# Patient Record
Sex: MALE | Race: OTHER | HISPANIC OR LATINO | URBAN - METROPOLITAN AREA
[De-identification: names, ages, dates, MRNs, and addresses within clinical notes are randomized per-mention and may not be internally consistent; named-entity substitution may affect disease eponyms.]

---

## 2019-02-03 ENCOUNTER — EMERGENCY (EMERGENCY)
Facility: HOSPITAL | Age: 29
LOS: 1 days | Discharge: HOME | End: 2019-02-03
Attending: EMERGENCY MEDICINE
Payer: SELF-PAY

## 2019-02-03 VITALS
RESPIRATION RATE: 18 BRPM | HEART RATE: 78 BPM | DIASTOLIC BLOOD PRESSURE: 64 MMHG | SYSTOLIC BLOOD PRESSURE: 112 MMHG | TEMPERATURE: 98 F | OXYGEN SATURATION: 100 %

## 2019-02-03 VITALS
HEART RATE: 111 BPM | SYSTOLIC BLOOD PRESSURE: 138 MMHG | WEIGHT: 136.91 LBS | HEIGHT: 64 IN | DIASTOLIC BLOOD PRESSURE: 83 MMHG | TEMPERATURE: 98 F | RESPIRATION RATE: 20 BRPM | OXYGEN SATURATION: 99 %

## 2019-02-03 LAB
ALBUMIN SERPL ELPH-MCNC: 4 G/DL — SIGNIFICANT CHANGE UP (ref 3.3–5)
ALP SERPL-CCNC: 247 U/L — HIGH (ref 40–120)
ALT FLD-CCNC: 65 U/L — HIGH (ref 10–45)
ANION GAP SERPL CALC-SCNC: 15 MMOL/L — SIGNIFICANT CHANGE UP (ref 5–17)
AST SERPL-CCNC: 70 U/L — HIGH (ref 10–40)
BASOPHILS # BLD AUTO: 0 K/UL — SIGNIFICANT CHANGE UP (ref 0–0.2)
BASOPHILS NFR BLD AUTO: 0.3 % — SIGNIFICANT CHANGE UP (ref 0–2)
BILIRUB SERPL-MCNC: 4.2 MG/DL — HIGH (ref 0.2–1.2)
BUN SERPL-MCNC: 15 MG/DL — SIGNIFICANT CHANGE UP (ref 7–23)
CALCIUM SERPL-MCNC: 9.5 MG/DL — SIGNIFICANT CHANGE UP (ref 8.4–10.5)
CHLORIDE SERPL-SCNC: 107 MMOL/L — SIGNIFICANT CHANGE UP (ref 96–108)
CO2 SERPL-SCNC: 20 MMOL/L — LOW (ref 22–31)
CREAT SERPL-MCNC: 0.85 MG/DL — SIGNIFICANT CHANGE UP (ref 0.5–1.3)
EOSINOPHIL # BLD AUTO: 0.1 K/UL — SIGNIFICANT CHANGE UP (ref 0–0.5)
EOSINOPHIL NFR BLD AUTO: 1.2 % — SIGNIFICANT CHANGE UP (ref 0–6)
GLUCOSE SERPL-MCNC: 119 MG/DL — HIGH (ref 70–99)
HCT VFR BLD CALC: 40.5 % — SIGNIFICANT CHANGE UP (ref 39–50)
HGB BLD-MCNC: 14.2 G/DL — SIGNIFICANT CHANGE UP (ref 13–17)
LIDOCAIN IGE QN: 26 U/L — SIGNIFICANT CHANGE UP (ref 7–60)
LYMPHOCYTES # BLD AUTO: 1.7 K/UL — SIGNIFICANT CHANGE UP (ref 1–3.3)
LYMPHOCYTES # BLD AUTO: 24.1 % — SIGNIFICANT CHANGE UP (ref 13–44)
MCHC RBC-ENTMCNC: 33.7 PG — SIGNIFICANT CHANGE UP (ref 27–34)
MCHC RBC-ENTMCNC: 35.1 GM/DL — SIGNIFICANT CHANGE UP (ref 32–36)
MCV RBC AUTO: 96.1 FL — SIGNIFICANT CHANGE UP (ref 80–100)
MONOCYTES # BLD AUTO: 0.5 K/UL — SIGNIFICANT CHANGE UP (ref 0–0.9)
MONOCYTES NFR BLD AUTO: 7.6 % — SIGNIFICANT CHANGE UP (ref 2–14)
NEUTROPHILS # BLD AUTO: 4.8 K/UL — SIGNIFICANT CHANGE UP (ref 1.8–7.4)
NEUTROPHILS NFR BLD AUTO: 66.9 % — SIGNIFICANT CHANGE UP (ref 43–77)
PLATELET # BLD AUTO: 424 K/UL — HIGH (ref 150–400)
POTASSIUM SERPL-MCNC: 3.7 MMOL/L — SIGNIFICANT CHANGE UP (ref 3.5–5.3)
POTASSIUM SERPL-SCNC: 3.7 MMOL/L — SIGNIFICANT CHANGE UP (ref 3.5–5.3)
PROT SERPL-MCNC: 7.5 G/DL — SIGNIFICANT CHANGE UP (ref 6–8.3)
RBC # BLD: 4.22 M/UL — SIGNIFICANT CHANGE UP (ref 4.2–5.8)
RBC # FLD: 12.1 % — SIGNIFICANT CHANGE UP (ref 10.3–14.5)
SODIUM SERPL-SCNC: 142 MMOL/L — SIGNIFICANT CHANGE UP (ref 135–145)
WBC # BLD: 7.2 K/UL — SIGNIFICANT CHANGE UP (ref 3.8–10.5)
WBC # FLD AUTO: 7.2 K/UL — SIGNIFICANT CHANGE UP (ref 3.8–10.5)

## 2019-02-03 PROCEDURE — 99284 EMERGENCY DEPT VISIT MOD MDM: CPT | Mod: 25

## 2019-02-03 PROCEDURE — 80053 COMPREHEN METABOLIC PANEL: CPT

## 2019-02-03 PROCEDURE — 82248 BILIRUBIN DIRECT: CPT

## 2019-02-03 PROCEDURE — 76705 ECHO EXAM OF ABDOMEN: CPT | Mod: 26,RT

## 2019-02-03 PROCEDURE — 83690 ASSAY OF LIPASE: CPT

## 2019-02-03 PROCEDURE — 99053 MED SERV 10PM-8AM 24 HR FAC: CPT

## 2019-02-03 PROCEDURE — 85027 COMPLETE CBC AUTOMATED: CPT

## 2019-02-03 PROCEDURE — 76705 ECHO EXAM OF ABDOMEN: CPT

## 2019-02-03 NOTE — ED ADULT NURSE NOTE - NSIMPLEMENTINTERV_GEN_ALL_ED
Implemented All Universal Safety Interventions:  [place] to call system. Call bell, personal items and telephone within reach. Instruct patient to call for assistance. Room bathroom lighting operational. Non-slip footwear when patient is off stretcher. Physically safe environment: no spills, clutter or unnecessary equipment. Stretcher in lowest position, wheels locked, appropriate side rails in place.

## 2019-02-03 NOTE — ED PROVIDER NOTE - NS ED ROS FT
General: no fevers or chills  Head: no headaches  Eyes: no vision changes  ENT:  no nasal discharge  CV: no chest pain, no palpitations  Resp: no SOB, no cough  GI: +nausea, +abdominal pain, no vomiting or diarrhea, no blood in stool  : no dysuria  MSK: no joint pain, no muscle aches  Skin: +itching  Neuro: no focal weakness, no change in sensation

## 2019-02-03 NOTE — ED PROVIDER NOTE - NSFOLLOWUPINSTRUCTIONS_ED_ALL_ED_FT
Please follow up with a gastroenterologist in the next 24-48 hours.    Return to the ED for any worsening symptoms of fevers or chills, worsening stomach pain, persistent vomiting, chest pain, difficulty breathing, or any new or concerning symptoms.    Please read all attached.

## 2019-02-03 NOTE — ED PROVIDER NOTE - PROGRESS NOTE DETAILS
Juana Ramirez, resident MD: discussed ultrasound results, no evidence of cholecystitis or hepatitic duct obstruction. discussed will require outpt follow up for management of elevated bilirubin and liver enzymes. gave copies of results and GI f/u information. return precautions were discussed.

## 2019-02-03 NOTE — ED PROVIDER NOTE - CARE PROVIDER_API CALL
Steve Mccartney (MD)  Gastroenterology; Internal Medicine  [address] [place] [address], [facility] [address]  [place] [address]  Phone: [phone]  Fax: [phone]

## 2019-02-03 NOTE — ED PROVIDER NOTE - MEDICAL DECISION MAKING DETAILS
27 yo man PMH cerebral palsy presents with itching, jaundice, and RUQ pain with +duckworth's on exam. concern for biliary etiology. was found to have enlarged gallbladder on ultrasound, elevated bilirubin, and transaminitis in [place]. will recheck labs and repeat RUQ ultrasound. no fever or history of fever. will pain control as needed and continue to monitor.

## 2019-02-03 NOTE — ED PROVIDER NOTE - PHYSICAL EXAMINATION
General: well-appearing young man in no acute distress  Head: normocephalic, atraumatic  Eyes: PERRL, mild scleral icterus  Mouth: moist mucous membranes, no oropharyngeal erythema, tongue and uvula midline  Neck: supple neck  CV: tachycardic, normal s1 and s2  Respiratory: clear to auscultation bilaterally  Abdomen: RUQ tenderness to palpation with +Roman's sign, soft, nondistended, bowel sounds present x 4 quadrants  Back: no midline tenderness to palpation, no CVAT  Neuro: alert and oriented x3, moving all extremities spontaneously  Skin: multiple excoriation on legs, feet, and arms  Extremities: no edema, peripheral pulses 2+ bilaterally

## 2019-02-03 NOTE — ED PROVIDER NOTE - OBJECTIVE STATEMENT
29 yo man PM cerebral palsy presents with itching and RUQ pain x 3 weeks. 29 yo man [facility] cerebral palsy presents with itching and RUQ pain x 3 weeks. Pt was in [place] x 2 months and began to have itching 3 weeks ago and was given medication to treat for scabies which did not help and labwork revealed transaminitis and elevated bili. He was then evaluated with RUQ ultrasound and was found to have hydropic gallbladder but no evidence of cholecystitis. Pt continued to have itching and then had RUQ pain yesterday and was seen in a hospital in [place] but did not pursue treatment. Pt currently does not complain of RUQ at rest, he denies worsening of pain with food intake. He continues to complain of itching and nausea but no vomiting. Pt has not had any fevers or chills during the past 3 weeks.

## 2019-02-03 NOTE — ED ADULT NURSE NOTE - CHPI ED NUR SYMPTOMS NEG
no dysuria/no blood in stool/no abdominal distension/no vomiting/no burning urination/no chills/no fever/no diarrhea/no hematuria

## 2019-02-03 NOTE — ED ADULT NURSE NOTE - OBJECTIVE STATEMENT
Pt presents to the ED with complaint of pruritis and 3 weeks of RUQ abdominal pain, pt states itching over entire body, lab work done in home area of [place] showed transaminitis, pt complaining of generalized itching, jaundice noted to sclera, abdomen nondistended, tender to palpation to RUQ, positive Roman's sign on exam, reports nausea, particularly after eating, no vomiting , no diarrhea, pt reports intermittent BRBPR, reportedly from hemmorhoids, none in the last week, no fevers or chills. Breathing unlabored, symmetrical, no shortness of breath, no chest pain. No dysuria or hematuria.

## 2019-02-09 VITALS — HEIGHT: 64 IN | WEIGHT: 132.94 LBS

## 2019-02-09 LAB
BASOPHILS # BLD AUTO: 0.07 K/UL — SIGNIFICANT CHANGE UP (ref 0–0.2)
BASOPHILS NFR BLD AUTO: 0.7 % — SIGNIFICANT CHANGE UP (ref 0–2)
EOSINOPHIL # BLD AUTO: 0.09 K/UL — SIGNIFICANT CHANGE UP (ref 0–0.5)
EOSINOPHIL NFR BLD AUTO: 0.9 % — SIGNIFICANT CHANGE UP (ref 0–6)
HCT VFR BLD CALC: 40.7 % — SIGNIFICANT CHANGE UP (ref 39–50)
HGB BLD-MCNC: 13.6 G/DL — SIGNIFICANT CHANGE UP (ref 13–17)
IMM GRANULOCYTES NFR BLD AUTO: 0.7 % — SIGNIFICANT CHANGE UP (ref 0–1.5)
LYMPHOCYTES # BLD AUTO: 1.34 K/UL — SIGNIFICANT CHANGE UP (ref 1–3.3)
LYMPHOCYTES # BLD AUTO: 12.9 % — LOW (ref 13–44)
MCHC RBC-ENTMCNC: 32.4 PG — SIGNIFICANT CHANGE UP (ref 27–34)
MCHC RBC-ENTMCNC: 33.4 GM/DL — SIGNIFICANT CHANGE UP (ref 32–36)
MCV RBC AUTO: 96.9 FL — SIGNIFICANT CHANGE UP (ref 80–100)
MONOCYTES # BLD AUTO: 0.59 K/UL — SIGNIFICANT CHANGE UP (ref 0–0.9)
MONOCYTES NFR BLD AUTO: 5.7 % — SIGNIFICANT CHANGE UP (ref 2–14)
NEUTROPHILS # BLD AUTO: 8.23 K/UL — HIGH (ref 1.8–7.4)
NEUTROPHILS NFR BLD AUTO: 79.1 % — HIGH (ref 43–77)
NRBC # BLD: 0 /100 WBCS — SIGNIFICANT CHANGE UP (ref 0–0)
PLATELET # BLD AUTO: 417 K/UL — HIGH (ref 150–400)
RBC # BLD: 4.2 M/UL — SIGNIFICANT CHANGE UP (ref 4.2–5.8)
RBC # FLD: 13.1 % — SIGNIFICANT CHANGE UP (ref 10.3–14.5)
WBC # BLD: 10.39 K/UL — SIGNIFICANT CHANGE UP (ref 3.8–10.5)
WBC # FLD AUTO: 10.39 K/UL — SIGNIFICANT CHANGE UP (ref 3.8–10.5)

## 2019-02-09 RX ORDER — SODIUM CHLORIDE 9 MG/ML
1000 INJECTION INTRAMUSCULAR; INTRAVENOUS; SUBCUTANEOUS ONCE
Qty: 0 | Refills: 0 | Status: COMPLETED | OUTPATIENT
Start: 2019-02-09 | End: 2019-02-09

## 2019-02-09 RX ORDER — FAMOTIDINE 10 MG/ML
20 INJECTION INTRAVENOUS ONCE
Qty: 0 | Refills: 0 | Status: COMPLETED | OUTPATIENT
Start: 2019-02-09 | End: 2019-02-09

## 2019-02-09 RX ORDER — ONDANSETRON 8 MG/1
4 TABLET, FILM COATED ORAL ONCE
Qty: 0 | Refills: 0 | Status: COMPLETED | OUTPATIENT
Start: 2019-02-09 | End: 2019-02-09

## 2019-02-09 RX ADMIN — FAMOTIDINE 20 MILLIGRAM(S): 10 INJECTION INTRAVENOUS at 23:58

## 2019-02-09 RX ADMIN — ONDANSETRON 4 MILLIGRAM(S): 8 TABLET, FILM COATED ORAL at 23:59

## 2019-02-09 RX ADMIN — SODIUM CHLORIDE 1000 MILLILITER(S): 9 INJECTION INTRAMUSCULAR; INTRAVENOUS; SUBCUTANEOUS at 23:58

## 2019-02-09 NOTE — ED ADULT TRIAGE NOTE - CHIEF COMPLAINT QUOTE
Pt presents to the ED for evaluation of R sided abdominal pain x2 days with +nausea and vomiting. Pt was diagnosed 1 wk ago in [place] with a "liver blockage". Denies fevers/chills.

## 2019-02-10 ENCOUNTER — INPATIENT (INPATIENT)
Facility: HOSPITAL | Age: 29
LOS: 1 days | Discharge: ANOTHER HOSPITAL | End: 2019-02-12
Attending: FAMILY MEDICINE | Admitting: FAMILY MEDICINE
Payer: MEDICAID

## 2019-02-10 DIAGNOSIS — R10.9 UNSPECIFIED ABDOMINAL PAIN: ICD-10-CM

## 2019-02-10 DIAGNOSIS — G80.9 CEREBRAL PALSY, UNSPECIFIED: ICD-10-CM

## 2019-02-10 DIAGNOSIS — K83.8 OTHER SPECIFIED DISEASES OF BILIARY TRACT: ICD-10-CM

## 2019-02-10 DIAGNOSIS — E80.7 DISORDER OF BILIRUBIN METABOLISM, UNSPECIFIED: ICD-10-CM

## 2019-02-10 DIAGNOSIS — K83.1 OBSTRUCTION OF BILE DUCT: ICD-10-CM

## 2019-02-10 DIAGNOSIS — K82.1 HYDROPS OF GALLBLADDER: ICD-10-CM

## 2019-02-10 LAB
ABO RH CONFIRMATION: SIGNIFICANT CHANGE UP
ALBUMIN SERPL ELPH-MCNC: 3.4 G/DL — SIGNIFICANT CHANGE UP (ref 3.3–5)
ALP SERPL-CCNC: 246 U/L — HIGH (ref 40–120)
ALT FLD-CCNC: 84 U/L — HIGH (ref 12–78)
ANION GAP SERPL CALC-SCNC: 12 MMOL/L — SIGNIFICANT CHANGE UP (ref 5–17)
APTT BLD: 32.8 SEC — SIGNIFICANT CHANGE UP (ref 27.5–36.3)
AST SERPL-CCNC: 77 U/L — HIGH (ref 15–37)
BILIRUB SERPL-MCNC: 4.6 MG/DL — HIGH (ref 0.2–1.2)
BLD GP AB SCN SERPL QL: SIGNIFICANT CHANGE UP
BUN SERPL-MCNC: 12 MG/DL — SIGNIFICANT CHANGE UP (ref 7–23)
CALCIUM SERPL-MCNC: 9 MG/DL — SIGNIFICANT CHANGE UP (ref 8.5–10.1)
CHLORIDE SERPL-SCNC: 104 MMOL/L — SIGNIFICANT CHANGE UP (ref 96–108)
CO2 SERPL-SCNC: 27 MMOL/L — SIGNIFICANT CHANGE UP (ref 22–31)
CREAT SERPL-MCNC: 0.95 MG/DL — SIGNIFICANT CHANGE UP (ref 0.5–1.3)
GLUCOSE SERPL-MCNC: 158 MG/DL — HIGH (ref 70–99)
INR BLD: 1.01 RATIO — SIGNIFICANT CHANGE UP (ref 0.88–1.16)
LIDOCAIN IGE QN: 171 U/L — SIGNIFICANT CHANGE UP (ref 73–393)
MAGNESIUM SERPL-MCNC: 2 MG/DL — SIGNIFICANT CHANGE UP (ref 1.6–2.6)
POTASSIUM SERPL-MCNC: 3.3 MMOL/L — LOW (ref 3.5–5.3)
POTASSIUM SERPL-SCNC: 3.3 MMOL/L — LOW (ref 3.5–5.3)
PROT SERPL-MCNC: 7.7 GM/DL — SIGNIFICANT CHANGE UP (ref 6–8.3)
PROTHROM AB SERPL-ACNC: 11.2 SEC — SIGNIFICANT CHANGE UP (ref 10–12.9)
SODIUM SERPL-SCNC: 143 MMOL/L — SIGNIFICANT CHANGE UP (ref 135–145)
TYPE + AB SCN PNL BLD: SIGNIFICANT CHANGE UP

## 2019-02-10 PROCEDURE — 99285 EMERGENCY DEPT VISIT HI MDM: CPT | Mod: 25

## 2019-02-10 PROCEDURE — 76705 ECHO EXAM OF ABDOMEN: CPT | Mod: 26

## 2019-02-10 RX ORDER — KETOROLAC TROMETHAMINE 30 MG/ML
30 SYRINGE (ML) INJECTION ONCE
Qty: 0 | Refills: 0 | Status: DISCONTINUED | OUTPATIENT
Start: 2019-02-10 | End: 2019-02-10

## 2019-02-10 RX ORDER — POTASSIUM CHLORIDE 20 MEQ
40 PACKET (EA) ORAL ONCE
Qty: 0 | Refills: 0 | Status: COMPLETED | OUTPATIENT
Start: 2019-02-10 | End: 2019-02-10

## 2019-02-10 RX ORDER — ACETAMINOPHEN 500 MG
650 TABLET ORAL EVERY 6 HOURS
Qty: 0 | Refills: 0 | Status: DISCONTINUED | OUTPATIENT
Start: 2019-02-10 | End: 2019-02-12

## 2019-02-10 RX ORDER — INFLUENZA VIRUS VACCINE 15; 15; 15; 15 UG/.5ML; UG/.5ML; UG/.5ML; UG/.5ML
0.5 SUSPENSION INTRAMUSCULAR ONCE
Qty: 0 | Refills: 0 | Status: DISCONTINUED | OUTPATIENT
Start: 2019-02-10 | End: 2019-02-12

## 2019-02-10 RX ADMIN — Medication 650 MILLIGRAM(S): at 09:45

## 2019-02-10 RX ADMIN — Medication 30 MILLIGRAM(S): at 00:10

## 2019-02-10 RX ADMIN — Medication 650 MILLIGRAM(S): at 09:05

## 2019-02-10 RX ADMIN — Medication 40 MILLIEQUIVALENT(S): at 02:13

## 2019-02-10 NOTE — ED ADULT NURSE NOTE - OBJECTIVE STATEMENT
Pt brought to ED complaining of right sided abdominal pain and back pain.  Pt states that he was in [place] 2 weeks ago and was diagnosed with a "liver blockage". Pt states that he returned approx a week ago and was taken directly to [facility]. Pt was discharged from their ED to follow up with a MRCP and possible ERCP. Pt states that he had the MRCP on Thursday and has not received any results as of yet. Pt presents with visible jaundice.

## 2019-02-10 NOTE — ED PROVIDER NOTE - NOTES
Recommends inpatient GI consult from Dr. Levi group for abnormal LFTs and he will consult concurrently for possible cholecystitis.

## 2019-02-10 NOTE — ED PROVIDER NOTE - MEDICAL DECISION MAKING DETAILS
Jaundice with abdominal pain, vomiting and abnormal LFTs last week. Likely will need transfer for higher level of care and ERCP, but will repeat labs and US in ED.

## 2019-02-10 NOTE — H&P ADULT - HISTORY OF PRESENT ILLNESS
29 y/o male presents with 3 week h/o intermittent RUQ pain along with N/V--saw dr yang 4 days ago and had MRCP performed (results not known)--presents with worsening RUQ pain and N/V after eating last night.  Sono shows mild intra and extrahepatic ductal dilation.  Given 1L IVF in ED  On my exam, stable vitals, NAD, awake and alert.  No fevers.  No hematemesis.  Normal BM.  No prior surgery and on no meds     Pt is FULL RESUSCITATION

## 2019-02-10 NOTE — H&P ADULT - ASSESSMENT
IMP:    27 y/o male presents with 3 week h/o intermittent RUQ pain along with N/V admitted with possible biliary disease secondary to stone--no evidence of acute cholecystitis     Plan:    RUQ pain--Consult dr yang for eval and MRCP report    PO diet as tolerated     Ambulation for DVT prophy    Above d/w mother at bedside

## 2019-02-10 NOTE — ED PROVIDER NOTE - PROGRESS NOTE DETAILS
JG:  Spoke with Surgery Dr. Fernando who will consult on patient in AM.  Recommends admission to medicine here at [facility] since workup being done by Dr. Connelly.  Awaiting MRCP results and reason for why LFTs abnormal from GI.  GI inpatient consult planned. Zosyn to be given in ED.

## 2019-02-10 NOTE — H&P ADULT - NSHPPHYSICALEXAM_GEN_ALL_CORE
Vital Signs Last 24 Hrs  T(C): 37 (09 Feb 2019 23:18), Max: 37 (09 Feb 2019 23:18)  T(F): 98.6 (09 Feb 2019 23:18), Max: 98.6 (09 Feb 2019 23:18)  HR: 64 (10 Feb 2019 06:47) (64 - 81)  BP: 121/71 (10 Feb 2019 06:47) (121/71 - 129/72)  BP(mean): --  RR: 16 (10 Feb 2019 06:47) (16 - 18)  SpO2: 100% (10 Feb 2019 06:47) (100% - 100%)

## 2019-02-10 NOTE — PATIENT PROFILE ADULT - NSPROGENDIFFINTUB_GEN_A_NUR
Ventricular Rate : 70   Atrial Rate : 70   P-R Interval : 158   QRS Duration : 74   Q-T Interval : 382   QTC Calculation(Bezet) : 412   P Axis : 49   R Axis : 35   T Axis : 12   Diagnosis : Normal sinus rhythm~Nonspecific T wave abnormality~****Abnormal ECG****~When compared with ECG of 06-MAR-2018 11:49,~No significant change was found~Confirmed by ABDELRAHMAN ENRIQUEZ MASOOD ([id number]) on 3/7/2018 3:33:27 PM      never intubated

## 2019-02-10 NOTE — ED PROVIDER NOTE - OBJECTIVE STATEMENT
Pt. is a 27 yo M with a PMHx of cerebral palsy not on any daily medications, with recent workup for biliary obstruction BIB mom for vomiting and RUQ abdominal pain radiating to mid back.  Pt. was in [place] ED where he had elevated liver enzymes and bilirubin but normal US of the RUQ.  Pt. was seen in [place] when the symptoms started about 3 wks ago.  His symptoms in [place] began as itching and intermittent abdominal pain with meals.  Pt. saw GI Dr. Aguilar Connelly in office last week and had outpatient MRCP 3 days ago without results yet.  Mom giving unknown medication for itching from GI specialist but patient has not taken anything for pain.  Pt. has never had surgery.  Mom states stools were gray in [facility] but patient states today he had normal bowel movements.

## 2019-02-10 NOTE — ED ADULT NURSE NOTE - NSIMPLEMENTINTERV_GEN_ALL_ED
Implemented All Fall Risk Interventions:  [place] to call system. Call bell, personal items and telephone within reach. Instruct patient to call for assistance. Room bathroom lighting operational. Non-slip footwear when patient is off stretcher. Physically safe environment: no spills, clutter or unnecessary equipment. Stretcher in lowest position, wheels locked, appropriate side rails in place. Provide visual cue, wrist band, yellow gown, etc. Monitor gait and stability. Monitor for mental status changes and reorient to person, place, and time. Review medications for side effects contributing to fall risk. Reinforce activity limits and safety measures with patient and family.

## 2019-02-10 NOTE — ED PROVIDER NOTE - MUSCULOSKELETAL, MLM
Spine appears normal, range of motion is not limited, no muscle or joint tenderness; no back tenderness.

## 2019-02-10 NOTE — H&P ADULT - NSHPLABSRESULTS_GEN_ALL_CORE
13.6   10.39 )-----------( 417      ( 09 Feb 2019 23:46 )             40.7       CBC Full  -  ( 09 Feb 2019 23:46 )  WBC Count : 10.39 K/uL  Hemoglobin : 13.6 g/dL  Hematocrit : 40.7 %  Platelet Count - Automated : 417 K/uL  Mean Cell Volume : 96.9 fl  Mean Cell Hemoglobin : 32.4 pg  Mean Cell Hemoglobin Concentration : 33.4 gm/dL  Auto Neutrophil # : 8.23 K/uL  Auto Lymphocyte # : 1.34 K/uL  Auto Monocyte # : 0.59 K/uL  Auto Eosinophil # : 0.09 K/uL  Auto Basophil # : 0.07 K/uL  Auto Neutrophil % : 79.1 %  Auto Lymphocyte % : 12.9 %  Auto Monocyte % : 5.7 %  Auto Eosinophil % : 0.9 %  Auto Basophil % : 0.7 %      02-09    143  |  104  |  12  ----------------------------<  158<H>  3.3<L>   |  27  |  0.95    Ca    9.0      09 Feb 2019 23:46  Mg     2.0     02-09    TPro  7.7  /  Alb  3.4  /  TBili  4.6<H>  /  DBili  x   /  AST  77<H>  /  ALT  84<H>  /  AlkPhos  246<H>  02-09      PT/INR - ( 09 Feb 2019 23:46 )   PT: 11.2 sec;   INR: 1.01 ratio         PTT - ( 09 Feb 2019 23:46 )  PTT:32.8 sec        LIVER FUNCTIONS - ( 09 Feb 2019 23:46 )  Alb: 3.4 g/dL / Pro: 7.7 gm/dL / ALK PHOS: 246 U/L / ALT: 84 U/L / AST: 77 U/L / GGT: x

## 2019-02-11 ENCOUNTER — TRANSCRIPTION ENCOUNTER (OUTPATIENT)
Age: 29
End: 2019-02-11

## 2019-02-11 LAB
ALBUMIN SERPL ELPH-MCNC: 3 G/DL — LOW (ref 3.3–5)
ALP SERPL-CCNC: 252 U/L — HIGH (ref 40–120)
ALT FLD-CCNC: 72 U/L — SIGNIFICANT CHANGE UP (ref 12–78)
ANION GAP SERPL CALC-SCNC: 10 MMOL/L — SIGNIFICANT CHANGE UP (ref 5–17)
AST SERPL-CCNC: 71 U/L — HIGH (ref 15–37)
BILIRUB DIRECT SERPL-MCNC: 5.4 MG/DL — HIGH (ref 0–0.2)
BILIRUB INDIRECT FLD-MCNC: 0.5 MG/DL — SIGNIFICANT CHANGE UP (ref 0.2–1)
BILIRUB SERPL-MCNC: 5.9 MG/DL — HIGH (ref 0.2–1.2)
BUN SERPL-MCNC: 12 MG/DL — SIGNIFICANT CHANGE UP (ref 7–23)
CALCIUM SERPL-MCNC: 8.9 MG/DL — SIGNIFICANT CHANGE UP (ref 8.5–10.1)
CHLORIDE SERPL-SCNC: 108 MMOL/L — SIGNIFICANT CHANGE UP (ref 96–108)
CO2 SERPL-SCNC: 22 MMOL/L — SIGNIFICANT CHANGE UP (ref 22–31)
CREAT SERPL-MCNC: 0.77 MG/DL — SIGNIFICANT CHANGE UP (ref 0.5–1.3)
GLUCOSE SERPL-MCNC: 85 MG/DL — SIGNIFICANT CHANGE UP (ref 70–99)
HAV IGM SER-ACNC: SIGNIFICANT CHANGE UP
HBV CORE IGM SER-ACNC: SIGNIFICANT CHANGE UP
HBV SURFACE AG SER-ACNC: SIGNIFICANT CHANGE UP
HCT VFR BLD CALC: 41.1 % — SIGNIFICANT CHANGE UP (ref 39–50)
HCV AB S/CO SERPL IA: 0.09 S/CO — SIGNIFICANT CHANGE UP
HCV AB SERPL-IMP: SIGNIFICANT CHANGE UP
HGB BLD-MCNC: 13.9 G/DL — SIGNIFICANT CHANGE UP (ref 13–17)
HIV 1+2 AB+HIV1 P24 AG SERPL QL IA: SIGNIFICANT CHANGE UP
MCHC RBC-ENTMCNC: 32.8 PG — SIGNIFICANT CHANGE UP (ref 27–34)
MCHC RBC-ENTMCNC: 33.8 GM/DL — SIGNIFICANT CHANGE UP (ref 32–36)
MCV RBC AUTO: 96.9 FL — SIGNIFICANT CHANGE UP (ref 80–100)
NRBC # BLD: 0 /100 WBCS — SIGNIFICANT CHANGE UP (ref 0–0)
PLATELET # BLD AUTO: 394 K/UL — SIGNIFICANT CHANGE UP (ref 150–400)
POTASSIUM SERPL-MCNC: 3.9 MMOL/L — SIGNIFICANT CHANGE UP (ref 3.5–5.3)
POTASSIUM SERPL-SCNC: 3.9 MMOL/L — SIGNIFICANT CHANGE UP (ref 3.5–5.3)
PROT SERPL-MCNC: 7.1 GM/DL — SIGNIFICANT CHANGE UP (ref 6–8.3)
RBC # BLD: 4.24 M/UL — SIGNIFICANT CHANGE UP (ref 4.2–5.8)
RBC # FLD: 13.4 % — SIGNIFICANT CHANGE UP (ref 10.3–14.5)
SODIUM SERPL-SCNC: 140 MMOL/L — SIGNIFICANT CHANGE UP (ref 135–145)
WBC # BLD: 7.22 K/UL — SIGNIFICANT CHANGE UP (ref 3.8–10.5)
WBC # FLD AUTO: 7.22 K/UL — SIGNIFICANT CHANGE UP (ref 3.8–10.5)

## 2019-02-11 RX ORDER — DIPHENHYDRAMINE HCL 50 MG
25 CAPSULE ORAL EVERY 6 HOURS
Qty: 0 | Refills: 0 | Status: DISCONTINUED | OUTPATIENT
Start: 2019-02-11 | End: 2019-02-12

## 2019-02-11 RX ORDER — DEXTROSE MONOHYDRATE, SODIUM CHLORIDE, AND POTASSIUM CHLORIDE 50; .745; 4.5 G/1000ML; G/1000ML; G/1000ML
1000 INJECTION, SOLUTION INTRAVENOUS
Qty: 0 | Refills: 0 | Status: DISCONTINUED | OUTPATIENT
Start: 2019-02-11 | End: 2019-02-12

## 2019-02-11 RX ORDER — DIPHENHYDRAMINE HCL 50 MG
25 CAPSULE ORAL
Qty: 0 | Refills: 0 | COMMUNITY
Start: 2019-02-11

## 2019-02-11 RX ORDER — KETOROLAC TROMETHAMINE 30 MG/ML
15 SYRINGE (ML) INJECTION
Qty: 0 | Refills: 0 | COMMUNITY
Start: 2019-02-11

## 2019-02-11 RX ORDER — KETOROLAC TROMETHAMINE 30 MG/ML
15 SYRINGE (ML) INJECTION EVERY 6 HOURS
Qty: 0 | Refills: 0 | Status: DISCONTINUED | OUTPATIENT
Start: 2019-02-11 | End: 2019-02-12

## 2019-02-11 RX ORDER — HYDROMORPHONE HYDROCHLORIDE 2 MG/ML
0.5 INJECTION INTRAMUSCULAR; INTRAVENOUS; SUBCUTANEOUS
Qty: 0 | Refills: 0 | COMMUNITY
Start: 2019-02-11

## 2019-02-11 RX ORDER — DEXTROSE MONOHYDRATE, SODIUM CHLORIDE, AND POTASSIUM CHLORIDE 50; .745; 4.5 G/1000ML; G/1000ML; G/1000ML
1000 INJECTION, SOLUTION INTRAVENOUS
Qty: 0 | Refills: 0 | COMMUNITY
Start: 2019-02-11

## 2019-02-11 RX ORDER — DEXTROSE MONOHYDRATE, SODIUM CHLORIDE, AND POTASSIUM CHLORIDE 50; .745; 4.5 G/1000ML; G/1000ML; G/1000ML
1000 INJECTION, SOLUTION INTRAVENOUS
Qty: 0 | Refills: 0 | Status: DISCONTINUED | OUTPATIENT
Start: 2019-02-11 | End: 2019-02-11

## 2019-02-11 RX ORDER — HYDROMORPHONE HYDROCHLORIDE 2 MG/ML
0.5 INJECTION INTRAMUSCULAR; INTRAVENOUS; SUBCUTANEOUS EVERY 4 HOURS
Qty: 0 | Refills: 0 | Status: DISCONTINUED | OUTPATIENT
Start: 2019-02-11 | End: 2019-02-12

## 2019-02-11 RX ADMIN — DEXTROSE MONOHYDRATE, SODIUM CHLORIDE, AND POTASSIUM CHLORIDE 75 MILLILITER(S): 50; .745; 4.5 INJECTION, SOLUTION INTRAVENOUS at 13:48

## 2019-02-11 RX ADMIN — Medication 25 MILLIGRAM(S): at 19:35

## 2019-02-11 RX ADMIN — Medication 25 MILLIGRAM(S): at 13:48

## 2019-02-11 NOTE — DISCHARGE NOTE ADULT - MEDICATION SUMMARY - MEDICATIONS TO TAKE
I will START or STAY ON the medications listed below when I get home from the hospital:    HYDROmorphone  -- 0.5 milligram(s) subcutaneous every 4 hours, As Needed for moderate pain   -- Indication: For pain    ketorolac  -- 15 milligram(s) intravenous every 8 hours, As Needed for moderate pain   -- Indication: For pain    diphenhydrAMINE  -- 25 milligram(s) by mouth every 6 hours, As Needed for pruritis  -- Indication: For pruritis    Sodium Chloride 0.9% with KCl 20 mEq/L intravenous solution  -- 1000 milliliter(s) intravenous   -- Indication: For Hydration I will START or STAY ON the medications listed below when I get home from the hospital:    HYDROmorphone  -- 0.5 milligram(s) subcutaneous every 4 hours, As Needed for moderate pain   -- Indication: For pain    ketorolac  -- 15 milligram(s) intravenous every 8 hours, As Needed for moderate pain   -- Indication: For pain    diphenhydrAMINE  -- 25 milligram(s) by mouth every 6 hours, As Needed for pruritis  -- Indication: For pruritis

## 2019-02-11 NOTE — DISCHARGE NOTE ADULT - PLAN OF CARE
evaluate Transfer to [facility] for ERCP and possible EUS Transfer to  [facility]  for ERCP and possible EUS

## 2019-02-11 NOTE — CONSULT NOTE ADULT - ASSESSMENT
28-year-old male with recurrent episodes of right upper quadrant pain associated with nausea and vomiting. Elevated LFTs.  MRCP with concern regarding biliary pathology.    Would suggest ERCP/EUS to evaluate further at tertiary care center. Discussed with hospitalist and she will arrange.    Rule out HIV cholangiopathy, rule out secondary causes. Rule out malignancy.

## 2019-02-11 NOTE — DISCHARGE NOTE ADULT - PATIENT PORTAL LINK FT
You can access the [facility] Patient Portal, offered by [facility], by registering with the following website: [email or web address]

## 2019-02-11 NOTE — DISCHARGE NOTE ADULT - OTHER SIGNIFICANT FINDINGS
MRI OF THE ABDOMEN WITH AND WITHOUT IV-CONTRAST   Clinical indication: Abnormal LFTs. Evaluate biliary obstruction. Elevated bilirubin, jaundice and pruritus.   Technique: Multiplanar T1, T2 and diffusion weighted acquisitions of the upper abdomen are obtained to include fat-suppressed volumetric T1-weighted fat-suppressed sequences obtained pre and post IV contrast administration as well as gradient echo in- and opposed-phase T1-weighted imaging. MRCP is also performed.   Contrast: 6.2 cc of IV-Gadavist    Comparison: Outside abdominal pelvic CT report dated 2/1/2019   Findings:   LIVER: Normal size with mild steatosis (that fraction 9.7%); no cirrhotic morphology. No evidence for discrete well-defined enhancing lesion however there is heterogeneous enhancement at the confluence of the right and left main intrahepatic ducts. Punctate less than 5 mm cyst in the right lobe posteriorly.   SPLEEN: Normal small splenule caudally   BILIARY SYSTEM: Mildly hydropic gallbladder without calculi or acute bladder inflammatory changes. Moderate intrahepatic bile duct dilatation in the left and right lobes extending to the confluence of the left and right main ducts where there is heterogeneous enhancement without a well-defined lesion or definite restricted diffusion (series 17, image 41). Nondilated common bile duct. No no appreciable 'beading' of the ducts. No evidence for calculi.   PANCREAS: No mass, main duct dilatation or atrophy.   ADRENALS: Normal   KIDNEYS: Normal   BOWEL: No bowel dilatation.   LYMPH NODES: No lymphadenopathy.   VASCULATURE: Patent portal, splenic and superior mesenteric veins. Normal caliber abdominal aorta.   PERITONEUM/ABDOMINAL WALL: No ascites.  Intact upper ventral wall.   SKELETAL: Normal marrow signal.   LUNG BASES:No infiltrate or effusion. Detailed lung evaluation is limited with MRI technique.   Electronic Signature: I personally reviewed the images and agree with this report. Final Report: Dictated by  and Signed by Attending Bharathi Del Rosario MD 2/7/2019 5:23 PM Impression   IMPRESSION: MILDLY HYDROPIC GALLBLADDER AND MODERATE INTRAHEPATIC BILE DUCT DILATATION EXTENDING TO THE CONFLUENCE OF THE RIGHT AND LEFT MAIN HEPATIC DUCTS WHERE THERE IS HETEROGENEOUS SIGNAL AND ENHANCEMENT WITHOUT DISCRETE WELL-DEFINED LESION OR EVIDENCE FOR BILE DUCT CALCULUS. NONDILATED COMMON BILE DUCT AND MAIN PANCREATIC DUCT.   FURTHER EVALUATION WITH CONVENTIONAL ERCP AND BRUSHINGS IS RECOMMENDED TO EXCLUDE EVIDENCE FOR CHOLANGIOCARCINOMA KLATSKIN TUMOR. ALTERNATIVE ETIOLOGIES INCLUDE BILIARY INFLAMMATORY PSEUDOTUMOR, AUTOIMMUNE CHOLANGITIS AND FIBROSING DISEASE.

## 2019-02-11 NOTE — CONSULT NOTE ADULT - SUBJECTIVE AND OBJECTIVE BOX
Patient is a 28y old  Male who presents with a chief complaint of RUQ pain (10 Feb 2019 09:00)      HPI:  27 y/o male presents with 3 week h/o intermittent RUQ pain along with N/V--saw dr yang 4 days ago and had MRCP performed (results not known)--presents with worsening RUQ pain and N/V after eating last night.  Sono shows mild intra and extrahepatic ductal dilation.  Given 1L IVF in ED  On my exam, stable vitals, NAD, awake and alert.  No fevers.  No hematemesis.  Normal BM.  No prior surgery and on no meds     Pt is FULL RESUSCITATION (10 Feb 2019 09:00)    History per patient and mother. Patient is a poor historian. Patient was seen by Dr. SUMI Yang as an outpatient.  Patient has been having recurrent episodes of abdominal pain in the right upper quadrant area associated with nausea and vomiting. He has a hard time describing it. He has lost about over 30 pounds per mother. Negative fevers. The pain has been recurrent became more severe for him.  He is a poor historian is a hard time describing the details of his symptoms.  Outside MRI was copied into my note below.  Case was discussed with hospitalist as well as nursing staff, and mother      PAST MEDICAL & SURGICAL HISTORY:  Cerebral palsy  No significant past surgical history      MEDICATIONS  (STANDING):  influenza   Vaccine 0.5 milliLiter(s) IntraMuscular once    MEDICATIONS  (PRN):  acetaminophen   Tablet .. 650 milliGRAM(s) Oral every 6 hours PRN Temp greater or equal to 38.5C (101.3F), Moderate Pain (4 - 6)      Allergies    No Known Allergies    Intolerances        SOCIAL HISTORY:poor historian, neg drugs    FAMILY HISTORY: NC, DW pt      REVIEW OF SYSTEMS:    CONSTITUTIONAL: No weakness, fevers or chills  EYES/ENT: No visual changes;  No vertigo or throat pain   NECK: No pain or stiffness  RESPIRATORY: No cough, wheezing, hemoptysis; No shortness of breath  CARDIOVASCULAR: No chest pain or palpitations  GENITOURINARY: No dysuria, frequency or hematuria  NEUROLOGICAL: No numbness or weakness  SKIN: No itching, burning, rashes, or lesions   All other review of systems is negative unless indicated above.    Vital Signs Last 24 Hrs  T(C): 36.4 (11 Feb 2019 05:13), Max: 36.8 (10 Feb 2019 09:44)  T(F): 97.6 (11 Feb 2019 05:13), Max: 98.3 (10 Feb 2019 10:58)  HR: 69 (11 Feb 2019 05:13) (69 - 80)  BP: 119/54 (11 Feb 2019 05:13) (119/54 - 132/55)  BP(mean): --  RR: 18 (11 Feb 2019 05:13) (16 - 18)  SpO2: 99% (11 Feb 2019 05:13) (97% - 100%)    PHYSICAL EXAM:    Constitutional: NAD, well-developed  HEENT: EOMI, throat clear  Neck: No LAD, supple  Respiratory: CTA and P  Cardiovascular: S1 and S2, RRR, no M  Gastrointestinal: BS+, soft, RUQ tenderness/ND, neg HSM,  Extremities: No peripheral edema, neg clubing, cyanosis  Vascular: 2+ peripheral pulses  Neurological: A/O x 3, no focal deficits  Psychiatric: Normal mood, normal affect  Skin: No rashes    LABS:  CBC Full  -  ( 11 Feb 2019 07:20 )  WBC Count : 7.22 K/uL  Hemoglobin : 13.9 g/dL  Hematocrit : 41.1 %  Platelet Count - Automated : 394 K/uL  Mean Cell Volume : 96.9 fl  Mean Cell Hemoglobin : 32.8 pg  Mean Cell Hemoglobin Concentration : 33.8 gm/dL  Auto Neutrophil # : x  Auto Lymphocyte # : x  Auto Monocyte # : x  Auto Eosinophil # : x  Auto Basophil # : x  Auto Neutrophil % : x  Auto Lymphocyte % : x  Auto Monocyte % : x  Auto Eosinophil % : x  Auto Basophil % : x    02-11    140  |  108  |  12  ----------------------------<  85  3.9   |  22  |  0.77    Ca    8.9      11 Feb 2019 07:20  Mg     2.0     02-09    TPro  7.1  /  Alb  3.0<L>  /  TBili  5.9<H>  /  DBili  5.4<H>  /  AST  71<H>  /  ALT  72  /  AlkPhos  252<H>  02-11    PT/INR - ( 09 Feb 2019 23:46 )   PT: 11.2 sec;   INR: 1.01 ratio         PTT - ( 09 Feb 2019 23:46 )  PTT:32.8 sec        RADIOLOGY & ADDITIONAL STUDIES:  < from: US Abdomen Limited (02.10.19 @ 00:47) >  EXAM:  US ABDOMEN LIMITED                            PROCEDURE DATE:  02/10/2019          INTERPRETATION:  CLINICAL INFORMATION: Right upper quadrant pain and   vomiting    COMPARISON: 2/3/2019    TECHNIQUE: Sonography of the right upper quadrant.     FINDINGS:    Liver: Within normal limits.    Bile ducts: Mild intrahepatic ductal dilatation. CBD dilatation up to 9   mm.    Gallbladder: Mildly distended. No wall thickening or pericholecystic   fluid. No cholelithiasis.    Pancreas: Not visualized due to overlying bowel gas.    Right kidney: 9.3 cm. No hydronephrosis.        Ascites: None.    IVC and aorta: Nonvisualized due to bowel.    IMPRESSION:     Mild intrahepatic and extrahepatic ductal dilatation of unclear etiology.   Correlation with MRCP reportedly performed 3 days ago is recommended.                TIFFANI GONZALEZ   This document has been electronically signed. Feb 10 2019 12:54AM    < end of copied text >      MRI ABDOMEN MR CHOLANGIOGRAM 3D RECONSTRUCTION WITH AND WITHOUT IV CONTRAST [[id number]]     Status: Final result	      Result Information     Status: Final result (Exam End: 2/7/2019 16:30)	Provider Status: Open	  Imaging Links      PACS Images    Show images for MRI ABDOMEN MR CHOLANGIOGRAM 3D RECONSTRUCTION WITH AND WITHOUT IV CONTRAST <epic:EPIC?ACTIVITY&RIS_PACS_REDIRECTOR&PATIENTDAT;77991.99&#7;9PATIENTID;  [id number]&#7;9RIS_ORDER_ID;[id number]&%[id number]%7C%[id number]&>  	 Virtual Consult <epic:EPIC?ACTIVITY&RIS_PACS_REDIRECTOR&PATIENTDAT;93187.99&#7;9PATIENTID;  [id number]&#7;9RIS_ORDER_ID;[id number]&%[id number]%[id number]&>  	    Enhanced Imaging Report      View Image <epic:EPIC?ACTIVITY&ED_FLOATING_BROWSER&PATIENTID;  [id number]&http%[email or web address]%[email or web address].aspx%3FencryptedKey%0HUXEXEVsrkDYgkeSx9s5dDvYxe5YxIAoeEndoeMVEZYlPQ3eHKritgx%129Zso9bQqA5U1uU29IcWs4l6XShThyvSqzYbd3G%253D%[email or web address]%7Cshowtoolbar%7Cprintenabled%7Chomeenabled%7Cshowvscroll%7Cshowiemenu&LAUNCH_OPTIONS;2>	    Result History   Order [id number]       Signed By     Signed	Date/Time		Phone	Pager	  FREDDYABIDAARTIE DANIEL	Feb 7, 2019	 5:23 PM	[phone]		  Impression     IMPRESSION: MILDLY HYDROPIC GALLBLADDER AND MODERATE INTRAHEPATIC BILE DUCT DILATATION EXTENDING TO THE CONFLUENCE OF THE RIGHT AND LEFT MAIN HEPATIC DUCTS WHERE THERE IS HETEROGENEOUS SIGNAL AND ENHANCEMENT WITHOUT DISCRETE WELL-DEFINED LESION OR EVIDENCE FOR BILE DUCT CALCULUS. NONDILATED COMMON BILE DUCT AND MAIN PANCREATIC DUCT.   FURTHER EVALUATION WITH CONVENTIONAL ERCP AND BRUSHINGS IS RECOMMENDED TO EXCLUDE EVIDENCE FOR CHOLANGIOCARCINOMA KLATSKIN TUMOR. ALTERNATIVE ETIOLOGIES INCLUDE BILIARY INFLAMMATORY PSEUDOTUMOR, AUTOIMMUNE CHOLANGITIS AND FIBROSING DISEASE.    	    	   	  Findings     MRI OF THE ABDOMEN WITH AND WITHOUT IV-CONTRAST   Clinical indication: Abnormal LFTs. Evaluate biliary obstruction. Elevated bilirubin, jaundice and pruritus.   Technique: Multiplanar T1, T2 and diffusion weighted acquisitions of the upper abdomen are obtained to include fat-suppressed volumetric T1-weighted fat-suppressed sequences obtained pre and post IV contrast administration as well as gradient echo in- and opposed-phase T1-weighted imaging. MRCP is also performed.   Contrast: 6.2 cc of IV-Gadavist    Comparison: Outside abdominal pelvic CT report dated 2/1/2019   Findings:   LIVER: Normal size with mild steatosis (that fraction 9.7%); no cirrhotic morphology. No evidence for discrete well-defined enhancing lesion however there is heterogeneous enhancement at the confluence of the right and left main intrahepatic ducts. Punctate less than 5 mm cyst in the right lobe posteriorly.   SPLEEN: Normal small splenule caudally   BILIARY SYSTEM: Mildly hydropic gallbladder without calculi or acute bladder inflammatory changes. Moderate intrahepatic bile duct dilatation in the left and right lobes extending to the confluence of the left and right main ducts where there is heterogeneous enhancement without a well-defined lesion or definite restricted diffusion (series 17, image 41). Nondilated common bile duct. No no appreciable 'beading' of the ducts. No evidence for calculi.   PANCREAS: No mass, main duct dilatation or atrophy.   ADRENALS: Normal   KIDNEYS: Normal   BOWEL: No bowel dilatation.   LYMPH NODES: No lymphadenopathy.   VASCULATURE: Patent portal, splenic and superior mesenteric veins. Normal caliber abdominal aorta.   PERITONEUM/ABDOMINAL WALL: No ascites.  Intact upper ventral wall.   SKELETAL: Normal marrow signal.   LUNG BASES:No infiltrate or effusion. Detailed lung evaluation is limited with MRI technique.   Electronic Signature: I personally reviewed the images and agree with this report. Final Report: Dictated by  and Signed by Attending Artie Del Rosario MD 2/7/2019 5:23 PM

## 2019-02-11 NOTE — DISCHARGE NOTE ADULT - CARE PLAN
Principal Discharge DX:	Biliary obstruction  Goal:	evaluate  Assessment and plan of treatment:	Transfer to [facility] for ERCP and possible EUS Principal Discharge DX:	Biliary obstruction  Goal:	evaluate  Assessment and plan of treatment:	Transfer to  [facility]  for ERCP and possible EUS

## 2019-02-11 NOTE — DISCHARGE NOTE ADULT - HOSPITAL COURSE
29 y/o male with PMH od Cerebral palsy presented to ED c/o  intermittent RUQ pain along with N/V  x 3 weeks, Pt was evaluated by  Dr Connelly  5 days ago and had MRCP performed (results  see below), Pt reported  worsening  of  RUQ pain and N/V after eating. + Pruritis and Mild jaundice. No fevers or chills.  Recent travel  to [place]    In ED     Sono showed  mild intra and extrahepatic ductal dilation.  Elevated Total Bili and LFTS.  Today Pt reports some improvement of pain and jaundice. Denies Fevers or chills. Results and plan for transfer for further evaluation dussed with Pt and mother at bedside and they agreed       Vital Signs Last 24 Hrs   T(C): 36.5 (11 Feb 2019 10:39), Max: 36.6 (10 Feb 2019 16:39)  T(F): 97.7 (11 Feb 2019 10:39), Max: 97.8 (10 Feb 2019 16:39)  HR: 71 (11 Feb 2019 10:39) (69 - 80)  BP: 156/70 (11 Feb 2019 10:39) (119/54 - 156/70)  RR: 19 (11 Feb 2019 10:39) (18 - 19)  SpO2: 100% (11 Feb 2019 10:39) (97% - 100%)    PHYSICAL EXAM:    General: Well developed; well nourished; in no acute distress  Eyes: PERRLA, EOMI; mild icterus   Head: Normocephalic; atraumatic  ENMT: No nasal discharge; airway clear  Neck: Supple; non tender; no masses  Respiratory: Good air enrty.  No wheezes, rales or rhonchi  Cardiovascular: Regular rate and rhythm. S1 and S2 Normal; No murmurs  Gastrointestinal: Soft non-tender non-distended; Normal bowel sounds  Genitourinary: No costovertebral angle tenderness  Extremities: Normal range of motion, No  edema  Vascular: Peripheral pulses palpable 2+ bilaterally  Neurological: Alert and oriented x4  Skin: Warm and dry. No acute rash  Lymph Nodes: No acute cervical adenopathy  Musculoskeletal: Normal muscle  tone, without deformities  Psychiatric: Cooperative and appropriate      A/p:       1. RUQ pain due to intrahepatic biliary Obstruction with elevated LFTS  No signs of cholangitis   VS stable  Bilirubin trending up  D/w Dr Gabbaizadeh, as per MRCP needs further ERCP and EUS  Clears   IVF  Control pain       2. DVT PPxs: ambulate         Dispo: Stable for transfer to [facility] for ERCP, Transfer center contacted, D/w GI and Hospitalist.  Plan for transfer when bed is available. Accepting physician DR Ayala     Total Time 60 min 29 y/o male with PMH od Cerebral palsy presented to ED c/o  intermittent RUQ pain along with N/V  x 3 weeks, Pt was evaluated by  Dr Connelly  5 days ago and had MRCP performed (results  see below), Pt reported  worsening  of  RUQ pain and N/V after eating. + Pruritis and Mild jaundice. No fevers or chills.  Recent travel  to [place]    In ED   Sono showed  mild intra and extrahepatic ductal dilation.  Elevated Total Bili and LFTS.  Today Pt reports  improvement of pain, + itchiness. Denies Fevers or chills. Results and plan for transfer for further evaluation discussed  with Pt and mother at bedside and they agreed     Vital Signs Last 24 Hrs  T(C): 36.7 (12 Feb 2019 05:10), Max: 36.8 (11 Feb 2019 17:07)  T(F): 98 (12 Feb 2019 05:10), Max: 98.2 (11 Feb 2019 17:07)  HR: 97 (12 Feb 2019 05:10) (68 - 97)  BP: 118/77 (12 Feb 2019 05:10) (118/77 - 122/49)  RR: 16 (11 Feb 2019 17:07) (16 - 16)  SpO2: 98% (12 Feb 2019 05:10) (98% - 100%)    PHYSICAL EXAM:  General: Well developed; well nourished; in no acute distress  Eyes: PERRLA, EOMI; mild icterus   Head: Normocephalic; atraumatic  ENMT: No nasal discharge; airway clear  Neck: Supple; non tender; no masses  Respiratory: Good air enrty.  No wheezes, rales or rhonchi  Cardiovascular: Regular rate and rhythm. S1 and S2 Normal; No murmurs  Gastrointestinal: Soft non-tender non-distended; Normal bowel sounds  Genitourinary: No costovertebral angle tenderness  Extremities: Normal range of motion, No  edema  Vascular: Peripheral pulses palpable 2+ bilaterally  Neurological: Alert and oriented x4  Skin: Warm and dry. No acute rash  Lymph Nodes: No acute cervical adenopathy  Musculoskeletal: Normal muscle  tone, without deformities  Psychiatric: Cooperative and appropriate      A/p:     1. RUQ pain due to intrahepatic biliary Obstruction with elevated LFTS and jaundice   No signs of cholangitis   VS stable  Bilirubin trending up  D/w Dr Gabbaizadeh, as per MRCP needs further ERCP and EUS  Control pain   Plan for transfer today     2. DVT PPxs: ambulate         Dispo: Stable for transfer to [facility]  for ERCP/EUS, Transfer center contacted, D/w Hospitalist. Accepting MD DR Shay. DR Lidia Howard accepted case     Total Time 60 min

## 2019-02-12 ENCOUNTER — INPATIENT (INPATIENT)
Facility: HOSPITAL | Age: 29
LOS: 5 days | Discharge: HOME | DRG: 444 | End: 2019-02-18
Attending: HOSPITALIST | Admitting: HOSPITALIST
Payer: MEDICAID

## 2019-02-12 ENCOUNTER — TRANSCRIPTION ENCOUNTER (OUTPATIENT)
Age: 29
End: 2019-02-12

## 2019-02-12 VITALS
HEIGHT: 64 IN | WEIGHT: 132.28 LBS | DIASTOLIC BLOOD PRESSURE: 79 MMHG | SYSTOLIC BLOOD PRESSURE: 123 MMHG | RESPIRATION RATE: 19 BRPM | OXYGEN SATURATION: 98 % | HEART RATE: 90 BPM | TEMPERATURE: 98 F

## 2019-02-12 VITALS
SYSTOLIC BLOOD PRESSURE: 119 MMHG | RESPIRATION RATE: 16 BRPM | TEMPERATURE: 98 F | HEART RATE: 80 BPM | OXYGEN SATURATION: 99 % | DIASTOLIC BLOOD PRESSURE: 76 MMHG

## 2019-02-12 DIAGNOSIS — G80.9 CEREBRAL PALSY, UNSPECIFIED: ICD-10-CM

## 2019-02-12 DIAGNOSIS — R10.9 UNSPECIFIED ABDOMINAL PAIN: ICD-10-CM

## 2019-02-12 DIAGNOSIS — E80.6 OTHER DISORDERS OF BILIRUBIN METABOLISM: ICD-10-CM

## 2019-02-12 LAB
ALBUMIN SERPL ELPH-MCNC: 2.8 G/DL — LOW (ref 3.3–5)
ALP SERPL-CCNC: 240 U/L — HIGH (ref 40–120)
ALT FLD-CCNC: 64 U/L — SIGNIFICANT CHANGE UP (ref 12–78)
ANION GAP SERPL CALC-SCNC: 9 MMOL/L — SIGNIFICANT CHANGE UP (ref 5–17)
AST SERPL-CCNC: 62 U/L — HIGH (ref 15–37)
BILIRUB SERPL-MCNC: 8.2 MG/DL — HIGH (ref 0.2–1.2)
BUN SERPL-MCNC: 9 MG/DL — SIGNIFICANT CHANGE UP (ref 7–23)
CALCIUM SERPL-MCNC: 8.9 MG/DL — SIGNIFICANT CHANGE UP (ref 8.5–10.1)
CHLORIDE SERPL-SCNC: 108 MMOL/L — SIGNIFICANT CHANGE UP (ref 96–108)
CO2 SERPL-SCNC: 24 MMOL/L — SIGNIFICANT CHANGE UP (ref 22–31)
CREAT SERPL-MCNC: 0.77 MG/DL — SIGNIFICANT CHANGE UP (ref 0.5–1.3)
GLUCOSE SERPL-MCNC: 106 MG/DL — HIGH (ref 70–99)
HCT VFR BLD CALC: 39.4 % — SIGNIFICANT CHANGE UP (ref 39–50)
HGB BLD-MCNC: 13.3 G/DL — SIGNIFICANT CHANGE UP (ref 13–17)
MCHC RBC-ENTMCNC: 32.5 PG — SIGNIFICANT CHANGE UP (ref 27–34)
MCHC RBC-ENTMCNC: 33.8 GM/DL — SIGNIFICANT CHANGE UP (ref 32–36)
MCV RBC AUTO: 96.3 FL — SIGNIFICANT CHANGE UP (ref 80–100)
NRBC # BLD: 0 /100 WBCS — SIGNIFICANT CHANGE UP (ref 0–0)
PLATELET # BLD AUTO: 369 K/UL — SIGNIFICANT CHANGE UP (ref 150–400)
POTASSIUM SERPL-MCNC: 3.8 MMOL/L — SIGNIFICANT CHANGE UP (ref 3.5–5.3)
POTASSIUM SERPL-SCNC: 3.8 MMOL/L — SIGNIFICANT CHANGE UP (ref 3.5–5.3)
PROT SERPL-MCNC: 6.9 GM/DL — SIGNIFICANT CHANGE UP (ref 6–8.3)
RBC # BLD: 4.09 M/UL — LOW (ref 4.2–5.8)
RBC # FLD: 13.2 % — SIGNIFICANT CHANGE UP (ref 10.3–14.5)
SODIUM SERPL-SCNC: 141 MMOL/L — SIGNIFICANT CHANGE UP (ref 135–145)
WBC # BLD: 6.42 K/UL — SIGNIFICANT CHANGE UP (ref 3.8–10.5)
WBC # FLD AUTO: 6.42 K/UL — SIGNIFICANT CHANGE UP (ref 3.8–10.5)

## 2019-02-12 PROCEDURE — 99285 EMERGENCY DEPT VISIT HI MDM: CPT

## 2019-02-12 PROCEDURE — 99222 1ST HOSP IP/OBS MODERATE 55: CPT

## 2019-02-12 RX ORDER — DEXTROSE MONOHYDRATE, SODIUM CHLORIDE, AND POTASSIUM CHLORIDE 50; .745; 4.5 G/1000ML; G/1000ML; G/1000ML
1000 INJECTION, SOLUTION INTRAVENOUS
Qty: 0 | Refills: 0 | Status: DISCONTINUED | OUTPATIENT
Start: 2019-02-12 | End: 2019-02-12

## 2019-02-12 RX ORDER — SODIUM CHLORIDE 9 MG/ML
1000 INJECTION INTRAMUSCULAR; INTRAVENOUS; SUBCUTANEOUS
Qty: 0 | Refills: 0 | Status: DISCONTINUED | OUTPATIENT
Start: 2019-02-12 | End: 2019-02-13

## 2019-02-12 RX ORDER — DIPHENHYDRAMINE HCL 50 MG
12.5 CAPSULE ORAL ONCE
Qty: 0 | Refills: 0 | Status: DISCONTINUED | OUTPATIENT
Start: 2019-02-12 | End: 2019-02-12

## 2019-02-12 RX ORDER — DIPHENHYDRAMINE HCL 50 MG
12.5 CAPSULE ORAL ONCE
Qty: 0 | Refills: 0 | Status: COMPLETED | OUTPATIENT
Start: 2019-02-12 | End: 2019-02-13

## 2019-02-12 RX ADMIN — Medication 25 MILLIGRAM(S): at 04:43

## 2019-02-12 RX ADMIN — DEXTROSE MONOHYDRATE, SODIUM CHLORIDE, AND POTASSIUM CHLORIDE 75 MILLILITER(S): 50; .745; 4.5 INJECTION, SOLUTION INTRAVENOUS at 11:23

## 2019-02-12 RX ADMIN — Medication 25 MILLIGRAM(S): at 11:22

## 2019-02-12 NOTE — ED ADULT NURSE NOTE - OBJECTIVE STATEMENT
pt with abd pain nausea with vomiting went to [facility] found to need ERCP

## 2019-02-12 NOTE — H&P ADULT - PROBLEM SELECTOR PLAN 1
possible Billary obstruction related  ? GI consult  Dr. Howard likely ERCP as per transfer information, pt's labs are repeated. no abd. pain at the time of admission.

## 2019-02-12 NOTE — PROGRESS NOTE ADULT - SUBJECTIVE AND OBJECTIVE BOX
CC: RUQ pain (11 Feb 2019 11:14)    HPI:  27 y/o male presents with 3 week h/o intermittent RUQ pain along with N/V--saw dr yang 4 days ago and had MRCP performed (results not known)--presents with worsening RUQ pain and N/V after eating last night.  Sono shows mild intra and extrahepatic ductal dilation.  Given 1L IVF in ED  On my exam, stable vitals, NAD, awake and alert.  No fevers.  No hematemesis.  Normal BM.  No prior surgery and on no meds       INTERVAL HPI/ OVERNIGHT EVENTS:  Pt was seen and examined, reports still has a lot of itchiness, pain is better, needed pain meds few times. No N/V.  No fevers or chills.  Plan discussed, awaiting for bed availability and transfer     Vital Signs Last 24 Hrs  T(C): 36.7 (12 Feb 2019 05:10), Max: 36.8 (11 Feb 2019 17:07)  T(F): 98 (12 Feb 2019 05:10), Max: 98.2 (11 Feb 2019 17:07)  HR: 97 (12 Feb 2019 05:10) (68 - 97)  BP: 118/77 (12 Feb 2019 05:10) (118/77 - 122/49)  RR: 16 (11 Feb 2019 17:07) (16 - 16)  SpO2: 98% (12 Feb 2019 05:10) (98% - 100%)      REVIEW OF SYSTEMS:    All other review of systems is negative unless indicated above.    PHYSICAL EXAM:    General: Well developed; well nourished; in no acute distress  Eyes: PERRLA, EOMI; mild icterus   Head: Normocephalic; atraumatic  ENMT: No nasal discharge; airway clear  Neck: Supple; non tender; no masses  Respiratory: Good air enrty.  No wheezes, rales or rhonchi  Cardiovascular: Regular rate and rhythm. S1 and S2 Normal; No murmurs  Gastrointestinal: Soft non-tender non-distended; Normal bowel sounds  Genitourinary: No costovertebral angle tenderness  Extremities: Normal range of motion, No  edema  Vascular: Peripheral pulses palpable 2+ bilaterally  Neurological: Alert and oriented x4, non focal   Skin: Warm and dry. No acute rash  Lymph Nodes: No acute cervical adenopathy  Musculoskeletal: Normal muscle  tone, without deformities  Psychiatric: Cooperative and appropriate        LABS:              13.3   6.42  )-----------( 369      ( 12 Feb 2019 06:19 )             39.4     12 Feb 2019 06:19    141    |  108    |  9      ----------------------------<  106    3.8     |  24     |  0.77     Ca    8.9        12 Feb 2019 06:19    TPro  6.9    /  Alb  2.8    /  TBili  8.2    /  DBili  x      /  AST  62     /  ALT  64     /  AlkPhos  240    12 Feb 2019 06:19      LIVER FUNCTIONS - ( 12 Feb 2019 06:19 )  Alb: 2.8 g/dL / Pro: 6.9 gm/dL / ALK PHOS: 240 U/L / ALT: 64 U/L / AST: 62 U/L / GGT: x               MEDICATIONS  (STANDING):  influenza   Vaccine 0.5 milliLiter(s) IntraMuscular once  sodium chloride 0.9% with potassium chloride 20 mEq/L 1000 milliLiter(s) (75 mL/Hr) IV Continuous <Continuous>    MEDICATIONS  (PRN):  acetaminophen   Tablet .. 650 milliGRAM(s) Oral every 6 hours PRN Temp greater or equal to 38.5C (101.3F), Moderate Pain (4 - 6)  diphenhydrAMINE 25 milliGRAM(s) Oral every 6 hours PRN Rash and/or Itching  HYDROmorphone  Injectable 0.5 milliGRAM(s) IV Push every 4 hours PRN Severe Pain (7 - 10)  ketorolac   Injectable 15 milliGRAM(s) IV Push every 6 hours PRN Moderate Pain (4 - 6)      RADIOLOGY & ADDITIONAL TESTS:

## 2019-02-12 NOTE — ED PROVIDER NOTE - OBJECTIVE STATEMENT
A 28 year old male pt presents to the ED c/o abdominal pain. The pt states that over the past month he has had intermittent episodes of RUQ pain and began to also develop jaundice of his eyes. Pt was seen at [facility] today and had CT done, which showed bile duct obstruction. He was sent to [facility] for ERCP. He denies any current pain, nausea or vomiting. denies fever. denies HA or neck pain. no chest pain or sob. no urinary f/u/d. no back pain. no motor or sensory deficits. denies illicit drug use. no recent travel. no rash. no other acute issues symptoms or concerns

## 2019-02-12 NOTE — ED PROVIDER NOTE - CLINICAL SUMMARY MEDICAL DECISION MAKING FREE TEXT BOX
sent from [facility] for ercp 2/2 hyperbilirubinmeia and mrcp showing biliary parthology no definitive mass or stone on mrcp at [facility] . sent for ercp brushings im and gi management pt stable no current pain vital normal dr mantilla accepting via medicine team

## 2019-02-12 NOTE — PROGRESS NOTE ADULT - SUBJECTIVE AND OBJECTIVE BOX
Patient is a 28y old  Male who presents with a chief complaint of RUQ pain (12 Feb 2019 16:00)      HPI:  29 y/o male presents with 3 week h/o intermittent RUQ pain along with N/V--saw dr yang 4 days ago and had MRCP performed (results not known)--presents with worsening RUQ pain and N/V after eating last night.  Sono shows mild intra and extrahepatic ductal dilation.  Given 1L IVF in ED  On my exam, stable vitals, NAD, awake and alert.  No fevers.  No hematemesis.  Normal BM.  No prior surgery and on no meds     Pt is FULL RESUSCITATION (10 Feb 2019 09:00)    February 12–patient with continued but improved right upper quadrant pain. Some nausea but improved. Negative vomiting.  Negative chest pain or shortness of breath.  History per patient and mother  Pain increases with thinking about food.        PAST MEDICAL & SURGICAL HISTORY:  Cerebral palsy  No significant past surgical history      MEDICATIONS  (STANDING):  influenza   Vaccine 0.5 milliLiter(s) IntraMuscular once  sodium chloride 0.9% with potassium chloride 20 mEq/L 1000 milliLiter(s) (75 mL/Hr) IV Continuous <Continuous>    MEDICATIONS  (PRN):  acetaminophen   Tablet .. 650 milliGRAM(s) Oral every 6 hours PRN Temp greater or equal to 38.5C (101.3F), Moderate Pain (4 - 6)  diphenhydrAMINE 25 milliGRAM(s) Oral every 6 hours PRN Rash and/or Itching  HYDROmorphone  Injectable 0.5 milliGRAM(s) IV Push every 4 hours PRN Severe Pain (7 - 10)  ketorolac   Injectable 15 milliGRAM(s) IV Push every 6 hours PRN Moderate Pain (4 - 6)      Allergies    No Known Allergies    Intolerances        SOCIAL HISTORY:NC    FAMILY HISTORY:NC      REVIEW OF SYSTEMS:    CONSTITUTIONAL: No weakness, fevers or chills  EYES/ENT: No visual changes;  No vertigo or throat pain   NECK: No pain or stiffness  RESPIRATORY: No cough, wheezing, hemoptysis; No shortness of breath  CARDIOVASCULAR: No chest pain or palpitations  GENITOURINARY: No dysuria, frequency or hematuria  NEUROLOGICAL: No numbness or weakness  SKIN: No itching, burning, rashes, or lesions   All other review of systems is negative unless indicated above.    Vital Signs Last 24 Hrs  T(C): 36.5 (12 Feb 2019 16:59), Max: 36.7 (12 Feb 2019 05:10)  T(F): 97.7 (12 Feb 2019 16:59), Max: 98 (12 Feb 2019 05:10)  HR: 80 (12 Feb 2019 16:59) (80 - 97)  BP: 119/76 (12 Feb 2019 16:59) (118/77 - 119/76)  BP(mean): --  RR: 16 (12 Feb 2019 16:59) (16 - 16)  SpO2: 99% (12 Feb 2019 16:59) (98% - 99%)    PHYSICAL EXAM:    Constitutional: NAD, well-developed  HEENT: EOMI, throat clear  Neck: No LAD, supple  Respiratory: CTA and P  Cardiovascular: S1 and S2, RRR, no M  Gastrointestinal: BS+, soft, mild RUQ tend/ND, neg HSM,  Extremities: No peripheral edema, neg clubing, cyanosis  Vascular: 2+ peripheral pulses  Neurological: A/O x 3, no focal deficits  Psychiatric: Normal mood, normal affect  Skin: No rashes    LABS:  CBC Full  -  ( 12 Feb 2019 06:19 )  WBC Count : 6.42 K/uL  Hemoglobin : 13.3 g/dL  Hematocrit : 39.4 %  Platelet Count - Automated : 369 K/uL  Mean Cell Volume : 96.3 fl  Mean Cell Hemoglobin : 32.5 pg  Mean Cell Hemoglobin Concentration : 33.8 gm/dL  Auto Neutrophil # : x  Auto Lymphocyte # : x  Auto Monocyte # : x  Auto Eosinophil # : x  Auto Basophil # : x  Auto Neutrophil % : x  Auto Lymphocyte % : x  Auto Monocyte % : x  Auto Eosinophil % : x  Auto Basophil % : x    02-12    141  |  108  |  9   ----------------------------<  106<H>  3.8   |  24  |  0.77    Ca    8.9      12 Feb 2019 06:19    TPro  6.9  /  Alb  2.8<L>  /  TBili  8.2<H>  /  DBili  x   /  AST  62<H>  /  ALT  64  /  AlkPhos  240<H>  02-12        02-11 @ 12:27  Hep A Igm Nonreact  Hep A total ab, IgA and M --  Hep B core Ab total --  Hep B core IgM Nonreact  Hep B DNA PCR --  Hep BSAg --  Hep BSAb --  Hep BSAb --  HCV Ab --  HCV RNA Log --  HCV RNA interp --                RADIOLOGY & ADDITIONAL STUDIES:

## 2019-02-12 NOTE — H&P ADULT - NSHPPHYSICALEXAM_GEN_ALL_CORE
General:  male lying in bed not in distress.   HEENT: AT, NC. PERRL. intact EOM. no throat erythema or exudate. + scleral icterus.   Neck: supple. no JVD.   Chest: CTA bilaterally  Heart: normal S1,S2. RRR. no heart murmur. no edema.   Abdomen: soft. non-tender. non-distended. + BS.   rectal : deferred by pt.   Ext: no calf tenderness on either side. moving all ext. without sig. difficulty.   Neuro: AAO x3. no focal weakness. co-operative, motor intact.  Skin: no rash noted, skin and scleral icterus.  Psych : no agitation, co-operative, no SI/HI.

## 2019-02-12 NOTE — PROGRESS NOTE ADULT - ASSESSMENT
28 y.o male with PMH od cerebral palsy admitted for:       1. RUQ pain due to intrahepatic biliary Obstruction with elevated LFTS and Jaundice   No signs of  acute cholangitis   VS stable  Bilirubin trending up  D/w Dr Castellano, as per MRCP needs further ERCP and EUS  Full liquid diet   IVF  Control pain   D/w  GI department at [facility] and transfer center, no beds available. Will try to arrange transfer to [facility].       2. DVT PPxs: ambulate         Dispo: Stable for transfer to tertiary center for ERCP/EUS when bed available

## 2019-02-13 ENCOUNTER — RESULT REVIEW (OUTPATIENT)
Age: 29
End: 2019-02-13

## 2019-02-13 LAB
ALBUMIN SERPL ELPH-MCNC: 3.5 G/DL — SIGNIFICANT CHANGE UP (ref 3.3–5.2)
ALP SERPL-CCNC: 249 U/L — HIGH (ref 40–120)
ALT FLD-CCNC: 56 U/L — HIGH
ANION GAP SERPL CALC-SCNC: 14 MMOL/L — SIGNIFICANT CHANGE UP (ref 5–17)
APTT BLD: 34.9 SEC — SIGNIFICANT CHANGE UP (ref 27.5–36.3)
AST SERPL-CCNC: 61 U/L — HIGH
BASOPHILS # BLD AUTO: 0 K/UL — SIGNIFICANT CHANGE UP (ref 0–0.2)
BASOPHILS NFR BLD AUTO: 0.4 % — SIGNIFICANT CHANGE UP (ref 0–2)
BILIRUB SERPL-MCNC: 9.1 MG/DL — HIGH (ref 0.4–2)
BUN SERPL-MCNC: 8 MG/DL — SIGNIFICANT CHANGE UP (ref 8–20)
CALCIUM SERPL-MCNC: 9.3 MG/DL — SIGNIFICANT CHANGE UP (ref 8.6–10.2)
CHLORIDE SERPL-SCNC: 105 MMOL/L — SIGNIFICANT CHANGE UP (ref 98–107)
CO2 SERPL-SCNC: 22 MMOL/L — SIGNIFICANT CHANGE UP (ref 22–29)
CREAT SERPL-MCNC: 0.44 MG/DL — LOW (ref 0.5–1.3)
EOSINOPHIL # BLD AUTO: 0.1 K/UL — SIGNIFICANT CHANGE UP (ref 0–0.5)
EOSINOPHIL NFR BLD AUTO: 1.8 % — SIGNIFICANT CHANGE UP (ref 0–5)
GLUCOSE SERPL-MCNC: 106 MG/DL — SIGNIFICANT CHANGE UP (ref 70–115)
HCT VFR BLD CALC: 40.9 % — LOW (ref 42–52)
HGB BLD-MCNC: 14.3 G/DL — SIGNIFICANT CHANGE UP (ref 14–18)
INR BLD: 1.04 RATIO — SIGNIFICANT CHANGE UP (ref 0.88–1.16)
LIDOCAIN IGE QN: 28 U/L — SIGNIFICANT CHANGE UP (ref 22–51)
LYMPHOCYTES # BLD AUTO: 1.5 K/UL — SIGNIFICANT CHANGE UP (ref 1–4.8)
LYMPHOCYTES # BLD AUTO: 21.8 % — SIGNIFICANT CHANGE UP (ref 20–55)
MCHC RBC-ENTMCNC: 33.1 PG — HIGH (ref 27–31)
MCHC RBC-ENTMCNC: 35 G/DL — SIGNIFICANT CHANGE UP (ref 32–36)
MCV RBC AUTO: 94.7 FL — HIGH (ref 80–94)
MONOCYTES # BLD AUTO: 0.8 K/UL — SIGNIFICANT CHANGE UP (ref 0–0.8)
MONOCYTES NFR BLD AUTO: 11 % — HIGH (ref 3–10)
NEUTROPHILS # BLD AUTO: 4.5 K/UL — SIGNIFICANT CHANGE UP (ref 1.8–8)
NEUTROPHILS NFR BLD AUTO: 64.6 % — SIGNIFICANT CHANGE UP (ref 37–73)
PLATELET # BLD AUTO: 440 K/UL — HIGH (ref 150–400)
POTASSIUM SERPL-MCNC: 3.6 MMOL/L — SIGNIFICANT CHANGE UP (ref 3.5–5.3)
POTASSIUM SERPL-SCNC: 3.6 MMOL/L — SIGNIFICANT CHANGE UP (ref 3.5–5.3)
PROT SERPL-MCNC: 7.4 G/DL — SIGNIFICANT CHANGE UP (ref 6.6–8.7)
PROTHROM AB SERPL-ACNC: 12 SEC — SIGNIFICANT CHANGE UP (ref 10–12.9)
RBC # BLD: 4.32 M/UL — LOW (ref 4.6–6.2)
RBC # FLD: 13.1 % — SIGNIFICANT CHANGE UP (ref 11–15.6)
SODIUM SERPL-SCNC: 141 MMOL/L — SIGNIFICANT CHANGE UP (ref 135–145)
WBC # BLD: 7 K/UL — SIGNIFICANT CHANGE UP (ref 4.8–10.8)
WBC # FLD AUTO: 7 K/UL — SIGNIFICANT CHANGE UP (ref 4.8–10.8)

## 2019-02-13 PROCEDURE — 99232 SBSQ HOSP IP/OBS MODERATE 35: CPT

## 2019-02-13 PROCEDURE — 43259 EGD US EXAM DUODENUM/JEJUNUM: CPT

## 2019-02-13 PROCEDURE — 43274 ERCP DUCT STENT PLACEMENT: CPT | Mod: 59

## 2019-02-13 PROCEDURE — 99222 1ST HOSP IP/OBS MODERATE 55: CPT | Mod: 25

## 2019-02-13 PROCEDURE — 88104 CYTOPATH FL NONGYN SMEARS: CPT | Mod: 26

## 2019-02-13 RX ORDER — INDOMETHACIN 50 MG
100 CAPSULE ORAL ONCE
Qty: 0 | Refills: 0 | Status: COMPLETED | OUTPATIENT
Start: 2019-02-13 | End: 2019-02-13

## 2019-02-13 RX ORDER — FENTANYL CITRATE 50 UG/ML
25 INJECTION INTRAVENOUS
Qty: 0 | Refills: 0 | Status: DISCONTINUED | OUTPATIENT
Start: 2019-02-13 | End: 2019-02-13

## 2019-02-13 RX ORDER — SODIUM CHLORIDE 9 MG/ML
1000 INJECTION, SOLUTION INTRAVENOUS
Qty: 0 | Refills: 0 | Status: DISCONTINUED | OUTPATIENT
Start: 2019-02-13 | End: 2019-02-15

## 2019-02-13 RX ORDER — ONDANSETRON 8 MG/1
4 TABLET, FILM COATED ORAL ONCE
Qty: 0 | Refills: 0 | Status: DISCONTINUED | OUTPATIENT
Start: 2019-02-13 | End: 2019-02-13

## 2019-02-13 RX ORDER — SODIUM CHLORIDE 9 MG/ML
1000 INJECTION, SOLUTION INTRAVENOUS
Qty: 0 | Refills: 0 | Status: DISCONTINUED | OUTPATIENT
Start: 2019-02-13 | End: 2019-02-13

## 2019-02-13 RX ORDER — ERTAPENEM SODIUM 1 G/1
1000 INJECTION, POWDER, LYOPHILIZED, FOR SOLUTION INTRAMUSCULAR; INTRAVENOUS EVERY 24 HOURS
Qty: 0 | Refills: 0 | Status: DISCONTINUED | OUTPATIENT
Start: 2019-02-13 | End: 2019-02-18

## 2019-02-13 RX ADMIN — Medication 12.5 MILLIGRAM(S): at 02:18

## 2019-02-13 RX ADMIN — ERTAPENEM SODIUM 120 MILLIGRAM(S): 1 INJECTION, POWDER, LYOPHILIZED, FOR SOLUTION INTRAMUSCULAR; INTRAVENOUS at 13:03

## 2019-02-13 RX ADMIN — SODIUM CHLORIDE 75 MILLILITER(S): 9 INJECTION, SOLUTION INTRAVENOUS at 21:41

## 2019-02-13 RX ADMIN — SODIUM CHLORIDE 100 MILLILITER(S): 9 INJECTION INTRAMUSCULAR; INTRAVENOUS; SUBCUTANEOUS at 02:16

## 2019-02-13 RX ADMIN — Medication 100 MILLIGRAM(S): at 17:15

## 2019-02-13 NOTE — PROGRESS NOTE ADULT - ASSESSMENT
The patient is a 28 year old male with a history of cerebral palsy transferred from [facility] for an ERCP. He was in [place] 4 weeks prior, was seen for abdominal pain, noted to have elevated LFTS. They recommended further work up but he chose to return home. He underwent MRCP as outpatient and a mid CBD abnormality was noted. He had abdominal pain and vomiting. No hematemesis. He was admitted to [facility] and then underwent viral hepatitis testing, which was negative. US abdomen showed mild intra and extra hepatic ductal dilation. Plan for ERCP today     Assessment/Plan:    1. CBD dilatation with elevated LFTS: NPO for ERCOP today    2. Cerebral palsy

## 2019-02-13 NOTE — PROGRESS NOTE ADULT - SUBJECTIVE AND OBJECTIVE BOX
CC: Abdominal pain    INTERVAL HPI/OVERNIGHT EVENTS: Patient seen and examined, has gassy abdominal discomfort in the RUQ.       Vital Signs Last 24 Hrs  T(C): 36.8 (13 Feb 2019 09:41), Max: 36.8 (12 Feb 2019 19:21)  T(F): 98.2 (13 Feb 2019 09:41), Max: 98.3 (12 Feb 2019 19:21)  HR: 83 (13 Feb 2019 09:41) (69 - 90)  BP: 125/75 (13 Feb 2019 09:41) (112/67 - 125/75)  BP(mean): --  RR: 18 (13 Feb 2019 09:41) (16 - 19)  SpO2: 100% (13 Feb 2019 09:41) (97% - 100%)    PHYSICAL EXAM:    GENERAL: NAD, AOX3  HEAD:  Atraumatic, Normocephalic  EYES: EOMI, PERRLA, conjunctiva and sclera clear  CHEST/LUNG: Clear to auscultation bilaterally; No rales, rhonchi, wheezing, or rubs  HEART: Regular rate and rhythm; No murmurs, rubs, or gallops  ABDOMEN: Soft, Nontender, Nondistended; Bowel sounds present  EXTREMITIES:  2+ Peripheral Pulses, No clubbing, cyanosis, or edema        MEDICATIONS  (STANDING):  ertapenem  IVPB 1000 milliGRAM(s) IV Intermittent every 24 hours  indomethacin Suppository 100 milliGRAM(s) Rectal once  sodium chloride 0.9%. 1000 milliLiter(s) (100 mL/Hr) IV Continuous <Continuous>    MEDICATIONS  (PRN):      Allergies    No Known Allergies    Intolerances          LABS:                          14.3   7.0   )-----------( 440      ( 13 Feb 2019 02:34 )             40.9     02-13    141  |  105  |  8.0  ----------------------------<  106  3.6   |  22.0  |  0.44<L>    Ca    9.3      13 Feb 2019 02:34    TPro  7.4  /  Alb  3.5  /  TBili  9.1<H>  /  DBili  x   /  AST  61<H>  /  ALT  56<H>  /  AlkPhos  249<H>  02-13    PT/INR - ( 13 Feb 2019 02:34 )   PT: 12.0 sec;   INR: 1.04 ratio         PTT - ( 13 Feb 2019 02:34 )  PTT:34.9 sec      RADIOLOGY & ADDITIONAL TESTS:

## 2019-02-13 NOTE — PROGRESS NOTE ADULT - SUBJECTIVE AND OBJECTIVE BOX
The patient is a 28y old  Male who presents with a  complaint of elevated bilirubin.  He   transferred from [facility].    He is scheduled for a ERCP with a Common Duct  Exploration.      m(13 Feb 2019 07:21)      PAST MEDICAL HISTORY:  Cerebral palsy        PAST SURGICAL HISTORY:  No significant past surgical history      MEDICATIONS  (STANDING):  ertapenem  IVPB 1000 milliGRAM(s) IV Intermittent every 24 hours  indomethacin Suppository 100 milliGRAM(s) Rectal once  sodium chloride 0.9%. 1000 milliLiter(s) (100 mL/Hr) IV Continuous <Continuous>    MEDICATIONS  (PRN):      Allergies:     No Known Drug Allergies      SOCIAL HISTORY:    Height (cm): 162.56 (02-12-19 @ 19:21)  Weight (kg): 60 (02-12-19 @ 19:21)  BMI (kg/m2): 22.7 (02-12-19 @ 19:21)                          14.3   7.0   )-----------( 440      ( 13 Feb 2019 02:34 )             40.9       PT/INR - ( 13 Feb 2019 02:34 )   PT: 12.0 sec;   INR: 1.04 ratio         PTT - ( 13 Feb 2019 02:34 )  PTT:34.9 sec    02-13    141  |  105  |  8.0  ----------------------------<  106  3.6   |  22.0  |  0.44<L>    Ca    9.3      13 Feb 2019 02:34    TPro  7.4  /  Alb  3.5  /  TBili  9.1<H>  /  DBili  x   /  AST  61<H>  /  ALT  56<H>  /  AlkPhos  249<H>  02-13    EKG:  none    TT ECHO:  -  None     MRI  -  None    CT Scan  -	    ASA # =             Mallampati # = The patient is a 28y old  Male who presents with a  complaint of elevated bilirubin.  He   transferred from [facility].    He is scheduled for a ERCP with a Common Duct  Exploration.  He was in [place] earlier this month and felt itching over his body.  He went  to the doctor there and lab work was drawn which showed elevated liver enzymes.  When the  family traveled home on [place] he went to [facility] where he had an   ultrasound of his gallbladder.  Results read here at [place] showed no stones in the   gallbladder and the common duct on 2/3 2019 measured 3 mm and a second ultrasound on   2/10/2019 measured 9 mm.  He now has some RUQ pain.  He hasn't eaten solid food in days  and his last liquids were at 6:00 p.m. yesterday.              (13 Feb 2019 07:21)      PAST MEDICAL HISTORY:  Cerebral palsy  His mother said that she had a difficult child birth.    PAST SURGICAL HISTORY:  No past surgical history      MEDICATIONS  (STANDING):  ertapenem  IVPB 1000 milliGRAM(s) IV Intermittent every 24 hours  indomethacin Suppository 100 milliGRAM(s) Rectal once  sodium chloride 0.9%. 1000 milliLiter(s) (100 mL/Hr) IV Continuous <Continuous>    MEDICATIONS  (PRN):      Allergies:     No Known Drug Allergies      SOCIAL HISTORY:  He does not drink alcohol, smoke or use illicit drugs.    Height (cm): 162.56 (02-12-19 @ 19:21)  Weight (kg): 60 (02-12-19 @ 19:21)  BMI (kg/m2): 22.7 (02-12-19 @ 19:21)                          14.3   7.0   )-----------( 440      ( 13 Feb 2019 02:34 )             40.9       PT/INR - ( 13 Feb 2019 02:34 )   PT: 12.0 sec;   INR: 1.04 ratio         PTT - ( 13 Feb 2019 02:34 )  PTT:34.9 sec    02-13    141  |  105  |  8.0  ----------------------------<  106  3.6   |  22.0  |  0.44<L>    Ca    9.3      13 Feb 2019 02:34    TPro  7.4  /  Alb  3.5  /  TBili  9.1<H>  /  DBili  x   /  AST  61<H>  /  ALT  56<H>  /  AlkPhos  249<H>  02-13    EKG:  none    TT ECHO:  -  None     MRI  -  None    CT Scan  -	    ASA # =2    Mallampati # = 1

## 2019-02-13 NOTE — CONSULT NOTE ADULT - SUBJECTIVE AND OBJECTIVE BOX
HPI:28 year old man with history of cerebral palsy was transferred from [facility] for ERCP.  As per pt. and his mother about 4 weeks ago he started developing itching while in [place]. He was seen by a physician there who ran blood work and his liver enzymes were very elevated. Pt. was told that he would need ERCP. pt. did not have the procedure and came back to [place]. He underwent MRCP as outpatient and a mid CBD abnormality was noted. He had abdominal pain and vomiting. No hematemesis. He was admitted to [facility] and then underwent viral hepatitis testing, which was negative. US abdomen showed mild intra and extra hepatic ductal dilation. No diarrhea. no fever. no cp. no cough. mild generalized itchiness. No medications.     PAST MEDICAL & SURGICAL HISTORY:  Cerebral palsy  No significant past surgical history      ROS:  No Heartburn, regurgitation, dysphagia, odynophagia.  No dyspepsia  +abdominal pain.    +Nausea, vomiting.  No Bleeding.  No hematemesis.   No diarrhea.    No hematochezia  No weight loss, anorexia.  No edema.      MEDICATIONS  (STANDING):  sodium chloride 0.9%. 1000 milliLiter(s) (100 mL/Hr) IV Continuous <Continuous>    MEDICATIONS  (PRN):      Allergies    No Known Allergies    Intolerances        SOCIAL HISTORY:Denies x 3    ENDOSCOPIC/GI HISTORY:No EGD and colonoscopy    FAMILY HISTORY:NC      Vital Signs Last 24 Hrs  T(C): 36.7 (13 Feb 2019 04:04), Max: 36.8 (12 Feb 2019 19:21)  T(F): 98.1 (13 Feb 2019 04:04), Max: 98.3 (12 Feb 2019 19:21)  HR: 69 (13 Feb 2019 04:04) (69 - 90)  BP: 112/67 (13 Feb 2019 04:04) (112/67 - 123/79)  BP(mean): --  RR: 18 (13 Feb 2019 04:04) (18 - 19)  SpO2: 97% (13 Feb 2019 04:04) (97% - 98%)    PHYSICAL EXAM:    GENERAL: NAD, well-groomed, well-developed  HEAD:  Atraumatic, Normocephalic  EYES: EOMI, PERRLA, conjunctiva and sclera icterus  ENMT: No tonsillar erythema, exudates, or enlargement; Moist mucous membranes, Good dentition, No lesions  NECK: Supple, No JVD, Normal thyroid  CHEST/LUNG: Clear to percussion bilaterally; No rales, rhonchi, wheezing, or rubs  HEART: Regular rate and rhythm; No murmurs, rubs, or gallops  ABDOMEN: Soft, tender, Nondistended; Bowel sounds present  EXTREMITIES:  2+ Peripheral Pulses, No clubbing, cyanosis, or edema  LYMPH: No lymphadenopathy noted  SKIN: No rashes or lesions      LABS:                        14.3   7.0   )-----------( 440      ( 13 Feb 2019 02:34 )             40.9     02-13    141  |  105  |  8.0  ----------------------------<  106  3.6   |  22.0  |  0.44<L>    Ca    9.3      13 Feb 2019 02:34    TPro  7.4  /  Alb  3.5  /  TBili  9.1<H>  /  DBili  x   /  AST  61<H>  /  ALT  56<H>  /  AlkPhos  249<H>  02-13    PT/INR - ( 13 Feb 2019 02:34 )   PT: 12.0 sec;   INR: 1.04 ratio         PTT - ( 13 Feb 2019 02:34 )  PTT:34.9 sec       LIVER FUNCTIONS - ( 13 Feb 2019 02:34 )  Alb: 3.5 g/dL / Pro: 7.4 g/dL / ALK PHOS: 249 U/L / ALT: 56 U/L / AST: 61 U/L / GGT: x               RADIOLOGY & ADDITIONAL STUDIES:

## 2019-02-13 NOTE — ED ADULT NURSE REASSESSMENT NOTE - NS ED NURSE REASSESS COMMENT FT1
pt with no pain. pt with reports of itching medication given. mother at bedside. pt NPO. will continue to monitor.

## 2019-02-13 NOTE — CONSULT NOTE ADULT - ASSESSMENT
Patient with obstructive jaundice with biliary dilation, with abdominal pain.  Keep NPO  No anticoagulation  ERCP today  Will follow

## 2019-02-13 NOTE — ED ADULT NURSE REASSESSMENT NOTE - NS ED NURSE REASSESS COMMENT FT1
Pt in no acute distress, alert and oriented x 4, vss, plan of care explained and pt aware he is to be NPO and going for endoscopy this afternoon. Report was given and pt endorsed to receiving RN Nancy Nichole for follow up and continuity of care.

## 2019-02-13 NOTE — BRIEF OPERATIVE NOTE - OPERATION/FINDINGS
Bile duct thickening noted on EUS, Possible stone in the proximal bile duct  Difficult cannulation  Distal bile duct stricture s/p brushings and 7 Fr 7 cm bile duct stent placement

## 2019-02-13 NOTE — BRIEF OPERATIVE NOTE - PROCEDURE
<<-----Click on this checkbox to enter Procedure ERCP with stent insertion into bile duct  02/13/2019    Active  [id number]  ERCP, with brushing  02/13/2019    Active  [id number]  ERCP with sphincterotomy  02/13/2019    Active  [id number]  Endoscopic ultrasound of bile duct  02/13/2019    Active  [id number]

## 2019-02-13 NOTE — ED ADULT NURSE REASSESSMENT NOTE - NS ED NURSE REASSESS COMMENT FT1
Late Note  Report received from outgoing RN Noemy Lamas and assumed care of pt at that time. Pt received on stretcher awake, alert, speaking with male visitor at bedside, will monitor.

## 2019-02-14 ENCOUNTER — TRANSCRIPTION ENCOUNTER (OUTPATIENT)
Age: 29
End: 2019-02-14

## 2019-02-14 LAB
ALBUMIN SERPL ELPH-MCNC: 3.3 G/DL — SIGNIFICANT CHANGE UP (ref 3.3–5.2)
ALP SERPL-CCNC: 240 U/L — HIGH (ref 40–120)
ALT FLD-CCNC: 46 U/L — HIGH
ANION GAP SERPL CALC-SCNC: 13 MMOL/L — SIGNIFICANT CHANGE UP (ref 5–17)
AST SERPL-CCNC: 57 U/L — HIGH
BASOPHILS # BLD AUTO: 0 K/UL — SIGNIFICANT CHANGE UP (ref 0–0.2)
BASOPHILS NFR BLD AUTO: 0.2 % — SIGNIFICANT CHANGE UP (ref 0–2)
BILIRUB SERPL-MCNC: 10.3 MG/DL — HIGH (ref 0.4–2)
BUN SERPL-MCNC: 12 MG/DL — SIGNIFICANT CHANGE UP (ref 8–20)
CALCIUM SERPL-MCNC: 9.1 MG/DL — SIGNIFICANT CHANGE UP (ref 8.6–10.2)
CHLORIDE SERPL-SCNC: 101 MMOL/L — SIGNIFICANT CHANGE UP (ref 98–107)
CO2 SERPL-SCNC: 25 MMOL/L — SIGNIFICANT CHANGE UP (ref 22–29)
CREAT SERPL-MCNC: 0.5 MG/DL — SIGNIFICANT CHANGE UP (ref 0.5–1.3)
EOSINOPHIL # BLD AUTO: 0.1 K/UL — SIGNIFICANT CHANGE UP (ref 0–0.5)
EOSINOPHIL NFR BLD AUTO: 1.3 % — SIGNIFICANT CHANGE UP (ref 0–5)
GLUCOSE SERPL-MCNC: 97 MG/DL — SIGNIFICANT CHANGE UP (ref 70–115)
HCT VFR BLD CALC: 36.5 % — LOW (ref 42–52)
HGB BLD-MCNC: 12.7 G/DL — LOW (ref 14–18)
LYMPHOCYTES # BLD AUTO: 1.9 K/UL — SIGNIFICANT CHANGE UP (ref 1–4.8)
LYMPHOCYTES # BLD AUTO: 19.6 % — LOW (ref 20–55)
MCHC RBC-ENTMCNC: 32.8 PG — HIGH (ref 27–31)
MCHC RBC-ENTMCNC: 34.8 G/DL — SIGNIFICANT CHANGE UP (ref 32–36)
MCV RBC AUTO: 94.3 FL — HIGH (ref 80–94)
MONOCYTES # BLD AUTO: 0.6 K/UL — SIGNIFICANT CHANGE UP (ref 0–0.8)
MONOCYTES NFR BLD AUTO: 6 % — SIGNIFICANT CHANGE UP (ref 3–10)
NEUTROPHILS # BLD AUTO: 6.9 K/UL — SIGNIFICANT CHANGE UP (ref 1.8–8)
NEUTROPHILS NFR BLD AUTO: 72.4 % — SIGNIFICANT CHANGE UP (ref 37–73)
PLATELET # BLD AUTO: 391 K/UL — SIGNIFICANT CHANGE UP (ref 150–400)
POTASSIUM SERPL-MCNC: 3.8 MMOL/L — SIGNIFICANT CHANGE UP (ref 3.5–5.3)
POTASSIUM SERPL-SCNC: 3.8 MMOL/L — SIGNIFICANT CHANGE UP (ref 3.5–5.3)
PROT SERPL-MCNC: 6.2 G/DL — LOW (ref 6.6–8.7)
RBC # BLD: 3.87 M/UL — LOW (ref 4.6–6.2)
RBC # FLD: 13.3 % — SIGNIFICANT CHANGE UP (ref 11–15.6)
SODIUM SERPL-SCNC: 139 MMOL/L — SIGNIFICANT CHANGE UP (ref 135–145)
WBC # BLD: 9.5 K/UL — SIGNIFICANT CHANGE UP (ref 4.8–10.8)
WBC # FLD AUTO: 9.5 K/UL — SIGNIFICANT CHANGE UP (ref 4.8–10.8)

## 2019-02-14 PROCEDURE — 99233 SBSQ HOSP IP/OBS HIGH 50: CPT

## 2019-02-14 PROCEDURE — 99232 SBSQ HOSP IP/OBS MODERATE 35: CPT

## 2019-02-14 RX ORDER — ENOXAPARIN SODIUM 100 MG/ML
40 INJECTION SUBCUTANEOUS DAILY
Qty: 0 | Refills: 0 | Status: DISCONTINUED | OUTPATIENT
Start: 2019-02-14 | End: 2019-02-18

## 2019-02-14 RX ORDER — SACCHAROMYCES BOULARDII 250 MG
250 POWDER IN PACKET (EA) ORAL
Qty: 0 | Refills: 0 | Status: DISCONTINUED | OUTPATIENT
Start: 2019-02-14 | End: 2019-02-18

## 2019-02-14 RX ADMIN — Medication 250 MILLIGRAM(S): at 18:28

## 2019-02-14 RX ADMIN — ENOXAPARIN SODIUM 40 MILLIGRAM(S): 100 INJECTION SUBCUTANEOUS at 11:41

## 2019-02-14 RX ADMIN — ERTAPENEM SODIUM 120 MILLIGRAM(S): 1 INJECTION, POWDER, LYOPHILIZED, FOR SOLUTION INTRAMUSCULAR; INTRAVENOUS at 15:09

## 2019-02-14 NOTE — DISCHARGE NOTE ADULT - CARE PLAN
Principal Discharge DX:	Obstructive jaundice Principal Discharge DX:	Obstructive jaundice  Goal:	Diagnosis and treat  Assessment and plan of treatment:	Take medication as reconciled for itching.   F/u with Dr Howard GI in 1-2 weeks on cytopathology/ IgG result.  Further management per GI as an outpatient.  Will need outpatient referral to hepatologist in [place] for further therapy that will be as per Dr Howard in clinic.  Secondary Diagnosis:	Cerebral palsy, unspecified type  Assessment and plan of treatment:	Supportive care.

## 2019-02-14 NOTE — DISCHARGE NOTE ADULT - PATIENT PORTAL LINK FT
You can access the [facility], offered by [facility], by registering with the following website: [email or web address]

## 2019-02-14 NOTE — PROGRESS NOTE ADULT - ASSESSMENT
The patient is a 28 year old male with a history of cerebral palsy transferred from [facility] for an ERCP. He was in [place] 4 weeks prior, was seen for abdominal pain, noted to have elevated LFTS. They recommended further work up but he chose to return home. He underwent MRCP as outpatient and a mid CBD abnormality was noted. He had abdominal pain and vomiting. No hematemesis. He was admitted to [facility] and then underwent viral hepatitis testing, which was negative. US abdomen showed mild intra and extra hepatic ductal dilation. Status post ERCP  with distal biliary thickening; biliary stent was placed and brushings done.     Assessment/Plan:    1. Obstructive jaundice: Plan for repeat MRCP in AM  Monitor lfts  IGG subset ordered as well as ARMANDO and CA 19-9     2. Cerebral palsy    Plan discussed with GI, patient and mother at the bedside

## 2019-02-14 NOTE — PROGRESS NOTE ADULT - SUBJECTIVE AND OBJECTIVE BOX
INTERVAL HPI/OVERNIGHT EVENTS: s/p ERCP yesterday. No pain. No fever or chills.    ROS wnl     PAST MEDICAL & SURGICAL HISTORY:  Cerebral palsy  Cerebral palsy  No significant past surgical history  No significant past surgical history      Home Medications:  diphenhydrAMINE: 25 milligram(s) orally every 6 hours, As Needed for pruritis (11 Feb 2019 11:39)  HYDROmorphone: 0.5 milligram(s) subcutaneous every 4 hours, As Needed for moderate pain  (11 Feb 2019 11:39)  ketorolac: 15 milligram(s) intravenous every 8 hours, As Needed for moderate pain  (11 Feb 2019 11:39)      MEDICATIONS  (STANDING):  ertapenem  IVPB 1000 milliGRAM(s) IV Intermittent every 24 hours  lactated ringers. 1000 milliLiter(s) (75 mL/Hr) IV Continuous <Continuous>    MEDICATIONS  (PRN):      Allergies    No Known Allergies    Intolerances      PHYSICAL EXAM:   Vital Signs:  Vital Signs Last 24 Hrs  T(C): 36.8 (14 Feb 2019 07:39), Max: 36.9 (13 Feb 2019 11:23)  T(F): 98.3 (14 Feb 2019 07:39), Max: 98.5 (13 Feb 2019 11:23)  HR: 64 (14 Feb 2019 07:39) (57 - 83)  BP: 109/65 (14 Feb 2019 07:39) (109/65 - 125/75)  BP(mean): --  RR: 18 (14 Feb 2019 07:39) (14 - 20)  SpO2: 100% (13 Feb 2019 19:30) (99% - 100%)  Daily     Daily     GENERAL:  no distress  HEENT:  NC/AT,  icteric  CHEST:   no increased effort, breath sounds clear  HEART:  Regular rhythm  ABDOMEN:  Soft, non-tender, non-distended, normoactive bowel sounds,  no masses ,no hepato-splenomegaly, no signs of chronic liver disease  EXTEREMITIES:  no cyanosis      LABS:                        12.7   9.5   )-----------( 391      ( 14 Feb 2019 06:06 )             36.5       Hemoglobin: 12.7 g/dL (02-14-19 @ 06:06)  Hemoglobin: 14.3 g/dL (02-13-19 @ 02:34)  Hemoglobin: 13.3 g/dL (02-12-19 @ 06:19)      02-14    139  |  101  |  12.0  ----------------------------<  97  3.8   |  25.0  |  0.50    Ca    9.1      14 Feb 2019 06:06    TPro  6.2<L>  /  Alb  3.3  /  TBili  10.3<H>  /  DBili  x   /  AST  57<H>  /  ALT  46<H>  /  AlkPhos  240<H>  02-14      INR: 1.04 ratio (02-13-19 @ 02:34)      Aspartate Aminotransferase (AST/SGOT): 57 U/L (02-14-19 @ 06:06)  Alkaline Phosphatase, Serum: 240 U/L (02-14-19 @ 06:06)  Alanine Aminotransferase (ALT/SGPT): 46 U/L (02-14-19 @ 06:06)  Aspartate Aminotransferase (AST/SGOT): 61 U/L (02-13-19 @ 02:34)  Alkaline Phosphatase, Serum: 249 U/L (02-13-19 @ 02:34)  Alanine Aminotransferase (ALT/SGPT): 56 U/L (02-13-19 @ 02:34)  Alanine Aminotransferase (ALT/SGPT): 64 U/L (02-12-19 @ 06:19)  Alkaline Phosphatase, Serum: 240 U/L (02-12-19 @ 06:19)  Aspartate Aminotransferase (AST/SGOT): 62 U/L (02-12-19 @ 06:19)

## 2019-02-14 NOTE — DISCHARGE NOTE ADULT - PROVIDER TOKENS
PROVIDER:[TOKEN:[[id number]],FOLLOWUP:[1 week]] PROVIDER:[TOKEN:[[id number]],FOLLOWUP:[1 week]],FREE:[LAST:[PMD],PHONE:[(   )    -],FAX:[(   )    -]]

## 2019-02-14 NOTE — PROGRESS NOTE ADULT - ASSESSMENT
Patient with obstructive jaundice with biliary dilation, with abdominal pain. s/p ERCP yesterday with distal biliary thickening biliary stent placed brushings were done.   ?? PSC vs IGG4 cholangiopathy vs malignancy   Patient denies chronic diarrhea or rectal bleeding -> no IBD like symptoms    advance diet to clear liquids  MRI/MRCP tomorrow   IGG subclasses ordered     Thanks

## 2019-02-14 NOTE — DISCHARGE NOTE ADULT - HOSPITAL COURSE
The patient is a 28 year old male with a history of cerebral palsy transferred from [facility] for an ERCP. He was in [place] 4 weeks prior, was seen for abdominal pain, noted to have elevated LFTS. They recommended further work up but he chose to return home. He underwent MRCP as outpatient and a mid CBD abnormality was noted. He had abdominal pain and vomiting. No hematemesis. He was admitted to [facility] and then underwent viral hepatitis testing, which was negative. US abdomen showed mild intra and extra hepatic ductal dilation. Status post ERCP  with distal biliary thickening; biliary stent was placed and brushings done. The patient is a 28 year old male with a history of cerebral palsy, transferred from [facility] for an ERCP. He was in [place] 4 weeks prior, was seen for abdominal pain, noted to have elevated LFTS. They recommended further work up but he chose to return home. He underwent MRCP as an outpatient and a mid CBD abnormality was noted. He had abdominal pain and vomiting. No hematemesis. He was admitted to [facility] and then underwent viral hepatitis testing, which was negative. US abdomen showed mild intra and extra hepatic ductal dilation. Status post ERCP with distal biliary thickening; biliary stent was placed and brushings done. Repeat MRCP showed mild to moderate intrahepatic duct dilatation and bilirubin still elevated raising the suspicion of PSC. Pt was followed by GI, deemed cleared for discharge with outpatient f/u with Dr Howard to f/u on cytopathology/ IgG and will will need outpatient referral to hepatologist in [place] for further therapy as per Dr Howard. Pt stable for discharge today. Plan discussed with pt's mother at bedside and she will make appointment with Dr Howard.     PHYSICAL EXAM:    Vital Signs Last 24 Hrs  T(C): 36.8 (18 Feb 2019 07:30), Max: 36.8 (17 Feb 2019 15:28)  T(F): 98.2 (18 Feb 2019 07:30), Max: 98.2 (17 Feb 2019 15:28)  HR: 109 (18 Feb 2019 07:30) (90 - 109)  BP: 123/82 (18 Feb 2019 07:30) (123/82 - 133/73)  BP(mean): --  RR: 18 (17 Feb 2019 23:46) (18 - 18)  SpO2: 99% (17 Feb 2019 15:28) (99% - 99%)    GENERAL: Pt lying comfortably, NAD.  HEENT: NC/ AT, icterus sclera.  CHEST/LUNG: Clear to auscultation bilaterally; No wheezing.  HEART: S1S2+, Regular rate and rhythm; No murmurs.  ABDOMEN: Soft, Nontender, Nondistended; Bowel sounds present.  Extremities: No LE edema, pulses+  NEURO: AAOX3, no focal deficits, no motor r sensory loss.  PSYCH: normal mood.    Total time spent on discharge 35 minutes.

## 2019-02-14 NOTE — DISCHARGE NOTE ADULT - PLAN OF CARE
Diagnosis and treat Take medication as reconciled for itching.   F/u with Dr Howard GI in 1-2 weeks on cytopathology/ IgG result.  Further management per GI as an outpatient.  Will need outpatient referral to hepatologist in [place] for further therapy that will be as per Dr Howard in clinic. Supportive care.

## 2019-02-14 NOTE — DISCHARGE NOTE ADULT - MEDICATION SUMMARY - MEDICATIONS TO STOP TAKING
I will STOP taking the medications listed below when I get home from the hospital:    HYDROmorphone  -- 0.5 milligram(s) subcutaneous every 4 hours, As Needed for moderate pain    ketorolac  -- 15 milligram(s) intravenous every 8 hours, As Needed for moderate pain

## 2019-02-14 NOTE — PROGRESS NOTE ADULT - SUBJECTIVE AND OBJECTIVE BOX
CC:     INTERVAL HPI/OVERNIGHT EVENTS: Patient seen and examined, complaints of right upper quadrant discomfort; non radiating. No associated nausea or vomiting.       Vital Signs Last 24 Hrs  T(C): 36.8 (14 Feb 2019 07:39), Max: 36.9 (13 Feb 2019 11:23)  T(F): 98.3 (14 Feb 2019 07:39), Max: 98.5 (13 Feb 2019 11:23)  HR: 64 (14 Feb 2019 07:39) (57 - 76)  BP: 109/65 (14 Feb 2019 07:39) (109/65 - 122/68)  BP(mean): --  RR: 18 (14 Feb 2019 07:39) (14 - 20)  SpO2: 100% (13 Feb 2019 19:30) (99% - 100%)    PHYSICAL EXAM:    GENERAL: NAD, AOX3  HEAD:  Atraumatic, Normocephalic  EYES: EOMI, PERRLA, conjunctiva and sclera icterus   ENMT: Moist mucous membranes  CHEST/LUNG: Clear to auscultation bilaterally; No rales, rhonchi, wheezing, or rubs  HEART: Regular rate and rhythm; No murmurs, rubs, or gallops  ABDOMEN: Soft, Nontender, Nondistended; Bowel sounds present  EXTREMITIES:  2+ Peripheral Pulses, No clubbing, cyanosis, or edema        MEDICATIONS  (STANDING):  enoxaparin Injectable 40 milliGRAM(s) SubCutaneous daily  ertapenem  IVPB 1000 milliGRAM(s) IV Intermittent every 24 hours  lactated ringers. 1000 milliLiter(s) (75 mL/Hr) IV Continuous <Continuous>  saccharomyces boulardii 250 milliGRAM(s) Oral two times a day    MEDICATIONS  (PRN):      Allergies    No Known Allergies    Intolerances          LABS:                          12.7   9.5   )-----------( 391      ( 14 Feb 2019 06:06 )             36.5     02-14    139  |  101  |  12.0  ----------------------------<  97  3.8   |  25.0  |  0.50    Ca    9.1      14 Feb 2019 06:06    TPro  6.2<L>  /  Alb  3.3  /  TBili  10.3<H>  /  DBili  x   /  AST  57<H>  /  ALT  46<H>  /  AlkPhos  240<H>  02-14    PT/INR - ( 13 Feb 2019 02:34 )   PT: 12.0 sec;   INR: 1.04 ratio         PTT - ( 13 Feb 2019 02:34 )  PTT:34.9 sec      RADIOLOGY & ADDITIONAL TESTS:

## 2019-02-14 NOTE — DISCHARGE NOTE ADULT - CARE PROVIDER_API CALL
Yasmany Howard)  Gastroenterology; Internal Medicine  [address] [place] [address]  Phone: [phone]  Fax: [phone]  Follow Up Time: 1 week Yasmany Howard)  Gastroenterology; Internal Medicine  [address] [place] [address]  Phone: [phone]  Fax: [phone]  Follow Up Time: 1 week    PMD,   Phone: (   )    -  Fax: (   )    -  Follow Up Time:

## 2019-02-14 NOTE — DISCHARGE NOTE ADULT - CARE PROVIDERS DIRECT ADDRESSES
[email or web address]

## 2019-02-15 DIAGNOSIS — K83.8 OTHER SPECIFIED DISEASES OF BILIARY TRACT: ICD-10-CM

## 2019-02-15 LAB
ALBUMIN SERPL ELPH-MCNC: 3 G/DL — LOW (ref 3.3–5.2)
ALP SERPL-CCNC: 223 U/L — HIGH (ref 40–120)
ALT FLD-CCNC: 44 U/L — HIGH
ANA TITR SER: NEGATIVE — SIGNIFICANT CHANGE UP
ANION GAP SERPL CALC-SCNC: 14 MMOL/L — SIGNIFICANT CHANGE UP (ref 5–17)
AST SERPL-CCNC: 60 U/L — HIGH
BILIRUB SERPL-MCNC: 9.1 MG/DL — HIGH (ref 0.4–2)
BUN SERPL-MCNC: 8 MG/DL — SIGNIFICANT CHANGE UP (ref 8–20)
CALCIUM SERPL-MCNC: 8.9 MG/DL — SIGNIFICANT CHANGE UP (ref 8.6–10.2)
CANCER AG19-9 SERPL-ACNC: 16.1 U/ML — SIGNIFICANT CHANGE UP
CANCER ANTIGEN 19-9-ROCHE, RESULT: 12 U/ML — SIGNIFICANT CHANGE UP
CHLORIDE SERPL-SCNC: 102 MMOL/L — SIGNIFICANT CHANGE UP (ref 98–107)
CO2 SERPL-SCNC: 23 MMOL/L — SIGNIFICANT CHANGE UP (ref 22–29)
CREAT SERPL-MCNC: 0.33 MG/DL — LOW (ref 0.5–1.3)
GLUCOSE SERPL-MCNC: 97 MG/DL — SIGNIFICANT CHANGE UP (ref 70–115)
HCT VFR BLD CALC: 37.5 % — LOW (ref 42–52)
HGB BLD-MCNC: 12.9 G/DL — LOW (ref 14–18)
MCHC RBC-ENTMCNC: 31.8 PG — HIGH (ref 27–31)
MCHC RBC-ENTMCNC: 34.4 G/DL — SIGNIFICANT CHANGE UP (ref 32–36)
MCV RBC AUTO: 92.4 FL — SIGNIFICANT CHANGE UP (ref 80–94)
PLATELET # BLD AUTO: 341 K/UL — SIGNIFICANT CHANGE UP (ref 150–400)
POTASSIUM SERPL-MCNC: 3.6 MMOL/L — SIGNIFICANT CHANGE UP (ref 3.5–5.3)
POTASSIUM SERPL-SCNC: 3.6 MMOL/L — SIGNIFICANT CHANGE UP (ref 3.5–5.3)
PROT SERPL-MCNC: 6.5 G/DL — LOW (ref 6.6–8.7)
RBC # BLD: 4.06 M/UL — LOW (ref 4.6–6.2)
RBC # FLD: 13.2 % — SIGNIFICANT CHANGE UP (ref 11–15.6)
SODIUM SERPL-SCNC: 139 MMOL/L — SIGNIFICANT CHANGE UP (ref 135–145)
WBC # BLD: 6.5 K/UL — SIGNIFICANT CHANGE UP (ref 4.8–10.8)
WBC # FLD AUTO: 6.5 K/UL — SIGNIFICANT CHANGE UP (ref 4.8–10.8)

## 2019-02-15 PROCEDURE — 99232 SBSQ HOSP IP/OBS MODERATE 35: CPT

## 2019-02-15 PROCEDURE — 74183 MRI ABD W/O CNTR FLWD CNTR: CPT | Mod: 26

## 2019-02-15 RX ADMIN — Medication 250 MILLIGRAM(S): at 05:44

## 2019-02-15 RX ADMIN — ERTAPENEM SODIUM 120 MILLIGRAM(S): 1 INJECTION, POWDER, LYOPHILIZED, FOR SOLUTION INTRAMUSCULAR; INTRAVENOUS at 16:00

## 2019-02-15 RX ADMIN — Medication 250 MILLIGRAM(S): at 19:10

## 2019-02-15 NOTE — PROGRESS NOTE ADULT - PROBLEM SELECTOR PLAN 1
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 For repeat MRCP today. Awaiting brushings of CBD as well. F/U LFTs in AM. 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 For repeat MRCP today. Awaiting brushings of CBD as well. F/U LFTs in AM. Will also order AMA and anti liver kidney microsomal antibody

## 2019-02-15 NOTE — PROGRESS NOTE ADULT - SUBJECTIVE AND OBJECTIVE BOX
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 Bili today down slightly. For repeat MRCP today.    REVIEW OF SYSTEMS:    CONSTITUTIONAL: No fever, weight loss, or fatigue  EYES: No eye pain, visual disturbances, or discharge  ENMT:  No difficulty hearing, tinnitus, vertigo; No sinus or throat pain  RESPIRATORY: No cough, wheezing, chills or hemoptysis; No shortness of breath  CARDIOVASCULAR: No chest pain, palpitations, dizziness, or leg swelling  GASTROINTESTINAL: No abdominal or epigastric pain. No nausea, vomiting, or hematemesis; No diarrhea or constipation. No melena or hematochezia.    MEDICATIONS:  MEDICATIONS  (STANDING):  enoxaparin Injectable 40 milliGRAM(s) SubCutaneous daily  ertapenem  IVPB 1000 milliGRAM(s) IV Intermittent every 24 hours  lactated ringers. 1000 milliLiter(s) (75 mL/Hr) IV Continuous <Continuous>  saccharomyces boulardii 250 milliGRAM(s) Oral two times a day    MEDICATIONS  (PRN):      Allergies    No Known Allergies    Intolerances        Vital Signs Last 24 Hrs  T(C): 37.1 (15 Feb 2019 01:01), Max: 37.1 (15 Feb 2019 01:01)  T(F): 98.7 (15 Feb 2019 01:01), Max: 98.7 (15 Feb 2019 01:01)  HR: 66 (15 Feb 2019 01:01) (66 - 82)  BP: 107/52 (15 Feb 2019 01:01) (107/52 - 132/64)  BP(mean): --  RR: 18 (15 Feb 2019 01:01) (18 - 18)  SpO2: --    02-14 @ 07:01  -  02-15 @ 07:00  --------------------------------------------------------  IN: 900 mL / OUT: 0 mL / NET: 900 mL        PHYSICAL EXAM:    General: Well developed; well nourished; in no acute distress, jaundiced  HEENT: MMM, conjunctiva and sclera clear  Gastrointestinal:Abdomen: Soft non-tender non-distended; Normal bowel sounds; No hepatosplenomegaly  Extremities: no cyanosis, clubbing or edema.  Skin: Warm and dry. No obvious rash    LABS:      CBC Full  -  ( 15 Feb 2019 06:58 )  WBC Count : 6.5 K/uL  Hemoglobin : 12.9 g/dL  Hematocrit : 37.5 %  Platelet Count - Automated : 341 K/uL  Mean Cell Volume : 92.4 fl  Mean Cell Hemoglobin : 31.8 pg  Mean Cell Hemoglobin Concentration : 34.4 g/dL  Auto Neutrophil # : x  Auto Lymphocyte # : x  Auto Monocyte # : x  Auto Eosinophil # : x  Auto Basophil # : x  Auto Neutrophil % : x  Auto Lymphocyte % : x  Auto Monocyte % : x  Auto Eosinophil % : x  Auto Basophil % : x    02-15    139  |  102  |  8.0  ----------------------------<  97  3.6   |  23.0  |  0.33<L>    Ca    8.9      15 Feb 2019 06:58    TPro  6.5<L>  /  Alb  3.0<L>  /  TBili  9.1<H>  /  DBili  x   /  AST  60<H>  /  ALT  44<H>  /  AlkPhos  223<H>  02-15 Pt seen and examined f/u 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 Bili today down slightly. For repeat MRCP today. IgG subsets pending    REVIEW OF SYSTEMS:    CONSTITUTIONAL: No fever, weight loss, or fatigue  EYES: No eye pain, visual disturbances, or discharge  ENMT:  No difficulty hearing, tinnitus, vertigo; No sinus or throat pain  RESPIRATORY: No cough, wheezing, chills or hemoptysis; No shortness of breath  CARDIOVASCULAR: No chest pain, palpitations, dizziness, or leg swelling  GASTROINTESTINAL: No abdominal or epigastric pain. No nausea, vomiting, or hematemesis; No diarrhea or constipation. No melena or hematochezia.    MEDICATIONS:  MEDICATIONS  (STANDING):  enoxaparin Injectable 40 milliGRAM(s) SubCutaneous daily  ertapenem  IVPB 1000 milliGRAM(s) IV Intermittent every 24 hours  lactated ringers. 1000 milliLiter(s) (75 mL/Hr) IV Continuous <Continuous>  saccharomyces boulardii 250 milliGRAM(s) Oral two times a day    MEDICATIONS  (PRN):      Allergies    No Known Allergies    Intolerances        Vital Signs Last 24 Hrs  T(C): 37.1 (15 Feb 2019 01:01), Max: 37.1 (15 Feb 2019 01:01)  T(F): 98.7 (15 Feb 2019 01:01), Max: 98.7 (15 Feb 2019 01:01)  HR: 66 (15 Feb 2019 01:01) (66 - 82)  BP: 107/52 (15 Feb 2019 01:01) (107/52 - 132/64)  BP(mean): --  RR: 18 (15 Feb 2019 01:01) (18 - 18)  SpO2: --    02-14 @ 07:01  -  02-15 @ 07:00  --------------------------------------------------------  IN: 900 mL / OUT: 0 mL / NET: 900 mL        PHYSICAL EXAM:    General: Well developed; well nourished; in no acute distress, jaundiced  HEENT: MMM, conjunctiva and sclera clear  Gastrointestinal:Abdomen: Soft non-tender non-distended; Normal bowel sounds; No hepatosplenomegaly  Extremities: no cyanosis, clubbing or edema.  Skin: Warm and dry. No obvious rash    LABS:      CBC Full  -  ( 15 Feb 2019 06:58 )  WBC Count : 6.5 K/uL  Hemoglobin : 12.9 g/dL  Hematocrit : 37.5 %  Platelet Count - Automated : 341 K/uL  Mean Cell Volume : 92.4 fl  Mean Cell Hemoglobin : 31.8 pg  Mean Cell Hemoglobin Concentration : 34.4 g/dL  Auto Neutrophil # : x  Auto Lymphocyte # : x  Auto Monocyte # : x  Auto Eosinophil # : x  Auto Basophil # : x  Auto Neutrophil % : x  Auto Lymphocyte % : x  Auto Monocyte % : x  Auto Eosinophil % : x  Auto Basophil % : x    02-15    139  |  102  |  8.0  ----------------------------<  97  3.6   |  23.0  |  0.33<L>    Ca    8.9      15 Feb 2019 06:58    TPro  6.5<L>  /  Alb  3.0<L>  /  TBili  9.1<H>  /  DBili  x   /  AST  60<H>  /  ALT  44<H>  /  AlkPhos  223<H>  02-15

## 2019-02-15 NOTE — PROGRESS NOTE ADULT - ASSESSMENT
The patient is a 28 year old male with a history of cerebral palsy transferred from [facility] for an ERCP. He was in [place] 4 weeks prior, was seen for abdominal pain, noted to have elevated LFTS. They recommended further work up but he chose to return home. He underwent MRCP as outpatient and a mid CBD abnormality was noted. He had abdominal pain and vomiting. No hematemesis. He was admitted to [facility] and then underwent viral hepatitis testing, which was negative. US abdomen showed mild intra and extra hepatic ductal dilation. Status post ERCP  with distal biliary thickening; biliary stent was placed and brushings done. Repeat MRCP ordered    Assessment/Plan:    1. Obstructive jaundice: Plan for repeat MRCP today  Monitor lfts  ARMANDO negative  IGG subset pending as well as brushings of CBD.   AMA and Anti liver kidney microsomal antibody ordered by GI.     2. Cerebral palsy    Plan discussed with patient and mother at the bedside

## 2019-02-15 NOTE — PROGRESS NOTE ADULT - SUBJECTIVE AND OBJECTIVE BOX
CC: Follow up    INTERVAL HPI/OVERNIGHT EVENTS: Patient seen and examined, RUQ discomfort. +itching overnight.       Vital Signs Last 24 Hrs  T(C): 36.9 (15 Feb 2019 07:48), Max: 37.1 (15 Feb 2019 01:01)  T(F): 98.4 (15 Feb 2019 07:48), Max: 98.7 (15 Feb 2019 01:01)  HR: 82 (15 Feb 2019 07:48) (66 - 82)  BP: 108/64 (15 Feb 2019 07:48) (107/52 - 132/64)  BP(mean): --  RR: 18 (15 Feb 2019 07:48) (18 - 18)  SpO2: --    PHYSICAL EXAM:    GENERAL: NAD, AOX3  HEAD:  Atraumatic, Normocephalic  EYES: EOMI, PERRLA, conjunctiva and sclera icterus+  ENMT: Moist mucous membranes  CHEST/LUNG: Clear to auscultation bilaterally; No rales, rhonchi, wheezing, or rubs  HEART: Regular rate and rhythm; No murmurs, rubs, or gallops  ABDOMEN: Soft, RUQ tenderness , Nondistended; Bowel sounds present  EXTREMITIES:  2+ Peripheral Pulses, No clubbing, cyanosis, or edema        MEDICATIONS  (STANDING):  enoxaparin Injectable 40 milliGRAM(s) SubCutaneous daily  ertapenem  IVPB 1000 milliGRAM(s) IV Intermittent every 24 hours  lactated ringers. 1000 milliLiter(s) (75 mL/Hr) IV Continuous <Continuous>  saccharomyces boulardii 250 milliGRAM(s) Oral two times a day    MEDICATIONS  (PRN):      Allergies    No Known Allergies    Intolerances          LABS:                          12.9   6.5   )-----------( 341      ( 15 Feb 2019 06:58 )             37.5     02-15    139  |  102  |  8.0  ----------------------------<  97  3.6   |  23.0  |  0.33<L>    Ca    8.9      15 Feb 2019 06:58    TPro  6.5<L>  /  Alb  3.0<L>  /  TBili  9.1<H>  /  DBili  x   /  AST  60<H>  /  ALT  44<H>  /  AlkPhos  223<H>  02-15          RADIOLOGY & ADDITIONAL TESTS:

## 2019-02-16 LAB
ALBUMIN SERPL ELPH-MCNC: 3.5 G/DL — SIGNIFICANT CHANGE UP (ref 3.3–5.2)
ALP SERPL-CCNC: 245 U/L — HIGH (ref 40–120)
ALT FLD-CCNC: 45 U/L — HIGH
AST SERPL-CCNC: 57 U/L — HIGH
BILIRUB DIRECT SERPL-MCNC: 6.6 MG/DL — HIGH (ref 0–0.3)
BILIRUB INDIRECT FLD-MCNC: 2.2 MG/DL — HIGH (ref 0.2–1)
BILIRUB SERPL-MCNC: 8.8 MG/DL — HIGH (ref 0.4–2)
INR BLD: 1.04 RATIO — SIGNIFICANT CHANGE UP (ref 0.88–1.16)
PROT SERPL-MCNC: 6.6 G/DL — SIGNIFICANT CHANGE UP (ref 6.6–8.7)
PROTHROM AB SERPL-ACNC: 12 SEC — SIGNIFICANT CHANGE UP (ref 10–12.9)

## 2019-02-16 PROCEDURE — 99232 SBSQ HOSP IP/OBS MODERATE 35: CPT

## 2019-02-16 RX ORDER — DIPHENHYDRAMINE HCL 50 MG
25 CAPSULE ORAL EVERY 6 HOURS
Qty: 0 | Refills: 0 | Status: DISCONTINUED | OUTPATIENT
Start: 2019-02-16 | End: 2019-02-18

## 2019-02-16 RX ORDER — CALAMINE AND ZINC OXIDE AND PHENOL 160; 10 MG/ML; MG/ML
1 LOTION TOPICAL
Qty: 0 | Refills: 0 | Status: DISCONTINUED | OUTPATIENT
Start: 2019-02-16 | End: 2019-02-18

## 2019-02-16 RX ADMIN — Medication 250 MILLIGRAM(S): at 05:09

## 2019-02-16 RX ADMIN — Medication 25 MILLIGRAM(S): at 18:30

## 2019-02-16 RX ADMIN — ERTAPENEM SODIUM 120 MILLIGRAM(S): 1 INJECTION, POWDER, LYOPHILIZED, FOR SOLUTION INTRAMUSCULAR; INTRAVENOUS at 13:38

## 2019-02-16 RX ADMIN — Medication 250 MILLIGRAM(S): at 18:30

## 2019-02-16 NOTE — PROGRESS NOTE ADULT - PROBLEM SELECTOR PLAN 1
due to CBD stricture of unknown etiology, possible sclerosing cholangitis or IgG4 disease. Awaiting brushings of CBD S/P ERCP with plastic stent placement. F/U LFTs. due to CBD stricture of unknown etiology and possibly an intrahepatic stricture, possible sclerosing cholangitis or IgG4 disease. Awaiting brushings of CBD S/P ERCP with plastic stent placement. F/U LFTs.

## 2019-02-16 NOTE — PROGRESS NOTE ADULT - ASSESSMENT
The patient is a 28 year old male with a history of cerebral palsy transferred from [facility] for an ERCP. He was in [place] 4 weeks prior, was seen for abdominal pain, noted to have elevated LFTS. They recommended further work up but he chose to return home. He underwent MRCP as outpatient and a mid CBD abnormality was noted. He had abdominal pain and vomiting. No hematemesis. He was admitted to [facility] and then underwent viral hepatitis testing, which was negative. US abdomen showed mild intra and extra hepatic ductal dilation. Status post ERCP  with distal biliary thickening; biliary stent was placed and brushings done. Repeat MRCP ordered    Assessment/Plan:    Obstructive jaundice - continues to have hyperbilirubinemia and transaminitis though improving  - Monitor lfts  - ARMANDO negative  - IGG subset pending as well as brushings of CBD.   - AMA and Anti liver kidney microsomal antibody ordered by GI.   - GI follow up; discussed with Dr Jones, to review follow up MRCP with patient mother    Cerebral palsy  - Supportive care    Prophylactic measure  - VTE ppx continue LMWH        Plan discussed with patient and mother at the bedside, PABLO, RN

## 2019-02-16 NOTE — PROGRESS NOTE ADULT - SUBJECTIVE AND OBJECTIVE BOX
HOSPITALIST PROGRESS NOTE    SAUMYA ZARATE  [id number]  28yMale    Patient is a 28y old  Male who presents with a chief complaint of elevated bilirubin / transferred from [facility] (16 Feb 2019 11:04)      SUBJECTIVE:   Chart reviewed since last visit.  Patient seen and examined at bedside for obstructive jaundice.  Feels itchy - denies any nausea, vomiting, abdominal pain, chills.    OBJECTIVE:  Vital Signs Last 24 Hrs  T(C): 36.6 (16 Feb 2019 07:55), Max: 36.8 (15 Feb 2019 16:52)  T(F): 97.9 (16 Feb 2019 07:55), Max: 98.3 (15 Feb 2019 16:52)  HR: 75 (16 Feb 2019 07:55) (71 - 107)  BP: 121/72 (16 Feb 2019 07:55) (106/69 - 125/56)   RR: 18 (16 Feb 2019 07:55) (18 - 18)  SpO2: 99% (16 Feb 2019 07:55) (95% - 99%)    PHYSICAL EXAMINATION  General: Lying in bed, NAD  HEENT:  Mildly icteric  NECK:  Supple  CVS: regular rate and rhythm S1S2  RESP:  CTAB  GI:  Soft nondistended nontender BS+  : No suprapubic tenderness  MS: No edema, FROM  CNS: No focal deficit noted  INTEG:  Warm dry skin  PSYCH:  Fair mood    MONITOR:  CAPILLARY BLOOD GLUCOSE            I&O's Summary                          12.9   6.5   )-----------( 341      ( 15 Feb 2019 06:58 )             37.5     PT/INR - ( 16 Feb 2019 06:56 )   PT: 12.0 sec;   INR: 1.04 ratio           02-15    139  |  102  |  8.0  ----------------------------<  97  3.6   |  23.0  |  0.33<L>    Ca    8.9      15 Feb 2019 06:58    TPro  6.6  /  Alb  3.5  /  TBili  8.8<H>  /  DBili  6.6<H>  /  AST  57<H>  /  ALT  45<H>  /  AlkPhos  245<H>  02-16            Culture:    TTE:    RADIOLOGY  < from: MR MRCP w/wo IV Cont (02.15.19 @ 11:07) >     EXAM:  MR MRCP WAW IC                          *** ADDENDUM 02/15/2019  ***    The following was omitted from the body of the report under BILE DUCTS:   There is abrupt cut off of the intrahepatic biliary radicles within the   central aspect of the biliary tree with no visible mass, likely secondary   to a stricture.      *** END OF ADDENDUM 02/15/2019  ***      PROCEDURE DATE:  02/15/2019          INTERPRETATION:  CLINICAL INFORMATION: Follow-up MRCP. Status post ERCP   with stent placement.    COMPARISON: Abdominal ultrasound 2/10/2019    PROCEDURE:   MRI of the abdomen was performed with and without intravenous contrast.    MRCP was performed.  6 cc Gadavist was administered        FINDINGS:    LOWER CHEST: Within normal limits.    LIVER: Within normal limits. Increased enhancement involving the right   hepatic lobe, likely perfusional and and may be related to the biliary   obstruction. No fatty infiltration. No mass.  BILE DUCTS: Mild to moderate intrahepatic biliary ductal dilatation which   is greater in the right hepatic lobe. Pneumobilia is noted consistent   with the recent instrumentation. Common bile duct measures 3 mm.  GALLBLADDER: Low T2 signal is seen within the nondependent portion of the   gallbladder fundus consistent with air are normal caliber wall.  SPLEEN: Within normal limits.  PANCREAS: Within normal limits.  ADRENALS: Within normal limits.  KIDNEYS/URETERS: Within normal limits.    VISUALIZED PORTIONS:    BOWEL: Within normal limits.   PERITONEUM: No ascites.  VESSELS: Within normal limits.  RETROPERITONEUM: No lymphadenopathy.    ABDOMINAL WALL: Within normal limits.  BONES: Within normal limits.    IMPRESSION:     Mild to moderate intrahepatic biliary ductal dilatation, greater in the   right hepatic lobe which is secondary to a stricture within the central   aspect of the biliary tree in the minh hepatis.    No mass seen. The differential includes primary sclerosing cholangeitis   or IgG4 disease.    Common bile duct normal in caliber.          ***Please see the addendum at the top of this report. It may contain   additional important information or changes.****          NATTY KERNS   This document has been electronically signed. Feb 15 2019  2:57PM  Addend:  NATTY KERNS   This addendum was electronically signed on: Feb 15 2019  3:13PM.            < end of copied text >        MEDICATIONS  (STANDING):  enoxaparin Injectable 40 milliGRAM(s) SubCutaneous daily  ertapenem  IVPB 1000 milliGRAM(s) IV Intermittent every 24 hours  saccharomyces boulardii 250 milliGRAM(s) Oral two times a day      MEDICATIONS  (PRN):

## 2019-02-16 NOTE — PROGRESS NOTE ADULT - SUBJECTIVE AND OBJECTIVE BOX
Pt seen and examined f/u CBD stricture    This morning he and family note that he has no abdominal pain, nausea or vomiting. No dairrhea or constipation. Repeat MRI with mld to moderate intrahepatic dilation, right greater than left with possible mid duct stricture. ARMANDO is negative. IgG4 is pending. Cytology is pending. Bili coming down slowly    REVIEW OF SYSTEMS:    CONSTITUTIONAL: No fever, weight loss, or fatigue  EYES: No eye pain, visual disturbances, or discharge  ENMT:  No difficulty hearing, tinnitus, vertigo; No sinus or throat pain  RESPIRATORY: No cough, wheezing, chills or hemoptysis; No shortness of breath  CARDIOVASCULAR: No chest pain, palpitations, dizziness, or leg swelling  GASTROINTESTINAL: No abdominal or epigastric pain. No nausea, vomiting, or hematemesis; No diarrhea or constipation. No melena or hematochezia.    MEDICATIONS:  MEDICATIONS  (STANDING):  enoxaparin Injectable 40 milliGRAM(s) SubCutaneous daily  ertapenem  IVPB 1000 milliGRAM(s) IV Intermittent every 24 hours  saccharomyces boulardii 250 milliGRAM(s) Oral two times a day    MEDICATIONS  (PRN):      Allergies    No Known Allergies    Intolerances        Vital Signs Last 24 Hrs  T(C): 36.6 (16 Feb 2019 07:55), Max: 36.8 (15 Feb 2019 16:52)  T(F): 97.9 (16 Feb 2019 07:55), Max: 98.3 (15 Feb 2019 16:52)  HR: 75 (16 Feb 2019 07:55) (71 - 107)  BP: 121/72 (16 Feb 2019 07:55) (106/69 - 125/56)  BP(mean): --  RR: 18 (16 Feb 2019 07:55) (18 - 18)  SpO2: 99% (16 Feb 2019 07:55) (95% - 99%)      PHYSICAL EXAM:    General: Well developed; well nourished; in no acute distress, jaundiced  HEENT: MMM, conjunctiva and sclera clear  Gastrointestinal:Abdomen: Soft non-tender non-distended; Normal bowel sounds; No hepatosplenomegaly  Extremities: no cyanosis, clubbing or edema.  Skin: Warm and dry. No obvious rash    LABS:      CBC Full  -  ( 15 Feb 2019 06:58 )  WBC Count : 6.5 K/uL  Hemoglobin : 12.9 g/dL  Hematocrit : 37.5 %  Platelet Count - Automated : 341 K/uL  Mean Cell Volume : 92.4 fl  Mean Cell Hemoglobin : 31.8 pg  Mean Cell Hemoglobin Concentration : 34.4 g/dL  Auto Neutrophil # : x  Auto Lymphocyte # : x  Auto Monocyte # : x  Auto Eosinophil # : x  Auto Basophil # : x  Auto Neutrophil % : x  Auto Lymphocyte % : x  Auto Monocyte % : x  Auto Eosinophil % : x  Auto Basophil % : x    02-15    139  |  102  |  8.0  ----------------------------<  97  3.6   |  23.0  |  0.33<L>    Ca    8.9      15 Feb 2019 06:58    TPro  6.6  /  Alb  3.5  /  TBili  8.8<H>  /  DBili  6.6<H>  /  AST  57<H>  /  ALT  45<H>  /  AlkPhos  245<H>  02-16    PT/INR - ( 16 Feb 2019 06:56 )   PT: 12.0 sec;   INR: 1.04 ratio                           RADIOLOGY & ADDITIONAL STUDIES (The following images were personally reviewed):  < from: MR MRCP w/wo IV Cont (02.15.19 @ 11:07) >    The following was omitted from the body of the report under BILE DUCTS:   There is abrupt cut off of the intrahepatic biliary radicles within the   central aspect of the biliary tree with no visible mass, likely secondary   to a stricture.      *** END OF ADDENDUM 02/15/2019  ***      PROCEDURE DATE:  02/15/2019          INTERPRETATION:  CLINICAL INFORMATION: Follow-up MRCP. Status post ERCP   with stent placement.    COMPARISON: Abdominal ultrasound 2/10/2019    PROCEDURE:   MRI of the abdomen was performed with and without intravenous contrast.    MRCP was performed.  6 cc Gadavist was administered        FINDINGS:    LOWER CHEST: Within normal limits.    LIVER: Within normal limits. Increased enhancement involving the right   hepatic lobe, likely perfusional and and may be related to the biliary   obstruction. No fatty infiltration. No mass.  BILE DUCTS: Mild to moderate intrahepatic biliary ductal dilatation which   is greater in the right hepatic lobe. Pneumobilia is noted consistent   with the recent instrumentation. Common bile duct measures 3 mm.  GALLBLADDER: Low T2 signal is seen within the nondependent portion of the   gallbladder fundus consistent with air are normal caliber wall.  SPLEEN: Within normal limits.  PANCREAS: Within normal limits.  ADRENALS: Within normal limits.  KIDNEYS/URETERS: Within normal limits.    VISUALIZED PORTIONS:    BOWEL: Within normal limits.   PERITONEUM: No ascites.  VESSELS: Within normal limits.  RETROPERITONEUM: No lymphadenopathy.    ABDOMINAL WALL: Within normal limits.  BONES: Within normal limits.    IMPRESSION:     Mild to moderate intrahepatic biliary ductal dilatation, greater in the   right hepatic lobe which is secondary to a stricture within the central   aspect of the biliary tree in the minh hepatis.      < end of copied text > Pt seen and examined f/u CBD stricture    This morning he and family note that he has no abdominal pain, nausea or vomiting. No dairrhea or constipation. Repeat MRI with mld to moderate intrahepatic dilation, right greater than left with possible mid duct stricture. There may be an intrahepatic stricture as well.  ARMANDO is negative. IgG4 is pending. Cytology is pending. Bili coming down slowly    REVIEW OF SYSTEMS:    CONSTITUTIONAL: No fever, weight loss, or fatigue  EYES: No eye pain, visual disturbances, or discharge  ENMT:  No difficulty hearing, tinnitus, vertigo; No sinus or throat pain  RESPIRATORY: No cough, wheezing, chills or hemoptysis; No shortness of breath  CARDIOVASCULAR: No chest pain, palpitations, dizziness, or leg swelling  GASTROINTESTINAL: No abdominal or epigastric pain. No nausea, vomiting, or hematemesis; No diarrhea or constipation. No melena or hematochezia.    MEDICATIONS:  MEDICATIONS  (STANDING):  enoxaparin Injectable 40 milliGRAM(s) SubCutaneous daily  ertapenem  IVPB 1000 milliGRAM(s) IV Intermittent every 24 hours  saccharomyces boulardii 250 milliGRAM(s) Oral two times a day    MEDICATIONS  (PRN):      Allergies    No Known Allergies    Intolerances        Vital Signs Last 24 Hrs  T(C): 36.6 (16 Feb 2019 07:55), Max: 36.8 (15 Feb 2019 16:52)  T(F): 97.9 (16 Feb 2019 07:55), Max: 98.3 (15 Feb 2019 16:52)  HR: 75 (16 Feb 2019 07:55) (71 - 107)  BP: 121/72 (16 Feb 2019 07:55) (106/69 - 125/56)  BP(mean): --  RR: 18 (16 Feb 2019 07:55) (18 - 18)  SpO2: 99% (16 Feb 2019 07:55) (95% - 99%)      PHYSICAL EXAM:    General: Well developed; well nourished; in no acute distress, jaundiced  HEENT: MMM, conjunctiva and sclera clear  Gastrointestinal:Abdomen: Soft non-tender non-distended; Normal bowel sounds; No hepatosplenomegaly  Extremities: no cyanosis, clubbing or edema.  Skin: Warm and dry. No obvious rash    LABS:      CBC Full  -  ( 15 Feb 2019 06:58 )  WBC Count : 6.5 K/uL  Hemoglobin : 12.9 g/dL  Hematocrit : 37.5 %  Platelet Count - Automated : 341 K/uL  Mean Cell Volume : 92.4 fl  Mean Cell Hemoglobin : 31.8 pg  Mean Cell Hemoglobin Concentration : 34.4 g/dL  Auto Neutrophil # : x  Auto Lymphocyte # : x  Auto Monocyte # : x  Auto Eosinophil # : x  Auto Basophil # : x  Auto Neutrophil % : x  Auto Lymphocyte % : x  Auto Monocyte % : x  Auto Eosinophil % : x  Auto Basophil % : x    02-15    139  |  102  |  8.0  ----------------------------<  97  3.6   |  23.0  |  0.33<L>    Ca    8.9      15 Feb 2019 06:58    TPro  6.6  /  Alb  3.5  /  TBili  8.8<H>  /  DBili  6.6<H>  /  AST  57<H>  /  ALT  45<H>  /  AlkPhos  245<H>  02-16    PT/INR - ( 16 Feb 2019 06:56 )   PT: 12.0 sec;   INR: 1.04 ratio                           RADIOLOGY & ADDITIONAL STUDIES (The following images were personally reviewed):  < from: MR MRCP w/wo IV Cont (02.15.19 @ 11:07) >    The following was omitted from the body of the report under BILE DUCTS:   There is abrupt cut off of the intrahepatic biliary radicles within the   central aspect of the biliary tree with no visible mass, likely secondary   to a stricture.      *** END OF ADDENDUM 02/15/2019  ***      PROCEDURE DATE:  02/15/2019          INTERPRETATION:  CLINICAL INFORMATION: Follow-up MRCP. Status post ERCP   with stent placement.    COMPARISON: Abdominal ultrasound 2/10/2019    PROCEDURE:   MRI of the abdomen was performed with and without intravenous contrast.    MRCP was performed.  6 cc Gadavist was administered        FINDINGS:    LOWER CHEST: Within normal limits.    LIVER: Within normal limits. Increased enhancement involving the right   hepatic lobe, likely perfusional and and may be related to the biliary   obstruction. No fatty infiltration. No mass.  BILE DUCTS: Mild to moderate intrahepatic biliary ductal dilatation which   is greater in the right hepatic lobe. Pneumobilia is noted consistent   with the recent instrumentation. Common bile duct measures 3 mm.  GALLBLADDER: Low T2 signal is seen within the nondependent portion of the   gallbladder fundus consistent with air are normal caliber wall.  SPLEEN: Within normal limits.  PANCREAS: Within normal limits.  ADRENALS: Within normal limits.  KIDNEYS/URETERS: Within normal limits.    VISUALIZED PORTIONS:    BOWEL: Within normal limits.   PERITONEUM: No ascites.  VESSELS: Within normal limits.  RETROPERITONEUM: No lymphadenopathy.    ABDOMINAL WALL: Within normal limits.  BONES: Within normal limits.    IMPRESSION:     Mild to moderate intrahepatic biliary ductal dilatation, greater in the   right hepatic lobe which is secondary to a stricture within the central   aspect of the biliary tree in the minh hepatis.      < end of copied text >

## 2019-02-17 LAB
ALBUMIN SERPL ELPH-MCNC: 3.4 G/DL — SIGNIFICANT CHANGE UP (ref 3.3–5.2)
ALP SERPL-CCNC: 254 U/L — HIGH (ref 40–120)
ALT FLD-CCNC: 47 U/L — HIGH
ANION GAP SERPL CALC-SCNC: 13 MMOL/L — SIGNIFICANT CHANGE UP (ref 5–17)
AST SERPL-CCNC: 62 U/L — HIGH
BILIRUB DIRECT SERPL-MCNC: 6.8 MG/DL — HIGH (ref 0–0.3)
BILIRUB INDIRECT FLD-MCNC: 2.6 MG/DL — HIGH (ref 0.2–1)
BILIRUB SERPL-MCNC: 9.4 MG/DL — HIGH (ref 0.4–2)
BUN SERPL-MCNC: 12 MG/DL — SIGNIFICANT CHANGE UP (ref 8–20)
CALCIUM SERPL-MCNC: 9.1 MG/DL — SIGNIFICANT CHANGE UP (ref 8.6–10.2)
CHLORIDE SERPL-SCNC: 100 MMOL/L — SIGNIFICANT CHANGE UP (ref 98–107)
CO2 SERPL-SCNC: 27 MMOL/L — SIGNIFICANT CHANGE UP (ref 22–29)
CREAT SERPL-MCNC: 0.45 MG/DL — LOW (ref 0.5–1.3)
GLUCOSE SERPL-MCNC: 95 MG/DL — SIGNIFICANT CHANGE UP (ref 70–115)
HCT VFR BLD CALC: 38.9 % — LOW (ref 42–52)
HGB BLD-MCNC: 13.1 G/DL — LOW (ref 14–18)
MCHC RBC-ENTMCNC: 31.6 PG — HIGH (ref 27–31)
MCHC RBC-ENTMCNC: 33.7 G/DL — SIGNIFICANT CHANGE UP (ref 32–36)
MCV RBC AUTO: 93.7 FL — SIGNIFICANT CHANGE UP (ref 80–94)
PLATELET # BLD AUTO: 393 K/UL — SIGNIFICANT CHANGE UP (ref 150–400)
POTASSIUM SERPL-MCNC: 3.2 MMOL/L — LOW (ref 3.5–5.3)
POTASSIUM SERPL-SCNC: 3.2 MMOL/L — LOW (ref 3.5–5.3)
PROT SERPL-MCNC: 6.5 G/DL — SIGNIFICANT CHANGE UP (ref 6–8.3)
PROT SERPL-MCNC: 6.5 G/DL — SIGNIFICANT CHANGE UP (ref 6–8.3)
PROT SERPL-MCNC: 7.2 G/DL — SIGNIFICANT CHANGE UP (ref 6.6–8.7)
RBC # BLD: 4.15 M/UL — LOW (ref 4.6–6.2)
RBC # FLD: 13.7 % — SIGNIFICANT CHANGE UP (ref 11–15.6)
SODIUM SERPL-SCNC: 140 MMOL/L — SIGNIFICANT CHANGE UP (ref 135–145)
WBC # BLD: 7.8 K/UL — SIGNIFICANT CHANGE UP (ref 4.8–10.8)
WBC # FLD AUTO: 7.8 K/UL — SIGNIFICANT CHANGE UP (ref 4.8–10.8)

## 2019-02-17 PROCEDURE — 99232 SBSQ HOSP IP/OBS MODERATE 35: CPT

## 2019-02-17 RX ORDER — POTASSIUM CHLORIDE 20 MEQ
40 PACKET (EA) ORAL ONCE
Qty: 0 | Refills: 0 | Status: COMPLETED | OUTPATIENT
Start: 2019-02-17 | End: 2019-02-17

## 2019-02-17 RX ADMIN — Medication 250 MILLIGRAM(S): at 05:20

## 2019-02-17 RX ADMIN — Medication 40 MILLIEQUIVALENT(S): at 10:46

## 2019-02-17 RX ADMIN — Medication 250 MILLIGRAM(S): at 17:07

## 2019-02-17 RX ADMIN — ERTAPENEM SODIUM 120 MILLIGRAM(S): 1 INJECTION, POWDER, LYOPHILIZED, FOR SOLUTION INTRAMUSCULAR; INTRAVENOUS at 12:57

## 2019-02-17 RX ADMIN — Medication 40 MILLIEQUIVALENT(S): at 21:40

## 2019-02-17 NOTE — CHART NOTE - NSCHARTNOTEFT_GEN_A_CORE
Upon Nutritional Assessment by the Registered Dietitian your patient was determined to meet criteria / has evidence of the following diagnosis/diagnoses:          [ ]  Mild Protein Calorie Malnutrition        [ ]  Moderate Protein Calorie Malnutrition        [x ] Severe Protein Calorie Malnutrition        [ ] Unspecified Protein Calorie Malnutrition        [ ] Underweight / BMI <19        [ ] Morbid Obesity / BMI > 40      Findings as based on:  •  Comprehensive nutrition assessment and consultation  18.75% weight loss x 6 weeks        Treatment:    The following diet has been recommended:    pennie ensure TID  PROVIDER Section:     By signing this assessment you are acknowledging and agree with the diagnosis/diagnoses assigned by the Registered Dietitian    Comments:

## 2019-02-17 NOTE — PROGRESS NOTE ADULT - SUBJECTIVE AND OBJECTIVE BOX
Pt seen and examined f/u for extrahepatic and possible some intrhepatic cholestasis    This morning he feels well with no complaints. deneis any abdominal pain, nausea or vomiting and tolerating diet. The bili is up somewhat today. ARMANDO negative. AMA pending and IgG4 pending.    REVIEW OF SYSTEMS:    CONSTITUTIONAL: No fever, weight loss, or fatigue  EYES: No eye pain, visual disturbances, or discharge  ENMT:  No difficulty hearing, tinnitus, vertigo; No sinus or throat pain  RESPIRATORY: No cough, wheezing, chills or hemoptysis; No shortness of breath  CARDIOVASCULAR: No chest pain, palpitations, dizziness, or leg swelling  GASTROINTESTINAL: No abdominal or epigastric pain. No nausea, vomiting, or hematemesis; No diarrhea or constipation. No melena or hematochezia.    MEDICATIONS:  MEDICATIONS  (STANDING):  enoxaparin Injectable 40 milliGRAM(s) SubCutaneous daily  ertapenem  IVPB 1000 milliGRAM(s) IV Intermittent every 24 hours  saccharomyces boulardii 250 milliGRAM(s) Oral two times a day    MEDICATIONS  (PRN):  calamine Lotion 1 Application(s) Topical four times a day PRN Itching  diphenhydrAMINE 25 milliGRAM(s) Oral every 6 hours PRN Rash and/or Itching      Allergies    No Known Allergies    Intolerances        Vital Signs Last 24 Hrs  T(C): 37.1 (17 Feb 2019 08:50), Max: 37.1 (17 Feb 2019 08:50)  T(F): 98.7 (17 Feb 2019 08:50), Max: 98.7 (17 Feb 2019 08:50)  HR: 72 (17 Feb 2019 08:50) (68 - 90)  BP: 110/60 (17 Feb 2019 08:50) (109/55 - 132/78)  BP(mean): --  RR: 18 (17 Feb 2019 08:50) (18 - 18)  SpO2: 99% (17 Feb 2019 08:50) (99% - 99%)      PHYSICAL EXAM:    General: Well developed; well nourished; in no acute distress, jaundiced  HEENT: MMM, conjunctiva and sclera clear  Gastrointestinal:Abdomen: Soft non-tender non-distended; Normal bowel sounds; No hepatosplenomegaly  Extremities: no cyanosis, clubbing or edema.  Skin: Warm and dry. No obvious rash    LABS:      CBC Full  -  ( 17 Feb 2019 07:01 )  WBC Count : 7.8 K/uL  Hemoglobin : 13.1 g/dL  Hematocrit : 38.9 %  Platelet Count - Automated : 393 K/uL  Mean Cell Volume : 93.7 fl  Mean Cell Hemoglobin : 31.6 pg  Mean Cell Hemoglobin Concentration : 33.7 g/dL  Auto Neutrophil # : x  Auto Lymphocyte # : x  Auto Monocyte # : x  Auto Eosinophil # : x  Auto Basophil # : x  Auto Neutrophil % : x  Auto Lymphocyte % : x  Auto Monocyte % : x  Auto Eosinophil % : x  Auto Basophil % : x    02-17    140  |  100  |  12.0  ----------------------------<  95  3.2<L>   |  27.0  |  0.45<L>    Ca    9.1      17 Feb 2019 07:01    TPro  7.2  /  Alb  3.4  /  TBili  9.4<H>  /  DBili  6.8<H>  /  AST  62<H>  /  ALT  47<H>  /  AlkPhos  254<H>  02-17    PT/INR - ( 16 Feb 2019 06:56 )   PT: 12.0 sec;   INR: 1.04 ratio                     < from: MR MRCP w/wo IV Cont (02.15.19 @ 11:07) >   EXAM:  MR MRCP WAW IC                          *** ADDENDUM 02/15/2019  ***    The following was omitted from the body of the report under BILE DUCTS:   There is abrupt cut off of the intrahepatic biliary radicles within the   central aspect of the biliary tree with no visible mass, likely secondary   to a stricture.      *** END OF ADDENDUM 02/15/2019  ***      PROCEDURE DATE:  02/15/2019          INTERPRETATION:  CLINICAL INFORMATION: Follow-up MRCP. Status post ERCP   with stent placement.    COMPARISON: Abdominal ultrasound 2/10/2019    PROCEDURE:   MRI of the abdomen was performed with and without intravenous contrast.    MRCP was performed.  6 cc Gadavist was administered        FINDINGS:    LOWER CHEST: Within normal limits.    LIVER: Within normal limits. Increased enhancement involving the right   hepatic lobe, likely perfusional and and may be related to the biliary   obstruction. No fatty infiltration. No mass.  BILE DUCTS: Mild to moderate intrahepatic biliary ductal dilatation which   is greater in the right hepatic lobe. Pneumobilia is noted consistent   with the recent instrumentation. Common bile duct measures 3 mm.  GALLBLADDER: Low T2 signal is seen within the nondependent portion of the   gallbladder fundus consistent with air are normal caliber wall.  SPLEEN: Within normal limits.  PANCREAS: Within normal limits.  ADRENALS: Within normal limits.  KIDNEYS/URETERS: Within normal limits.    VISUALIZED PORTIONS:    BOWEL: Within normal limits.   PERITONEUM: No ascites.  VESSELS: Within normal limits.  RETROPERITONEUM: No lymphadenopathy.    ABDOMINAL WALL: Within normal limits.  BONES: Within normal limits.    IMPRESSION:     Mild to moderate intrahepatic biliary ductal dilatation, greater in the   right hepatic lobe which is secondary to a stricture within the central   aspect of the biliary tree in the minh hepatis.    No mass seen. The differential includes primary sclerosing cholangeitis   or IgG4 disease.    Common bile duct normal in caliber.          ***Please see the addendum at the top of this report. It may contain   additional important information or changes.****          NATTY KERNS   This document has been electronically signed. Feb 15 2019  2:57PM  Addend:  NATTY KERNS   This addendum was electronically signed on: Feb 15 2019  3:13PM.            < end of copied text >

## 2019-02-17 NOTE — PROGRESS NOTE ADULT - ASSESSMENT
The patient is a 28 year old male with a history of cerebral palsy, transferred from [facility] for an ERCP. He was in [place] 4 weeks prior, was seen for abdominal pain, noted to have elevated LFTS. They recommended further work up but he chose to return home. He underwent MRCP as an outpatient and a mid CBD abnormality was noted. He had abdominal pain and vomiting. No hematemesis. He was admitted to [facility] and then underwent viral hepatitis testing, which was negative. US abdomen showed mild intra and extra hepatic ductal dilation. Status post ERCP with distal biliary thickening; biliary stent was placed and brushings done. Repeat MRCP showed mild ot moderate intrahepatic duct dilatation.    >Obstructive jaundice:  - continues to have hyperbilirubinemia and transaminitis.  - No pain/ nausea/ vomiting or itching.   - Monitor lfts  - ARMANDO negative  - Pending IGG subset/ brushings of CBD/ AMA and antimicrosomal Ab. Will follow.   - GI on board. C/w Abx, monitor LFTs.     >H/o Cerebral palsy- Supportive care  >Prophylactic measure- VTE ppx continue LMWH

## 2019-02-17 NOTE — PROGRESS NOTE ADULT - PROBLEM SELECTOR PLAN 1
s/p stent placement into proximal CBD but bili no longer falling. MRCP showed possible stricture of intrahepatic bile duct feeding the right hepatic lobe in both the region of the minh hepatis and more proximally in the intrahepatic duct of the right lobe. 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 Brushings still pending.

## 2019-02-17 NOTE — PROGRESS NOTE ADULT - SUBJECTIVE AND OBJECTIVE BOX
SAUMYA ZARATE Male 28y [id number]    Patient is a 28y old  Male who presents with a chief complaint of elevated bilirubin / transferred from [facility] (16 Feb 2019 11:32)      On Service note:  Pt seen/ examined and charts reviewed, parents at bedside, no over night event reported by night staff. Pt reports doing well and has no complaints. Per mother jaundice improving. Labs noted.     Review of system:  No fever, chills, nausea, vomiting, headache, dizziness, chest pain, SOB or palpitation.      PHYSICAL EXAM:    Vital Signs Last 24 Hrs  T(C): 37.1 (17 Feb 2019 08:50), Max: 37.1 (17 Feb 2019 08:50)  T(F): 98.7 (17 Feb 2019 08:50), Max: 98.7 (17 Feb 2019 08:50)  HR: 72 (17 Feb 2019 08:50) (68 - 90)  BP: 110/60 (17 Feb 2019 08:50) (109/55 - 132/78)  BP(mean): --  RR: 18 (17 Feb 2019 08:50) (18 - 18)  SpO2: 99% (17 Feb 2019 08:50) (99% - 99%)    GENERAL: Pt lying comfortably, NAD.  HEENT: NC/ AT, icterus sclera.  CHEST/LUNG: Clear to auscultation bilaterally; No wheezing.  HEART: S1S2+, Regular rate and rhythm; No murmurs.  ABDOMEN: Soft, Nontender, Nondistended; Bowel sounds present.  Extremities: No LE edema, pulses+  NEURO: AAOX3, no focal deficits, no motor r sensory loss.  PSYCH: normal mood.      MEDICATIONS  (STANDING):  enoxaparin Injectable 40 milliGRAM(s) SubCutaneous daily  ertapenem  IVPB 1000 milliGRAM(s) IV Intermittent every 24 hours  potassium chloride    Tablet ER 40 milliEquivalent(s) Oral once  saccharomyces boulardii 250 milliGRAM(s) Oral two times a day    MEDICATIONS  (PRN):  calamine Lotion 1 Application(s) Topical four times a day PRN Itching  diphenhydrAMINE 25 milliGRAM(s) Oral every 6 hours PRN Rash and/or Itching        Labs:  LABS:                        13.1   7.8   )-----------( 393      ( 17 Feb 2019 07:01 )             38.9     02-17    140  |  100  |  12.0  ----------------------------<  95  3.2<L>   |  27.0  |  0.45<L>    Ca    9.1      17 Feb 2019 07:01    TPro  7.2  /  Alb  3.4  /  TBili  9.4<H>  /  DBili  6.8<H>  /  AST  62<H>  /  ALT  47<H>  /  AlkPhos  254<H>  02-17    PT/INR - ( 16 Feb 2019 06:56 )   PT: 12.0 sec;   INR: 1.04 ratio             LIVER FUNCTIONS - ( 17 Feb 2019 07:01 )  Alb: 3.4 g/dL / Pro: 7.2 g/dL / ALK PHOS: 254 U/L / ALT: 47 U/L / AST: 62 U/L / GGT: x

## 2019-02-17 NOTE — DIETITIAN INITIAL EVALUATION ADULT. - OTHER INFO
Pt reports lack of appetite secondary to pain. He knew he lost weight, but was unaware of quantity until we obtained current weight. physical signs of malnutrition [ ]absent [ x]present. [x ]Mild [ ]Moderate [ ]Severe Muscle wasting present at [x] temples [ ]clavicle [ ]interosseos [ ]calf. [x ]Mild [ ]Moderate [ ]Severe subcutaneous fat loss presents at [ ]ribs [x]orbital region [ ]triceps [x ]buccal area.

## 2019-02-18 VITALS — SYSTOLIC BLOOD PRESSURE: 123 MMHG | TEMPERATURE: 98 F | DIASTOLIC BLOOD PRESSURE: 82 MMHG | HEART RATE: 109 BPM

## 2019-02-18 LAB
% ALBUMIN: 53.6 % — SIGNIFICANT CHANGE UP
% ALPHA 1: 4.8 % — SIGNIFICANT CHANGE UP
% ALPHA 2: 9.8 % — SIGNIFICANT CHANGE UP
% BETA: 12.9 % — SIGNIFICANT CHANGE UP
% GAMMA: 18.9 % — SIGNIFICANT CHANGE UP
ALBUMIN SERPL ELPH-MCNC: 3.5 G/DL — LOW (ref 3.6–5.5)
ALBUMIN SERPL ELPH-MCNC: 4 G/DL — SIGNIFICANT CHANGE UP (ref 3.3–5.2)
ALBUMIN/GLOB SERPL ELPH: 1.2 RATIO — SIGNIFICANT CHANGE UP
ALP SERPL-CCNC: 288 U/L — HIGH (ref 40–120)
ALPHA1 GLOB SERPL ELPH-MCNC: 0.3 G/DL — SIGNIFICANT CHANGE UP (ref 0.1–0.4)
ALPHA2 GLOB SERPL ELPH-MCNC: 0.6 G/DL — SIGNIFICANT CHANGE UP (ref 0.5–1)
ALT FLD-CCNC: 57 U/L — HIGH
ANION GAP SERPL CALC-SCNC: 14 MMOL/L — SIGNIFICANT CHANGE UP (ref 5–17)
AST SERPL-CCNC: 75 U/L — HIGH
B-GLOBULIN SERPL ELPH-MCNC: 0.8 G/DL — SIGNIFICANT CHANGE UP (ref 0.5–1)
BILIRUB DIRECT SERPL-MCNC: 7.1 MG/DL — HIGH (ref 0–0.3)
BILIRUB DIRECT SERPL-MCNC: 7.1 MG/DL — HIGH (ref 0–0.3)
BILIRUB INDIRECT FLD-MCNC: 3.4 MG/DL — HIGH (ref 0.2–1)
BILIRUB INDIRECT FLD-MCNC: 3.4 MG/DL — HIGH (ref 0.2–1)
BILIRUB SERPL-MCNC: 10.5 MG/DL — HIGH (ref 0.4–2)
BILIRUB SERPL-MCNC: 10.5 MG/DL — HIGH (ref 0.4–2)
BUN SERPL-MCNC: 16 MG/DL — SIGNIFICANT CHANGE UP (ref 8–20)
CALCIUM SERPL-MCNC: 9.8 MG/DL — SIGNIFICANT CHANGE UP (ref 8.6–10.2)
CHLORIDE SERPL-SCNC: 100 MMOL/L — SIGNIFICANT CHANGE UP (ref 98–107)
CO2 SERPL-SCNC: 23 MMOL/L — SIGNIFICANT CHANGE UP (ref 22–29)
CREAT SERPL-MCNC: 0.53 MG/DL — SIGNIFICANT CHANGE UP (ref 0.5–1.3)
GAMMA GLOBULIN: 1.2 G/DL — SIGNIFICANT CHANGE UP (ref 0.6–1.6)
GLUCOSE SERPL-MCNC: 116 MG/DL — HIGH (ref 70–115)
INTERPRETATION SERPL IFE-IMP: SIGNIFICANT CHANGE UP
LKM AB SER-ACNC: <20.1 UNITS — SIGNIFICANT CHANGE UP (ref 0–20)
POTASSIUM SERPL-MCNC: 4 MMOL/L — SIGNIFICANT CHANGE UP (ref 3.5–5.3)
POTASSIUM SERPL-SCNC: 4 MMOL/L — SIGNIFICANT CHANGE UP (ref 3.5–5.3)
PROT PATTERN SERPL ELPH-IMP: SIGNIFICANT CHANGE UP
PROT SERPL-MCNC: 8.2 G/DL — SIGNIFICANT CHANGE UP (ref 6.6–8.7)
SODIUM SERPL-SCNC: 137 MMOL/L — SIGNIFICANT CHANGE UP (ref 135–145)

## 2019-02-18 PROCEDURE — C1769: CPT

## 2019-02-18 PROCEDURE — 80076 HEPATIC FUNCTION PANEL: CPT

## 2019-02-18 PROCEDURE — T1013: CPT

## 2019-02-18 PROCEDURE — 80053 COMPREHEN METABOLIC PANEL: CPT

## 2019-02-18 PROCEDURE — 83690 ASSAY OF LIPASE: CPT

## 2019-02-18 PROCEDURE — 99232 SBSQ HOSP IP/OBS MODERATE 35: CPT

## 2019-02-18 PROCEDURE — 85610 PROTHROMBIN TIME: CPT

## 2019-02-18 PROCEDURE — 74330 X-RAY BILE/PANC ENDOSCOPY: CPT

## 2019-02-18 PROCEDURE — 86038 ANTINUCLEAR ANTIBODIES: CPT

## 2019-02-18 PROCEDURE — 86334 IMMUNOFIX E-PHORESIS SERUM: CPT

## 2019-02-18 PROCEDURE — 85027 COMPLETE CBC AUTOMATED: CPT

## 2019-02-18 PROCEDURE — C1889: CPT

## 2019-02-18 PROCEDURE — 99239 HOSP IP/OBS DSCHRG MGMT >30: CPT

## 2019-02-18 PROCEDURE — 88104 CYTOPATH FL NONGYN SMEARS: CPT

## 2019-02-18 PROCEDURE — 74183 MRI ABD W/O CNTR FLWD CNTR: CPT

## 2019-02-18 PROCEDURE — 85730 THROMBOPLASTIN TIME PARTIAL: CPT

## 2019-02-18 PROCEDURE — 84165 PROTEIN E-PHORESIS SERUM: CPT

## 2019-02-18 PROCEDURE — 84155 ASSAY OF PROTEIN SERUM: CPT

## 2019-02-18 PROCEDURE — C2625: CPT

## 2019-02-18 PROCEDURE — 99285 EMERGENCY DEPT VISIT HI MDM: CPT

## 2019-02-18 PROCEDURE — 86376 MICROSOMAL ANTIBODY EACH: CPT

## 2019-02-18 PROCEDURE — 82248 BILIRUBIN DIRECT: CPT

## 2019-02-18 PROCEDURE — 36415 COLL VENOUS BLD VENIPUNCTURE: CPT

## 2019-02-18 PROCEDURE — 86381 MITOCHONDRIAL ANTIBODY EACH: CPT

## 2019-02-18 PROCEDURE — 86301 IMMUNOASSAY TUMOR CA 19-9: CPT

## 2019-02-18 PROCEDURE — 82787 IGG 1 2 3 OR 4 EACH: CPT

## 2019-02-18 RX ORDER — CHOLESTYRAMINE 4 G/9G
4 POWDER, FOR SUSPENSION ORAL AT BEDTIME
Qty: 0 | Refills: 0 | Status: DISCONTINUED | OUTPATIENT
Start: 2019-02-18 | End: 2019-02-18

## 2019-02-18 RX ORDER — CHOLESTYRAMINE 4 G/9G
4 POWDER, FOR SUSPENSION ORAL
Qty: 120 | Refills: 0 | OUTPATIENT
Start: 2019-02-18

## 2019-02-18 RX ORDER — HYDROXYZINE HCL 10 MG
25 TABLET ORAL EVERY 6 HOURS
Qty: 0 | Refills: 0 | Status: DISCONTINUED | OUTPATIENT
Start: 2019-02-18 | End: 2019-02-18

## 2019-02-18 RX ORDER — CALAMINE AND ZINC OXIDE AND PHENOL 160; 10 MG/ML; MG/ML
1 LOTION TOPICAL
Qty: 1 | Refills: 0 | OUTPATIENT
Start: 2019-02-18

## 2019-02-18 RX ADMIN — ERTAPENEM SODIUM 120 MILLIGRAM(S): 1 INJECTION, POWDER, LYOPHILIZED, FOR SOLUTION INTRAMUSCULAR; INTRAVENOUS at 12:52

## 2019-02-18 RX ADMIN — Medication 250 MILLIGRAM(S): at 05:29

## 2019-02-18 NOTE — PROGRESS NOTE ADULT - SUBJECTIVE AND OBJECTIVE BOX
Pt seen and examined f/u for cholestasis and biliary strictures    This morning he has no complaints. No abdominal pain, nausea or vomiting. Bili today without change at 10.5. Still with pruritis. IgG4 and pathology still pending.    REVIEW OF SYSTEMS:    CONSTITUTIONAL: No fever, weight loss, or fatigue  EYES: No eye pain, visual disturbances, or discharge  ENMT:  No difficulty hearing, tinnitus, vertigo; No sinus or throat pain  RESPIRATORY: No cough, wheezing, chills or hemoptysis; No shortness of breath  CARDIOVASCULAR: No chest pain, palpitations, dizziness, or leg swelling  GASTROINTESTINAL: No abdominal or epigastric pain. No nausea, vomiting, or hematemesis; No diarrhea or constipation. No melena or hematochezia.    MEDICATIONS:  MEDICATIONS  (STANDING):  cholestyramine Powder (Sugar-Free) 4 Gram(s) Oral at bedtime  enoxaparin Injectable 40 milliGRAM(s) SubCutaneous daily  ertapenem  IVPB 1000 milliGRAM(s) IV Intermittent every 24 hours  saccharomyces boulardii 250 milliGRAM(s) Oral two times a day    MEDICATIONS  (PRN):  calamine Lotion 1 Application(s) Topical four times a day PRN Itching  diphenhydrAMINE 25 milliGRAM(s) Oral every 6 hours PRN Rash and/or Itching      Allergies    No Known Allergies    Intolerances        Vital Signs Last 24 Hrs  T(C): 36.8 (18 Feb 2019 07:30), Max: 36.8 (17 Feb 2019 15:28)  T(F): 98.2 (18 Feb 2019 07:30), Max: 98.2 (17 Feb 2019 15:28)  HR: 109 (18 Feb 2019 07:30) (90 - 109)  BP: 123/82 (18 Feb 2019 07:30) (123/82 - 133/73)  BP(mean): --  RR: 18 (17 Feb 2019 23:46) (18 - 18)  SpO2: 99% (17 Feb 2019 15:28) (99% - 99%)      PHYSICAL EXAM:    General: Well developed; well nourished; in no acute distress, jaundiced  HEENT: MMM, conjunctiva and scleral icterus  Gastrointestinal:Abdomen: Soft non-tender non-distended; Normal bowel sounds; No hepatosplenomegaly  Extremities: no cyanosis, clubbing or edema.  Skin: Warm and dry. No obvious rash    LABS:      CBC Full  -  ( 17 Feb 2019 07:01 )  WBC Count : 7.8 K/uL  Hemoglobin : 13.1 g/dL  Hematocrit : 38.9 %  Platelet Count - Automated : 393 K/uL  Mean Cell Volume : 93.7 fl  Mean Cell Hemoglobin : 31.6 pg  Mean Cell Hemoglobin Concentration : 33.7 g/dL  Auto Neutrophil # : x  Auto Lymphocyte # : x  Auto Monocyte # : x  Auto Eosinophil # : x  Auto Basophil # : x  Auto Neutrophil % : x  Auto Lymphocyte % : x  Auto Monocyte % : x  Auto Eosinophil % : x  Auto Basophil % : x    02-18    137  |  100  |  16.0  ----------------------------<  116<H>  4.0   |  23.0  |  0.53    Ca    9.8      18 Feb 2019 08:05    TPro  8.2  /  Alb  4.0  /  TBili  10.5<H>  /  DBili  7.1<H>  /  AST  75<H>  /  ALT  57<H>  /  AlkPhos  288<H>  02-18                      RADIOLOGY & ADDITIONAL STUDIES (The following images were personally reviewed):

## 2019-02-18 NOTE — PROGRESS NOTE ADULT - PROBLEM SELECTOR PLAN 1
possible PSC. Will add questran powder at bedtime for itching from cholestasis. Also continue benadryl or can try hydroxyzine 25mg every 6 hours for the itching. Nothing further to do from our standoint. Await path and IgG4 but will need outpatient referral to hepatologist in [facility] for further therapy. F/U with Dr Howard as outpatient.

## 2019-02-18 NOTE — PROGRESS NOTE ADULT - REASON FOR ADMISSION
elevated bilirubin / transferred from [facility]
elevated bilirubin / transferred from [facility]
elevated bilirubin / transferred from [facility]
elevated bilirubin / transferred from [facility]
elevated bilirubin / transferred from [facility]
elevated bilirubin / transferred from [facility]
elevated bilirubin / transferred from [facility]
elevated bilirubin / transferred from [facility]
elevated bilirubin / transferred from [facility]
elevated bilirubin / transferred from [facility]
elevated bilirubin / transferred from [facility]

## 2019-02-19 PROBLEM — G80.9 CEREBRAL PALSY, UNSPECIFIED: Chronic | Status: ACTIVE | Noted: 2019-02-12

## 2019-02-19 LAB — MITOCHONDRIA AB SER-ACNC: SIGNIFICANT CHANGE UP

## 2019-02-20 LAB — NON-GYNECOLOGICAL CYTOLOGY STUDY: SIGNIFICANT CHANGE UP

## 2019-02-21 LAB
IGG SERPL-MCNC: 1217 MG/DL — SIGNIFICANT CHANGE UP (ref 700–1600)
IGG1 SER-MCNC: 815 MG/DL — HIGH (ref 248–810)
IGG2 SER-MCNC: 372 MG/DL — SIGNIFICANT CHANGE UP (ref 130–555)
IGG3 SER-MCNC: 106 MG/DL — HIGH (ref 15–102)
IGG4 SER-MCNC: 14 MG/DL — SIGNIFICANT CHANGE UP (ref 2–96)

## 2019-03-07 ENCOUNTER — APPOINTMENT (OUTPATIENT)
Dept: GASTROENTEROLOGY | Facility: CLINIC | Age: 29
End: 2019-03-07
Payer: MEDICAID

## 2019-03-07 VITALS
WEIGHT: 132 LBS | BODY MASS INDEX: 22.53 KG/M2 | HEIGHT: 64 IN | DIASTOLIC BLOOD PRESSURE: 75 MMHG | HEART RATE: 82 BPM | SYSTOLIC BLOOD PRESSURE: 132 MMHG

## 2019-03-07 DIAGNOSIS — Z78.9 OTHER SPECIFIED HEALTH STATUS: ICD-10-CM

## 2019-03-07 PROCEDURE — 99215 OFFICE O/P EST HI 40 MIN: CPT

## 2019-03-10 NOTE — HISTORY OF PRESENT ILLNESS
[de-identified] : 28-year-old man with a history of cerebral palsy presented for a followup. He had started itching while he was in [place]. He had some nausea and vomiting and abdominal pain. He was noted to have liver enzyme elevation. Then subsequently he presented to Health system and underwent evaluation. His bilirubin started to rise further and then transferred to [facility]. He underwent ERCP today the distal bile duct stricture and questionable right intrahepatic stricture. Small stent was placed in the distal bile duct. However his bilirubin did not improve initially. He underwent MRI with severe right intrahepatic stricture. He was discharged home. He was itching for many days but now has improved. His skin color and lactulose have improved. He has no abdominal pain.\par \par Ca 19-9 level, IgG 4 level and bile duct brushings have been unremarkable.

## 2019-03-10 NOTE — ASSESSMENT
[FreeTextEntry1] : I recommended repeating the blood tests and then will schedule him for ERCP. Based on the ERCP findings, we will decide further whether he needs repeat imaging or liver biopsy with possible hepatology referral.

## 2019-03-27 LAB
ALBUMIN SERPL ELPH-MCNC: 4 G/DL
ALP BLD-CCNC: 299 U/L
ALT SERPL-CCNC: 70 U/L
ANION GAP SERPL CALC-SCNC: 11 MMOL/L
AST SERPL-CCNC: 76 U/L
BASOPHILS # BLD AUTO: 0.06 K/UL
BASOPHILS NFR BLD AUTO: 1 %
BILIRUB SERPL-MCNC: 4 MG/DL
BUN SERPL-MCNC: 13 MG/DL
CALCIUM SERPL-MCNC: 10.1 MG/DL
CHLORIDE SERPL-SCNC: 100 MMOL/L
CO2 SERPL-SCNC: 26 MMOL/L
CREAT SERPL-MCNC: 0.75 MG/DL
EOSINOPHIL # BLD AUTO: 0.18 K/UL
EOSINOPHIL NFR BLD AUTO: 2.9 %
GLUCOSE SERPL-MCNC: 100 MG/DL
HCT VFR BLD CALC: 44.5 %
HGB BLD-MCNC: 14.4 G/DL
IMM GRANULOCYTES NFR BLD AUTO: 0.5 %
INR PPP: 0.97 RATIO
LYMPHOCYTES # BLD AUTO: 1.35 K/UL
LYMPHOCYTES NFR BLD AUTO: 21.5 %
MAN DIFF?: NORMAL
MCHC RBC-ENTMCNC: 32.4 GM/DL
MCHC RBC-ENTMCNC: 32.6 PG
MCV RBC AUTO: 100.7 FL
MONOCYTES # BLD AUTO: 0.64 K/UL
MONOCYTES NFR BLD AUTO: 10.2 %
NEUTROPHILS # BLD AUTO: 4.03 K/UL
NEUTROPHILS NFR BLD AUTO: 63.9 %
PLATELET # BLD AUTO: 478 K/UL
POTASSIUM SERPL-SCNC: 4.4 MMOL/L
PROT SERPL-MCNC: 7.5 G/DL
PT BLD: 11 SEC
RBC # BLD: 4.42 M/UL
RBC # FLD: 13.2 %
SODIUM SERPL-SCNC: 137 MMOL/L
WBC # FLD AUTO: 6.29 K/UL

## 2019-04-02 ENCOUNTER — FORM ENCOUNTER (OUTPATIENT)
Age: 29
End: 2019-04-02

## 2019-04-03 ENCOUNTER — OUTPATIENT (OUTPATIENT)
Dept: OUTPATIENT SERVICES | Facility: HOSPITAL | Age: 29
LOS: 1 days | End: 2019-04-03
Payer: MEDICAID

## 2019-04-03 ENCOUNTER — RESULT REVIEW (OUTPATIENT)
Age: 29
End: 2019-04-03

## 2019-04-03 ENCOUNTER — APPOINTMENT (OUTPATIENT)
Dept: GASTROENTEROLOGY | Facility: HOSPITAL | Age: 29
End: 2019-04-03

## 2019-04-03 DIAGNOSIS — K83.1 OBSTRUCTION OF BILE DUCT: ICD-10-CM

## 2019-04-03 PROCEDURE — C1889: CPT

## 2019-04-03 PROCEDURE — 88305 TISSUE EXAM BY PATHOLOGIST: CPT

## 2019-04-03 PROCEDURE — 88300 SURGICAL PATH GROSS: CPT

## 2019-04-03 PROCEDURE — C1769: CPT

## 2019-04-03 PROCEDURE — 88300 SURGICAL PATH GROSS: CPT | Mod: 26,59

## 2019-04-03 PROCEDURE — C2625: CPT

## 2019-04-03 PROCEDURE — C1726: CPT

## 2019-04-03 PROCEDURE — 88305 TISSUE EXAM BY PATHOLOGIST: CPT | Mod: 26

## 2019-04-03 PROCEDURE — 88112 CYTOPATH CELL ENHANCE TECH: CPT | Mod: 26

## 2019-04-03 PROCEDURE — 43274 ERCP DUCT STENT PLACEMENT: CPT

## 2019-04-03 PROCEDURE — 88112 CYTOPATH CELL ENHANCE TECH: CPT

## 2019-04-03 PROCEDURE — 74330 X-RAY BILE/PANC ENDOSCOPY: CPT

## 2019-04-03 PROCEDURE — 43276 ERCP STENT EXCHANGE W/DILATE: CPT

## 2019-04-09 LAB — NON-GYNECOLOGICAL CYTOLOGY STUDY: SIGNIFICANT CHANGE UP

## 2019-04-10 LAB — SURGICAL PATHOLOGY STUDY: SIGNIFICANT CHANGE UP

## 2019-04-11 LAB
ALBUMIN SERPL ELPH-MCNC: 4 G/DL
ALP BLD-CCNC: 265 U/L
ALT SERPL-CCNC: 58 U/L
ANION GAP SERPL CALC-SCNC: 14 MMOL/L
AST SERPL-CCNC: 77 U/L
BASOPHILS # BLD AUTO: 0.07 K/UL
BASOPHILS NFR BLD AUTO: 0.9 %
BILIRUB SERPL-MCNC: 3.7 MG/DL
BUN SERPL-MCNC: 13 MG/DL
CALCIUM SERPL-MCNC: 10.1 MG/DL
CHLORIDE SERPL-SCNC: 102 MMOL/L
CO2 SERPL-SCNC: 24 MMOL/L
CREAT SERPL-MCNC: 0.79 MG/DL
EOSINOPHIL # BLD AUTO: 0.23 K/UL
EOSINOPHIL NFR BLD AUTO: 2.9 %
GLUCOSE SERPL-MCNC: 98 MG/DL
HCT VFR BLD CALC: 45.5 %
HGB BLD-MCNC: 14.6 G/DL
IMM GRANULOCYTES NFR BLD AUTO: 0.6 %
LYMPHOCYTES # BLD AUTO: 1.88 K/UL
LYMPHOCYTES NFR BLD AUTO: 23.5 %
MAN DIFF?: NORMAL
MCHC RBC-ENTMCNC: 32.1 GM/DL
MCHC RBC-ENTMCNC: 32.4 PG
MCV RBC AUTO: 100.9 FL
MONOCYTES # BLD AUTO: 0.61 K/UL
MONOCYTES NFR BLD AUTO: 7.6 %
NEUTROPHILS # BLD AUTO: 5.16 K/UL
NEUTROPHILS NFR BLD AUTO: 64.5 %
PLATELET # BLD AUTO: 553 K/UL
POTASSIUM SERPL-SCNC: 4.7 MMOL/L
PROT SERPL-MCNC: 7.5 G/DL
RBC # BLD: 4.51 M/UL
RBC # FLD: 13 %
SODIUM SERPL-SCNC: 140 MMOL/L
WBC # FLD AUTO: 8 K/UL

## 2019-04-12 ENCOUNTER — TRANSCRIPTION ENCOUNTER (OUTPATIENT)
Age: 29
End: 2019-04-12

## 2019-04-12 ENCOUNTER — EMERGENCY (EMERGENCY)
Facility: HOSPITAL | Age: 29
LOS: 1 days | Discharge: HOME | End: 2019-04-12
Attending: EMERGENCY MEDICINE
Payer: MEDICAID

## 2019-04-12 VITALS
HEART RATE: 88 BPM | DIASTOLIC BLOOD PRESSURE: 68 MMHG | OXYGEN SATURATION: 99 % | TEMPERATURE: 99 F | RESPIRATION RATE: 18 BRPM | SYSTOLIC BLOOD PRESSURE: 121 MMHG

## 2019-04-12 VITALS
HEIGHT: 64 IN | HEART RATE: 92 BPM | WEIGHT: 126.1 LBS | OXYGEN SATURATION: 99 % | SYSTOLIC BLOOD PRESSURE: 126 MMHG | DIASTOLIC BLOOD PRESSURE: 69 MMHG | RESPIRATION RATE: 18 BRPM | TEMPERATURE: 98 F

## 2019-04-12 DIAGNOSIS — Z98.890 OTHER SPECIFIED POSTPROCEDURAL STATES: Chronic | ICD-10-CM

## 2019-04-12 LAB
ALBUMIN MFR SERPL ELPH: 50.6 %
ALBUMIN SERPL ELPH-MCNC: 3.9 G/DL — SIGNIFICANT CHANGE UP (ref 3.3–5.2)
ALBUMIN SERPL-MCNC: 3.8 G/DL
ALBUMIN/GLOB SERPL: 1 RATIO
ALP SERPL-CCNC: 257 U/L — HIGH (ref 40–120)
ALPHA1 GLOB MFR SERPL ELPH: 4.8 %
ALPHA1 GLOB SERPL ELPH-MCNC: 0.4 G/DL
ALPHA2 GLOB MFR SERPL ELPH: 10.2 %
ALPHA2 GLOB SERPL ELPH-MCNC: 0.8 G/DL
ALT FLD-CCNC: 52 U/L — HIGH
ANION GAP SERPL CALC-SCNC: 12 MMOL/L — SIGNIFICANT CHANGE UP (ref 5–17)
APPEARANCE UR: ABNORMAL
APTT BLD: 35.3 SEC — SIGNIFICANT CHANGE UP (ref 27.5–36.3)
AST SERPL-CCNC: 66 U/L — HIGH
B-GLOBULIN MFR SERPL ELPH: 13.7 %
B-GLOBULIN SERPL ELPH-MCNC: 1 G/DL
BACTERIA # UR AUTO: ABNORMAL
BASOPHILS # BLD AUTO: 0 K/UL — SIGNIFICANT CHANGE UP (ref 0–0.2)
BASOPHILS NFR BLD AUTO: 0.3 % — SIGNIFICANT CHANGE UP (ref 0–2)
BILIRUB SERPL-MCNC: 3 MG/DL — HIGH (ref 0.4–2)
BILIRUB UR-MCNC: ABNORMAL
BUN SERPL-MCNC: 18 MG/DL — SIGNIFICANT CHANGE UP (ref 8–20)
CALCIUM SERPL-MCNC: 9.5 MG/DL — SIGNIFICANT CHANGE UP (ref 8.6–10.2)
CHLORIDE SERPL-SCNC: 102 MMOL/L — SIGNIFICANT CHANGE UP (ref 98–107)
CO2 SERPL-SCNC: 23 MMOL/L — SIGNIFICANT CHANGE UP (ref 22–29)
COD CRY URNS QL: ABNORMAL
COLOR SPEC: ABNORMAL
COMMENT - URINE: SIGNIFICANT CHANGE UP
CREAT SERPL-MCNC: 1.05 MG/DL — SIGNIFICANT CHANGE UP (ref 0.5–1.3)
DEPRECATED KAPPA LC FREE/LAMBDA SER: 1.18 RATIO
DIFF PNL FLD: ABNORMAL
EOSINOPHIL # BLD AUTO: 0 K/UL — SIGNIFICANT CHANGE UP (ref 0–0.5)
EOSINOPHIL NFR BLD AUTO: 0.4 % — SIGNIFICANT CHANGE UP (ref 0–5)
EPI CELLS # UR: SIGNIFICANT CHANGE UP
GAMMA GLOB FLD ELPH-MCNC: 1.6 G/DL
GAMMA GLOB MFR SERPL ELPH: 20.7 %
GLUCOSE SERPL-MCNC: 117 MG/DL — HIGH (ref 70–115)
GLUCOSE UR QL: NEGATIVE MG/DL — SIGNIFICANT CHANGE UP
HCT VFR BLD CALC: 40.3 % — LOW (ref 42–52)
HGB BLD-MCNC: 13.7 G/DL — LOW (ref 14–18)
HYALINE CASTS # UR AUTO: ABNORMAL /LPF
IGA SER QL IEP: 281 MG/DL
IGG SER QL IEP: 1770 MG/DL
IGM SER QL IEP: 100 MG/DL
INR BLD: 1.03 RATIO — SIGNIFICANT CHANGE UP (ref 0.88–1.16)
INTERPRETATION SERPL IEP-IMP: NORMAL
KAPPA LC CSF-MCNC: 2.5 MG/DL
KAPPA LC SERPL-MCNC: 2.95 MG/DL
KETONES UR-MCNC: ABNORMAL
LEUKOCYTE ESTERASE UR-ACNC: ABNORMAL
LIDOCAIN IGE QN: 73 U/L — HIGH (ref 22–51)
LYMPHOCYTES # BLD AUTO: 1.5 K/UL — SIGNIFICANT CHANGE UP (ref 1–4.8)
LYMPHOCYTES # BLD AUTO: 13.8 % — LOW (ref 20–55)
M PROTEIN SPEC IFE-MCNC: NORMAL
MCHC RBC-ENTMCNC: 32.9 PG — HIGH (ref 27–31)
MCHC RBC-ENTMCNC: 34 G/DL — SIGNIFICANT CHANGE UP (ref 32–36)
MCV RBC AUTO: 96.9 FL — HIGH (ref 80–94)
MONOCYTES # BLD AUTO: 0.9 K/UL — HIGH (ref 0–0.8)
MONOCYTES NFR BLD AUTO: 8.5 % — SIGNIFICANT CHANGE UP (ref 3–10)
NEUTROPHILS # BLD AUTO: 8.2 K/UL — HIGH (ref 1.8–8)
NEUTROPHILS NFR BLD AUTO: 76.6 % — HIGH (ref 37–73)
NITRITE UR-MCNC: POSITIVE
PH UR: 5 — SIGNIFICANT CHANGE UP (ref 5–8)
PLATELET # BLD AUTO: 543 K/UL — HIGH (ref 150–400)
POTASSIUM SERPL-MCNC: 4.6 MMOL/L — SIGNIFICANT CHANGE UP (ref 3.5–5.3)
POTASSIUM SERPL-SCNC: 4.6 MMOL/L — SIGNIFICANT CHANGE UP (ref 3.5–5.3)
PROT SERPL-MCNC: 7.5 G/DL
PROT SERPL-MCNC: 7.5 G/DL
PROT SERPL-MCNC: 7.7 G/DL — SIGNIFICANT CHANGE UP (ref 6.6–8.7)
PROT UR-MCNC: 30 MG/DL
PROTHROM AB SERPL-ACNC: 11.9 SEC — SIGNIFICANT CHANGE UP (ref 10–12.9)
RBC # BLD: 4.16 M/UL — LOW (ref 4.6–6.2)
RBC # FLD: 12.8 % — SIGNIFICANT CHANGE UP (ref 11–15.6)
RBC CASTS # UR COMP ASSIST: SIGNIFICANT CHANGE UP /HPF (ref 0–4)
SODIUM SERPL-SCNC: 137 MMOL/L — SIGNIFICANT CHANGE UP (ref 135–145)
SP GR SPEC: 1.02 — SIGNIFICANT CHANGE UP (ref 1.01–1.02)
UROBILINOGEN FLD QL: 4 MG/DL
WBC # BLD: 10.7 K/UL — SIGNIFICANT CHANGE UP (ref 4.8–10.8)
WBC # FLD AUTO: 10.7 K/UL — SIGNIFICANT CHANGE UP (ref 4.8–10.8)
WBC UR QL: ABNORMAL

## 2019-04-12 PROCEDURE — 76700 US EXAM ABDOM COMPLETE: CPT | Mod: 26

## 2019-04-12 PROCEDURE — 99284 EMERGENCY DEPT VISIT MOD MDM: CPT | Mod: 25

## 2019-04-12 PROCEDURE — 96375 TX/PRO/DX INJ NEW DRUG ADDON: CPT

## 2019-04-12 PROCEDURE — 76700 US EXAM ABDOM COMPLETE: CPT

## 2019-04-12 PROCEDURE — 80053 COMPREHEN METABOLIC PANEL: CPT

## 2019-04-12 PROCEDURE — 99285 EMERGENCY DEPT VISIT HI MDM: CPT

## 2019-04-12 PROCEDURE — 81001 URINALYSIS AUTO W/SCOPE: CPT

## 2019-04-12 PROCEDURE — 74019 RADEX ABDOMEN 2 VIEWS: CPT | Mod: 26

## 2019-04-12 PROCEDURE — 36415 COLL VENOUS BLD VENIPUNCTURE: CPT

## 2019-04-12 PROCEDURE — 96374 THER/PROPH/DIAG INJ IV PUSH: CPT

## 2019-04-12 PROCEDURE — 83690 ASSAY OF LIPASE: CPT

## 2019-04-12 PROCEDURE — 85610 PROTHROMBIN TIME: CPT

## 2019-04-12 PROCEDURE — 74176 CT ABD & PELVIS W/O CONTRAST: CPT

## 2019-04-12 PROCEDURE — 85730 THROMBOPLASTIN TIME PARTIAL: CPT

## 2019-04-12 PROCEDURE — 74176 CT ABD & PELVIS W/O CONTRAST: CPT | Mod: 26

## 2019-04-12 PROCEDURE — 85027 COMPLETE CBC AUTOMATED: CPT

## 2019-04-12 PROCEDURE — 74019 RADEX ABDOMEN 2 VIEWS: CPT

## 2019-04-12 RX ORDER — KETOROLAC TROMETHAMINE 30 MG/ML
15 SYRINGE (ML) INJECTION ONCE
Qty: 0 | Refills: 0 | Status: DISCONTINUED | OUTPATIENT
Start: 2019-04-12 | End: 2019-04-12

## 2019-04-12 RX ORDER — SODIUM CHLORIDE 9 MG/ML
3 INJECTION INTRAMUSCULAR; INTRAVENOUS; SUBCUTANEOUS ONCE
Qty: 0 | Refills: 0 | Status: COMPLETED | OUTPATIENT
Start: 2019-04-12 | End: 2019-04-12

## 2019-04-12 RX ORDER — CEFTRIAXONE 500 MG/1
1 INJECTION, POWDER, FOR SOLUTION INTRAMUSCULAR; INTRAVENOUS ONCE
Qty: 0 | Refills: 0 | Status: COMPLETED | OUTPATIENT
Start: 2019-04-12 | End: 2019-04-12

## 2019-04-12 RX ORDER — MORPHINE SULFATE 50 MG/1
4 CAPSULE, EXTENDED RELEASE ORAL ONCE
Qty: 0 | Refills: 0 | Status: DISCONTINUED | OUTPATIENT
Start: 2019-04-12 | End: 2019-04-12

## 2019-04-12 RX ORDER — SODIUM CHLORIDE 9 MG/ML
1000 INJECTION INTRAMUSCULAR; INTRAVENOUS; SUBCUTANEOUS ONCE
Qty: 0 | Refills: 0 | Status: COMPLETED | OUTPATIENT
Start: 2019-04-12 | End: 2019-04-12

## 2019-04-12 RX ADMIN — Medication 15 MILLIGRAM(S): at 19:28

## 2019-04-12 RX ADMIN — SODIUM CHLORIDE 3 MILLILITER(S): 9 INJECTION INTRAMUSCULAR; INTRAVENOUS; SUBCUTANEOUS at 13:42

## 2019-04-12 RX ADMIN — MORPHINE SULFATE 4 MILLIGRAM(S): 50 CAPSULE, EXTENDED RELEASE ORAL at 15:35

## 2019-04-12 RX ADMIN — CEFTRIAXONE 100 GRAM(S): 500 INJECTION, POWDER, FOR SOLUTION INTRAMUSCULAR; INTRAVENOUS at 19:28

## 2019-04-12 RX ADMIN — SODIUM CHLORIDE 1000 MILLILITER(S): 9 INJECTION INTRAMUSCULAR; INTRAVENOUS; SUBCUTANEOUS at 19:28

## 2019-04-12 NOTE — ED PROVIDER NOTE - PROGRESS NOTE DETAILS
Urology PA consulted. Pain controlled.  Mom instructed to have pt f/u with urology early next week; she would like to have him see a urologist through his regular doctor's group. I provided her with DR. Enriquez's information anyway. Advised as to controlling pain/ ABX, and prescribed percocet for breakthrough pain;  given return precautions. Well for discharge.

## 2019-04-12 NOTE — CONSULT NOTE ADULT - SUBJECTIVE AND OBJECTIVE BOX
HPI:  29 yo WM with CP and prior obstructive jaundice.  2 recent ERCP's identifying CBD and right sided biliary strictures:   ;  4/3 Last ERCP:  tight right sided biliary strictures, brushed: non-diagnostic.  Stented with 7 fr x 9 cm straight stent into the right hepatic lobe.  ( No left sided strictures).  Cancer markers were negative.  Autoimmune markers were also negative.    Currently presents with RUQ pain today with radiation to right flank.  Some nausea.  Denies fever, jaundice, chills or rigors.  Lessening jaundice gradually.  No trauma.  No acholic stools.  No diarrhea.  Mother also alleges moderate weight loss of 20 # in last 4 months.  Seems quite robust.  Currently taking no medications.  No substance abuse.        PAST MEDICAL & SURGICAL HISTORY:  Cerebral palsy  History of ERCP: performed at [place] [address].        ROS:  No Heartburn, regurgitation, dysphagia, odynophagia.  No dyspepsia  No abdominal pain.    No Nausea, vomiting.  No Bleeding.  No hematemesis.   No diarrhea.    No hematochezia.  No weight loss, anorexia.  No edema.      MEDICATIONS  (STANDING):  cefTRIAXone   IVPB 1 Gram(s) IV Intermittent Once  ketorolac   Injectable 15 milliGRAM(s) IV Push Once  sodium chloride 0.9% Bolus 1000 milliLiter(s) IV Bolus once    MEDICATIONS  (PRN):  None.        Allergies:    No Known Allergies    SOCIAL HISTORY: neg.      FAMILY HISTORY:  Neg.       Vital Signs Last 24 Hrs  T(C): 37.1 (2019 16:15), Max: 37.1 (2019 16:15)  T(F): 98.7 (2019 16:15), Max: 98.7 (2019 16:15)  HR: 88 (2019 16:15) (88 - 92)  BP: 121/68 (2019 16:15) (121/68 - 126/69)  BP(mean): --  RR: 18 (2019 16:15) (18 - 18)  SpO2: 99% (2019 16:15) (99% - 99%)    PHYSICAL EXAM:    GENERAL: NAD, well-groomed, well-developed  HEAD:  Atraumatic, Normocephalic  EYES: EOMI, PERRLA, conjunctiva and sclera clear; trace icterus.    ENMT: No tonsillar erythema, exudates, or enlargement; Moist mucous membranes, Good dentition, No lesions  NECK: Supple, No JVD, Normal thyroid  CHEST/LUNG: Clear to percussion bilaterally; No rales, rhonchi, wheezing, or rubs  HEART: Regular rate and rhythm; No murmurs, rubs, or gallops  ABDOMEN: Soft, Nontender, Nondistended; Bowel sounds present;  No scars;  no HSM;  No MTR.  No MICHELLE.    EXTREMITIES:  2+ Peripheral Pulses, No clubbing, cyanosis, or edema  LYMPH: No lymphadenopathy noted  SKIN: No rashes or lesions      LABS:                        13.7   10.7  )-----------( 543      ( 2019 13:50 )             40.3     04-12    137  |  102  |  18.0  ----------------------------<  117<H>  4.6   |  23.0  |  1.05    Ca    9.5      2019 13:50    TPro  7.7  /  Alb  3.9  /  TBili  3.0<H>  /  DBili  x   /  AST  66<H>  /  ALT  52<H>  /  AlkPhos  257<H>  04-12    PT/INR - ( 2019 13:50 )   PT: 11.9 sec;   INR: 1.03 ratio         PTT - ( 2019 13:50 )  PTT:35.3 sec   Urinalysis Basic - ( 2019 14:20 )    Color: Sandra / Appearance: Cloudy / S.020 / pH: x  Gluc: x / Ketone: Trace  / Bili: Moderate / Urobili: 4 mg/dL   Blood: x / Protein: 30 mg/dL / Nitrite: Positive   Leuk Esterase: Small / RBC: TNTC /HPF / WBC 11-25   Sq Epi: x / Non Sq Epi: Few / Bacteria: Few        LIVER FUNCTIONS - ( 2019 13:50 )  Alb: 3.9 g/dL / Pro: 7.7 g/dL / ALK PHOS: 257 U/L / ALT: 52 U/L / AST: 66 U/L / GGT: x             RADIOLOGY & ADDITIONAL STUDIES:  KUB:  Biliary stent in position in RUQ.  No free air.  No ileus.  Moderate stool in colon.    Sono:  Tiny gravel in GB.  Minor GB wall thickening.  No patti.  Right sided intrahepatic bile duct dilitation.  Biliary stent in place into the right lobe of the liver.  .

## 2019-04-12 NOTE — CONSULT NOTE ADULT - ASSESSMENT
Assessment: 27 y/o male with Right sided flank pain and 2.2 mm renal stone in distal ureter.     Plan:  If pain can be controlled with PO pain meds he may be discharged.  Follow up with Dr. Enriquez in 3-4 days.  Strain Urine  PO hydration  Tamsulosin 0.4 mg x30 days  PO Keflex for UTI    Subjective:      STATUS POST:      POST OPERATIVE DAY #:     MEDICATIONS  (STANDING):    MEDICATIONS  (PRN):      Vital Signs Last 24 Hrs  T(C): 37.1 (2019 16:15), Max: 37.1 (2019 16:15)  T(F): 98.7 (2019 16:15), Max: 98.7 (2019 16:15)  HR: 88 (2019 16:15) (88 - 92)  BP: 121/68 (2019 16:15) (121/68 - 126/69)  BP(mean): --  RR: 18 (2019 16:15) (18 - 18)  SpO2: 99% (2019 16:15) (99% - 99%)        Physical Exam:    Constitutional: NAD  HEENT: PERRL, EOMI  Neck: No JVD, FROM without pain  Respiratory: Respirations non-labored, no accessory muscle use  Cardiovascular: Regular rate & rhythm, S1, S2  Gastrointestinal: Soft, non-tender, non-distended  Extremities: No peripheral edema, No cyanosis  Neurological: A&O x 3; without gross deficit  Musculoskeletal: No joint pain, swelling, deformity, or point tenderness; no limitation of movement      LABS:                        13.7   10.7  )-----------( 543      ( 2019 13:50 )             40.3     04-12    137  |  102  |  18.0  ----------------------------<  117<H>  4.6   |  23.0  |  1.05    Ca    9.5      2019 13:50    TPro  7.7  /  Alb  3.9  /  TBili  3.0<H>  /  DBili  x   /  AST  66<H>  /  ALT  52<H>  /  AlkPhos  257<H>  04-12    PT/INR - ( 2019 13:50 )   PT: 11.9 sec;   INR: 1.03 ratio         PTT - ( 2019 13:50 )  PTT:35.3 sec  Urinalysis Basic - ( 2019 14:20 )    Color: Sandra / Appearance: Cloudy / S.020 / pH: x  Gluc: x / Ketone: Trace  / Bili: Moderate / Urobili: 4 mg/dL   Blood: x / Protein: 30 mg/dL / Nitrite: Positive   Leuk Esterase: Small / RBC: TNTC /HPF / WBC 11-25   Sq Epi: x / Non Sq Epi: Few / Bacteria: Few Assessment: 27 y/o male with Right sided flank pain and 2.2 mm renal stone in distal ureter.     Plan:  If pain can be controlled with PO pain meds he may be discharged.  Follow up with Dr. Enriquez in 3-4 days.  Strain Urine  PO hydration  Tamsulosin 0.4 mg x30 days  PO Keflex for UTI

## 2019-04-12 NOTE — ED ADULT NURSE NOTE - CHPI ED NUR SYMPTOMS NEG
no abdominal distension/no chills/no dysuria/no diarrhea/no fever/no blood in stool/no burning urination/no vomiting/no hematuria

## 2019-04-12 NOTE — ED PROVIDER NOTE - CLINICAL SUMMARY MEDICAL DECISION MAKING FREE TEXT BOX
pt presents with RUQ pain onset today;  h/o biliary stent placed on 4/3; pt well appearing; TTP in RUQ: plan to f/u labs, US, d/w GI

## 2019-04-12 NOTE — ED STATDOCS - PROGRESS NOTE DETAILS
Patient is a 28 year old M with PMHx of cerebral palsy who presents to the ED with parents for evaluation of RUQ abdominal pain with radiation to the back onset this AM.  Patient was seen in February of 2019 for same complaint at  and was sent to [facility] to have ERCP.  Also had stent placed on 4/3/19 by Dr. Yasmany Howard.  Denies any chest pain, shortness of breath or other medical complaints at this time.  Patient will be sent to the main ED for further evaluation by another provider; initial orders placed.  Exam: very minimal scleral icterus, mild RUQ tenderness

## 2019-04-12 NOTE — ED ADULT NURSE NOTE - OBJECTIVE STATEMENT
Patient awake, alert, able to describe and explain location of pain, with parents at the bedside report abdominal pain that began this morning. Pain in intermittent and sharp in RLQ radiating to back. Reports nausea as well. Denies painful urination, constipation, diarrhea, vomiting, fevers, chills, decreased eating and drinking. Upon palpating, patient denies any pain, but reports that pain will come on its own.

## 2019-04-12 NOTE — CONSULT NOTE ADULT - SUBJECTIVE AND OBJECTIVE BOX
HPI: 29 yo male with CP and prior obstructive jaundice.  2 recent ERCP's identifying CBD and right sided biliary strictures.  4/3 Last ERCP w/stent placement.  Presents today with right sided flank pain radiating to back and groin. Pain started approx. 5 am and woke him up out of sleep. He had nausea with no vomiting. No hematuria. No fevers. GI saw patient and believed the stent was in place and this pain was not related to stent placement on 4/3. Renal stone was found on CT scan.     PAST MEDICAL & SURGICAL HISTORY:  Cerebral palsy  History of ERCP: performed at [place]      No Known Allergies      T(C): 37.1 (19 @ 16:15), Max: 37.1 (19 @ 16:15)  HR: 88 (19 @ 16:15) (88 - 92)  BP: 121/68 (19 @ 16:15) (121/68 - 126/69)  RR: 18 (19 @ 16:15) (18 - 18)  SpO2: 99% (19 @ 16:15) (99% - 99%)                              13.7   10.7  )-----------( 543      ( 2019 13:50 )             40.3           137  |  102  |  18.0  ----------------------------<  117<H>  4.6   |  23.0  |  1.05    Ca    9.5      2019 13:50    TPro  7.7  /  Alb  3.9  /  TBili  3.0<H>  /  DBili  x   /  AST  66<H>  /  ALT  52<H>  /  AlkPhos  257<H>        Urinalysis Basic - ( 2019 14:20 )    Color: Sandra / Appearance: Cloudy / S.020 / pH: x  Gluc: x / Ketone: Trace  / Bili: Moderate / Urobili: 4 mg/dL   Blood: x / Protein: 30 mg/dL / Nitrite: Positive   Leuk Esterase: Small / RBC: TNTC /HPF / WBC 11-25   Sq Epi: x / Non Sq Epi: Few / Bacteria: Few      Radiology:  CT Scan: 2.2 MM OBSTRUCTING RIGHT DISTAL URETERAL STONE CAUSING MILD RIGHT RENAL   HYDROURETERONEPHROSIS.    Indwelling biliary bypass stent in place.  Proximal biliary duct obstructionconcerning for an obstructed biliary   stent. No evidence of acute cholecystitis. Remaining abdominal pelvic   viscera unremarkable. HPI: 29 yo male with CP and prior obstructive jaundice.  2 recent ERCP's identifying CBD and right sided biliary strictures.  4/3 Last ERCP w/stent placement.  Presents today with right sided flank pain radiating to back and groin. Pain started approx. 5 am and woke him up out of sleep. He had nausea with no vomiting. No hematuria. No fevers. GI saw patient and believed the stent was in place and this pain was not related to stent placement on 4/3. Renal stone was found on CT scan.     PAST MEDICAL & SURGICAL HISTORY:  Cerebral palsy  History of ERCP: performed at [place]      No Known Allergies      T(C): 37.1 (19 @ 16:15), Max: 37.1 (19 @ 16:15)  HR: 88 (19 @ 16:15) (88 - 92)  BP: 121/68 (19 @ 16:15) (121/68 - 126/69)  RR: 18 (19 @ 16:15) (18 - 18)  SpO2: 99% (19 @ 16:15) (99% - 99%)                              13.7   10.7  )-----------( 543      ( 2019 13:50 )             40.3           137  |  102  |  18.0  ----------------------------<  117<H>  4.6   |  23.0  |  1.05    Ca    9.5      2019 13:50    TPro  7.7  /  Alb  3.9  /  TBili  3.0<H>  /  DBili  x   /  AST  66<H>  /  ALT  52<H>  /  AlkPhos  257<H>        Urinalysis Basic - ( 2019 14:20 )    Color: Sandra / Appearance: Cloudy / S.020 / pH: x  Gluc: x / Ketone: Trace  / Bili: Moderate / Urobili: 4 mg/dL   Blood: x / Protein: 30 mg/dL / Nitrite: Positive   Leuk Esterase: Small / RBC: TNTC /HPF / WBC 11-25   Sq Epi: x / Non Sq Epi: Few / Bacteria: Few    Physical Exam:    Constitutional: NAD  HEENT: PERRL, EOMI  Neck: No JVD, FROM without pain  Respiratory: Respirations non-labored, no accessory muscle use  Cardiovascular: Regular rate & rhythm, S1, S2  Gastrointestinal: pos. nausea, neg vomiting, Soft, non-tender, non-distended  : pos. right flank pain, neg hematuria, neg CVA tenderness  Extremities: No peripheral edema, No cyanosis        Radiology:  CT Scan: 2.2 MM OBSTRUCTING RIGHT DISTAL URETERAL STONE CAUSING MILD RIGHT RENAL   HYDROURETERONEPHROSIS.    Indwelling biliary bypass stent in place.  Proximal biliary duct obstructionconcerning for an obstructed biliary   stent. No evidence of acute cholecystitis. Remaining abdominal pelvic   viscera unremarkable.

## 2019-04-12 NOTE — ED PROVIDER NOTE - OBJECTIVE STATEMENT
28yoM with h/o biliary obstruction thought poss. 2/2 PSC, s/p biliary stent in February and repeat stenting on 4/3, brought in by parents for new onset RUQ pain today and jaundice onset today;  They note pt has had some slight intermittent pain in the past but not like today; no fever; last BM normal last night.  No vomiting.

## 2019-04-12 NOTE — ED ADULT NURSE REASSESSMENT NOTE - NS ED NURSE REASSESS COMMENT FT1
Patient comfortable. Denies all forms of pain. Hemodynamically stable. Will continue to monitor. Waiting for results.

## 2019-04-12 NOTE — CONSULT NOTE ADULT - ASSESSMENT
Probable sclerosing cholangitis (PSC).  Unclear etiology for ruq abdominal pain.  Stent in place and good position.  Hx of Right sided biliary dilitation in past.  Recent Right bile duct dilitation and stent, 9 days ago.  No fever or Leukocytosis. LFT's continue to trend downwards.  No sx of cholangitis.  No obvious procedural complications.    No current need for admission.  OK for GI office follow up with Dr Howard in 2 weeks.  Eventual removal of biliary stent and repeat imaging to be subsequently arranged.  .

## 2019-04-13 LAB — IGG4 SER-MCNC: 28.9 MG/DL

## 2019-04-13 RX ORDER — CEPHALEXIN 500 MG
1 CAPSULE ORAL
Qty: 28 | Refills: 0
Start: 2019-04-13 | End: 2019-04-26

## 2019-04-15 LAB — ANA SER IF-ACNC: NEGATIVE

## 2019-04-15 NOTE — PATIENT PROFILE ADULT - NSPROGENPREVTRANSF_GEN_A_NUR
BMP today stable. Seeing Elisabeth Means NP on 4/17/2019. Update forwarded.  Discharged from TCU and now followed by [facility] care - DR Frias did assessment for homecare.  Sammie Auguste RN 04/15/19 1:50 PM    Last Comprehensive Metabolic Panel:  Sodium   Date Value Ref Range Status   04/15/2019 139 133 - 144 mmol/L Final     Potassium   Date Value Ref Range Status   04/15/2019 4.5 3.4 - 5.3 mmol/L Final     Chloride   Date Value Ref Range Status   04/15/2019 108 94 - 109 mmol/L Final     Carbon Dioxide   Date Value Ref Range Status   04/15/2019 25 20 - 32 mmol/L Final     Anion Gap   Date Value Ref Range Status   04/15/2019 6 3 - 14 mmol/L Final     Glucose   Date Value Ref Range Status   04/15/2019 148 (H) 70 - 99 mg/dL Final     Urea Nitrogen   Date Value Ref Range Status   04/15/2019 22 7 - 30 mg/dL Final     Creatinine   Date Value Ref Range Status   04/15/2019 0.89 0.52 - 1.04 mg/dL Final     GFR Estimate   Date Value Ref Range Status   04/15/2019 58 (L) >60 mL/min/[1.73_m2] Final     Comment:     Non  GFR Calc  Starting 12/18/2018, serum creatinine based estimated GFR (eGFR) will be   calculated using the Chronic Kidney Disease Epidemiology Collaboration   (CKD-EPI) equation.       Calcium   Date Value Ref Range Status   04/15/2019 9.8 8.5 - 10.1 mg/dL Final     
no

## 2019-05-06 ENCOUNTER — APPOINTMENT (OUTPATIENT)
Dept: GASTROENTEROLOGY | Facility: CLINIC | Age: 29
End: 2019-05-06
Payer: MEDICAID

## 2019-05-06 VITALS
WEIGHT: 129 LBS | SYSTOLIC BLOOD PRESSURE: 135 MMHG | HEIGHT: 64 IN | DIASTOLIC BLOOD PRESSURE: 75 MMHG | HEART RATE: 84 BPM | BODY MASS INDEX: 22.02 KG/M2 | OXYGEN SATURATION: 98 % | RESPIRATION RATE: 16 BRPM

## 2019-05-06 PROCEDURE — 99215 OFFICE O/P EST HI 40 MIN: CPT

## 2019-05-06 NOTE — ASSESSMENT
[FreeTextEntry1] : Patient is doing well at this time. We will repeat the labs.I recommended to follow up with the hepatology team. I also recommended that he needs repeat ERCP with stent pull in 6-8 weeks.Followup on a short-term basis.

## 2019-05-06 NOTE — HISTORY OF PRESENT ILLNESS
[de-identified] : The patient arrived for a followup appointment. 28-year-old man with a history of cerebral palsy presented for a followup. He had started itching while he was in [place]. He had some nausea and vomiting and abdominal pain. He was noted to have liver enzyme elevation. Then subsequently he presented to [facility] and underwent evaluation. His bilirubin started to rise further and then transferred to [facility] in February 2019. He underwent ERCP which showed the distal bile duct stricture and questionable right intrahepatic stricture. Small stent was placed in the distal bile duct. However his bilirubin did not improve initially. He underwent MRI with severe right intrahepatic stricture. He was discharged home. He was itching for many days but then it improved. \par \par Ca 19-9 level, IgG 4 level and bile duct brushings have been unremarkable. \par \par The patient had  another ERCP with stent pull and cholangiogram in april 2019. Right intrahepatic stricture was noted. Then additional brushings were performed.Then a stent was attempted to be advanced into the right intrahepatic system. Although there was significant resistance. Then it was dilated using a 4 mm balloon. Then 7 Portuguese 9 cm bile duct stent was placed.The patient had a ER visit about a week or 2 after that. He was noted to have nephrolithiasis.\par \par In the meanwhile, his liver tests are improving. His last bilirubin was 3. He is referred to hepatology team for further assessment for primary sclerosing cholangitis.\par \par ARMANDO is negative.Mitochondrial antibody is negative.Viral hepatitis serologies are negative. Immunugloblin level are unimpressive.\par \par He has no complaints at this time. His appetite has increased. His itching has resolved. His eye color and skin color has improved. His urine color has improved.

## 2019-05-06 NOTE — PHYSICAL EXAM
[General Appearance - Alert] : alert [General Appearance - In No Acute Distress] : in no acute distress [PERRL With Normal Accommodation] : pupils were equal in size, round, and reactive to light [Sclera] : the sclera and conjunctiva were normal [Outer Ear] : the ears and nose were normal in appearance [Extraocular Movements] : extraocular movements were intact [Oropharynx] : the oropharynx was normal [Neck Appearance] : the appearance of the neck was normal [Neck Cervical Mass (___cm)] : no neck mass was observed [Thyroid Diffuse Enlargement] : the thyroid was not enlarged [Jugular Venous Distention Increased] : there was no jugular-venous distention [Thyroid Nodule] : there were no palpable thyroid nodules [Heart Rate And Rhythm] : heart rate was normal and rhythm regular [Auscultation Breath Sounds / Voice Sounds] : lungs were clear to auscultation bilaterally [Heart Sounds] : normal S1 and S2 [Heart Sounds Gallop] : no gallops [Murmurs] : no murmurs [Heart Sounds Pericardial Friction Rub] : no pericardial rub [Full Pulse] : the pedal pulses are present [Edema] : there was no peripheral edema [Abdomen Soft] : soft [Bowel Sounds] : normal bowel sounds [Abdomen Tenderness] : non-tender [Abdomen Mass (___ Cm)] : no abdominal mass palpated [Cervical Lymph Nodes Enlarged Posterior Bilaterally] : posterior cervical [Cervical Lymph Nodes Enlarged Anterior Bilaterally] : anterior cervical [No CVA Tenderness] : no ~M costovertebral angle tenderness [Supraclavicular Lymph Nodes Enlarged Bilaterally] : supraclavicular [Skin Color & Pigmentation] : normal skin color and pigmentation [No Spinal Tenderness] : no spinal tenderness [] : no rash [Skin Turgor] : normal skin turgor [No Focal Deficits] : no focal deficits [Oriented To Time, Place, And Person] : oriented to person, place, and time [Affect] : the affect was normal [Impaired Insight] : insight and judgment were intact

## 2019-05-09 LAB
ALBUMIN SERPL ELPH-MCNC: 4.1 G/DL
ALP BLD-CCNC: 344 U/L
ALT SERPL-CCNC: 121 U/L
AST SERPL-CCNC: 95 U/L
BILIRUB DIRECT SERPL-MCNC: 0.7 MG/DL
BILIRUB INDIRECT SERPL-MCNC: 0.4 MG/DL
BILIRUB SERPL-MCNC: 1.1 MG/DL
PROT SERPL-MCNC: 7.3 G/DL

## 2019-05-10 LAB
BAKER'S YEAST AB QL: 19.3 UNITS
BAKER'S YEAST IGA QL IA: 8.4 UNITS
BAKER'S YEAST IGA QN IA: NEGATIVE
BAKER'S YEAST IGG QN IA: NEGATIVE

## 2019-05-13 LAB
ALKPISO INTERP: NORMAL
ALP BLD-CCNC: 311 U/L
ALP BONE CFR SERPL: 21 %
ALP INTEST CFR SERPL: 0 %
ALP LIVER CFR SERPL: 79 %
ALP MACRO CFR SERPL: 0 %
ALP PLAC CFR SERPL: 0 %
BILE AC SER-MCNC: 49.6 UMOL/L

## 2019-05-22 ENCOUNTER — APPOINTMENT (OUTPATIENT)
Dept: HEPATOLOGY | Facility: CLINIC | Age: 29
End: 2019-05-22
Payer: MEDICAID

## 2019-05-22 VITALS
DIASTOLIC BLOOD PRESSURE: 77 MMHG | HEART RATE: 73 BPM | BODY MASS INDEX: 21.17 KG/M2 | TEMPERATURE: 98.1 F | WEIGHT: 124 LBS | HEIGHT: 64 IN | SYSTOLIC BLOOD PRESSURE: 124 MMHG

## 2019-05-22 PROCEDURE — 99214 OFFICE O/P EST MOD 30 MIN: CPT

## 2019-05-22 PROCEDURE — 99204 OFFICE O/P NEW MOD 45 MIN: CPT

## 2019-05-22 RX ORDER — CHOLESTYRAMINE 4 G/9G
4 POWDER, FOR SUSPENSION ORAL
Qty: 30 | Refills: 0 | Status: COMPLETED | COMMUNITY
Start: 2019-02-04 | End: 2019-05-22

## 2019-05-22 NOTE — HISTORY OF PRESENT ILLNESS
[FreeTextEntry1] : Mr. Javy Biggs is a 28-year-old man with a history of cerebral palsy presents for initial evaluation.\par \par Based on Lee's office visit note, he started itching while he was in [place], along with some nausea and vomiting and abdominal pain. He was noted to have liver enzyme elevation. Then subsequently he presented to [facility] and underwent evaluation. His bilirubin started to rise further and then transferred to [facility] in February 2019. He underwent ERCP which showed the distal bile duct stricture and questionable right intrahepatic stricture. Small stent was placed in the distal bile duct. However his bilirubin did not improve initially. He underwent MRI with severe right intrahepatic stricture. He was discharged home. He was itching for many days but then it improved. \par \par Ca 19-9 level, IgG 4 level and bile duct brushings have been unremarkable. \par \par The patient had another ERCP with stent pull and cholangiogram in april 2019. Right intrahepatic stricture was noted. Then additional brushings were performed.Then a stent was attempted to be advanced into the right intrahepatic system. Although there was significant resistance. Then it was dilated using a 4 mm balloon. Then 7 Vincentian 9 cm bile duct stent was placed.The patient had a ER visit about a week or 2 after that. He was noted to have nephrolithiasis.\par \par His LFTs imroved with ERCP and stent. He is now referred to hepatology for further assessment for primary sclerosing cholangitis.\par \par ARMANDO is negative, Mitochondrial antibody is negative, Viral hepatitis serologies are negative, immunugloblin level are unimpressive.\par \par He reports feeling well today. Pruritus is resolved, and he is no more taking Cholestyramine. Follow up ERCP is planned for June 12.

## 2019-05-22 NOTE — PHYSICAL EXAM
[Scleral Icterus] : No Scleral Icterus [Spider Angioma] : No spider angioma(s) were observed [Abdominal  Ascites] : no ascites [General Appearance - Alert] : alert [General Appearance - Well Nourished] : well nourished [Sclera] : the sclera and conjunctiva were normal [Outer Ear] : the ears and nose were normal in appearance [Neck Appearance] : the appearance of the neck was normal [Neck Cervical Mass (___cm)] : no neck mass was observed [Thyroid Diffuse Enlargement] : the thyroid was not enlarged [Respiration, Rhythm And Depth] : normal respiratory rhythm and effort [Apical Impulse] : the apical impulse was normal [Heart Rate And Rhythm] : heart rate was normal and rhythm regular [Heart Sounds] : normal S1 and S2 [Murmurs] : no murmurs [Arterial Pulses Carotid] : carotid pulses were normal with no bruits [Bowel Sounds] : normal bowel sounds [Abdomen Soft] : soft [Abdomen Tenderness] : non-tender [] : no hepato-splenomegaly [Abdomen Mass (___ Cm)] : no abdominal mass palpated [Abdomen Hernia] : no hernia was discovered [Abnormal Walk] : normal gait [Skin Color & Pigmentation] : normal skin color and pigmentation [Oriented To Time, Place, And Person] : oriented to person, place, and time [Affect] : the affect was normal

## 2019-05-23 LAB
ALBUMIN SERPL ELPH-MCNC: 4.2 G/DL
ALP BLD-CCNC: 443 U/L
ALT SERPL-CCNC: 213 U/L
ANION GAP SERPL CALC-SCNC: 11 MMOL/L
AST SERPL-CCNC: 167 U/L
BASOPHILS # BLD AUTO: 0.05 K/UL
BASOPHILS NFR BLD AUTO: 0.8 %
BILIRUB SERPL-MCNC: 2.4 MG/DL
BUN SERPL-MCNC: 13 MG/DL
CALCIUM SERPL-MCNC: 9.8 MG/DL
CANCER AG19-9 SERPL-ACNC: 17 U/ML
CERULOPLASMIN SERPL-MCNC: 42 MG/DL
CHLORIDE SERPL-SCNC: 102 MMOL/L
CO2 SERPL-SCNC: 26 MMOL/L
CREAT SERPL-MCNC: 0.78 MG/DL
EOSINOPHIL # BLD AUTO: 0.2 K/UL
EOSINOPHIL NFR BLD AUTO: 3.2 %
GLUCOSE SERPL-MCNC: 108 MG/DL
HBV CORE IGG+IGM SER QL: NONREACTIVE
HBV SURFACE AB SER QL: NONREACTIVE
HBV SURFACE AG SER QL: NONREACTIVE
HCT VFR BLD CALC: 42.2 %
HCV AB SER QL: NONREACTIVE
HCV S/CO RATIO: 0.06 S/CO
HEPATITIS A IGG ANTIBODY: REACTIVE
HGB BLD-MCNC: 14 G/DL
IGG SER QL IEP: 1723 MG/DL
IMM GRANULOCYTES NFR BLD AUTO: 0.5 %
INR PPP: 1.01 RATIO
LYMPHOCYTES # BLD AUTO: 1.43 K/UL
LYMPHOCYTES NFR BLD AUTO: 23 %
MAN DIFF?: NORMAL
MCHC RBC-ENTMCNC: 32.6 PG
MCHC RBC-ENTMCNC: 33.2 GM/DL
MCV RBC AUTO: 98.4 FL
MONOCYTES # BLD AUTO: 0.46 K/UL
MONOCYTES NFR BLD AUTO: 7.4 %
NEUTROPHILS # BLD AUTO: 4.05 K/UL
NEUTROPHILS NFR BLD AUTO: 65.1 %
PLATELET # BLD AUTO: 436 K/UL
POTASSIUM SERPL-SCNC: 5.2 MMOL/L
PROT SERPL-MCNC: 8.1 G/DL
PT BLD: 11.6 SEC
RBC # BLD: 4.29 M/UL
RBC # FLD: 12.4 %
SODIUM SERPL-SCNC: 139 MMOL/L
WBC # FLD AUTO: 6.22 K/UL

## 2019-05-25 LAB
ANA SER IF-ACNC: NEGATIVE
MITOCHONDRIA AB SER IF-ACNC: NORMAL
SMOOTH MUSCLE AB SER QL IF: ABNORMAL

## 2019-06-04 ENCOUNTER — RESULT REVIEW (OUTPATIENT)
Age: 29
End: 2019-06-04

## 2019-06-04 ENCOUNTER — OUTPATIENT (OUTPATIENT)
Dept: OUTPATIENT SERVICES | Facility: HOSPITAL | Age: 29
LOS: 1 days | End: 2019-06-04
Payer: MEDICAID

## 2019-06-04 ENCOUNTER — APPOINTMENT (OUTPATIENT)
Dept: ULTRASOUND IMAGING | Facility: IMAGING CENTER | Age: 29
End: 2019-06-04
Payer: MEDICAID

## 2019-06-04 DIAGNOSIS — K83.8 OTHER SPECIFIED DISEASES OF BILIARY TRACT: ICD-10-CM

## 2019-06-04 DIAGNOSIS — K83.1 OBSTRUCTION OF BILE DUCT: ICD-10-CM

## 2019-06-04 DIAGNOSIS — Z98.890 OTHER SPECIFIED POSTPROCEDURAL STATES: Chronic | ICD-10-CM

## 2019-06-04 PROCEDURE — 76942 ECHO GUIDE FOR BIOPSY: CPT | Mod: 26

## 2019-06-04 PROCEDURE — 47000 NEEDLE BIOPSY OF LIVER PERQ: CPT

## 2019-06-04 PROCEDURE — 88307 TISSUE EXAM BY PATHOLOGIST: CPT

## 2019-06-04 PROCEDURE — 88307 TISSUE EXAM BY PATHOLOGIST: CPT | Mod: 26

## 2019-06-04 PROCEDURE — 76942 ECHO GUIDE FOR BIOPSY: CPT

## 2019-06-04 PROCEDURE — 88313 SPECIAL STAINS GROUP 2: CPT | Mod: 26

## 2019-06-04 PROCEDURE — 88313 SPECIAL STAINS GROUP 2: CPT

## 2019-06-11 ENCOUNTER — TRANSCRIPTION ENCOUNTER (OUTPATIENT)
Age: 29
End: 2019-06-11

## 2019-06-12 ENCOUNTER — APPOINTMENT (OUTPATIENT)
Dept: GASTROENTEROLOGY | Facility: HOSPITAL | Age: 29
End: 2019-06-12

## 2019-06-12 ENCOUNTER — RESULT REVIEW (OUTPATIENT)
Age: 29
End: 2019-06-12

## 2019-06-12 ENCOUNTER — OUTPATIENT (OUTPATIENT)
Dept: OUTPATIENT SERVICES | Facility: HOSPITAL | Age: 29
LOS: 1 days | End: 2019-06-12
Payer: MEDICAID

## 2019-06-12 DIAGNOSIS — K83.1 OBSTRUCTION OF BILE DUCT: ICD-10-CM

## 2019-06-12 DIAGNOSIS — Z98.890 OTHER SPECIFIED POSTPROCEDURAL STATES: Chronic | ICD-10-CM

## 2019-06-12 PROCEDURE — 88112 CYTOPATH CELL ENHANCE TECH: CPT | Mod: 26

## 2019-06-12 PROCEDURE — C1726: CPT

## 2019-06-12 PROCEDURE — 43274 ERCP DUCT STENT PLACEMENT: CPT

## 2019-06-12 PROCEDURE — 88112 CYTOPATH CELL ENHANCE TECH: CPT

## 2019-06-12 PROCEDURE — C2625: CPT

## 2019-06-12 PROCEDURE — C1773: CPT

## 2019-06-12 PROCEDURE — 88300 SURGICAL PATH GROSS: CPT

## 2019-06-12 PROCEDURE — 88300 SURGICAL PATH GROSS: CPT | Mod: 26

## 2019-06-12 PROCEDURE — C1769: CPT

## 2019-06-12 PROCEDURE — 43276 ERCP STENT EXCHANGE W/DILATE: CPT

## 2019-06-14 LAB
NON-GYNECOLOGICAL CYTOLOGY STUDY: SIGNIFICANT CHANGE UP
SURGICAL PATHOLOGY STUDY: SIGNIFICANT CHANGE UP

## 2019-06-17 ENCOUNTER — CLINICAL ADVICE (OUTPATIENT)
Age: 29
End: 2019-06-17

## 2019-06-19 ENCOUNTER — APPOINTMENT (OUTPATIENT)
Dept: HEPATOLOGY | Facility: CLINIC | Age: 29
End: 2019-06-19
Payer: MEDICAID

## 2019-06-19 PROCEDURE — 99214 OFFICE O/P EST MOD 30 MIN: CPT

## 2019-06-20 LAB
ALBUMIN SERPL ELPH-MCNC: 3.9 G/DL
ALP BLD-CCNC: 322 U/L
ALT SERPL-CCNC: 131 U/L
ANION GAP SERPL CALC-SCNC: 13 MMOL/L
AST SERPL-CCNC: 94 U/L
BASOPHILS # BLD AUTO: 0.06 K/UL
BASOPHILS NFR BLD AUTO: 0.7 %
BILIRUB SERPL-MCNC: 1.9 MG/DL
BUN SERPL-MCNC: 15 MG/DL
CALCIUM SERPL-MCNC: 9.6 MG/DL
CHLORIDE SERPL-SCNC: 102 MMOL/L
CO2 SERPL-SCNC: 24 MMOL/L
CREAT SERPL-MCNC: 0.78 MG/DL
EOSINOPHIL # BLD AUTO: 0.26 K/UL
EOSINOPHIL NFR BLD AUTO: 3.2 %
GLUCOSE SERPL-MCNC: 96 MG/DL
HCT VFR BLD CALC: 43.2 %
HGB BLD-MCNC: 14 G/DL
IGG SER QL IEP: 1645 MG/DL
IMM GRANULOCYTES NFR BLD AUTO: 0.5 %
INR PPP: 0.96 RATIO
LYMPHOCYTES # BLD AUTO: 1.76 K/UL
LYMPHOCYTES NFR BLD AUTO: 21.8 %
MAN DIFF?: NORMAL
MCHC RBC-ENTMCNC: 32 PG
MCHC RBC-ENTMCNC: 32.4 GM/DL
MCV RBC AUTO: 98.9 FL
MONOCYTES # BLD AUTO: 0.8 K/UL
MONOCYTES NFR BLD AUTO: 9.9 %
NEUTROPHILS # BLD AUTO: 5.17 K/UL
NEUTROPHILS NFR BLD AUTO: 63.9 %
PLATELET # BLD AUTO: 391 K/UL
POTASSIUM SERPL-SCNC: 3.9 MMOL/L
PROT SERPL-MCNC: 7.3 G/DL
PT BLD: 10.9 SEC
RBC # BLD: 4.37 M/UL
RBC # FLD: 12.7 %
SODIUM SERPL-SCNC: 139 MMOL/L
WBC # FLD AUTO: 8.09 K/UL

## 2019-06-21 NOTE — PHYSICAL EXAM
[General Appearance - Alert] : alert [General Appearance - Well Nourished] : well nourished [General Appearance - Well Developed] : well developed [Neck Appearance] : the appearance of the neck was normal [Outer Ear] : the ears and nose were normal in appearance [Sclera] : the sclera and conjunctiva were normal [Apical Impulse] : the apical impulse was normal [Exaggerated Use Of Accessory Muscles For Inspiration] : no accessory muscle use [Heart Sounds Gallop] : no gallops [Heart Sounds] : normal S1 and S2 [Heart Rate And Rhythm] : heart rate was normal and rhythm regular [Heart Sounds Pericardial Friction Rub] : no pericardial rub [Murmurs] : no murmurs [Arterial Pulses Carotid] : carotid pulses were normal with no bruits [Breast Appearance] : normal in appearance [Bowel Sounds] : normal bowel sounds [Skin Color & Pigmentation] : normal skin color and pigmentation [Cranial Nerves] : cranial nerves 2-12 were intact [No Focal Deficits] : no focal deficits [Motor Exam] : the motor exam was normal [Sensation] : the sensory exam was normal to light touch and pinprick [Oriented To Time, Place, And Person] : oriented to person, place, and time [FreeTextEntry1] : Abnormal gait c/w cerebral palsy

## 2019-06-21 NOTE — ASSESSMENT
[FreeTextEntry1] : Suspected benign bile duct stricture, likely PSC. No discernible mass lesion, and Ca 19-9 level is normal. IgG4 cholangiopathy need to be excluded.\par \par PLAN\par -Follow up MRI w/wo with MRCP.\par -Routine follow up labs. I will also check IgG level, and IgG4 level,

## 2019-06-21 NOTE — HISTORY OF PRESENT ILLNESS
[FreeTextEntry1] : Mr. Javy Biggs is a 28-year-old man with a history of cerebral palsy presents for follow up  evaluation.\par \par Based on Dr. Howard's office visit note, his GI specialist, he started itching while he was in [place], along with some nausea and vomiting and abdominal pain. He was noted to have liver enzyme elevation. Then subsequently he presented to [facility] and underwent evaluation. His bilirubin started to rise further and then transferred to [facility] in February 2019. He underwent ERCP which showed the distal bile duct stricture and questionable right intrahepatic stricture. Small stent was placed in the distal bile duct. However his bilirubin did not improve initially. He underwent MRI with severe right intrahepatic stricture. He was discharged home. He was itching for many days but then it improved. \par \par Ca 19-9 level, IgG 4 level and bile duct brushings have been unremarkable. \par \par The patient had another ERCP with stent pull and cholangiogram in april 2019. Right intrahepatic stricture was noted. Then additional brushings were performed.Then a stent was attempted to be advanced into the right intrahepatic system. Although there was significant resistance. Then it was dilated using a 4 mm balloon. Then 7 Icelandic 9 cm bile duct stent was placed.The patient had a ER visit about a week or 2 after that. He was noted to have nephrolithiasis.\par \par His LFTs imroved with ERCP and stent. He is now referred to hepatology for further assessment for primary sclerosing cholangitis.\par \par ARMANDO is negative, Mitochondrial antibody is negative, Viral hepatitis serologies are negative, immunugloblin level are unimpressive.\par \par \par Interval hx: Since last seen in the hepatology clinic on 22-May-2019, he had a follow up ERCP by Dr. Howard, and the old stent was removed, and exchanged with a new one. Brushings were negative for malignant cell. Additionally he had a liver biopsy that showed overall appears cholestatic pattern of injury with ductular reaction. This was further discussed at the hepato-billiary meeting with pathology review. 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 Essentially, work up for underlying CLD were negative for viral serology, autoimmune markers, ceruloplasmin ( higher level not suggestive of Garcia's). His total bili is down to 2.4 mg/dL, , and , and . AMA is negative, but ASMA is low titer + ( 1:20). ARMANDO is also negative.\par Interestingly, his IgG level is high ( 1723 mg/dL). \par His viral serology suggest to protection towards hepatitis B, but he is immune to hepatitis A.\par Ca19-9 was within normal range.\par \par

## 2019-06-25 LAB — IGG4 SER-MCNC: 23.2 MG/DL

## 2019-07-03 ENCOUNTER — APPOINTMENT (OUTPATIENT)
Dept: MRI IMAGING | Facility: CLINIC | Age: 29
End: 2019-07-03
Payer: MEDICAID

## 2019-07-03 ENCOUNTER — EMERGENCY (EMERGENCY)
Facility: HOSPITAL | Age: 29
LOS: 1 days | Discharge: HOME | End: 2019-07-03

## 2019-07-03 ENCOUNTER — OUTPATIENT (OUTPATIENT)
Dept: OUTPATIENT SERVICES | Facility: HOSPITAL | Age: 29
LOS: 1 days | End: 2019-07-03
Payer: MEDICAID

## 2019-07-03 VITALS
OXYGEN SATURATION: 98 % | HEIGHT: 65 IN | WEIGHT: 119.93 LBS | TEMPERATURE: 99 F | DIASTOLIC BLOOD PRESSURE: 73 MMHG | HEART RATE: 105 BPM | SYSTOLIC BLOOD PRESSURE: 129 MMHG | RESPIRATION RATE: 18 BRPM

## 2019-07-03 DIAGNOSIS — Z98.890 OTHER SPECIFIED POSTPROCEDURAL STATES: Chronic | ICD-10-CM

## 2019-07-03 DIAGNOSIS — Z00.8 ENCOUNTER FOR OTHER GENERAL EXAMINATION: ICD-10-CM

## 2019-07-03 PROCEDURE — A9585: CPT

## 2019-07-03 PROCEDURE — 74183 MRI ABD W/O CNTR FLWD CNTR: CPT | Mod: 26

## 2019-07-03 PROCEDURE — 74183 MRI ABD W/O CNTR FLWD CNTR: CPT

## 2019-07-07 ENCOUNTER — TRANSCRIPTION ENCOUNTER (OUTPATIENT)
Age: 29
End: 2019-07-07

## 2019-07-07 ENCOUNTER — INPATIENT (INPATIENT)
Facility: HOSPITAL | Age: 29
LOS: 2 days | Discharge: HOME | DRG: 440 | End: 2019-07-10
Attending: HOSPITALIST | Admitting: HOSPITALIST
Payer: MEDICAID

## 2019-07-07 VITALS
OXYGEN SATURATION: 98 % | RESPIRATION RATE: 18 BRPM | HEART RATE: 110 BPM | TEMPERATURE: 99 F | WEIGHT: 126.99 LBS | DIASTOLIC BLOOD PRESSURE: 66 MMHG | SYSTOLIC BLOOD PRESSURE: 116 MMHG | HEIGHT: 62 IN

## 2019-07-07 DIAGNOSIS — Z98.890 OTHER SPECIFIED POSTPROCEDURAL STATES: Chronic | ICD-10-CM

## 2019-07-07 DIAGNOSIS — K85.90 ACUTE PANCREATITIS WITHOUT NECROSIS OR INFECTION, UNSPECIFIED: ICD-10-CM

## 2019-07-07 LAB
ALBUMIN SERPL ELPH-MCNC: 3.6 G/DL — SIGNIFICANT CHANGE UP (ref 3.3–5.2)
ALP SERPL-CCNC: 436 U/L — HIGH (ref 40–120)
ALT FLD-CCNC: 78 U/L — HIGH
ANION GAP SERPL CALC-SCNC: 11 MMOL/L — SIGNIFICANT CHANGE UP (ref 5–17)
APPEARANCE UR: CLEAR — SIGNIFICANT CHANGE UP
AST SERPL-CCNC: 59 U/L — HIGH
BACTERIA # UR AUTO: ABNORMAL
BASOPHILS # BLD AUTO: 0.06 K/UL — SIGNIFICANT CHANGE UP (ref 0–0.2)
BASOPHILS NFR BLD AUTO: 0.7 % — SIGNIFICANT CHANGE UP (ref 0–2)
BILIRUB SERPL-MCNC: 1 MG/DL — SIGNIFICANT CHANGE UP (ref 0.4–2)
BILIRUB UR-MCNC: NEGATIVE — SIGNIFICANT CHANGE UP
BUN SERPL-MCNC: 10 MG/DL — SIGNIFICANT CHANGE UP (ref 8–20)
CALCIUM SERPL-MCNC: 9.3 MG/DL — SIGNIFICANT CHANGE UP (ref 8.6–10.2)
CHLORIDE SERPL-SCNC: 105 MMOL/L — SIGNIFICANT CHANGE UP (ref 98–107)
CO2 SERPL-SCNC: 24 MMOL/L — SIGNIFICANT CHANGE UP (ref 22–29)
COLOR SPEC: YELLOW — SIGNIFICANT CHANGE UP
CREAT SERPL-MCNC: 0.68 MG/DL — SIGNIFICANT CHANGE UP (ref 0.5–1.3)
DIFF PNL FLD: NEGATIVE — SIGNIFICANT CHANGE UP
EOSINOPHIL # BLD AUTO: 0.15 K/UL — SIGNIFICANT CHANGE UP (ref 0–0.5)
EOSINOPHIL NFR BLD AUTO: 1.7 % — SIGNIFICANT CHANGE UP (ref 0–6)
EPI CELLS # UR: SIGNIFICANT CHANGE UP
GLUCOSE SERPL-MCNC: 106 MG/DL — SIGNIFICANT CHANGE UP (ref 70–115)
GLUCOSE UR QL: NEGATIVE MG/DL — SIGNIFICANT CHANGE UP
HCT VFR BLD CALC: 36.2 % — LOW (ref 39–50)
HGB BLD-MCNC: 12.3 G/DL — LOW (ref 13–17)
IMM GRANULOCYTES NFR BLD AUTO: 0.5 % — SIGNIFICANT CHANGE UP (ref 0–1.5)
KETONES UR-MCNC: NEGATIVE — SIGNIFICANT CHANGE UP
LACTATE BLDV-MCNC: 1.3 MMOL/L — SIGNIFICANT CHANGE UP (ref 0.5–2)
LEUKOCYTE ESTERASE UR-ACNC: NEGATIVE — SIGNIFICANT CHANGE UP
LIDOCAIN IGE QN: 1363 U/L — HIGH (ref 22–51)
LYMPHOCYTES # BLD AUTO: 1.49 K/UL — SIGNIFICANT CHANGE UP (ref 1–3.3)
LYMPHOCYTES # BLD AUTO: 17.3 % — SIGNIFICANT CHANGE UP (ref 13–44)
MCHC RBC-ENTMCNC: 32.3 PG — SIGNIFICANT CHANGE UP (ref 27–34)
MCHC RBC-ENTMCNC: 34 GM/DL — SIGNIFICANT CHANGE UP (ref 32–36)
MCV RBC AUTO: 95 FL — SIGNIFICANT CHANGE UP (ref 80–100)
MONOCYTES # BLD AUTO: 0.68 K/UL — SIGNIFICANT CHANGE UP (ref 0–0.9)
MONOCYTES NFR BLD AUTO: 7.9 % — SIGNIFICANT CHANGE UP (ref 2–14)
NEUTROPHILS # BLD AUTO: 6.17 K/UL — SIGNIFICANT CHANGE UP (ref 1.8–7.4)
NEUTROPHILS NFR BLD AUTO: 71.9 % — SIGNIFICANT CHANGE UP (ref 43–77)
NITRITE UR-MCNC: NEGATIVE — SIGNIFICANT CHANGE UP
PH UR: 7 — SIGNIFICANT CHANGE UP (ref 5–8)
PLATELET # BLD AUTO: 673 K/UL — HIGH (ref 150–400)
POTASSIUM SERPL-MCNC: 3.7 MMOL/L — SIGNIFICANT CHANGE UP (ref 3.5–5.3)
POTASSIUM SERPL-SCNC: 3.7 MMOL/L — SIGNIFICANT CHANGE UP (ref 3.5–5.3)
PROT SERPL-MCNC: 8 G/DL — SIGNIFICANT CHANGE UP (ref 6.6–8.7)
PROT UR-MCNC: 15 MG/DL
RBC # BLD: 3.81 M/UL — LOW (ref 4.2–5.8)
RBC # FLD: 11.9 % — SIGNIFICANT CHANGE UP (ref 10.3–14.5)
RBC CASTS # UR COMP ASSIST: SIGNIFICANT CHANGE UP /HPF (ref 0–4)
SODIUM SERPL-SCNC: 140 MMOL/L — SIGNIFICANT CHANGE UP (ref 135–145)
SP GR SPEC: 1.01 — SIGNIFICANT CHANGE UP (ref 1.01–1.02)
UROBILINOGEN FLD QL: 4 MG/DL
WBC # BLD: 8.59 K/UL — SIGNIFICANT CHANGE UP (ref 3.8–10.5)
WBC # FLD AUTO: 8.59 K/UL — SIGNIFICANT CHANGE UP (ref 3.8–10.5)
WBC UR QL: SIGNIFICANT CHANGE UP

## 2019-07-07 PROCEDURE — 74177 CT ABD & PELVIS W/CONTRAST: CPT | Mod: 26

## 2019-07-07 PROCEDURE — 99285 EMERGENCY DEPT VISIT HI MDM: CPT

## 2019-07-07 PROCEDURE — 76705 ECHO EXAM OF ABDOMEN: CPT | Mod: 26

## 2019-07-07 RX ORDER — ONDANSETRON 8 MG/1
4 TABLET, FILM COATED ORAL ONCE
Refills: 0 | Status: COMPLETED | OUTPATIENT
Start: 2019-07-07 | End: 2019-07-07

## 2019-07-07 RX ORDER — MORPHINE SULFATE 50 MG/1
4 CAPSULE, EXTENDED RELEASE ORAL ONCE
Refills: 0 | Status: DISCONTINUED | OUTPATIENT
Start: 2019-07-07 | End: 2019-07-07

## 2019-07-07 RX ORDER — SODIUM CHLORIDE 9 MG/ML
1000 INJECTION INTRAMUSCULAR; INTRAVENOUS; SUBCUTANEOUS ONCE
Refills: 0 | Status: COMPLETED | OUTPATIENT
Start: 2019-07-07 | End: 2019-07-07

## 2019-07-07 RX ADMIN — MORPHINE SULFATE 4 MILLIGRAM(S): 50 CAPSULE, EXTENDED RELEASE ORAL at 18:41

## 2019-07-07 RX ADMIN — ONDANSETRON 4 MILLIGRAM(S): 8 TABLET, FILM COATED ORAL at 18:41

## 2019-07-07 RX ADMIN — SODIUM CHLORIDE 1000 MILLILITER(S): 9 INJECTION INTRAMUSCULAR; INTRAVENOUS; SUBCUTANEOUS at 18:41

## 2019-07-07 NOTE — ED ADULT TRIAGE NOTE - CHIEF COMPLAINT QUOTE
Mom states pt has been c/o abdominal pain, with nausea and bloating for about 4-5 days. Pt was seen here on Wednesday for fever then sent home. Pt's GI MD is Lee

## 2019-07-07 NOTE — ED ADULT NURSE REASSESSMENT NOTE - NS ED NURSE REASSESS COMMENT FT1
Assumed pt care at this time, pt alert and oriented, in no acute distress. Pt given PO contrast. No c/o of abd pain at this time, breath sounds unlabored and even b/l. Parents at bedside.

## 2019-07-07 NOTE — ED PROVIDER NOTE - PROGRESS NOTE DETAILS
As per signout from Dr. Palacios, patient awaiting CT abdomen and pelvis and US. Patient has history of recent biliary stent placement due to sclerosing cholangitis. He has been doing generally well. He had a fever a couple of days ago but it resolved prior to presentation to the ED and he left prior to evaluation. He states the pain started after that. Currently he is resting comfortably. Labs are as noted. Patient will be admitted for management of acute pancreatitis. US and CT are as noted

## 2019-07-07 NOTE — ED ADULT NURSE NOTE - NSIMPLEMENTINTERV_GEN_ALL_ED
Implemented All Universal Safety Interventions:  [place] to call system. Call bell, personal items and telephone within reach. Instruct patient to call for assistance. Room bathroom lighting operational. Non-slip footwear when patient is off stretcher. Physically safe environment: no spills, clutter or unnecessary equipment. Stretcher in lowest position, wheels locked, appropriate side rails in place.

## 2019-07-07 NOTE — ED ADULT NURSE NOTE - OBJECTIVE STATEMENT
Pt c/o fevers everyday since Wednesday, denies n/v/d, c/o epigastric and right flank pain stating "its sharp when I inhale and I feel really bloated, like my stomach just keeps getting bigger", AOx4, hx of CP and 3 stents in gallbladder, last one being 6/12/19, ambulates well w/out assistance, parents @ bedside, was seen here 3 days ago for fever but "fever resolved before we saw the doctor so we left", mom gives tylenol/motrin daily for fevers

## 2019-07-07 NOTE — ED PROVIDER NOTE - MUSCULOSKELETAL NEGATIVE STATEMENT, MLM
no back pain, no gout, no musculoskeletal pain, no neck pain, and no weakness. baseline CP related left arm and leg weakness

## 2019-07-07 NOTE — ED PROVIDER NOTE - NEUROLOGICAL, MLM
Alert and oriented, no focal deficits, no motor or sensory deficits. baselin eleft arm weakness from CP

## 2019-07-07 NOTE — ED STATDOCS - PROGRESS NOTE DETAILS
Pt is a 30 y/o M with a PMHx of PSC and stents who presents with a chief complaint of abdominal pain for the past 2 days. Notes his pain is exacerbated when he eats. Mother reports mild fever since Wednesday. Most recent kidney stone was 2 months ago. Denies chills, nausea, vomiting, dysuria.    Sent to [facility] for further evaluation. Pt is a 30 y/o M with a PMHx of primary sclerosing cholangitis and biliary stents (most recently placed 6/12) who presents with a chief complaint of abdominal pain for the past 2 days. Notes his pain is exacerbated when he eats. Mother reports mild fever since Wednesday. Most recent kidney stone was 2 months ago. Denies chills, nausea, vomiting, dysuria.    Orders placed, sent to [facility] for further evaluation.

## 2019-07-07 NOTE — ED ADULT NURSE NOTE - CAS TRG GENERAL NORM CIRC DET
I will STOP taking the medications listed below when I get home from the hospital:  None Strong peripheral pulses

## 2019-07-07 NOTE — ED PROVIDER NOTE - CLINICAL SUMMARY MEDICAL DECISION MAKING FREE TEXT BOX
plan to r/o cbd duct obstruction, sbo, pancreaititis or gallstone, will do ct abd andm us gb and control pain and reassess

## 2019-07-08 ENCOUNTER — RESULT REVIEW (OUTPATIENT)
Age: 29
End: 2019-07-08

## 2019-07-08 DIAGNOSIS — K83.01 PRIMARY SCLEROSING CHOLANGITIS: ICD-10-CM

## 2019-07-08 DIAGNOSIS — K85.00 IDIOPATHIC ACUTE PANCREATITIS WITHOUT NECROSIS OR INFECTION: ICD-10-CM

## 2019-07-08 DIAGNOSIS — G80.9 CEREBRAL PALSY, UNSPECIFIED: ICD-10-CM

## 2019-07-08 LAB
ALBUMIN SERPL ELPH-MCNC: 3.2 G/DL — LOW (ref 3.3–5.2)
ALP SERPL-CCNC: 376 U/L — HIGH (ref 40–120)
ALT FLD-CCNC: 68 U/L — HIGH
ANION GAP SERPL CALC-SCNC: 10 MMOL/L — SIGNIFICANT CHANGE UP (ref 5–17)
APTT BLD: 35.2 SEC — SIGNIFICANT CHANGE UP (ref 27.5–36.3)
AST SERPL-CCNC: 59 U/L — HIGH
BILIRUB SERPL-MCNC: 1 MG/DL — SIGNIFICANT CHANGE UP (ref 0.4–2)
BUN SERPL-MCNC: 7 MG/DL — LOW (ref 8–20)
CALCIUM SERPL-MCNC: 8.5 MG/DL — LOW (ref 8.6–10.2)
CHLORIDE SERPL-SCNC: 105 MMOL/L — SIGNIFICANT CHANGE UP (ref 98–107)
CO2 SERPL-SCNC: 25 MMOL/L — SIGNIFICANT CHANGE UP (ref 22–29)
CREAT SERPL-MCNC: 0.58 MG/DL — SIGNIFICANT CHANGE UP (ref 0.5–1.3)
GLUCOSE SERPL-MCNC: 86 MG/DL — SIGNIFICANT CHANGE UP (ref 70–115)
INR BLD: 1.07 RATIO — SIGNIFICANT CHANGE UP (ref 0.88–1.16)
LIDOCAIN IGE QN: 2108 U/L — HIGH (ref 22–51)
POTASSIUM SERPL-MCNC: 3.6 MMOL/L — SIGNIFICANT CHANGE UP (ref 3.5–5.3)
POTASSIUM SERPL-SCNC: 3.6 MMOL/L — SIGNIFICANT CHANGE UP (ref 3.5–5.3)
PROT SERPL-MCNC: 7.2 G/DL — SIGNIFICANT CHANGE UP (ref 6.6–8.7)
PROTHROM AB SERPL-ACNC: 12.3 SEC — SIGNIFICANT CHANGE UP (ref 10–12.9)
SODIUM SERPL-SCNC: 140 MMOL/L — SIGNIFICANT CHANGE UP (ref 135–145)

## 2019-07-08 PROCEDURE — 99222 1ST HOSP IP/OBS MODERATE 55: CPT | Mod: 25

## 2019-07-08 PROCEDURE — 99222 1ST HOSP IP/OBS MODERATE 55: CPT

## 2019-07-08 PROCEDURE — 43247 EGD REMOVE FOREIGN BODY: CPT

## 2019-07-08 PROCEDURE — 88300 SURGICAL PATH GROSS: CPT | Mod: 26

## 2019-07-08 PROCEDURE — 12345: CPT | Mod: NC

## 2019-07-08 RX ORDER — SODIUM CHLORIDE 9 MG/ML
1000 INJECTION, SOLUTION INTRAVENOUS
Refills: 0 | Status: DISCONTINUED | OUTPATIENT
Start: 2019-07-08 | End: 2019-07-10

## 2019-07-08 RX ORDER — SODIUM CHLORIDE 9 MG/ML
3 INJECTION INTRAMUSCULAR; INTRAVENOUS; SUBCUTANEOUS EVERY 8 HOURS
Refills: 0 | Status: DISCONTINUED | OUTPATIENT
Start: 2019-07-08 | End: 2019-07-10

## 2019-07-08 RX ORDER — ONDANSETRON 8 MG/1
4 TABLET, FILM COATED ORAL EVERY 6 HOURS
Refills: 0 | Status: DISCONTINUED | OUTPATIENT
Start: 2019-07-08 | End: 2019-07-10

## 2019-07-08 RX ORDER — SODIUM CHLORIDE 9 MG/ML
1000 INJECTION, SOLUTION INTRAVENOUS
Refills: 0 | Status: DISCONTINUED | OUTPATIENT
Start: 2019-07-08 | End: 2019-07-08

## 2019-07-08 RX ORDER — MORPHINE SULFATE 50 MG/1
2 CAPSULE, EXTENDED RELEASE ORAL EVERY 4 HOURS
Refills: 0 | Status: DISCONTINUED | OUTPATIENT
Start: 2019-07-08 | End: 2019-07-09

## 2019-07-08 RX ORDER — ACETAMINOPHEN 500 MG
650 TABLET ORAL EVERY 6 HOURS
Refills: 0 | Status: DISCONTINUED | OUTPATIENT
Start: 2019-07-08 | End: 2019-07-10

## 2019-07-08 RX ORDER — SODIUM CHLORIDE 9 MG/ML
1000 INJECTION INTRAMUSCULAR; INTRAVENOUS; SUBCUTANEOUS
Refills: 0 | Status: DISCONTINUED | OUTPATIENT
Start: 2019-07-08 | End: 2019-07-08

## 2019-07-08 RX ADMIN — MORPHINE SULFATE 2 MILLIGRAM(S): 50 CAPSULE, EXTENDED RELEASE ORAL at 08:00

## 2019-07-08 RX ADMIN — Medication 650 MILLIGRAM(S): at 17:38

## 2019-07-08 RX ADMIN — Medication 650 MILLIGRAM(S): at 23:40

## 2019-07-08 RX ADMIN — SODIUM CHLORIDE 3 MILLILITER(S): 9 INJECTION INTRAMUSCULAR; INTRAVENOUS; SUBCUTANEOUS at 14:11

## 2019-07-08 RX ADMIN — MORPHINE SULFATE 2 MILLIGRAM(S): 50 CAPSULE, EXTENDED RELEASE ORAL at 07:44

## 2019-07-08 RX ADMIN — SODIUM CHLORIDE 150 MILLILITER(S): 9 INJECTION, SOLUTION INTRAVENOUS at 02:56

## 2019-07-08 RX ADMIN — Medication 650 MILLIGRAM(S): at 18:33

## 2019-07-08 NOTE — PROGRESS NOTE ADULT - ASSESSMENT
30 y/o male with acute pancreatitis, hx of PSC s/p biliary stent x3 for obstructive jaundice, CP       Problem/Plan - 1:  ·  Problem: Idiopathic acute pancreatitis without infection or necrosis.  s/p egd with stent removal  --> possible dc in am as per GI      Problem/Plan - 2:  ·  Problem: PSC (primary sclerosing cholangitis).  Plan: Plans as above.      Problem/Plan - 3:  ·  Problem: Cerebral palsy, unspecified type.  Plan: No acute issues or concerns by Parents.

## 2019-07-08 NOTE — CONSULT NOTE ADULT - SUBJECTIVE AND OBJECTIVE BOX
HPI:Patient known to me both inpatient and outpatient. Initially admitted for obstructive jaundice. Then he had ERCP performed with right intrahepatic stenting due to right intrahepatic strictures. Subsequently he had liver biopsy at [place]. Possible PSC.  He had last ERCP with stent change on . He is on no medications at this time. He was doing well until yesterday when he started having abdominal pain and nausea. No vomiting or diarrhea. No sick contacts or changes in diet. Patient also had subjective fevers, no chills. He was given Motrin and Tylenol but still did not feel better. Parents brought him to the ED for further eval. In the ED patient with no fever. Had some epigastric pain and was noted to have Lipase of 1363. CT showed biliary stent in place and evidence of pancreatitis and known dilated hepatic ducts. No evidence of cholangitis.     He has abdominal pain this morning.     PAST MEDICAL & SURGICAL HISTORY:  PSC (primary sclerosing cholangitis)  Cerebral palsy  History of ERCP: performed at [place]      ROS:  No Heartburn, regurgitation, dysphagia, odynophagia.  No dyspepsia  + abdominal pain.    + Nausea, No vomiting.  No Bleeding.  No hematemesis.   No diarrhea.    No hematochesia.  No weight loss, anorexia.  No edema.      MEDICATIONS  (STANDING):  dextrose 5% + sodium chloride 0.9%. 1000 milliLiter(s) (150 mL/Hr) IV Continuous <Continuous>  sodium chloride 0.9% lock flush 3 milliLiter(s) IV Push every 8 hours    MEDICATIONS  (PRN):  acetaminophen   Tablet .. 650 milliGRAM(s) Oral every 6 hours PRN Temp greater or equal to 38C (100.4F), Mild Pain (1 - 3)  morphine  - Injectable 2 milliGRAM(s) IV Push every 4 hours PRN Severe Pain (7 - 10)  ondansetron Injectable 4 milliGRAM(s) IV Push every 6 hours PRN Nausea and/or Vomiting      Allergies    No Known Allergies    Intolerances        SOCIAL HISTORY:Denies x 3    ENDOSCOPIC/GI HISTORY:As above    FAMILY HISTORY:  No pertinent family history in first degree relatives      Vital Signs Last 24 Hrs  T(C): 37 (2019 04:43), Max: 37.1 (2019 17:44)  T(F): 98.6 (2019 04:43), Max: 98.8 (2019 17:44)  HR: 115 (2019 04:43) (86 - 115)  BP: 114/66 (2019 04:43) (114/66 - 126/75)  BP(mean): 92 (2019 02:03) (92 - 92)  RR: 18 (2019 04:43) (17 - 18)  SpO2: 100% (2019 04:43) (96% - 100%)    PHYSICAL EXAM:    GENERAL: NAD, well-groomed, well-developed  HEAD:  Atraumatic, Normocephalic  EYES: EOMI, PERRLA, conjunctiva and sclera clear  ENMT: No tonsillar erythema, exudates, or enlargement; Moist mucous membranes, Good dentition, No lesions  NECK: Supple, No JVD, Normal thyroid  CHEST/LUNG: Clear to percussion bilaterally; No rales, rhonchi, wheezing, or rubs  HEART: Regular rate and rhythm; No murmurs, rubs, or gallops  ABDOMEN: Soft, tender, Nondistended; Bowel sounds present  EXTREMITIES:  2+ Peripheral Pulses, No clubbing, cyanosis, or edema  LYMPH: No lymphadenopathy noted  SKIN: No rashes or lesions      LABS:                        12.3   8.59  )-----------( 673      ( 2019 18:54 )             36.2         140  |  105  |  10.0  ----------------------------<  106  3.7   |  24.0  |  0.68    Ca    9.3      2019 18:54    TPro  8.0  /  Alb  3.6  /  TBili  1.0  /  DBili  x   /  AST  59<H>  /  ALT  78<H>  /  AlkPhos  436<H>      Lipase, Serum (19 @ 18:54)    Lipase, Serum: 1363 U/L       Urinalysis Basic - ( 2019 18:54 )    Color: Yellow / Appearance: Clear / S.010 / pH: x  Gluc: x / Ketone: Negative  / Bili: Negative / Urobili: 4 mg/dL   Blood: x / Protein: 15 mg/dL / Nitrite: Negative   Leuk Esterase: Negative / RBC: 0-2 /HPF / WBC 0-2   Sq Epi: x / Non Sq Epi: Occasional / Bacteria: Few        LIVER FUNCTIONS - ( 2019 18:54 )  Alb: 3.6 g/dL / Pro: 8.0 g/dL / ALK PHOS: 436 U/L / ALT: 78 U/L / AST: 59 U/L / GGT: x               RADIOLOGY & ADDITIONAL STUDIES:  < from: CT Abdomen and Pelvis w/ Oral Cont and w/ IV Cont (19 @ 21:57) >     EXAM:  CT ABDOMEN AND PELVIS OC IC                          PROCEDURE DATE:  2019          INTERPRETATION:  CLINICAL INFORMATION: Abdominal pain. History of common   bile duct stent    COMPARISON: CT abdomen pelvis 2019    PROCEDURE:   CT of the Abdomen and Pelvis was performed with intravenous contrast.   Intravenous contrast: 90 ml Omnipaque 350. 10 ml discarded.  Oral contrast: positive contrast was administered.  Sagittal and coronal reformats were performed.    FINDINGS:    LOWER CHEST: Within normal limits.    LIVER: Within normal limits.  BILE DUCTS: Common bile duct stent reidentified. Marked intrahepatic   biliary ductal dilatation is similar to the prior study. Mild wall   thickening and enhancement of the intrahepaticcentral biliary tree from   the minh hepatis to the common bile duct.  GALLBLADDER: Within normal limits.  SPLEEN: Within normal limits.  PANCREAS: Mild pancreatic interstitial edema with trace peripancreatic   fluid. No evidence of cyst formation. No evidence of pancreatic necrosis.  ADRENALS: Within normal limits.  KIDNEYS/URETERS: Enhance symmetrically. No hydronephrosis.    BLADDER: Within normal limits.  REPRODUCTIVE ORGANS: Prostate is not enlarged. Trace free pelvic fluid.    BOWEL: No bowel obstruction. Appendix is not visualized; correlate with   surgical history.  PERITONEUM: No ascites.  VESSELS:  Normal caliber abdominal aorta. Portal and splenic veins are   patent.  RETROPERITONEUM: Mildly enlarged 1.0 cm in short axis portacaval lymph   node.   ABDOMINAL WALL: Within normal limits.  BONES: Small nonspecific sclerotic focus in the right femoral head. Mild   degenerative changes in the spine.    IMPRESSION:     Findings suggest acute pancreatitis. No evidence of pancreatic necrosis   or adjacent splenic or portal vein thrombosis.    Common bile duct stent redemonstrated. Marked intrahepatic biliary ductal   dilatation is similar to the prior study. Mild wall thickening and   enhancement of the biliary tree from the minh hepatis to the common bile   duct may be related to fibrosis, inflammation or infection (cholangitis);   less likely neoplasm but should be considered in the differential.                    SHAGGY PINA MD., ATTENDING RADIOLOGIST  This document has beenelectronically signed. 2019 10:14PM                  < end of copied text >

## 2019-07-08 NOTE — PROGRESS NOTE ADULT - SUBJECTIVE AND OBJECTIVE BOX
SAUMYA ELLIOT    [id number]    29y      Male    INTERVAL HPI/OVERNIGHT EVENTS:    patient being seen for pancreatitis. Patient seen at bedside with mother and states pain is improved    REVIEW OF SYSTEMS:    CONSTITUTIONAL: No fever, weight loss, or fatigue  RESPIRATORY: No cough, wheezing, hemoptysis; No shortness of breath  CARDIOVASCULAR: No chest pain, palpitations  GASTROINTESTINAL: No abdominal or epigastric pain. No nausea, vomiting  NEUROLOGICAL: No headaches, memory loss, loss of strength.  MISCELLANEOUS:      Vital Signs Last 24 Hrs  T(C): 37 (2019 11:03), Max: 37.1 (2019 17:44)  T(F): 98.6 (2019 11:03), Max: 98.8 (2019 17:44)  HR: 69 (2019 11:03) (69 - 115)  BP: 118/59 (2019 11:03) (111/68 - 126/75)  BP(mean): 92 (2019 02:03) (92 - 92)  RR: 18 (2019 11:03) (17 - 18)  SpO2: 100% (2019 11:03) (96% - 100%)    PHYSICAL EXAM:    GENERAL: NAD, well-groomed  HEENT: PERRL, +EOMI  NECK: soft, Supple, No JVD,   CHEST/LUNG: Clear to auscultation bilaterally; No wheezing  HEART: S1S2+, Regular rate and rhythm; No murmurs, rubs, or gallops  ABDOMEN: Soft, Nontender, Nondistended; Bowel sounds present  EXTREMITIES:  2+ Peripheral Pulses, No clubbing, cyanosis, or edema  SKIN: No rashes or lesions  NEURO: AAOX3, no focal deficits, no motor r sensory loss  PSYCH: normal mood      LABS:                        12.3   8.59  )-----------( 673      ( 2019 18:54 )             36.2     07-08    140  |  105  |  7.0<L>  ----------------------------<  86  3.6   |  25.0  |  0.58    Ca    8.5<L>      2019 07:35    TPro  7.2  /  Alb  3.2<L>  /  TBili  1.0  /  DBili  x   /  AST  59<H>  /  ALT  68<H>  /  AlkPhos  376<H>  -    PT/INR - ( 2019 11:58 )   PT: 12.3 sec;   INR: 1.07 ratio         PTT - ( 2019 11:58 )  PTT:35.2 sec  Urinalysis Basic - ( 2019 18:54 )    Color: Yellow / Appearance: Clear / S.010 / pH: x  Gluc: x / Ketone: Negative  / Bili: Negative / Urobili: 4 mg/dL   Blood: x / Protein: 15 mg/dL / Nitrite: Negative   Leuk Esterase: Negative / RBC: 0-2 /HPF / WBC 0-2   Sq Epi: x / Non Sq Epi: Occasional / Bacteria: Few          MEDICATIONS  (STANDING):  dextrose 5% + sodium chloride 0.9%. 1000 milliLiter(s) (150 mL/Hr) IV Continuous <Continuous>  lactated ringers. 1000 milliLiter(s) (125 mL/Hr) IV Continuous <Continuous>  sodium chloride 0.9% lock flush 3 milliLiter(s) IV Push every 8 hours    MEDICATIONS  (PRN):  acetaminophen   Tablet .. 650 milliGRAM(s) Oral every 6 hours PRN Temp greater or equal to 38C (100.4F), Mild Pain (1 - 3)  morphine  - Injectable 2 milliGRAM(s) IV Push every 4 hours PRN Severe Pain (7 - 10)  ondansetron Injectable 4 milliGRAM(s) IV Push every 6 hours PRN Nausea and/or Vomiting      RADIOLOGY & ADDITIONAL TESTS:

## 2019-07-08 NOTE — H&P ADULT - PROBLEM SELECTOR PLAN 1
Likely related to PSC, no stones noted on CT/US. Stent migration? No hx of etoh use. Will keep npo for now, cont. IVF, prn morphine, Spirometer, OOB, VCboots. Trend labs. GI eval in AM

## 2019-07-08 NOTE — H&P ADULT - HISTORY OF PRESENT ILLNESS
30 y/o male with history of CP, PSC s/p 3 biliary stents last one changed on 6/12. Patient Mother states they saw Hepatologist at [place] after discharge here and was told that was his diagnosis. He is on no medications at this time. He was doing well until yesterday when he started having abdominal pain and nausea. No vomiting or diarrhea. No sick contacts or changes in diet. Patient also had subjective fevers, no chills. He was given Motrin and Tylenol but still did not feel better. Parents brought him to the ED for further eval. In the ED patient with no fever. Had some epigastric pain and was noted to have Lipase of 1363. CT showed biliary stent in place and evidence of pancreatitis and known dilated hepatic ducts. No evidence of cholangitis.

## 2019-07-08 NOTE — CONSULT NOTE ADULT - ASSESSMENT
Patient with PSC with right intrahepatic strictures s/p ERCP and dilation and stent placement. Now admitted with pancreatitis. No clear etiology. Bilirubin is normal. At this time, keep NPO. Start PPI bid.   Will attempt removing bile duct stent removal today  IV fluids-LR at 125 mL/hour   Pain control

## 2019-07-09 ENCOUNTER — TRANSCRIPTION ENCOUNTER (OUTPATIENT)
Age: 29
End: 2019-07-09

## 2019-07-09 LAB
ALBUMIN SERPL ELPH-MCNC: 2.9 G/DL — LOW (ref 3.3–5.2)
ALP SERPL-CCNC: 392 U/L — HIGH (ref 40–120)
ALT FLD-CCNC: 64 U/L — HIGH
AMYLASE P1 CFR SERPL: 243 U/L — HIGH (ref 36–128)
ANION GAP SERPL CALC-SCNC: 11 MMOL/L — SIGNIFICANT CHANGE UP (ref 5–17)
AST SERPL-CCNC: 56 U/L — HIGH
BASOPHILS # BLD AUTO: 0.05 K/UL — SIGNIFICANT CHANGE UP (ref 0–0.2)
BASOPHILS NFR BLD AUTO: 0.6 % — SIGNIFICANT CHANGE UP (ref 0–2)
BILIRUB SERPL-MCNC: 0.9 MG/DL — SIGNIFICANT CHANGE UP (ref 0.4–2)
BUN SERPL-MCNC: 6 MG/DL — LOW (ref 8–20)
CALCIUM SERPL-MCNC: 9 MG/DL — SIGNIFICANT CHANGE UP (ref 8.6–10.2)
CHLORIDE SERPL-SCNC: 106 MMOL/L — SIGNIFICANT CHANGE UP (ref 98–107)
CO2 SERPL-SCNC: 26 MMOL/L — SIGNIFICANT CHANGE UP (ref 22–29)
CREAT SERPL-MCNC: 0.61 MG/DL — SIGNIFICANT CHANGE UP (ref 0.5–1.3)
EOSINOPHIL # BLD AUTO: 0.23 K/UL — SIGNIFICANT CHANGE UP (ref 0–0.5)
EOSINOPHIL NFR BLD AUTO: 2.8 % — SIGNIFICANT CHANGE UP (ref 0–6)
GLUCOSE SERPL-MCNC: 100 MG/DL — SIGNIFICANT CHANGE UP (ref 70–115)
HCT VFR BLD CALC: 34.2 % — LOW (ref 39–50)
HGB BLD-MCNC: 11.5 G/DL — LOW (ref 13–17)
IMM GRANULOCYTES NFR BLD AUTO: 0.7 % — SIGNIFICANT CHANGE UP (ref 0–1.5)
LIDOCAIN IGE QN: 957 U/L — HIGH (ref 22–51)
LYMPHOCYTES # BLD AUTO: 2.62 K/UL — SIGNIFICANT CHANGE UP (ref 1–3.3)
LYMPHOCYTES # BLD AUTO: 32.4 % — SIGNIFICANT CHANGE UP (ref 13–44)
MAGNESIUM SERPL-MCNC: 2.1 MG/DL — SIGNIFICANT CHANGE UP (ref 1.6–2.6)
MCHC RBC-ENTMCNC: 32.2 PG — SIGNIFICANT CHANGE UP (ref 27–34)
MCHC RBC-ENTMCNC: 33.6 GM/DL — SIGNIFICANT CHANGE UP (ref 32–36)
MCV RBC AUTO: 95.8 FL — SIGNIFICANT CHANGE UP (ref 80–100)
MONOCYTES # BLD AUTO: 0.63 K/UL — SIGNIFICANT CHANGE UP (ref 0–0.9)
MONOCYTES NFR BLD AUTO: 7.8 % — SIGNIFICANT CHANGE UP (ref 2–14)
NEUTROPHILS # BLD AUTO: 4.5 K/UL — SIGNIFICANT CHANGE UP (ref 1.8–7.4)
NEUTROPHILS NFR BLD AUTO: 55.7 % — SIGNIFICANT CHANGE UP (ref 43–77)
PHOSPHATE SERPL-MCNC: 4 MG/DL — SIGNIFICANT CHANGE UP (ref 2.4–4.7)
PLATELET # BLD AUTO: 542 K/UL — HIGH (ref 150–400)
POTASSIUM SERPL-MCNC: 3.6 MMOL/L — SIGNIFICANT CHANGE UP (ref 3.5–5.3)
POTASSIUM SERPL-SCNC: 3.6 MMOL/L — SIGNIFICANT CHANGE UP (ref 3.5–5.3)
PROT SERPL-MCNC: 7.1 G/DL — SIGNIFICANT CHANGE UP (ref 6.6–8.7)
RBC # BLD: 3.57 M/UL — LOW (ref 4.2–5.8)
RBC # FLD: 11.8 % — SIGNIFICANT CHANGE UP (ref 10.3–14.5)
SODIUM SERPL-SCNC: 143 MMOL/L — SIGNIFICANT CHANGE UP (ref 135–145)
WBC # BLD: 8.09 K/UL — SIGNIFICANT CHANGE UP (ref 3.8–10.5)
WBC # FLD AUTO: 8.09 K/UL — SIGNIFICANT CHANGE UP (ref 3.8–10.5)

## 2019-07-09 PROCEDURE — 99233 SBSQ HOSP IP/OBS HIGH 50: CPT

## 2019-07-09 RX ORDER — POLYETHYLENE GLYCOL 3350 17 G/17G
17 POWDER, FOR SOLUTION ORAL DAILY
Refills: 0 | Status: DISCONTINUED | OUTPATIENT
Start: 2019-07-09 | End: 2019-07-10

## 2019-07-09 RX ORDER — LACTULOSE 10 G/15ML
10 SOLUTION ORAL
Refills: 0 | Status: DISCONTINUED | OUTPATIENT
Start: 2019-07-09 | End: 2019-07-10

## 2019-07-09 RX ORDER — OXYCODONE HYDROCHLORIDE 5 MG/1
1 TABLET ORAL
Qty: 9 | Refills: 0
Start: 2019-07-09 | End: 2019-07-11

## 2019-07-09 RX ORDER — MORPHINE SULFATE 50 MG/1
2 CAPSULE, EXTENDED RELEASE ORAL EVERY 4 HOURS
Refills: 0 | Status: DISCONTINUED | OUTPATIENT
Start: 2019-07-09 | End: 2019-07-10

## 2019-07-09 RX ORDER — MORPHINE SULFATE 50 MG/1
4 CAPSULE, EXTENDED RELEASE ORAL EVERY 4 HOURS
Refills: 0 | Status: DISCONTINUED | OUTPATIENT
Start: 2019-07-09 | End: 2019-07-10

## 2019-07-09 RX ADMIN — POLYETHYLENE GLYCOL 3350 17 GRAM(S): 17 POWDER, FOR SOLUTION ORAL at 16:29

## 2019-07-09 RX ADMIN — MORPHINE SULFATE 2 MILLIGRAM(S): 50 CAPSULE, EXTENDED RELEASE ORAL at 08:30

## 2019-07-09 RX ADMIN — MORPHINE SULFATE 2 MILLIGRAM(S): 50 CAPSULE, EXTENDED RELEASE ORAL at 08:18

## 2019-07-09 RX ADMIN — SODIUM CHLORIDE 125 MILLILITER(S): 9 INJECTION, SOLUTION INTRAVENOUS at 14:45

## 2019-07-09 RX ADMIN — SODIUM CHLORIDE 3 MILLILITER(S): 9 INJECTION INTRAMUSCULAR; INTRAVENOUS; SUBCUTANEOUS at 21:05

## 2019-07-09 RX ADMIN — SODIUM CHLORIDE 3 MILLILITER(S): 9 INJECTION INTRAMUSCULAR; INTRAVENOUS; SUBCUTANEOUS at 14:46

## 2019-07-09 NOTE — DISCHARGE NOTE PROVIDER - HOSPITAL COURSE
28 y/o male with history of CP, PSC s/p 3 biliary stents last one changed on 6/12. Patient Mother states they saw Hepatologist at [place] after discharge here and was told that was his diagnosis. He is on no medications at this time. Patient started having abdominal pain and nausea. Had some epigastric pain and was noted to have Lipase of 1363. CT showed biliary stent in place and evidence of pancreatitis and known dilated hepatic ducts. No evidence of cholangitis.         patient admitted to medicine and seen by GI in consult. On july 8thL:        EGD, with stent removal . Patient tolerated it well and lipase and amylase improved. Patient is now stable for dc home on 7/10.         time spent on dc 32 mintues 28 y/o male with history of CP, PSC s/p 3 biliary stents last one changed on 6/12. Patient Mother states they saw Hepatologist at [place] after discharge here and was told that was his diagnosis. He is on no medications at this time. Patient started having abdominal pain and nausea. Had some epigastric pain and was noted to have Lipase of 1363. CT showed biliary stent in place and evidence of pancreatitis and known dilated hepatic ducts. No evidence of cholangitis.         patient admitted to medicine and seen by GI in consult. On july 8th:        EGD, with stent removal . Patient tolerated it well and lipase and amylase improved. Patient is now stable for dc home on 7/10 if cleared by GI        time spent on dc 32 mintues

## 2019-07-09 NOTE — DISCHARGE NOTE PROVIDER - CARE PROVIDER_API CALL
Yasmany Howadr)  Gastroenterology; Internal Medicine  [address] [place] [address]  Phone: [phone]  Fax: [phone]  Follow Up Time:     pcp,   Phone: (   )    -  Fax: (   )    -  Follow Up Time:

## 2019-07-09 NOTE — PROGRESS NOTE PEDS - SUBJECTIVE AND OBJECTIVE BOX
INTERVAL HPI/OVERNIGHT EVENTS:FU for pancreatitis. S/p EGD with bile duct stent removal. Still has abdominal pain. Lipase improving. Bilirubin  is normal.    MEDICATIONS  (STANDING):  lactated ringers. 1000 milliLiter(s) (125 mL/Hr) IV Continuous <Continuous>  sodium chloride 0.9% lock flush 3 milliLiter(s) IV Push every 8 hours    MEDICATIONS  (PRN):  acetaminophen   Tablet .. 650 milliGRAM(s) Oral every 6 hours PRN Temp greater or equal to 38C (100.4F), Mild Pain (1 - 3)  morphine  - Injectable 2 milliGRAM(s) IV Push every 4 hours PRN Severe Pain (7 - 10)  ondansetron Injectable 4 milliGRAM(s) IV Push every 6 hours PRN Nausea and/or Vomiting      Allergies    No Known Allergies    Intolerances        Vital Signs Last 24 Hrs  T(C): 36.8 (2019 05:00), Max: 37.1 (2019 14:38)  T(F): 98.2 (2019 05:00), Max: 98.8 (2019 14:38)  HR: 74 (2019 05:00) (69 - 86)  BP: 108/70 (2019 05:00) (108/70 - 127/78)  BP(mean): --  RR: 16 (2019 05:00) (16 - 18)  SpO2: 99% (2019 05:00) (98% - 100%)    LABS:                        11.5   8.09  )-----------( 542      ( 2019 04:42 )             34.2         143  |  106  |  6.0<L>  ----------------------------<  100  3.6   |  26.0  |  0.61    Ca    9.0      2019 04:42  Phos  4.0       Mg     2.1         TPro  7.1  /  Alb  2.9<L>  /  TBili  0.9  /  DBili  x   /  AST  56<H>  /  ALT  64<H>  /  AlkPhos  392<H>      PT/INR - ( 2019 11:58 )   PT: 12.3 sec;   INR: 1.07 ratio         PTT - ( 2019 11:58 )  PTT:35.2 sec  Urinalysis Basic - ( 2019 18:54 )    Color: Yellow / Appearance: Clear / S.010 / pH: x  Gluc: x / Ketone: Negative  / Bili: Negative / Urobili: 4 mg/dL   Blood: x / Protein: 15 mg/dL / Nitrite: Negative   Leuk Esterase: Negative / RBC: 0-2 /HPF / WBC 0-2   Sq Epi: x / Non Sq Epi: Occasional / Bacteria: Few        RADIOLOGY & ADDITIONAL TESTS:

## 2019-07-09 NOTE — PROGRESS NOTE PEDS - SUBJECTIVE AND OBJECTIVE BOX
SAUMYA ELLIOT    [id number]    29y      Male    INTERVAL HPI/OVERNIGHT EVENTS:    patient being seen for pancreatitis., patient seen at bedside with mother. patient denies any complaints    REVIEW OF SYSTEMS:    CONSTITUTIONAL: No fever, weight loss, or fatigue  RESPIRATORY: No cough, wheezing, hemoptysis; No shortness of breath  CARDIOVASCULAR: No chest pain, palpitations  GASTROINTESTINAL: No abdominal or epigastric pain. No nausea, vomiting  NEUROLOGICAL: No headaches, memory loss, loss of strength.  MISCELLANEOUS:      Vital Signs Last 24 Hrs  T(C): 36.8 (2019 12:07), Max: 37.1 (2019 14:38)  T(F): 98.3 (2019 12:07), Max: 98.8 (2019 14:38)  HR: 73 (2019 12:07) (73 - 83)  BP: 120/76 (2019 08:08) (108/70 - 127/78)  BP(mean): --  RR: 18 (2019 12:07) (16 - 18)  SpO2: 99% (2019 12:07) (98% - 100%)    PHYSICAL EXAM:    GENERAL: NAD, well-groomed  HEENT: PERRL, +EOMI  NECK: soft, Supple, No JVD,   CHEST/LUNG: Clear to auscultation bilaterally; No wheezing  HEART: S1S2+, Regular rate and rhythm; No murmurs, rubs, or gallops  ABDOMEN: Soft, Nontender, Nondistended; Bowel sounds present  EXTREMITIES:  2+ Peripheral Pulses, No clubbing, cyanosis, or edema  SKIN: No rashes or lesions  NEURO: AAOX3, no focal deficits, no motor r sensory loss  PSYCH: normal mood        LABS:                        11.5   8.09  )-----------( 542      ( 2019 04:42 )             34.2     07-09    143  |  106  |  6.0<L>  ----------------------------<  100  3.6   |  26.0  |  0.61    Ca    9.0      2019 04:42  Phos  4.0     07-09  Mg     2.1     07-09    TPro  7.1  /  Alb  2.9<L>  /  TBili  0.9  /  DBili  x   /  AST  56<H>  /  ALT  64<H>  /  AlkPhos  392<H>      PT/INR - ( 2019 11:58 )   PT: 12.3 sec;   INR: 1.07 ratio         PTT - ( 2019 11:58 )  PTT:35.2 sec  Urinalysis Basic - ( 2019 18:54 )    Color: Yellow / Appearance: Clear / S.010 / pH: x  Gluc: x / Ketone: Negative  / Bili: Negative / Urobili: 4 mg/dL   Blood: x / Protein: 15 mg/dL / Nitrite: Negative   Leuk Esterase: Negative / RBC: 0-2 /HPF / WBC 0-2   Sq Epi: x / Non Sq Epi: Occasional / Bacteria: Few          MEDICATIONS  (STANDING):  lactated ringers. 1000 milliLiter(s) (125 mL/Hr) IV Continuous <Continuous>  sodium chloride 0.9% lock flush 3 milliLiter(s) IV Push every 8 hours    MEDICATIONS  (PRN):  acetaminophen   Tablet .. 650 milliGRAM(s) Oral every 6 hours PRN Temp greater or equal to 38C (100.4F), Mild Pain (1 - 3)  morphine  - Injectable 2 milliGRAM(s) IV Push every 4 hours PRN Moderate Pain (4 - 6)  morphine  - Injectable 4 milliGRAM(s) IV Push every 4 hours PRN Severe Pain (7 - 10)  ondansetron Injectable 4 milliGRAM(s) IV Push every 6 hours PRN Nausea and/or Vomiting      RADIOLOGY & ADDITIONAL TESTS:

## 2019-07-10 ENCOUNTER — TRANSCRIPTION ENCOUNTER (OUTPATIENT)
Age: 29
End: 2019-07-10

## 2019-07-10 VITALS
HEART RATE: 79 BPM | RESPIRATION RATE: 16 BRPM | OXYGEN SATURATION: 100 % | SYSTOLIC BLOOD PRESSURE: 110 MMHG | DIASTOLIC BLOOD PRESSURE: 66 MMHG | TEMPERATURE: 99 F

## 2019-07-10 LAB
ALBUMIN SERPL ELPH-MCNC: 3.1 G/DL — LOW (ref 3.3–5.2)
ALP SERPL-CCNC: 415 U/L — HIGH (ref 40–120)
ALT FLD-CCNC: 74 U/L — HIGH
AMYLASE P1 CFR SERPL: 156 U/L — HIGH (ref 36–128)
ANION GAP SERPL CALC-SCNC: 11 MMOL/L — SIGNIFICANT CHANGE UP (ref 5–17)
AST SERPL-CCNC: 78 U/L — HIGH
BILIRUB SERPL-MCNC: 1.1 MG/DL — SIGNIFICANT CHANGE UP (ref 0.4–2)
BUN SERPL-MCNC: 6 MG/DL — LOW (ref 8–20)
CALCIUM SERPL-MCNC: 9.1 MG/DL — SIGNIFICANT CHANGE UP (ref 8.6–10.2)
CHLORIDE SERPL-SCNC: 100 MMOL/L — SIGNIFICANT CHANGE UP (ref 98–107)
CO2 SERPL-SCNC: 25 MMOL/L — SIGNIFICANT CHANGE UP (ref 22–29)
CREAT SERPL-MCNC: 0.6 MG/DL — SIGNIFICANT CHANGE UP (ref 0.5–1.3)
GLUCOSE SERPL-MCNC: 98 MG/DL — SIGNIFICANT CHANGE UP (ref 70–115)
HCT VFR BLD CALC: 33.9 % — LOW (ref 39–50)
HGB BLD-MCNC: 11.4 G/DL — LOW (ref 13–17)
LIDOCAIN IGE QN: 597 U/L — HIGH (ref 22–51)
MAGNESIUM SERPL-MCNC: 1.9 MG/DL — SIGNIFICANT CHANGE UP (ref 1.6–2.6)
MCHC RBC-ENTMCNC: 32 PG — SIGNIFICANT CHANGE UP (ref 27–34)
MCHC RBC-ENTMCNC: 33.6 GM/DL — SIGNIFICANT CHANGE UP (ref 32–36)
MCV RBC AUTO: 95.2 FL — SIGNIFICANT CHANGE UP (ref 80–100)
PLATELET # BLD AUTO: 545 K/UL — HIGH (ref 150–400)
POTASSIUM SERPL-MCNC: 3.9 MMOL/L — SIGNIFICANT CHANGE UP (ref 3.5–5.3)
POTASSIUM SERPL-SCNC: 3.9 MMOL/L — SIGNIFICANT CHANGE UP (ref 3.5–5.3)
PROT SERPL-MCNC: 7 G/DL — SIGNIFICANT CHANGE UP (ref 6.6–8.7)
RBC # BLD: 3.56 M/UL — LOW (ref 4.2–5.8)
RBC # FLD: 11.9 % — SIGNIFICANT CHANGE UP (ref 10.3–14.5)
SODIUM SERPL-SCNC: 136 MMOL/L — SIGNIFICANT CHANGE UP (ref 135–145)
SURGICAL PATHOLOGY STUDY: SIGNIFICANT CHANGE UP
WBC # BLD: 8.06 K/UL — SIGNIFICANT CHANGE UP (ref 3.8–10.5)
WBC # FLD AUTO: 8.06 K/UL — SIGNIFICANT CHANGE UP (ref 3.8–10.5)

## 2019-07-10 PROCEDURE — 76705 ECHO EXAM OF ABDOMEN: CPT

## 2019-07-10 PROCEDURE — 83735 ASSAY OF MAGNESIUM: CPT

## 2019-07-10 PROCEDURE — 36415 COLL VENOUS BLD VENIPUNCTURE: CPT

## 2019-07-10 PROCEDURE — 99239 HOSP IP/OBS DSCHRG MGMT >30: CPT

## 2019-07-10 PROCEDURE — 99233 SBSQ HOSP IP/OBS HIGH 50: CPT

## 2019-07-10 PROCEDURE — 83690 ASSAY OF LIPASE: CPT

## 2019-07-10 PROCEDURE — 85730 THROMBOPLASTIN TIME PARTIAL: CPT

## 2019-07-10 PROCEDURE — 80053 COMPREHEN METABOLIC PANEL: CPT

## 2019-07-10 PROCEDURE — 99285 EMERGENCY DEPT VISIT HI MDM: CPT | Mod: 25

## 2019-07-10 PROCEDURE — 96375 TX/PRO/DX INJ NEW DRUG ADDON: CPT

## 2019-07-10 PROCEDURE — 83605 ASSAY OF LACTIC ACID: CPT

## 2019-07-10 PROCEDURE — 96374 THER/PROPH/DIAG INJ IV PUSH: CPT | Mod: XU

## 2019-07-10 PROCEDURE — 85610 PROTHROMBIN TIME: CPT

## 2019-07-10 PROCEDURE — 74177 CT ABD & PELVIS W/CONTRAST: CPT

## 2019-07-10 PROCEDURE — 88300 SURGICAL PATH GROSS: CPT

## 2019-07-10 PROCEDURE — 82150 ASSAY OF AMYLASE: CPT

## 2019-07-10 PROCEDURE — 83615 LACTATE (LD) (LDH) ENZYME: CPT

## 2019-07-10 PROCEDURE — 85027 COMPLETE CBC AUTOMATED: CPT

## 2019-07-10 PROCEDURE — 81001 URINALYSIS AUTO W/SCOPE: CPT

## 2019-07-10 PROCEDURE — 84100 ASSAY OF PHOSPHORUS: CPT

## 2019-07-10 RX ADMIN — POLYETHYLENE GLYCOL 3350 17 GRAM(S): 17 POWDER, FOR SOLUTION ORAL at 10:26

## 2019-07-10 NOTE — DISCHARGE NOTE NURSING/CASE MANAGEMENT/SOCIAL WORK - NSDCDPATPORTLINK_GEN_ALL_CORE
You can access the [facility] Portal, offered by [facility], by registering with the following website: [email or web address]

## 2019-07-10 NOTE — PROGRESS NOTE PEDS - SUBJECTIVE AND OBJECTIVE BOX
INTERVAL HPI/OVERNIGHT EVENTS:    MEDICATIONS  (STANDING):  lactated ringers. 1000 milliLiter(s) (125 mL/Hr) IV Continuous <Continuous>  polyethylene glycol 3350 17 Gram(s) Oral daily  sodium chloride 0.9% lock flush 3 milliLiter(s) IV Push every 8 hours    MEDICATIONS  (PRN):  acetaminophen   Tablet .. 650 milliGRAM(s) Oral every 6 hours PRN Temp greater or equal to 38C (100.4F), Mild Pain (1 - 3)  lactulose Syrup 10 Gram(s) Oral two times a day PRN constipaiton  morphine  - Injectable 2 milliGRAM(s) IV Push every 4 hours PRN Moderate Pain (4 - 6)  morphine  - Injectable 4 milliGRAM(s) IV Push every 4 hours PRN Severe Pain (7 - 10)  ondansetron Injectable 4 milliGRAM(s) IV Push every 6 hours PRN Nausea and/or Vomiting      Allergies    No Known Allergies    Intolerances        Vital Signs Last 24 Hrs  T(C): 37.1 (10 Jul 2019 08:45), Max: 37.1 (10 Jul 2019 08:45)  T(F): 98.8 (10 Jul 2019 08:45), Max: 98.8 (10 Jul 2019 08:45)  HR: 79 (10 Jul 2019 08:45) (69 - 90)  BP: 110/66 (10 Jul 2019 08:45) (110/66 - 126/66)  BP(mean): --  RR: 16 (10 Jul 2019 08:45) (16 - 18)  SpO2: 100% (10 Jul 2019 08:45) (97% - 100%)    LABS:                        11.4   8.06  )-----------( 545      ( 10 Jul 2019 05:08 )             33.9     07-10    136  |  100  |  6.0<L>  ----------------------------<  98  3.9   |  25.0  |  0.60    Ca    9.1      10 Jul 2019 05:08  Phos  4.0     07-09  Mg     1.9     07-10    TPro  7.0  /  Alb  3.1<L>  /  TBili  1.1  /  DBili  x   /  AST  78<H>  /  ALT  74<H>  /  AlkPhos  415<H>  07-10    PT/INR - ( 08 Jul 2019 11:58 )   PT: 12.3 sec;   INR: 1.07 ratio         PTT - ( 08 Jul 2019 11:58 )  PTT:35.2 sec      RADIOLOGY & ADDITIONAL TESTS: INTERVAL HPI/OVERNIGHT EVENTS:Fu for pancreatitis. No more abdominal pain. LFts stable. Lipase improving. Had good BM after miralax.    MEDICATIONS  (STANDING):  lactated ringers. 1000 milliLiter(s) (125 mL/Hr) IV Continuous <Continuous>  polyethylene glycol 3350 17 Gram(s) Oral daily  sodium chloride 0.9% lock flush 3 milliLiter(s) IV Push every 8 hours    MEDICATIONS  (PRN):  acetaminophen   Tablet .. 650 milliGRAM(s) Oral every 6 hours PRN Temp greater or equal to 38C (100.4F), Mild Pain (1 - 3)  lactulose Syrup 10 Gram(s) Oral two times a day PRN constipaiton  morphine  - Injectable 2 milliGRAM(s) IV Push every 4 hours PRN Moderate Pain (4 - 6)  morphine  - Injectable 4 milliGRAM(s) IV Push every 4 hours PRN Severe Pain (7 - 10)  ondansetron Injectable 4 milliGRAM(s) IV Push every 6 hours PRN Nausea and/or Vomiting      Allergies    No Known Allergies    Intolerances        Vital Signs Last 24 Hrs  T(C): 37.1 (10 Jul 2019 08:45), Max: 37.1 (10 Jul 2019 08:45)  T(F): 98.8 (10 Jul 2019 08:45), Max: 98.8 (10 Jul 2019 08:45)  HR: 79 (10 Jul 2019 08:45) (69 - 90)  BP: 110/66 (10 Jul 2019 08:45) (110/66 - 126/66)  BP(mean): --  RR: 16 (10 Jul 2019 08:45) (16 - 18)  SpO2: 100% (10 Jul 2019 08:45) (97% - 100%)    LABS:                        11.4   8.06  )-----------( 545      ( 10 Jul 2019 05:08 )             33.9     07-10    136  |  100  |  6.0<L>  ----------------------------<  98  3.9   |  25.0  |  0.60    Ca    9.1      10 Jul 2019 05:08  Phos  4.0     07-09  Mg     1.9     07-10    TPro  7.0  /  Alb  3.1<L>  /  TBili  1.1  /  DBili  x   /  AST  78<H>  /  ALT  74<H>  /  AlkPhos  415<H>  07-10    PT/INR - ( 08 Jul 2019 11:58 )   PT: 12.3 sec;   INR: 1.07 ratio         PTT - ( 08 Jul 2019 11:58 )  PTT:35.2 sec      RADIOLOGY & ADDITIONAL TESTS:

## 2019-07-10 NOTE — PROGRESS NOTE PEDS - ASSESSMENT
Patient with Cerebral palsy, PSC, and now with pancreatitis. s/p EGD with stent removal as thought was stent may be blocking pancreatic orifice.  Continue clear liquid  PPI bid  Pain control  Recheck lipase, amylase and LFTs tomorrow  Advance diet once pain is better
30 y/o male with acute pancreatitis, hx of PSC s/p biliary stent x3 for obstructive jaundice, CP       Problem/Plan - 1:  ·  Problem: Idiopathic acute pancreatitis without infection or necrosis.  s/p egd with stent removal  --> spoke to GI dc in am      Problem/Plan - 2:  ·  Problem: PSC (primary sclerosing cholangitis).  Plan: Plans as above.        Problem/Plan - 3:  ·  Problem: Cerebral palsy, unspecified type.  Plan: No acute issues or concerns by Parents.     clear liquid diet advance as tolerated
Patient is clinically improving. Advance diet to low fat diet. If continues to do well, discharge home with GI FU with me

## 2019-07-17 ENCOUNTER — TRANSCRIPTION ENCOUNTER (OUTPATIENT)
Age: 29
End: 2019-07-17

## 2019-07-29 ENCOUNTER — APPOINTMENT (OUTPATIENT)
Dept: HEPATOLOGY | Facility: CLINIC | Age: 29
End: 2019-07-29
Payer: MEDICAID

## 2019-07-29 VITALS
HEIGHT: 64 IN | BODY MASS INDEX: 20.49 KG/M2 | RESPIRATION RATE: 16 BRPM | DIASTOLIC BLOOD PRESSURE: 80 MMHG | TEMPERATURE: 98 F | WEIGHT: 120 LBS | SYSTOLIC BLOOD PRESSURE: 120 MMHG | HEART RATE: 80 BPM

## 2019-07-29 LAB
BASOPHILS # BLD AUTO: 0.06 K/UL
BASOPHILS NFR BLD AUTO: 0.9 %
EOSINOPHIL # BLD AUTO: 0.24 K/UL
EOSINOPHIL NFR BLD AUTO: 3.5 %
HCT VFR BLD CALC: 42.2 %
HGB BLD-MCNC: 13.1 G/DL
IMM GRANULOCYTES NFR BLD AUTO: 0.3 %
INR PPP: 1.04 RATIO
LYMPHOCYTES # BLD AUTO: 2.16 K/UL
LYMPHOCYTES NFR BLD AUTO: 31.9 %
MAN DIFF?: NORMAL
MCHC RBC-ENTMCNC: 31 GM/DL
MCHC RBC-ENTMCNC: 32 PG
MCV RBC AUTO: 102.9 FL
MONOCYTES # BLD AUTO: 0.65 K/UL
MONOCYTES NFR BLD AUTO: 9.6 %
NEUTROPHILS # BLD AUTO: 3.65 K/UL
NEUTROPHILS NFR BLD AUTO: 53.8 %
PLATELET # BLD AUTO: 487 K/UL
PT BLD: 11.8 SEC
RBC # BLD: 4.1 M/UL
RBC # FLD: 12.9 %
WBC # FLD AUTO: 6.78 K/UL

## 2019-07-29 PROCEDURE — 99214 OFFICE O/P EST MOD 30 MIN: CPT

## 2019-07-29 NOTE — ASSESSMENT
[FreeTextEntry1] : Patient had an episode of acute pancreatitis needing hospitalization for few days at [facility], and needed removal of biliary stent. He is doing well since that time. Improved LFTs. ? suspect PSC vs inflammatory cholangiopathy. High level in the past but IgG4 level within normal range.\par \par PLAN\par - Follow up labs today: CBC, CMP, PT/INR, and recheck labs in 4 weeks.\par - I am considering a steroid trial on his next visit. Hesitant start now die to recent acute pancreatitis.\par \par RTC in 4 weeks.

## 2019-07-29 NOTE — HISTORY OF PRESENT ILLNESS
[FreeTextEntry1] : Mr. Javy Biggs is a 28-year-old man with a history of cerebral palsy presents for follow up  evaluation. Patient presented along with his mother to the clinic.\par \par Since last seen patient had an episode of acute pancreatitis needing hospitalization for few days at [facility], and needed removal of biliary stent. He is doing well since that time. Improved LFTs. ? suspect PSC vs inflammatory cholangiopathy.\par \par Prior history: Based on Dr. Howard's office visit note, his GI specialist, he started itching while he was in [place], along with some nausea and vomiting and abdominal pain. He was noted to have liver enzyme elevation. Then subsequently he presented to [facility] and underwent evaluation. His bilirubin started to rise further and then transferred to [facility] in February 2019. He underwent ERCP which showed the distal bile duct stricture and questionable right intrahepatic stricture. Small stent was placed in the distal bile duct. However his bilirubin did not improve initially. He underwent MRI with severe right intrahepatic stricture. He was discharged home. He was itching for many days but then it improved. \par \par Ca 19-9 level, IgG 4 level and bile duct brushings have been unremarkable. \par \par The patient had another ERCP with stent pull and cholangiogram in april 2019. Right intrahepatic stricture was noted. Then additional brushings were performed.Then a stent was attempted to be advanced into the right intrahepatic system. Although there was significant resistance. Then it was dilated using a 4 mm balloon. Then 7 Polish 9 cm bile duct stent was placed.The patient had a ER visit about a week or 2 after that. He was noted to have nephrolithiasis.\par \par His LFTs imroved with ERCP and stent. He is now referred to hepatology for further assessment for primary sclerosing cholangitis.\par \par ARMANDO is negative, Mitochondrial antibody is negative, Viral hepatitis serologies are negative, immunugloblin level are unimpressive.\par \par \par Interval hx: Since last seen in the hepatology clinic on 22-May-2019, he had a follow up ERCP by Dr. Howard, and the old stent was removed, and exchanged with a new one. Brushings were negative for malignant cell. Additionally he had a liver biopsy that showed overall appears cholestatic pattern of injury with ductular reaction. This was further discussed at the hepato-billiary meeting with pathology review. 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 Essentially, work up for underlying CLD were negative for viral serology, autoimmune markers, ceruloplasmin ( higher level not suggestive of Garcia's). His total bili is down to 2.4 mg/dL, , and , and . AMA is negative, but ASMA is low titer + ( 1:20). ARMANDO is also negative.\par Interestingly, his IgG level is high ( 1723 mg/dL). \par \par His viral serology suggest to protection towards hepatitis B, but he is immune to hepatitis A.\par Ca19-9 was within normal range.\par \par

## 2019-07-29 NOTE — PHYSICAL EXAM
[General Appearance - Alert] : alert [General Appearance - Well Nourished] : well nourished [General Appearance - Well Developed] : well developed [Sclera] : the sclera and conjunctiva were normal [Outer Ear] : the ears and nose were normal in appearance [Neck Appearance] : the appearance of the neck was normal [Exaggerated Use Of Accessory Muscles For Inspiration] : no accessory muscle use [Apical Impulse] : the apical impulse was normal [Heart Rate And Rhythm] : heart rate was normal and rhythm regular [Heart Sounds] : normal S1 and S2 [Heart Sounds Gallop] : no gallops [Murmurs] : no murmurs [Heart Sounds Pericardial Friction Rub] : no pericardial rub [Arterial Pulses Carotid] : carotid pulses were normal with no bruits [Breast Appearance] : normal in appearance [Bowel Sounds] : normal bowel sounds [Skin Color & Pigmentation] : normal skin color and pigmentation [Cranial Nerves] : cranial nerves 2-12 were intact [Sensation] : the sensory exam was normal to light touch and pinprick [Motor Exam] : the motor exam was normal [No Focal Deficits] : no focal deficits [Oriented To Time, Place, And Person] : oriented to person, place, and time [FreeTextEntry1] : Abnormal gait c/w cerebral palsy

## 2019-07-30 LAB
ALBUMIN SERPL ELPH-MCNC: 3.9 G/DL
ALP BLD-CCNC: 621 U/L
ALT SERPL-CCNC: 172 U/L
ANION GAP SERPL CALC-SCNC: 10 MMOL/L
AST SERPL-CCNC: 131 U/L
BILIRUB SERPL-MCNC: 1.7 MG/DL
BUN SERPL-MCNC: 15 MG/DL
CALCIUM SERPL-MCNC: 9.9 MG/DL
CHLORIDE SERPL-SCNC: 103 MMOL/L
CO2 SERPL-SCNC: 26 MMOL/L
CREAT SERPL-MCNC: 0.78 MG/DL
GLUCOSE SERPL-MCNC: 98 MG/DL
POTASSIUM SERPL-SCNC: 4.2 MMOL/L
PROT SERPL-MCNC: 7.9 G/DL
SODIUM SERPL-SCNC: 139 MMOL/L

## 2019-07-31 ENCOUNTER — APPOINTMENT (OUTPATIENT)
Dept: HEPATOLOGY | Facility: CLINIC | Age: 29
End: 2019-07-31

## 2019-08-08 ENCOUNTER — APPOINTMENT (OUTPATIENT)
Dept: GASTROENTEROLOGY | Facility: CLINIC | Age: 29
End: 2019-08-08
Payer: MEDICAID

## 2019-08-08 ENCOUNTER — LABORATORY RESULT (OUTPATIENT)
Age: 29
End: 2019-08-08

## 2019-08-08 VITALS
SYSTOLIC BLOOD PRESSURE: 100 MMHG | HEART RATE: 67 BPM | HEIGHT: 64 IN | BODY MASS INDEX: 21.68 KG/M2 | DIASTOLIC BLOOD PRESSURE: 65 MMHG | WEIGHT: 127 LBS

## 2019-08-08 PROCEDURE — 99214 OFFICE O/P EST MOD 30 MIN: CPT

## 2019-08-08 NOTE — HISTORY OF PRESENT ILLNESS
[de-identified] : The patient arrived for a followup appointment after hospitalization. Patient has diagnosis of bile duct strictures due to primary sclerosing cholangitis. He has undergone stenting of the common bile duct and then the right intrahepatic system. He has undergone liver biopsy. He was recently admitted to [facility] for episode of abdominal pain and pancreatitis. Bile duct stent was removed. He was discharged. He is complaining of pruritus. He was placed on steroids by his hepatologist. His LFTs are trending up.Despite being on steroids, his itching has not improved.As per the mother,He has mild abdominal discomfort.

## 2019-08-13 LAB
ALBUMIN SERPL ELPH-MCNC: 4 G/DL
ALP BLD-CCNC: 258 U/L
ALT SERPL-CCNC: 226 U/L
ANION GAP SERPL CALC-SCNC: 11 MMOL/L
AST SERPL-CCNC: 142 U/L
BASOPHILS # BLD AUTO: 0.02 K/UL
BASOPHILS NFR BLD AUTO: 0.2 %
BILIRUB SERPL-MCNC: 1 MG/DL
BUN SERPL-MCNC: 18 MG/DL
CALCIUM SERPL-MCNC: 10.1 MG/DL
CHLORIDE SERPL-SCNC: 101 MMOL/L
CO2 SERPL-SCNC: 28 MMOL/L
CREAT SERPL-MCNC: 0.67 MG/DL
EOSINOPHIL # BLD AUTO: 0.02 K/UL
EOSINOPHIL NFR BLD AUTO: 0.2 %
GLUCOSE SERPL-MCNC: 109 MG/DL
HCT VFR BLD CALC: 45 %
HGB BLD-MCNC: 14.6 G/DL
IMM GRANULOCYTES NFR BLD AUTO: 1.2 %
INR PPP: 0.92 RATIO
LYMPHOCYTES # BLD AUTO: 1.24 K/UL
LYMPHOCYTES NFR BLD AUTO: 13.1 %
MAN DIFF?: NORMAL
MCHC RBC-ENTMCNC: 32.4 GM/DL
MCHC RBC-ENTMCNC: 33 PG
MCV RBC AUTO: 101.8 FL
MONOCYTES # BLD AUTO: 0.4 K/UL
MONOCYTES NFR BLD AUTO: 4.2 %
NEUTROPHILS # BLD AUTO: 7.71 K/UL
NEUTROPHILS NFR BLD AUTO: 81.1 %
PLATELET # BLD AUTO: 446 K/UL
POTASSIUM SERPL-SCNC: 4.4 MMOL/L
PROT SERPL-MCNC: 7.1 G/DL
PT BLD: 10.4 SEC
RBC # BLD: 4.42 M/UL
RBC # FLD: 13.5 %
SODIUM SERPL-SCNC: 140 MMOL/L
WBC # FLD AUTO: 9.5 K/UL

## 2019-08-22 ENCOUNTER — INPATIENT (INPATIENT)
Facility: HOSPITAL | Age: 29
LOS: 3 days | Discharge: HOME | DRG: 871 | End: 2019-08-26
Attending: INTERNAL MEDICINE | Admitting: HOSPITALIST
Payer: MEDICAID

## 2019-08-22 ENCOUNTER — APPOINTMENT (OUTPATIENT)
Dept: GASTROENTEROLOGY | Facility: CLINIC | Age: 29
End: 2019-08-22

## 2019-08-22 VITALS
OXYGEN SATURATION: 100 % | HEIGHT: 62 IN | HEART RATE: 100 BPM | TEMPERATURE: 98 F | WEIGHT: 119.93 LBS | RESPIRATION RATE: 20 BRPM

## 2019-08-22 DIAGNOSIS — Z98.890 OTHER SPECIFIED POSTPROCEDURAL STATES: Chronic | ICD-10-CM

## 2019-08-22 DIAGNOSIS — A41.9 SEPSIS, UNSPECIFIED ORGANISM: ICD-10-CM

## 2019-08-22 LAB
ALBUMIN SERPL ELPH-MCNC: 3.1 G/DL — LOW (ref 3.3–5.2)
ALP SERPL-CCNC: 227 U/L — HIGH (ref 40–120)
ALT FLD-CCNC: 93 U/L — HIGH
AMMONIA BLD-MCNC: 21 UMOL/L — SIGNIFICANT CHANGE UP (ref 11–55)
ANION GAP SERPL CALC-SCNC: 20 MMOL/L — HIGH (ref 5–17)
ANISOCYTOSIS BLD QL: SLIGHT — SIGNIFICANT CHANGE UP
APPEARANCE UR: CLEAR — SIGNIFICANT CHANGE UP
APTT BLD: 32.6 SEC — SIGNIFICANT CHANGE UP (ref 27.5–36.3)
AST SERPL-CCNC: 49 U/L — HIGH
BACTERIA # UR AUTO: ABNORMAL
BASE EXCESS BLDV CALC-SCNC: -6.5 MMOL/L — LOW (ref -2–2)
BASE EXCESS BLDV CALC-SCNC: -8.4 MMOL/L — LOW (ref -2–2)
BASOPHILS # BLD AUTO: 0 K/UL — SIGNIFICANT CHANGE UP (ref 0–0.2)
BASOPHILS NFR BLD AUTO: 0 % — SIGNIFICANT CHANGE UP (ref 0–2)
BILIRUB SERPL-MCNC: 3.1 MG/DL — HIGH (ref 0.4–2)
BILIRUB UR-MCNC: NEGATIVE — SIGNIFICANT CHANGE UP
BLD GP AB SCN SERPL QL: SIGNIFICANT CHANGE UP
BUN SERPL-MCNC: 20 MG/DL — SIGNIFICANT CHANGE UP (ref 8–20)
CA-I SERPL-SCNC: 1.03 MMOL/L — LOW (ref 1.15–1.33)
CA-I SERPL-SCNC: 1.15 MMOL/L — SIGNIFICANT CHANGE UP (ref 1.15–1.33)
CALCIUM SERPL-MCNC: 9.7 MG/DL — SIGNIFICANT CHANGE UP (ref 8.6–10.2)
CHLORIDE BLDV-SCNC: 105 MMOL/L — SIGNIFICANT CHANGE UP (ref 98–107)
CHLORIDE BLDV-SCNC: 111 MMOL/L — HIGH (ref 98–107)
CHLORIDE SERPL-SCNC: 100 MMOL/L — SIGNIFICANT CHANGE UP (ref 98–107)
CO2 SERPL-SCNC: 19 MMOL/L — LOW (ref 22–29)
COLOR SPEC: YELLOW — SIGNIFICANT CHANGE UP
CREAT SERPL-MCNC: 1.17 MG/DL — SIGNIFICANT CHANGE UP (ref 0.5–1.3)
DIFF PNL FLD: ABNORMAL
EOSINOPHIL # BLD AUTO: 0.22 K/UL — SIGNIFICANT CHANGE UP (ref 0–0.5)
EOSINOPHIL NFR BLD AUTO: 0.9 % — SIGNIFICANT CHANGE UP (ref 0–6)
EPI CELLS # UR: SIGNIFICANT CHANGE UP
GAS PNL BLDV: 141 MMOL/L — SIGNIFICANT CHANGE UP (ref 135–145)
GAS PNL BLDV: 143 MMOL/L — SIGNIFICANT CHANGE UP (ref 135–145)
GAS PNL BLDV: SIGNIFICANT CHANGE UP
GLUCOSE BLDV-MCNC: 166 MG/DL — HIGH (ref 70–99)
GLUCOSE BLDV-MCNC: 177 MG/DL — HIGH (ref 70–99)
GLUCOSE SERPL-MCNC: 207 MG/DL — HIGH (ref 70–115)
GLUCOSE UR QL: NEGATIVE MG/DL — SIGNIFICANT CHANGE UP
HCO3 BLDV-SCNC: 18 MMOL/L — LOW (ref 20–26)
HCO3 BLDV-SCNC: 18 MMOL/L — LOW (ref 20–26)
HCT VFR BLD CALC: 39.4 % — SIGNIFICANT CHANGE UP (ref 39–50)
HCT VFR BLDA CALC: 35 — LOW (ref 39–50)
HCT VFR BLDA CALC: 40 — SIGNIFICANT CHANGE UP (ref 39–50)
HGB BLD CALC-MCNC: 11.3 G/DL — LOW (ref 13–17)
HGB BLD CALC-MCNC: 12.9 G/DL — LOW (ref 13–17)
HGB BLD-MCNC: 12.5 G/DL — LOW (ref 13–17)
HYALINE CASTS # UR AUTO: ABNORMAL /LPF
INR BLD: 1.19 RATIO — HIGH (ref 0.88–1.16)
KETONES UR-MCNC: NEGATIVE — SIGNIFICANT CHANGE UP
LACTATE BLDV-MCNC: 3.6 MMOL/L — HIGH (ref 0.5–2)
LACTATE BLDV-MCNC: 3.8 MMOL/L — HIGH (ref 0.5–2)
LACTATE BLDV-MCNC: 4.3 MMOL/L — CRITICAL HIGH (ref 0.5–2)
LACTATE BLDV-MCNC: 7.8 MMOL/L — CRITICAL HIGH (ref 0.5–2)
LEUKOCYTE ESTERASE UR-ACNC: NEGATIVE — SIGNIFICANT CHANGE UP
LYMPHOCYTES # BLD AUTO: 0.64 K/UL — LOW (ref 1–3.3)
LYMPHOCYTES # BLD AUTO: 2.6 % — LOW (ref 13–44)
MACROCYTES BLD QL: SLIGHT — SIGNIFICANT CHANGE UP
MANUAL SMEAR VERIFICATION: SIGNIFICANT CHANGE UP
MCHC RBC-ENTMCNC: 31.7 GM/DL — LOW (ref 32–36)
MCHC RBC-ENTMCNC: 32.6 PG — SIGNIFICANT CHANGE UP (ref 27–34)
MCV RBC AUTO: 102.9 FL — HIGH (ref 80–100)
MONOCYTES # BLD AUTO: 0.64 K/UL — SIGNIFICANT CHANGE UP (ref 0–0.9)
MONOCYTES NFR BLD AUTO: 2.6 % — SIGNIFICANT CHANGE UP (ref 2–14)
NEUTROPHILS # BLD AUTO: 23.24 K/UL — HIGH (ref 1.8–7.4)
NEUTROPHILS NFR BLD AUTO: 79.1 % — HIGH (ref 43–77)
NEUTS BAND # BLD: 14.8 % — HIGH (ref 0–8)
NITRITE UR-MCNC: NEGATIVE — SIGNIFICANT CHANGE UP
OTHER CELLS CSF MANUAL: 15 ML/DL — LOW (ref 18–22)
OTHER CELLS CSF MANUAL: 8 ML/DL — LOW (ref 18–22)
PCO2 BLDV: 46 MMHG — SIGNIFICANT CHANGE UP (ref 35–50)
PCO2 BLDV: 58 MMHG — HIGH (ref 35–50)
PH BLDV: 7.19 — CRITICAL LOW (ref 7.32–7.43)
PH BLDV: 7.23 — LOW (ref 7.32–7.43)
PH UR: 6.5 — SIGNIFICANT CHANGE UP (ref 5–8)
PLAT MORPH BLD: NORMAL — SIGNIFICANT CHANGE UP
PLATELET # BLD AUTO: 342 K/UL — SIGNIFICANT CHANGE UP (ref 150–400)
PO2 BLDV: 30 MMHG — SIGNIFICANT CHANGE UP (ref 25–45)
PO2 BLDV: 88 MMHG — HIGH (ref 25–45)
POTASSIUM BLDV-SCNC: 3.6 MMOL/L — SIGNIFICANT CHANGE UP (ref 3.4–4.5)
POTASSIUM BLDV-SCNC: 3.6 MMOL/L — SIGNIFICANT CHANGE UP (ref 3.4–4.5)
POTASSIUM SERPL-MCNC: 3.6 MMOL/L — SIGNIFICANT CHANGE UP (ref 3.5–5.3)
POTASSIUM SERPL-SCNC: 3.6 MMOL/L — SIGNIFICANT CHANGE UP (ref 3.5–5.3)
PROT SERPL-MCNC: 6.9 G/DL — SIGNIFICANT CHANGE UP (ref 6.6–8.7)
PROT UR-MCNC: 30 MG/DL
PROTHROM AB SERPL-ACNC: 13.8 SEC — HIGH (ref 10–12.9)
RAPID RVP RESULT: SIGNIFICANT CHANGE UP
RBC # BLD: 3.83 M/UL — LOW (ref 4.2–5.8)
RBC # FLD: 13.2 % — SIGNIFICANT CHANGE UP (ref 10.3–14.5)
RBC BLD AUTO: ABNORMAL
RBC CASTS # UR COMP ASSIST: SIGNIFICANT CHANGE UP /HPF (ref 0–4)
SAO2 % BLDV: 45 % — SIGNIFICANT CHANGE UP
SAO2 % BLDV: 96 % — SIGNIFICANT CHANGE UP
SODIUM SERPL-SCNC: 139 MMOL/L — SIGNIFICANT CHANGE UP (ref 135–145)
SP GR SPEC: 1 — LOW (ref 1.01–1.02)
UROBILINOGEN FLD QL: 1 MG/DL
WBC # BLD: 24.75 K/UL — HIGH (ref 3.8–10.5)
WBC # FLD AUTO: 24.75 K/UL — HIGH (ref 3.8–10.5)
WBC UR QL: SIGNIFICANT CHANGE UP

## 2019-08-22 PROCEDURE — 95819 EEG AWAKE AND ASLEEP: CPT | Mod: 26

## 2019-08-22 PROCEDURE — 70450 CT HEAD/BRAIN W/O DYE: CPT | Mod: 26

## 2019-08-22 PROCEDURE — 71045 X-RAY EXAM CHEST 1 VIEW: CPT | Mod: 26,76

## 2019-08-22 PROCEDURE — 36556 INSERT NON-TUNNEL CV CATH: CPT

## 2019-08-22 PROCEDURE — 99292 CRITICAL CARE ADDL 30 MIN: CPT | Mod: 25

## 2019-08-22 PROCEDURE — 74177 CT ABD & PELVIS W/CONTRAST: CPT | Mod: 26

## 2019-08-22 PROCEDURE — 93010 ELECTROCARDIOGRAM REPORT: CPT

## 2019-08-22 PROCEDURE — 99291 CRITICAL CARE FIRST HOUR: CPT | Mod: 25

## 2019-08-22 RX ORDER — IBUPROFEN 200 MG
600 TABLET ORAL ONCE
Refills: 0 | Status: COMPLETED | OUTPATIENT
Start: 2019-08-22 | End: 2019-08-22

## 2019-08-22 RX ORDER — HYDROCORTISONE 20 MG
100 TABLET ORAL EVERY 8 HOURS
Refills: 0 | Status: DISCONTINUED | OUTPATIENT
Start: 2019-08-22 | End: 2019-08-24

## 2019-08-22 RX ORDER — ENOXAPARIN SODIUM 100 MG/ML
40 INJECTION SUBCUTANEOUS DAILY
Refills: 0 | Status: DISCONTINUED | OUTPATIENT
Start: 2019-08-22 | End: 2019-08-26

## 2019-08-22 RX ORDER — SODIUM CHLORIDE 9 MG/ML
1700 INJECTION INTRAMUSCULAR; INTRAVENOUS; SUBCUTANEOUS ONCE
Refills: 0 | Status: COMPLETED | OUTPATIENT
Start: 2019-08-22 | End: 2019-08-22

## 2019-08-22 RX ORDER — KETOROLAC TROMETHAMINE 30 MG/ML
30 SYRINGE (ML) INJECTION ONCE
Refills: 0 | Status: DISCONTINUED | OUTPATIENT
Start: 2019-08-22 | End: 2019-08-22

## 2019-08-22 RX ORDER — PHENYLEPHRINE HYDROCHLORIDE 10 MG/ML
200 INJECTION INTRAVENOUS ONCE
Refills: 0 | Status: COMPLETED | OUTPATIENT
Start: 2019-08-22 | End: 2019-08-22

## 2019-08-22 RX ORDER — HYDROCORTISONE 20 MG
100 TABLET ORAL ONCE
Refills: 0 | Status: COMPLETED | OUTPATIENT
Start: 2019-08-22 | End: 2019-08-22

## 2019-08-22 RX ORDER — SODIUM CHLORIDE 9 MG/ML
1000 INJECTION, SOLUTION INTRAVENOUS ONCE
Refills: 0 | Status: COMPLETED | OUTPATIENT
Start: 2019-08-22 | End: 2019-08-22

## 2019-08-22 RX ORDER — PIPERACILLIN AND TAZOBACTAM 4; .5 G/20ML; G/20ML
3.38 INJECTION, POWDER, LYOPHILIZED, FOR SOLUTION INTRAVENOUS ONCE
Refills: 0 | Status: COMPLETED | OUTPATIENT
Start: 2019-08-22 | End: 2019-08-22

## 2019-08-22 RX ORDER — VANCOMYCIN HCL 1 G
1000 VIAL (EA) INTRAVENOUS ONCE
Refills: 0 | Status: COMPLETED | OUTPATIENT
Start: 2019-08-22 | End: 2019-08-22

## 2019-08-22 RX ORDER — SODIUM CHLORIDE 9 MG/ML
1000 INJECTION INTRAMUSCULAR; INTRAVENOUS; SUBCUTANEOUS ONCE
Refills: 0 | Status: COMPLETED | OUTPATIENT
Start: 2019-08-22 | End: 2019-08-22

## 2019-08-22 RX ORDER — SODIUM CHLORIDE 9 MG/ML
500 INJECTION, SOLUTION INTRAVENOUS ONCE
Refills: 0 | Status: COMPLETED | OUTPATIENT
Start: 2019-08-22 | End: 2019-08-22

## 2019-08-22 RX ORDER — IBUPROFEN 200 MG
400 TABLET ORAL ONCE
Refills: 0 | Status: COMPLETED | OUTPATIENT
Start: 2019-08-22 | End: 2019-08-22

## 2019-08-22 RX ORDER — SODIUM CHLORIDE 9 MG/ML
1000 INJECTION, SOLUTION INTRAVENOUS
Refills: 0 | Status: DISCONTINUED | OUTPATIENT
Start: 2019-08-22 | End: 2019-08-24

## 2019-08-22 RX ORDER — LEVETIRACETAM 250 MG/1
1000 TABLET, FILM COATED ORAL ONCE
Refills: 0 | Status: COMPLETED | OUTPATIENT
Start: 2019-08-22 | End: 2019-08-22

## 2019-08-22 RX ORDER — PIPERACILLIN AND TAZOBACTAM 4; .5 G/20ML; G/20ML
3.38 INJECTION, POWDER, LYOPHILIZED, FOR SOLUTION INTRAVENOUS EVERY 8 HOURS
Refills: 0 | Status: DISCONTINUED | OUTPATIENT
Start: 2019-08-22 | End: 2019-08-26

## 2019-08-22 RX ADMIN — SODIUM CHLORIDE 150 MILLILITER(S): 9 INJECTION, SOLUTION INTRAVENOUS at 18:10

## 2019-08-22 RX ADMIN — Medication 1000 MILLIGRAM(S): at 15:15

## 2019-08-22 RX ADMIN — Medication 30 MILLIGRAM(S): at 13:35

## 2019-08-22 RX ADMIN — SODIUM CHLORIDE 1700 MILLILITER(S): 9 INJECTION INTRAMUSCULAR; INTRAVENOUS; SUBCUTANEOUS at 14:09

## 2019-08-22 RX ADMIN — LEVETIRACETAM 1000 MILLIGRAM(S): 250 TABLET, FILM COATED ORAL at 13:20

## 2019-08-22 RX ADMIN — PIPERACILLIN AND TAZOBACTAM 25 GRAM(S): 4; .5 INJECTION, POWDER, LYOPHILIZED, FOR SOLUTION INTRAVENOUS at 21:33

## 2019-08-22 RX ADMIN — PIPERACILLIN AND TAZOBACTAM 3.38 GRAM(S): 4; .5 INJECTION, POWDER, LYOPHILIZED, FOR SOLUTION INTRAVENOUS at 14:09

## 2019-08-22 RX ADMIN — SODIUM CHLORIDE 150 MILLILITER(S): 9 INJECTION, SOLUTION INTRAVENOUS at 21:33

## 2019-08-22 RX ADMIN — Medication 400 MILLIGRAM(S): at 23:23

## 2019-08-22 RX ADMIN — Medication 30 MILLIGRAM(S): at 14:43

## 2019-08-22 RX ADMIN — SODIUM CHLORIDE 1000 MILLILITER(S): 9 INJECTION INTRAMUSCULAR; INTRAVENOUS; SUBCUTANEOUS at 14:44

## 2019-08-22 RX ADMIN — Medication 100 MILLIGRAM(S): at 13:37

## 2019-08-22 RX ADMIN — SODIUM CHLORIDE 2000 MILLILITER(S): 9 INJECTION, SOLUTION INTRAVENOUS at 23:40

## 2019-08-22 RX ADMIN — PHENYLEPHRINE HYDROCHLORIDE 200 MILLIGRAM(S): 10 INJECTION INTRAVENOUS at 13:34

## 2019-08-22 RX ADMIN — PIPERACILLIN AND TAZOBACTAM 200 GRAM(S): 4; .5 INJECTION, POWDER, LYOPHILIZED, FOR SOLUTION INTRAVENOUS at 13:08

## 2019-08-22 RX ADMIN — SODIUM CHLORIDE 1000 MILLILITER(S): 9 INJECTION, SOLUTION INTRAVENOUS at 16:30

## 2019-08-22 RX ADMIN — Medication 100 MILLIGRAM(S): at 21:33

## 2019-08-22 RX ADMIN — SODIUM CHLORIDE 1000 MILLILITER(S): 9 INJECTION INTRAMUSCULAR; INTRAVENOUS; SUBCUTANEOUS at 14:43

## 2019-08-22 RX ADMIN — SODIUM CHLORIDE 1000 MILLILITER(S): 9 INJECTION, SOLUTION INTRAVENOUS at 15:57

## 2019-08-22 RX ADMIN — SODIUM CHLORIDE 2000 MILLILITER(S): 9 INJECTION, SOLUTION INTRAVENOUS at 15:29

## 2019-08-22 RX ADMIN — Medication 250 MILLIGRAM(S): at 14:10

## 2019-08-22 RX ADMIN — SODIUM CHLORIDE 1700 MILLILITER(S): 9 INJECTION INTRAMUSCULAR; INTRAVENOUS; SUBCUTANEOUS at 13:03

## 2019-08-22 RX ADMIN — LEVETIRACETAM 400 MILLIGRAM(S): 250 TABLET, FILM COATED ORAL at 13:05

## 2019-08-22 RX ADMIN — PHENYLEPHRINE HYDROCHLORIDE 200 MILLIGRAM(S): 10 INJECTION INTRAVENOUS at 13:45

## 2019-08-22 NOTE — ED PROCEDURE NOTE - ATTENDING CONTRIBUTION TO CARE
I, Jesus Giraldo, performed the initial face to face bedside interview with this patient regarding history of present illness, review of symptoms and relevant past medical, social and family history.  I completed an independent physical examination.  I was the initial provider who evaluated this patient. I have signed out the follow up of any pending tests (i.e. labs, radiological studies) to the resident.  I have communicated the patient’s plan of care and disposition with the resident.

## 2019-08-22 NOTE — ED PROVIDER NOTE - OBJECTIVE STATEMENT
The patient is a 29 year old male presents with seizure like activity in the car when coming to ED according to mom.  The patient has history of primary sclerosing cholangitis on prednisone daily presents with fever for 3 days with mild cough.  The patient had ERCP and had stents and removed.  In ED, the patient is tachycardic and cyanotic and unmeasurable BP

## 2019-08-22 NOTE — ED ADULT NURSE REASSESSMENT NOTE - NS ED NURSE REASSESS COMMENT FT1
Pt had 2 consecutive seizures lasting approx 25 seconds each. NRB placed. Mother remains at bedside.

## 2019-08-22 NOTE — ED ADULT NURSE REASSESSMENT NOTE - NS ED NURSE REASSESS COMMENT FT1
Pt w/ subjective SOB, sinus tachycardia on cardiac monitor.  pt does not appear to be in distress at this time.  Called medicine JESSA Cutler and notified her of assessment findings.  All other vitals remain stable.  Awaiting orders.

## 2019-08-22 NOTE — H&P ADULT - HISTORY OF PRESENT ILLNESS
29M presented with episodes of seizures. Information was obtained from the patient as well as his mother at the bedside. The patient was noted to have developed seizure like activity while being driven by his mother. He was noted to have recurrent episodes of seizures prior to presentation to the hospital. The patient had no recollection of the events except for his arrival to the hospital. The patient's mother reported that the patient had an episode of seizure during childhood but no recurrence. The patient denied any headache or visual changes. The patient was also noted to have had an episode of vomiting and diarrhea earlier today. He was noted to have a history of primary sclerosing cholangitis and prior biliary stent placement and removal of the stent last month. The patient denied any chest pain, dyspnea, fevers, chills, sick contacts, or abdominal pain. He was previously started on daily prednisone by his hepatologist three weeks prior.

## 2019-08-22 NOTE — CHART NOTE - NSCHARTNOTEFT_GEN_A_CORE
Called by RN for pt's elevated lactate, fever, tachycardia.  Pt admitted with ?seizure, sepsis ?GI source Called by RN for pt c/o SOB with elevated lactate, fever, tachycardia, .  Pt admitted with ?seizure, sepsis ?GI source.  Lactate trending down from 7.8 to 3.6 after >5L of plasmalyte.  Pt's mom by bedside, states pt was SOB for the past hour with dry cough.  Initial CXR negative for cardiopulmonary disease.  RVP negative.  Pt had ABG done previously with metabolic acidosis.  Denies any other complaints.    VS T 100.5 P 128 R 30 /76 Pulse ox 92% on RA    General   Pt is alert in bed with frequent coughing; nasal flaring  Lungs      Right sided scattered crackles with decreased breath sounds in lower fields; no wheezing, tachypneic  Heart      s1/s2 tachycardic  Abdomen soft NT +BS  Ext          no edema    Tachypnea  Fever with tachycardia  Elevated lactate Called by RN for pt c/o SOB with elevated lactate, fever, tachycardia, .  Pt admitted with ?seizure, sepsis ?GI source.  Lactate trending down from 7.8 to 3.6 after >5L of plasmalyte.  Pt's mom by bedside, states pt was SOB for the past hour with dry cough.  Initial CXR negative for cardiopulmonary disease.  RVP negative.  Pt had ABG done previously with metabolic acidosis.  Denies any other complaints.    VS T 100.5 P 128 R 30 /76 Pulse ox 92% on RA    General   Pt is alert in bed with frequent coughing; nasal flaring  Lungs      Right sided scattered crackles with decreased breath sounds in lower fields; no wheezing, tachypneic  Heart      s1/s2 tachycardic  Abdomen soft NT +BS  Ext          no edema    Tachypnea  Fever with tachycardia  Elevated lactate  Metabolic Acidosis    ABG stat  Stat portable CXR  2L O2 via nc  Motrin 400mg pox1  CBC, BMP, BNP, lactate stat  Will follow up  RN to continue to monitor and reassess prn

## 2019-08-22 NOTE — ED ADULT NURSE NOTE - ED STAT RN HANDOFF DETAILS
report given to DEON Cole.  Pt transported to Bed A7L.  Placed on cardiac monitor and continuous pulse ox

## 2019-08-22 NOTE — ED ADULT NURSE REASSESSMENT NOTE - NS ED NURSE REASSESS COMMENT FT1
Report received from offgoing RN, charting as noted. Patient is A&Ox4, denies any pain or discomfort. Pt with audible cough, no other complaints at this time.  Pt is sinus tachycardia on cardiac monitor, respirations are even and unlabored, no distress at this time.  Pt afebrile rectally, awaiting stepdown bed. Pt oriented to room, call bell within reach, stretcher in lowest position, wheels locked.

## 2019-08-22 NOTE — ED PROVIDER NOTE - CLINICAL SUMMARY MEDICAL DECISION MAKING FREE TEXT BOX
The patient came to ED with septic shock and seen by MICU and recommend admission to medicine and will admit for further evaluation

## 2019-08-22 NOTE — ED ADULT NURSE REASSESSMENT NOTE - NS ED NURSE REASSESS COMMENT FT1
Assumed pt care as part of critical care nursing team. triple lumen central venous catheter placed by ED attending and ED resident.  3 liters NS infused with successful raise in blood pressure.  pt awake alert and oriented X 3 with mother at bedside.  Side rails up.  Cardiac monitor showing sinus tachycardia.  Oxygen saturation 100% on room air. Blood pressure 96/59.  Pt comfortable with even, unlabored respirations.  Awaiting MD dispo.

## 2019-08-22 NOTE — H&P ADULT - ASSESSMENT
29M with a history of cerebral palsy and primary sclerosing cholangitis who presented with seizures, hypotension, and fever requiring resuscitation.    Sepsis - Blood pressure improved with resuscitation with phenylephrine and intravenous fluids. Empiric antibiotics were also initiated. Initial lactate was elevated but improved. Lactate to be repeated to monitor. On intravenous fluids. Hydrocortisone given history of prednisone use for the past three weeks. Blood culture results to be followed. Urine studies to be collected. CT of the abdomen with groundglass opacities in the lung bases, new low attenuation lesions in the right hepatic lobe. Infectious Disease consultation requested. The patient was evaluated by ICU and thought not to require admission to the intensive care unit.    Seizures - No significant history of seizures. CT of the head was without acute intracranial pathology. Levetiracetam was administered in the emergency department. EEG requested. Neurology consultation requested.    Primary sclerosing cholangitis - Elevated bilirubin on presenting laboratory studies. Status post biliary stent removal last month. Comprehensive metabolic panel to be repeated.    Discussed with the patient and his mother at the bedside. 29M with a history of cerebral palsy and primary sclerosing cholangitis who presented with seizures, hypotension, and fever requiring resuscitation.    Sepsis - Blood pressure improved with resuscitation with phenylephrine and intravenous fluids. Empiric antibiotics were also initiated. Initial lactate was elevated but improved. Lactate to be repeated to monitor. On intravenous fluids. Hydrocortisone given history of prednisone use for the past three weeks. Blood culture results to be followed. Urine studies to be collected. CT of the abdomen with groundglass opacities in the lung bases, new low attenuation lesions in the right hepatic lobe. Infectious Disease consultation requested. The patient was evaluated by ICU and thought not to require admission to the intensive care unit.    Seizures - No significant history of seizures. CT of the head was without acute intracranial pathology. Levetiracetam was administered in the emergency department. EEG requested. Neurology consultation requested.    Primary sclerosing cholangitis - Elevated bilirubin on presenting laboratory studies. Status post biliary stent removal last month. Comprehensive metabolic panel to be repeated. Dr. Howard contacted by the emergency department, to evaluate.    Discussed with the patient and his mother at the bedside.

## 2019-08-22 NOTE — ED ADULT NURSE REASSESSMENT NOTE - NS ED NURSE REASSESS COMMENT FT1
Central line placed to right femoral, b/p improving at this time. xray at bedside. Central line placed to right femoral by MD Gooden, b/p improving at this time. xray at bedside.

## 2019-08-22 NOTE — EEG REPORT - NS EEG TEXT BOX
[facility]   COMPREHENSIVE EPILEPSY CENTER   REPORT OF ROUTINE VIDEO EEG     [facility]: [address], [place] [address], Ph#: [phone]  [facility]: [address], [place] [address], Ph#: [phone]  [facility]: [address] [place] [address]    Patient Name: SAUMYA ZARATE  Age and : 29y ([birth year])  MRN #: [id number]  Location: [place] [address]  Referring Physician: Dwight Gordon    Study Date: 19    _____________________________________________________________  TECHNICAL INFORMATION    Placement and Labeling of Electrodes:  The EEG was performed utilizing 20 channels referential EEG connections (coronal over temporal over parasagittal montage) using all standard 10-20 electrode placements with EKG.  Recording was at a sampling rate of 256 samples per second per channel.  Time synchronized digital video recording was done simultaneously with EEG recording.  A low light infrared camera was used for low light recording.  Terry and seizure detection algorithms were utilized.    _____________________________________________________________  HISTORY    Patient is a 29y old  Male who presents with a chief complaint of     PERTINENT MEDICATION:  none  _____________________________________________________________  STUDY INTERPRETATION    Findings: The background was continuous, spontaneously variable and reactive. During wakefulness, the posterior dominant rhythm consisted of symmetric, well-modulated 10 Hz activity, with amplitude to 30 uV, that attenuated to eye opening.  Low amplitude frontal beta was noted in wakefulness    Background Slowing:  No generalized background slowing was present.    Focal Slowing:   None were present.    Sleep Background:  Drowsiness was characterized by fragmentation, attenuation, and slowing of the background activity.    Stage II sleep transients were not recorded.    Other Non-Epileptiform Findings:  None were present.    Interictal Epileptiform Activity:   None were present.    Events:  Clinical events: None recorded.  Seizures: None recorded.    Activation Procedures:   Hyperventilation was performed and did not elicit any abnormality.  Photic stimulation was performed and did not elicit any abnormality.     Artifacts:  Intermittent myogenic and movement artifacts were noted.    ECG:  The heart rate on single channel ECG was predominantly between 110-120 BPM.    _____________________________________________________________  EEG SUMMARY/CLASSIFICATION    Normal EEG in the awake, drowsy states.    _____________________________________________________________  EEG IMPRESSION/CLINICAL CORRELATE    Normal EEG study.  No epileptiform pattern or seizure seen.    _____________________________________________________________    Richy Allen MD  Attending Physician, [facility] Epilepsy [place]

## 2019-08-22 NOTE — H&P ADULT - NSICDXPASTSURGICALHX_GEN_ALL_CORE_FT
PAST SURGICAL HISTORY:  History of biliary stent insertion removed 7/2019    History of ERCP performed at [facility]

## 2019-08-22 NOTE — ED ADULT TRIAGE NOTE - CHIEF COMPLAINT QUOTE
Patient reports fever for several days, vomiting/diarrhea started today.  Patient having mini seizures on the way to the hospital.  One seizure witnessed in the waiting room by RN.

## 2019-08-22 NOTE — ED ADULT NURSE NOTE - OBJECTIVE STATEMENT
Pt care assumed at 1300 in critical care, pt presents to ED responsive to verbal and tactile stimuli. Mother at bedside, reports pt has been running a fever for 3 days. Mother states "pt had seizure like activity twice in the car and once in the waiting room but he's never had a seizure before." Pt activated as code sepsis. Unable to obtain manual B/P. Spo2 100% on RA. MD Giraldo at bedside. Multiple IV access established. Preparing for central line placement. Pt care assumed at 1300 in critical care, pt presents to ED responsive to verbal and tactile stimuli. Mother at bedside, reports pt has been running a fever for 3 days. Mother states "pt had seizure like activity twice in the car and once in the waiting room but he's never had a seizure before." Pt with rectal temp of 101.7 and tachycardic, activated code sepsis. Unable to obtain manual B/P. Spo2 100% on RA. MD Giraldo at bedside. Multiple IV access established. Preparing for central line placement.

## 2019-08-22 NOTE — PHARMACOTHERAPY INTERVENTION NOTE - COMMENTS
Spoke to mother at bedside. Patient on prednisone 30 mG qday, started 3 weeks ago for PSC.
AMS, seizures, hypotension

## 2019-08-22 NOTE — CONSULT NOTE ADULT - SUBJECTIVE AND OBJECTIVE BOX
Patient is a 29y old  Male who presents with a chief complaint of     28 y/o male pmhx cerebral palsy, primary sclerosing cholangitis    PAST MEDICAL & SURGICAL HISTORY:  PSC (primary sclerosing cholangitis)  Cerebral palsy  History of biliary stent insertion: removed 7/2019  History of ERCP: performed at [place]    Allergies    No Known Allergies    Intolerances      FAMILY HISTORY:  No pertinent family history in first degree relatives      Review of Systems:  CONSTITUTIONAL: No fever, chills, or fatigue  EYES: No eye pain, visual disturbances, or discharge  ENMT:  No difficulty hearing, tinnitus, vertigo; No sinus or throat pain  NECK: No pain or stiffness  RESPIRATORY: No cough, wheezing, chills or hemoptysis; No shortness of breath  CARDIOVASCULAR: No chest pain, palpitations, dizziness, or leg swelling  GASTROINTESTINAL: No abdominal or epigastric pain. No nausea, vomiting, or hematemesis; No diarrhea or constipation. No melena or hematochezia.  GENITOURINARY: No dysuria, frequency, hematuria, or incontinence  NEUROLOGICAL: No headaches, memory loss, loss of strength, numbness, or tremors  SKIN: No itching, burning, rashes, or lesions   MUSCULOSKELETAL: No joint pain or swelling; No muscle, back, or extremity pain  PSYCHIATRIC: No depression, anxiety, mood swings, or difficulty sleeping      Medications:  piperacillin/tazobactam IVPB.. 3.375 Gram(s) IV Intermittent every 8 hours                  hydrocortisone sodium succinate Injectable 100 milliGRAM(s) IV Push every 8 hours    multiple electrolytes Injection Type 1 1000 milliLiter(s) IV Continuous <Continuous>                ICU Vital Signs Last 24 Hrs  T(C): 37.6 (22 Aug 2019 14:55), Max: 38.7 (22 Aug 2019 13:05)  T(F): 99.6 (22 Aug 2019 14:55), Max: 101.7 (22 Aug 2019 13:05)  HR: 130 (22 Aug 2019 16:31) (100 - 141)  BP: 108/69 (22 Aug 2019 16:31) (88/58 - 108/69)  BP(mean): 69 (22 Aug 2019 15:27) (69 - 69)  ABP: --  ABP(mean): --  RR: 22 (22 Aug 2019 16:31) (18 - 24)  SpO2: 97% (22 Aug 2019 16:31) (97% - 100%)    Vital Signs Last 24 Hrs  T(C): 37.6 (22 Aug 2019 14:55), Max: 38.7 (22 Aug 2019 13:05)  T(F): 99.6 (22 Aug 2019 14:55), Max: 101.7 (22 Aug 2019 13:05)  HR: 130 (22 Aug 2019 16:31) (100 - 141)  BP: 108/69 (22 Aug 2019 16:31) (88/58 - 108/69)  BP(mean): 69 (22 Aug 2019 15:27) (69 - 69)  RR: 22 (22 Aug 2019 16:31) (18 - 24)  SpO2: 97% (22 Aug 2019 16:31) (97% - 100%)        I&O's Detail        LABS:                        12.5   24.75 )-----------( 342      ( 22 Aug 2019 13:13 )             39.4     08-22    139  |  100  |  20.0  ----------------------------<  207<H>  3.6   |  19.0<L>  |  1.17    Ca    9.7      22 Aug 2019 13:13    TPro  6.9  /  Alb  3.1<L>  /  TBili  3.1<H>  /  DBili  x   /  AST  49<H>  /  ALT  93<H>  /  AlkPhos  227<H>  08-22          CAPILLARY BLOOD GLUCOSE      POCT Blood Glucose.: 148 mg/dL (22 Aug 2019 12:58)    PT/INR - ( 22 Aug 2019 13:13 )   PT: 13.8 sec;   INR: 1.19 ratio         PTT - ( 22 Aug 2019 13:13 )  PTT:32.6 sec    CULTURES:      Physical Examination:    General: No acute distress.  Alert, oriented, interactive, nonfocal    HEENT: Pupils equal, reactive to light.  Symmetric.    PULM: Clear to auscultation bilaterally, no significant sputum production    CVS: Regular rate and rhythm, no murmurs, rubs, or gallops    ABD: Soft, nondistended, nontender, normoactive bowel sounds, no masses    EXT: No edema, nontender    SKIN: Warm and well perfused, no rashes noted.    NEURO: A&Ox3, strength 5/5 all extremities, cranial nerves grossly intact, no focal deficits    RADIOLOGY: ***    CRITICAL CARE TIME SPENT: ***  Evaluating/treating patient, reviewing data/labs/imaging, discussing case with multidisciplinary team, discussing plan/goals of care with patient/family. Non-inclusive of procedure time. Patient is a 29y old  Male who presents with a chief complaint of     28 y/o male pmhx cerebral palsy, primary sclerosing cholangitis, recent admission to [facility] for pancreatitis, had his biliary stent removed presented today after he has periods of unresponsiveness in the car and again the waiting room witnessed by his mother. As per his mother he began to vomit in the car, she saw his eyes roll back and became unresponsive which lasted about 6 seconds, happened again in the ER waiting room. As per mother, he has been running a fever since  Sunday night. She states early this morning he started w/ vomiting and watery diarrhea. He denies any chest pain/ SOB, palpitations, abdominal pain. Denies any recent travel, sick contacts. In ED was found to be hypotensive and tachycardic, received IV push of phenylephrine, given 2.7L IVF , stress dose steroids Vanc and zosyn.    On Exam patient noted to be resting in bed comfortably, BP 98/72, sinus tachycardia to 130's, O2 sat 98% on room air. Awake/ Alert, w/ good mentation. Has no complaints at this time.     PAST MEDICAL & SURGICAL HISTORY:  PSC (primary sclerosing cholangitis)  Cerebral palsy  History of biliary stent insertion: removed 7/2019  History of ERCP: performed at [place]    Allergies    No Known Allergies    Intolerances      FAMILY HISTORY:  No pertinent family history in first degree relatives      Review of Systems:  CONSTITUTIONAL: No fever, chills, or fatigue  EYES: No eye pain, visual disturbances, or discharge  ENMT:  No difficulty hearing, tinnitus, vertigo; No sinus or throat pain  NECK: No pain or stiffness  RESPIRATORY: No cough, wheezing, chills or hemoptysis; No shortness of breath  CARDIOVASCULAR: No chest pain, palpitations, dizziness, or leg swelling  GASTROINTESTINAL: No abdominal or epigastric pain. No nausea, vomiting, or hematemesis; No diarrhea or constipation. No melena or hematochezia.  GENITOURINARY: No dysuria, frequency, hematuria, or incontinence  NEUROLOGICAL: No headaches, memory loss, loss of strength, numbness, or tremors  SKIN: No itching, burning, rashes, or lesions   MUSCULOSKELETAL: No joint pain or swelling; No muscle, back, or extremity pain  PSYCHIATRIC: No depression, anxiety, mood swings, or difficulty sleeping      Medications:  piperacillin/tazobactam IVPB.. 3.375 Gram(s) IV Intermittent every 8 hours  hydrocortisone sodium succinate Injectable 100 milliGRAM(s) IV Push every 8 hours  multiple electrolytes Injection Type 1 1000 milliLiter(s) IV Continuous <Continuous>                ICU Vital Signs Last 24 Hrs  T(C): 37.6 (22 Aug 2019 14:55), Max: 38.7 (22 Aug 2019 13:05)  T(F): 99.6 (22 Aug 2019 14:55), Max: 101.7 (22 Aug 2019 13:05)  HR: 130 (22 Aug 2019 16:31) (100 - 141)  BP: 108/69 (22 Aug 2019 16:31) (88/58 - 108/69)  BP(mean): 69 (22 Aug 2019 15:27) (69 - 69)  RR: 22 (22 Aug 2019 16:31) (18 - 24)  SpO2: 97% (22 Aug 2019 16:31) (97% - 100%)    Vital Signs Last 24 Hrs  T(C): 37.6 (22 Aug 2019 14:55), Max: 38.7 (22 Aug 2019 13:05)  T(F): 99.6 (22 Aug 2019 14:55), Max: 101.7 (22 Aug 2019 13:05)  HR: 130 (22 Aug 2019 16:31) (100 - 141)  BP: 108/69 (22 Aug 2019 16:31) (88/58 - 108/69)  BP(mean): 69 (22 Aug 2019 15:27) (69 - 69)  RR: 22 (22 Aug 2019 16:31) (18 - 24)  SpO2: 97% (22 Aug 2019 16:31) (97% - 100%)        I&O's Detail        LABS:                        12.5   24.75 )-----------( 342      ( 22 Aug 2019 13:13 )             39.4     08-22    139  |  100  |  20.0  ----------------------------<  207<H>  3.6   |  19.0<L>  |  1.17    Ca    9.7      22 Aug 2019 13:13    TPro  6.9  /  Alb  3.1<L>  /  TBili  3.1<H>  /  DBili  x   /  AST  49<H>  /  ALT  93<H>  /  AlkPhos  227<H>  08-22          CAPILLARY BLOOD GLUCOSE      POCT Blood Glucose.: 148 mg/dL (22 Aug 2019 12:58)    PT/INR - ( 22 Aug 2019 13:13 )   PT: 13.8 sec;   INR: 1.19 ratio         PTT - ( 22 Aug 2019 13:13 )  PTT:32.6 sec    CULTURES:      Physical Examination:    General:  AAOX3. No acute distress     HEENT: Pupils equal, reactive to light.  Symmetric.    PULM: Clear to auscultation bilaterally, no significant sputum production. No wheez/rales/rhonchi. Non labored breathing     CVS: + S1/S2. Sinus tachycardia. No murmurs     ABD: Soft, nondistended, nontender, normoactive bowel sounds, no masses    EXT: No edema, nontender    SKIN: Warm and well perfused, no rashes noted.    NEURO: A&Ox3, strength 5/5 all extremities no focal deficits    RADIOLOGY: < from: CT Abdomen and Pelvis w/ IV Cont (08.22.19 @ 15:58) >  PROCEDURE DATE:  08/22/2019          INTERPRETATION:  CLINICAL INFORMATION: Abdominal pain. Sepsis. Common   bile duct stent removed since prior study.    COMPARISON: Multiple prior studies, most recently CT abdomen/pelvis   7/7/2019 and MRI abdomen 7/3/2019    PROCEDURE:   CT of the Abdomen and Pelvis was performed with intravenous contrast.   Intravenous contrast: 90 ml Omnipaque 350. 10 ml discarded.  Oral contrast: None.  Sagittal and coronal reformats were performed.    FINDINGS:    LOWER CHEST: There is atelectasis at the posterior aspect of both lower   lobes. Nonspecific patchy groundglass opacities at both lung bases;   differential includes edema or inflammatory etiologies.    LIVER: Heterogeneous enhancement which may reflect hepatic congestion or   vascular shunting. Three new low attenuation lesions in the right hepatic   lobe with the largest measuring 6 mm (series 3 images 25, 29, and 37).  BILE DUCTS: Interval removal of common bile duct stent. Mild to moderate   intrahepatic biliary ductal dilatation, unchanged since 7/7/2019, cut off   of the biliary tree is again seen within the central aspect of the liver.   Common bile duct normal in caliber with enhancement of the common bile   duct wall.  GALLBLADDER: There is gallbladder wall thickening/pericholecystic fluid.   No calcified gallstones.  SPLEEN: Within normal limits.  PANCREAS: Mild infiltration of the peripancreatic fat and small amount of   fluid adjacent to the pancreatic head. Pancreas has decreased in size   since 7/7/2019. Main pancreatic duct normal in caliber. Normal   enhancement. There is also infiltration of the fat with a small amount of   fluid in the minh hepatis and adjacent to the proximal transverse   duodenum.  ADRENALS: Within normal limits.  KIDNEYS/URETERS: Within normal limits.    BLADDER: Within normal limits.  REPRODUCTIVE ORGANS: Prostate within normal limits.    BOWEL: No bowel obstruction. No evidence of appendicitis.  PERITONEUM: Small amount of abdominal and pelvic ascites  VESSELS: Abdominal aorta normal in caliber. There is a venous central   line entering the right groin with the tip in the right external iliac   vein.  RETROPERITONEUM/LYMPH NODES: Enlarged periportal and portacaval lymph   nodes, not significantly changed including a representative lymph node   measuring 2.3 x 1.3 cm (series 2 image 50).  ABDOMINAL WALL: Within normal limits.  BONES: 7 mm sclerotic lesion again seen in the right femoral head,   possibly a bone island.    IMPRESSION:     Interval removal of common bile duct stent with intrahepatic biliary   ductal dilatation, unchanged since 7/7/2019 with abrupt cut off ofthe   biliary tree within the central aspect of the liver, likely secondary to   a stricture.    Enhancement of the common bile duct wall with the differential including   sequelae of prior instrumentation or cholangitis; clinical correlation is   recommended    3 new low-attenuation lesions in the right hepatic lobe measuring up to 6   mm with the differential including intrahepatic abscesses or less likely   neoplasm; follow-up is recommended.    Infiltration of the fat and small amount of fluid adjacent to the   pancreas, in the minh hepatis, and adjacent to the proximal transverse   duodenum; differential includes pancreatitis, duodenitis, reactive   changes from cholangitis; correlation is recommended with pancreatic   enzymes.    The findings were discussed with Dr. Giraldo at 4:43 PM on 8/22/2019.      ***Please see the addendum at the top of this report. It may contain   additional important information or changes.****          NATTY KERNS   This document has been electronically signed. Aug 22 2019  4:45PM  Addend:  NATTY KERNS   This addendum was electronically signed on: Aug 22 2019  5:36PM.    < end of copied text >    TIME SPENT:  45 min   Evaluating/treating patient, reviewing data/labs/imaging, discussing case with multidisciplinary team, discussing plan/goals of care with patient/family. Non-inclusive of procedure time.

## 2019-08-22 NOTE — ED PROVIDER NOTE - CARE PLAN
Principal Discharge DX:	Septic shock  Secondary Diagnosis:	Cerebral palsy  Secondary Diagnosis:	PSC (primary sclerosing cholangitis)

## 2019-08-22 NOTE — H&P ADULT - NSHPPHYSICALEXAM_GEN_ALL_CORE
Vital Signs Last 24 Hrs  T(C): 37.6 (22 Aug 2019 14:55), Max: 38.7 (22 Aug 2019 13:05)  T(F): 99.6 (22 Aug 2019 14:55), Max: 101.7 (22 Aug 2019 13:05)  HR: 130 (22 Aug 2019 16:31) (100 - 141)  BP: 108/69 (22 Aug 2019 16:31) (88/58 - 108/69)  BP(mean): 69 (22 Aug 2019 15:27) (69 - 69)  RR: 22 (22 Aug 2019 16:31) (18 - 24)  SpO2: 97% (22 Aug 2019 16:31) (97% - 100%)    General appearance: No acute distress, Awake, Alert  HEENT: Normocephalic, Atraumatic, Conjunctiva clear, EOMI  Neck: Supple, No JVD, No tenderness  Lungs: Breath sound equal bilaterally, No wheezes, No rales  Cardiovascular: S1S2, Regular rhythm  Abdomen: Soft, Nontender, Nondistended, No guarding/rebound, Positive bowel sounds  Extremities: No clubbing, No cyanosis, No edema, No calf tenderness  Neuro: Strength equal bilaterally, No tremors  Psychiatric: Appropriate mood, Normal affect

## 2019-08-22 NOTE — CONSULT NOTE ADULT - ASSESSMENT
30 y/o male pmhx cerebral palsy, primary sclerosing cholangitis, recent admission to [facility] for pancreatitis, had his biliary stent removed presented today after he has periods of unresponsiveness. Patient now admitted w/ Severe sepsis and possible seizure. At this time patient does not require a MICU admission, he is HD stable, w/ O2 sat 98% on room air and mentating alright. Please re consult if his condition deteriorates. Case discussed w/ ICU Dr. Jansen.       Plan:   CV: Severe Sepsis, likely GI source, CT abd/pelvis w/ new hepatic lesions measuring possible intrahepatic abscesses, 30 y/o male pmhx cerebral palsy, primary sclerosing cholangitis, recent admission to [facility] for pancreatitis, had his biliary stent removed presented today after he has periods of unresponsiveness. Patient now admitted w/ Severe sepsis and possible seizure. At this time patient does not require a MICU admission, he is HD stable, w/ O2 sat 98% on room air and mentating alright. Please re consult if his condition deteriorates. Case discussed w/ ICU Dr. Jansen.       Plan:   Neuro: ? Seizure, unclear if seizure or vasovagal from profuse nausea/ vomiting and severe hypotension. Loaded w/ Keppra in ED. seizure precautions.    CV: Severe Sepsis, likely GI source, CT abd/pelvis w/ new hepatic lesions measuring possible intrahepatic abscesses vs pancreatitis vs duodenitis.  Hypotensive on arrival likely combination of sepsis and volume loss from diarrhea vomiting. Received 4L IVF, on POCUS LV hyperdynamic w/ collapsible IVC.  Lactate down trending, repeat until cleared.  Continue w/ IVF hydration.  c/w Abx. Stress dose steroids as patient is on  prednisone [id number]. F/u blood and urine cultures. Send GI PCR and RVP.  Send procalcitonin.  ID Consult   Resp: Stable  GI: CT abd/pelvis w/ new hepatic lesions measuring possible intrahepatic abscesses vs pancreatitis vs duodenitis. GI consult

## 2019-08-22 NOTE — H&P ADULT - NSHPLABSRESULTS_GEN_ALL_CORE
Chest Xray was reviewed, no acute pulmonary disease    CT of the head was reviewed, no acute intracranial pathology noted

## 2019-08-22 NOTE — ED ADULT NURSE NOTE - PSH
History of ERCP  performed at [place] History of biliary stent insertion  removed 7/2019  History of ERCP  performed at [place]

## 2019-08-23 LAB
ALBUMIN SERPL ELPH-MCNC: 2.5 G/DL — LOW (ref 3.3–5.2)
ALP SERPL-CCNC: 171 U/L — HIGH (ref 40–120)
ALT FLD-CCNC: 114 U/L — HIGH
ANION GAP SERPL CALC-SCNC: 11 MMOL/L — SIGNIFICANT CHANGE UP (ref 5–17)
ANION GAP SERPL CALC-SCNC: 11 MMOL/L — SIGNIFICANT CHANGE UP (ref 5–17)
AST SERPL-CCNC: 87 U/L — HIGH
BASE EXCESS BLDA CALC-SCNC: -0.2 MMOL/L — SIGNIFICANT CHANGE UP (ref -2–2)
BASOPHILS # BLD AUTO: 0.03 K/UL — SIGNIFICANT CHANGE UP (ref 0–0.2)
BASOPHILS NFR BLD AUTO: 0.1 % — SIGNIFICANT CHANGE UP (ref 0–2)
BILIRUB SERPL-MCNC: 1.1 MG/DL — SIGNIFICANT CHANGE UP (ref 0.4–2)
BLOOD GAS COMMENTS ARTERIAL: SIGNIFICANT CHANGE UP
BUN SERPL-MCNC: 13 MG/DL — SIGNIFICANT CHANGE UP (ref 8–20)
BUN SERPL-MCNC: 14 MG/DL — SIGNIFICANT CHANGE UP (ref 8–20)
CALCIUM SERPL-MCNC: 8.2 MG/DL — LOW (ref 8.6–10.2)
CALCIUM SERPL-MCNC: 8.8 MG/DL — SIGNIFICANT CHANGE UP (ref 8.6–10.2)
CHLORIDE SERPL-SCNC: 108 MMOL/L — HIGH (ref 98–107)
CHLORIDE SERPL-SCNC: 110 MMOL/L — HIGH (ref 98–107)
CO2 SERPL-SCNC: 22 MMOL/L — SIGNIFICANT CHANGE UP (ref 22–29)
CO2 SERPL-SCNC: 22 MMOL/L — SIGNIFICANT CHANGE UP (ref 22–29)
CREAT SERPL-MCNC: 0.69 MG/DL — SIGNIFICANT CHANGE UP (ref 0.5–1.3)
CREAT SERPL-MCNC: 0.72 MG/DL — SIGNIFICANT CHANGE UP (ref 0.5–1.3)
CULTURE RESULTS: NO GROWTH — SIGNIFICANT CHANGE UP
EOSINOPHIL # BLD AUTO: 0.01 K/UL — SIGNIFICANT CHANGE UP (ref 0–0.5)
EOSINOPHIL NFR BLD AUTO: 0 % — SIGNIFICANT CHANGE UP (ref 0–6)
GAS PNL BLDA: SIGNIFICANT CHANGE UP
GLUCOSE SERPL-MCNC: 115 MG/DL — SIGNIFICANT CHANGE UP (ref 70–115)
GLUCOSE SERPL-MCNC: 140 MG/DL — HIGH (ref 70–115)
HCO3 BLDA-SCNC: 24 MMOL/L — SIGNIFICANT CHANGE UP (ref 20–26)
HCT VFR BLD CALC: 32.6 % — LOW (ref 39–50)
HCT VFR BLD CALC: 35.1 % — LOW (ref 39–50)
HGB BLD-MCNC: 10.6 G/DL — LOW (ref 13–17)
HGB BLD-MCNC: 11.2 G/DL — LOW (ref 13–17)
HOROWITZ INDEX BLDA+IHG-RTO: SIGNIFICANT CHANGE UP
IMM GRANULOCYTES NFR BLD AUTO: 1.7 % — HIGH (ref 0–1.5)
LACTATE BLDV-MCNC: 2 MMOL/L — SIGNIFICANT CHANGE UP (ref 0.5–2)
LYMPHOCYTES # BLD AUTO: 0.42 K/UL — LOW (ref 1–3.3)
LYMPHOCYTES # BLD AUTO: 1.8 % — LOW (ref 13–44)
MCHC RBC-ENTMCNC: 31.9 GM/DL — LOW (ref 32–36)
MCHC RBC-ENTMCNC: 32.5 GM/DL — SIGNIFICANT CHANGE UP (ref 32–36)
MCHC RBC-ENTMCNC: 32.6 PG — SIGNIFICANT CHANGE UP (ref 27–34)
MCHC RBC-ENTMCNC: 32.7 PG — SIGNIFICANT CHANGE UP (ref 27–34)
MCV RBC AUTO: 100.6 FL — HIGH (ref 80–100)
MCV RBC AUTO: 102 FL — HIGH (ref 80–100)
METHOD TYPE: SIGNIFICANT CHANGE UP
MONOCYTES # BLD AUTO: 0.35 K/UL — SIGNIFICANT CHANGE UP (ref 0–0.9)
MONOCYTES NFR BLD AUTO: 1.5 % — LOW (ref 2–14)
NEUTROPHILS # BLD AUTO: 21.89 K/UL — HIGH (ref 1.8–7.4)
NEUTROPHILS NFR BLD AUTO: 94.9 % — HIGH (ref 43–77)
NT-PROBNP SERPL-SCNC: 2017 PG/ML — HIGH (ref 0–300)
P AERUGINOSA DNA BLD POS NAA+NON-PROBE: SIGNIFICANT CHANGE UP
PCO2 BLDA: 33 MMHG — LOW (ref 35–45)
PH BLDA: 7.45 — SIGNIFICANT CHANGE UP (ref 7.35–7.45)
PLATELET # BLD AUTO: 232 K/UL — SIGNIFICANT CHANGE UP (ref 150–400)
PLATELET # BLD AUTO: 237 K/UL — SIGNIFICANT CHANGE UP (ref 150–400)
PO2 BLDA: 50 MMHG — CRITICAL LOW (ref 83–108)
POTASSIUM SERPL-MCNC: 4.1 MMOL/L — SIGNIFICANT CHANGE UP (ref 3.5–5.3)
POTASSIUM SERPL-MCNC: 4.3 MMOL/L — SIGNIFICANT CHANGE UP (ref 3.5–5.3)
POTASSIUM SERPL-SCNC: 4.1 MMOL/L — SIGNIFICANT CHANGE UP (ref 3.5–5.3)
POTASSIUM SERPL-SCNC: 4.3 MMOL/L — SIGNIFICANT CHANGE UP (ref 3.5–5.3)
PROT SERPL-MCNC: 6.3 G/DL — LOW (ref 6.6–8.7)
RBC # BLD: 3.24 M/UL — LOW (ref 4.2–5.8)
RBC # BLD: 3.44 M/UL — LOW (ref 4.2–5.8)
RBC # FLD: 13.4 % — SIGNIFICANT CHANGE UP (ref 10.3–14.5)
RBC # FLD: 13.4 % — SIGNIFICANT CHANGE UP (ref 10.3–14.5)
SAO2 % BLDA: 89 % — LOW (ref 95–99)
SODIUM SERPL-SCNC: 141 MMOL/L — SIGNIFICANT CHANGE UP (ref 135–145)
SODIUM SERPL-SCNC: 143 MMOL/L — SIGNIFICANT CHANGE UP (ref 135–145)
SPECIMEN SOURCE: SIGNIFICANT CHANGE UP
WBC # BLD: 22.95 K/UL — HIGH (ref 3.8–10.5)
WBC # BLD: 23.1 K/UL — HIGH (ref 3.8–10.5)
WBC # FLD AUTO: 22.95 K/UL — HIGH (ref 3.8–10.5)
WBC # FLD AUTO: 23.1 K/UL — HIGH (ref 3.8–10.5)

## 2019-08-23 PROCEDURE — 99233 SBSQ HOSP IP/OBS HIGH 50: CPT

## 2019-08-23 PROCEDURE — 71045 X-RAY EXAM CHEST 1 VIEW: CPT | Mod: 26

## 2019-08-23 PROCEDURE — 99222 1ST HOSP IP/OBS MODERATE 55: CPT

## 2019-08-23 PROCEDURE — 99223 1ST HOSP IP/OBS HIGH 75: CPT

## 2019-08-23 RX ORDER — METOPROLOL TARTRATE 50 MG
25 TABLET ORAL EVERY 12 HOURS
Refills: 0 | Status: DISCONTINUED | OUTPATIENT
Start: 2019-08-23 | End: 2019-08-24

## 2019-08-23 RX ORDER — ACETAMINOPHEN 500 MG
650 TABLET ORAL EVERY 6 HOURS
Refills: 0 | Status: DISCONTINUED | OUTPATIENT
Start: 2019-08-23 | End: 2019-08-26

## 2019-08-23 RX ORDER — METOPROLOL TARTRATE 50 MG
5 TABLET ORAL ONCE
Refills: 0 | Status: DISCONTINUED | OUTPATIENT
Start: 2019-08-23 | End: 2019-08-23

## 2019-08-23 RX ADMIN — PIPERACILLIN AND TAZOBACTAM 25 GRAM(S): 4; .5 INJECTION, POWDER, LYOPHILIZED, FOR SOLUTION INTRAVENOUS at 21:57

## 2019-08-23 RX ADMIN — PIPERACILLIN AND TAZOBACTAM 25 GRAM(S): 4; .5 INJECTION, POWDER, LYOPHILIZED, FOR SOLUTION INTRAVENOUS at 13:42

## 2019-08-23 RX ADMIN — Medication 100 MILLIGRAM(S): at 13:43

## 2019-08-23 RX ADMIN — Medication 25 MILLIGRAM(S): at 16:56

## 2019-08-23 RX ADMIN — Medication 650 MILLIGRAM(S): at 11:00

## 2019-08-23 RX ADMIN — Medication 100 MILLIGRAM(S): at 21:57

## 2019-08-23 RX ADMIN — Medication 650 MILLIGRAM(S): at 16:56

## 2019-08-23 RX ADMIN — PIPERACILLIN AND TAZOBACTAM 25 GRAM(S): 4; .5 INJECTION, POWDER, LYOPHILIZED, FOR SOLUTION INTRAVENOUS at 05:16

## 2019-08-23 RX ADMIN — Medication 650 MILLIGRAM(S): at 10:56

## 2019-08-23 RX ADMIN — Medication 100 MILLIGRAM(S): at 05:17

## 2019-08-23 RX ADMIN — Medication 650 MILLIGRAM(S): at 21:57

## 2019-08-23 RX ADMIN — Medication 650 MILLIGRAM(S): at 18:48

## 2019-08-23 NOTE — PATIENT PROFILE ADULT - BILL PAYMENT
Pre-Operative Diagnosis: Chronic ethmoidal sinusitis [J32.2]  Chronic maxillary sinusitis [J32.0]  Chronic sphenoidal sinusitis [J32.3]  Deviated nasal septum [J34.2]  Hypertrophy of nasal turbinates [J34.3]     Post-Operative Diagnosis: Chronic ethmoidal no fever no

## 2019-08-23 NOTE — CONSULT NOTE ADULT - ASSESSMENT
Fever and Leukocytosis in pt on steroids, high dose for chronic liver disease, PSC.  Benign exam now.  Flurry of seizures a concern.  Not overtly toxic. ? CNS source.    LFT's are improving and no recent Biliary manipulation, last > 6 weeks ago.  Therefore unlikely Biliary source.  CT findings are not convincing.      Plan Support;  OK to feed pt, reg diet.  Sepsis eval and treatment.  Immunocompromised host with recent high dose steroids.  IVF needed.

## 2019-08-23 NOTE — CONSULT NOTE ADULT - SUBJECTIVE AND OBJECTIVE BOX
HPI:  29M c CP and PSC and prior biliary stents and chronically maintained on Steroids for the Liver disease.  Pt presented with a flurry  of seizures and high fever. Information was obtained from the patient as well as his mother at the bedside.  The patient had no recollection of the events except for his arrival to the hospital. The patient's mother reported that the patient had an episode of seizure during childhood but no recurrence. The patient denied any headache or visual changes. The patient was also noted to have had an episode of Nausea,  vomiting and diarrhea earlier yesterday.  He was noted to have a history of primary sclerosing cholangitis and prior biliary stent placement and removal of the stent last month. The patient denied any chest pain, dyspnea, chills, sick contacts, or abdominal pain. He was previously started on daily prednisone by his hepatologist at [facility],  three weeks ago. Biliary stents had been placed in ,  and last removed in 19, because pancreatitis developed.  Pancreatitis has since resolved.    No further diarrhea.  No bleeding.  No abdominal pain.  No weight loss.  Looks good but having a dry cough.  Urine had been dark but gradually better of late.        PAST MEDICAL & SURGICAL HISTORY:  PSC (primary sclerosing cholangitis)  Cerebral palsy  History of biliary stent insertion: removed 2019  History of ERCP: performed at [place]      ROS:  No Heartburn, regurgitation, dysphagia, odynophagia.  No dyspepsia  No abdominal pain.    No Nausea, vomiting.  No Bleeding.  No hematemesis.   No diarrhea.    No hematochezia.  No weight loss, anorexia.  No edema.      MEDICATIONS  (STANDING):  enoxaparin Injectable 40 milliGRAM(s) SubCutaneous daily  hydrocortisone sodium succinate Injectable 100 milliGRAM(s) IV Push every 8 hours  multiple electrolytes Injection Type 1 1000 milliLiter(s) (150 mL/Hr) IV Continuous <Continuous>  piperacillin/tazobactam IVPB.. 3.375 Gram(s) IV Intermittent every 8 hours    MEDICATIONS  (PRN):  acetaminophen   Tablet .. 650 milliGRAM(s) Oral every 6 hours PRN Temp greater or equal to 38C (100.4F)      Allergies    No Known Allergies    Intolerances        SOCIAL HISTORY:    FAMILY HISTORY:  No pertinent family history in first degree relatives      Vital Signs Last 24 Hrs  T(C): 38.2 (23 Aug 2019 10:22), Max: 38.7 (22 Aug 2019 13:05)  T(F): 100.8 (23 Aug 2019 10:22), Max: 101.7 (22 Aug 2019 13:05)  HR: 140 (23 Aug 2019 09:08) (95 - 141)  BP: 121/68 (23 Aug 2019 09:08) (88/58 - 121/68)  BP(mean): 83 (23 Aug 2019 09:08) (69 - 83)  RR: 26 (23 Aug 2019 09:08) (18 - 26)  SpO2: 96% (23 Aug 2019 09:08) (95% - 100%)    PHYSICAL EXAM:    GENERAL: NAD, well-groomed, well-developed  HEAD:  Atraumatic, Normocephalic  EYES: EOMI, PERRLA, conjunctiva and sclera clear  ENMT: No tonsillar erythema, exudates, or enlargement; Moist mucous membranes, Good dentition, No lesions  NECK: Supple, No JVD, Normal thyroid  CHEST/LUNG: Clear to percussion bilaterally; No rales, rhonchi, wheezing, or rubs  HEART: Regular rate and rhythm; No murmurs, rubs, or gallops  ABDOMEN: Soft, Nontender, Nondistended; Bowel sounds present  EXTREMITIES:  2+ Peripheral Pulses, No clubbing, cyanosis, or edema  LYMPH: No lymphadenopathy noted  SKIN: No rashes or lesions      LABS:                        11.2   22.95 )-----------( 232      ( 23 Aug 2019 06:07 )             35.1     08    143  |  110<H>  |  14.0  ----------------------------<  115  4.3   |  22.0  |  0.69    Ca    8.8      23 Aug 2019 06:07    TPro  6.3<L>  /  Alb  2.5<L>  /  TBili  1.1  /  DBili  x   /  AST  87<H>  /  ALT  114<H>  /  AlkPhos  171<H>  08-23    PT/INR - ( 22 Aug 2019 13:13 )   PT: 13.8 sec;   INR: 1.19 ratio         PTT - ( 22 Aug 2019 13:13 )  PTT:32.6 sec   Urinalysis Basic - ( 22 Aug 2019 17:33 )    Color: Yellow / Appearance: Clear / S.005 / pH: x  Gluc: x / Ketone: Negative  / Bili: Negative / Urobili: 1 mg/dL   Blood: x / Protein: 30 mg/dL / Nitrite: Negative   Leuk Esterase: Negative / RBC: 0-2 /HPF / WBC 0-2   Sq Epi: x / Non Sq Epi: Occasional / Bacteria: Occasional        LIVER FUNCTIONS - ( 23 Aug 2019 06:07 )  Alb: 2.5 g/dL / Pro: 6.3 g/dL / ALK PHOS: 171 U/L / ALT: 114 U/L / AST: 87 U/L / GGT: x               RADIOLOGY & ADDITIONAL STUDIES:  CT A/P  3 hypodensities in the liver 6 mm each, unclear etiology;  Enhancement of the CBD wall in the liver.  Intact pancreas and GB and ducts.

## 2019-08-23 NOTE — CONSULT NOTE ADULT - SUBJECTIVE AND OBJECTIVE BOX
CHIEF COMPLAINT: possible seizure    HPI: 29yMale  29M presented with episodes of seizures. Information was obtained from the patient as well as his mother at the bedside. The patient was noted to have developed seizure like activity while being driven by his mother. He was noted to have recurrent episodes of seizures prior to presentation to the hospital. The patient had no recollection of the events except for his arrival to the hospital. The patient's mother reported that the patient had an episode of seizure during childhood but no recurrence. The patient denied any headache or visual changes. The patient was also noted to have had an episode of vomiting and diarrhea earlier today. He was noted to have a history of primary sclerosing cholangitis and prior biliary stent placement and removal of the stent last month. The patient denied any chest pain, dyspnea, fevers, chills, sick contacts, or abdominal pain. He was previously started on daily prednisone by his hepatologist three weeks prior.    Mother reports she was taking him by car to the ED for fevers of 107. In car had several episodes of eyes rolling back, stiffening and LOC with prompt recovery.   now at baseline      PAST MEDICAL & SURGICAL HISTORY:  PSC (primary sclerosing cholangitis)  Cerebral palsy  History of biliary stent insertion: removed 7/2019  History of ERCP: performed at [place]    MEDICATIONS  (STANDING):  enoxaparin Injectable 40 milliGRAM(s) SubCutaneous daily  hydrocortisone sodium succinate Injectable 100 milliGRAM(s) IV Push every 8 hours  metoprolol tartrate Injectable 5 milliGRAM(s) IV Push once  multiple electrolytes Injection Type 1 1000 milliLiter(s) (150 mL/Hr) IV Continuous <Continuous>  piperacillin/tazobactam IVPB.. 3.375 Gram(s) IV Intermittent every 8 hours    MEDICATIONS  (PRN):  acetaminophen   Tablet .. 650 milliGRAM(s) Oral every 6 hours PRN Temp greater or equal to 38C (100.4F)    Allergies    No Known Allergies    Intolerances        FAMILY HISTORY:  No pertinent family history in first degree relatives          SOCIAL HISTORY:    Tobacco:  no  Alcohol:  no  Drugs:  no        REVIEW OF SYSTEMS:    Relevant systems are negative except as noted in the chart, HPI, and PMH      VITAL SIGNS:  Vital Signs Last 24 Hrs  T(C): 38.2 (23 Aug 2019 10:22), Max: 38.7 (22 Aug 2019 13:05)  T(F): 100.8 (23 Aug 2019 10:22), Max: 101.7 (22 Aug 2019 13:05)  HR: 140 (23 Aug 2019 09:08) (95 - 141)  BP: 121/68 (23 Aug 2019 09:08) (88/58 - 121/68)  BP(mean): 83 (23 Aug 2019 09:08) (69 - 83)  RR: 26 (23 Aug 2019 09:08) (18 - 26)  SpO2: 96% (23 Aug 2019 09:08) (95% - 100%)    PHYSICAL EXAMINATION:    General: Well-developed, well nourished, in no acute distress.  Cardiac:  Regular rate and rhythm. No carotid bruits appreciated.  Eyes: Fundoscopic examination was deferred.  Neurologic:  - Mental Status:  Alert, awake, oriented to person, place, and time; Speech is fluent. Language is normal. Follows commands well.  Insight and knowledge appear appropriate.  Cranial Nerves II-XII:    II:  Visual acuity is normal for age ; Visual fields are full to confrontation; Pupils are equal, round, and reactive to light.  III, IV, VI:  Extraocular movements are intact without nystagmus.  V:  Facial sensation is intact in the V1-V3 distribution bilaterally.  VII:  Face is symmetric with normal eye closure and smile  VIII:  Hearing is grossly intact  IX, X, XII:  speech is clear  XI:  Head turning and shoulder shrug are intact.  - Motor:  Strength is moderate spastic left hemiparesis  - old  - Reflexes:  3+ and symmetric at the  left  knee.  Plantar responses left extensor  - Sensory:  Symmetric to light touch  - Coordination:  Finger-nose-finger is normal. Rapid alternating hand and foot  movements are intact. Dexterity appears normal      LABS:                          11.2   22.95 )-----------( 232      ( 23 Aug 2019 06:07 )             35.1     23 Aug 2019 06:07    143    |  110    |  14.0   ----------------------------<  115    4.3     |  22.0   |  0.69     Ca    8.8        23 Aug 2019 06:07    TPro  6.3    /  Alb  2.5    /  TBili  1.1    /  DBili  x      /  AST  87     /  ALT  114    /  AlkPhos  171    23 Aug 2019 06:07    LIVER FUNCTIONS - ( 23 Aug 2019 06:07 )  Alb: 2.5 g/dL / Pro: 6.3 g/dL / ALK PHOS: 171 U/L / ALT: 114 U/L / AST: 87 U/L / GGT: x           PT/INR - ( 22 Aug 2019 13:13 )   PT: 13.8 sec;   INR: 1.19 ratio         PTT - ( 22 Aug 2019 13:13 )  PTT:32.6 sec      RADIOLOGY & ADDITIONAL STUDIES:    Brain CT- normal  EEG- normal    IMPRESSION:    FUO  CP  episodes of LOC- doubt seizures      PLAN:  1. brain MRI  2. defer seizure meds for now  3.   4.  5.

## 2019-08-23 NOTE — SWALLOW BEDSIDE ASSESSMENT ADULT - ASR SWALLOW ASPIRATION MONITOR
cough/fever/throat clearing/upper respiratory infection/change of breathing pattern/position upright (90Y)/gurgly voice/oral hygiene/pneumonia

## 2019-08-23 NOTE — SWALLOW BEDSIDE ASSESSMENT ADULT - MODE OF PRESENTATION
spoon/fed by clinician spoon/self fed/fed by clinician self fed/fed by clinician cup/fed by clinician cup

## 2019-08-23 NOTE — CONSULT NOTE ADULT - ASSESSMENT
28y/o young man with PMH of CP with left sided paresis, childhood seizure and Primary sclerosing cholangitis was admitted with high fever and seizure.   Had one episode of diarrhea, vomiting on admission day and some cough started after admission.   He had stent placed for jaundice in the past and due to acute pancreatitis it was removed in july by ERCP.    Fever  Viral URI or GE  R/O cholangitis     - Blood culture x 2   - UA negative   - RVP neg   - RLL infiltrate in CXR could be due to aspiration pneumonia during seizure.   - Abdominal CT result noted r/o liver abscess/cholangitis   - LFTs slightly higher but abdomen exam normal.   - GI consult noted  - Neurology work up for seizure , MRI, EEG  - On steroid that could cause oppurtunistic infections as well  - Leukocytosis due to steroid use  - If diarrhea recurs send stool exam  - Continue Zosyn 3.375gm q8h to cover abdominal adelita until we have more culture info.      Will follow.

## 2019-08-23 NOTE — CONSULT NOTE ADULT - SUBJECTIVE AND OBJECTIVE BOX
[facility]  INFECTIOUS DISEASES AND INTERNAL MEDICINE at [place]  =======================================================  Kb Garcia MD  Diplomates American Board of Internal Medicine and Infectious Diseases  =======================================================    [id number]  SAUMYA ZARATE     Both parents in the room.     CC: Fever     HPI:  28y/o young man with PMH of CP with left sided paresis, childhood seizure and Primary sclerosing cholangitis was admitted with high fever and seizure.   Her mother was brining him to [facility] for high fever when he started seizing in the car x 2 and few times in ED. She states that he didn't have any headache or visual changes, no cold symptoms prior to seizure. No sick contact. No change in his mental status after seizure. She had one episode of vomiting and diarrhea on the day of admission.   Since admission he has some cough.  He had stent placed for jaundice in the past and due to acute pancreatitis it was removed in july by ERCP. No abdominal pain currently but had R shoulder pain yesterday.   ID has been called for recommendation about fever.     PAST MEDICAL & SURGICAL HISTORY:  PSC (primary sclerosing cholangitis)  Cerebral palsy  History of biliary stent insertion: removed 2019  History of ERCP: performed at [facility] Hx: no smoking or toxic habts    FAMILY HISTORY:  No pertinent family history in first degree relatives    Allergies  No Known Allergies    Antibiotics:  piperacillin/tazobactam IVPB.. 3.375 Gram(s) IV Intermittent every 8 hours    REVIEW OF SYSTEMS:  CONSTITUTIONAL:  + Fever and chills  HEENT:  No diplopia or blurred vision.  No sore throat or runny nose.  CARDIOVASCULAR:  No chest pain or SOB.  RESPIRATORY:  No cough, shortness of breath, PND or orthopnea.  GASTROINTESTINAL:  No nausea, vomiting or diarrhea.  GENITOURINARY:  No dysuria, frequency or urgency. No Blood in urine  MUSCULOSKELETAL:  no joint aches, no muscle pain  SKIN:  No change in skin, hair or nails.  NEUROLOGIC:  No paresthesias, fasciculations, seizures or weakness.  PSYCHIATRIC:  No disorder of thought or mood.  ENDOCRINE:  No heat or cold intolerance, polyuria or polydipsia.  HEMATOLOGICAL:  No easy bruising or bleeding.     Physical Exam:  Vital Signs Last 24 Hrs  T(C): 38.2 (23 Aug 2019 10:22), Max: 38.7 (22 Aug 2019 13:05)  T(F): 100.8 (23 Aug 2019 10:22), Max: 101.7 (22 Aug 2019 13:05)  HR: 131 (23 Aug 2019 10:59) (95 - 141)  BP: 133/78 (23 Aug 2019 10:59) (88/58 - 133/78)  BP(mean): 93 (23 Aug 2019 10:59) (69 - 93)  RR: 26 (23 Aug 2019 10:59) (18 - 26)  SpO2: 97% (23 Aug 2019 10:59) (95% - 100%)  Height (cm): 157.48 ( @ 12:48)  Weight (kg): 54.4 ( @ 12:48)  BMI (kg/m2): 21.9 ( @ 12:48)  BSA (m2): 1.54 ( @ 12:48)  GEN: NAD  HEENT: normocephalic and atraumatic. EOMI. PERRL.    NECK: Supple.  No lymphadenopathy   LUNGS: Clear to auscultation.  HEART: Regular rate and rhythm without murmur.  ABDOMEN: Soft, nontender, and nondistended.  Positive bowel sounds.    : No CVA tenderness  EXTREMITIES: Without any cyanosis, clubbing, rash, lesions or edema.  NEUROLOGIC: left sided paresis   PSYCHIATRIC: Appropriate affect .  SKIN: No ulceration or induration present.    Labs:      143  |  110<H>  |  14.0  ----------------------------<  115  4.3   |  22.0  |  0.69    Ca    8.8      23 Aug 2019 06:07    TPro  6.3<L>  /  Alb  2.5<L>  /  TBili  1.1  /  DBili  x   /  AST  87<H>  /  ALT  114<H>  /  AlkPhos  171<H>                          11.2   22.95 )-----------( 232      ( 23 Aug 2019 06:07 )             35.1     PT/INR - ( 22 Aug 2019 13:13 )   PT: 13.8 sec;   INR: 1.19 ratio    PTT - ( 22 Aug 2019 13:13 )  PTT:32.6 sec  Urinalysis Basic - ( 22 Aug 2019 17:33 )    Color: Yellow / Appearance: Clear / S.005 / pH: x  Gluc: x / Ketone: Negative  / Bili: Negative / Urobili: 1 mg/dL   Blood: x / Protein: 30 mg/dL / Nitrite: Negative   Leuk Esterase: Negative / RBC: 0-2 /HPF / WBC 0-2   Sq Epi: x / Non Sq Epi: Occasional / Bacteria: Occasional    LIVER FUNCTIONS - ( 23 Aug 2019 06:07 )  Alb: 2.5 g/dL / Pro: 6.3 g/dL / ALK PHOS: 171 U/L / ALT: 114 U/L / AST: 87 U/L / GGT: x           ABG - ( 23 Aug 2019 01:45 )  pH, Arterial: 7.45  pH, Blood: x     /  pCO2: 33    /  pO2: 50    / HCO3: 24    / Base Excess: -0.2  /  SaO2: 89        RECENT CULTURES:   @ 20:12      [facility]    All imaging and other data have been reviewed.

## 2019-08-23 NOTE — PROGRESS NOTE ADULT - SUBJECTIVE AND OBJECTIVE BOX
Patient: SAUMYA ZARATE [id number] 29y Male                           Internal Medicine Hospitalist Progress Note    Initial HPI:  29M presented with episodes of seizures. Information was obtained from the patient as well as his mother at the bedside. The patient was noted to have developed seizure like activity while being driven by his mother. He was noted to have recurrent episodes of seizures prior to presentation to the hospital. The patient had no recollection of the events except for his arrival to the hospital. The patient's mother reported that the patient had an episode of seizure during childhood but no recurrence. The patient denied any headache or visual changes. The patient was also noted to have had an episode of vomiting and diarrhea earlier today. He was noted to have a history of primary sclerosing cholangitis and prior biliary stent placement and removal of the stent last month. The patient denied any chest pain, dyspnea, fevers, chills, sick contacts, or abdominal pain. He was previously started on daily prednisone by his hepatologist three weeks prior. (22 Aug 2019 17:10)    Interval History:  Admitted for fever, seizures.  Seen by Neurology.  EEG unremarkable, seizures attributed to febrile illness.  Pt seen with RN today, reports nonproductive cough and mild SOB.  No Abdominal pain, nausea, vomiting, diarrhea, nor constipation. No additional complaints.     ____________________PHYSICAL EXAM:  Vitals reviewed as indicated below  GENERAL:  NAD Alert, interactive.   HEENT: NCAT  CARDIOVASCULAR:  S1, S2  LUNGS: coarse BS b/l  ABDOMEN:  soft, (-) tenderness, (-) distension, (+) bowel sounds, (-) guarding, (-) rebound (-) rigidity  EXTREMITIES:  no cyanosis / clubbing / edema.   ____________________      BACKGROUND:  HEALTH ISSUES - PROBLEM Dx:        Allergies    No Known Allergies    Intolerances      PAST MEDICAL & SURGICAL HISTORY:  PSC (primary sclerosing cholangitis)  Cerebral palsy  History of biliary stent insertion: removed 2019  History of ERCP: performed at [place]        VITALS:  Vital Signs Last 24 Hrs  T(C): 38.2 (23 Aug 2019 10:22), Max: 38.7 (22 Aug 2019 13:05)  T(F): 100.8 (23 Aug 2019 10:22), Max: 101.7 (22 Aug 2019 13:05)  HR: 140 (23 Aug 2019 09:08) (95 - 141)  BP: 121/68 (23 Aug 2019 09:08) (88/58 - 121/68)  BP(mean): 83 (23 Aug 2019 09:08) (69 - 83)  RR: 26 (23 Aug 2019 09:08) (18 - 26)  SpO2: 96% (23 Aug 2019 09:08) (95% - 100%) Daily Height in cm: 157.48 (22 Aug 2019 12:48)    Daily   CAPILLARY BLOOD GLUCOSE      POCT Blood Glucose.: 148 mg/dL (22 Aug 2019 12:58)    I&O's Summary        LABS:                        11.2   22.95 )-----------( 232      ( 23 Aug 2019 06:07 )             35.1     08-23    143  |  110<H>  |  14.0  ----------------------------<  115  4.3   |  22.0  |  0.69    Ca    8.8      23 Aug 2019 06:07    TPro  6.3<L>  /  Alb  2.5<L>  /  TBili  1.1  /  DBili  x   /  AST  87<H>  /  ALT  114<H>  /  AlkPhos  171<H>  08-23    PT/INR - ( 22 Aug 2019 13:13 )   PT: 13.8 sec;   INR: 1.19 ratio         PTT - ( 22 Aug 2019 13:13 )  PTT:32.6 sec  LIVER FUNCTIONS - ( 23 Aug 2019 06:07 )  Alb: 2.5 g/dL / Pro: 6.3 g/dL / ALK PHOS: 171 U/L / ALT: 114 U/L / AST: 87 U/L / GGT: x           Urinalysis Basic - ( 22 Aug 2019 17:33 )    Color: Yellow / Appearance: Clear / S.005 / pH: x  Gluc: x / Ketone: Negative  / Bili: Negative / Urobili: 1 mg/dL   Blood: x / Protein: 30 mg/dL / Nitrite: Negative   Leuk Esterase: Negative / RBC: 0-2 /HPF / WBC 0-2   Sq Epi: x / Non Sq Epi: Occasional / Bacteria: Occasional              MEDICATIONS:  MEDICATIONS  (STANDING):  enoxaparin Injectable 40 milliGRAM(s) SubCutaneous daily  hydrocortisone sodium succinate Injectable 100 milliGRAM(s) IV Push every 8 hours  multiple electrolytes Injection Type 1 1000 milliLiter(s) (150 mL/Hr) IV Continuous <Continuous>  piperacillin/tazobactam IVPB.. 3.375 Gram(s) IV Intermittent every 8 hours    MEDICATIONS  (PRN):  acetaminophen   Tablet .. 650 milliGRAM(s) Oral every 6 hours PRN Temp greater or equal to 38C (100.4F) Patient: SAUMYA ZARATE [id number] 29y Male                           Internal Medicine Hospitalist Progress Note    Initial HPI:  29M presented with episodes of seizures. Information was obtained from the patient as well as his mother at the bedside. The patient was noted to have developed seizure like activity while being driven by his mother. He was noted to have recurrent episodes of seizures prior to presentation to the hospital. The patient had no recollection of the events except for his arrival to the hospital. The patient's mother reported that the patient had an episode of seizure during childhood but no recurrence. The patient denied any headache or visual changes. The patient was also noted to have had an episode of vomiting and diarrhea earlier today. He was noted to have a history of primary sclerosing cholangitis and prior biliary stent placement and removal of the stent last month. The patient denied any chest pain, dyspnea, fevers, chills, sick contacts, or abdominal pain. He was previously started on daily prednisone by his hepatologist three weeks prior. (22 Aug 2019 17:10)    Interval History:  Admitted for fever, seizures.  Seen by Neurology.  EEG unremarkable, seizures attributed to febrile illness.  Pt seen with RN today, reports nonproductive cough and mild SOB.  No Abdominal pain, nausea, vomiting, diarrhea, nor constipation. No additional complaints.     ____________________PHYSICAL EXAM:  Vitals reviewed as indicated below  GENERAL:  NAD Alert, interactive.   HEENT: NCAT  CARDIOVASCULAR:  S1, S2  LUNGS: coarse BS b/l  ABDOMEN:  soft, (-) tenderness, (-) distension, (+) bowel sounds, (-) guarding, (-) rebound (-) rigidity  EXTREMITIES:  no cyanosis / clubbing / edema.   ____________________      BACKGROUND:  HEALTH ISSUES - PROBLEM Dx:        Allergies    No Known Allergies        Intolerances      PAST MEDICAL & SURGICAL HISTORY:  PSC (primary sclerosing cholangitis)  Cerebral palsy  History of biliary stent insertion: removed 2019  History of ERCP: performed at [place]        VITALS:  Vital Signs Last 24 Hrs  T(C): 38.2 (23 Aug 2019 10:22), Max: 38.7 (22 Aug 2019 13:05)  T(F): 100.8 (23 Aug 2019 10:22), Max: 101.7 (22 Aug 2019 13:05)  HR: 140 (23 Aug 2019 09:08) (95 - 141)  BP: 121/68 (23 Aug 2019 09:08) (88/58 - 121/68)  BP(mean): 83 (23 Aug 2019 09:08) (69 - 83)  RR: 26 (23 Aug 2019 09:08) (18 - 26)  SpO2: 96% (23 Aug 2019 09:08) (95% - 100%) Daily Height in cm: 157.48 (22 Aug 2019 12:48)    Daily   CAPILLARY BLOOD GLUCOSE      POCT Blood Glucose.: 148 mg/dL (22 Aug 2019 12:58)    I&O's Summary        LABS:                        11.2   22.95 )-----------( 232      ( 23 Aug 2019 06:07 )             35.1     08-23    143  |  110<H>  |  14.0  ----------------------------<  115  4.3   |  22.0  |  0.69    Ca    8.8      23 Aug 2019 06:07    TPro  6.3<L>  /  Alb  2.5<L>  /  TBili  1.1  /  DBili  x   /  AST  87<H>  /  ALT  114<H>  /  AlkPhos  171<H>  08-23    PT/INR - ( 22 Aug 2019 13:13 )   PT: 13.8 sec;   INR: 1.19 ratio         PTT - ( 22 Aug 2019 13:13 )  PTT:32.6 sec  LIVER FUNCTIONS - ( 23 Aug 2019 06:07 )  Alb: 2.5 g/dL / Pro: 6.3 g/dL / ALK PHOS: 171 U/L / ALT: 114 U/L / AST: 87 U/L / GGT: x           Urinalysis Basic - ( 22 Aug 2019 17:33 )    Color: Yellow / Appearance: Clear / S.005 / pH: x  Gluc: x / Ketone: Negative  / Bili: Negative / Urobili: 1 mg/dL   Blood: x / Protein: 30 mg/dL / Nitrite: Negative   Leuk Esterase: Negative / RBC: 0-2 /HPF / WBC 0-2   Sq Epi: x / Non Sq Epi: Occasional / Bacteria: Occasional              MEDICATIONS:  MEDICATIONS  (STANDING):  enoxaparin Injectable 40 milliGRAM(s) SubCutaneous daily  hydrocortisone sodium succinate Injectable 100 milliGRAM(s) IV Push every 8 hours  multiple electrolytes Injection Type 1 1000 milliLiter(s) (150 mL/Hr) IV Continuous <Continuous>  piperacillin/tazobactam IVPB.. 3.375 Gram(s) IV Intermittent every 8 hours    MEDICATIONS  (PRN):  acetaminophen   Tablet .. 650 milliGRAM(s) Oral every 6 hours PRN Temp greater or equal to 38C (100.4F)

## 2019-08-23 NOTE — PROGRESS NOTE ADULT - ASSESSMENT
29M with a history of cerebral palsy and primary sclerosing cholangitis who presented with seizures, hypotension, and fever requiring resuscitation.    Sepsis - due to ? pneumonia on CT, primary sclerosing cholangitis.  Continue IV Abx.  Lactate improved.  ID input was requested.    Seizures - May be due to febrile illness.  Neurology input appreciated and d/w Dr. Cuadra.  MRI Brain ordered.     Tachycardia - likely due to above.  Check EKG.  Trial of Metoprolol.      Primary sclerosing cholangitis - Elevated bilirubin on presenting laboratory studies. Status post biliary stent removal last month. Comprehensive metabolic panel to be repeated. Awaiting GI consult.    Cerebral Palsy - supportive care. 29M with a history of cerebral palsy and primary sclerosing cholangitis who presented with seizures, hypotension, and fever requiring resuscitation.    Sepsis - due to ? pneumonia on CT, primary sclerosing cholangitis.  Continue IV Abx.  Lactate improved.  ID input was requested.  Continue Solucortef.     Seizures - May be due to febrile illness.  Neurology input appreciated and d/w Dr. Cuadra.  MRI Brain ordered.     Tachycardia - likely due to above.  Check EKG.  Trial of Metoprolol.      Primary sclerosing cholangitis - Elevated bilirubin on presenting laboratory studies. Status post biliary stent removal last month. Comprehensive metabolic panel to be repeated. Awaiting GI consult.    Cerebral Palsy - supportive care.     > DVT Prophylaxis - Lovenox subcut

## 2019-08-24 DIAGNOSIS — G93.40 ENCEPHALOPATHY, UNSPECIFIED: ICD-10-CM

## 2019-08-24 LAB
-  AMIKACIN: SIGNIFICANT CHANGE UP
-  AZTREONAM: SIGNIFICANT CHANGE UP
-  CEFEPIME: SIGNIFICANT CHANGE UP
-  CEFTAZIDIME: SIGNIFICANT CHANGE UP
-  CIPROFLOXACIN: SIGNIFICANT CHANGE UP
-  GENTAMICIN: SIGNIFICANT CHANGE UP
-  IMIPENEM: SIGNIFICANT CHANGE UP
-  LEVOFLOXACIN: SIGNIFICANT CHANGE UP
-  MEROPENEM: SIGNIFICANT CHANGE UP
-  PIPERACILLIN/TAZOBACTAM: SIGNIFICANT CHANGE UP
-  TOBRAMYCIN: SIGNIFICANT CHANGE UP
ALBUMIN SERPL ELPH-MCNC: 2.3 G/DL — LOW (ref 3.3–5.2)
ALP SERPL-CCNC: 167 U/L — HIGH (ref 40–120)
ALT FLD-CCNC: 84 U/L — HIGH
ANION GAP SERPL CALC-SCNC: 12 MMOL/L — SIGNIFICANT CHANGE UP (ref 5–17)
AST SERPL-CCNC: 41 U/L — HIGH
BILIRUB SERPL-MCNC: 0.8 MG/DL — SIGNIFICANT CHANGE UP (ref 0.4–2)
BUN SERPL-MCNC: 18 MG/DL — SIGNIFICANT CHANGE UP (ref 8–20)
CALCIUM SERPL-MCNC: 8.9 MG/DL — SIGNIFICANT CHANGE UP (ref 8.6–10.2)
CHLORIDE SERPL-SCNC: 109 MMOL/L — HIGH (ref 98–107)
CO2 SERPL-SCNC: 22 MMOL/L — SIGNIFICANT CHANGE UP (ref 22–29)
CREAT SERPL-MCNC: 0.62 MG/DL — SIGNIFICANT CHANGE UP (ref 0.5–1.3)
GLUCOSE SERPL-MCNC: 124 MG/DL — HIGH (ref 70–115)
HCT VFR BLD CALC: 32.5 % — LOW (ref 39–50)
HGB BLD-MCNC: 10.7 G/DL — LOW (ref 13–17)
MCHC RBC-ENTMCNC: 32.9 GM/DL — SIGNIFICANT CHANGE UP (ref 32–36)
MCHC RBC-ENTMCNC: 32.9 PG — SIGNIFICANT CHANGE UP (ref 27–34)
MCV RBC AUTO: 100 FL — SIGNIFICANT CHANGE UP (ref 80–100)
METHOD TYPE: SIGNIFICANT CHANGE UP
PLATELET # BLD AUTO: 277 K/UL — SIGNIFICANT CHANGE UP (ref 150–400)
POTASSIUM SERPL-MCNC: 3.9 MMOL/L — SIGNIFICANT CHANGE UP (ref 3.5–5.3)
POTASSIUM SERPL-SCNC: 3.9 MMOL/L — SIGNIFICANT CHANGE UP (ref 3.5–5.3)
PROT SERPL-MCNC: 6.1 G/DL — LOW (ref 6.6–8.7)
RBC # BLD: 3.25 M/UL — LOW (ref 4.2–5.8)
RBC # FLD: 13.3 % — SIGNIFICANT CHANGE UP (ref 10.3–14.5)
SODIUM SERPL-SCNC: 143 MMOL/L — SIGNIFICANT CHANGE UP (ref 135–145)
WBC # BLD: 22.07 K/UL — HIGH (ref 3.8–10.5)
WBC # FLD AUTO: 22.07 K/UL — HIGH (ref 3.8–10.5)

## 2019-08-24 PROCEDURE — 99233 SBSQ HOSP IP/OBS HIGH 50: CPT

## 2019-08-24 PROCEDURE — 99232 SBSQ HOSP IP/OBS MODERATE 35: CPT

## 2019-08-24 RX ORDER — SENNA PLUS 8.6 MG/1
2 TABLET ORAL AT BEDTIME
Refills: 0 | Status: DISCONTINUED | OUTPATIENT
Start: 2019-08-24 | End: 2019-08-26

## 2019-08-24 RX ORDER — HYDROCORTISONE 20 MG
100 TABLET ORAL
Refills: 0 | Status: DISCONTINUED | OUTPATIENT
Start: 2019-08-24 | End: 2019-08-25

## 2019-08-24 RX ORDER — MAGNESIUM HYDROXIDE 400 MG/1
30 TABLET, CHEWABLE ORAL DAILY
Refills: 0 | Status: DISCONTINUED | OUTPATIENT
Start: 2019-08-24 | End: 2019-08-26

## 2019-08-24 RX ORDER — SACCHAROMYCES BOULARDII 250 MG
250 POWDER IN PACKET (EA) ORAL
Refills: 0 | Status: DISCONTINUED | OUTPATIENT
Start: 2019-08-24 | End: 2019-08-26

## 2019-08-24 RX ADMIN — Medication 250 MILLIGRAM(S): at 18:27

## 2019-08-24 RX ADMIN — Medication 100 MILLIGRAM(S): at 06:07

## 2019-08-24 RX ADMIN — PIPERACILLIN AND TAZOBACTAM 25 GRAM(S): 4; .5 INJECTION, POWDER, LYOPHILIZED, FOR SOLUTION INTRAVENOUS at 14:51

## 2019-08-24 RX ADMIN — PIPERACILLIN AND TAZOBACTAM 25 GRAM(S): 4; .5 INJECTION, POWDER, LYOPHILIZED, FOR SOLUTION INTRAVENOUS at 06:07

## 2019-08-24 RX ADMIN — PIPERACILLIN AND TAZOBACTAM 25 GRAM(S): 4; .5 INJECTION, POWDER, LYOPHILIZED, FOR SOLUTION INTRAVENOUS at 22:56

## 2019-08-24 RX ADMIN — Medication 100 MILLIGRAM(S): at 14:51

## 2019-08-24 RX ADMIN — Medication 25 MILLIGRAM(S): at 06:07

## 2019-08-24 NOTE — PROGRESS NOTE ADULT - SUBJECTIVE AND OBJECTIVE BOX
[facility]  INFECTIOUS DISEASES AND INTERNAL MEDICINE at [place]  =======================================================  Kb Garcia MD  Diplomates American Board of Internal Medicine and Infectious Diseases  =======================================================    [facility]-[id number]  SAUMYAANASTACIO BACONGO     Follow up: Fever and bacteremia     Doing better, now afebrile.   Blood culture is positive, source still unclear, could be abdomen.     PAST MEDICAL & SURGICAL HISTORY:  PSC (primary sclerosing cholangitis)  Cerebral palsy  History of biliary stent insertion: removed 2019  History of ERCP: performed at [facility] Hx: no smoking or toxic habts    FAMILY HISTORY:  No pertinent family history in first degree relatives    Allergies  No Known Allergies    Antibiotics:  piperacillin/tazobactam IVPB.. 3.375 Gram(s) IV Intermittent every 8 hours    REVIEW OF SYSTEMS:  CONSTITUTIONAL:  + Fever and chills  HEENT:  No diplopia or blurred vision.  No sore throat or runny nose.  CARDIOVASCULAR:  No chest pain or SOB.  RESPIRATORY:  No cough, shortness of breath, PND or orthopnea.  GASTROINTESTINAL:  No nausea, vomiting or diarrhea.  GENITOURINARY:  No dysuria, frequency or urgency. No Blood in urine  MUSCULOSKELETAL:  no joint aches, no muscle pain  SKIN:  No change in skin, hair or nails.  NEUROLOGIC:  No paresthesias, fasciculations, seizures or weakness.  PSYCHIATRIC:  No disorder of thought or mood.  ENDOCRINE:  No heat or cold intolerance, polyuria or polydipsia.  HEMATOLOGICAL:  No easy bruising or bleeding.     Physical Exam:  Vital Signs Last 24 Hrs  T(C): 37.3 (24 Aug 2019 07:50), Max: 38.1 (23 Aug 2019 16:05)  T(F): 99.1 (24 Aug 2019 07:50), Max: 100.5 (23 Aug 2019 16:05)  HR: 95 (24 Aug 2019 08:00) (75 - 107)  BP: 132/91 (24 Aug 2019 08:00) (122/71 - 137/76)  BP(mean): 92 (24 Aug 2019 06:03) (87 - 93)  RR: 27 (24 Aug 2019 08:00) (22 - 41)  SpO2: 99% (24 Aug 2019 08:00) (93% - 99%)  GEN: NAD  HEENT: normocephalic and atraumatic. EOMI. PERRL.    NECK: Supple.  No lymphadenopathy   LUNGS: Clear to auscultation.  HEART: Regular rate and rhythm without murmur.  ABDOMEN: Soft, nontender, and nondistended.  Positive bowel sounds.    : No CVA tenderness  EXTREMITIES: Without any cyanosis, clubbing, rash, lesions or edema.  NEUROLOGIC: left sided paresis   PSYCHIATRIC: Appropriate affect .  SKIN: No ulceration or induration present.    Labs:      143  |  109<H>  |  18.0  ----------------------------<  124<H>  3.9   |  22.0  |  0.62    Ca    8.9      24 Aug 2019 04:55    TPro  6.1<L>  /  Alb  2.3<L>  /  TBili  0.8  /  DBili  x   /  AST  41<H>  /  ALT  84<H>  /  AlkPhos  167<H>                          10.7   22.07 )-----------( 277      ( 24 Aug 2019 04:55 )             32.5     Urinalysis Basic - ( 22 Aug 2019 17:33 )    Color: Yellow / Appearance: Clear / S.005 / pH: x  Gluc: x / Ketone: Negative  / Bili: Negative / Urobili: 1 mg/dL   Blood: x / Protein: 30 mg/dL / Nitrite: Negative   Leuk Esterase: Negative / RBC: 0-2 /HPF / WBC 0-2   Sq Epi: x / Non Sq Epi: Occasional / Bacteria: Occasional    LIVER FUNCTIONS - ( 24 Aug 2019 04:55 )  Alb: 2.3 g/dL / Pro: 6.1 g/dL / ALK PHOS: 167 U/L / ALT: 84 U/L / AST: 41 U/L / GGT: x           ABG - ( 23 Aug 2019 01:45 )  pH, Arterial: 7.45  pH, Blood: x     /  pCO2: 33    /  pO2: 50    / HCO3: 24    / Base Excess: -0.2  /  SaO2: 89        RECENT CULTURES:   @ 20:12      NotDetec     @ 17:33 .Urine     No growth     @ 13:31 .Blood Pseudomonas aeruginosa  Blood Culture PCR    Growth in aerobic bottle: Pseudomonas aeruginosa  Aerobic Bottle: 23.14 Hours to positivity  Anaerobic Bottle: No growth to date    All imaging and other data have been reviewed.

## 2019-08-24 NOTE — PROGRESS NOTE ADULT - SUBJECTIVE AND OBJECTIVE BOX
seen for bacteremia    no acute complaints/events  tolerating PO,  intermittent gi upset   at baseline per mom at bedside.  ros negative    MEDICATIONS  (STANDING):  enoxaparin Injectable 40 milliGRAM(s) SubCutaneous daily  hydrocortisone sodium succinate Injectable 100 milliGRAM(s) IV Push two times a day  metoprolol tartrate 25 milliGRAM(s) Oral every 12 hours  piperacillin/tazobactam IVPB.. 3.375 Gram(s) IV Intermittent every 8 hours  saccharomyces boulardii 250 milliGRAM(s) Oral two times a day    MEDICATIONS  (PRN):  acetaminophen   Tablet .. 650 milliGRAM(s) Oral every 6 hours PRN Temp greater or equal to 38C (100.4F)      Allergies    No Known Allergies      Vital Signs Last 24 Hrs  T(C): 37.3 (24 Aug 2019 07:50), Max: 37.7 (23 Aug 2019 21:30)  T(F): 99.1 (24 Aug 2019 07:50), Max: 99.9 (23 Aug 2019 21:30)  HR: 95 (24 Aug 2019 08:00) (75 - 107)  BP: 132/91 (24 Aug 2019 08:00) (122/71 - 137/76)  BP(mean): 92 (24 Aug 2019 06:03) (87 - 93)  RR: 27 (24 Aug 2019 08:00) (22 - 41)  SpO2: 99% (24 Aug 2019 08:00) (93% - 99%)    PHYSICAL EXAM:    GENERAL: NAD  CHEST/LUNG: dec bs right base no wheezing   HEART: Regular rate and rhythm; S1 S2  ABDOMEN: Soft, ; Bowel sounds present  EXTREMITIES: no edema   NERVOUS SYSTEM:  Alert & responds appropriately, left sided weakness  LABS:                        10.7   22.07 )-----------( 277      ( 24 Aug 2019 04:55 )             32.5     08-24    143  |  109<H>  |  18.0  ----------------------------<  124<H>  3.9   |  22.0  |  0.62    Ca    8.9      24 Aug 2019 04:55    TPro  6.1<L>  /  Alb  2.3<L>  /  TBili  0.8  /  DBili  x   /  AST  41<H>  /  ALT  84<H>  /  AlkPhos  167<H>  08-      Urinalysis Basic - ( 22 Aug 2019 17:33 )    Color: Yellow / Appearance: Clear / S.005 / pH: x  Gluc: x / Ketone: Negative  / Bili: Negative / Urobili: 1 mg/dL   Blood: x / Protein: 30 mg/dL / Nitrite: Negative   Leuk Esterase: Negative / RBC: 0-2 /HPF / WBC 0-2   Sq Epi: x / Non Sq Epi: Occasional / Bacteria: Occasional        CAPILLARY BLOOD GLUCOSE            RADIOLOGY & ADDITIONAL TESTS:

## 2019-08-24 NOTE — PROGRESS NOTE ADULT - SUBJECTIVE AND OBJECTIVE BOX
Interval History:  no seizure like epiosde  MEDICATIONS  (STANDING):  enoxaparin Injectable 40 milliGRAM(s) SubCutaneous daily  hydrocortisone sodium succinate Injectable 100 milliGRAM(s) IV Push two times a day  metoprolol tartrate 25 milliGRAM(s) Oral every 12 hours  piperacillin/tazobactam IVPB.. 3.375 Gram(s) IV Intermittent every 8 hours    MEDICATIONS  (PRN):  acetaminophen   Tablet .. 650 milliGRAM(s) Oral every 6 hours PRN Temp greater or equal to 38C (100.4F)      Allergies    No Known Allergies    Intolerances        PHYSICAL EXAM:  Vital Signs Last 24 Hrs  T(F): 99.1 (19 @ 07:50)  HR: 95 (19 @ 08:00)  BP: 132/91 (19 @ 08:00)  RR: 27 (19 @ 08:00)    NERVOUS SYSTEM:  Alert & Oriented X3, speech and language normal, cranial nerves II-XII normal,   Good concentration; Motor Strength 4/5 left isde, 5/5 right side.  LABS:                        10.7   22.07 )-----------( 277      ( 24 Aug 2019 04:55 )             32.5     -    143  |  109<H>  |  18.0  ----------------------------<  124<H>  3.9   |  22.0  |  0.62    Ca    8.9      24 Aug 2019 04:55    TPro  6.1<L>  /  Alb  2.3<L>  /  TBili  0.8  /  DBili  x   /  AST  41<H>  /  ALT  84<H>  /  AlkPhos  167<H>        Urinalysis Basic - ( 22 Aug 2019 17:33 )    Color: Yellow / Appearance: Clear / S.005 / pH: x  Gluc: x / Ketone: Negative  / Bili: Negative / Urobili: 1 mg/dL   Blood: x / Protein: 30 mg/dL / Nitrite: Negative   Leuk Esterase: Negative / RBC: 0-2 /HPF / WBC 0-2   Sq Epi: x / Non Sq Epi: Occasional / Bacteria: Occasional        RADIOLOGY & ADDITIONAL STUDIES:

## 2019-08-24 NOTE — PROGRESS NOTE ADULT - ASSESSMENT
28y/o young man with PMH of CP with left sided paresis, childhood seizure and Primary sclerosing cholangitis was admitted with high fever and seizure.   Had one episode of diarrhea, vomiting on admission day and some cough started after admission.   He had stent placed for jaundice in the past and due to acute pancreatitis it was removed in july by ERCP.  Now has bacteremia with pseudomonas, source still unclear, possibly abdomen or pneumonia.     Fever  Viral URI or GE  R/O cholangitis     - Blood culture positive for pseudomonas, will follow ID and sensitivity   - Repeat blood culture   - UA and UC negative   - RVP neg   - RLL infiltrate in CXR could be due to aspiration pneumonia during seizure vs pneumonia   - Abdominal CT result noted r/o liver abscess/cholangitis   - LFTs are trending down, abdomen exam normal    - GI consult noted  - Neurology follow up  - Leukocytosis due to steroid use  - Continue Zosyn 3.375gm q8h to cover abdominal adelita and pneumonia until culture is back.     Will follow.

## 2019-08-24 NOTE — PROGRESS NOTE ADULT - ASSESSMENT
29M with a history of cerebral palsy and primary sclerosing cholangitis on chronic prednisone, recently removal of biliary stent presents with fever, hypotension and suspected seizure.    Sepsis - resolved, present on admission.  acute hypoxic respiratory failure.    due to aspiration pneumonia?     Continue IV Abx    ID following    decrease stress dose steroids--hydrocortisone BID today and tomorrow then back to prednisone 30mg daily (home dose)    Bacteremia: sensitive pseudomonas    check blood cultures in am    Seizures - May be due to febrile illness.  Neurology input appreciated. MRI brain pending     Primary sclerosing cholangitis - Elevated bilirubin on presenting laboratory studies. Status post biliary stent removal last month    GI eval appreciated..no further intervention     Cerebral Palsy - supportive care.     Dysphagia: likely due to acute illness. MBS per speech rec     DVT Prophylaxis - Lovenox subcut    d/w patient and mother at bedside.  PT evaluation

## 2019-08-25 LAB
ALBUMIN SERPL ELPH-MCNC: 2.6 G/DL — LOW (ref 3.3–5.2)
ALP SERPL-CCNC: 196 U/L — HIGH (ref 40–120)
ALT FLD-CCNC: 65 U/L — HIGH
ANION GAP SERPL CALC-SCNC: 10 MMOL/L — SIGNIFICANT CHANGE UP (ref 5–17)
AST SERPL-CCNC: 29 U/L — SIGNIFICANT CHANGE UP
BASOPHILS # BLD AUTO: 0.01 K/UL — SIGNIFICANT CHANGE UP (ref 0–0.2)
BASOPHILS NFR BLD AUTO: 0.1 % — SIGNIFICANT CHANGE UP (ref 0–2)
BILIRUB DIRECT SERPL-MCNC: 0.4 MG/DL — HIGH (ref 0–0.3)
BILIRUB INDIRECT FLD-MCNC: 0.4 MG/DL — SIGNIFICANT CHANGE UP (ref 0.2–1)
BILIRUB SERPL-MCNC: 0.8 MG/DL — SIGNIFICANT CHANGE UP (ref 0.4–2)
BUN SERPL-MCNC: 20 MG/DL — SIGNIFICANT CHANGE UP (ref 8–20)
CALCIUM SERPL-MCNC: 8.9 MG/DL — SIGNIFICANT CHANGE UP (ref 8.6–10.2)
CHLORIDE SERPL-SCNC: 108 MMOL/L — HIGH (ref 98–107)
CO2 SERPL-SCNC: 25 MMOL/L — SIGNIFICANT CHANGE UP (ref 22–29)
CREAT SERPL-MCNC: 0.56 MG/DL — SIGNIFICANT CHANGE UP (ref 0.5–1.3)
EOSINOPHIL # BLD AUTO: 0 K/UL — SIGNIFICANT CHANGE UP (ref 0–0.5)
EOSINOPHIL NFR BLD AUTO: 0 % — SIGNIFICANT CHANGE UP (ref 0–6)
GLUCOSE SERPL-MCNC: 125 MG/DL — HIGH (ref 70–115)
HCT VFR BLD CALC: 33.3 % — LOW (ref 39–50)
HGB BLD-MCNC: 10.8 G/DL — LOW (ref 13–17)
IMM GRANULOCYTES NFR BLD AUTO: 1.1 % — SIGNIFICANT CHANGE UP (ref 0–1.5)
LYMPHOCYTES # BLD AUTO: 0.95 K/UL — LOW (ref 1–3.3)
LYMPHOCYTES # BLD AUTO: 5.5 % — LOW (ref 13–44)
MCHC RBC-ENTMCNC: 32.4 GM/DL — SIGNIFICANT CHANGE UP (ref 32–36)
MCHC RBC-ENTMCNC: 32.4 PG — SIGNIFICANT CHANGE UP (ref 27–34)
MCV RBC AUTO: 100 FL — SIGNIFICANT CHANGE UP (ref 80–100)
MONOCYTES # BLD AUTO: 0.44 K/UL — SIGNIFICANT CHANGE UP (ref 0–0.9)
MONOCYTES NFR BLD AUTO: 2.5 % — SIGNIFICANT CHANGE UP (ref 2–14)
NEUTROPHILS # BLD AUTO: 15.71 K/UL — HIGH (ref 1.8–7.4)
NEUTROPHILS NFR BLD AUTO: 90.8 % — HIGH (ref 43–77)
PLATELET # BLD AUTO: 324 K/UL — SIGNIFICANT CHANGE UP (ref 150–400)
POTASSIUM SERPL-MCNC: 3.7 MMOL/L — SIGNIFICANT CHANGE UP (ref 3.5–5.3)
POTASSIUM SERPL-SCNC: 3.7 MMOL/L — SIGNIFICANT CHANGE UP (ref 3.5–5.3)
PROT SERPL-MCNC: 6.4 G/DL — LOW (ref 6.6–8.7)
RBC # BLD: 3.33 M/UL — LOW (ref 4.2–5.8)
RBC # FLD: 13.2 % — SIGNIFICANT CHANGE UP (ref 10.3–14.5)
SODIUM SERPL-SCNC: 143 MMOL/L — SIGNIFICANT CHANGE UP (ref 135–145)
WBC # BLD: 17.3 K/UL — HIGH (ref 3.8–10.5)
WBC # FLD AUTO: 17.3 K/UL — HIGH (ref 3.8–10.5)

## 2019-08-25 PROCEDURE — 70551 MRI BRAIN STEM W/O DYE: CPT | Mod: 26

## 2019-08-25 PROCEDURE — 99233 SBSQ HOSP IP/OBS HIGH 50: CPT

## 2019-08-25 PROCEDURE — 99232 SBSQ HOSP IP/OBS MODERATE 35: CPT

## 2019-08-25 PROCEDURE — 76377 3D RENDER W/INTRP POSTPROCES: CPT | Mod: 26

## 2019-08-25 RX ORDER — HYDROCORTISONE 20 MG
100 TABLET ORAL
Refills: 0 | Status: COMPLETED | OUTPATIENT
Start: 2019-08-25 | End: 2019-08-25

## 2019-08-25 RX ADMIN — PIPERACILLIN AND TAZOBACTAM 25 GRAM(S): 4; .5 INJECTION, POWDER, LYOPHILIZED, FOR SOLUTION INTRAVENOUS at 05:21

## 2019-08-25 RX ADMIN — Medication 250 MILLIGRAM(S): at 17:26

## 2019-08-25 RX ADMIN — PIPERACILLIN AND TAZOBACTAM 25 GRAM(S): 4; .5 INJECTION, POWDER, LYOPHILIZED, FOR SOLUTION INTRAVENOUS at 13:46

## 2019-08-25 RX ADMIN — Medication 100 MILLIGRAM(S): at 05:21

## 2019-08-25 RX ADMIN — PIPERACILLIN AND TAZOBACTAM 25 GRAM(S): 4; .5 INJECTION, POWDER, LYOPHILIZED, FOR SOLUTION INTRAVENOUS at 21:44

## 2019-08-25 RX ADMIN — SENNA PLUS 2 TABLET(S): 8.6 TABLET ORAL at 21:44

## 2019-08-25 RX ADMIN — Medication 100 MILLIGRAM(S): at 00:21

## 2019-08-25 RX ADMIN — Medication 100 MILLIGRAM(S): at 17:26

## 2019-08-25 RX ADMIN — Medication 250 MILLIGRAM(S): at 05:21

## 2019-08-25 NOTE — PROGRESS NOTE ADULT - ASSESSMENT
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   Had one episode of diarrhea, vomiting on admission day and some cough started after admission.   He had stent placed for jaundice in the past and due to acute pancreatitis it was removed in july by ERCP.  Now has bacteremia with pseudomonas, source still unclear, possibly abdomen or pneumonia.     Fever  Viral URI or GE  R/O cholangitis     - Blood culture positive for pseudomonas, will follow ID and sensitivity   - Repeat blood culture   - UA and UC negative   - RVP neg   - RLL infiltrate in CXR could be due to aspiration pneumonia during seizure vs pneumonia   - Abdominal CT result noted r/o liver abscess/cholangitis   - LFTs are trending down, abdomen exam normal    - GI consult noted  - Neurology follow up  - Leukocytosis trending down but on steroid as well.   - Continue Zosyn 3.375gm q8h to cover abdominal adelita and pneumonia even though psudomonas is S to all ABx,  - When ready for discharge can be switched to Ciprofloxacin 750mg q12h to complete 14 days from first neg blood culture.     Will follow.

## 2019-08-25 NOTE — PROGRESS NOTE ADULT - SUBJECTIVE AND OBJECTIVE BOX
[facility]  INFECTIOUS DISEASES AND INTERNAL MEDICINE at [place]  =======================================================  Kb Garcia MD  Diplomates American Board of Internal Medicine and Infectious Diseases  =======================================================    [facility]-[id number]  SAUMYA ELLIOT     Follow up: Fever and bacteremia     No more fever, no seizure.   Blood culture with pseudomonas most likely source is abdomen/liver.     PAST MEDICAL & SURGICAL HISTORY:  PSC (primary sclerosing cholangitis)  Cerebral palsy  History of biliary stent insertion: removed 7/2019  History of ERCP: performed at [facility] Hx: no smoking or toxic habts    FAMILY HISTORY:  No pertinent family history in first degree relatives    Allergies  No Known Allergies    Antibiotics:  piperacillin/tazobactam IVPB.. 3.375 Gram(s) IV Intermittent every 8 hours    REVIEW OF SYSTEMS:  CONSTITUTIONAL:  + Fever and chills  HEENT:  No diplopia or blurred vision.  No sore throat or runny nose.  CARDIOVASCULAR:  No chest pain or SOB.  RESPIRATORY:  No cough, shortness of breath, PND or orthopnea.  GASTROINTESTINAL:  No nausea, vomiting or diarrhea.  GENITOURINARY:  No dysuria, frequency or urgency. No Blood in urine  MUSCULOSKELETAL:  no joint aches, no muscle pain  SKIN:  No change in skin, hair or nails.  NEUROLOGIC:  No paresthesias, fasciculations, seizures or weakness.  PSYCHIATRIC:  No disorder of thought or mood.  ENDOCRINE:  No heat or cold intolerance, polyuria or polydipsia.  HEMATOLOGICAL:  No easy bruising or bleeding.     Physical Exam:  Vital Signs Last 24 Hrs  T(C): 36.8 (25 Aug 2019 09:24), Max: 37.6 (24 Aug 2019 20:17)  T(F): 98.3 (25 Aug 2019 09:24), Max: 99.6 (24 Aug 2019 20:17)  HR: 60 (25 Aug 2019 09:24) (54 - 90)  BP: 127/78 (25 Aug 2019 09:24) (124/78 - 137/73)  BP(mean): 87 (24 Aug 2019 20:01) (87 - 87)  RR: 18 (25 Aug 2019 09:24) (18 - 30)  SpO2: 95% (25 Aug 2019 09:24) (93% - 100%)  GEN: NAD  HEENT: normocephalic and atraumatic. EOMI. PERRL.    NECK: Supple.  No lymphadenopathy   LUNGS: Clear to auscultation.  HEART: Regular rate and rhythm without murmur.  ABDOMEN: Soft, nontender, and nondistended.  Positive bowel sounds.    : No CVA tenderness  EXTREMITIES: Without any cyanosis, clubbing, rash, lesions or edema.  NEUROLOGIC: left sided paresis   PSYCHIATRIC: Appropriate affect .  SKIN: No ulceration or induration present.    Labs:  08-25    143  |  108<H>  |  20.0  ----------------------------<  125<H>  3.7   |  25.0  |  0.56    Ca    8.9      25 Aug 2019 06:49    TPro  6.4<L>  /  Alb  2.6<L>  /  TBili  0.8  /  DBili  0.4<H>  /  AST  29  /  ALT  65<H>  /  AlkPhos  196<H>  08-25                        10.8   17.30 )-----------( 324      ( 25 Aug 2019 06:49 )             33.3     LIVER FUNCTIONS - ( 25 Aug 2019 06:49 )  Alb: 2.6 g/dL / Pro: 6.4 g/dL / ALK PHOS: 196 U/L / ALT: 65 U/L / AST: 29 U/L / GGT: x           RECENT CULTURES:  08-22 @ 20:12      NotDetec    08-22 @ 17:33 .Urine     No growth    08-22 @ 13:31 .Blood Pseudomonas aeruginosa  Blood Culture PCR    Growth in aerobic bottle: Pseudomonas aeruginosa  Aerobic Bottle: 23.14 Hours to positivity  Anaerobic Bottle: No growth to date    08-22 @ 13:30 .Blood     No growth at 48 hours    All imaging and other data have been reviewed.

## 2019-08-25 NOTE — PROGRESS NOTE ADULT - SUBJECTIVE AND OBJECTIVE BOX
seen for bacteremia    no acute complaints/events  no rn events  ros negative   at baseline per mother at bedside     MEDICATIONS  (STANDING):  enoxaparin Injectable 40 milliGRAM(s) SubCutaneous daily  hydrocortisone sodium succinate Injectable 100 milliGRAM(s) IV Push two times a day  piperacillin/tazobactam IVPB.. 3.375 Gram(s) IV Intermittent every 8 hours  saccharomyces boulardii 250 milliGRAM(s) Oral two times a day  senna 2 Tablet(s) Oral at bedtime    MEDICATIONS  (PRN):  acetaminophen   Tablet .. 650 milliGRAM(s) Oral every 6 hours PRN Temp greater or equal to 38C (100.4F)  aluminum hydroxide/magnesium hydroxide/simethicone Suspension 30 milliLiter(s) Oral every 4 hours PRN Dyspepsia  magnesium hydroxide Suspension 30 milliLiter(s) Oral daily PRN Constipation      Allergies    No Known Allergies    Vital Signs Last 24 Hrs  T(C): 36.8 (25 Aug 2019 09:24), Max: 37.6 (24 Aug 2019 20:17)  T(F): 98.3 (25 Aug 2019 09:24), Max: 99.6 (24 Aug 2019 20:17)  HR: 60 (25 Aug 2019 09:24) (54 - 90)  BP: 127/78 (25 Aug 2019 09:24) (124/78 - 137/73)  BP(mean): 87 (24 Aug 2019 20:01) (87 - 87)  RR: 18 (25 Aug 2019 09:24) (18 - 30)  SpO2: 95% (25 Aug 2019 09:24) (93% - 100%)    PHYSICAL EXAM:    GENERAL: NAD  CHEST/LUNG: dec bs right base no wheezing   HEART: Regular rate and rhythm; S1 S2  ABDOMEN: Soft, ; Bowel sounds present  EXTREMITIES: no edema   NERVOUS SYSTEM:  Alert & responds appropriately, mild left sided weakness    LABS:                        10.8   17.30 )-----------( 324      ( 25 Aug 2019 06:49 )             33.3     08-25    143  |  108<H>  |  20.0  ----------------------------<  125<H>  3.7   |  25.0  |  0.56    Ca    8.9      25 Aug 2019 06:49    TPro  6.4<L>  /  Alb  2.6<L>  /  TBili  0.8  /  DBili  0.4<H>  /  AST  29  /  ALT  65<H>  /  AlkPhos  196<H>  08-25          CAPILLARY BLOOD GLUCOSE            RADIOLOGY & ADDITIONAL TESTS:

## 2019-08-25 NOTE — PROGRESS NOTE ADULT - SUBJECTIVE AND OBJECTIVE BOX
INTERVAL HPI/OVERNIGHT EVENTS:FU for sepsis. Doing better.     MEDICATIONS  (STANDING):  enoxaparin Injectable 40 milliGRAM(s) SubCutaneous daily  piperacillin/tazobactam IVPB.. 3.375 Gram(s) IV Intermittent every 8 hours  saccharomyces boulardii 250 milliGRAM(s) Oral two times a day  senna 2 Tablet(s) Oral at bedtime    MEDICATIONS  (PRN):  acetaminophen   Tablet .. 650 milliGRAM(s) Oral every 6 hours PRN Temp greater or equal to 38C (100.4F)  aluminum hydroxide/magnesium hydroxide/simethicone Suspension 30 milliLiter(s) Oral every 4 hours PRN Dyspepsia  magnesium hydroxide Suspension 30 milliLiter(s) Oral daily PRN Constipation      Allergies    No Known Allergies    Intolerances        Vital Signs Last 24 Hrs  T(C): 36.6 (25 Aug 2019 16:17), Max: 37.6 (24 Aug 2019 20:17)  T(F): 97.9 (25 Aug 2019 16:17), Max: 99.6 (24 Aug 2019 20:17)  HR: 73 (25 Aug 2019 16:17) (54 - 76)  BP: 116/63 (25 Aug 2019 16:17) (116/63 - 137/73)  BP(mean): --  RR: 18 (25 Aug 2019 16:17) (18 - 19)  SpO2: 98% (25 Aug 2019 16:17) (93% - 98%)    LABS:                        10.8   17.30 )-----------( 324      ( 25 Aug 2019 06:49 )             33.3     08-25    143  |  108<H>  |  20.0  ----------------------------<  125<H>  3.7   |  25.0  |  0.56    Ca    8.9      25 Aug 2019 06:49    TPro  6.4<L>  /  Alb  2.6<L>  /  TBili  0.8  /  DBili  0.4<H>  /  AST  29  /  ALT  65<H>  /  AlkPhos  196<H>  08-25          RADIOLOGY & ADDITIONAL TESTS:

## 2019-08-25 NOTE — PROGRESS NOTE ADULT - ASSESSMENT
29M with a history of cerebral palsy and primary sclerosing cholangitis on chronic prednisone, recently removal of biliary stent presents with fever, hypotension and suspected seizure.    Sepsis - resolved, present on admission.  acute hypoxic respiratory failure.    due to aspiration pneumonia?     Continue IV Abx    ID following    decrease stress dose steroids--hydrocortisone BID today then back to prednisone 30mg daily (home dose)    Bacteremia: sensitive pseudomonas    repeat blood cultures in progress    2 weeks of oral cipro 750mg BID from 1st neg blood culture    Seizures - May be due to febrile illness.  Neurology input appreciated. MRI brain pending     Primary sclerosing cholangitis - Elevated bilirubin on presenting laboratory studies. Status post biliary stent removal last month    GI eval appreciated..no further intervention     Cerebral Palsy - supportive care.     Dysphagia: likely due to acute illness. MBS per speech rec     DVT Prophylaxis - Lovenox subcut    d/w patient and mother at bedside.  PT evaluation

## 2019-08-26 ENCOUNTER — RX RENEWAL (OUTPATIENT)
Age: 29
End: 2019-08-26

## 2019-08-26 ENCOUNTER — TRANSCRIPTION ENCOUNTER (OUTPATIENT)
Age: 29
End: 2019-08-26

## 2019-08-26 VITALS
OXYGEN SATURATION: 98 % | HEART RATE: 71 BPM | TEMPERATURE: 98 F | DIASTOLIC BLOOD PRESSURE: 69 MMHG | SYSTOLIC BLOOD PRESSURE: 118 MMHG | RESPIRATION RATE: 18 BRPM

## 2019-08-26 LAB
ANION GAP SERPL CALC-SCNC: 11 MMOL/L — SIGNIFICANT CHANGE UP (ref 5–17)
BUN SERPL-MCNC: 19 MG/DL — SIGNIFICANT CHANGE UP (ref 8–20)
CALCIUM SERPL-MCNC: 8.8 MG/DL — SIGNIFICANT CHANGE UP (ref 8.6–10.2)
CHLORIDE SERPL-SCNC: 105 MMOL/L — SIGNIFICANT CHANGE UP (ref 98–107)
CO2 SERPL-SCNC: 25 MMOL/L — SIGNIFICANT CHANGE UP (ref 22–29)
CREAT SERPL-MCNC: 0.6 MG/DL — SIGNIFICANT CHANGE UP (ref 0.5–1.3)
GLUCOSE SERPL-MCNC: 102 MG/DL — SIGNIFICANT CHANGE UP (ref 70–115)
HCT VFR BLD CALC: 36 % — LOW (ref 39–50)
HGB BLD-MCNC: 11.7 G/DL — LOW (ref 13–17)
MCHC RBC-ENTMCNC: 32.1 PG — SIGNIFICANT CHANGE UP (ref 27–34)
MCHC RBC-ENTMCNC: 32.5 GM/DL — SIGNIFICANT CHANGE UP (ref 32–36)
MCV RBC AUTO: 98.6 FL — SIGNIFICANT CHANGE UP (ref 80–100)
PLATELET # BLD AUTO: 387 K/UL — SIGNIFICANT CHANGE UP (ref 150–400)
POTASSIUM SERPL-MCNC: 3.6 MMOL/L — SIGNIFICANT CHANGE UP (ref 3.5–5.3)
POTASSIUM SERPL-SCNC: 3.6 MMOL/L — SIGNIFICANT CHANGE UP (ref 3.5–5.3)
RBC # BLD: 3.65 M/UL — LOW (ref 4.2–5.8)
RBC # FLD: 12.8 % — SIGNIFICANT CHANGE UP (ref 10.3–14.5)
SODIUM SERPL-SCNC: 141 MMOL/L — SIGNIFICANT CHANGE UP (ref 135–145)
VIT B12 SERPL-MCNC: 322 PG/ML — SIGNIFICANT CHANGE UP (ref 232–1245)
WBC # BLD: 11.33 K/UL — HIGH (ref 3.8–10.5)
WBC # FLD AUTO: 11.33 K/UL — HIGH (ref 3.8–10.5)

## 2019-08-26 PROCEDURE — 95819 EEG AWAKE AND ASLEEP: CPT

## 2019-08-26 PROCEDURE — 87581 M.PNEUMON DNA AMP PROBE: CPT

## 2019-08-26 PROCEDURE — 92526 ORAL FUNCTION THERAPY: CPT

## 2019-08-26 PROCEDURE — 87150 DNA/RNA AMPLIFIED PROBE: CPT

## 2019-08-26 PROCEDURE — 82962 GLUCOSE BLOOD TEST: CPT

## 2019-08-26 PROCEDURE — 84132 ASSAY OF SERUM POTASSIUM: CPT

## 2019-08-26 PROCEDURE — 86901 BLOOD TYPING SEROLOGIC RH(D): CPT

## 2019-08-26 PROCEDURE — 71045 X-RAY EXAM CHEST 1 VIEW: CPT

## 2019-08-26 PROCEDURE — 36415 COLL VENOUS BLD VENIPUNCTURE: CPT

## 2019-08-26 PROCEDURE — 82803 BLOOD GASES ANY COMBINATION: CPT

## 2019-08-26 PROCEDURE — 36600 WITHDRAWAL OF ARTERIAL BLOOD: CPT

## 2019-08-26 PROCEDURE — 82140 ASSAY OF AMMONIA: CPT

## 2019-08-26 PROCEDURE — 87186 SC STD MICRODIL/AGAR DIL: CPT

## 2019-08-26 PROCEDURE — 74177 CT ABD & PELVIS W/CONTRAST: CPT

## 2019-08-26 PROCEDURE — 82435 ASSAY OF BLOOD CHLORIDE: CPT

## 2019-08-26 PROCEDURE — 96365 THER/PROPH/DIAG IV INF INIT: CPT | Mod: XU

## 2019-08-26 PROCEDURE — 85610 PROTHROMBIN TIME: CPT

## 2019-08-26 PROCEDURE — 87486 CHLMYD PNEUM DNA AMP PROBE: CPT

## 2019-08-26 PROCEDURE — 84295 ASSAY OF SERUM SODIUM: CPT

## 2019-08-26 PROCEDURE — 82330 ASSAY OF CALCIUM: CPT

## 2019-08-26 PROCEDURE — 80076 HEPATIC FUNCTION PANEL: CPT

## 2019-08-26 PROCEDURE — 80053 COMPREHEN METABOLIC PANEL: CPT

## 2019-08-26 PROCEDURE — 70450 CT HEAD/BRAIN W/O DYE: CPT

## 2019-08-26 PROCEDURE — 96375 TX/PRO/DX INJ NEW DRUG ADDON: CPT | Mod: XU

## 2019-08-26 PROCEDURE — 87086 URINE CULTURE/COLONY COUNT: CPT

## 2019-08-26 PROCEDURE — 87798 DETECT AGENT NOS DNA AMP: CPT

## 2019-08-26 PROCEDURE — 99291 CRITICAL CARE FIRST HOUR: CPT | Mod: 25

## 2019-08-26 PROCEDURE — 85014 HEMATOCRIT: CPT

## 2019-08-26 PROCEDURE — C1751: CPT

## 2019-08-26 PROCEDURE — 81001 URINALYSIS AUTO W/SCOPE: CPT

## 2019-08-26 PROCEDURE — 99232 SBSQ HOSP IP/OBS MODERATE 35: CPT

## 2019-08-26 PROCEDURE — 87040 BLOOD CULTURE FOR BACTERIA: CPT

## 2019-08-26 PROCEDURE — 96367 TX/PROPH/DG ADDL SEQ IV INF: CPT

## 2019-08-26 PROCEDURE — 70551 MRI BRAIN STEM W/O DYE: CPT

## 2019-08-26 PROCEDURE — 86900 BLOOD TYPING SEROLOGIC ABO: CPT

## 2019-08-26 PROCEDURE — 83880 ASSAY OF NATRIURETIC PEPTIDE: CPT

## 2019-08-26 PROCEDURE — 82607 VITAMIN B-12: CPT

## 2019-08-26 PROCEDURE — 86850 RBC ANTIBODY SCREEN: CPT

## 2019-08-26 PROCEDURE — 85730 THROMBOPLASTIN TIME PARTIAL: CPT

## 2019-08-26 PROCEDURE — 36555 INSERT NON-TUNNEL CV CATH: CPT

## 2019-08-26 PROCEDURE — 92610 EVALUATE SWALLOWING FUNCTION: CPT

## 2019-08-26 PROCEDURE — 87633 RESP VIRUS 12-25 TARGETS: CPT

## 2019-08-26 PROCEDURE — 80048 BASIC METABOLIC PNL TOTAL CA: CPT

## 2019-08-26 PROCEDURE — 83605 ASSAY OF LACTIC ACID: CPT

## 2019-08-26 PROCEDURE — 82947 ASSAY GLUCOSE BLOOD QUANT: CPT

## 2019-08-26 PROCEDURE — 85027 COMPLETE CBC AUTOMATED: CPT

## 2019-08-26 PROCEDURE — 83690 ASSAY OF LIPASE: CPT

## 2019-08-26 PROCEDURE — 99239 HOSP IP/OBS DSCHRG MGMT >30: CPT

## 2019-08-26 PROCEDURE — 93005 ELECTROCARDIOGRAM TRACING: CPT

## 2019-08-26 PROCEDURE — 76377 3D RENDER W/INTRP POSTPROCES: CPT

## 2019-08-26 RX ORDER — CIPROFLOXACIN LACTATE 400MG/40ML
1 VIAL (ML) INTRAVENOUS
Qty: 20 | Refills: 0
Start: 2019-08-26 | End: 2019-09-04

## 2019-08-26 RX ADMIN — PIPERACILLIN AND TAZOBACTAM 25 GRAM(S): 4; .5 INJECTION, POWDER, LYOPHILIZED, FOR SOLUTION INTRAVENOUS at 13:23

## 2019-08-26 RX ADMIN — Medication 250 MILLIGRAM(S): at 05:26

## 2019-08-26 RX ADMIN — Medication 30 MILLIGRAM(S): at 05:27

## 2019-08-26 RX ADMIN — PIPERACILLIN AND TAZOBACTAM 25 GRAM(S): 4; .5 INJECTION, POWDER, LYOPHILIZED, FOR SOLUTION INTRAVENOUS at 05:26

## 2019-08-26 NOTE — PROGRESS NOTE ADULT - SUBJECTIVE AND OBJECTIVE BOX
INTERVAL HISTORY:  stable. No seizures      VITAL SIGNS:  Vital Signs Last 24 Hrs  T(C): 36.3 (26 Aug 2019 07:52), Max: 37 (26 Aug 2019 00:22)  T(F): 97.4 (26 Aug 2019 07:52), Max: 98.6 (26 Aug 2019 00:22)  HR: 56 (26 Aug 2019 07:52) (45 - 76)  BP: 120/74 (26 Aug 2019 07:52) (116/63 - 125/71)  BP(mean): --  RR: 20 (26 Aug 2019 07:52) (18 - 20)  SpO2: 98% (26 Aug 2019 04:00) (98% - 100%)    PHYSICAL EXAMINATION:    Mentation:    Language/Speech:  CN:  Visual Fields:  Motor:  Sensory:  DTR:  Babinski:      MEDS:  MEDICATIONS  (STANDING):  enoxaparin Injectable 40 milliGRAM(s) SubCutaneous daily  piperacillin/tazobactam IVPB.. 3.375 Gram(s) IV Intermittent every 8 hours  predniSONE   Tablet 30 milliGRAM(s) Oral daily  saccharomyces boulardii 250 milliGRAM(s) Oral two times a day  senna 2 Tablet(s) Oral at bedtime    MEDICATIONS  (PRN):  acetaminophen   Tablet .. 650 milliGRAM(s) Oral every 6 hours PRN Temp greater or equal to 38C (100.4F)  aluminum hydroxide/magnesium hydroxide/simethicone Suspension 30 milliLiter(s) Oral every 4 hours PRN Dyspepsia  magnesium hydroxide Suspension 30 milliLiter(s) Oral daily PRN Constipation      LABS:                          11.7   11.33 )-----------( 387      ( 26 Aug 2019 05:59 )             36.0     08-26    141  |  105  |  19.0  ----------------------------<  102  3.6   |  25.0  |  0.60    Ca    8.8      26 Aug 2019 05:59    TPro  6.4<L>  /  Alb  2.6<L>  /  TBili  0.8  /  DBili  0.4<H>  /  AST  29  /  ALT  65<H>  /  AlkPhos  196<H>  08-25    LIVER FUNCTIONS - ( 25 Aug 2019 06:49 )  Alb: 2.6 g/dL / Pro: 6.4 g/dL / ALK PHOS: 196 U/L / ALT: 65 U/L / AST: 29 U/L / GGT: x               RADIOLOGY & ADDITIONAL STUDIES:    MR brain -normal  EEG- normal    IMPRESSION & PLAN:    Sepsis  h/o CP  No evidence of epilepsy  Will not actively follow.   Neurologically cleared for discharge/disposition.  Please recontact as needed.

## 2019-08-26 NOTE — PROGRESS NOTE ADULT - PROVIDER SPECIALTY LIST ADULT
Gastroenterology
Gastroenterology
Hospitalist
Hospitalist
Infectious Disease
Neurology
Neurology
Hospitalist

## 2019-08-26 NOTE — DISCHARGE NOTE PROVIDER - NSDCCPCAREPLAN_GEN_ALL_CORE_FT
PRINCIPAL DISCHARGE DIAGNOSIS  Diagnosis: Bacteremia due to Pseudomonas  Assessment and Plan of Treatment: finish course of antibiotics  follow up with infectious disease in 2 weeks.      SECONDARY DISCHARGE DIAGNOSES  Diagnosis: Aspiration pneumonia  Assessment and Plan of Treatment: see above    Diagnosis: Febrile seizure  Assessment and Plan of Treatment: suspected or possible syncope (fainting)  negative EEG/MRI      Diagnosis: PSC (primary sclerosing cholangitis)  Assessment and Plan of Treatment: continue prednisone, follow up with GI regarding tapering.    Diagnosis: Cerebral palsy  Assessment and Plan of Treatment: at baseline

## 2019-08-26 NOTE — PROGRESS NOTE ADULT - SUBJECTIVE AND OBJECTIVE BOX
[facility]  INFECTIOUS DISEASES AND INTERNAL MEDICINE at [place]  =======================================================  Kb Garcia MD  Diplomates American Board of Internal Medicine and Infectious Diseases  =======================================================    [facility]-[id number]  SAUMYA BACONGO     Follow up: Fever and bacteremia     No fever, no seizure. No abdominal pain.  Blood culture with pseudomonas most likely source is abdomen/liver.     PAST MEDICAL & SURGICAL HISTORY:  PSC (primary sclerosing cholangitis)  Cerebral palsy  History of biliary stent insertion: removed 7/2019  History of ERCP: performed at [facility] Hx: no smoking or toxic habits    FAMILY HISTORY:  No pertinent family history in first degree relatives    Allergies  No Known Allergies    Antibiotics:  piperacillin/tazobactam IVPB.. 3.375 Gram(s) IV Intermittent every 8 hours    REVIEW OF SYSTEMS:  CONSTITUTIONAL:  no Fever and chills  HEENT:  No diplopia or blurred vision.  No sore throat or runny nose.  CARDIOVASCULAR:  No chest pain or SOB.  RESPIRATORY:  No cough, shortness of breath, PND or orthopnea.  GASTROINTESTINAL:  No nausea, vomiting or diarrhea.  GENITOURINARY:  No dysuria, frequency or urgency. No Blood in urine  MUSCULOSKELETAL:  no joint aches, no muscle pain  SKIN:  No change in skin, hair or nails.  NEUROLOGIC:  No paresthesias, fasciculations, seizures or weakness.  PSYCHIATRIC:  No disorder of thought or mood.  ENDOCRINE:  No heat or cold intolerance, polyuria or polydipsia.  HEMATOLOGICAL:  No easy bruising or bleeding.     Physical Exam:  Vital Signs Last 24 Hrs  T(C): 36.3 (26 Aug 2019 07:52), Max: 37 (26 Aug 2019 00:22)  T(F): 97.4 (26 Aug 2019 07:52), Max: 98.6 (26 Aug 2019 00:22)  HR: 56 (26 Aug 2019 07:52) (45 - 76)  BP: 120/74 (26 Aug 2019 07:52) (116/63 - 125/71)  BP(mean): --  RR: 20 (26 Aug 2019 07:52) (18 - 20)  SpO2: 98% (26 Aug 2019 04:00) (98% - 100%)  GEN: NAD  HEENT: normocephalic and atraumatic. EOMI. PERRL.    NECK: Supple.  No lymphadenopathy   LUNGS: Clear to auscultation.  HEART: Regular rate and rhythm without murmur.  ABDOMEN: Soft, nontender, and nondistended.  Positive bowel sounds.    : No CVA tenderness  EXTREMITIES: Without any cyanosis, clubbing, rash, lesions or edema.  NEUROLOGIC: left sided paresis   PSYCHIATRIC: Appropriate affect .  SKIN: No ulceration or induration present.    Labs:  08-26    141  |  105  |  19.0  ----------------------------<  102  3.6   |  25.0  |  0.60    Ca    8.8      26 Aug 2019 05:59    TPro  6.4<L>  /  Alb  2.6<L>  /  TBili  0.8  /  DBili  0.4<H>  /  AST  29  /  ALT  65<H>  /  AlkPhos  196<H>  08-25                        11.7   11.33 )-----------( 387      ( 26 Aug 2019 05:59 )             36.0     LIVER FUNCTIONS - ( 25 Aug 2019 06:49 )  Alb: 2.6 g/dL / Pro: 6.4 g/dL / ALK PHOS: 196 U/L / ALT: 65 U/L / AST: 29 U/L / GGT: x           RECENT CULTURES:  08-23 @ 19:23 .Blood     No growth at 48 hours    08-22 @ 20:12      NotDetec    08-22 @ 17:33 .Urine     No growth    08-22 @ 13:31 .Blood Pseudomonas aeruginosa  Blood Culture PCR    Growth in aerobic bottle: Pseudomonas aeruginosa  Aerobic Bottle: 23.14 Hours to positivity  Anaerobic Bottle: No growth to date    08-22 @ 13:30 .Blood     No growth at 48 hours    All imaging and other data have been reviewed.

## 2019-08-26 NOTE — PHYSICAL THERAPY INITIAL EVALUATION ADULT - ACTIVE RANGE OF MOTION EXAMINATION, REHAB EVAL
L foot drop/contracture, L wrist and finger contracture/Right UE Active ROM was WFL (within functional limits)/Right LE Active ROM was WFL (within functional limits)/deficits as listed below

## 2019-08-26 NOTE — PROGRESS NOTE ADULT - SUBJECTIVE AND OBJECTIVE BOX
Patient seen and examined; chart reviewed.  Remains on IV antibiotics for Klebsiella sepsis.  Afebrile x several days.  Denies abdominal pain.  Tolerates diet.  Moves bowel OK.  Possible discharge home later today.  LFT's much improved.      PAST MEDICAL & SURGICAL HISTORY:  PSC (primary sclerosing cholangitis)  Cerebral palsy  History of biliary stent insertion: removed 7/2019  History of ERCP: performed at [facility]      ROS:  No Heartburn, regurgitation, dysphagia, odynophagia.  No dyspepsia  No abdominal pain.    No Nausea, vomiting.  No Bleeding.  No hematemesis.   No diarrhea.    No hematochesia.  No weight loss, anorexia.  No edema.      MEDICATIONS  (STANDING):  enoxaparin Injectable 40 milliGRAM(s) SubCutaneous daily  piperacillin/tazobactam IVPB.. 3.375 Gram(s) IV Intermittent every 8 hours  predniSONE   Tablet 30 milliGRAM(s) Oral daily  saccharomyces boulardii 250 milliGRAM(s) Oral two times a day  senna 2 Tablet(s) Oral at bedtime    MEDICATIONS  (PRN):  acetaminophen   Tablet .. 650 milliGRAM(s) Oral every 6 hours PRN Temp greater or equal to 38C (100.4F)  aluminum hydroxide/magnesium hydroxide/simethicone Suspension 30 milliLiter(s) Oral every 4 hours PRN Dyspepsia  magnesium hydroxide Suspension 30 milliLiter(s) Oral daily PRN Constipation      Allergies    No Known Allergies    Intolerances        Vital Signs Last 24 Hrs  T(C): 36.3 (26 Aug 2019 07:52), Max: 37 (26 Aug 2019 00:22)  T(F): 97.4 (26 Aug 2019 07:52), Max: 98.6 (26 Aug 2019 00:22)  HR: 56 (26 Aug 2019 07:52) (45 - 76)  BP: 120/74 (26 Aug 2019 07:52) (116/63 - 125/71)  BP(mean): --  RR: 20 (26 Aug 2019 07:52) (18 - 20)  SpO2: 98% (26 Aug 2019 04:00) (98% - 100%)    PHYSICAL EXAM:    GENERAL: NAD, well-groomed, well-developed  HEAD:  Atraumatic, Normocephalic  EYES: EOMI, PERRLA, conjunctiva and sclera clear  ENMT: No tonsillar erythema, exudates, or enlargement; Moist mucous membranes, Good dentition, No lesions  NECK: Supple, No JVD, Normal thyroid  CHEST/LUNG: Clear to percussion bilaterally; No rales, rhonchi, wheezing, or rubs  HEART: Regular rate and rhythm; No murmurs, rubs, or gallops  ABDOMEN: Soft, Nontender, Nondistended; Bowel sounds present  EXTREMITIES:  2+ Peripheral Pulses, No clubbing, cyanosis, or edema  LYMPH: No lymphadenopathy noted  SKIN: No rashes or lesions      LABS:                        11.7   11.33 )-----------( 387      ( 26 Aug 2019 05:59 )             36.0     08-26    141  |  105  |  19.0  ----------------------------<  102  3.6   |  25.0  |  0.60    Ca    8.8      26 Aug 2019 05:59    TPro  6.4<L>  /  Alb  2.6<L>  /  TBili  0.8  /  DBili  0.4<H>  /  AST  29  /  ALT  65<H>  /  AlkPhos  196<H>  08-25             RADIOLOGY & ADDITIONAL STUDIES:

## 2019-08-26 NOTE — DISCHARGE NOTE PROVIDER - NSDCFUSCHEDAPPT_GEN_ALL_CORE_FT
SAUMYA ZARATE ; 09/03/2019 ; NPP Hepatology [address] [facility]  SAUMYA ZARATE ; 09/19/2019 ; NPP Gastro [facility] [address] [facility]

## 2019-08-26 NOTE — DISCHARGE NOTE PROVIDER - CARE PROVIDER_API CALL
Yasmany Howard)  Gastroenterology; Internal Medicine  [address] [facility], [address] [place] [address]  Phone: [phone]  Fax: [phone]  Follow Up Time:     Aldo Husain)  Infectious Disease; Internal Medicine  [address] [place] [address], [address]  [place] [address]  Phone: [phone]  Fax: [phone]  Follow Up Time:

## 2019-08-26 NOTE — PROGRESS NOTE ADULT - ASSESSMENT
Improved from bacteremia.  Unclear source.  To complete Po Ab's on outpatient basis.  GI Office follow up post discharge with Dr Howard.

## 2019-08-26 NOTE — DISCHARGE NOTE NURSING/CASE MANAGEMENT/SOCIAL WORK - NSDCDPATPORTLINK_GEN_ALL_CORE
You can access the [facility] Patient Portal, offered by [facility], by registering with the following website: [email or web address]

## 2019-08-26 NOTE — PHYSICAL THERAPY INITIAL EVALUATION ADULT - ADDITIONAL COMMENTS
Pt states he lives in a house with no ARCELIA and no steps inside. Mother is always home and assists as needed. Does not use any DME or bracing/equipment.

## 2019-08-26 NOTE — PROGRESS NOTE ADULT - ASSESSMENT
28y/o young man with PMH of CP with left sided paresis, childhood seizure and Primary sclerosing cholangitis was admitted with high fever and seizure.   Had one episode of diarrhea, vomiting on admission day and some cough started after admission.   He had stent placed for jaundice in the past and due to acute pancreatitis it was removed in july by ERCP.  Now has bacteremia with pseudomonas, source still unclear, possibly abdomen or pneumonia.     Fever  Viral URI or GE  R/O cholangitis     - Blood culture positive for pseudomonas, S to all meds. on 8/22  - Repeat blood culture from 8/23 negative   - UA and UC negative   - RVP neg   - RLL infiltrate in CXR could be due to aspiration pneumonia during seizure vs pneumonia   - Abdominal CT result noted r/o liver abscess/cholangitis ??   - LFTs are trending down, abdomen exam normal    - GI consult noted  - Neurology follow up noted  - Leukocytosis trending down  - Can be switched to Ciprofloxacin 750mg q12h to complete 14 days from first neg blood culture.   - Last day wound be 9/5/19.    Will sign off please call with any question.

## 2019-08-27 LAB
CULTURE RESULTS: SIGNIFICANT CHANGE UP
SPECIMEN SOURCE: SIGNIFICANT CHANGE UP

## 2019-08-28 LAB
CULTURE RESULTS: SIGNIFICANT CHANGE UP
ORGANISM # SPEC MICROSCOPIC CNT: SIGNIFICANT CHANGE UP
SPECIMEN SOURCE: SIGNIFICANT CHANGE UP

## 2019-08-30 LAB
CULTURE RESULTS: SIGNIFICANT CHANGE UP
CULTURE RESULTS: SIGNIFICANT CHANGE UP
SPECIMEN SOURCE: SIGNIFICANT CHANGE UP
SPECIMEN SOURCE: SIGNIFICANT CHANGE UP

## 2019-09-03 ENCOUNTER — APPOINTMENT (OUTPATIENT)
Dept: HEPATOLOGY | Facility: CLINIC | Age: 29
End: 2019-09-03
Payer: MEDICAID

## 2019-09-03 VITALS
BODY MASS INDEX: 21.85 KG/M2 | HEART RATE: 132 BPM | SYSTOLIC BLOOD PRESSURE: 144 MMHG | WEIGHT: 128 LBS | TEMPERATURE: 98.2 F | HEIGHT: 64 IN | RESPIRATION RATE: 16 BRPM | DIASTOLIC BLOOD PRESSURE: 72 MMHG

## 2019-09-03 PROCEDURE — 99214 OFFICE O/P EST MOD 30 MIN: CPT

## 2019-09-03 RX ORDER — PREDNISONE 10 MG/1
10 TABLET ORAL DAILY
Qty: 90 | Refills: 0 | Status: DISCONTINUED | COMMUNITY
Start: 2019-07-30 | End: 2019-09-03

## 2019-09-03 RX ORDER — POLYETHYLENE GLYCOL 3350, SODIUM SULFATE, SODIUM CHLORIDE, POTASSIUM CHLORIDE, ASCORBIC ACID, SODIUM ASCORBATE 7.5-2.691G
100 KIT ORAL
Qty: 1 | Refills: 0 | Status: DISCONTINUED | COMMUNITY
Start: 2019-08-08 | End: 2019-09-03

## 2019-09-03 NOTE — ASSESSMENT
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 No discernible mass lesion, and Ca 19-9 level is normal. Responding to corticosteroid.\par \par PLAN\par -Follow up MRI w/wo with MRCP in 3 months.\par -Follow up labs\par -Reduce Prednisone to 10 mg from his current 20 mg. \par -Add Azathioprine 50 mg PO qd.  Side effect profile including small risk of pancreatitis was discussed with mother. She verbalized understanding.\par \par RTC in 1 month

## 2019-09-03 NOTE — PHYSICAL EXAM
[General Appearance - Well Nourished] : well nourished [General Appearance - Alert] : alert [General Appearance - Well Developed] : well developed [Sclera] : the sclera and conjunctiva were normal [Outer Ear] : the ears and nose were normal in appearance [Neck Appearance] : the appearance of the neck was normal [Exaggerated Use Of Accessory Muscles For Inspiration] : no accessory muscle use [Apical Impulse] : the apical impulse was normal [Heart Rate And Rhythm] : heart rate was normal and rhythm regular [Heart Sounds] : normal S1 and S2 [Heart Sounds Gallop] : no gallops [Murmurs] : no murmurs [Heart Sounds Pericardial Friction Rub] : no pericardial rub [Arterial Pulses Carotid] : carotid pulses were normal with no bruits [Breast Appearance] : normal in appearance [Bowel Sounds] : normal bowel sounds [Cranial Nerves] : cranial nerves 2-12 were intact [Sensation] : the sensory exam was normal to light touch and pinprick [Motor Exam] : the motor exam was normal [No Focal Deficits] : no focal deficits [Oriented To Time, Place, And Person] : oriented to person, place, and time [FreeTextEntry1] : Numerous papular skin rash like pimples in the face, upper chest, back.

## 2019-09-03 NOTE — HISTORY OF PRESENT ILLNESS
[FreeTextEntry1] : \par \par \par Mr. Javy Biggs is a 28-year-old man with a history of cerebral palsy presents for follow up  evaluation.\par \par Based on Dr. Howard's office visit note, his GI specialist, he started itching while he was in [place], along with some nausea and vomiting and abdominal pain. He was noted to have liver enzyme elevation. Then subsequently he presented to [facility] and underwent evaluation. His bilirubin started to rise further and then transferred to [facility] in February 2019. He underwent ERCP which showed the distal bile duct stricture and questionable right intrahepatic stricture. Small stent was placed in the distal bile duct. However his bilirubin did not improve initially. He underwent MRI with severe right intrahepatic stricture. He was discharged home. He was itching for many days but then it improved. \par \par Ca 19-9 level, IgG 4 level and bile duct brushings have been unremarkable. \par \par The patient had another ERCP with stent pull and cholangiogram in april 2019. Right intrahepatic stricture was noted. Then additional brushings were performed.Then a stent was attempted to be advanced into the right intrahepatic system. Although there was significant resistance. Then it was dilated using a 4 mm balloon. Then 7 German 9 cm bile duct stent was placed.The patient had a ER visit about a week or 2 after that. He was noted to have nephrolithiasis.\par \par His LFTs imroved with ERCP and stent. He is now referred to hepatology for further assessment for primary sclerosing cholangitis.\par \par ARMANDO is negative, Mitochondrial antibody is negative, Viral hepatitis serologies are negative, immunugloblin level are unimpressive.\par \par \par Interval hx: Since last seen in the hepatology clinic on 22-May-2019, he had a follow up ERCP by Dr. Howard, and the old stent was removed, and exchanged with a new one. Brushings were negative for malignant cell. Additionally he had a liver biopsy that showed overall appears cholestatic pattern of injury with ductular reaction. This was further discussed at the hepato-billiary meeting with pathology review. 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 Essentially, work up for underlying CLD were negative for viral serology, autoimmune markers, ceruloplasmin ( higher level not suggestive of Garcia's). His total bili is down to 2.4 mg/dL, , and , and . AMA is negative, but ASMA is low titer + ( 1:20). ARMANDO is also negative.\par Interestingly, his IgG level is high ( 1723 mg/dL). \par His viral serology suggest to protection towards hepatitis A, but he is immune to hepatitis B. I hav recommended vaccine series.\par Ca19-9 was within normal range.\par \par Interval hx ( 9/3/2019): Since last seen he has been apparently hospitalized with fever, and suspected infection. He was treated with IV antibiotics, and was discharged with Cipro which he is still taking ( will finish in 2 days).\par Today he feels well.\par LFTs significantly better since Prednisone was started. His mother reports skin rash in the face, chest, and upper back (like pimples) since the steroid was initiated.\par

## 2019-09-05 ENCOUNTER — APPOINTMENT (OUTPATIENT)
Dept: DERMATOLOGY | Facility: CLINIC | Age: 29
End: 2019-09-05
Payer: MEDICAID

## 2019-09-05 PROCEDURE — 99203 OFFICE O/P NEW LOW 30 MIN: CPT

## 2019-09-10 LAB
ALBUMIN SERPL ELPH-MCNC: 4.2 G/DL
ALP BLD-CCNC: 269 U/L
ALT SERPL-CCNC: 199 U/L
ANION GAP SERPL CALC-SCNC: 16 MMOL/L
AST SERPL-CCNC: 124 U/L
BASOPHILS # BLD AUTO: 0.07 K/UL
BASOPHILS NFR BLD AUTO: 0.5 %
BILIRUB SERPL-MCNC: 0.6 MG/DL
BUN SERPL-MCNC: 17 MG/DL
CALCIUM SERPL-MCNC: 10.3 MG/DL
CHLORIDE SERPL-SCNC: 99 MMOL/L
CO2 SERPL-SCNC: 25 MMOL/L
CREAT SERPL-MCNC: 0.77 MG/DL
EOSINOPHIL # BLD AUTO: 0 K/UL
EOSINOPHIL NFR BLD AUTO: 0 %
GLUCOSE SERPL-MCNC: 113 MG/DL
HCT VFR BLD CALC: 45.3 %
HGB BLD-MCNC: 13.6 G/DL
IMM GRANULOCYTES NFR BLD AUTO: 2.9 %
INR PPP: 0.93 RATIO
LYMPHOCYTES # BLD AUTO: 1.1 K/UL
LYMPHOCYTES NFR BLD AUTO: 8 %
MAN DIFF?: NORMAL
MCHC RBC-ENTMCNC: 30 GM/DL
MCHC RBC-ENTMCNC: 31.3 PG
MCV RBC AUTO: 104.4 FL
MONOCYTES # BLD AUTO: 0.28 K/UL
MONOCYTES NFR BLD AUTO: 2 %
NEUTROPHILS # BLD AUTO: 11.89 K/UL
NEUTROPHILS NFR BLD AUTO: 86.6 %
PLATELET # BLD AUTO: 863 K/UL
POTASSIUM SERPL-SCNC: 5.2 MMOL/L
PROT SERPL-MCNC: 7.6 G/DL
PT BLD: 10.7 SEC
RBC # BLD: 4.34 M/UL
RBC # FLD: 13.8 %
SODIUM SERPL-SCNC: 140 MMOL/L
WBC # FLD AUTO: 13.74 K/UL

## 2019-09-12 ENCOUNTER — TRANSCRIPTION ENCOUNTER (OUTPATIENT)
Age: 29
End: 2019-09-12

## 2019-09-16 ENCOUNTER — RX RENEWAL (OUTPATIENT)
Age: 29
End: 2019-09-16

## 2019-09-19 ENCOUNTER — APPOINTMENT (OUTPATIENT)
Dept: GASTROENTEROLOGY | Facility: HOSPITAL | Age: 29
End: 2019-09-19

## 2019-09-19 ENCOUNTER — OUTPATIENT (OUTPATIENT)
Dept: OUTPATIENT SERVICES | Facility: HOSPITAL | Age: 29
LOS: 1 days | End: 2019-09-19
Payer: MEDICAID

## 2019-09-19 ENCOUNTER — RESULT REVIEW (OUTPATIENT)
Age: 29
End: 2019-09-19

## 2019-09-19 DIAGNOSIS — K83.1 OBSTRUCTION OF BILE DUCT: ICD-10-CM

## 2019-09-19 DIAGNOSIS — Z98.890 OTHER SPECIFIED POSTPROCEDURAL STATES: Chronic | ICD-10-CM

## 2019-09-19 PROCEDURE — 88305 TISSUE EXAM BY PATHOLOGIST: CPT | Mod: 26

## 2019-09-19 PROCEDURE — 88305 TISSUE EXAM BY PATHOLOGIST: CPT

## 2019-09-19 PROCEDURE — 45380 COLONOSCOPY AND BIOPSY: CPT

## 2019-09-19 NOTE — PROCEDURE
[With Biopsy] : with biopsy [Procedure Explained] : The procedure was explained [Allergies Reviewed] : allergies reviewed. [Risks] : Risks [Benefits] : benefits [Alternatives] : alternatives [Consent Obtained] : written consent was obtained prior to the procedure and is detailed in the patient's record [Patient] : the patient [Bowel Prep Kit] : the patient took the appropriate bowel preparation kit as directed [Automated Blood Pressure Cuff] : automated blood pressure cuff [Approved Diet Followed] : the patient avoided solid foods and adhered to the approved diet list for 24 hours prior to the procedure [Pulse Oximeter] : pulse oximeter [2] : 2 [Cardiac Monitor] : cardiac monitor [Sedation Clearance] : the patient was cleared for moderate sedation [Prep Qualtiy: ___] : Prep Quality:  [unfilled] [Performed By: ___] : Performed by:  APOLINAR [Abnormal Rectum] : a normal rectum [Left Lateral Decubitus] : The patient was positioned in the left lateral decubitus position [External Hemorrhoids] : no external hemorrhoids [Normal Prostate] : a normal prostate [Terminal Ileum via Ileocecal Valve] : and the terminal ileum was examined by entering the ileocecal valve [No Difficulty] : without difficulty [Insufflated] : insufflated [Single Pass Needed] : after a single pass [Retroflex View] : a retroflex view of the rectum was performed [Patient Rotated Into Alternating Positions] : the patient was not rotated [Normal] : Normal [Sent to Pathology] : was sent to pathology for analysis [Tolerated Well] : the patient tolerated the procedure well [Vital Signs Stable] : the vital signs were stable [de-identified] : PSC [No Complications] : There were no complications [de-identified] : Random colon [de-identified] : Normal terminal ileum

## 2019-09-19 NOTE — ASSESSMENT
[FreeTextEntry1] : IMPRESSION:\par Normal colonoscopy. Normal terminal ileoscopy\par \par RECOMMENDATIONS:\par Await pathology results

## 2019-09-20 LAB — SURGICAL PATHOLOGY STUDY: SIGNIFICANT CHANGE UP

## 2019-09-23 ENCOUNTER — OTHER (OUTPATIENT)
Age: 29
End: 2019-09-23

## 2019-09-24 ENCOUNTER — APPOINTMENT (OUTPATIENT)
Dept: HEPATOLOGY | Facility: CLINIC | Age: 29
End: 2019-09-24
Payer: MEDICAID

## 2019-09-24 PROCEDURE — 90471 IMMUNIZATION ADMIN: CPT

## 2019-09-24 PROCEDURE — 90739 HEPB VACC 2/4 DOSE ADULT IM: CPT

## 2019-09-27 LAB
ALBUMIN SERPL ELPH-MCNC: 4.1 G/DL
ALP BLD-CCNC: 169 U/L
ALT SERPL-CCNC: 90 U/L
ANION GAP SERPL CALC-SCNC: 12 MMOL/L
AST SERPL-CCNC: 92 U/L
BASOPHILS # BLD AUTO: 0.06 K/UL
BASOPHILS NFR BLD AUTO: 0.8 %
BILIRUB SERPL-MCNC: 0.5 MG/DL
BUN SERPL-MCNC: 12 MG/DL
CALCIUM SERPL-MCNC: 9.6 MG/DL
CHLORIDE SERPL-SCNC: 106 MMOL/L
CO2 SERPL-SCNC: 28 MMOL/L
CREAT SERPL-MCNC: 0.84 MG/DL
EOSINOPHIL # BLD AUTO: 0.12 K/UL
EOSINOPHIL NFR BLD AUTO: 1.5 %
GLUCOSE SERPL-MCNC: 88 MG/DL
HCT VFR BLD CALC: 42.3 %
HGB BLD-MCNC: 12.8 G/DL
IMM GRANULOCYTES NFR BLD AUTO: 1.8 %
INR PPP: 0.9 RATIO
LYMPHOCYTES # BLD AUTO: 2.98 K/UL
LYMPHOCYTES NFR BLD AUTO: 38.2 %
MAN DIFF?: NORMAL
MCHC RBC-ENTMCNC: 30.3 GM/DL
MCHC RBC-ENTMCNC: 31.6 PG
MCV RBC AUTO: 104.4 FL
MONOCYTES # BLD AUTO: 0.47 K/UL
MONOCYTES NFR BLD AUTO: 6 %
NEUTROPHILS # BLD AUTO: 4.03 K/UL
NEUTROPHILS NFR BLD AUTO: 51.7 %
PLATELET # BLD AUTO: 330 K/UL
POTASSIUM SERPL-SCNC: 4.4 MMOL/L
PROT SERPL-MCNC: 6.7 G/DL
PT BLD: 10.1 SEC
RBC # BLD: 4.05 M/UL
RBC # FLD: 13.7 %
SODIUM SERPL-SCNC: 145 MMOL/L
TPMT ENZYME INTERPRETATION: NORMAL
TPMT ENZYME METHODOLOGY: NORMAL
TPMT ENZYME: 15.9
WBC # FLD AUTO: 7.8 K/UL

## 2019-10-02 LAB
ALBUMIN SERPL ELPH-MCNC: 4.5 G/DL
ALP BLD-CCNC: 236 U/L
ALT SERPL-CCNC: 159 U/L
ANION GAP SERPL CALC-SCNC: 12 MMOL/L
AST SERPL-CCNC: 121 U/L
BASOPHILS # BLD AUTO: 0.04 K/UL
BASOPHILS NFR BLD AUTO: 0.5 %
BILIRUB SERPL-MCNC: 0.8 MG/DL
BUN SERPL-MCNC: 20 MG/DL
CALCIUM SERPL-MCNC: 10 MG/DL
CHLORIDE SERPL-SCNC: 100 MMOL/L
CO2 SERPL-SCNC: 28 MMOL/L
CREAT SERPL-MCNC: 0.87 MG/DL
EOSINOPHIL # BLD AUTO: 0.08 K/UL
EOSINOPHIL NFR BLD AUTO: 1 %
GLUCOSE SERPL-MCNC: 97 MG/DL
HCT VFR BLD CALC: 43.9 %
HGB BLD-MCNC: 14.1 G/DL
IMM GRANULOCYTES NFR BLD AUTO: 1.4 %
INR PPP: 0.91 RATIO
LYMPHOCYTES # BLD AUTO: 2.12 K/UL
LYMPHOCYTES NFR BLD AUTO: 27.5 %
MAN DIFF?: NORMAL
MCHC RBC-ENTMCNC: 31.9 PG
MCHC RBC-ENTMCNC: 32.1 GM/DL
MCV RBC AUTO: 99.3 FL
MONOCYTES # BLD AUTO: 0.51 K/UL
MONOCYTES NFR BLD AUTO: 6.6 %
NEUTROPHILS # BLD AUTO: 4.86 K/UL
NEUTROPHILS NFR BLD AUTO: 63 %
PLATELET # BLD AUTO: 363 K/UL
POTASSIUM SERPL-SCNC: 4.5 MMOL/L
PROT SERPL-MCNC: 7.5 G/DL
PT BLD: 10.3 SEC
RBC # BLD: 4.42 M/UL
RBC # FLD: 13.9 %
SODIUM SERPL-SCNC: 140 MMOL/L
WBC # FLD AUTO: 7.72 K/UL

## 2019-10-03 ENCOUNTER — APPOINTMENT (OUTPATIENT)
Dept: HEPATOLOGY | Facility: CLINIC | Age: 29
End: 2019-10-03
Payer: MEDICAID

## 2019-10-03 VITALS
TEMPERATURE: 98.1 F | HEART RATE: 96 BPM | SYSTOLIC BLOOD PRESSURE: 127 MMHG | WEIGHT: 144 LBS | DIASTOLIC BLOOD PRESSURE: 73 MMHG | RESPIRATION RATE: 16 BRPM | HEIGHT: 64 IN | BODY MASS INDEX: 24.59 KG/M2

## 2019-10-03 PROCEDURE — 99214 OFFICE O/P EST MOD 30 MIN: CPT

## 2019-10-03 RX ORDER — ADAPALENE 1 MG/G
0.1 GEL TOPICAL
Qty: 1 | Refills: 2 | Status: DISCONTINUED | COMMUNITY
Start: 2019-09-05 | End: 2019-10-03

## 2019-10-03 NOTE — REVIEW OF SYSTEMS
[As Noted in HPI] : as noted in HPI [Difficulty Walking] : difficulty walking [Negative] : Heme/Lymph [de-identified] : Spastic gait fro cerebral palsy

## 2019-10-03 NOTE — PHYSICAL EXAM
[General Appearance - Alert] : alert [General Appearance - Well Nourished] : well nourished [General Appearance - Well Developed] : well developed [Outer Ear] : the ears and nose were normal in appearance [Sclera] : the sclera and conjunctiva were normal [Exaggerated Use Of Accessory Muscles For Inspiration] : no accessory muscle use [Neck Appearance] : the appearance of the neck was normal [Apical Impulse] : the apical impulse was normal [Heart Rate And Rhythm] : heart rate was normal and rhythm regular [Heart Sounds] : normal S1 and S2 [Heart Sounds Gallop] : no gallops [Murmurs] : no murmurs [Heart Sounds Pericardial Friction Rub] : no pericardial rub [Breast Appearance] : normal in appearance [Arterial Pulses Carotid] : carotid pulses were normal with no bruits [Bowel Sounds] : normal bowel sounds [Cranial Nerves] : cranial nerves 2-12 were intact [Sensation] : the sensory exam was normal to light touch and pinprick [Motor Exam] : the motor exam was normal [No Focal Deficits] : no focal deficits [Oriented To Time, Place, And Person] : oriented to person, place, and time [FreeTextEntry1] : spastic gait fro cerbral plasy

## 2019-10-03 NOTE — ASSESSMENT
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 No discernible mass lesion, and Ca 19-9 level is normal. Responding to corticosteroid.\par \par PLAN\par -Follow up MRI w/wo with MRCP in 3 months.\par -Follow up labs q 2 weeks. Recurring labs ordered.\par -Cont Prednisone to 10 mg, and  Azathioprine 100 mg PO qd.  He is tolerating treatment well.\par \par RTC in 3 months

## 2019-10-03 NOTE — HISTORY OF PRESENT ILLNESS
[FreeTextEntry1] : Mr. Javy Biggs is a 28-year-old man with a history of cerebral palsy presents for follow up evaluation.\par \par Based on Dr. Howard's office visit note, his GI specialist, he started itching while he was in [place], along with some nausea and vomiting and abdominal pain. He was noted to have liver enzyme elevation. Then subsequently he presented to [facility] and underwent evaluation. His bilirubin started to rise further and then transferred to [facility] in February 2019. He underwent ERCP which showed the distal bile duct stricture and questionable right intrahepatic stricture. Small stent was placed in the distal bile duct. However his bilirubin did not improve initially. He underwent MRI with severe right intrahepatic stricture. He was discharged home. He was itching for many days but then it improved. \par \par Ca 19-9 level, IgG 4 level and bile duct brushings have been unremarkable. \par \par The patient had another ERCP with stent pull and cholangiogram in april 2019. Right intrahepatic stricture was noted. Then additional brushings were performed.Then a stent was attempted to be advanced into the right intrahepatic system. Although there was significant resistance. Then it was dilated using a 4 mm balloon. Then 7 [place] 9 cm bile duct stent was placed.The patient had a ER visit about a week or 2 after that. He was noted to have nephrolithiasis.\par \par His LFTs imroved with ERCP and stent. He is now referred to hepatology for further assessment for primary sclerosing cholangitis.\par \par ARMANDO is negative, Mitochondrial antibody is negative, Viral hepatitis serologies are negative, immunugloblin level are unimpressive.\par \par \par Interval hx: Since last seen in the hepatology clinic on 22-May-2019, he had a follow up ERCP by Dr. Howard, and the old stent was removed, and exchanged with a new one. Brushings were negative for malignant cell. Additionally he had a liver biopsy that showed overall appears cholestatic pattern of injury with ductular reaction. This was further discussed at the hepato-billiary meeting with pathology review. 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 Essentially, work up for underlying CLD were negative for viral serology, autoimmune markers, ceruloplasmin ( higher level not suggestive of Garcia's). His total bili is down to 2.4 mg/dL, , and , and . AMA is negative, but ASMA is low titer + ( 1:20). ARMANDO is also negative.\par Interestingly, his IgG level is high ( 1723 mg/dL). \par His viral serology suggest to protection towards hepatitis A, but he is immune to hepatitis B. I hav recommended vaccine series.\par Ca19-9 was within normal range.\par \par Interval hx ( 9/3/2019): Since last seen he has been apparently hospitalized with fever, and suspected infection. He was treated with IV antibiotics, and was discharged with Cipro which he is still taking ( will finish in 2 days).\par Today he feels well.\par LFTs significantly better since Prednisone was started. His mother reports skin rash in the face, chest, and upper back (like pimples) since the steroid was initiated.\par \par Interval hx-10-3-2019:\par \par Patient has been doing well since last seen. His acne lesions have almost disappeared. He remain on Azathioprine 100 mg daily (increased on 10-2-2019), and Prednisone 10 mg daily. His Azathioprine dose was increased due to poor response on 50 mg daily. He denies any cholestasis symptoms.

## 2019-10-07 ENCOUNTER — RX RENEWAL (OUTPATIENT)
Age: 29
End: 2019-10-07

## 2019-10-15 LAB
ALBUMIN SERPL ELPH-MCNC: 4 G/DL
ALP BLD-CCNC: 256 U/L
ALT SERPL-CCNC: 164 U/L
ANION GAP SERPL CALC-SCNC: 15 MMOL/L
AST SERPL-CCNC: 157 U/L
BASOPHILS # BLD AUTO: 0.07 K/UL
BASOPHILS NFR BLD AUTO: 1 %
BILIRUB SERPL-MCNC: 1 MG/DL
BUN SERPL-MCNC: 16 MG/DL
CALCIUM SERPL-MCNC: 9.9 MG/DL
CHLORIDE SERPL-SCNC: 102 MMOL/L
CO2 SERPL-SCNC: 24 MMOL/L
CREAT SERPL-MCNC: 0.85 MG/DL
EOSINOPHIL # BLD AUTO: 0.14 K/UL
EOSINOPHIL NFR BLD AUTO: 1.9 %
GLUCOSE SERPL-MCNC: 97 MG/DL
HCT VFR BLD CALC: 43.7 %
HGB BLD-MCNC: 14.1 G/DL
IMM GRANULOCYTES NFR BLD AUTO: 1.1 %
INR PPP: 0.93 RATIO
LYMPHOCYTES # BLD AUTO: 2.12 K/UL
LYMPHOCYTES NFR BLD AUTO: 29.4 %
MAN DIFF?: NORMAL
MCHC RBC-ENTMCNC: 32.3 GM/DL
MCHC RBC-ENTMCNC: 32.6 PG
MCV RBC AUTO: 101.2 FL
MONOCYTES # BLD AUTO: 0.48 K/UL
MONOCYTES NFR BLD AUTO: 6.6 %
NEUTROPHILS # BLD AUTO: 4.33 K/UL
NEUTROPHILS NFR BLD AUTO: 60 %
PLATELET # BLD AUTO: 366 K/UL
POTASSIUM SERPL-SCNC: 4.3 MMOL/L
PROT SERPL-MCNC: 7.2 G/DL
PT BLD: 10.5 SEC
RBC # BLD: 4.32 M/UL
RBC # FLD: 13.8 %
SODIUM SERPL-SCNC: 141 MMOL/L
WBC # FLD AUTO: 7.22 K/UL

## 2019-10-22 NOTE — ED ADULT NURSE NOTE - NS ED NOTE ABUSE RESPONSE YN
Detail Level: Detailed Quality 110: Preventive Care And Screening: Influenza Immunization: Influenza immunization was not ordered or administered, reason not given Quality 111:Pneumonia Vaccination Status For Older Adults: Pneumococcal Vaccination not Administered or Previously Received, Reason not Otherwise Specified Quality 131: Pain Assessment And Follow-Up: Pain assessment using a standardized tool is documented as negative, no follow-up plan required Yes

## 2019-10-25 ENCOUNTER — APPOINTMENT (OUTPATIENT)
Dept: HEPATOLOGY | Facility: CLINIC | Age: 29
End: 2019-10-25
Payer: MEDICAID

## 2019-10-25 PROCEDURE — 90471 IMMUNIZATION ADMIN: CPT

## 2019-10-25 PROCEDURE — 90739 HEPB VACC 2/4 DOSE ADULT IM: CPT

## 2019-10-31 LAB
ALBUMIN SERPL ELPH-MCNC: 4.3 G/DL
ALP BLD-CCNC: 282 U/L
ALT SERPL-CCNC: 202 U/L
ANION GAP SERPL CALC-SCNC: 12 MMOL/L
AST SERPL-CCNC: 233 U/L
BASOPHILS # BLD AUTO: 0.05 K/UL
BASOPHILS NFR BLD AUTO: 0.7 %
BILIRUB SERPL-MCNC: 1.3 MG/DL
BUN SERPL-MCNC: 21 MG/DL
CALCIUM SERPL-MCNC: 10 MG/DL
CHLORIDE SERPL-SCNC: 102 MMOL/L
CO2 SERPL-SCNC: 25 MMOL/L
CREAT SERPL-MCNC: 0.85 MG/DL
EOSINOPHIL # BLD AUTO: 0.1 K/UL
EOSINOPHIL NFR BLD AUTO: 1.4 %
GLUCOSE SERPL-MCNC: 95 MG/DL
HCT VFR BLD CALC: 44.7 %
HGB BLD-MCNC: 14.4 G/DL
IMM GRANULOCYTES NFR BLD AUTO: 1.2 %
INR PPP: 0.95 RATIO
LYMPHOCYTES # BLD AUTO: 1.92 K/UL
LYMPHOCYTES NFR BLD AUTO: 26.5 %
MAN DIFF?: NORMAL
MCHC RBC-ENTMCNC: 32.2 GM/DL
MCHC RBC-ENTMCNC: 32.2 PG
MCV RBC AUTO: 100 FL
MONOCYTES # BLD AUTO: 0.47 K/UL
MONOCYTES NFR BLD AUTO: 6.5 %
NEUTROPHILS # BLD AUTO: 4.61 K/UL
NEUTROPHILS NFR BLD AUTO: 63.7 %
PLATELET # BLD AUTO: 439 K/UL
POTASSIUM SERPL-SCNC: 4.4 MMOL/L
PROT SERPL-MCNC: 7.5 G/DL
PT BLD: 10.7 SEC
RBC # BLD: 4.47 M/UL
RBC # FLD: 13.9 %
SODIUM SERPL-SCNC: 139 MMOL/L
WBC # FLD AUTO: 7.24 K/UL

## 2019-11-05 ENCOUNTER — APPOINTMENT (OUTPATIENT)
Dept: DERMATOLOGY | Facility: CLINIC | Age: 29
End: 2019-11-05
Payer: MEDICAID

## 2019-11-05 DIAGNOSIS — L84 CORNS AND CALLOSITIES: ICD-10-CM

## 2019-11-05 PROCEDURE — 99213 OFFICE O/P EST LOW 20 MIN: CPT

## 2019-11-13 LAB
ALBUMIN SERPL ELPH-MCNC: 4.2 G/DL
ALP BLD-CCNC: 224 U/L
ALT SERPL-CCNC: 128 U/L
ANION GAP SERPL CALC-SCNC: 14 MMOL/L
AST SERPL-CCNC: 123 U/L
BASOPHILS # BLD AUTO: 0.06 K/UL
BASOPHILS NFR BLD AUTO: 0.8 %
BILIRUB SERPL-MCNC: 1.4 MG/DL
BUN SERPL-MCNC: 19 MG/DL
CALCIUM SERPL-MCNC: 10.2 MG/DL
CHLORIDE SERPL-SCNC: 101 MMOL/L
CO2 SERPL-SCNC: 27 MMOL/L
CREAT SERPL-MCNC: 0.98 MG/DL
EOSINOPHIL # BLD AUTO: 0.09 K/UL
EOSINOPHIL NFR BLD AUTO: 1.3 %
GLUCOSE SERPL-MCNC: 126 MG/DL
HCT VFR BLD CALC: 45.3 %
HGB BLD-MCNC: 14.9 G/DL
IMM GRANULOCYTES NFR BLD AUTO: 1 %
INR PPP: 0.95 RATIO
LYMPHOCYTES # BLD AUTO: 2.06 K/UL
LYMPHOCYTES NFR BLD AUTO: 28.8 %
MAN DIFF?: NORMAL
MCHC RBC-ENTMCNC: 32.9 GM/DL
MCHC RBC-ENTMCNC: 33.3 PG
MCV RBC AUTO: 101.1 FL
MONOCYTES # BLD AUTO: 0.48 K/UL
MONOCYTES NFR BLD AUTO: 6.7 %
NEUTROPHILS # BLD AUTO: 4.39 K/UL
NEUTROPHILS NFR BLD AUTO: 61.4 %
PLATELET # BLD AUTO: 430 K/UL
POTASSIUM SERPL-SCNC: 4.3 MMOL/L
PROT SERPL-MCNC: 7.4 G/DL
PT BLD: 10.7 SEC
RBC # BLD: 4.48 M/UL
RBC # FLD: 14.3 %
SODIUM SERPL-SCNC: 142 MMOL/L
WBC # FLD AUTO: 7.15 K/UL

## 2019-11-18 ENCOUNTER — TRANSCRIPTION ENCOUNTER (OUTPATIENT)
Age: 29
End: 2019-11-18

## 2019-11-25 ENCOUNTER — APPOINTMENT (OUTPATIENT)
Dept: GASTROENTEROLOGY | Facility: CLINIC | Age: 29
End: 2019-11-25
Payer: MEDICAID

## 2019-11-25 VITALS
HEART RATE: 104 BPM | DIASTOLIC BLOOD PRESSURE: 75 MMHG | HEIGHT: 64 IN | SYSTOLIC BLOOD PRESSURE: 146 MMHG | OXYGEN SATURATION: 98 % | BODY MASS INDEX: 25.61 KG/M2 | WEIGHT: 150 LBS | RESPIRATION RATE: 16 BRPM

## 2019-11-25 PROCEDURE — 99214 OFFICE O/P EST MOD 30 MIN: CPT

## 2019-11-25 NOTE — ASSESSMENT
[FreeTextEntry1] : The followup MRCP will be performed by the hepatology service. He is currently on prednisone and azathioprine. He will continue to followup with hepatology. I recommended to follow up with me in 6 months.\par \par Yasmany Howard MD\par Gastroenterology \par \par

## 2019-11-25 NOTE — PHYSICAL EXAM
[General Appearance - In No Acute Distress] : in no acute distress [General Appearance - Alert] : alert [Sclera] : the sclera and conjunctiva were normal [Extraocular Movements] : extraocular movements were intact [Outer Ear] : the ears and nose were normal in appearance [PERRL With Normal Accommodation] : pupils were equal in size, round, and reactive to light [Oropharynx] : the oropharynx was normal [Neck Appearance] : the appearance of the neck was normal [Jugular Venous Distention Increased] : there was no jugular-venous distention [Neck Cervical Mass (___cm)] : no neck mass was observed [Thyroid Diffuse Enlargement] : the thyroid was not enlarged [Thyroid Nodule] : there were no palpable thyroid nodules [Heart Rate And Rhythm] : heart rate was normal and rhythm regular [Auscultation Breath Sounds / Voice Sounds] : lungs were clear to auscultation bilaterally [Heart Sounds] : normal S1 and S2 [Heart Sounds Gallop] : no gallops [Heart Sounds Pericardial Friction Rub] : no pericardial rub [Murmurs] : no murmurs [Edema] : there was no peripheral edema [Full Pulse] : the pedal pulses are present [Bowel Sounds] : normal bowel sounds [Abdomen Soft] : soft [Abdomen Tenderness] : non-tender [] : no hepato-splenomegaly [Abdomen Mass (___ Cm)] : no abdominal mass palpated [Cervical Lymph Nodes Enlarged Posterior Bilaterally] : posterior cervical [Cervical Lymph Nodes Enlarged Anterior Bilaterally] : anterior cervical [Supraclavicular Lymph Nodes Enlarged Bilaterally] : supraclavicular [No CVA Tenderness] : no ~M costovertebral angle tenderness [No Spinal Tenderness] : no spinal tenderness [FreeTextEntry1] : Hypopigmentation, scratch marks noted [No Focal Deficits] : no focal deficits [Oriented To Time, Place, And Person] : oriented to person, place, and time [Impaired Insight] : insight and judgment were intact [Affect] : the affect was normal

## 2019-11-25 NOTE — HISTORY OF PRESENT ILLNESS
[de-identified] : The patient arrived for a followup along with the mother. He has a history of biliary strictures requiring multiple ERCPs. Then he has been evaluated by hepatology. 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 He has been on prednisone and azathioprine. He has occasional itching. He had steroid treatment acne for which he had been evaluated by the dermatology. He is following up regularly with hepatology service. His LFTs are still elevated.

## 2019-12-02 LAB
ALBUMIN SERPL ELPH-MCNC: 4.1 G/DL
ALP BLD-CCNC: 203 U/L
ALT SERPL-CCNC: 121 U/L
ANION GAP SERPL CALC-SCNC: 15 MMOL/L
AST SERPL-CCNC: 89 U/L
BASOPHILS # BLD AUTO: 0.06 K/UL
BASOPHILS NFR BLD AUTO: 1.1 %
BILIRUB SERPL-MCNC: 1.1 MG/DL
BUN SERPL-MCNC: 16 MG/DL
CALCIUM SERPL-MCNC: 9.8 MG/DL
CHLORIDE SERPL-SCNC: 103 MMOL/L
CO2 SERPL-SCNC: 24 MMOL/L
CREAT SERPL-MCNC: 1 MG/DL
EOSINOPHIL # BLD AUTO: 0.08 K/UL
EOSINOPHIL NFR BLD AUTO: 1.4 %
GLUCOSE SERPL-MCNC: 83 MG/DL
HCT VFR BLD CALC: 44.6 %
HGB BLD-MCNC: 14.5 G/DL
IMM GRANULOCYTES NFR BLD AUTO: 0.9 %
INR PPP: 0.91 RATIO
LYMPHOCYTES # BLD AUTO: 1.76 K/UL
LYMPHOCYTES NFR BLD AUTO: 31.8 %
MAN DIFF?: NORMAL
MCHC RBC-ENTMCNC: 32.5 GM/DL
MCHC RBC-ENTMCNC: 33.3 PG
MCV RBC AUTO: 102.3 FL
MONOCYTES # BLD AUTO: 0.4 K/UL
MONOCYTES NFR BLD AUTO: 7.2 %
NEUTROPHILS # BLD AUTO: 3.19 K/UL
NEUTROPHILS NFR BLD AUTO: 57.6 %
PLATELET # BLD AUTO: 446 K/UL
POTASSIUM SERPL-SCNC: 4.5 MMOL/L
PROT SERPL-MCNC: 7.1 G/DL
PT BLD: 10.4 SEC
RBC # BLD: 4.36 M/UL
RBC # FLD: 14.2 %
SODIUM SERPL-SCNC: 142 MMOL/L
WBC # FLD AUTO: 5.54 K/UL

## 2019-12-09 ENCOUNTER — RX RENEWAL (OUTPATIENT)
Age: 29
End: 2019-12-09

## 2019-12-11 LAB
ALBUMIN SERPL ELPH-MCNC: 4 G/DL
ALP BLD-CCNC: 151 U/L
ALT SERPL-CCNC: 161 U/L
ANION GAP SERPL CALC-SCNC: 14 MMOL/L
AST SERPL-CCNC: 183 U/L
BASOPHILS # BLD AUTO: 0.05 K/UL
BASOPHILS NFR BLD AUTO: 0.8 %
BILIRUB SERPL-MCNC: 1.8 MG/DL
BUN SERPL-MCNC: 18 MG/DL
CALCIUM SERPL-MCNC: 9.8 MG/DL
CHLORIDE SERPL-SCNC: 101 MMOL/L
CO2 SERPL-SCNC: 25 MMOL/L
CREAT SERPL-MCNC: 0.92 MG/DL
EOSINOPHIL # BLD AUTO: 0.08 K/UL
EOSINOPHIL NFR BLD AUTO: 1.3 %
GLUCOSE SERPL-MCNC: 104 MG/DL
HCT VFR BLD CALC: 44.3 %
HGB BLD-MCNC: 14.8 G/DL
IMM GRANULOCYTES NFR BLD AUTO: 0.3 %
INR PPP: 0.93 RATIO
LYMPHOCYTES # BLD AUTO: 1.81 K/UL
LYMPHOCYTES NFR BLD AUTO: 30.4 %
MAN DIFF?: NORMAL
MCHC RBC-ENTMCNC: 33.4 GM/DL
MCHC RBC-ENTMCNC: 34.4 PG
MCV RBC AUTO: 103 FL
MONOCYTES # BLD AUTO: 0.41 K/UL
MONOCYTES NFR BLD AUTO: 6.9 %
NEUTROPHILS # BLD AUTO: 3.59 K/UL
NEUTROPHILS NFR BLD AUTO: 60.3 %
PLATELET # BLD AUTO: 400 K/UL
POTASSIUM SERPL-SCNC: 3.9 MMOL/L
PROT SERPL-MCNC: 6.9 G/DL
PT BLD: 10.5 SEC
RBC # BLD: 4.3 M/UL
RBC # FLD: 14 %
SODIUM SERPL-SCNC: 140 MMOL/L
WBC # FLD AUTO: 5.96 K/UL

## 2020-01-04 ENCOUNTER — TRANSCRIPTION ENCOUNTER (OUTPATIENT)
Age: 30
End: 2020-01-04

## 2020-01-06 ENCOUNTER — APPOINTMENT (OUTPATIENT)
Dept: HEPATOLOGY | Facility: CLINIC | Age: 30
End: 2020-01-06
Payer: MEDICAID

## 2020-01-06 VITALS
SYSTOLIC BLOOD PRESSURE: 133 MMHG | DIASTOLIC BLOOD PRESSURE: 76 MMHG | WEIGHT: 150 LBS | BODY MASS INDEX: 25.61 KG/M2 | HEART RATE: 118 BPM | HEIGHT: 64 IN | RESPIRATION RATE: 16 BRPM | TEMPERATURE: 98.63 F

## 2020-01-06 PROCEDURE — 99214 OFFICE O/P EST MOD 30 MIN: CPT

## 2020-01-07 LAB
ALBUMIN SERPL ELPH-MCNC: 4.1 G/DL
ALP BLD-CCNC: 176 U/L
ALT SERPL-CCNC: 111 U/L
ANION GAP SERPL CALC-SCNC: 15 MMOL/L
AST SERPL-CCNC: 115 U/L
BASOPHILS # BLD AUTO: 0.06 K/UL
BASOPHILS NFR BLD AUTO: 0.5 %
BILIRUB SERPL-MCNC: 2 MG/DL
BUN SERPL-MCNC: 19 MG/DL
CALCIUM SERPL-MCNC: 10.3 MG/DL
CANCER AG19-9 SERPL-ACNC: 32 U/ML
CHLORIDE SERPL-SCNC: 101 MMOL/L
CO2 SERPL-SCNC: 22 MMOL/L
CREAT SERPL-MCNC: 1.08 MG/DL
EOSINOPHIL # BLD AUTO: 0.01 K/UL
EOSINOPHIL NFR BLD AUTO: 0.1 %
GLUCOSE SERPL-MCNC: 98 MG/DL
HCT VFR BLD CALC: 47.1 %
HGB BLD-MCNC: 15.5 G/DL
IMM GRANULOCYTES NFR BLD AUTO: 0.9 %
INR PPP: 0.95 RATIO
LYMPHOCYTES # BLD AUTO: 0.81 K/UL
LYMPHOCYTES NFR BLD AUTO: 7.4 %
MAN DIFF?: NORMAL
MCHC RBC-ENTMCNC: 32.9 GM/DL
MCHC RBC-ENTMCNC: 34.4 PG
MCV RBC AUTO: 104.7 FL
MONOCYTES # BLD AUTO: 0.38 K/UL
MONOCYTES NFR BLD AUTO: 3.5 %
NEUTROPHILS # BLD AUTO: 9.55 K/UL
NEUTROPHILS NFR BLD AUTO: 87.6 %
PLATELET # BLD AUTO: 514 K/UL
POTASSIUM SERPL-SCNC: 5.3 MMOL/L
PROT SERPL-MCNC: 7.7 G/DL
PT BLD: 10.8 SEC
RBC # BLD: 4.5 M/UL
RBC # FLD: 13.7 %
SODIUM SERPL-SCNC: 138 MMOL/L
WBC # FLD AUTO: 10.91 K/UL

## 2020-01-14 LAB
AFPL3 RESULTS RECEIVED: NORMAL
ALPHA-1-FETOPROTEIN-L3: NORMAL %
ALPHA-1-FETOPROTEIN: 1.8 NG/ML

## 2020-01-24 ENCOUNTER — RX RENEWAL (OUTPATIENT)
Age: 30
End: 2020-01-24

## 2020-01-28 ENCOUNTER — EMERGENCY (EMERGENCY)
Facility: HOSPITAL | Age: 30
LOS: 1 days | Discharge: HOME | End: 2020-01-28
Attending: EMERGENCY MEDICINE
Payer: MEDICAID

## 2020-01-28 VITALS
WEIGHT: 149.91 LBS | OXYGEN SATURATION: 100 % | RESPIRATION RATE: 18 BRPM | SYSTOLIC BLOOD PRESSURE: 127 MMHG | DIASTOLIC BLOOD PRESSURE: 60 MMHG | HEIGHT: 63 IN | HEART RATE: 78 BPM | TEMPERATURE: 98 F

## 2020-01-28 DIAGNOSIS — Z98.890 OTHER SPECIFIED POSTPROCEDURAL STATES: Chronic | ICD-10-CM

## 2020-01-28 LAB
ALBUMIN SERPL ELPH-MCNC: 3.8 G/DL — SIGNIFICANT CHANGE UP (ref 3.3–5.2)
ALP SERPL-CCNC: 114 U/L — SIGNIFICANT CHANGE UP (ref 40–120)
ALT FLD-CCNC: 46 U/L — HIGH
ANION GAP SERPL CALC-SCNC: 14 MMOL/L — SIGNIFICANT CHANGE UP (ref 5–17)
ANISOCYTOSIS BLD QL: SLIGHT — SIGNIFICANT CHANGE UP
APTT BLD: 37.3 SEC — HIGH (ref 27.5–36.3)
AST SERPL-CCNC: 67 U/L — HIGH
BASOPHILS # BLD AUTO: 0 K/UL — SIGNIFICANT CHANGE UP (ref 0–0.2)
BASOPHILS NFR BLD AUTO: 0 % — SIGNIFICANT CHANGE UP (ref 0–2)
BILIRUB DIRECT SERPL-MCNC: >10 MG/DL — HIGH (ref 0–0.3)
BILIRUB INDIRECT FLD-MCNC: <4.3 MG/DL — HIGH (ref 0.2–1)
BILIRUB SERPL-MCNC: 14.3 MG/DL — HIGH (ref 0.4–2)
BILIRUB SERPL-MCNC: 14.3 MG/DL — HIGH (ref 0.4–2)
BLD GP AB SCN SERPL QL: SIGNIFICANT CHANGE UP
BUN SERPL-MCNC: 18 MG/DL — SIGNIFICANT CHANGE UP (ref 8–20)
CALCIUM SERPL-MCNC: 9.3 MG/DL — SIGNIFICANT CHANGE UP (ref 8.6–10.2)
CHLORIDE SERPL-SCNC: 102 MMOL/L — SIGNIFICANT CHANGE UP (ref 98–107)
CO2 SERPL-SCNC: 23 MMOL/L — SIGNIFICANT CHANGE UP (ref 22–29)
CREAT SERPL-MCNC: 0.35 MG/DL — LOW (ref 0.5–1.3)
EOSINOPHIL # BLD AUTO: 0 K/UL — SIGNIFICANT CHANGE UP (ref 0–0.5)
EOSINOPHIL NFR BLD AUTO: 0 % — SIGNIFICANT CHANGE UP (ref 0–6)
GIANT PLATELETS BLD QL SMEAR: PRESENT — SIGNIFICANT CHANGE UP
GLUCOSE SERPL-MCNC: 113 MG/DL — HIGH (ref 70–99)
HCT VFR BLD CALC: 39.9 % — SIGNIFICANT CHANGE UP (ref 39–50)
HGB BLD-MCNC: 13.3 G/DL — SIGNIFICANT CHANGE UP (ref 13–17)
INR BLD: 1.14 RATIO — SIGNIFICANT CHANGE UP (ref 0.88–1.16)
LIDOCAIN IGE QN: 21 U/L — LOW (ref 22–51)
LYMPHOCYTES # BLD AUTO: 0.59 K/UL — LOW (ref 1–3.3)
LYMPHOCYTES # BLD AUTO: 5.2 % — LOW (ref 13–44)
MACROCYTES BLD QL: SLIGHT — SIGNIFICANT CHANGE UP
MANUAL SMEAR VERIFICATION: SIGNIFICANT CHANGE UP
MCHC RBC-ENTMCNC: 33.3 GM/DL — SIGNIFICANT CHANGE UP (ref 32–36)
MCHC RBC-ENTMCNC: 35.6 PG — HIGH (ref 27–34)
MCV RBC AUTO: 106.7 FL — HIGH (ref 80–100)
MONOCYTES # BLD AUTO: 0.5 K/UL — SIGNIFICANT CHANGE UP (ref 0–0.9)
MONOCYTES NFR BLD AUTO: 4.4 % — SIGNIFICANT CHANGE UP (ref 2–14)
NEUTROPHILS # BLD AUTO: 10.2 K/UL — HIGH (ref 1.8–7.4)
NEUTROPHILS NFR BLD AUTO: 90.4 % — HIGH (ref 43–77)
NRBC # BLD: 1 /100 — HIGH (ref 0–0)
PLAT MORPH BLD: NORMAL — SIGNIFICANT CHANGE UP
PLATELET # BLD AUTO: 521 K/UL — HIGH (ref 150–400)
POIKILOCYTOSIS BLD QL AUTO: SLIGHT — SIGNIFICANT CHANGE UP
POLYCHROMASIA BLD QL SMEAR: SLIGHT — SIGNIFICANT CHANGE UP
POTASSIUM SERPL-MCNC: 4.3 MMOL/L — SIGNIFICANT CHANGE UP (ref 3.5–5.3)
POTASSIUM SERPL-SCNC: 4.3 MMOL/L — SIGNIFICANT CHANGE UP (ref 3.5–5.3)
PROT SERPL-MCNC: 7.4 G/DL — SIGNIFICANT CHANGE UP (ref 6.6–8.7)
PROTHROM AB SERPL-ACNC: 13.2 SEC — HIGH (ref 10–12.9)
RBC # BLD: 3.74 M/UL — LOW (ref 4.2–5.8)
RBC # FLD: 15 % — HIGH (ref 10.3–14.5)
RBC BLD AUTO: ABNORMAL
SODIUM SERPL-SCNC: 139 MMOL/L — SIGNIFICANT CHANGE UP (ref 135–145)
TARGETS BLD QL SMEAR: SLIGHT — SIGNIFICANT CHANGE UP
WBC # BLD: 11.28 K/UL — HIGH (ref 3.8–10.5)
WBC # FLD AUTO: 11.28 K/UL — HIGH (ref 3.8–10.5)

## 2020-01-28 PROCEDURE — 76705 ECHO EXAM OF ABDOMEN: CPT | Mod: 26

## 2020-01-28 PROCEDURE — 93010 ELECTROCARDIOGRAM REPORT: CPT

## 2020-01-28 PROCEDURE — 99284 EMERGENCY DEPT VISIT MOD MDM: CPT

## 2020-01-28 PROCEDURE — 99285 EMERGENCY DEPT VISIT HI MDM: CPT

## 2020-01-28 NOTE — ED PROVIDER NOTE - PATIENT PORTAL LINK FT
You can access the FollowMyHealth Patient Portal offered by [facility] by registering at the following website: [email or web address]. By joining [facility]’s FollowMyHealth portal, you will also be able to view your health information using other applications (apps) compatible with our system.

## 2020-01-28 NOTE — ED PROVIDER NOTE - OBJECTIVE STATEMENT
28 y/o male with a PMHx of cerebral palsy and PSC presents to the ED c/o syncopal episode. Pt syncopized at airport after returning from [place]. Pt has no complaints right now. 30 y/o male with a PMHx of cerebral palsy and PSC presents to the ED c/o syncopal episode. Pt syncopized at airport after returning from [place]. (+) LOC. Pt states he had abd pain, which has now resolved. Pt has no complaints right now. Denies fever. denies HA or neck pain. no chest pain or sob. no abd pain. no n/v/d. no urinary f/u/d. no back pain. no motor or sensory deficits. denies illicit drug use. no rash. no other acute issues symptoms or concerns.

## 2020-01-28 NOTE — ED STATDOCS - PROGRESS NOTE DETAILS
30 y/o M pt with significant PMHx of liver problems ( problem unkown) presents to the ED c/o jaundice, onset 1 week ago. Associated symptoms include syncope. He reports that he recently came back from [place], and reports that he had a syncopal episode at the airport.   His family spoke to his PMD about his jaundice, and has an appointment scheduled on 1/31. However, due to his syncopal episode today, he was prompted to come to the ED by his PMD. Focused eval, protocol orders entered. Pt to be moved to main ED for complete evaluation by another provider.   PMD: Dr. Howard

## 2020-01-28 NOTE — CONSULT NOTE ADULT - SUBJECTIVE AND OBJECTIVE BOX
HPI: 29 year old man known well to me with history of cerebral palsy, autoimmune cholangitis and with history of elevated LFTs, requiring multiple ERCP, biliary stent related pancreatitis, and being followed by hepatology team and on prednisone and azathioprine. He was in [place] and had developed jaundice and also started to have itching. He returned from [place] today and had syncopal episode at airport. Also, he had mild RUQ pain which hs resolved. He also had small amount of rectal bleeding. He had colonoscopy in the recent past.      PAST MEDICAL & SURGICAL HISTORY:  PSC (primary sclerosing cholangitis)  Cerebral palsy  History of biliary stent insertion: removed 7/2019  History of ERCP: performed at [facility]      ROS:  No Heartburn, regurgitation, dysphagia, odynophagia.  No dyspepsia  + abdominal pain.    No Nausea, vomiting.  + Bleeding.  No hematemesis.   No diarrhea.    No hematochezia  No weight loss, anorexia.  No edema.      MEDICATIONS  (STANDING):    MEDICATIONS  (PRN):      Allergies    No Known Allergies    Intolerances        SOCIAL HISTORY:Denies x 3    ENDOSCOPIC/GI HISTORY:Multiple ERCP.     FAMILY HISTORY:  No pertinent family history in first degree relatives      Vital Signs Last 24 Hrs  T(C): 36.8 (28 Jan 2020 21:28), Max: 36.8 (28 Jan 2020 19:36)  T(F): 98.2 (28 Jan 2020 21:28), Max: 98.2 (28 Jan 2020 19:36)  HR: 96 (28 Jan 2020 21:28) (78 - 96)  BP: 125/56 (28 Jan 2020 21:28) (125/56 - 127/60)  BP(mean): --  RR: 17 (28 Jan 2020 21:28) (17 - 18)  SpO2: 98% (28 Jan 2020 21:28) (98% - 100%)    PHYSICAL EXAM:    GENERAL: NAD, well-groomed, well-developed  HEAD:  Atraumatic, Normocephalic  EYES: EOMI, PERRLA, conjunctiva and sclera Icterus  ENMT: No tonsillar erythema, exudates, or enlargement; Moist mucous membranes, Good dentition, No lesions  NECK: Supple, No JVD, Normal thyroid  CHEST/LUNG: Clear to percussion bilaterally; No rales, rhonchi, wheezing, or rubs  HEART: Regular rate and rhythm; No murmurs, rubs, or gallops  ABDOMEN: Soft, Nontender, Nondistended; Bowel sounds present  EXTREMITIES:  2+ Peripheral Pulses, No clubbing, cyanosis, or edema  LYMPH: No lymphadenopathy noted  SKIN: No rashes or lesions      LABS:                        13.3   11.28 )-----------( 521      ( 28 Jan 2020 21:47 )             39.9           PT/INR - ( 28 Jan 2020 21:47 )   PT: 13.2 sec;   INR: 1.14 ratio         PTT - ( 28 Jan 2020 21:47 )  PTT:37.3 sec             RADIOLOGY & ADDITIONAL STUDIES:

## 2020-01-28 NOTE — ED ADULT TRIAGE NOTE - CHIEF COMPLAINT QUOTE
pt arrived from [place] today when he got off the plane he passed out as per family "about 5 minutes" LOC. hx of liver problems told to come to ED by physician c/o sharp pain to RUQ abd decrease appetite nausea. jaundice face and b/l eyes.

## 2020-01-28 NOTE — ED PROVIDER NOTE - PROGRESS NOTE DETAILS
just personally spoke with dr hutchison pt non toxic abd soft nt nd can f/u outpt on friday in office for outpt ercp pt abd soft nt nd known h/o autoimmunie cholangitis mother agrees to this plan of care return precautions given

## 2020-01-28 NOTE — CONSULT NOTE ADULT - ASSESSMENT
Patient with autoimmune cholangitis. Please continue prednisone and azathioprine. Monitor LFTs. Check MRCP tomorrow.   May need ERCP with stricture dilation. If doing well, can have ERCP as outpatient as well.   Can feed patient.

## 2020-01-29 VITALS
DIASTOLIC BLOOD PRESSURE: 69 MMHG | SYSTOLIC BLOOD PRESSURE: 133 MMHG | OXYGEN SATURATION: 98 % | RESPIRATION RATE: 18 BRPM | TEMPERATURE: 98 F | HEART RATE: 90 BPM

## 2020-01-29 PROCEDURE — 99284 EMERGENCY DEPT VISIT MOD MDM: CPT

## 2020-01-29 PROCEDURE — 83690 ASSAY OF LIPASE: CPT

## 2020-01-29 PROCEDURE — 74177 CT ABD & PELVIS W/CONTRAST: CPT

## 2020-01-29 PROCEDURE — 93005 ELECTROCARDIOGRAM TRACING: CPT

## 2020-01-29 PROCEDURE — 86901 BLOOD TYPING SEROLOGIC RH(D): CPT

## 2020-01-29 PROCEDURE — 86900 BLOOD TYPING SEROLOGIC ABO: CPT

## 2020-01-29 PROCEDURE — 85730 THROMBOPLASTIN TIME PARTIAL: CPT

## 2020-01-29 PROCEDURE — 74177 CT ABD & PELVIS W/CONTRAST: CPT | Mod: 26

## 2020-01-29 PROCEDURE — 80053 COMPREHEN METABOLIC PANEL: CPT

## 2020-01-29 PROCEDURE — 82248 BILIRUBIN DIRECT: CPT

## 2020-01-29 PROCEDURE — 36415 COLL VENOUS BLD VENIPUNCTURE: CPT

## 2020-01-29 PROCEDURE — 86850 RBC ANTIBODY SCREEN: CPT

## 2020-01-29 PROCEDURE — 76705 ECHO EXAM OF ABDOMEN: CPT

## 2020-01-29 PROCEDURE — 85027 COMPLETE CBC AUTOMATED: CPT

## 2020-01-29 PROCEDURE — 85610 PROTHROMBIN TIME: CPT

## 2020-01-31 ENCOUNTER — APPOINTMENT (OUTPATIENT)
Dept: GASTROENTEROLOGY | Facility: CLINIC | Age: 30
End: 2020-01-31
Payer: MEDICAID

## 2020-01-31 VITALS
BODY MASS INDEX: 25.61 KG/M2 | WEIGHT: 150 LBS | RESPIRATION RATE: 17 BRPM | HEIGHT: 64 IN | DIASTOLIC BLOOD PRESSURE: 68 MMHG | SYSTOLIC BLOOD PRESSURE: 112 MMHG

## 2020-01-31 PROCEDURE — 99213 OFFICE O/P EST LOW 20 MIN: CPT

## 2020-01-31 NOTE — ASSESSMENT
[FreeTextEntry1] : At this time, the patient will need ERCP. I discussed the procedure at length. We will schedule him within the next one week. In the meanwhile he should continue his therapy. If the patient condition worsens, I recommended to come to the emergency room.\par \par Yasmany Howard MD\par Gastroenterology \par \par

## 2020-01-31 NOTE — HISTORY OF PRESENT ILLNESS
[de-identified] : Patient is well known to me and due to history of biliary stricture and autoimmune cholangitis. He has been on therapy by liver specialist Dr. Tipton. He was in [place] along with his mother. Then he started to become jaundice. [place] the [place] last [place] and [place]opized. He was evaluated in the emergency room. He had CT abdomen performed. He had elevated liver test. He is having occasional abdominal discomfort on the right side. No rectal bleeding. No fever or chills.

## 2020-01-31 NOTE — PHYSICAL EXAM
[General Appearance - Alert] : alert [General Appearance - In No Acute Distress] : in no acute distress [Sclera] : the sclera and conjunctiva were normal [PERRL With Normal Accommodation] : pupils were equal in size, round, and reactive to light [Extraocular Movements] : extraocular movements were intact [Outer Ear] : the ears and nose were normal in appearance [Oropharynx] : the oropharynx was normal [Neck Appearance] : the appearance of the neck was normal [Neck Cervical Mass (___cm)] : no neck mass was observed [Jugular Venous Distention Increased] : there was no jugular-venous distention [Thyroid Diffuse Enlargement] : the thyroid was not enlarged [Thyroid Nodule] : there were no palpable thyroid nodules [Auscultation Breath Sounds / Voice Sounds] : lungs were clear to auscultation bilaterally [Heart Rate And Rhythm] : heart rate was normal and rhythm regular [Heart Sounds] : normal S1 and S2 [Heart Sounds Gallop] : no gallops [Murmurs] : no murmurs [Heart Sounds Pericardial Friction Rub] : no pericardial rub [Full Pulse] : the pedal pulses are present [Edema] : there was no peripheral edema [Bowel Sounds] : normal bowel sounds [Abdomen Soft] : soft [Abdomen Tenderness] : non-tender [] : no hepato-splenomegaly [Abdomen Mass (___ Cm)] : no abdominal mass palpated [Cervical Lymph Nodes Enlarged Posterior Bilaterally] : posterior cervical [Cervical Lymph Nodes Enlarged Anterior Bilaterally] : anterior cervical [Supraclavicular Lymph Nodes Enlarged Bilaterally] : supraclavicular [No CVA Tenderness] : no ~M costovertebral angle tenderness [No Spinal Tenderness] : no spinal tenderness [No Focal Deficits] : no focal deficits [Oriented To Time, Place, And Person] : oriented to person, place, and time [Impaired Insight] : insight and judgment were intact [Affect] : the affect was normal [FreeTextEntry1] : Hypopigmentation, scratch marks noted

## 2020-02-06 ENCOUNTER — LABORATORY RESULT (OUTPATIENT)
Age: 30
End: 2020-02-06

## 2020-02-06 ENCOUNTER — TRANSCRIPTION ENCOUNTER (OUTPATIENT)
Age: 30
End: 2020-02-06

## 2020-02-07 ENCOUNTER — OUTPATIENT (OUTPATIENT)
Dept: OUTPATIENT SERVICES | Facility: HOSPITAL | Age: 30
LOS: 1 days | End: 2020-02-07
Payer: MEDICAID

## 2020-02-07 ENCOUNTER — APPOINTMENT (OUTPATIENT)
Dept: GASTROENTEROLOGY | Facility: HOSPITAL | Age: 30
End: 2020-02-07

## 2020-02-07 DIAGNOSIS — K83.1 OBSTRUCTION OF BILE DUCT: ICD-10-CM

## 2020-02-07 DIAGNOSIS — Z98.890 OTHER SPECIFIED POSTPROCEDURAL STATES: Chronic | ICD-10-CM

## 2020-02-07 DIAGNOSIS — Z00.00 ENCOUNTER FOR GENERAL ADULT MEDICAL EXAMINATION W/OUT ABNORMAL FINDINGS: ICD-10-CM

## 2020-02-07 LAB
ALBUMIN SERPL ELPH-MCNC: 3.3 G/DL
ALP BLD-CCNC: 118 U/L
ALT SERPL-CCNC: 47 U/L
ANION GAP SERPL CALC-SCNC: 14 MMOL/L
AST SERPL-CCNC: 69 U/L
BASOPHILS # BLD AUTO: 0.05 K/UL
BASOPHILS NFR BLD AUTO: 0.5 %
BILIRUB SERPL-MCNC: 20.6 MG/DL
BUN SERPL-MCNC: 12 MG/DL
CALCIUM SERPL-MCNC: 8.9 MG/DL
CHLORIDE SERPL-SCNC: 105 MMOL/L
CO2 SERPL-SCNC: 21 MMOL/L
CREAT SERPL-MCNC: 0.73 MG/DL
EOSINOPHIL # BLD AUTO: 0.02 K/UL
EOSINOPHIL NFR BLD AUTO: 0.2 %
GLUCOSE SERPL-MCNC: 97 MG/DL
HCT VFR BLD CALC: 35.9 %
HGB BLD-MCNC: 11.8 G/DL
IMM GRANULOCYTES NFR BLD AUTO: 1.2 %
INR PPP: 1.13 RATIO
LYMPHOCYTES # BLD AUTO: 0.37 K/UL
LYMPHOCYTES NFR BLD AUTO: 3.8 %
MAN DIFF?: NORMAL
MCHC RBC-ENTMCNC: 32.9 GM/DL
MCHC RBC-ENTMCNC: 37.5 PG
MCV RBC AUTO: 114 FL
MONOCYTES # BLD AUTO: 0.26 K/UL
MONOCYTES NFR BLD AUTO: 2.7 %
NEUTROPHILS # BLD AUTO: 8.86 K/UL
NEUTROPHILS NFR BLD AUTO: 91.6 %
PLATELET # BLD AUTO: 564 K/UL
POTASSIUM SERPL-SCNC: 4.4 MMOL/L
PROT SERPL-MCNC: 6.2 G/DL
PT BLD: 12.8 SEC
RBC # BLD: 3.15 M/UL
RBC # FLD: 17.7 %
SODIUM SERPL-SCNC: 140 MMOL/L
WBC # FLD AUTO: 9.68 K/UL

## 2020-02-07 PROCEDURE — C2617: CPT

## 2020-02-07 PROCEDURE — 43274 ERCP DUCT STENT PLACEMENT: CPT

## 2020-02-07 PROCEDURE — C1726: CPT

## 2020-02-07 PROCEDURE — C1773: CPT

## 2020-02-07 PROCEDURE — 43264 ERCP REMOVE DUCT CALCULI: CPT

## 2020-02-07 PROCEDURE — 43277 ERCP EA DUCT/AMPULLA DILATE: CPT | Mod: 59

## 2020-02-07 PROCEDURE — C1769: CPT

## 2020-02-07 RX ORDER — LEVOFLOXACIN 750 MG/1
750 TABLET, FILM COATED ORAL DAILY
Qty: 7 | Refills: 0 | Status: COMPLETED | COMMUNITY
Start: 2020-02-07 | End: 2020-02-14

## 2020-02-07 NOTE — PHYSICAL EXAM
[General Appearance - Alert] : alert [General Appearance - In No Acute Distress] : in no acute distress [PERRL With Normal Accommodation] : pupils were equal in size, round, and reactive to light [Extraocular Movements] : extraocular movements were intact [Outer Ear] : the ears and nose were normal in appearance [Oropharynx] : the oropharynx was normal [Neck Appearance] : the appearance of the neck was normal [Neck Cervical Mass (___cm)] : no neck mass was observed [Jugular Venous Distention Increased] : there was no jugular-venous distention [Thyroid Diffuse Enlargement] : the thyroid was not enlarged [Thyroid Nodule] : there were no palpable thyroid nodules [Auscultation Breath Sounds / Voice Sounds] : lungs were clear to auscultation bilaterally [Heart Rate And Rhythm] : heart rate was normal and rhythm regular [Heart Sounds] : normal S1 and S2 [Heart Sounds Gallop] : no gallops [Murmurs] : no murmurs [Heart Sounds Pericardial Friction Rub] : no pericardial rub [Full Pulse] : the pedal pulses are present [Edema] : there was no peripheral edema [Bowel Sounds] : normal bowel sounds [Abdomen Soft] : soft [Abdomen Tenderness] : non-tender [] : no hepato-splenomegaly [Abdomen Mass (___ Cm)] : no abdominal mass palpated [Cervical Lymph Nodes Enlarged Posterior Bilaterally] : posterior cervical [Cervical Lymph Nodes Enlarged Anterior Bilaterally] : anterior cervical [Supraclavicular Lymph Nodes Enlarged Bilaterally] : supraclavicular [No CVA Tenderness] : no ~M costovertebral angle tenderness [No Spinal Tenderness] : no spinal tenderness [FreeTextEntry1] : Hypopigmentation, scratch marks noted [No Focal Deficits] : no focal deficits [Oriented To Time, Place, And Person] : oriented to person, place, and time [Impaired Insight] : insight and judgment were intact [Affect] : the affect was normal

## 2020-02-07 NOTE — HISTORY OF PRESENT ILLNESS
[de-identified] : Patient arrived for ERCP. His bilirubin is high. Itching. Occasional abdominal pain. No rectal bleeding.

## 2020-02-12 ENCOUNTER — LABORATORY RESULT (OUTPATIENT)
Age: 30
End: 2020-02-12

## 2020-02-12 LAB
ALBUMIN SERPL ELPH-MCNC: 3.4 G/DL
ALP BLD-CCNC: 129 U/L
ALT SERPL-CCNC: 44 U/L
AMYLASE/CREAT SERPL: 69 U/L
ANION GAP SERPL CALC-SCNC: 13 MMOL/L
AST SERPL-CCNC: 65 U/L
BASOPHILS # BLD AUTO: 0.04 K/UL
BASOPHILS NFR BLD AUTO: 0.4 %
BILIRUB DIRECT SERPL-MCNC: 18.1 MG/DL
BILIRUB SERPL-MCNC: 22.1 MG/DL
BUN SERPL-MCNC: 18 MG/DL
CALCIUM SERPL-MCNC: 8.9 MG/DL
CHLORIDE SERPL-SCNC: 104 MMOL/L
CO2 SERPL-SCNC: 22 MMOL/L
CREAT SERPL-MCNC: 0.69 MG/DL
EOSINOPHIL # BLD AUTO: 0.01 K/UL
EOSINOPHIL NFR BLD AUTO: 0.1 %
GLUCOSE SERPL-MCNC: 118 MG/DL
HCT VFR BLD CALC: 33.9 %
HGB BLD-MCNC: 11.5 G/DL
IMM GRANULOCYTES NFR BLD AUTO: 1.2 %
INR PPP: 1.55 RATIO
LPL SERPL-CCNC: 68 U/L
LYMPHOCYTES # BLD AUTO: 0.36 K/UL
LYMPHOCYTES NFR BLD AUTO: 4 %
MAN DIFF?: NORMAL
MCHC RBC-ENTMCNC: 33.9 GM/DL
MCHC RBC-ENTMCNC: 37.7 PG
MCV RBC AUTO: 111.1 FL
MONOCYTES # BLD AUTO: 0.23 K/UL
MONOCYTES NFR BLD AUTO: 2.6 %
NEUTROPHILS # BLD AUTO: 8.23 K/UL
NEUTROPHILS NFR BLD AUTO: 91.7 %
PLATELET # BLD AUTO: 631 K/UL
POTASSIUM SERPL-SCNC: 4.4 MMOL/L
PROT SERPL-MCNC: 5.7 G/DL
PT BLD: 17.9 SEC
RBC # BLD: 3.05 M/UL
RBC # FLD: 18.4 %
SODIUM SERPL-SCNC: 139 MMOL/L
WBC # FLD AUTO: 8.98 K/UL

## 2020-02-13 ENCOUNTER — APPOINTMENT (OUTPATIENT)
Dept: HEPATOLOGY | Facility: CLINIC | Age: 30
End: 2020-02-13
Payer: MEDICAID

## 2020-02-13 VITALS
DIASTOLIC BLOOD PRESSURE: 68 MMHG | OXYGEN SATURATION: 98 % | SYSTOLIC BLOOD PRESSURE: 119 MMHG | HEIGHT: 64 IN | TEMPERATURE: 98.6 F | RESPIRATION RATE: 17 BRPM | BODY MASS INDEX: 24.07 KG/M2 | WEIGHT: 141 LBS | HEART RATE: 111 BPM

## 2020-02-13 LAB
ALBUMIN SERPL ELPH-MCNC: 3.3 G/DL
ALP BLD-CCNC: 129 U/L
ALT SERPL-CCNC: 44 U/L
ANION GAP SERPL CALC-SCNC: 14 MMOL/L
AST SERPL-CCNC: 65 U/L
BASOPHILS # BLD AUTO: 0.03 K/UL
BASOPHILS NFR BLD AUTO: 0.3 %
BILIRUB DIRECT SERPL-MCNC: 17.5 MG/DL
BILIRUB SERPL-MCNC: 21.9 MG/DL
BUN SERPL-MCNC: 18 MG/DL
CALCIUM SERPL-MCNC: 8.8 MG/DL
CHLORIDE SERPL-SCNC: 104 MMOL/L
CO2 SERPL-SCNC: 22 MMOL/L
CREAT SERPL-MCNC: 0.65 MG/DL
EOSINOPHIL # BLD AUTO: 0.02 K/UL
EOSINOPHIL NFR BLD AUTO: 0.2 %
GLUCOSE SERPL-MCNC: 119 MG/DL
HCT VFR BLD CALC: 33.6 %
HGB BLD-MCNC: 11.4 G/DL
IGG SER QL IEP: 1244 MG/DL
IMM GRANULOCYTES NFR BLD AUTO: 1.6 %
LYMPHOCYTES # BLD AUTO: 0.39 K/UL
LYMPHOCYTES NFR BLD AUTO: 4.4 %
MAN DIFF?: NORMAL
MCHC RBC-ENTMCNC: 33.9 GM/DL
MCHC RBC-ENTMCNC: 37.9 PG
MCV RBC AUTO: 111.6 FL
MONOCYTES # BLD AUTO: 0.22 K/UL
MONOCYTES NFR BLD AUTO: 2.5 %
NEUTROPHILS # BLD AUTO: 8.02 K/UL
NEUTROPHILS NFR BLD AUTO: 91 %
PLATELET # BLD AUTO: 614 K/UL
POTASSIUM SERPL-SCNC: 4.4 MMOL/L
PROT SERPL-MCNC: 5.6 G/DL
RBC # BLD: 3.01 M/UL
RBC # FLD: 18.3 %
SODIUM SERPL-SCNC: 139 MMOL/L
WBC # FLD AUTO: 8.82 K/UL

## 2020-02-13 PROCEDURE — 99215 OFFICE O/P EST HI 40 MIN: CPT

## 2020-02-17 ENCOUNTER — INPATIENT (INPATIENT)
Facility: HOSPITAL | Age: 30
LOS: 1 days | Discharge: HOME | DRG: 444 | End: 2020-02-19
Attending: INTERNAL MEDICINE | Admitting: HOSPITALIST
Payer: MEDICAID

## 2020-02-17 VITALS
SYSTOLIC BLOOD PRESSURE: 116 MMHG | DIASTOLIC BLOOD PRESSURE: 72 MMHG | WEIGHT: 139.99 LBS | HEART RATE: 100 BPM | OXYGEN SATURATION: 97 % | RESPIRATION RATE: 20 BRPM | TEMPERATURE: 98 F | HEIGHT: 63 IN

## 2020-02-17 DIAGNOSIS — Z98.890 OTHER SPECIFIED POSTPROCEDURAL STATES: Chronic | ICD-10-CM

## 2020-02-17 DIAGNOSIS — E80.6 OTHER DISORDERS OF BILIRUBIN METABOLISM: ICD-10-CM

## 2020-02-17 LAB
ALBUMIN SERPL ELPH-MCNC: 2.9 G/DL — LOW (ref 3.3–5.2)
ALP SERPL-CCNC: 143 U/L — HIGH (ref 40–120)
ALT FLD-CCNC: 47 U/L — HIGH
ANION GAP SERPL CALC-SCNC: 11 MMOL/L — SIGNIFICANT CHANGE UP (ref 5–17)
ANISOCYTOSIS BLD QL: SIGNIFICANT CHANGE UP
APTT BLD: 36.2 SEC — SIGNIFICANT CHANGE UP (ref 27.5–36.3)
AST SERPL-CCNC: 73 U/L — HIGH
BASOPHILS # BLD AUTO: 0 K/UL — SIGNIFICANT CHANGE UP (ref 0–0.2)
BASOPHILS NFR BLD AUTO: 0 % — SIGNIFICANT CHANGE UP (ref 0–2)
BILIRUB DIRECT SERPL-MCNC: >10 MG/DL — HIGH (ref 0–0.3)
BILIRUB INDIRECT FLD-MCNC: <7.9 MG/DL — HIGH (ref 0.2–1)
BILIRUB SERPL-MCNC: 17.9 MG/DL — HIGH (ref 0.4–2)
BILIRUB SERPL-MCNC: 17.9 MG/DL — HIGH (ref 0.4–2)
BUN SERPL-MCNC: 15 MG/DL — SIGNIFICANT CHANGE UP (ref 8–20)
CALCIUM SERPL-MCNC: 8.3 MG/DL — LOW (ref 8.6–10.2)
CHLORIDE SERPL-SCNC: 103 MMOL/L — SIGNIFICANT CHANGE UP (ref 98–107)
CO2 SERPL-SCNC: 22 MMOL/L — SIGNIFICANT CHANGE UP (ref 22–29)
CREAT SERPL-MCNC: <0.2 MG/DL — LOW (ref 0.5–1.3)
EOSINOPHIL # BLD AUTO: 0 K/UL — SIGNIFICANT CHANGE UP (ref 0–0.5)
EOSINOPHIL NFR BLD AUTO: 0 % — SIGNIFICANT CHANGE UP (ref 0–6)
GIANT PLATELETS BLD QL SMEAR: PRESENT — SIGNIFICANT CHANGE UP
GLUCOSE BLDC GLUCOMTR-MCNC: 83 MG/DL — SIGNIFICANT CHANGE UP (ref 70–99)
GLUCOSE SERPL-MCNC: 150 MG/DL — HIGH (ref 70–99)
HCT VFR BLD CALC: 30 % — LOW (ref 39–50)
HGB BLD-MCNC: 10.6 G/DL — LOW (ref 13–17)
INR BLD: 1.37 RATIO — HIGH (ref 0.88–1.16)
LIDOCAIN IGE QN: 36 U/L — SIGNIFICANT CHANGE UP (ref 22–51)
LYMPHOCYTES # BLD AUTO: 0.22 K/UL — LOW (ref 1–3.3)
LYMPHOCYTES # BLD AUTO: 2.6 % — LOW (ref 13–44)
MACROCYTES BLD QL: SIGNIFICANT CHANGE UP
MANUAL SMEAR VERIFICATION: SIGNIFICANT CHANGE UP
MCHC RBC-ENTMCNC: 35.3 GM/DL — SIGNIFICANT CHANGE UP (ref 32–36)
MCHC RBC-ENTMCNC: 37.2 PG — HIGH (ref 27–34)
MCV RBC AUTO: 105.3 FL — HIGH (ref 80–100)
MONOCYTES # BLD AUTO: 0.14 K/UL — SIGNIFICANT CHANGE UP (ref 0–0.9)
MONOCYTES NFR BLD AUTO: 1.7 % — LOW (ref 2–14)
MYELOCYTES NFR BLD: 1.7 % — HIGH (ref 0–0)
NEUTROPHILS # BLD AUTO: 7.91 K/UL — HIGH (ref 1.8–7.4)
NEUTROPHILS NFR BLD AUTO: 93.1 % — HIGH (ref 43–77)
PLAT MORPH BLD: NORMAL — SIGNIFICANT CHANGE UP
PLATELET # BLD AUTO: 467 K/UL — HIGH (ref 150–400)
POTASSIUM SERPL-MCNC: 3.9 MMOL/L — SIGNIFICANT CHANGE UP (ref 3.5–5.3)
POTASSIUM SERPL-SCNC: 3.9 MMOL/L — SIGNIFICANT CHANGE UP (ref 3.5–5.3)
PROT SERPL-MCNC: 5.8 G/DL — LOW (ref 6.6–8.7)
PROTHROM AB SERPL-ACNC: 15.6 SEC — HIGH (ref 10–12.9)
RBC # BLD: 2.85 M/UL — LOW (ref 4.2–5.8)
RBC # FLD: 19.9 % — HIGH (ref 10.3–14.5)
RBC BLD AUTO: ABNORMAL
SODIUM SERPL-SCNC: 136 MMOL/L — SIGNIFICANT CHANGE UP (ref 135–145)
TARGETS BLD QL SMEAR: SLIGHT — SIGNIFICANT CHANGE UP
VARIANT LYMPHS # BLD: 0.9 % — SIGNIFICANT CHANGE UP (ref 0–6)
WBC # BLD: 8.5 K/UL — SIGNIFICANT CHANGE UP (ref 3.8–10.5)
WBC # FLD AUTO: 8.5 K/UL — SIGNIFICANT CHANGE UP (ref 3.8–10.5)

## 2020-02-17 PROCEDURE — 99285 EMERGENCY DEPT VISIT HI MDM: CPT

## 2020-02-17 RX ORDER — DIPHENHYDRAMINE HCL 50 MG
25 CAPSULE ORAL ONCE
Refills: 0 | Status: COMPLETED | OUTPATIENT
Start: 2020-02-17 | End: 2020-02-17

## 2020-02-17 RX ORDER — DEXTROSE 50 % IN WATER 50 %
15 SYRINGE (ML) INTRAVENOUS ONCE
Refills: 0 | Status: DISCONTINUED | OUTPATIENT
Start: 2020-02-17 | End: 2020-02-19

## 2020-02-17 RX ORDER — SODIUM CHLORIDE 9 MG/ML
1000 INJECTION, SOLUTION INTRAVENOUS
Refills: 0 | Status: DISCONTINUED | OUTPATIENT
Start: 2020-02-17 | End: 2020-02-19

## 2020-02-17 RX ORDER — DEXTROSE 50 % IN WATER 50 %
12.5 SYRINGE (ML) INTRAVENOUS ONCE
Refills: 0 | Status: DISCONTINUED | OUTPATIENT
Start: 2020-02-17 | End: 2020-02-19

## 2020-02-17 RX ORDER — ONDANSETRON 8 MG/1
4 TABLET, FILM COATED ORAL EVERY 8 HOURS
Refills: 0 | Status: DISCONTINUED | OUTPATIENT
Start: 2020-02-17 | End: 2020-02-19

## 2020-02-17 RX ORDER — DEXTROSE 50 % IN WATER 50 %
25 SYRINGE (ML) INTRAVENOUS ONCE
Refills: 0 | Status: DISCONTINUED | OUTPATIENT
Start: 2020-02-17 | End: 2020-02-19

## 2020-02-17 RX ORDER — AZATHIOPRINE 100 MG/1
50 TABLET ORAL DAILY
Refills: 0 | Status: DISCONTINUED | OUTPATIENT
Start: 2020-02-18 | End: 2020-02-19

## 2020-02-17 RX ORDER — HYDROXYZINE HCL 10 MG
25 TABLET ORAL EVERY 8 HOURS
Refills: 0 | Status: DISCONTINUED | OUTPATIENT
Start: 2020-02-17 | End: 2020-02-19

## 2020-02-17 RX ORDER — GLUCAGON INJECTION, SOLUTION 0.5 MG/.1ML
1 INJECTION, SOLUTION SUBCUTANEOUS ONCE
Refills: 0 | Status: DISCONTINUED | OUTPATIENT
Start: 2020-02-17 | End: 2020-02-19

## 2020-02-17 RX ADMIN — Medication 25 MILLIGRAM(S): at 21:10

## 2020-02-17 RX ADMIN — Medication 100 MILLIGRAM(S): at 22:04

## 2020-02-17 NOTE — ED PROVIDER NOTE - OBJECTIVE STATEMENT
29 year old male with PMH primary sclerosing cholangitis and bilary stenosis with multiple ERCP and biliary stents presents with worsening jaundice. He denies abd pain, nausea, vomiting, fever, chills. He went to see his GI, Dr. Howard, who sent him to the ED, for another ERCP

## 2020-02-17 NOTE — ED ADULT NURSE NOTE - OBJECTIVE STATEMENT
Pt presents to ED sent in from Dr. Howard for ERCP.  Pt jaundiced. C/o itchiness.  Respirations even and unlabored.  Abdomen soft, nontender, nondistended.

## 2020-02-17 NOTE — ED PROVIDER NOTE - CLINICAL SUMMARY MEDICAL DECISION MAKING FREE TEXT BOX
pt with biliary ductal stenosis, requiring multiple stents in the past, presenting with worsening jaundice, sent in by PABLO Howard for ERCP

## 2020-02-17 NOTE — CONSULT NOTE ADULT - SUBJECTIVE AND OBJECTIVE BOX
REASON FOR CONSULT: telehospitalist evaluation    Outpatient physicians: PMD: Dr. Thompson                                       GI: Dr. Howard                                       Hepatologist: Dr. Tipton         HPI: 29 year old M with history of mild cerebral palsy, autoimmune primary sclerosing cholangitis, biliary stricture requiring multiple ERCP, stent placement and, biliary stent related pancreatitis,  pt is being followed by hepatology team Dr. Tipton and on chronic prednisone and azathioprine, known to GI Dr. Howard, most recent stent placement on 2/7/20. As per patient and mother Nan at bedside since stent placement pt continues to have symptoms of worsening RUQ abd pain which intermittently recurs, 8/10 pain scale, noted as a discomfort with associated nausea, denies vomiting. Also with at times intractable itching for which he reports minimal relief with atarax. As per pts mother she keeps close contact with GI Dr. Howard who recommended that if the pts sx persist then he would need to come to the ER for repeat procedure. Given progression of the pts sx he came to the ER for further management. Of note the pt recently saw Dr. Tipton who decreased his azathioprine dose from 150mg to 50mg po qd and started referral for liver transplant. Patient denies chest pain, SOB, fever, chills, dysuria or any other complaints. All remainder ROS negative.     PMH:   Autoimmune cholangitis   Mild Cerebral palsy     PSH:   None     Social Hx:  Denies smoking, alcohol or drug use  Lives with mother   Independent in ADLs     Family Hx:   No significant fam hx     Meds: Med rec completed   prednisone 10mg po qd  Azathiprine 50mg po qd     Allergies:  NKDA     Pharmacy: [facility]     OBJECTIVE    Vital Signs Last 24 Hrs  T(C): 36.8 (17 Feb 2020 16:15), Max: 36.8 (17 Feb 2020 16:15)  T(F): 98.2 (17 Feb 2020 16:15), Max: 98.2 (17 Feb 2020 16:15)  HR: 100 (17 Feb 2020 16:15) (100 - 100)  BP: 116/72 (17 Feb 2020 16:15) (116/72 - 116/72)  BP(mean): --  RR: 20 (17 Feb 2020 16:15) (20 - 20)  SpO2: 97% (17 Feb 2020 16:15) (97% - 97%)        PHYSICAL EXAM: Tele-evaluation precludes physical exam.       LABS AND IMAGING DATA:                        10.6   8.50  )-----------( 467      ( 17 Feb 2020 18:44 )             30.0     02-17    136  |  103  |  15.0  ----------------------------<  150<H>  3.9   |  22.0  |  <0.20<L>    Ca    8.3<L>      17 Feb 2020 18:44    TPro  5.8<L>  /  Alb  2.9<L>  /  TBili  17.9<H>  /  DBili  >10.0<H>  /  AST  73<H>  /  ALT  47<H>  /  AlkPhos  143<H>  02-17      ASSESSMENT AND PLAN:     29 year old M with history of mild cerebral palsy, autoimmune primary sclerosing cholangitis, biliary stricture requiring multiple ERCP, stent placement and, biliary stent related pancreatitis,  pt is being followed by hepatology team Dr. Tipton and on chronic prednisone and azathioprine, known to GI Dr. Howard, most recent stent placement on 2/7/20. Currently presenting with recurrent worsening RUQ abdominal pain with associated nausea, and intractable pruritis. Noted transaminitis and elevated bilirubin. Sent in by GI for further evaluation and management and need for repeat ERCP. Pt admitted with persistent sx of intractable abd pain/ pruritis/ obstructive jaundice in the setting of Primary biliary sclerosing cholangitis.        Admit to Any bed     Intractable abd pain/ obstructive jaundice 2/2 Autoimmune primary sclerosing cholangitis   - hx of recurrent ERCP intervention with recent biliary stent placement on 2/7/20 with minimal relief of sx thereafter   - Currently with nausea and intermittent RUQ abd pain   - Noted transaminitis and elevated bilirubin   - no evidence of infection   - pt is afebrile  - no leukocytosis   - will trend lfts/ bilirubin   - c/w zofran prn for N/V  - c/w atarax prn for pruritis   - c/w NPO  - c/w IVF  - accuchecks q8hrs while npo and hypoglycemia protocol   - c/w prednisone 10mg po qd  - c/w azathiprine 50mg po qd (recent dose adjustment as per hepatologist)   - GI consulted, Dr. Howard sent pt in for repeat ERCP in the am   - ppx abx per GI in the am   - pt folows with hepatologist Dr. Tipton who has referred him for liver transplant initiation as an outpt     Mild cerebral palsy  - pt independent in ADLs and functional     DVT ppx  - no chemical AC given procedure in the am   - scd boots b/l     Activity level: Increase as tolerated    Dispo: Anticipated in 2-3 days pending improvement and procedure. Pt to return to home when cleared by GI.       Care plan discussed with Dr. Godinez

## 2020-02-18 DIAGNOSIS — R19.7 DIARRHEA, UNSPECIFIED: ICD-10-CM

## 2020-02-18 DIAGNOSIS — G80.9 CEREBRAL PALSY, UNSPECIFIED: ICD-10-CM

## 2020-02-18 DIAGNOSIS — E80.6 OTHER DISORDERS OF BILIRUBIN METABOLISM: ICD-10-CM

## 2020-02-18 LAB
ALBUMIN SERPL ELPH-MCNC: 2.9 G/DL — LOW (ref 3.3–5.2)
ALP SERPL-CCNC: 140 U/L — HIGH (ref 40–120)
ALT FLD-CCNC: 46 U/L — HIGH
ANION GAP SERPL CALC-SCNC: 13 MMOL/L — SIGNIFICANT CHANGE UP (ref 5–17)
ANISOCYTOSIS BLD QL: SLIGHT — SIGNIFICANT CHANGE UP
APPEARANCE UR: CLEAR — SIGNIFICANT CHANGE UP
AST SERPL-CCNC: 71 U/L — HIGH
BACTERIA # UR AUTO: ABNORMAL
BASOPHILS # BLD AUTO: 0.04 K/UL — SIGNIFICANT CHANGE UP (ref 0–0.2)
BASOPHILS NFR BLD AUTO: 0.4 % — SIGNIFICANT CHANGE UP (ref 0–2)
BILIRUB DIRECT SERPL-MCNC: >10 MG/DL — HIGH (ref 0–0.3)
BILIRUB INDIRECT FLD-MCNC: <7.9 MG/DL — HIGH (ref 0.2–1)
BILIRUB SERPL-MCNC: 17.9 MG/DL — HIGH (ref 0.4–2)
BILIRUB SERPL-MCNC: 17.9 MG/DL — HIGH (ref 0.4–2)
BILIRUB UR-MCNC: ABNORMAL
BUN SERPL-MCNC: 19 MG/DL — SIGNIFICANT CHANGE UP (ref 8–20)
CALCIUM SERPL-MCNC: 8.8 MG/DL — SIGNIFICANT CHANGE UP (ref 8.6–10.2)
CHLORIDE SERPL-SCNC: 102 MMOL/L — SIGNIFICANT CHANGE UP (ref 98–107)
CO2 SERPL-SCNC: 23 MMOL/L — SIGNIFICANT CHANGE UP (ref 22–29)
COLOR SPEC: ABNORMAL
CREAT SERPL-MCNC: 0.38 MG/DL — LOW (ref 0.5–1.3)
DACRYOCYTES BLD QL SMEAR: SLIGHT — SIGNIFICANT CHANGE UP
DIFF PNL FLD: ABNORMAL
EOSINOPHIL # BLD AUTO: 0.04 K/UL — SIGNIFICANT CHANGE UP (ref 0–0.5)
EOSINOPHIL NFR BLD AUTO: 0.4 % — SIGNIFICANT CHANGE UP (ref 0–6)
EPI CELLS # UR: SIGNIFICANT CHANGE UP
GLUCOSE SERPL-MCNC: 90 MG/DL — SIGNIFICANT CHANGE UP (ref 70–99)
GLUCOSE UR QL: NEGATIVE — SIGNIFICANT CHANGE UP
HCT VFR BLD CALC: 28.6 % — LOW (ref 39–50)
HGB BLD-MCNC: 10.1 G/DL — LOW (ref 13–17)
HYPOCHROMIA BLD QL: SLIGHT — SIGNIFICANT CHANGE UP
IMM GRANULOCYTES NFR BLD AUTO: 0.7 % — SIGNIFICANT CHANGE UP (ref 0–1.5)
KETONES UR-MCNC: ABNORMAL
LEUKOCYTE ESTERASE UR-ACNC: ABNORMAL
LYMPHOCYTES # BLD AUTO: 1.94 K/UL — SIGNIFICANT CHANGE UP (ref 1–3.3)
LYMPHOCYTES # BLD AUTO: 19.9 % — SIGNIFICANT CHANGE UP (ref 13–44)
MACROCYTES BLD QL: SLIGHT — SIGNIFICANT CHANGE UP
MANUAL SMEAR VERIFICATION: SIGNIFICANT CHANGE UP
MCHC RBC-ENTMCNC: 35.3 GM/DL — SIGNIFICANT CHANGE UP (ref 32–36)
MCHC RBC-ENTMCNC: 37.7 PG — HIGH (ref 27–34)
MCV RBC AUTO: 106.7 FL — HIGH (ref 80–100)
MICROCYTES BLD QL: SLIGHT — SIGNIFICANT CHANGE UP
MONOCYTES # BLD AUTO: 0.48 K/UL — SIGNIFICANT CHANGE UP (ref 0–0.9)
MONOCYTES NFR BLD AUTO: 4.9 % — SIGNIFICANT CHANGE UP (ref 2–14)
NEUTROPHILS # BLD AUTO: 7.16 K/UL — SIGNIFICANT CHANGE UP (ref 1.8–7.4)
NEUTROPHILS NFR BLD AUTO: 73.7 % — SIGNIFICANT CHANGE UP (ref 43–77)
NITRITE UR-MCNC: POSITIVE
OVALOCYTES BLD QL SMEAR: SLIGHT — SIGNIFICANT CHANGE UP
PH UR: 6 — SIGNIFICANT CHANGE UP (ref 5–8)
PLAT MORPH BLD: ABNORMAL
PLATELET # BLD AUTO: 493 K/UL — HIGH (ref 150–400)
PLATELET COUNT - ESTIMATE: NORMAL — SIGNIFICANT CHANGE UP
POIKILOCYTOSIS BLD QL AUTO: SLIGHT — SIGNIFICANT CHANGE UP
POTASSIUM SERPL-MCNC: 3.6 MMOL/L — SIGNIFICANT CHANGE UP (ref 3.5–5.3)
POTASSIUM SERPL-SCNC: 3.6 MMOL/L — SIGNIFICANT CHANGE UP (ref 3.5–5.3)
PROT SERPL-MCNC: 5.7 G/DL — LOW (ref 6.6–8.7)
PROT UR-MCNC: 15
RBC # BLD: 2.68 M/UL — LOW (ref 4.2–5.8)
RBC # FLD: 20.3 % — HIGH (ref 10.3–14.5)
RBC BLD AUTO: ABNORMAL
RBC CASTS # UR COMP ASSIST: SIGNIFICANT CHANGE UP /HPF (ref 0–4)
SCHISTOCYTES BLD QL AUTO: SLIGHT — SIGNIFICANT CHANGE UP
SODIUM SERPL-SCNC: 138 MMOL/L — SIGNIFICANT CHANGE UP (ref 135–145)
SP GR SPEC: 1.02 — SIGNIFICANT CHANGE UP (ref 1.01–1.02)
TARGETS BLD QL SMEAR: SLIGHT — SIGNIFICANT CHANGE UP
UROBILINOGEN FLD QL: 12
WBC # BLD: 9.73 K/UL — SIGNIFICANT CHANGE UP (ref 3.8–10.5)
WBC # FLD AUTO: 9.73 K/UL — SIGNIFICANT CHANGE UP (ref 3.8–10.5)
WBC UR QL: SIGNIFICANT CHANGE UP

## 2020-02-18 PROCEDURE — 12345: CPT | Mod: NC

## 2020-02-18 RX ORDER — ACETAMINOPHEN 500 MG
650 TABLET ORAL EVERY 6 HOURS
Refills: 0 | Status: DISCONTINUED | OUTPATIENT
Start: 2020-02-18 | End: 2020-02-19

## 2020-02-18 RX ORDER — CIPROFLOXACIN LACTATE 400MG/40ML
VIAL (ML) INTRAVENOUS
Refills: 0 | Status: DISCONTINUED | OUTPATIENT
Start: 2020-02-18 | End: 2020-02-18

## 2020-02-18 RX ORDER — CIPROFLOXACIN LACTATE 400MG/40ML
400 VIAL (ML) INTRAVENOUS EVERY 12 HOURS
Refills: 0 | Status: DISCONTINUED | OUTPATIENT
Start: 2020-02-18 | End: 2020-02-18

## 2020-02-18 RX ORDER — KETOROLAC TROMETHAMINE 30 MG/ML
10 SYRINGE (ML) INJECTION EVERY 6 HOURS
Refills: 0 | Status: DISCONTINUED | OUTPATIENT
Start: 2020-02-18 | End: 2020-02-19

## 2020-02-18 RX ADMIN — SODIUM CHLORIDE 75 MILLILITER(S): 9 INJECTION, SOLUTION INTRAVENOUS at 00:51

## 2020-02-18 RX ADMIN — Medication 10 MILLIGRAM(S): at 12:05

## 2020-02-18 RX ADMIN — Medication 25 MILLIGRAM(S): at 22:36

## 2020-02-18 RX ADMIN — Medication 650 MILLIGRAM(S): at 12:05

## 2020-02-18 RX ADMIN — Medication 650 MILLIGRAM(S): at 13:39

## 2020-02-18 RX ADMIN — Medication 25 MILLIGRAM(S): at 14:58

## 2020-02-18 RX ADMIN — Medication 10 MILLIGRAM(S): at 14:57

## 2020-02-18 RX ADMIN — AZATHIOPRINE 50 MILLIGRAM(S): 100 TABLET ORAL at 12:05

## 2020-02-18 NOTE — PROGRESS NOTE ADULT - SUBJECTIVE AND OBJECTIVE BOX
Patient is a 29y old  Male with PMHx of Cerebral Palsy, Primary Sclerosing Cholangitis, who presents with a chief complaint of jaundice (2020 00:42)    INTERVAL HPI/OVERNIGHT EVENTS:  29y  Male admitted to the hospital due jaundice. This AM continues with jaundice. Abdominal pain improved this AM but had intermittent RUQ in early afternoon. Presented one episode of liquid brown/greenish diarrhea overnight in ED.  No other acute events overnight  Pt is tolerating PO, voiding dark colored urine spontaneously. Laying comfortably in bed.   ROS Negative except as above.    Vital Signs Last 24 Hrs  T(C): 36.8 (2020 15:33), Max: 37.2 (2020 07:48)  T(F): 98.3 (2020 15:33), Max: 98.9 (2020 07:48)  HR: 78 (2020 15:33) (73 - 100)  BP: 116/63 (2020 15:33) (105/58 - 116/72)  RR: 18 (2020 15:33) (18 - 20)  SpO2: 98% (2020 15:33) (97% - 100%)    Telemetry:      Daily Height in cm: 160.02 (2020 16:15)    Daily   I&O's Detail    2020 07:01  -  2020 07:00  --------------------------------------------------------  IN:    dextrose 5% + sodium chloride 0.9%.: 450 mL  Total IN: 450 mL    OUT:  Total OUT: 0 mL    Total NET: 450 mL          PHYSICAL EXAM:    General:NAD, well nourished and well developed, afebrile  HEENT: Normocephalic, atraumatic, clear sclera, PERRLA, EOMI, Clear nares, Moist Mucosas, Normal Pharynx, No exudates, no lesions   Neck: Supple, no carotid bruits, no JVD, No thyromegaly  Respiratory: Normal respiratory rate and effort, lungs are clear to auscultation bilaterally.  Cardiac: Regular rate and rhythm, no murmurs, rubs or gallop.  GI: Abdomen is soft, nontender, nondistended, no rebound or guarding, bowel sounds are normal.  Extremities: No cyanosis, no edema, pedal pulses +2 bilaterally.  Neurological: Patient is alert and oriented x4, CN II-XII grossly intact, no sensory or motor deficits.  Psych: Normal speech and affect, good eye contact. No behavioural disturbances.  Skin: Appropiate skin color to race and age, no abnormal discolorations, no bruises, no bleeding, no lacerations.    LABS:                        10.1   9.73  )-----------( 493      ( 2020 07:44 )             28.6     CBC Full  -  ( 2020 07:44 )  WBC Count : 9.73 K/uL  RBC Count : 2.68 M/uL  Hemoglobin : 10.1 g/dL  Hematocrit : 28.6 %  Platelet Count - Automated : 493 K/uL  Mean Cell Volume : 106.7 fl  Mean Cell Hemoglobin : 37.7 pg  Mean Cell Hemoglobin Concentration : 35.3 gm/dL  Auto Neutrophil # : 7.16 K/uL  Auto Lymphocyte # : 1.94 K/uL  Auto Monocyte # : 0.48 K/uL  Auto Eosinophil # : 0.04 K/uL  Auto Basophil # : 0.04 K/uL  Auto Neutrophil % : 73.7 %  Auto Lymphocyte % : 19.9 %  Auto Monocyte % : 4.9 %  Auto Eosinophil % : 0.4 %  Auto Basophil % : 0.4 %        138  |  102  |  19.0  ----------------------------<  90  3.6   |  23.0  |  0.38<L>    Ca    8.8      2020 07:44    TPro  5.7<L>  /  Alb  2.9<L>  /  TBili  17.9<H>  /  DBili  >10.0<H>  /  AST  71<H>  /  ALT  46<H>  /  AlkPhos  140<H>        PT/INR - ( 2020 18:44 )   PT: 15.6 sec;   INR: 1.37 ratio         PTT - ( 2020 18:44 )  PTT:36.2 sec  Urinalysis Basic - ( 2020 00:46 )    Color: Sandra / Appearance: Clear / S.020 / pH: x  Gluc: x / Ketone: Trace  / Bili: Large / Urobili: 12   Blood: x / Protein: 15 / Nitrite: Positive   Leuk Esterase: Trace / RBC: 0-2 /HPF / WBC 0-2   Sq Epi: x / Non Sq Epi: Occasional / Bacteria: Few            Albumin, Serum: 2.9 g/dL ( @ 07:44)  Albumin, Serum: 2.9 g/dL ( @ 18:44)        RADIOLOGY & ADDITIONAL TESTS:    acetaminophen   Tablet .. 650 milliGRAM(s) Oral every 6 hours PRN  azaTHIOprine 50 milliGRAM(s) Oral daily  dextrose 40% Gel 15 Gram(s) Oral once PRN  dextrose 5% + sodium chloride 0.9%. 1000 milliLiter(s) IV Continuous <Continuous>  dextrose 5%. 1000 milliLiter(s) IV Continuous <Continuous>  dextrose 50% Injectable 12.5 Gram(s) IV Push once  dextrose 50% Injectable 25 Gram(s) IV Push once  dextrose 50% Injectable 25 Gram(s) IV Push once  glucagon  Injectable 1 milliGRAM(s) IntraMuscular once PRN  hydrOXYzine hydrochloride 25 milliGRAM(s) Oral every 8 hours PRN  ketorolac 10 milliGRAM(s) Oral every 6 hours PRN  ondansetron Injectable 4 milliGRAM(s) IV Push every 8 hours PRN  predniSONE   Tablet 10 milliGRAM(s) Oral daily Patient is a 29y old  Male with PMHx of Cerebral Palsy, Primary Sclerosing Cholangitis, who presents with a chief complaint of jaundice (2020 00:42)    INTERVAL HPI/OVERNIGHT EVENTS:  29y  Male admitted to the hospital due jaundice. This AM continues with jaundice. Abdominal pain improved this AM but had intermittent RUQ in early afternoon. Presented one episode of liquid brown/greenish diarrhea overnight in ED.  No other acute events overnight  Pt is tolerating PO, voiding dark colored urine spontaneously. Laying comfortably in bed. Denies dysuria.  ROS Negative except as above.    Vital Signs Last 24 Hrs  T(C): 36.8 (2020 15:33), Max: 37.2 (2020 07:48)  T(F): 98.3 (2020 15:33), Max: 98.9 (2020 07:48)  HR: 78 (2020 15:33) (73 - 100)  BP: 116/63 (2020 15:33) (105/58 - 116/72)  RR: 18 (2020 15:33) (18 - 20)  SpO2: 98% (2020 15:33) (97% - 100%)    Daily Height in cm: 160.02 (2020 16:15)    Daily   I&O's Detail    2020 07:01  -  2020 07:00  --------------------------------------------------------  IN:    dextrose 5% + sodium chloride 0.9%.: 450 mL  Total IN: 450 mL    OUT:  Total OUT: 0 mL    Total NET: 450 mL      PHYSICAL EXAM:    General: NAD, calm, well nourished, afebrile  HEENT: Normocephalic, atraumatic, jaundiced sclera/skin/mucosal surfaces, PERRLA, EOMI, Clear nares, Moist Mucosas, Normal Pharynx, No exudates/petechia, no lesions   Neck: Supple, no JVD  Respiratory: Normal respiratory rate and effort, lungs are clear to auscultation bilaterally.  Cardiac: Regular rate and rhythm, no murmurs, rubs or gallop. No edema  GI: Abdomen is soft, mild tenderness in RUQ, nondistended, no rebound or guarding, bowel sounds are normal. No Roman, Mcburney, Rovsing sign,  Extremities: Left UE and hand have chronic atrophic changes, No cyanosis, no edema, pedal pulses +2 bilaterally.  Neurological: Patient is alert and oriented x4, CN II-XII grossly intact, no sensory or motor deficits. No asterixis noted.  Psych: Normal speech and affect, good eye contact. No behavioural disturbances. Cooperative.   Skin: Generalized jaundice noted, no bruises, no bleeding, no lacerations.    LABS:                  10.1   9.73  )-----------( 493      ( 2020 07:44 )             28.6     CBC Full  -  ( 2020 07:44 )  WBC Count : 9.73 K/uL  RBC Count : 2.68 M/uL  Hemoglobin : 10.1 g/dL  Hematocrit : 28.6 %  Platelet Count - Automated : 493 K/uL  Mean Cell Volume : 106.7 fl  Mean Cell Hemoglobin : 37.7 pg  Mean Cell Hemoglobin Concentration : 35.3 gm/dL  Auto Neutrophil # : 7.16 K/uL  Auto Lymphocyte # : 1.94 K/uL  Auto Monocyte # : 0.48 K/uL  Auto Eosinophil # : 0.04 K/uL  Auto Basophil # : 0.04 K/uL  Auto Neutrophil % : 73.7 %  Auto Lymphocyte % : 19.9 %  Auto Monocyte % : 4.9 %  Auto Eosinophil % : 0.4 %  Auto Basophil % : 0.4 %      138  |  102  |  19.0  ----------------------------<  90  3.6   |  23.0  |  0.38<L>  Ca    8.8      2020 07:44    TPro  5.7<L>  /  Alb  2.9<L>  /  TBili  17.9<H>  /  DBili  >10.0<H>  /  AST  71<H>  /  ALT  46<H>  /  AlkPhos  140<H>      Bilirubin - Total and Direct in AM (20 @ 07:44)    Indirect Reacting Bilirubin: <7.9 mg/dL    Bilirubin Direct, Serum: >10.0 mg/dL    Bilirubin Total, Serum: 17.9 mg/dL    PT/INR - ( 2020 18:44 )   PT: 15.6 sec;   INR: 1.37 ratio    PTT - ( 2020 18:44 )  PTT:36.2 sec    Urinalysis Basic - ( 2020 00:46 )  Color: Sandra / Appearance: Clear / S.020 / pH: x  Gluc: x / Ketone: Trace  / Bili: Large / Urobili: 12   Blood: x / Protein: 15 / Nitrite: Positive   Leuk Esterase: Trace / RBC: 0-2 /HPF / WBC 0-2   Sq Epi: x / Non Sq Epi: Occasional / Bacteria: Few

## 2020-02-18 NOTE — CHART NOTE - NSCHARTNOTEFT_GEN_A_CORE
Patient seen and examined at the bedside with resident team. I was physically present for the key portions of the evaluation and management services provided.  history, physical, assessment, and plan per their note dated for today with the necessary amendments/elaborations below:    clinically stable. known hx of PSC w/ recurrent abd pains likely sec to bile duct obstruction in setting of sclerosis. supportive care for pain. npo @ midnight for ercp tomorrow.     no evidence of uti on ua, pt w/o consistent symptoms, will stop abx.     dispo. pending gi intervention    case discussed w/ pt family @ bedside also

## 2020-02-18 NOTE — PROGRESS NOTE ADULT - REASON FOR ADMISSION
jaundice
Asthma    DM (diabetes mellitus)    HTN (hypertension)    Lymphedema of both lower extremities    Pulmonary embolism

## 2020-02-18 NOTE — H&P ADULT - NSHPPHYSICALEXAM_GEN_ALL_CORE
General: Pt. lying in bed , pleasant not in distress.   HEENT: AT, NC. PERRL. intact EOM. no throat erythema or exudate. + scleral icterus.   Neck: supple. no JVD.   Chest: CTA bilaterally  Heart: S1,S2. RRR. no heart murmur. no edema.   Abdomen: soft. mild pain in rt. upper quadrant to palpation, no RT, no guarding, no Roman's sign. non-distended. + BS.   Ext: no calf tenderness, ROM of all ext. intact. distal pulses intact.   Neuro: Alert and awake, baseline mental status has h/o CP. no focal weakness. follows commands, cooperative.   Skin: + jaundiced skin, no diaphoresis.   Psych : no agitation, co-operative. General: Pt. lying in bed , pleasant not in distress.   HEENT: AT, NC. PERRL. intact EOM. no throat erythema or exudate. + scleral icterus.   Neck: supple. no JVD.   Chest: CTA bilaterally  Heart: S1,S2. RRR. no heart murmur. no edema.   Abdomen: soft. mild pain in rt. upper quadrant to palpation, no RT, no guarding, no Roman's sign. non-distended. + BS.   Ext: no calf tenderness, ROM of all ext. intact. distal pulses intact.   Neuro: Alert and awake, baseline mental status has h/o CP. no focal weakness. follows commands, cooperative.   Skin: + jaundiced skin, very small area of incision /drain( done by er physician ) over rt. middle finger distally noted, no sig. swelling , warmth or erythema noted, no diaphoresis.   Psych : no agitation, co-operative.

## 2020-02-18 NOTE — PROGRESS NOTE ADULT - ASSESSMENT
30 y/o male with h/o CP and PSC (on chronic Azathioprine and Steroids) who had multiple ERCPs ans stent placement , last biliary stent placement on 2/7/20 at [facility] was brought back as pt's jaundice and pruritis. Noted to have high bilirubinemia. Recently placed on Levofloxacin for 7 days (from 7 February-14 February) with episodes of diarrhea for about 10-12 days, as per mother. Pt continues with Jaundice and intermittent abdominal pain. GI in case, awaiting MRCP to be scheduled for tomorrow.     lirubinemia, in setting of Primary Schelorosing Cholanditis) 2/2 bile buct obstruction vs. sclerosis  -pt. with h/o PCS, s/p many ercps and biliary stent,   -ERCP scheduled for 19 February 2020  -pt. can eat today as tolerated, but will be npo after midnight  for ercp and stent management as per GI.   -GI consult appreciated  -iv fluids when npo.   -pt. on azathioprine and prednisone which will be continued.  -follow bilirubin trend in AM     Cerebral palsy  -by hx  -pt. at his baseline , mother at bedside.   pt. pleasant, cooperative.     Diarrhea, unspecified type.  -started soon after initiation of levofloxacin PO  -will senf stool studies. 30 y/o male with h/o CP and PSC (on chronic Azathioprine and Steroids) who had multiple ERCPs ans stent placement , last biliary stent placement on 2/7/20 at [facility] was brought back as pt's jaundice and pruritis. Noted to have high bilirubinemia. Recently placed on Levofloxacin for 7 days (from 7 February-14 February) with episodes of diarrhea for about 10-12 days, as per mother. Pt continues with Jaundice and intermittent abdominal pain. GI in case, awaiting MRCP to be scheduled for tomorrow.     lirubinemia, in setting of Primary Schelorosing Cholanditis) 2/2 bile buct obstruction vs. sclerosis  -pt. with h/o PCS, s/p many ercps and biliary stent,   -ERCP scheduled for 19 February 2020  -pt. can eat today as tolerated, but will be npo after midnight  for ercp and stent management as per GI.   -GI consult appreciated  -iv fluids when npo.   -pt. on azathioprine and prednisone which will be continued.  -follow bilirubin trend in AM     Cerebral palsy  -by hx  -pt. at his baseline , mother at bedside.   pt. pleasant, cooperative.     Diarrhea, unspecified type.  -started soon after initiation of levofloxacin PO  -will senf stool studies.     +Nitrates UA  -Pt has no current symptoms  -Negative for bacteria and WBCs  -Will order UA  -no abx at this time 30 y/o male with h/o CP and PSC (on chronic Azathioprine and Steroids) who had multiple ERCPs ans stent placement , last biliary stent placement on 2/7/20 at [facility] was brought back as pt's jaundice and pruritis. Noted to have high bilirubinemia. Recently placed on Levofloxacin for 7 days (from 7 February-14 February) with episodes of diarrhea for about 10-12 days, as per mother. Pt continues with Jaundice and intermittent abdominal pain. GI in case, awaiting MRCP to be scheduled for tomorrow.     lirubinemia, in setting of Primary Schelorosing Cholanditis) 2/2 bile buct obstruction vs. sclerosis  -pt. with h/o PCS, s/p many ercps and biliary stent,   -ERCP scheduled for 19 February 2020  -pt. can eat today as tolerated, but will be npo after midnight  for ercp and stent management as per GI.   -GI consult appreciated  -iv fluids when npo.   -pt. on azathioprine and prednisone which will be continued.  -follow bilirubin trend in AM     Cerebral palsy  -by hx  -pt. at his baseline , mother at bedside.   pt. pleasant, cooperative.     Diarrhea, unspecified type.  -started soon after initiation of levofloxacin PO  -will senf stool studies.     +Nitrates UA  -Pt has no current symptoms  -Negative for bacteria and WBCs  -Will order UA  -no abx at this time 28 y/o male with h/o CP and PSC (on chronic Azathioprine and Steroids) who had multiple ERCPs ans stent placement , last biliary stent placement on 2/7/20 at [facility] was brought back as pt's jaundice and pruritis. Noted to have high bilirubinemia. Recently placed on Levofloxacin for 7 days (from 7 February-14 February) with episodes of diarrhea for about 10-12 days, as per mother. Pt continues with Jaundice and intermittent abdominal pain. GI in case, awaiting MRCP to be scheduled for tomorrow.     lirubinemia, in setting of Primary Schelorosing Cholanditis) 2/2 bile buct obstruction vs. sclerosis  -pt. with h/o PCS, s/p many ercps and biliary stent,   -ERCP scheduled for 19 February 2020  -pt. can eat today as tolerated, but will be npo after midnight  for ercp and stent management as per GI.   -GI consult appreciated  -iv fluids when npo  -Toradol for Pain  -pt. on azathioprine and prednisone which will be continued.  -follow bilirubin trend in AM     Cerebral palsy  -by hx  -pt. at his baseline , mother at bedside.   pt. pleasant, cooperative.     Diarrhea, unspecified type.  -started soon after initiation of levofloxacin PO  -will senf stool studies.     +Nitrates UA  -Pt has no current symptoms  -Negative for bacteria and WBCs    VTE Prophylaxis  SCDs    Dispo:  -possibly home     Follow up:  PCP, Gastro    Code Status:  Full Code 28 y/o male with h/o CP and PSC (on chronic Azathioprine and Steroids) who had multiple ERCPs ans stent placement , last biliary stent placement on 2/7/20 at [facility] was brought back as pt's jaundice and pruritis. Noted to have high bilirubinemia. Recently placed on Levofloxacin for 7 days (from 7 February-14 February) with episodes of diarrhea for about 10-12 days, as per mother. Pt continues with Jaundice and intermittent abdominal pain. GI in case, awaiting ERCP to be scheduled for tomorrow.     lirubinemia, in setting of Primary Schelorosing Cholanditis) 2/2 bile buct obstruction vs. sclerosis  -pt. with h/o PCS, s/p many ercps and biliary stent,   -ERCP scheduled for 19 February 2020  -pt. can eat today as tolerated, but will be npo after midnight  for ercp and stent management as per GI.   -GI consult appreciated  -iv fluids when npo  -Toradol for Pain  -pt. on azathioprine and prednisone which will be continued.  -follow bilirubin trend in AM     Cerebral palsy  -by hx  -pt. at his baseline , mother at bedside.   pt. pleasant, cooperative.     Diarrhea, unspecified type.  -started soon after initiation of levofloxacin PO  -will senf stool studies.     +Nitrates UA  -Pt has no current symptoms  -Negative for bacteria and WBCs    VTE Prophylaxis  SCDs    Dispo:  -possibly home     Follow up:  PCP, Gastro    Code Status:  Full Code

## 2020-02-18 NOTE — H&P ADULT - NSICDXPASTSURGICALHX_GEN_ALL_CORE_FT
PAST SURGICAL HISTORY:  History of biliary stent insertion removed 7/2019    History of ERCP performed at [place]

## 2020-02-18 NOTE — PROGRESS NOTE ADULT - ATTENDING COMMENTS
Senior Resident Addendum  Note addended where needed. Patient seen and examined at bedside. I was physically present for the key portions of the evaluation and management services provided. Patient with a known hx of PSC on Prednisone and Azathioprine and ERCP with stent presented with recurrent RUQ abd pain and jaundice. Bili levels severely elevated. No clinical signs of infection at this time. PAtient tolerating PO diet as well as PO pain medication. Plan for ERCP tomorrow with possible stenting as per GI. NPO at midnight. Dispo planning depending on ERCP results.

## 2020-02-18 NOTE — H&P ADULT - HISTORY OF PRESENT ILLNESS
28 y/o male with h/o CP and PSC who had biliary stent placement on 2/7/20 at [facility] was brought back as pt's jaundice and pruritis did not get better and on and off gets RUQ pain, pain appears to be mild to moderate. no fever. no some nausea no vomiting. no cp, no sob reported. As per mother has diarrhea for past 10 to 12 days. no other complaints. 28 y/o male with h/o CP and PSC who had multiple ERCPs ans stent placement , last biliary stent placement on 2/7/20 at [facility] was brought back as pt's jaundice and pruritis did not get better and on and off gets RUQ pain, pain appears to be mild to moderate. no fever. no some nausea no vomiting. no cp, no sob reported. As per mother has diarrhea for past 10 to 12 days. no other complaints. pt. gives history and mother is assisting as well.   pt is being followed by hepatology team Dr. Tipton and on chronic prednisone and azathioprine, known to GI Dr. Howard at [facility]. pt recently saw Dr. Tipton who decreased his azathioprine dose from 150mg to 50mg po qd and started referral for liver transplant. 30 y/o male with h/o CP and PSC who had multiple ERCPs ans stent placement , last biliary stent placement on 2/7/20 at [facility] was brought back as pt's jaundice and pruritis did not get better and on and off gets RUQ pain, pain appears to be mild to moderate. no fever. no some nausea no vomiting. no cp, no sob reported. As per mother has loose watery diarrhea for past 10 to 12 days. no other complaints. pt. gives history and mother is assisting as well.   pt is being followed by hepatology team Dr. Tipton and on chronic prednisone and azathioprine, known to GI Dr. Howard at [facility]. pt recently saw Dr. Tipton who decreased his azathioprine dose from 150mg to 50mg po qd and started referral for liver transplant.

## 2020-02-18 NOTE — H&P ADULT - PROBLEM SELECTOR PLAN 1
pt. with h/o PCS, s/p many ercps and biliary stent, pt. will be npo and will be seen by GI dr. Howard and plan is for ercp and stent management as per GI. iv fluids when npo. pt. on azathioprine and prednisone which will be continued. follow bilirubin trend.

## 2020-02-19 ENCOUNTER — TRANSCRIPTION ENCOUNTER (OUTPATIENT)
Age: 30
End: 2020-02-19

## 2020-02-19 VITALS
HEART RATE: 83 BPM | TEMPERATURE: 98 F | RESPIRATION RATE: 18 BRPM | OXYGEN SATURATION: 99 % | DIASTOLIC BLOOD PRESSURE: 61 MMHG | SYSTOLIC BLOOD PRESSURE: 112 MMHG

## 2020-02-19 LAB
ALBUMIN SERPL ELPH-MCNC: 2.7 G/DL — LOW (ref 3.3–5.2)
ALP SERPL-CCNC: 144 U/L — HIGH (ref 40–120)
ALT FLD-CCNC: 42 U/L — HIGH
ANION GAP SERPL CALC-SCNC: 11 MMOL/L — SIGNIFICANT CHANGE UP (ref 5–17)
AST SERPL-CCNC: 63 U/L — HIGH
BILIRUB DIRECT SERPL-MCNC: >10 MG/DL — HIGH (ref 0–0.3)
BILIRUB INDIRECT FLD-MCNC: <3.3 MG/DL — HIGH (ref 0.2–1)
BILIRUB SERPL-MCNC: 13.3 MG/DL — HIGH (ref 0.4–2)
BILIRUB SERPL-MCNC: 13.3 MG/DL — HIGH (ref 0.4–2)
BUN SERPL-MCNC: 16 MG/DL — SIGNIFICANT CHANGE UP (ref 8–20)
CALCIUM SERPL-MCNC: 8.2 MG/DL — LOW (ref 8.6–10.2)
CHLORIDE SERPL-SCNC: 106 MMOL/L — SIGNIFICANT CHANGE UP (ref 98–107)
CO2 SERPL-SCNC: 22 MMOL/L — SIGNIFICANT CHANGE UP (ref 22–29)
CREAT SERPL-MCNC: 0.31 MG/DL — LOW (ref 0.5–1.3)
GLUCOSE BLDC GLUCOMTR-MCNC: 97 MG/DL — SIGNIFICANT CHANGE UP (ref 70–99)
GLUCOSE SERPL-MCNC: 117 MG/DL — HIGH (ref 70–99)
POTASSIUM SERPL-MCNC: 3.6 MMOL/L — SIGNIFICANT CHANGE UP (ref 3.5–5.3)
POTASSIUM SERPL-SCNC: 3.6 MMOL/L — SIGNIFICANT CHANGE UP (ref 3.5–5.3)
PROT SERPL-MCNC: 5.3 G/DL — LOW (ref 6.6–8.7)
SODIUM SERPL-SCNC: 139 MMOL/L — SIGNIFICANT CHANGE UP (ref 135–145)

## 2020-02-19 PROCEDURE — 99222 1ST HOSP IP/OBS MODERATE 55: CPT

## 2020-02-19 PROCEDURE — 80053 COMPREHEN METABOLIC PANEL: CPT

## 2020-02-19 PROCEDURE — 85730 THROMBOPLASTIN TIME PARTIAL: CPT

## 2020-02-19 PROCEDURE — 85027 COMPLETE CBC AUTOMATED: CPT

## 2020-02-19 PROCEDURE — 36415 COLL VENOUS BLD VENIPUNCTURE: CPT

## 2020-02-19 PROCEDURE — 99239 HOSP IP/OBS DSCHRG MGMT >30: CPT | Mod: GC

## 2020-02-19 PROCEDURE — 87040 BLOOD CULTURE FOR BACTERIA: CPT

## 2020-02-19 PROCEDURE — 99285 EMERGENCY DEPT VISIT HI MDM: CPT | Mod: 25

## 2020-02-19 PROCEDURE — 81001 URINALYSIS AUTO W/SCOPE: CPT

## 2020-02-19 PROCEDURE — 96374 THER/PROPH/DIAG INJ IV PUSH: CPT

## 2020-02-19 PROCEDURE — 83690 ASSAY OF LIPASE: CPT

## 2020-02-19 PROCEDURE — 87086 URINE CULTURE/COLONY COUNT: CPT

## 2020-02-19 PROCEDURE — 82248 BILIRUBIN DIRECT: CPT

## 2020-02-19 PROCEDURE — 85610 PROTHROMBIN TIME: CPT

## 2020-02-19 PROCEDURE — 82962 GLUCOSE BLOOD TEST: CPT

## 2020-02-19 RX ORDER — SODIUM CHLORIDE 9 MG/ML
1000 INJECTION INTRAMUSCULAR; INTRAVENOUS; SUBCUTANEOUS
Refills: 0 | Status: DISCONTINUED | OUTPATIENT
Start: 2020-02-19 | End: 2020-02-19

## 2020-02-19 RX ADMIN — Medication 650 MILLIGRAM(S): at 09:12

## 2020-02-19 RX ADMIN — Medication 650 MILLIGRAM(S): at 09:36

## 2020-02-19 RX ADMIN — AZATHIOPRINE 50 MILLIGRAM(S): 100 TABLET ORAL at 11:26

## 2020-02-19 RX ADMIN — Medication 25 MILLIGRAM(S): at 09:07

## 2020-02-19 RX ADMIN — Medication 10 MILLIGRAM(S): at 05:46

## 2020-02-19 NOTE — DISCHARGE NOTE PROVIDER - NSDCCPCAREPLAN_GEN_ALL_CORE_FT
PRINCIPAL DISCHARGE DIAGNOSIS  Diagnosis: Hyperbilirubinemia  Assessment and Plan of Treatment: You have a history of primary sclerosing cholangitis and you have had bilary stents placed before. You presented with jaundice and icteric sclera, You had elevated bilirubin levels but PRINCIPAL DISCHARGE DIAGNOSIS  Diagnosis: Hyperbilirubinemia  Assessment and Plan of Treatment: You have a history of primary sclerosing cholangitis and you have had bilary stents placed before. You presented with jaundice and icteric sclera, You had elevated bilirubin levels which were closely monitored during your hospital stay. GI specialist were consulted who stated you can follow up as an outpatient to schedule your ERCP as your levels have improved. Please be sure to follow up with the Gastroenterologist after discharge.      SECONDARY DISCHARGE DIAGNOSES  Diagnosis: Primary sclerosing cholangitis  Assessment and Plan of Treatment:     Diagnosis: Cerebral palsy  Assessment and Plan of Treatment: PRINCIPAL DISCHARGE DIAGNOSIS  Diagnosis: Hyperbilirubinemia  Assessment and Plan of Treatment: You have a history of primary sclerosing cholangitis which is the likely cause. GI specialist were consulted who stated you can follow up as an outpatient to schedule your ERCP as your levels have improved. Please be sure to follow up with the Gastroenterologist after discharge.      SECONDARY DISCHARGE DIAGNOSES  Diagnosis: Primary sclerosing cholangitis  Assessment and Plan of Treatment: Continue all medications as prescribed.    Diagnosis: Cerebral palsy  Assessment and Plan of Treatment: PRINCIPAL DISCHARGE DIAGNOSIS  Diagnosis: Hyperbilirubinemia  Assessment and Plan of Treatment: You have a history of primary sclerosing cholangitis which is the likely cause. GI specialist were consulted who stated you can follow up as an outpatient to schedule your ERCP as your levels have improved. Please be sure to follow up with the Gastroenterologist after discharge.      SECONDARY DISCHARGE DIAGNOSES  Diagnosis: Primary sclerosing cholangitis  Assessment and Plan of Treatment: Continue all medications as prescribed. Follow up with your primary care doctor and your gastroenterologist for further management and monitoring.    Diagnosis: Cerebral palsy  Assessment and Plan of Treatment: Continue to follow up with your primary care doctor for any further management and monitoring.

## 2020-02-19 NOTE — DISCHARGE NOTE PROVIDER - NSFOLLOWUPCLINICS_GEN_ALL_ED_FT
A Family Medicine Doctor  Family Medicine  .  [place]   Phone:   Fax:   Follow Up Time: 1 week    A Gastroenterologist  Gastroenterology  .  [place]   Phone:   Fax:   Follow Up Time: 1 week

## 2020-02-19 NOTE — DISCHARGE NOTE PROVIDER - PROVIDER TOKENS
PROVIDER:[TOKEN:[[id number]],FOLLOWUP:[2 weeks],ESTABLISHEDPATIENT:[T]],PROVIDER:[TOKEN:[[id number]],FOLLOWUP:[1 week],ESTABLISHEDPATIENT:[T]]

## 2020-02-19 NOTE — DISCHARGE NOTE PROVIDER - NSDCFUSCHEDAPPT_GEN_ALL_CORE_FT
SAUMYA ZARATE ; 03/02/2020 ; [facility] Hepatology [address] [facility]  SAUMYA ZARATE ; 05/12/2020 ; [facility] Derm [address]  SAUMYA ZARATE ; 03/02/2020 ; [facility] Hepatology [address] [facility]  SAUMYA ZARATE ; 05/12/2020 ; [facility] Derm [address]  SAUMYA ZARATE ; 03/02/2020 ; [facility] Hepatology [address] [facility]  SAUMYA ZARATE ; 05/12/2020 ; [facility] Derm [address]  SAUMYA ZARATE ; 03/02/2020 ; [facility] Hepatology [address] [facility]  SAUMYA ZARATE ; 05/12/2020 ; [facility] Derm [address]

## 2020-02-19 NOTE — DISCHARGE NOTE PROVIDER - NSDCMRMEDTOKEN_GEN_ALL_CORE_FT
azaTHIOprine 50 mg oral tablet: 1 tab(s) orally once a day  hydrOXYzine hydrochloride 25 mg oral tablet: 1 tab(s) orally 4 times a day, As Needed  predniSONE 10 mg oral tablet: 3 tab(s) orally once a day

## 2020-02-19 NOTE — CONSULT NOTE ADULT - SUBJECTIVE AND OBJECTIVE BOX
HPI:  30 y/o male with h/o CP and Autoimmune cholangitis who had multiple ERCP and stent placement , last biliary stent placement on 20 at [facility] was brought back as pt's jaundice and pruritis did not get better and on and off gets RUQ pain, pain appears to be mild to moderate. no fever. no some nausea no vomiting. no cp, no sob reported. As per mother has loose watery diarrhea for past 10 to 12 days. no other complaints. pt. gives history and mother is assisting as well.   pt is being followed by hepatology team Dr. Tipton and on chronic prednisone and azathioprine, known to GI Dr. Howard at [facility]. pt recently saw Dr. Tipton who decreased his azathioprine dose from 150mg to 50mg po qd and started referral for liver transplant.    His bilirubin is improving at this time. I spoke to the mother and patient at length.       PAST MEDICAL & SURGICAL HISTORY:  PSC (primary sclerosing cholangitis)  Cerebral palsy  History of biliary stent insertion: removed 2019  History of ERCP: performed at [place]      ROS:  No Heartburn, regurgitation, dysphagia, odynophagia.  No dyspepsia  + abdominal pain.    No Nausea, vomiting.  No Bleeding.  No hematemesis.   No diarrhea.    No hematochezia  No weight loss, anorexia.  No edema.      MEDICATIONS  (STANDING):  azaTHIOprine 50 milliGRAM(s) Oral daily  dextrose 5% + sodium chloride 0.9%. 1000 milliLiter(s) (75 mL/Hr) IV Continuous <Continuous>  dextrose 5%. 1000 milliLiter(s) (50 mL/Hr) IV Continuous <Continuous>  dextrose 50% Injectable 12.5 Gram(s) IV Push once  dextrose 50% Injectable 25 Gram(s) IV Push once  dextrose 50% Injectable 25 Gram(s) IV Push once  predniSONE   Tablet 10 milliGRAM(s) Oral daily  sodium chloride 0.9%. 1000 milliLiter(s) (125 mL/Hr) IV Continuous <Continuous>    MEDICATIONS  (PRN):  acetaminophen   Tablet .. 650 milliGRAM(s) Oral every 6 hours PRN Mild Pain (1 - 3)  dextrose 40% Gel 15 Gram(s) Oral once PRN Blood Glucose LESS THAN 70 milliGRAM(s)/deciLiter  glucagon  Injectable 1 milliGRAM(s) IntraMuscular once PRN Glucose <70 milliGRAM(s)/deciLiter  hydrOXYzine hydrochloride 25 milliGRAM(s) Oral every 8 hours PRN Itching  ketorolac 10 milliGRAM(s) Oral every 6 hours PRN Moderate Pain (4 - 6)  ondansetron Injectable 4 milliGRAM(s) IV Push every 8 hours PRN Nausea and/or Vomiting      Allergies    No Known Allergies    Intolerances        SOCIAL HISTORY:Denies x 3    ENDOSCOPIC/GI HISTORY: Multiple ERCP. Normal colonoscopy    FAMILY HISTORY:  No pertinent family history in first degree relatives      Vital Signs Last 24 Hrs  T(C): 36.4 (2020 11:28), Max: 37.1 (2020 19:54)  T(F): 97.6 (2020 11:28), Max: 98.7 (2020 19:54)  HR: 83 (2020 11:28) (69 - 83)  BP: 112/61 (2020 11:28) (106/58 - 126/74)  BP(mean): --  RR: 18 (2020 11:28) (18 - 20)  SpO2: 99% (2020 11:28) (97% - 99%)    PHYSICAL EXAM:    GENERAL: NAD, well-groomed, well-developed  HEAD:  Atraumatic, Normocephalic  EYES: EOMI, PERRLA, conjunctiva and sclera icterus  ENMT: No tonsillar erythema, exudates, or enlargement; Moist mucous membranes, Good dentition, No lesions  NECK: Supple, No JVD, Normal thyroid  CHEST/LUNG: Clear to percussion bilaterally; No rales, rhonchi, wheezing, or rubs  HEART: Regular rate and rhythm; No murmurs, rubs, or gallops  ABDOMEN: Soft, Nontender, Nondistended; Bowel sounds present  EXTREMITIES:  2+ Peripheral Pulses, No clubbing, cyanosis, or edema  LYMPH: No lymphadenopathy noted  SKIN: No rashes or lesions      LABS:                        10.1   9.73  )-----------( 493      ( 2020 07:44 )             28.6         139  |  106  |  16.0  ----------------------------<  117<H>  3.6   |  22.0  |  0.31<L>    Ca    8.2<L>      2020 07:23    TPro  5.3<L>  /  Alb  2.7<L>  /  TBili  13.3<H>  /  DBili  >10.0<H>  /  AST  63<H>  /  ALT  42<H>  /  AlkPhos  144<H>      PT/INR - ( 2020 18:44 )   PT: 15.6 sec;   INR: 1.37 ratio         PTT - ( 2020 18:44 )  PTT:36.2 sec   Urinalysis Basic - ( 2020 00:46 )    Color: Sandra / Appearance: Clear / S.020 / pH: x  Gluc: x / Ketone: Trace  / Bili: Large / Urobili: 12   Blood: x / Protein: 15 / Nitrite: Positive   Leuk Esterase: Trace / RBC: 0-2 /HPF / WBC 0-2   Sq Epi: x / Non Sq Epi: Occasional / Bacteria: Few        LIVER FUNCTIONS - ( 2020 07:23 )  Alb: 2.7 g/dL / Pro: 5.3 g/dL / ALK PHOS: 144 U/L / ALT: 42 U/L / AST: 63 U/L / GGT: x               RADIOLOGY & ADDITIONAL STUDIES:

## 2020-02-19 NOTE — DISCHARGE NOTE PROVIDER - NSDCFUADDINST_GEN_ALL_CORE_FT
02-19    139  |  106  |  16.0  ----------------------------<  117<H>  3.6   |  22.0  |  0.31<L>    Ca    8.2<L>      19 Feb 2020 07:23    TPro  5.3<L>  /  Alb  2.7<L>  /  TBili  13.3<H>  /  DBili  >10.0<H>  /  AST  63<H>  /  ALT  42<H>  /  AlkPhos  144<H>  02-19                          10.1   9.73  )-----------( 493      ( 18 Feb 2020 07:44 )             28.6     LIVER FUNCTIONS - ( 19 Feb 2020 07:23 )  Alb: 2.7 g/dL / Pro: 5.3 g/dL / ALK PHOS: 144 U/L / ALT: 42 U/L / AST: 63 U/L / GGT: x           PT/INR - ( 17 Feb 2020 18:44 )   PT: 15.6 sec;   INR: 1.37 ratio         PTT - ( 17 Feb 2020 18:44 )  PTT:36.2 sec

## 2020-02-19 NOTE — DISCHARGE NOTE NURSING/CASE MANAGEMENT/SOCIAL WORK - PATIENT PORTAL LINK FT
You can access the FollowMyHealth Patient Portal offered by [facility] by registering at the following website: [email or web address]. By joining [employer]’s FollowMyHealth portal, you will also be able to view your health information using other applications (apps) compatible with our system.

## 2020-02-19 NOTE — DISCHARGE NOTE PROVIDER - CARE PROVIDER_API CALL
Yasmany Howard)  Gastroenterology; Internal Medicine  [facility], [address] [place] [address]  Phone: [phone]  Fax: [phone]  Established Patient  Follow Up Time: 2 weeks    Clay Thompson)  Internal Medicine  [address] [place] [address]  Phone: [phone]  Fax: [phone]  Established Patient  Follow Up Time: 1 week

## 2020-02-19 NOTE — CONSULT NOTE ADULT - ASSESSMENT
Patient with autoimmune cholangitis, s/p ERCP and also on prednisone and azathioprine. Now bilirubin is improving. Will postpone the procedure and also feed the patient. Will schedule ERCP as outpatient

## 2020-02-20 LAB
CULTURE RESULTS: SIGNIFICANT CHANGE UP
SPECIMEN SOURCE: SIGNIFICANT CHANGE UP

## 2020-03-01 PROCEDURE — G9001: CPT

## 2020-03-02 ENCOUNTER — APPOINTMENT (OUTPATIENT)
Dept: HEPATOLOGY | Facility: CLINIC | Age: 30
End: 2020-03-02
Payer: MEDICAID

## 2020-03-02 VITALS
DIASTOLIC BLOOD PRESSURE: 66 MMHG | HEART RATE: 71 BPM | WEIGHT: 140 LBS | HEIGHT: 64 IN | RESPIRATION RATE: 17 BRPM | BODY MASS INDEX: 23.9 KG/M2 | TEMPERATURE: 98.2 F | SYSTOLIC BLOOD PRESSURE: 120 MMHG

## 2020-03-02 PROCEDURE — 99214 OFFICE O/P EST MOD 30 MIN: CPT

## 2020-03-02 RX ORDER — PREDNISONE 10 MG/1
10 TABLET ORAL DAILY
Qty: 90 | Refills: 0 | Status: DISCONTINUED | COMMUNITY
Start: 2020-01-06 | End: 2020-03-02

## 2020-03-04 ENCOUNTER — FORM ENCOUNTER (OUTPATIENT)
Age: 30
End: 2020-03-04

## 2020-03-04 ENCOUNTER — TRANSCRIPTION ENCOUNTER (OUTPATIENT)
Age: 30
End: 2020-03-04

## 2020-03-04 LAB
ALBUMIN SERPL ELPH-MCNC: 3.5 G/DL
ALP BLD-CCNC: 228 U/L
ALT SERPL-CCNC: 55 U/L
ANION GAP SERPL CALC-SCNC: 18 MMOL/L
AST SERPL-CCNC: 86 U/L
BASOPHILS # BLD AUTO: 0.04 K/UL
BASOPHILS NFR BLD AUTO: 0.6 %
BILIRUB SERPL-MCNC: 6.8 MG/DL
BUN SERPL-MCNC: 9 MG/DL
CALCIUM SERPL-MCNC: 9.4 MG/DL
CANCER AG19-9 SERPL-ACNC: 77 U/ML
CHLORIDE SERPL-SCNC: 103 MMOL/L
CO2 SERPL-SCNC: 20 MMOL/L
CREAT SERPL-MCNC: 0.77 MG/DL
EOSINOPHIL # BLD AUTO: 0.01 K/UL
EOSINOPHIL NFR BLD AUTO: 0.2 %
GLUCOSE SERPL-MCNC: 106 MG/DL
HCT VFR BLD CALC: 36.3 %
HGB BLD-MCNC: 12 G/DL
IMM GRANULOCYTES NFR BLD AUTO: 0.9 %
INR PPP: 1.03 RATIO
LYMPHOCYTES # BLD AUTO: 0.54 K/UL
LYMPHOCYTES NFR BLD AUTO: 8.5 %
MAN DIFF?: NORMAL
MCHC RBC-ENTMCNC: 33.1 GM/DL
MCHC RBC-ENTMCNC: 39.2 PG
MCV RBC AUTO: 118.6 FL
MONOCYTES # BLD AUTO: 0.32 K/UL
MONOCYTES NFR BLD AUTO: 5.1 %
NEUTROPHILS # BLD AUTO: 5.36 K/UL
NEUTROPHILS NFR BLD AUTO: 84.7 %
PLATELET # BLD AUTO: 597 K/UL
POTASSIUM SERPL-SCNC: 4.6 MMOL/L
PROT SERPL-MCNC: 6.5 G/DL
PT BLD: 11.8 SEC
RBC # BLD: 3.06 M/UL
RBC # FLD: 16.7 %
SODIUM SERPL-SCNC: 141 MMOL/L
WBC # FLD AUTO: 6.33 K/UL

## 2020-03-04 RX ORDER — CHOLESTYRAMINE 4 G/9G
4 POWDER, FOR SUSPENSION ORAL TWICE DAILY
Qty: 60 | Refills: 0 | Status: DISCONTINUED | COMMUNITY
Start: 2020-03-02 | End: 2020-03-04

## 2020-03-05 ENCOUNTER — OUTPATIENT (OUTPATIENT)
Dept: OUTPATIENT SERVICES | Facility: HOSPITAL | Age: 30
LOS: 1 days | End: 2020-03-05
Payer: MEDICAID

## 2020-03-05 ENCOUNTER — RESULT REVIEW (OUTPATIENT)
Age: 30
End: 2020-03-05

## 2020-03-05 ENCOUNTER — APPOINTMENT (OUTPATIENT)
Dept: GASTROENTEROLOGY | Facility: HOSPITAL | Age: 30
End: 2020-03-05

## 2020-03-05 DIAGNOSIS — Z98.890 OTHER SPECIFIED POSTPROCEDURAL STATES: Chronic | ICD-10-CM

## 2020-03-05 DIAGNOSIS — K83.1 OBSTRUCTION OF BILE DUCT: ICD-10-CM

## 2020-03-05 PROCEDURE — 74330 X-RAY BILE/PANC ENDOSCOPY: CPT

## 2020-03-05 PROCEDURE — 43274 ERCP DUCT STENT PLACEMENT: CPT | Mod: 59

## 2020-03-05 PROCEDURE — 43276 ERCP STENT EXCHANGE W/DILATE: CPT

## 2020-03-05 PROCEDURE — C1769: CPT

## 2020-03-05 PROCEDURE — C1773: CPT

## 2020-03-05 PROCEDURE — 43277 ERCP EA DUCT/AMPULLA DILATE: CPT | Mod: XS

## 2020-03-05 PROCEDURE — 88300 SURGICAL PATH GROSS: CPT | Mod: 26

## 2020-03-05 PROCEDURE — 43277 ERCP EA DUCT/AMPULLA DILATE: CPT | Mod: 59

## 2020-03-05 PROCEDURE — 43276 ERCP STENT EXCHANGE W/DILATE: CPT | Mod: XS

## 2020-03-05 PROCEDURE — 88300 SURGICAL PATH GROSS: CPT

## 2020-03-05 PROCEDURE — C1726: CPT

## 2020-03-05 PROCEDURE — C2617: CPT

## 2020-03-11 LAB — SURGICAL PATHOLOGY STUDY: SIGNIFICANT CHANGE UP

## 2020-04-01 ENCOUNTER — OUTPATIENT (OUTPATIENT)
Dept: OUTPATIENT SERVICES | Facility: HOSPITAL | Age: 30
LOS: 1 days | End: 2020-04-01
Payer: MEDICAID

## 2020-04-01 DIAGNOSIS — Z98.890 OTHER SPECIFIED POSTPROCEDURAL STATES: Chronic | ICD-10-CM

## 2020-04-07 DIAGNOSIS — Z71.89 OTHER SPECIFIED COUNSELING: ICD-10-CM

## 2020-04-13 ENCOUNTER — APPOINTMENT (OUTPATIENT)
Dept: HEPATOLOGY | Facility: CLINIC | Age: 30
End: 2020-04-13
Payer: MEDICAID

## 2020-04-13 PROCEDURE — 99214 OFFICE O/P EST MOD 30 MIN: CPT | Mod: 95

## 2020-04-24 ENCOUNTER — RX RENEWAL (OUTPATIENT)
Age: 30
End: 2020-04-24

## 2020-05-12 ENCOUNTER — OUTPATIENT (OUTPATIENT)
Dept: OUTPATIENT SERVICES | Facility: HOSPITAL | Age: 30
LOS: 1 days | End: 2020-05-12
Payer: MEDICAID

## 2020-05-12 DIAGNOSIS — Z98.890 OTHER SPECIFIED POSTPROCEDURAL STATES: Chronic | ICD-10-CM

## 2020-05-12 DIAGNOSIS — U07.1 COVID-19: ICD-10-CM

## 2020-05-12 PROCEDURE — U0003: CPT

## 2020-05-13 ENCOUNTER — TRANSCRIPTION ENCOUNTER (OUTPATIENT)
Age: 30
End: 2020-05-13

## 2020-05-13 LAB — SARS-COV-2 RNA SPEC QL NAA+PROBE: SIGNIFICANT CHANGE UP

## 2020-05-14 ENCOUNTER — OUTPATIENT (OUTPATIENT)
Dept: OUTPATIENT SERVICES | Facility: HOSPITAL | Age: 30
LOS: 1 days | End: 2020-05-14
Payer: MEDICAID

## 2020-05-14 ENCOUNTER — RESULT REVIEW (OUTPATIENT)
Age: 30
End: 2020-05-14

## 2020-05-14 ENCOUNTER — APPOINTMENT (OUTPATIENT)
Dept: GASTROENTEROLOGY | Facility: HOSPITAL | Age: 30
End: 2020-05-14

## 2020-05-14 DIAGNOSIS — Z98.890 OTHER SPECIFIED POSTPROCEDURAL STATES: Chronic | ICD-10-CM

## 2020-05-14 DIAGNOSIS — K80.01 CALCULUS OF GALLBLADDER WITH ACUTE CHOLECYSTITIS WITH OBSTRUCTION: ICD-10-CM

## 2020-05-14 PROCEDURE — 43276 ERCP STENT EXCHANGE W/DILATE: CPT | Mod: 59

## 2020-05-14 PROCEDURE — 88300 SURGICAL PATH GROSS: CPT | Mod: 26

## 2020-05-14 PROCEDURE — 88300 SURGICAL PATH GROSS: CPT

## 2020-05-14 PROCEDURE — C1769: CPT

## 2020-05-14 PROCEDURE — 47536 EXCHANGE BILIARY DRG CATH: CPT | Mod: 50

## 2020-05-14 PROCEDURE — C2617: CPT

## 2020-05-14 PROCEDURE — C1773: CPT

## 2020-05-14 PROCEDURE — C1726: CPT

## 2020-05-14 PROCEDURE — 74330 X-RAY BILE/PANC ENDOSCOPY: CPT

## 2020-05-14 PROCEDURE — 47542 DILATE BILIARY DUCT/AMPULLA: CPT | Mod: XS

## 2020-05-15 LAB — SURGICAL PATHOLOGY STUDY: SIGNIFICANT CHANGE UP

## 2020-05-26 ENCOUNTER — APPOINTMENT (OUTPATIENT)
Dept: DERMATOLOGY | Facility: CLINIC | Age: 30
End: 2020-05-26

## 2020-05-26 ENCOUNTER — RX RENEWAL (OUTPATIENT)
Age: 30
End: 2020-05-26

## 2020-05-29 RX ORDER — LEVOFLOXACIN 500 MG/1
500 TABLET, FILM COATED ORAL
Qty: 5 | Refills: 0 | Status: DISCONTINUED | COMMUNITY
Start: 2020-03-06 | End: 2020-05-29

## 2020-05-29 RX ORDER — HYDROXYZINE HYDROCHLORIDE 25 MG/1
25 TABLET ORAL 4 TIMES DAILY
Qty: 60 | Refills: 1 | Status: DISCONTINUED | COMMUNITY
Start: 2020-02-13 | End: 2020-05-29

## 2020-05-29 RX ORDER — CHOLESTYRAMINE 4 G/9G
4 POWDER, FOR SUSPENSION ORAL TWICE DAILY
Qty: 1 | Refills: 5 | Status: DISCONTINUED | COMMUNITY
Start: 2020-03-04 | End: 2020-05-29

## 2020-05-29 RX ORDER — BUDESONIDE 3 MG/1
3 CAPSULE, COATED PELLETS ORAL
Qty: 60 | Refills: 2 | Status: DISCONTINUED | COMMUNITY
Start: 2019-12-11 | End: 2020-05-29

## 2020-06-08 ENCOUNTER — APPOINTMENT (OUTPATIENT)
Dept: HEPATOLOGY | Facility: CLINIC | Age: 30
End: 2020-06-08
Payer: MEDICAID

## 2020-06-08 VITALS
HEIGHT: 64 IN | HEART RATE: 57 BPM | WEIGHT: 149 LBS | TEMPERATURE: 98.4 F | SYSTOLIC BLOOD PRESSURE: 131 MMHG | BODY MASS INDEX: 25.44 KG/M2 | RESPIRATION RATE: 17 BRPM | DIASTOLIC BLOOD PRESSURE: 77 MMHG

## 2020-06-08 LAB
BASOPHILS # BLD AUTO: 0.05 K/UL
BASOPHILS NFR BLD AUTO: 0.5 %
CANCER AG19-9 SERPL-ACNC: 25 U/ML
EOSINOPHIL # BLD AUTO: 0.13 K/UL
EOSINOPHIL NFR BLD AUTO: 1.3 %
HCT VFR BLD CALC: 43.5 %
HGB BLD-MCNC: 14.2 G/DL
IGG SER QL IEP: 1547 MG/DL
IMM GRANULOCYTES NFR BLD AUTO: 0.4 %
INR PPP: 1.11 RATIO
LYMPHOCYTES # BLD AUTO: 1.36 K/UL
LYMPHOCYTES NFR BLD AUTO: 13.9 %
MAN DIFF?: NORMAL
MCHC RBC-ENTMCNC: 32.6 GM/DL
MCHC RBC-ENTMCNC: 32.9 PG
MCV RBC AUTO: 100.9 FL
MONOCYTES # BLD AUTO: 0.89 K/UL
MONOCYTES NFR BLD AUTO: 9.1 %
NEUTROPHILS # BLD AUTO: 7.28 K/UL
NEUTROPHILS NFR BLD AUTO: 74.8 %
PLATELET # BLD AUTO: 229 K/UL
PT BLD: 12.6 SEC
RBC # BLD: 4.31 M/UL
RBC # FLD: 12.2 %
WBC # FLD AUTO: 9.75 K/UL

## 2020-06-08 PROCEDURE — 99214 OFFICE O/P EST MOD 30 MIN: CPT

## 2020-06-08 RX ORDER — PREDNISONE 5 MG/1
5 TABLET ORAL DAILY
Qty: 60 | Refills: 1 | Status: DISCONTINUED | COMMUNITY
Start: 2019-08-26 | End: 2020-06-08

## 2020-06-09 ENCOUNTER — EMERGENCY (EMERGENCY)
Facility: HOSPITAL | Age: 30
LOS: 0 days | Discharge: HOME | End: 2020-06-09
Attending: EMERGENCY MEDICINE
Payer: MEDICAID

## 2020-06-09 VITALS
OXYGEN SATURATION: 100 % | HEART RATE: 120 BPM | DIASTOLIC BLOOD PRESSURE: 67 MMHG | SYSTOLIC BLOOD PRESSURE: 140 MMHG | TEMPERATURE: 100 F | WEIGHT: 149.03 LBS | RESPIRATION RATE: 22 BRPM

## 2020-06-09 VITALS
RESPIRATION RATE: 18 BRPM | DIASTOLIC BLOOD PRESSURE: 60 MMHG | HEART RATE: 106 BPM | OXYGEN SATURATION: 100 % | SYSTOLIC BLOOD PRESSURE: 131 MMHG

## 2020-06-09 DIAGNOSIS — R50.9 FEVER, UNSPECIFIED: ICD-10-CM

## 2020-06-09 DIAGNOSIS — G80.9 CEREBRAL PALSY, UNSPECIFIED: ICD-10-CM

## 2020-06-09 DIAGNOSIS — K83.09 OTHER CHOLANGITIS: ICD-10-CM

## 2020-06-09 DIAGNOSIS — Z98.890 OTHER SPECIFIED POSTPROCEDURAL STATES: Chronic | ICD-10-CM

## 2020-06-09 DIAGNOSIS — B34.9 VIRAL INFECTION, UNSPECIFIED: ICD-10-CM

## 2020-06-09 DIAGNOSIS — R00.0 TACHYCARDIA, UNSPECIFIED: ICD-10-CM

## 2020-06-09 LAB
ALBUMIN SERPL ELPH-MCNC: 3.3 G/DL — SIGNIFICANT CHANGE UP (ref 3.3–5)
ALBUMIN SERPL ELPH-MCNC: 4.3 G/DL
ALP BLD-CCNC: 307 U/L
ALP SERPL-CCNC: 322 U/L — HIGH (ref 40–120)
ALT FLD-CCNC: 85 U/L — HIGH (ref 12–78)
ALT SERPL-CCNC: 68 U/L
ANION GAP SERPL CALC-SCNC: 14 MMOL/L
ANION GAP SERPL CALC-SCNC: 4 MMOL/L — LOW (ref 5–17)
AST SERPL-CCNC: 51 U/L
AST SERPL-CCNC: 54 U/L — HIGH (ref 15–37)
BASOPHILS # BLD AUTO: 0.02 K/UL — SIGNIFICANT CHANGE UP (ref 0–0.2)
BASOPHILS # BLD AUTO: 0.03 K/UL
BASOPHILS NFR BLD AUTO: 0.2 % — SIGNIFICANT CHANGE UP (ref 0–2)
BASOPHILS NFR BLD AUTO: 0.3 %
BILIRUB SERPL-MCNC: 0.4 MG/DL
BILIRUB SERPL-MCNC: 0.6 MG/DL — SIGNIFICANT CHANGE UP (ref 0.2–1.2)
BUN SERPL-MCNC: 10 MG/DL — SIGNIFICANT CHANGE UP (ref 7–23)
BUN SERPL-MCNC: 12 MG/DL
CALCIUM SERPL-MCNC: 8.9 MG/DL — SIGNIFICANT CHANGE UP (ref 8.5–10.1)
CALCIUM SERPL-MCNC: 9.6 MG/DL
CHLORIDE SERPL-SCNC: 102 MMOL/L
CHLORIDE SERPL-SCNC: 106 MMOL/L — SIGNIFICANT CHANGE UP (ref 96–108)
CO2 SERPL-SCNC: 25 MMOL/L
CO2 SERPL-SCNC: 29 MMOL/L — SIGNIFICANT CHANGE UP (ref 22–31)
CREAT SERPL-MCNC: 0.89 MG/DL
CREAT SERPL-MCNC: 0.97 MG/DL — SIGNIFICANT CHANGE UP (ref 0.5–1.3)
EOSINOPHIL # BLD AUTO: 0.05 K/UL
EOSINOPHIL # BLD AUTO: 0.11 K/UL — SIGNIFICANT CHANGE UP (ref 0–0.5)
EOSINOPHIL NFR BLD AUTO: 0.5 %
EOSINOPHIL NFR BLD AUTO: 1.2 % — SIGNIFICANT CHANGE UP (ref 0–6)
GLUCOSE SERPL-MCNC: 107 MG/DL
GLUCOSE SERPL-MCNC: 108 MG/DL — HIGH (ref 70–99)
HCT VFR BLD CALC: 36.6 % — LOW (ref 39–50)
HCT VFR BLD CALC: 39 %
HGB BLD-MCNC: 12.9 G/DL — LOW (ref 13–17)
HGB BLD-MCNC: 13.1 G/DL
IMM GRANULOCYTES NFR BLD AUTO: 0.5 % — SIGNIFICANT CHANGE UP (ref 0–1.5)
IMM GRANULOCYTES NFR BLD AUTO: 0.7 %
INR PPP: 1.14 RATIO
LYMPHOCYTES # BLD AUTO: 0.76 K/UL
LYMPHOCYTES # BLD AUTO: 1.32 K/UL — SIGNIFICANT CHANGE UP (ref 1–3.3)
LYMPHOCYTES # BLD AUTO: 14.4 % — SIGNIFICANT CHANGE UP (ref 13–44)
LYMPHOCYTES NFR BLD AUTO: 7.1 %
MAGNESIUM SERPL-MCNC: 2.2 MG/DL — SIGNIFICANT CHANGE UP (ref 1.6–2.6)
MAN DIFF?: NORMAL
MCHC RBC-ENTMCNC: 32.8 PG
MCHC RBC-ENTMCNC: 33.4 PG — SIGNIFICANT CHANGE UP (ref 27–34)
MCHC RBC-ENTMCNC: 33.6 GM/DL
MCHC RBC-ENTMCNC: 35.2 GM/DL — SIGNIFICANT CHANGE UP (ref 32–36)
MCV RBC AUTO: 94.8 FL — SIGNIFICANT CHANGE UP (ref 80–100)
MCV RBC AUTO: 97.5 FL
MONOCYTES # BLD AUTO: 0.56 K/UL
MONOCYTES # BLD AUTO: 0.62 K/UL — SIGNIFICANT CHANGE UP (ref 0–0.9)
MONOCYTES NFR BLD AUTO: 5.2 %
MONOCYTES NFR BLD AUTO: 6.8 % — SIGNIFICANT CHANGE UP (ref 2–14)
NEUTROPHILS # BLD AUTO: 7.03 K/UL — SIGNIFICANT CHANGE UP (ref 1.8–7.4)
NEUTROPHILS # BLD AUTO: 9.26 K/UL
NEUTROPHILS NFR BLD AUTO: 76.9 % — SIGNIFICANT CHANGE UP (ref 43–77)
NEUTROPHILS NFR BLD AUTO: 86.2 %
PHOSPHATE SERPL-MCNC: 1.2 MG/DL — LOW (ref 2.5–4.5)
PLATELET # BLD AUTO: 232 K/UL — SIGNIFICANT CHANGE UP (ref 150–400)
PLATELET # BLD AUTO: 244 K/UL
POTASSIUM SERPL-MCNC: 3.9 MMOL/L — SIGNIFICANT CHANGE UP (ref 3.5–5.3)
POTASSIUM SERPL-SCNC: 3.9 MMOL/L
POTASSIUM SERPL-SCNC: 3.9 MMOL/L — SIGNIFICANT CHANGE UP (ref 3.5–5.3)
PROT SERPL-MCNC: 7.6 G/DL
PROT SERPL-MCNC: 8.3 GM/DL — SIGNIFICANT CHANGE UP (ref 6–8.3)
PT BLD: 13 SEC
RBC # BLD: 3.86 M/UL — LOW (ref 4.2–5.8)
RBC # BLD: 4 M/UL
RBC # FLD: 11.9 % — SIGNIFICANT CHANGE UP (ref 10.3–14.5)
RBC # FLD: 12 %
SARS-COV-2 IGG SERPL IA-ACNC: <3.8 AU/ML
SARS-COV-2 IGG SERPL QL IA: NEGATIVE
SODIUM SERPL-SCNC: 139 MMOL/L — SIGNIFICANT CHANGE UP (ref 135–145)
SODIUM SERPL-SCNC: 141 MMOL/L
WBC # BLD: 9.15 K/UL — SIGNIFICANT CHANGE UP (ref 3.8–10.5)
WBC # FLD AUTO: 10.73 K/UL
WBC # FLD AUTO: 9.15 K/UL — SIGNIFICANT CHANGE UP (ref 3.8–10.5)

## 2020-06-09 PROCEDURE — 99284 EMERGENCY DEPT VISIT MOD MDM: CPT

## 2020-06-09 PROCEDURE — 71046 X-RAY EXAM CHEST 2 VIEWS: CPT | Mod: 26

## 2020-06-09 PROCEDURE — 36415 COLL VENOUS BLD VENIPUNCTURE: CPT

## 2020-06-09 PROCEDURE — 85025 COMPLETE CBC W/AUTO DIFF WBC: CPT

## 2020-06-09 PROCEDURE — U0003: CPT

## 2020-06-09 PROCEDURE — 96361 HYDRATE IV INFUSION ADD-ON: CPT

## 2020-06-09 PROCEDURE — 93010 ELECTROCARDIOGRAM REPORT: CPT

## 2020-06-09 PROCEDURE — 93005 ELECTROCARDIOGRAM TRACING: CPT

## 2020-06-09 PROCEDURE — 84100 ASSAY OF PHOSPHORUS: CPT

## 2020-06-09 PROCEDURE — 80053 COMPREHEN METABOLIC PANEL: CPT

## 2020-06-09 PROCEDURE — 96360 HYDRATION IV INFUSION INIT: CPT

## 2020-06-09 PROCEDURE — 83735 ASSAY OF MAGNESIUM: CPT

## 2020-06-09 PROCEDURE — 71046 X-RAY EXAM CHEST 2 VIEWS: CPT

## 2020-06-09 PROCEDURE — 99283 EMERGENCY DEPT VISIT LOW MDM: CPT | Mod: 25

## 2020-06-09 RX ORDER — SODIUM,POTASSIUM PHOSPHATES 278-250MG
2 POWDER IN PACKET (EA) ORAL ONCE
Refills: 0 | Status: COMPLETED | OUTPATIENT
Start: 2020-06-09 | End: 2020-06-09

## 2020-06-09 RX ORDER — IBUPROFEN 200 MG
600 TABLET ORAL ONCE
Refills: 0 | Status: COMPLETED | OUTPATIENT
Start: 2020-06-09 | End: 2020-06-09

## 2020-06-09 RX ORDER — SODIUM CHLORIDE 9 MG/ML
1000 INJECTION INTRAMUSCULAR; INTRAVENOUS; SUBCUTANEOUS ONCE
Refills: 0 | Status: COMPLETED | OUTPATIENT
Start: 2020-06-09 | End: 2020-06-09

## 2020-06-09 RX ADMIN — Medication 2 PACKET(S): at 17:28

## 2020-06-09 RX ADMIN — SODIUM CHLORIDE 1000 MILLILITER(S): 9 INJECTION INTRAMUSCULAR; INTRAVENOUS; SUBCUTANEOUS at 17:25

## 2020-06-09 RX ADMIN — SODIUM CHLORIDE 2000 MILLILITER(S): 9 INJECTION INTRAMUSCULAR; INTRAVENOUS; SUBCUTANEOUS at 15:56

## 2020-06-09 RX ADMIN — Medication 600 MILLIGRAM(S): at 15:40

## 2020-06-09 NOTE — ED STATDOCS - CLINICAL SUMMARY MEDICAL DECISION MAKING FREE TEXT BOX
Patient presents to the ED with fever, tachycardiac, chest tightness. Plan: Fever control, check labs, CXR.

## 2020-06-09 NOTE — ED ADULT NURSE NOTE - CHIEF COMPLAINT
The patient is a 29y Male complaining of flu-like symptoms.
The patient is a 29y Male complaining of flu-like symptoms.

## 2020-06-09 NOTE — ED STATDOCS - OBJECTIVE STATEMENT
30 y/o M with a PMHx of Cerebral palsy presenting to the ED c/o fever x3 days. Patient reports that he started to develop chest tightness over the last 48 hours, and came to the ED for COVID swab. Upon arrival patient was found to be tachycardiac to 174 with movement, and brought into ED for further evaluation. Denies any sick contacts, recent travel, SOB, cough, N/V/D. Non drinker, non smoker, no illegal drug use. Lives with parents at home.

## 2020-06-09 NOTE — ED STATDOCS - NSFOLLOWUPINSTRUCTIONS_ED_ALL_ED_FT
How to get your Coronavirus (COVID-19) Testing Results:   Please be advised that you were tested for the coronavirus (COVID-19) in the Emergency Department at [facility].  You are to maintain self-quarantine procedures for 14 days until instructed otherwise by one of our healthcare agents. Please note that the test may take up to 2-4 days to result.  If you do not hear from us within 72 hours and you'd like to check on your results, you can call on of our coronavirus specialists at [address] [place] [address] (available 24/7).  Please DO NOT call the site where you received the test to obtain your results.      Upper Respiratory Infection, Adult  An upper respiratory infection (URI) affects the nose, throat, and upper air passages. URIs are caused by germs (viruses). The most common type of URI is often called "the common cold."    Medicines cannot cure URIs, but you can do things at home to relieve your symptoms. URIs usually get better within 7–10 days.    Follow these instructions at home:  Activity     Rest as needed.  If you have a fever, stay home from work or school until your fever is gone, or until your doctor says you may return to work or school.    You should stay home until you cannot spread the infection anymore (you are not contagious).  Your doctor may have you wear a face mask so you have less risk of spreading the infection.    Relieving symptoms     Gargle with a salt-water mixture 3–4 times a day or as needed. To make a salt-water mixture, completely dissolve ½–1 tsp of salt in 1 cup of warm water.  Use a cool-mist humidifier to add moisture to the air. This can help you breathe more easily.  Eating and drinking     Drink enough fluid to keep your pee (urine) pale yellow.  ImageEat soups and other clear broths.  General instructions     Take over-the-counter and prescription medicines only as told by your doctor. These include cold medicines, fever reducers, and cough suppressants.  Do not use any products that contain nicotine or tobacco. These include cigarettes and e-cigarettes. If you need help quitting, ask your doctor.  Avoid being where people are smoking (avoid secondhand smoke).  Make sure you get regular shots and get the flu shot every year.  ImageKeep all follow-up visits as told by your doctor. This is important.  How to avoid spreading infection to others     Wash your hands often with soap and water. If you do not have soap and water, use hand .  Avoid touching your mouth, face, eyes, or nose.  ImageCough or sneeze into a tissue or your sleeve or elbow. Do not cough or sneeze into your hand or into the air.  Contact a doctor if:  You are getting worse, not better.  You have any of these:    A fever.  Chills.  Brown or red mucus in your nose.  Yellow or brown fluid (discharge)coming from your nose.  Pain in your face, especially when you bend forward.  Swollen neck glands.  Pain with swallowing.  White areas in the back of your throat.    Get help right away if:  You have shortness of breath that gets worse.  You have very bad or constant:    Headache.  Ear pain.  Pain in your forehead, behind your eyes, and over your cheekbones (sinus pain).  Chest pain.    You have long-lasting (chronic) lung disease along with any of these:    Wheezing.  Long-lasting cough.  Coughing up blood.  A change in your usual mucus.    You have a stiff neck.  You have changes in your:    Vision.  Hearing.  Thinking.  Mood.    Summary  An upper respiratory infection (URI) is caused by a germ called a virus. The most common type of URI is often called "the common cold."  URIs usually get better within 7–10 days.  Take over-the-counter and prescription medicines only as told by your doctor.  This information is not intended to replace advice given to you by your health care provider. Make sure you discuss any questions you have with your health care provider.

## 2020-06-09 NOTE — ED STATDOCS - PATIENT PORTAL LINK FT
You can access the FollowMyHealth Patient Portal offered by [facility] by registering at the following website: [email or web address]. By joining [employer]’s FollowMyHealth portal, you will also be able to view your health information using other applications (apps) compatible with our system.

## 2020-06-09 NOTE — ED ADULT NURSE NOTE - OBJECTIVE STATEMENT
Patient evaluated, treated, and discharged by PA. Refer to PA note for assessment.   Discharge instructions given verbally and understood by patient. Self-quarantine and COVID-19 information provided to patient. Unable to sign secondary to COVID-19 PUI.
presents to ED requesting covid testing, found tachycardic and sent into ED for evaluation. Pt with hx CP, c/o fevers, chills x 3 days. Denies cough or sick contacts

## 2020-06-09 NOTE — ED STATDOCS - ATTENDING CONTRIBUTION TO CARE
I, Alyx Mederos MD,  performed the initial face to face bedside interview with this patient regarding history of present illness, review of symptoms and relevant past medical, social and family history.  I completed an independent physical examination.  I was the initial provider who evaluated this patient. I have signed out the follow up of any pending tests (i.e. labs, radiological studies) to the ACP.  I have communicated the patient’s plan of care and disposition with the ACP.  The history, relevant review of systems, past medical and surgical history, medical decision making, and physical examination was documented by the scribe in my presence and I attest to the accuracy of the documentation.

## 2020-06-09 NOTE — ED ADULT NURSE NOTE - CHIEF COMPLAINT QUOTE
Patient presents for COVID-19 testing; complains of fever, flu-like symptoms.  Patient provides verbal consent to receive results via text or email.

## 2020-06-09 NOTE — ED STATDOCS - PROGRESS NOTE DETAILS
Patient initially seen and evaluated with ED attending at intake.  Initially presented for COVID19 testing, however was found to be tachycardic.  Fever and tachycardia improved, now ranging 105-110 HR.  Liver enzymes at baseline as per patient's mother at bedside.  Will replete phosphorus, reviewed importance of hydration and control of fever as well as return precautions and PMD f/u -Thao Bal PA-C

## 2020-06-09 NOTE — ED ADULT TRIAGE NOTE - CHIEF COMPLAINT QUOTE
Patient presents for COVID-19 testing; complains of fever, tightness in chest.  HR 120s on arrival, pt swabbed for Covid and brought into ED for further evaluation.  Ermias CP, mother accompanying pt.

## 2020-06-10 LAB — SARS-COV-2 RNA SPEC QL NAA+PROBE: SIGNIFICANT CHANGE UP

## 2020-06-12 LAB
AFPL3 RESULTS RECEIVED: NORMAL
ALPHA-1-FETOPROTEIN-L3: NORMAL %
ALPHA-1-FETOPROTEIN: 1.1 NG/ML

## 2020-08-17 ENCOUNTER — APPOINTMENT (OUTPATIENT)
Dept: DISASTER EMERGENCY | Facility: CLINIC | Age: 30
End: 2020-08-17

## 2020-08-19 ENCOUNTER — TRANSCRIPTION ENCOUNTER (OUTPATIENT)
Age: 30
End: 2020-08-19

## 2020-08-19 LAB — SARS-COV-2 N GENE NPH QL NAA+PROBE: NOT DETECTED

## 2020-08-20 ENCOUNTER — RESULT REVIEW (OUTPATIENT)
Age: 30
End: 2020-08-20

## 2020-08-20 ENCOUNTER — APPOINTMENT (OUTPATIENT)
Dept: GASTROENTEROLOGY | Facility: HOSPITAL | Age: 30
End: 2020-08-20

## 2020-08-20 ENCOUNTER — OUTPATIENT (OUTPATIENT)
Dept: OUTPATIENT SERVICES | Facility: HOSPITAL | Age: 30
LOS: 1 days | End: 2020-08-20
Payer: MEDICAID

## 2020-08-20 DIAGNOSIS — Z98.890 OTHER SPECIFIED POSTPROCEDURAL STATES: Chronic | ICD-10-CM

## 2020-08-20 DIAGNOSIS — K83.1 OBSTRUCTION OF BILE DUCT: ICD-10-CM

## 2020-08-20 PROCEDURE — C1773: CPT

## 2020-08-20 PROCEDURE — C1726: CPT

## 2020-08-20 PROCEDURE — 43276 ERCP STENT EXCHANGE W/DILATE: CPT

## 2020-08-20 PROCEDURE — C2617: CPT

## 2020-08-20 PROCEDURE — 88300 SURGICAL PATH GROSS: CPT

## 2020-08-20 PROCEDURE — C1769: CPT

## 2020-08-20 PROCEDURE — 74330 X-RAY BILE/PANC ENDOSCOPY: CPT

## 2020-08-20 PROCEDURE — 88300 SURGICAL PATH GROSS: CPT | Mod: 26

## 2020-08-20 NOTE — HISTORY OF PRESENT ILLNESS
[de-identified] : Patient arrived for ERCP. 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 He is on azathioprine and is doing much better. No complaint today.

## 2020-08-20 NOTE — PHYSICAL EXAM
[General Appearance - Alert] : alert [General Appearance - In No Acute Distress] : in no acute distress [Extraocular Movements] : extraocular movements were intact [PERRL With Normal Accommodation] : pupils were equal in size, round, and reactive to light [Outer Ear] : the ears and nose were normal in appearance [Oropharynx] : the oropharynx was normal [Neck Appearance] : the appearance of the neck was normal [Neck Cervical Mass (___cm)] : no neck mass was observed [Jugular Venous Distention Increased] : there was no jugular-venous distention [Thyroid Diffuse Enlargement] : the thyroid was not enlarged [Thyroid Nodule] : there were no palpable thyroid nodules [Heart Rate And Rhythm] : heart rate was normal and rhythm regular [Auscultation Breath Sounds / Voice Sounds] : lungs were clear to auscultation bilaterally [Heart Sounds] : normal S1 and S2 [Heart Sounds Gallop] : no gallops [Murmurs] : no murmurs [Heart Sounds Pericardial Friction Rub] : no pericardial rub [Bowel Sounds] : normal bowel sounds [Edema] : there was no peripheral edema [Full Pulse] : the pedal pulses are present [Abdomen Soft] : soft [Abdomen Tenderness] : non-tender [Abdomen Mass (___ Cm)] : no abdominal mass palpated [] : no hepato-splenomegaly [Cervical Lymph Nodes Enlarged Anterior Bilaterally] : anterior cervical [Cervical Lymph Nodes Enlarged Posterior Bilaterally] : posterior cervical [Supraclavicular Lymph Nodes Enlarged Bilaterally] : supraclavicular [FreeTextEntry1] : Hypopigmentation, scratch marks noted [No CVA Tenderness] : no ~M costovertebral angle tenderness [No Spinal Tenderness] : no spinal tenderness [Impaired Insight] : insight and judgment were intact [Oriented To Time, Place, And Person] : oriented to person, place, and time [No Focal Deficits] : no focal deficits [Affect] : the affect was normal

## 2020-08-24 LAB — SURGICAL PATHOLOGY STUDY: SIGNIFICANT CHANGE UP

## 2020-09-17 LAB
INR PPP: 1.01 RATIO
PT BLD: 12 SEC

## 2020-09-18 LAB
ALBUMIN SERPL ELPH-MCNC: 4.2 G/DL
ALP BLD-CCNC: 411 U/L
ALT SERPL-CCNC: 130 U/L
ANION GAP SERPL CALC-SCNC: 13 MMOL/L
AST SERPL-CCNC: 125 U/L
BASOPHILS # BLD AUTO: 0.04 K/UL
BASOPHILS NFR BLD AUTO: 0.8 %
BILIRUB SERPL-MCNC: 0.9 MG/DL
BUN SERPL-MCNC: 17 MG/DL
CALCIUM SERPL-MCNC: 9 MG/DL
CHLORIDE SERPL-SCNC: 104 MMOL/L
CO2 SERPL-SCNC: 25 MMOL/L
CREAT SERPL-MCNC: 0.99 MG/DL
EOSINOPHIL # BLD AUTO: 0.16 K/UL
EOSINOPHIL NFR BLD AUTO: 3.4 %
GLUCOSE SERPL-MCNC: 111 MG/DL
HCT VFR BLD CALC: 43.2 %
HGB BLD-MCNC: 14.1 G/DL
IMM GRANULOCYTES NFR BLD AUTO: 0.2 %
LYMPHOCYTES # BLD AUTO: 1.55 K/UL
LYMPHOCYTES NFR BLD AUTO: 32.5 %
MAN DIFF?: NORMAL
MCHC RBC-ENTMCNC: 32.1 PG
MCHC RBC-ENTMCNC: 32.6 GM/DL
MCV RBC AUTO: 98.4 FL
MONOCYTES # BLD AUTO: 0.4 K/UL
MONOCYTES NFR BLD AUTO: 8.4 %
NEUTROPHILS # BLD AUTO: 2.61 K/UL
NEUTROPHILS NFR BLD AUTO: 54.7 %
PLATELET # BLD AUTO: 202 K/UL
POTASSIUM SERPL-SCNC: 4.2 MMOL/L
PROT SERPL-MCNC: 7.7 G/DL
RBC # BLD: 4.39 M/UL
RBC # FLD: 13.3 %
SODIUM SERPL-SCNC: 142 MMOL/L
WBC # FLD AUTO: 4.77 K/UL

## 2020-11-03 ENCOUNTER — OUTPATIENT (OUTPATIENT)
Dept: OUTPATIENT SERVICES | Facility: HOSPITAL | Age: 30
LOS: 1 days | End: 2020-11-03
Payer: MEDICAID

## 2020-11-03 ENCOUNTER — APPOINTMENT (OUTPATIENT)
Dept: MRI IMAGING | Facility: CLINIC | Age: 30
End: 2020-11-03
Payer: MEDICAID

## 2020-11-03 ENCOUNTER — RESULT REVIEW (OUTPATIENT)
Age: 30
End: 2020-11-03

## 2020-11-03 DIAGNOSIS — Z98.890 OTHER SPECIFIED POSTPROCEDURAL STATES: Chronic | ICD-10-CM

## 2020-11-03 DIAGNOSIS — K83.01 PRIMARY SCLEROSING CHOLANGITIS: ICD-10-CM

## 2020-11-03 PROCEDURE — 74183 MRI ABD W/O CNTR FLWD CNTR: CPT

## 2020-11-03 PROCEDURE — A9585: CPT

## 2020-11-03 PROCEDURE — 74183 MRI ABD W/O CNTR FLWD CNTR: CPT | Mod: 26

## 2020-11-18 ENCOUNTER — APPOINTMENT (OUTPATIENT)
Dept: HEPATOLOGY | Facility: CLINIC | Age: 30
End: 2020-11-18
Payer: MEDICAID

## 2020-11-18 VITALS
WEIGHT: 149 LBS | TEMPERATURE: 97 F | DIASTOLIC BLOOD PRESSURE: 78 MMHG | BODY MASS INDEX: 25.44 KG/M2 | HEART RATE: 120 BPM | HEIGHT: 64 IN | SYSTOLIC BLOOD PRESSURE: 134 MMHG | RESPIRATION RATE: 17 BRPM

## 2020-11-18 PROCEDURE — 99214 OFFICE O/P EST MOD 30 MIN: CPT

## 2020-11-18 NOTE — PHYSICAL EXAM
[Liver Size (___ Cm)] : Liver size [unfilled] cm [Non-Tender] : non-tender [Smooth] : smooth [General Appearance - Alert] : alert [General Appearance - Well Nourished] : well nourished [General Appearance - Well Developed] : well developed [Outer Ear] : the ears and nose were normal in appearance [Neck Appearance] : the appearance of the neck was normal [Exaggerated Use Of Accessory Muscles For Inspiration] : no accessory muscle use [Apical Impulse] : the apical impulse was normal [Heart Rate And Rhythm] : heart rate was normal and rhythm regular [Heart Sounds] : normal S1 and S2 [Heart Sounds Gallop] : no gallops [Murmurs] : no murmurs [Heart Sounds Pericardial Friction Rub] : no pericardial rub [Arterial Pulses Carotid] : carotid pulses were normal with no bruits [Breast Appearance] : normal in appearance [Bowel Sounds] : normal bowel sounds [Abdomen Soft] : soft [Abdomen Tenderness] : non-tender [Abdomen Mass (___ Cm)] : no abdominal mass palpated [Cranial Nerves] : cranial nerves 2-12 were intact [Sensation] : the sensory exam was normal to light touch and pinprick [Motor Exam] : the motor exam was normal [No Focal Deficits] : no focal deficits [Oriented To Time, Place, And Person] : oriented to person, place, and time [Scleral Icterus] : No Scleral Icterus [Spider Angioma] : No spider angioma(s) were observed [Abdominal Bruit] : no abdominal bruit [Abdominal  Ascites] : no ascites [Asterixis] : no asterixis observed [Jaundice] : No jaundice [Depression] : no depression [FreeTextEntry1] : spastic gait fro cerbral plasy

## 2020-11-18 NOTE — REVIEW OF SYSTEMS
[Feeling Poorly] : feeling poorly [As Noted in HPI] : as noted in HPI [Difficulty Walking] : difficulty walking [Negative] : Heme/Lymph [Fever] : no fever [Chills] : no chills [FreeTextEntry3] : Reports jaundice [de-identified] : Report significant generalized pruritus particularly during the night [de-identified] : Spastic gait fro cerebral palsy

## 2020-11-18 NOTE — HISTORY OF PRESENT ILLNESS
[FreeTextEntry1] : Mr. Javy Biggs is a 28-year-old man with a history of cerebral palsy presents for follow up evaluation.\par \par Based on Dr. Howard's office visit note, his GI specialist, he started itching while he was in [place], along with some nausea and vomiting and abdominal pain. He was noted to have liver enzyme elevation. Then subsequently he presented to [facility] and underwent evaluation. His bilirubin started to rise further and then transferred to [facility] in February 2019. He underwent ERCP which showed the distal bile duct stricture and questionable right intrahepatic stricture. Small stent was placed in the distal bile duct. However his bilirubin did not improve initially. He underwent MRI with severe right intrahepatic stricture. He was discharged home. He was itching for many days but then it improved. \par \par Ca 19-9 level, IgG 4 level and bile duct brushings have been unremarkable. \par \par The patient had another ERCP with stent pull and cholangiogram in april 2019. Right intrahepatic stricture was noted. Then additional brushings were performed.Then a stent was attempted to be advanced into the right intrahepatic system. Although there was significant resistance. Then it was dilated using a 4 mm balloon. Then 7 Scottish 9 cm bile duct stent was placed.The patient had a ER visit about a week or 2 after that. He was noted to have nephrolithiasis.\par \par His LFTs imroved with ERCP and stent. He is now referred to hepatology for further assessment for primary sclerosing cholangitis.\par \par ARMANDO is negative, Mitochondrial antibody is negative, Viral hepatitis serologies are negative, immunugloblin level are unimpressive.\par \par \par Interval hx: Since last seen in the hepatology clinic on 22-May-2019, he had a follow up ERCP by Dr. Howard, and the old stent was removed, and exchanged with a new one. Brushings were negative for malignant cell. Additionally he had a liver biopsy that showed overall appears cholestatic pattern of injury with ductular reaction. This was further discussed at the hepato-billiary meeting with pathology review. 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 Essentially, work up for underlying CLD were negative for viral serology, autoimmune markers, ceruloplasmin ( higher level not suggestive of Garcia's). His total bili is down to 2.4 mg/dL, , and , and . AMA is negative, but ASMA is low titer + ( 1:20). ARMANDO is also negative.\par Interestingly, his IgG level is high ( 1723 mg/dL). \par His viral serology suggest to protection towards hepatitis A, but he is immune to hepatitis B. I hav recommended vaccine series.\par Ca19-9 was within normal range.\par \par Interval hx ( 9/3/2019): Since last seen he has been apparently hospitalized with fever, and suspected infection. He was treated with IV antibiotics, and was discharged with Cipro which he is still taking ( will finish in 2 days).\par Today he feels well.\par LFTs significantly better since Prednisone was started. His mother reports skin rash in the face, chest, and upper back (like pimples) since the steroid was initiated.\par \par Interval hx-10-3-2019:\par \par Patient has been doing well since last seen. His acne lesions have almost disappeared. He remain on Azathioprine 100 mg daily (increased on 10-2-2019), and Prednisone 10 mg daily. His Azathioprine dose was increased due to poor response on 50 mg daily. He denies any cholestasis symptoms. \par \par Interval history January 6, 2020:\par Recent laboratory tests from 10 December 2019 was revealed. This revealed white cell count of 5.96, hemoglobin 14.8, platelet count 400,000. Liver function tests revealed total bilirubin 1.8, , , alkaline phosphatase 151. Serum creatine was 0.92 mg/dL. INR is 0.93. His liver tests appears to have significantly increased compared to 26 November 2019. At that time his total bilirubin was 1.1, AST 89, , alkaline phosphatase 203.\par \par Interval history February 13, 2020:\par \par Patient presents for a follow up visit today along with his parents. Since last seen that has a significant jump in his liver test particularly total bilirubin level. His total bilirubin is 21.9, AST 65, ALT 44, alkaline phosphatase 129. He recently had an ERCP done through Dr. Yasmany Howard, and his liver function tests has not improved from the spike yesterday. I had a personal discussion with Dr. Howard, and he mentioned that he placed the stent on the right side only and he intends now to repeat an ERCP with bilateral stenting. \par \par \par Internal history dated March 2, 2020\par \par Ms. Biggs returns for a followup visit in the hepatology clinic accompianed  by her mother. On his followup today, patient reports severe persistent itching that is bothersome. He has been taking hydroxyzine with some relief. He also certainly some excoriation marks. He denies any internal episodes of fever, chills, , nausea or vomiting.\par \par His liver function test from 2 of February 2020 revealed total bilirubin 21.9, AST 65, ALT 44, alkaline phosphatase 129. Serum creatinine was 0.65 mg/dL. \par \par He's still waiting for getting the ERCP done by Dr. Howard, and apparently scheduled for this week.  We will reduce his hasn't had reduced to 50 mg daily and prednisone dose to 10 mg daily. I have recommended him to hold off on switching to budesonide.\par \par \par Interval history dated June 8, 2020\par \par Javy returns for a followup visit to hepatology clinic today accompanied by her mother. He reports fever for the last 2 days, and was preceded with some malaise. His fever was as high as her in 3 different it 2 days ago but the intensity has significantly improved as of yesterday. About 2 weeks in the he had an ERCP with stent exchange by Dr. Howard. He denies any chills. He also denies any symptoms of itching or any worsening jaundice or change in color of his urine or stool. Today the clinic she otherwise feels well and denies any new symptoms.\par \par On June 5, 2020 GI blood works that included CBC. This revealed a white cell count of 9.75, hemoglobin 14.2, platelet count 229,000. Recent CEA 19-9 level is within normal range. His INR is also normal and quantitative hemoglobin levels are now within normal range.\par \par He is currently taking azathioprine 50 mg daily.\par \par Interval Hx ( 11/18/2020)\par \par Patient presents for a follow up. He reports doing well- although reports occasional itching. No fever or jaundice.\par \par Recent MRCP- Stable appearance of the biliary tree since 7/3/2019 MRI. 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 Follow-up with endoscopic ultrasound may be considered.\par \par Labs from Sept 17, 2020- , , , and bili 0.9. \par

## 2020-11-18 NOTE — ASSESSMENT
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 Recent MRI shows a sgamll 1.6 cm lesion in the tail of the pancreas. Ca 19-9 level is normal from June 2020.\par Recent ERCP with stent exchange by Dr. Howard in August 2020, and scheduled for stent exchange in Dec 03, 2020.\par \par PLAN\par - Keep on azathioprine to 50 mg daily.\par -I have already refered  him for potential evaluation for liver transplant and he is awaiting additional clearance to get started. He recently changed his insurance carrier. \par -I recommended followup labs today but will include CBC, CMP, PT/INR, \par -Schedule an EUS with FNA of the pancreatic tail mass.\par \par RTC in 4 weeks.

## 2020-11-19 LAB
ALBUMIN SERPL ELPH-MCNC: 4 G/DL
ALP BLD-CCNC: 597 U/L
ALT SERPL-CCNC: 120 U/L
ANION GAP SERPL CALC-SCNC: 9 MMOL/L
AST SERPL-CCNC: 94 U/L
BASOPHILS # BLD AUTO: 0.04 K/UL
BASOPHILS NFR BLD AUTO: 0.6 %
BILIRUB SERPL-MCNC: 1.2 MG/DL
BUN SERPL-MCNC: 14 MG/DL
CALCIUM SERPL-MCNC: 9.8 MG/DL
CANCER AG19-9 SERPL-ACNC: 19 U/ML
CHLORIDE SERPL-SCNC: 104 MMOL/L
CO2 SERPL-SCNC: 27 MMOL/L
CREAT SERPL-MCNC: 0.87 MG/DL
EOSINOPHIL # BLD AUTO: 0.12 K/UL
EOSINOPHIL NFR BLD AUTO: 1.8 %
GLUCOSE SERPL-MCNC: 102 MG/DL
HCT VFR BLD CALC: 41.6 %
HGB BLD-MCNC: 13.4 G/DL
IMM GRANULOCYTES NFR BLD AUTO: 0.4 %
INR PPP: 1.03 RATIO
LYMPHOCYTES # BLD AUTO: 1.56 K/UL
LYMPHOCYTES NFR BLD AUTO: 23.2 %
MAN DIFF?: NORMAL
MCHC RBC-ENTMCNC: 32.2 GM/DL
MCHC RBC-ENTMCNC: 32.4 PG
MCV RBC AUTO: 100.7 FL
MONOCYTES # BLD AUTO: 0.58 K/UL
MONOCYTES NFR BLD AUTO: 8.6 %
NEUTROPHILS # BLD AUTO: 4.39 K/UL
NEUTROPHILS NFR BLD AUTO: 65.4 %
PLATELET # BLD AUTO: 361 K/UL
POTASSIUM SERPL-SCNC: 4.5 MMOL/L
PROT SERPL-MCNC: 7.9 G/DL
PT BLD: 12.2 SEC
RBC # BLD: 4.13 M/UL
RBC # FLD: 13.8 %
SODIUM SERPL-SCNC: 140 MMOL/L
WBC # FLD AUTO: 6.72 K/UL

## 2020-11-30 ENCOUNTER — APPOINTMENT (OUTPATIENT)
Dept: DISASTER EMERGENCY | Facility: CLINIC | Age: 30
End: 2020-11-30

## 2020-12-01 LAB — SARS-COV-2 N GENE NPH QL NAA+PROBE: NOT DETECTED

## 2020-12-02 ENCOUNTER — TRANSCRIPTION ENCOUNTER (OUTPATIENT)
Age: 30
End: 2020-12-02

## 2020-12-03 ENCOUNTER — RESULT REVIEW (OUTPATIENT)
Age: 30
End: 2020-12-03

## 2020-12-03 ENCOUNTER — OUTPATIENT (OUTPATIENT)
Dept: OUTPATIENT SERVICES | Facility: HOSPITAL | Age: 30
LOS: 1 days | End: 2020-12-03
Payer: MEDICAID

## 2020-12-03 ENCOUNTER — APPOINTMENT (OUTPATIENT)
Dept: GASTROENTEROLOGY | Facility: HOSPITAL | Age: 30
End: 2020-12-03

## 2020-12-03 DIAGNOSIS — Z98.890 OTHER SPECIFIED POSTPROCEDURAL STATES: Chronic | ICD-10-CM

## 2020-12-03 DIAGNOSIS — K83.1 OBSTRUCTION OF BILE DUCT: ICD-10-CM

## 2020-12-03 PROCEDURE — 43274 ERCP DUCT STENT PLACEMENT: CPT

## 2020-12-03 PROCEDURE — 88300 SURGICAL PATH GROSS: CPT

## 2020-12-03 PROCEDURE — 74330 X-RAY BILE/PANC ENDOSCOPY: CPT

## 2020-12-03 PROCEDURE — C1773: CPT

## 2020-12-03 PROCEDURE — 88300 SURGICAL PATH GROSS: CPT | Mod: 26

## 2020-12-03 PROCEDURE — 43276 ERCP STENT EXCHANGE W/DILATE: CPT | Mod: 59

## 2020-12-03 PROCEDURE — C1769: CPT

## 2020-12-03 PROCEDURE — C1726: CPT

## 2020-12-03 PROCEDURE — C2617: CPT

## 2020-12-03 PROCEDURE — 43277 ERCP EA DUCT/AMPULLA DILATE: CPT | Mod: XS

## 2020-12-04 LAB
IGG4 SER-MCNC: 57 MG/DL
SURGICAL PATHOLOGY STUDY: SIGNIFICANT CHANGE UP

## 2020-12-05 NOTE — PHYSICAL EXAM
[General Appearance - Alert] : alert [General Appearance - In No Acute Distress] : in no acute distress [Sclera] : the sclera and conjunctiva were normal [PERRL With Normal Accommodation] : pupils were equal in size, round, and reactive to light [Extraocular Movements] : extraocular movements were intact [Outer Ear] : the ears and nose were normal in appearance [Neck Appearance] : the appearance of the neck was normal [Oropharynx] : the oropharynx was normal [Jugular Venous Distention Increased] : there was no jugular-venous distention [Neck Cervical Mass (___cm)] : no neck mass was observed [Thyroid Nodule] : there were no palpable thyroid nodules Patient/Caregiver provided printed discharge information. [Thyroid Diffuse Enlargement] : the thyroid was not enlarged [Auscultation Breath Sounds / Voice Sounds] : lungs were clear to auscultation bilaterally [Heart Rate And Rhythm] : heart rate was normal and rhythm regular [Heart Sounds] : normal S1 and S2 [Murmurs] : no murmurs [Heart Sounds Gallop] : no gallops [Heart Sounds Pericardial Friction Rub] : no pericardial rub [Full Pulse] : the pedal pulses are present [Edema] : there was no peripheral edema [Abdomen Soft] : soft [Bowel Sounds] : normal bowel sounds [Abdomen Tenderness] : non-tender [] : no hepato-splenomegaly [Abdomen Mass (___ Cm)] : no abdominal mass palpated [Cervical Lymph Nodes Enlarged Posterior Bilaterally] : posterior cervical [Cervical Lymph Nodes Enlarged Anterior Bilaterally] : anterior cervical [No CVA Tenderness] : no ~M costovertebral angle tenderness [Supraclavicular Lymph Nodes Enlarged Bilaterally] : supraclavicular [No Spinal Tenderness] : no spinal tenderness [No Focal Deficits] : no focal deficits [Oriented To Time, Place, And Person] : oriented to person, place, and time [Affect] : the affect was normal [Impaired Insight] : insight and judgment were intact [FreeTextEntry1] : Hypopigmentation, scratch marks noted

## 2020-12-22 ENCOUNTER — RX RENEWAL (OUTPATIENT)
Age: 30
End: 2020-12-22

## 2021-01-10 ENCOUNTER — APPOINTMENT (OUTPATIENT)
Dept: DISASTER EMERGENCY | Facility: CLINIC | Age: 31
End: 2021-01-10

## 2021-01-12 ENCOUNTER — APPOINTMENT (OUTPATIENT)
Dept: HEPATOLOGY | Facility: CLINIC | Age: 31
End: 2021-01-12
Payer: MEDICAID

## 2021-01-12 ENCOUNTER — APPOINTMENT (OUTPATIENT)
Dept: TRANSPLANT | Facility: CLINIC | Age: 31
End: 2021-01-12
Payer: MEDICAID

## 2021-01-12 VITALS
BODY MASS INDEX: 25.61 KG/M2 | HEART RATE: 114 BPM | RESPIRATION RATE: 16 BRPM | WEIGHT: 150 LBS | TEMPERATURE: 97 F | OXYGEN SATURATION: 100 % | HEIGHT: 64 IN | DIASTOLIC BLOOD PRESSURE: 73 MMHG | SYSTOLIC BLOOD PRESSURE: 139 MMHG

## 2021-01-12 LAB — SARS-COV-2 N GENE NPH QL NAA+PROBE: NOT DETECTED

## 2021-01-12 PROCEDURE — 99072 ADDL SUPL MATRL&STAF TM PHE: CPT

## 2021-01-12 PROCEDURE — 99215 OFFICE O/P EST HI 40 MIN: CPT

## 2021-01-12 PROCEDURE — 99205 OFFICE O/P NEW HI 60 MIN: CPT

## 2021-01-12 RX ORDER — CLINDAMYCIN AND BENZOYL PEROXIDE 50; 10 MG/G; MG/G
1-5 GEL TOPICAL DAILY
Qty: 3 | Refills: 5 | Status: DISCONTINUED | COMMUNITY
Start: 2019-09-05 | End: 2021-01-12

## 2021-01-12 RX ORDER — ADAPALENE 1 MG/G
0.1 GEL TOPICAL
Qty: 1 | Refills: 6 | Status: DISCONTINUED | COMMUNITY
Start: 2019-11-05 | End: 2021-01-12

## 2021-01-12 NOTE — PHYSICAL EXAM
[Alert] : alert [No Acute Distress] : no acute distress [Clear to Auscultation] : lungs were clear to auscultation bilaterally [Normal Rate] : normal rate [Scleral Icterus] : no scleral icterus [Hepatojugular Reflux] : no hepatojugular reflux [Abdominal Bruit] : no abdominal bruit [Ascites Fluid Wave] : no ascites fluid wave [Splenomegaly] : no splenomegaly [Ascites Tense] : no ascites tense [Caput Medusae] : no caput medusae [Soft] : soft [] : right femoral palpable [Depression] : no depression [de-identified] : Scratch winifred on back  [FreeTextEntry1] : 30 year old with ? PSC multiple stents and sepsis with cholangitis. Consented for OLTX with mother in attendance. Very high functioning and delightful gentleman s/p CP, with left sided contractures and release. Discussed with Dr. Tipton and Mother in detail.

## 2021-01-12 NOTE — PLAN
[FreeTextEntry1] : 30 y.o with cerebral palsy and autoimmune cholangitis. \par Scheduled for EUS tomorrow to evaluate pancreatic lesion.

## 2021-01-12 NOTE — REVIEW OF SYSTEMS
[Feeling Poorly] : feeling poorly [As Noted in HPI] : as noted in HPI [Difficulty Walking] : difficulty walking [Negative] : Heme/Lymph [Fever] : no fever [Chills] : no chills [FreeTextEntry3] : Reports jaundice [de-identified] : Report significant generalized pruritus particularly during the night [de-identified] : Spastic gait fro cerebral palsy

## 2021-01-12 NOTE — ASSESSMENT
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 Recent MRI shows a small 1.6 cm lesion in the tail of the pancreas. Ca 19-9 level is normal from June 2020.\par Recent ERCP with stent exchange by Dr. Howard in Dec 02, 2020,\par \par PLAN\par - Keep on azathioprine to 50 mg daily.\par - Proceed with EUS with FNA of the pancreatic tail mass.\par - Patient appears to be an excellent candidate for liver transplant, and will meet criteria for MELD exception.\par - Labs for transplant evaluation.\par \par I reviewed the following areas and provided time for questions to be asked and for information to be clarified and/or repeated.\par \par Evaluation process: I reviewed the results of tests and consults available to date and results of physical evaluation. I discussed the tests/consults that are required to complete an evaluation. I discussed the purpose of specific tests/consults, why they are needed, what the test involves, and discussed choices of where tests can be done. I discussed inclusion and exclusion criteria for organ transplant candidacy at [facility] and provided a copy of selection criteria, if requested. I advised him if alternative treatment options are available to them. I discussed the importance of abstinence from illicit, recreational, and prescriptive drug use. I discussed the importance of abstinence from alcohol use. I discussed the necessity of following a strict medical regimen post-transplant. I discussed the importance of an adequate support system to assist patient through evaluation, listing, and transplant process. The candidate was given the opportunity to ask questions.\par \par Candidate selection: I discussed the Recipient Review Committee (RRC) meeting process and consensus decision making of the team. I discussed that the team will decide if an organ transplant is indicated and if the patient is a suitable candidate medically, surgically, psychosocially, and financially. If alternative treatments are identified by the team, this will be discussed with the patient. The candidate was given the opportunity to ask questions.\par Waiting list: I discussed liver allocation and MELD/PELD system, prioritization on the waiting list, and average waiting time for patients based on their disease etiology. I discussed the need for periodic MELD/PELD recertification per OPTN/UNOS regulations and the consequence of not completing the requested tests/procedures on time. I discussed multiple listing options and the option to transfer his waiting time to another center. I discussed the possible progression and complications of their disease, and the possibility that he may not be a candidate in the future for organ transplant. I discussed with the patient that if he uses alcohol or any recreational drugs while on the waiting list or if he is non-adherent with follow-up or medications, he will be removed from the transplant waiting list. I advised the patient of his right to withdraw consent for transplant at any time. The candidate was given the opportunity to ask questions.\par \par Complications of liver disease: I discussed signs and symptoms of progressive liver disease or complications requiring medical attention. I discussed the complication of ascites/edema and generalized fluid overload. I discussed the complication of infections and spontaneous bacterial peritonitis. I discussed the development/worsening of renal insufficiency. I discussed generalized malaise, fatigue, and muscle wasting. I discussed encephalopathy, including the prevention, treatment, and worsening of the condition. I discussed GI bleeding, which is a medical emergency if present, and the need for urgent/emergent medical care. I discussed the possibility of development or worsening of HCC while on the waiting list. I discussed the possibility of progression of HCC beyond criteria for liver transplant. I reviewed the procedure for HCC cases when a donor liver becomes available, namely that the patient will be taken to the OR and explored prior to transplant. If any tumor is found outside of the liver, the transplant procedure will be cancelled and the abdomen will be closed. The candidate was given the opportunity to ask questions.\par \par \par RTC in 4 weeks.

## 2021-01-12 NOTE — HISTORY OF PRESENT ILLNESS
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 Biggs is a 29 y/o M with a Hx cerebral palsy, nephrolithiasis and autoimmune cholangitis, seizures as a child. He was referred by Dr. Yasmany Howard for liver transplant evaluation. Pt has know liver disease since January 2019 when he presented with pruritus, N/V and abdominal pain while living in [place]. He was noted to have liver enzymes elevation and his family traveled back to [place] for healthcare.\par Then subsequently he presented to [facility] and underwent evaluation. His bilirubin started to rise further and then transferred to [facility] in February 2019. He underwent ERCP which showed the distal bile duct stricture and questionable right intrahepatic stricture. Small stent was placed in the distal bile duct. However his bilirubin did not improve initially. He underwent MRI with severe right intrahepatic stricture.\par He's undergone serial stent exchanges last one Dec. 3, 2020. His TB peaked 22. He's had multiple cholangitis episodes. Mother recalls in August 2019 he became septic presenting febrile and hypotensive. Additionally, he had a liver biopsy (6/7/2019) that showed a cholestatic pattern of injury with ductular reaction. His most recent blood work ( 11/18/20) revealed MELD 7:  WBC 6.72, H/H 13.4, 41.6, , Na 140, \par TB 1.2, AST 94 , , , Cr 0.87, ALB 4.0 \par Ca 19-9 level, IgG 4 level and bile duct brushings have been unremarkable. \par ARMANDO is negative, Mitochondrial antibody is negative, Viral hepatitis serologies are negative. \par  MRCP 11/3/20- Stable appearance of the biliary tree since 7/3/19.  \par 1.6 cm pancreatic tail lesion, in retrospect probable stable from 1/29/20 CT and may represent inflammatory pseudomonas in the setting of autoimmune pancreatitis. F/ u with endoscopic us may  be considered which he is scheduled for on 1/13/21. \par He is currently on 50 mg daily of azathioprine, no prednisone. c/o diarrhea episodes, last colo many years ago. 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 \par lives with both parents. Family relocated to [place] and has now moved back to [place], living in [place], Mother is very supportive.

## 2021-01-12 NOTE — ASSESSMENT
[FreeTextEntry1] : Patient agrees to proceed with the full evaluation for liver transplantation. \par \par Patient was explained alternatives, benefits and risk of liver transplantation, including but not limited to infection, bleeding, hepatic artery or portal vein thrombosis, primary dysfunction or primary non-function of the liver allograft, cardiopulmonary arrest, intra-operative death and other surgical, medical and psychosocial risks as outlined in the evaluation consent form.  she understands these risks and is willing to proceed with liver transplantation.\par Patient was also explained the need to remain on lifelong anti-rejection medications.  We discussed the risks and side effects of immunosuppressive medications including, but not limited to infection, cancer, weight gain, new onset or worsening of diabetes or hypertension in a temporary or permanent state, kidney dysfunction, water retention, back pain, constipation, diarrhea, dizziness, headache, joint pain, loss of appetite, nausea, stomach pain or upset, trouble sleeping, vomiting, and mental or mood changes.  An overview of the follow-up protocol was reviewed including outpatient visits, blood tests and the potential for hospital readmission. She understands these risks and is willing to proceed with liver transplantation.\par \par We also discussed the available donor organ pool. We discussed the assessment of the  donor including age, cause of death, cardiac arrest, electrolyte abnormalities, course and length of hospital stay, use of vasopressors, hepatitis and HIV testing. We reviewed organ donor risk factors that could affect the success of the graft or the health of the patient, including, but not limited to, the donor's history, condition or age of the organs used, or the patient's potential risk of josee the human immunodeficiency virus and other infectious diseases if the disease cannot be detected in an infected donor.  We discussed and defined the option of an extended criteria for cadaveric donors (Hepatitis B core Ab positive donor, Hepatitis C Ab positive donor, steatosis, older donors, split livers and DCD donors) and early and late outcomes of graft survival after transplantation.\par \par The options of  donor liver transplantation vs. live donor liver transplantation were discussed with them. Differences between donation after cardiac death (DCD) compared to donation after brain death (DBD) liver transplantation were also fully disclosed and included lower graft survival rates, the increased incidence of hepatic artery stenosis, bile duct injury, ischemic cholangiopathy and increased re transplant rates seen in recipients of DCD donor livers.\par The use of the [place] Public Health Services (PHS) Guideline has defined some donors as "Increased Risk Donors" based on their history which may suggest socially increased risk behaviors was discussed. The patient is aware that if PHS increased risk donor is offered to the candidate, the transplant team will discuss the specifics to assist with making an informed decision. We discussed our post-transplant protocol of serology testing if the candidate receives an organ that is PHS increased risk.\par The patient was made aware that it is against the law to be paid or to pay to donate an organ.  If any money was given or will be given in exchange for receiving an organ, the patient may be subject to criminal prosecution, and any insurance coverage may no longer apply and patient may become personally responsible for all the health care costs associated with the donation, and private health information will be available to law enforcement agencies.\par We explained that we store vessels for subsequent later use in transplants.  Again, we discussed the extensive testing done on  donors prior to donation, however despite an extensive evaluation on the donor, there is potential risk a recipient may contract infectious diseases (HIV or Hepatitis) or cancer if they cannot be detected in the donor. In the cases where PHS Increased Risk donor vessels are used, we test the recipient per protocol between 1-2 months post-transplant for any potential infectious disease transmission. The patient understands that there is the potential of use of  donor vessels and PHS increased risk donor vessels. She understands these risks and is willing to receive potentially PHS increased risk donor vessels.\par \par We also discussed the MELD allocation system in depth and the one-year observed and expected patient and graft survival rates according to data from the Scientific Registry for Transplant Recipients. These center specific outcomes were provided in comparison to the national one-year averages as described in the evaluation consent forms.\par \par Prior to signing consent, patient was given an opportunity to ask questions.  After all concerns were addressed, informed consent was signed. Patient is aware that they may withdraw their consent for transplantation at any time.  Further, the patient is aware of the right to refuse an organ offer without penalty at any time. She has consented for PHS increased risk but not C.\par \par

## 2021-01-12 NOTE — HISTORY OF PRESENT ILLNESS
[FreeTextEntry1] : Mr. Javy Biggs is a 30-year-old man with a history of cerebral palsy presents for  evaluation for potential liver transplant.\par \par He has been symptomatic since early 2019. He started itching while he was in [place], along with some nausea and vomiting and abdominal pain. He was noted to have liver enzyme elevation. Then subsequently he presented to [facility] and underwent evaluation. His bilirubin started to rise further and then transferred to [facility] in February 2019. He underwent ERCP which showed the distal bile duct stricture and questionable right intrahepatic stricture. Small stent was placed in the distal bile duct. However his bilirubin did not improve initially. He underwent MRI with severe right intrahepatic stricture. He was discharged home. He was itching for many days but then it improved. \par \par Ca 19-9 level, IgG 4 level and bile duct brushings have been unremarkable. \par \par The patient had another ERCP with stent pull and cholangiogram in april 2019. Right intrahepatic stricture was noted. Then additional brushings were performed.Then a stent was attempted to be advanced into the right intrahepatic system. Although there was significant resistance. Then it was dilated using a 4 mm balloon. Then 7 [place] 9 cm bile duct stent was placed.The patient had a ER visit about a week or 2 after that. He was noted to have nephrolithiasis.\par \par His LFTs imroved with ERCP and stent. He is now referred to hepatology for further assessment for primary sclerosing cholangitis.\par \par ARMANDO is negative, Mitochondrial antibody is negative, Viral hepatitis serologies are negative, immunugloblin level are unimpressive.\par \par Interval hx: Since last seen in the hepatology clinic on 22-May-2019, he had a follow up ERCP by Dr. Howard, and the old stent was removed, and exchanged with a new one. Brushings were negative for malignant cell. Additionally he had a liver biopsy that showed overall appears cholestatic pattern of injury with ductular reaction. This was further discussed at the hepato-billiary meeting with pathology review. 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 Essentially, work up for underlying CLD were negative for viral serology, autoimmune markers, ceruloplasmin ( higher level not suggestive of Garcia's). His total bili is down to 2.4 mg/dL, , and , and . AMA is negative, but ASMA is low titer + ( 1:20). ARMANDO is also negative.\par Interestingly, his IgG level is high ( 1723 mg/dL). \par His viral serology suggest to protection towards hepatitis A, but he is immune to hepatitis B. I hav recommended vaccine series.\par Ca19-9 was within normal range.\par \par Interval hx ( 9/3/2019): Since last seen he has been apparently hospitalized with fever, and suspected infection. He was treated with IV antibiotics, and was discharged with Cipro which he is still taking ( will finish in 2 days).\par Today he feels well.\par LFTs significantly better since Prednisone was started. His mother reports skin rash in the face, chest, and upper back (like pimples) since the steroid was initiated.\par \par Interval hx-10-3-2019:\par \par Patient has been doing well since last seen. His acne lesions have almost disappeared. He remain on Azathioprine 100 mg daily (increased on 10-2-2019), and Prednisone 10 mg daily. His Azathioprine dose was increased due to poor response on 50 mg daily. He denies any cholestasis symptoms. \par \par Interval history January 6, 2020:\par Recent laboratory tests from 10 December 2019 was revealed. This revealed white cell count of 5.96, hemoglobin 14.8, platelet count 400,000. Liver function tests revealed total bilirubin 1.8, , , alkaline phosphatase 151. Serum creatine was 0.92 mg/dL. INR is 0.93. His liver tests appears to have significantly increased compared to 26 November 2019. At that time his total bilirubin was 1.1, AST 89, , alkaline phosphatase 203.\par \par Interval history February 13, 2020:\par \par Patient presents for a follow up visit today along with his parents. Since last seen that has a significant jump in his liver test particularly total bilirubin level. His total bilirubin is 21.9, AST 65, ALT 44, alkaline phosphatase 129. He recently had an ERCP done through Dr. Yasmany Howard, and his liver function tests has not improved from the spike yesterday. I had a personal discussion with Dr. Howard, and he mentioned that he placed the stent on the right side only and he intends now to repeat an ERCP with bilateral stenting. \par \par \par Internal history dated March 2, 2020\par \par Ms. Biggs returns for a followup visit in the hepatology clinic accompianed  by her mother. On his followup today, patient reports severe persistent itching that is bothersome. He has been taking hydroxyzine with some relief. He also certainly some excoriation marks. He denies any internal episodes of fever, chills, , nausea or vomiting.\par \par His liver function test from 2 of February 2020 revealed total bilirubin 21.9, AST 65, ALT 44, alkaline phosphatase 129. Serum creatinine was 0.65 mg/dL. \par \par He's still waiting for getting the ERCP done by Dr. Howard, and apparently scheduled for this week.  We will reduce his hasn't had reduced to 50 mg daily and prednisone dose to 10 mg daily. I have recommended him to hold off on switching to budesonide.\par \par \par Interval history dated June 8, 2020\par \par Javy returns for a followup visit to hepatology clinic today accompanied by her mother. He reports fever for the last 2 days, and was preceded with some malaise. His fever was as high as her in 3 different it 2 days ago but the intensity has significantly improved as of yesterday. About 2 weeks in the he had an ERCP with stent exchange by Dr. Howard. He denies any chills. He also denies any symptoms of itching or any worsening jaundice or change in color of his urine or stool. Today the clinic she otherwise feels well and denies any new symptoms.\par \par On June 5, 2020 GI blood works that included CBC. This revealed a white cell count of 9.75, hemoglobin 14.2, platelet count 229,000. Recent CEA 19-9 level is within normal range. His INR is also normal and quantitative hemoglobin levels are now within normal range.\par \par He is currently taking azathioprine 50 mg daily.\par \par Interval Hx ( 11/18/2020)\par \par Patient presents for a follow up. He reports doing well- although reports occasional itching. No fever or jaundice.\par \par Recent MRCP- Stable appearance of the biliary tree since 7/3/2019 MRI. 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 Follow-up with endoscopic ultrasound may be considered.\par \par Labs from Sept 17, 2020- , , , and bili 0.9. \par \par Interval Hx (1/12/2021)\par \par Patient presents for evaluation for Liver Transplant. He is being accompanied by his mother. He remains asymptomatic, denies any cholestatic symptoms.\par \par His CBC from Nov 18, 2020- normal CBC, ,000. LFTs were elevated, with total bili 94, AST 94, , . INR was 1.03. His blood type is AB.\par \par He had an ERCP with stent exchange in Dec 02, 2020 though Dr. Yasmany Howard.\par

## 2021-01-13 ENCOUNTER — OUTPATIENT (OUTPATIENT)
Dept: OUTPATIENT SERVICES | Facility: HOSPITAL | Age: 31
LOS: 1 days | End: 2021-01-13
Payer: MEDICAID

## 2021-01-13 ENCOUNTER — APPOINTMENT (OUTPATIENT)
Dept: HEPATOLOGY | Facility: HOSPITAL | Age: 31
End: 2021-01-13

## 2021-01-13 VITALS
TEMPERATURE: 97 F | RESPIRATION RATE: 18 BRPM | HEART RATE: 83 BPM | WEIGHT: 149.91 LBS | SYSTOLIC BLOOD PRESSURE: 124 MMHG | HEIGHT: 62 IN | OXYGEN SATURATION: 98 % | DIASTOLIC BLOOD PRESSURE: 73 MMHG

## 2021-01-13 VITALS
OXYGEN SATURATION: 100 % | RESPIRATION RATE: 20 BRPM | DIASTOLIC BLOOD PRESSURE: 77 MMHG | SYSTOLIC BLOOD PRESSURE: 114 MMHG | HEART RATE: 99 BPM

## 2021-01-13 DIAGNOSIS — Z98.890 OTHER SPECIFIED POSTPROCEDURAL STATES: Chronic | ICD-10-CM

## 2021-01-13 DIAGNOSIS — K86.89 OTHER SPECIFIED DISEASES OF PANCREAS: ICD-10-CM

## 2021-01-13 PROCEDURE — 43237 ENDOSCOPIC US EXAM ESOPH: CPT

## 2021-01-13 PROCEDURE — 43259 EGD US EXAM DUODENUM/JEJUNUM: CPT

## 2021-01-13 RX ORDER — SODIUM CHLORIDE 9 MG/ML
500 INJECTION INTRAMUSCULAR; INTRAVENOUS; SUBCUTANEOUS
Refills: 0 | Status: DISCONTINUED | OUTPATIENT
Start: 2021-01-13 | End: 2021-01-27

## 2021-01-13 RX ORDER — HYDROXYZINE HCL 10 MG
1 TABLET ORAL
Qty: 0 | Refills: 0 | DISCHARGE

## 2021-01-13 NOTE — ASU DISCHARGE PLAN (ADULT/PEDIATRIC) - CARE PROVIDER_API CALL
Juan David Tipton (MD)  Gastroenterology; Internal Medicine; Transplant Hepatology  [address] [place] [address]  Phone: [phone]  Fax: [phone]  Follow Up Time:

## 2021-01-13 NOTE — ASU PATIENT PROFILE, ADULT - PSH
History of biliary stent insertion  stent x2 12/2020  History of biliary stent insertion  removed 7/2019  History of ERCP  performed at [facility]

## 2021-01-13 NOTE — PRE PROCEDURE NOTE - PRE PROCEDURE EVALUATION
Attending Physician: Juan David Tipton                        Procedure: EGD/EUS with possible FNA    Indication for Procedure: Pancreatic tail mass  ________________________________________________________  PAST MEDICAL & SURGICAL HISTORY:  PSC (primary sclerosing cholangitis)    Cerebral palsy    History of biliary stent insertion  stent x2 12/2020    History of biliary stent insertion  removed 7/2019    History of ERCP  performed at [place] [facility]      ALLERGIES:  No Known Allergies    HOME MEDICATIONS:  azaTHIOprine 50 mg oral tablet: 1 tab(s) orally once a day    AICD/PPM: [ ] yes   [ ] no    PERTINENT LAB DATA:                      PHYSICAL EXAMINATION:    Height (cm): 157.5  Weight (kg): 68  BMI (kg/m2): 27.4  BSA (m2): 1.69T(C): 36.3  HR: 80  BP: 124/73  RR: 18  SpO2: 100%    Constitutional: NAD  HEENT: PERRLA, EOMI,    Neck:  No JVD  Respiratory: CTAB/L  Cardiovascular: S1 and S2  Gastrointestinal: BS+, soft, NT/ND  Extremities: No peripheral edema  Neurological: A/O x 3, no focal deficits  Psychiatric: Normal mood, normal affect  Skin: No rashes    ASA Class: I [ ]  II [x ]  III [ ]  IV [ ]    COMMENTS:    The patient is a suitable candidate for the planned procedure unless box checked [ ]  No, explain:

## 2021-01-14 LAB
ABO + RH PNL BLD: NORMAL
ALBUMIN SERPL ELPH-MCNC: 4 G/DL
ALP BLD-CCNC: 368 U/L
ALT SERPL-CCNC: 146 U/L
ANION GAP SERPL CALC-SCNC: 14 MMOL/L
AST SERPL-CCNC: 118 U/L
BASOPHILS # BLD AUTO: 0.04 K/UL
BASOPHILS NFR BLD AUTO: 0.7 %
BILIRUB SERPL-MCNC: 1.5 MG/DL
BUN SERPL-MCNC: 14 MG/DL
CALCIUM SERPL-MCNC: 9.6 MG/DL
CHLORIDE SERPL-SCNC: 104 MMOL/L
CHOLEST SERPL-MCNC: 206 MG/DL
CMV IGG SERPL QL: <0.2 U/ML
CMV IGG SERPL-IMP: NEGATIVE
CO2 SERPL-SCNC: 23 MMOL/L
CREAT SERPL-MCNC: 1.09 MG/DL
CREAT SPEC-SCNC: 214 MG/DL
CREAT/PROT UR: 0.1 RATIO
EBV EA AB SER IA-ACNC: 17.9 U/ML
EBV EA AB TITR SER IF: POSITIVE
EBV EA IGG SER QL IA: 571 U/ML
EBV EA IGG SER-ACNC: POSITIVE
EBV EA IGM SER IA-ACNC: NEGATIVE
EBV PATRN SPEC IB-IMP: NORMAL
EBV VCA IGG SER IA-ACNC: 185 U/ML
EBV VCA IGM SER QL IA: 17.4 U/ML
EOSINOPHIL # BLD AUTO: 0.08 K/UL
EOSINOPHIL NFR BLD AUTO: 1.4 %
EPSTEIN-BARR VIRUS CAPSID ANTIGEN IGG: POSITIVE
ESTIMATED AVERAGE GLUCOSE: 91 MG/DL
GLUCOSE SERPL-MCNC: 96 MG/DL
HAV IGM SER QL: NONREACTIVE
HBA1C MFR BLD HPLC: 4.8 %
HBV CORE IGG+IGM SER QL: NONREACTIVE
HBV SURFACE AB SER QL: REACTIVE
HBV SURFACE AB SERPL IA-ACNC: >1000 MIU/ML
HBV SURFACE AG SER QL: NONREACTIVE
HCT VFR BLD CALC: 45.5 %
HCV AB SER QL: NONREACTIVE
HCV S/CO RATIO: 0.09 S/CO
HDLC SERPL-MCNC: 37 MG/DL
HEPATITIS A IGG ANTIBODY: REACTIVE
HGB BLD-MCNC: 14.9 G/DL
HIV1+2 AB SPEC QL IA.RAPID: NONREACTIVE
HSV 1+2 IGG SER IA-IMP: NEGATIVE
HSV 1+2 IGG SER IA-IMP: NEGATIVE
HSV1 IGG SER QL: 0.29 INDEX
HSV2 IGG SER QL: 0.1 INDEX
IMM GRANULOCYTES NFR BLD AUTO: 0.5 %
INR PPP: 1.03 RATIO
LDLC SERPL CALC-MCNC: 150 MG/DL
LYMPHOCYTES # BLD AUTO: 1.4 K/UL
LYMPHOCYTES NFR BLD AUTO: 24.1 %
M TB IFN-G BLD-IMP: NEGATIVE
MAGNESIUM SERPL-MCNC: 2.1 MG/DL
MAN DIFF?: NORMAL
MCHC RBC-ENTMCNC: 32.5 PG
MCHC RBC-ENTMCNC: 32.7 GM/DL
MCV RBC AUTO: 99.1 FL
MONOCYTES # BLD AUTO: 0.39 K/UL
MONOCYTES NFR BLD AUTO: 6.7 %
NEUTROPHILS # BLD AUTO: 3.86 K/UL
NEUTROPHILS NFR BLD AUTO: 66.6 %
NONHDLC SERPL-MCNC: 169 MG/DL
PHOSPHATE SERPL-MCNC: 3 MG/DL
PLATELET # BLD AUTO: 252 K/UL
POTASSIUM SERPL-SCNC: 3.7 MMOL/L
PROT SERPL-MCNC: 7.7 G/DL
PROT UR-MCNC: 16 MG/DL
PT BLD: 12.1 SEC
QUANTIFERON TB PLUS MITOGEN MINUS NIL: 1.24 IU/ML
QUANTIFERON TB PLUS NIL: 0.02 IU/ML
QUANTIFERON TB PLUS TB1 MINUS NIL: 0 IU/ML
QUANTIFERON TB PLUS TB2 MINUS NIL: 0 IU/ML
RBC # BLD: 4.59 M/UL
RBC # FLD: 13.2 %
RUBV IGG FLD-ACNC: 1.8 INDEX
RUBV IGG SER-IMP: POSITIVE
SARS-COV-2 IGG SERPL IA-ACNC: 0.07 INDEX
SARS-COV-2 IGG SERPL QL IA: NEGATIVE
SODIUM SERPL-SCNC: 141 MMOL/L
T GONDII AB SER-IMP: NEGATIVE
T GONDII IGG SER QL: <3 IU/ML
T PALLIDUM AB SER QL IA: NEGATIVE
TRIGL SERPL-MCNC: 96 MG/DL
VZV AB TITR SER: POSITIVE
VZV IGG SER IF-ACNC: >4000 INDEX
WBC # FLD AUTO: 5.8 K/UL

## 2021-01-15 LAB — EBV DNA SERPL NAA+PROBE-ACNC: NOT DETECTED IU/ML

## 2021-01-18 ENCOUNTER — NON-APPOINTMENT (OUTPATIENT)
Age: 31
End: 2021-01-18

## 2021-01-18 ENCOUNTER — APPOINTMENT (OUTPATIENT)
Dept: CARDIOLOGY | Facility: CLINIC | Age: 31
End: 2021-01-18
Payer: MEDICAID

## 2021-01-18 VITALS
BODY MASS INDEX: 25.58 KG/M2 | WEIGHT: 149 LBS | OXYGEN SATURATION: 98 % | SYSTOLIC BLOOD PRESSURE: 120 MMHG | HEART RATE: 87 BPM | TEMPERATURE: 97.7 F | DIASTOLIC BLOOD PRESSURE: 82 MMHG

## 2021-01-18 PROCEDURE — 99205 OFFICE O/P NEW HI 60 MIN: CPT

## 2021-01-18 PROCEDURE — 99072 ADDL SUPL MATRL&STAF TM PHE: CPT

## 2021-01-18 PROCEDURE — 93000 ELECTROCARDIOGRAM COMPLETE: CPT | Mod: NC

## 2021-01-18 NOTE — DISCUSSION/SUMMARY
[FreeTextEntry1] : 30 year-old gentleman with history as above presents for cardiac evaluation prior to possible liver transplant.\par Will check echocardiogram to evaluate for structural heart disease.\par Patient is low risk for ischemic heart disease given his age and absence of significant comorbid conditions, e.g. type I diabetes, however, will check exercise stress test to evaluate patient's exertional capacity and exclude stress induced arrhythmia prior to possible transplant surgery.

## 2021-01-18 NOTE — HISTORY OF PRESENT ILLNESS
[FreeTextEntry1] : Patient is a 30 year-old with known history of cerebral palsy, childhood seizures (last seizure at age 12), no known cardiovascular disease, presented with pruritus in January 2019, diagnosed with primary sclerosing cholangitis, now being evaluated for possible liver transplant.\par Patient does not exercise. He is a [employer] and [employer] fan.\par He is without chest pains, shortness of breath, swelling in the feet or legs, syncope.\par He does get occasional palpitations that are self-limited. \par \par PMD: Juve Bryson MD [phone]\par GI: Yasmany Howard MD [phone]

## 2021-01-18 NOTE — PHYSICAL EXAM
[Well Groomed] : well groomed [General Appearance - In No Acute Distress] : no acute distress [Conjunctiva] : the conjunctiva were normal in both eyes [PERRL] : pupils were equal in size, round, and reactive to light [EOM Intact] : extraocular movements were intact [Yellow Sclera (Icteric)] : no scleral icterus was seen [Normal Oral Mucosa] : normal oral mucosa [No Oral Pallor] : no oral pallor [No Oral Cyanosis] : no oral cyanosis [Normal Oropharynx] : normal oropharynx [Normal Jugular Venous A Waves Present] : normal jugular venous A waves present [Normal Jugular Venous V Waves Present] : normal jugular venous V waves present [No Jugular Venous Heredia A Waves] : no jugular venous heredia A waves [Normal] : normal [No Precordial Heave] : no precordial heave was noted [Normal Rate] : normal [Rhythm Regular] : regular [Normal S1] : normal S1 [Normal S2] : normal S2 [No Gallop] : no gallop heard [No Murmur] : no murmurs heard [Right Carotid Bruit] : no bruit heard over the right carotid [Left Carotid Bruit] : no bruit heard over the left carotid [2+] : left 2+ [No Pitting Edema] : no pitting edema present [] : no respiratory distress [Respiration, Rhythm And Depth] : normal respiratory rhythm and effort [Exaggerated Use Of Accessory Muscles For Inspiration] : no accessory muscle use [Auscultation Breath Sounds / Voice Sounds] : lungs were clear to auscultation bilaterally [Bowel Sounds] : normal bowel sounds [Abdomen Soft] : soft [Abdomen Tenderness] : non-tender [Abnormal Walk] : normal gait [Gait - Sufficient For Exercise Testing] : the gait was sufficient for exercise testing [Nail Clubbing] : no clubbing of the fingernails [Cyanosis, Localized] : no localized cyanosis [Skin Color & Pigmentation] : normal skin color and pigmentation [No Venous Stasis] : no venous stasis [No Xanthoma] : no  xanthoma was observed [Oriented To Time, Place, And Person] : oriented to person, place, and time [Impaired Insight] : insight and judgment were intact [Affect] : the affect was normal [Mood] : the mood was normal [No Anxiety] : not feeling anxious

## 2021-01-19 NOTE — DISCHARGE NOTE PROVIDER - NSDCCPGOAL_GEN_ALL_CORE_FT
To get better and follow your care plan as instructed. Strong peripheral pulses/Capillary refill less/equal to 2 seconds

## 2021-01-27 ENCOUNTER — APPOINTMENT (OUTPATIENT)
Dept: HEPATOLOGY | Facility: CLINIC | Age: 31
End: 2021-01-27
Payer: MEDICAID

## 2021-01-27 VITALS
DIASTOLIC BLOOD PRESSURE: 63 MMHG | TEMPERATURE: 97.9 F | RESPIRATION RATE: 16 BRPM | HEART RATE: 97 BPM | HEIGHT: 64 IN | SYSTOLIC BLOOD PRESSURE: 128 MMHG | WEIGHT: 149 LBS | BODY MASS INDEX: 25.44 KG/M2

## 2021-01-27 PROCEDURE — 99072 ADDL SUPL MATRL&STAF TM PHE: CPT

## 2021-01-27 PROCEDURE — 99214 OFFICE O/P EST MOD 30 MIN: CPT

## 2021-01-28 LAB
ALBUMIN SERPL ELPH-MCNC: 3.9 G/DL
ALP BLD-CCNC: 284 U/L
ALT SERPL-CCNC: 118 U/L
ANION GAP SERPL CALC-SCNC: 12 MMOL/L
AST SERPL-CCNC: 130 U/L
BASOPHILS # BLD AUTO: 0.04 K/UL
BASOPHILS NFR BLD AUTO: 0.7 %
BILIRUB SERPL-MCNC: 2.8 MG/DL
BUN SERPL-MCNC: 16 MG/DL
CALCIUM SERPL-MCNC: 9.3 MG/DL
CHLORIDE SERPL-SCNC: 103 MMOL/L
CO2 SERPL-SCNC: 23 MMOL/L
CREAT SERPL-MCNC: 0.95 MG/DL
EOSINOPHIL # BLD AUTO: 0.09 K/UL
EOSINOPHIL NFR BLD AUTO: 1.5 %
GLUCOSE SERPL-MCNC: 79 MG/DL
HCT VFR BLD CALC: 42.6 %
HGB BLD-MCNC: 14.5 G/DL
IMM GRANULOCYTES NFR BLD AUTO: 0.3 %
INR PPP: 1.04 RATIO
LYMPHOCYTES # BLD AUTO: 1.12 K/UL
LYMPHOCYTES NFR BLD AUTO: 19.2 %
MAN DIFF?: NORMAL
MCHC RBC-ENTMCNC: 33.9 PG
MCHC RBC-ENTMCNC: 34 GM/DL
MCV RBC AUTO: 99.5 FL
MONOCYTES # BLD AUTO: 0.38 K/UL
MONOCYTES NFR BLD AUTO: 6.5 %
NEUTROPHILS # BLD AUTO: 4.17 K/UL
NEUTROPHILS NFR BLD AUTO: 71.8 %
PLATELET # BLD AUTO: 241 K/UL
POTASSIUM SERPL-SCNC: 4 MMOL/L
PROT SERPL-MCNC: 7.5 G/DL
PT BLD: 12.3 SEC
RBC # BLD: 4.28 M/UL
RBC # FLD: 14.1 %
SODIUM SERPL-SCNC: 138 MMOL/L
WBC # FLD AUTO: 5.82 K/UL

## 2021-02-03 ENCOUNTER — APPOINTMENT (OUTPATIENT)
Dept: CARDIOLOGY | Facility: CLINIC | Age: 31
End: 2021-02-03
Payer: MEDICAID

## 2021-02-03 PROCEDURE — 99072 ADDL SUPL MATRL&STAF TM PHE: CPT

## 2021-02-03 PROCEDURE — 93306 TTE W/DOPPLER COMPLETE: CPT

## 2021-02-03 PROCEDURE — 93015 CV STRESS TEST SUPVJ I&R: CPT

## 2021-02-18 ENCOUNTER — APPOINTMENT (OUTPATIENT)
Dept: TRANSPLANT | Facility: CLINIC | Age: 31
End: 2021-02-18
Payer: MEDICAID

## 2021-02-18 VITALS
OXYGEN SATURATION: 98 % | HEART RATE: 120 BPM | RESPIRATION RATE: 16 BRPM | BODY MASS INDEX: 24.92 KG/M2 | WEIGHT: 146 LBS | HEIGHT: 64 IN | SYSTOLIC BLOOD PRESSURE: 138 MMHG | TEMPERATURE: 98.2 F | DIASTOLIC BLOOD PRESSURE: 80 MMHG

## 2021-02-18 PROCEDURE — 99072 ADDL SUPL MATRL&STAF TM PHE: CPT

## 2021-02-18 PROCEDURE — 99214 OFFICE O/P EST MOD 30 MIN: CPT

## 2021-02-18 NOTE — PLAN
[FreeTextEntry1] : 30 y.o with cerebral palsy and autoimmune cholangitis. For new ERCP next week. Plan to list with exception.\par  EUS tomorrow of  pancreatic lesion benign. On azathioprine with numerous stents.

## 2021-02-18 NOTE — HISTORY OF PRESENT ILLNESS
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 Biggs is a 31 y/o M  here for liver transplant evaluation follow up. \par \par Interim Hx 2/17/2020: He has completed liver transplant testing.  The echocardiogram showed normal LV systolic function, LVEF 50-55%, normal LA size, and normal pulmonary pressures. Stress (5 METS), achieved 92% MPHR, and there was no evidence of arrhythmia or ischemia at peak exertion. Last stent exchange 12/3/2020. no recent episodes of cholangitis. He underwent an EUS for a mass  identified in the pancreatic tail. Appearance characteristic is benign. Suspect an inflammatory pseudomonas. FNA was not performed due to benign appearance He is blodd type AB, low MELD. \par \par \par PMHx includes cerebral palsy, nephrolithiasis and autoimmune cholangitis, seizures as a child. He was referred by Dr. Yasmany Howard for liver transplant evaluation. Pt has know liver disease since January 2019 when he presented with pruritus, N/V and abdominal pain while living in [place]. He was noted to have liver enzymes elevation and his [place] back [place] for healthcare.\par Then subsequently he presented to [facility] and underwent evaluation. His bilirubin started to rise further and then transferred to [facility] in February 2019. He underwent ERCP which showed the distal bile duct stricture and questionable right intrahepatic stricture. Stent was placed in the distal bile duct. However his bilirubin did not improve initially. He underwent MRI with severe right intrahepatic stricture.\par He's undergone serial stent exchanges last one Dec. 3, 2020. His TB peaked 22. He's had multiple cholangitis episodes. Mother recalls in August 2019 he became septic presenting febrile and hypotensive. Additionally, he had a liver biopsy (6/7/2019) that showed a cholestatic pattern of injury with ductular reaction. His most recent blood work ( 11/18/20) revealed MELD 7:  WBC 6.72, H/H 13.4, 41.6, , Na 140, \par TB 1.2, AST 94 , , , Cr 0.87, ALB 4.0 \par Ca 19-9 level, IgG 4 level and bile duct brushings have been unremarkable. \par ARMANDO is negative, Mitochondrial antibody is negative, Viral hepatitis serologies are negative. \par  MRCP 11/3/20- Stable appearance of the biliary tree since 7/3/19.  \par 1.6 cm pancreatic tail lesion, in retrospect probable stable from 1/29/20 CT and may represent inflammatory pseudomonas in the setting of autoimmune pancreatitis. F/ u with endoscopic us may  be considered which he is scheduled for on 1/13/21. \par He is currently on 50 mg daily of azathioprine, no prednisone. c/o diarrhea episodes, last colo many years ago. 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 \par lives with both parents. Family relocated to [place] and has now moved back to , living in [place], Mother is very supportive. \par \par Latest Labs: 1/27/21 MELD 10 -TB 2.8, [id number],, , Na 138, Cr 0.95, INR 1.04 \par WBC 5.82 H/H 14.5/42.6,

## 2021-02-18 NOTE — REASON FOR VISIT
[Initial] : an initial visit for [Liver Transplant Evaluation] : liver transplant evaluation [Parent] : parent [FreeTextEntry2] : Dr. Yasmany Howard

## 2021-02-18 NOTE — PHYSICAL EXAM
[Alert] : alert [No Acute Distress] : no acute distress [Clear to Auscultation] : lungs were clear to auscultation bilaterally [Normal Rate] : normal rate [Soft] : soft [] : right femoral palpable [Scleral Icterus] : no scleral icterus [Hepatojugular Reflux] : no hepatojugular reflux [Abdominal Bruit] : no abdominal bruit [Ascites Fluid Wave] : no ascites fluid wave [Splenomegaly] : no splenomegaly [Ascites Tense] : no ascites tense [Caput Medusae] : no caput medusae [Depression] : no depression [de-identified] : improved [de-identified] : non tender not distended [de-identified] :  Less Scratch winifred on back  [de-identified] : Better [FreeTextEntry1] : 30 year old with ? PSC multiple stents and sepsis with cholangitis. Consented for OLTX with mother in attendance. Very high functioning and delightful gentleman s/p CP, with left sided contractures and release. Discussed with Dr. Tipton and Mother in detail. Completed workup, new ERCP next week, Plan to present and obtain exception. Labs today, check covid.

## 2021-02-21 ENCOUNTER — APPOINTMENT (OUTPATIENT)
Dept: DISASTER EMERGENCY | Facility: CLINIC | Age: 31
End: 2021-02-21

## 2021-02-22 LAB
ALBUMIN SERPL ELPH-MCNC: 3.8 G/DL
ALP BLD-CCNC: 239 U/L
ALT SERPL-CCNC: 35 U/L
ANION GAP SERPL CALC-SCNC: 10 MMOL/L
AST SERPL-CCNC: 66 U/L
BASOPHILS # BLD AUTO: 0.06 K/UL
BASOPHILS NFR BLD AUTO: 1.1 %
BILIRUB SERPL-MCNC: 4.9 MG/DL
BUN SERPL-MCNC: 15 MG/DL
CALCIUM SERPL-MCNC: 9.6 MG/DL
CANCER AG19-9 SERPL-ACNC: 18 U/ML
CEA SERPL-MCNC: 2.4 NG/ML
CHLORIDE SERPL-SCNC: 104 MMOL/L
CO2 SERPL-SCNC: 26 MMOL/L
CREAT SERPL-MCNC: 1.01 MG/DL
EOSINOPHIL # BLD AUTO: 0.08 K/UL
EOSINOPHIL NFR BLD AUTO: 1.5 %
GLUCOSE SERPL-MCNC: 116 MG/DL
HCT VFR BLD CALC: 39.6 %
HGB BLD-MCNC: 14 G/DL
IMM GRANULOCYTES NFR BLD AUTO: 0.4 %
INR PPP: 1.04 RATIO
LYMPHOCYTES # BLD AUTO: 1.04 K/UL
LYMPHOCYTES NFR BLD AUTO: 19.3 %
MAN DIFF?: NORMAL
MCHC RBC-ENTMCNC: 34.7 PG
MCHC RBC-ENTMCNC: 35.4 GM/DL
MCV RBC AUTO: 98.3 FL
MONOCYTES # BLD AUTO: 0.39 K/UL
MONOCYTES NFR BLD AUTO: 7.2 %
NEUTROPHILS # BLD AUTO: 3.81 K/UL
NEUTROPHILS NFR BLD AUTO: 70.5 %
PLATELET # BLD AUTO: 238 K/UL
POTASSIUM SERPL-SCNC: 3.8 MMOL/L
PROT SERPL-MCNC: 7.6 G/DL
PT BLD: 12.2 SEC
RBC # BLD: 4.03 M/UL
RBC # FLD: 14.2 %
SARS-COV-2 IGG SERPL IA-ACNC: 0.07 INDEX
SARS-COV-2 IGG SERPL QL IA: NEGATIVE
SARS-COV-2 N GENE NPH QL NAA+PROBE: NOT DETECTED
SODIUM SERPL-SCNC: 140 MMOL/L
WBC # FLD AUTO: 5.4 K/UL

## 2021-02-23 ENCOUNTER — APPOINTMENT (OUTPATIENT)
Dept: DISASTER EMERGENCY | Facility: CLINIC | Age: 31
End: 2021-02-23

## 2021-02-23 LAB — SARS-COV-2 N GENE NPH QL NAA+PROBE: NOT DETECTED

## 2021-02-24 ENCOUNTER — TRANSCRIPTION ENCOUNTER (OUTPATIENT)
Age: 31
End: 2021-02-24

## 2021-02-25 ENCOUNTER — APPOINTMENT (OUTPATIENT)
Dept: GASTROENTEROLOGY | Facility: HOSPITAL | Age: 31
End: 2021-02-25

## 2021-02-25 ENCOUNTER — RESULT REVIEW (OUTPATIENT)
Age: 31
End: 2021-02-25

## 2021-02-25 ENCOUNTER — APPOINTMENT (OUTPATIENT)
Dept: CARDIOLOGY | Facility: CLINIC | Age: 31
End: 2021-02-25

## 2021-02-25 ENCOUNTER — OUTPATIENT (OUTPATIENT)
Dept: OUTPATIENT SERVICES | Facility: HOSPITAL | Age: 31
LOS: 1 days | End: 2021-02-25
Payer: MEDICAID

## 2021-02-25 DIAGNOSIS — Z98.890 OTHER SPECIFIED POSTPROCEDURAL STATES: Chronic | ICD-10-CM

## 2021-02-25 DIAGNOSIS — R94.5 ABNORMAL RESULTS OF LIVER FUNCTION STUDIES: ICD-10-CM

## 2021-02-25 DIAGNOSIS — K83.1 OBSTRUCTION OF BILE DUCT: ICD-10-CM

## 2021-02-25 PROCEDURE — 43273 ENDOSCOPIC PANCREATOSCOPY: CPT

## 2021-02-25 PROCEDURE — 43276 ERCP STENT EXCHANGE W/DILATE: CPT

## 2021-02-25 PROCEDURE — 43276 ERCP STENT EXCHANGE W/DILATE: CPT | Mod: 59

## 2021-02-25 PROCEDURE — 88300 SURGICAL PATH GROSS: CPT

## 2021-02-25 PROCEDURE — C1773: CPT

## 2021-02-25 PROCEDURE — 88300 SURGICAL PATH GROSS: CPT | Mod: 26

## 2021-02-25 PROCEDURE — 74330 X-RAY BILE/PANC ENDOSCOPY: CPT

## 2021-02-25 PROCEDURE — C2617: CPT

## 2021-02-25 PROCEDURE — C1769: CPT

## 2021-03-01 LAB — SURGICAL PATHOLOGY STUDY: SIGNIFICANT CHANGE UP

## 2021-03-02 NOTE — PATIENT PROFILE ADULT - NSPRESCRALCFREQ_GEN_A_NUR
Never Helical Rim Advancement Flap Text: The defect edges were debeveled with a #15 blade scalpel.  Given the location of the defect and the proximity to free margins (helical rim) a double helical rim advancement flap was deemed most appropriate.  Using a sterile surgical marker, the appropriate advancement flaps were drawn incorporating the defect and placing the expected incisions between the helical rim and antihelix where possible.  The area thus outlined was incised through and through with a #15 scalpel blade.  With a skin hook and iris scissors, the flaps were gently and sharply undermined and freed up.

## 2021-03-17 LAB
ALBUMIN SERPL ELPH-MCNC: 3.4 G/DL
ALP BLD-CCNC: 257 U/L
ALT SERPL-CCNC: 31 U/L
ANA SER IF-ACNC: NEGATIVE
ANION GAP SERPL CALC-SCNC: 8 MMOL/L
AST SERPL-CCNC: 61 U/L
BASOPHILS # BLD AUTO: 0.05 K/UL
BASOPHILS NFR BLD AUTO: 0.8 %
BILIRUB SERPL-MCNC: 4.6 MG/DL
BUN SERPL-MCNC: 12 MG/DL
CALCIUM SERPL-MCNC: 9.3 MG/DL
CHLORIDE SERPL-SCNC: 104 MMOL/L
CO2 SERPL-SCNC: 25 MMOL/L
CREAT SERPL-MCNC: 0.8 MG/DL
EOSINOPHIL # BLD AUTO: 0.18 K/UL
EOSINOPHIL NFR BLD AUTO: 2.9 %
GGT SERPL-CCNC: 44 U/L
GLUCOSE SERPL-MCNC: 96 MG/DL
HCT VFR BLD CALC: 37.8 %
HGB BLD-MCNC: 12.8 G/DL
IMM GRANULOCYTES NFR BLD AUTO: 0.3 %
INR PPP: 1.03 RATIO
LYMPHOCYTES # BLD AUTO: 1.43 K/UL
LYMPHOCYTES NFR BLD AUTO: 23.1 %
MAN DIFF?: NORMAL
MCHC RBC-ENTMCNC: 33.9 GM/DL
MCHC RBC-ENTMCNC: 35 PG
MCV RBC AUTO: 103.3 FL
MONOCYTES # BLD AUTO: 0.49 K/UL
MONOCYTES NFR BLD AUTO: 7.9 %
NEUTROPHILS # BLD AUTO: 4.03 K/UL
NEUTROPHILS NFR BLD AUTO: 65 %
PLATELET # BLD AUTO: 323 K/UL
POTASSIUM SERPL-SCNC: 4.3 MMOL/L
PROT SERPL-MCNC: 6.9 G/DL
PT BLD: 12.1 SEC
RBC # BLD: 3.66 M/UL
RBC # FLD: 14.7 %
SODIUM SERPL-SCNC: 138 MMOL/L
WBC # FLD AUTO: 6.2 K/UL

## 2021-03-19 ENCOUNTER — NON-APPOINTMENT (OUTPATIENT)
Age: 31
End: 2021-03-19

## 2021-03-29 ENCOUNTER — NON-APPOINTMENT (OUTPATIENT)
Age: 31
End: 2021-03-29

## 2021-03-29 LAB
ALBUMIN SERPL ELPH-MCNC: 3.6 G/DL
ALP BLD-CCNC: 241 U/L
ALT SERPL-CCNC: 31 U/L
ANION GAP SERPL CALC-SCNC: 13 MMOL/L
AST SERPL-CCNC: 75 U/L
BASOPHILS # BLD AUTO: 0.05 K/UL
BASOPHILS NFR BLD AUTO: 1 %
BILIRUB SERPL-MCNC: 3.9 MG/DL
BUN SERPL-MCNC: 13 MG/DL
CALCIUM SERPL-MCNC: 9.4 MG/DL
CHLORIDE SERPL-SCNC: 103 MMOL/L
CO2 SERPL-SCNC: 26 MMOL/L
CREAT SERPL-MCNC: 0.83 MG/DL
EOSINOPHIL # BLD AUTO: 0.15 K/UL
EOSINOPHIL NFR BLD AUTO: 3 %
GLUCOSE SERPL-MCNC: 133 MG/DL
HCT VFR BLD CALC: 40.4 %
HGB BLD-MCNC: 13.1 G/DL
IMM GRANULOCYTES NFR BLD AUTO: 0.4 %
INR PPP: 1 RATIO
LYMPHOCYTES # BLD AUTO: 1.28 K/UL
LYMPHOCYTES NFR BLD AUTO: 25.9 %
MAN DIFF?: NORMAL
MCHC RBC-ENTMCNC: 32.4 GM/DL
MCHC RBC-ENTMCNC: 34.6 PG
MCV RBC AUTO: 106.6 FL
MONOCYTES # BLD AUTO: 0.3 K/UL
MONOCYTES NFR BLD AUTO: 6.1 %
NEUTROPHILS # BLD AUTO: 3.14 K/UL
NEUTROPHILS NFR BLD AUTO: 63.6 %
PLATELET # BLD AUTO: 277 K/UL
POTASSIUM SERPL-SCNC: 4.3 MMOL/L
PROT SERPL-MCNC: 7.1 G/DL
PT BLD: 11.8 SEC
RBC # BLD: 3.79 M/UL
RBC # FLD: 15 %
SODIUM SERPL-SCNC: 142 MMOL/L
WBC # FLD AUTO: 4.94 K/UL

## 2021-03-31 ENCOUNTER — NON-APPOINTMENT (OUTPATIENT)
Age: 31
End: 2021-03-31

## 2021-04-02 ENCOUNTER — NON-APPOINTMENT (OUTPATIENT)
Age: 31
End: 2021-04-02

## 2021-04-06 ENCOUNTER — APPOINTMENT (OUTPATIENT)
Dept: HEPATOLOGY | Facility: CLINIC | Age: 31
End: 2021-04-06
Payer: MEDICAID

## 2021-04-06 VITALS
SYSTOLIC BLOOD PRESSURE: 121 MMHG | TEMPERATURE: 98 F | DIASTOLIC BLOOD PRESSURE: 78 MMHG | BODY MASS INDEX: 23.22 KG/M2 | HEART RATE: 96 BPM | WEIGHT: 136 LBS | RESPIRATION RATE: 16 BRPM | HEIGHT: 64 IN | OXYGEN SATURATION: 100 %

## 2021-04-06 PROCEDURE — 99072 ADDL SUPL MATRL&STAF TM PHE: CPT

## 2021-04-06 PROCEDURE — 99214 OFFICE O/P EST MOD 30 MIN: CPT

## 2021-04-15 ENCOUNTER — APPOINTMENT (OUTPATIENT)
Dept: TRANSPLANT | Facility: CLINIC | Age: 31
End: 2021-04-15
Payer: MEDICAID

## 2021-04-15 VITALS
WEIGHT: 136 LBS | RESPIRATION RATE: 16 BRPM | TEMPERATURE: 97.9 F | HEIGHT: 64 IN | DIASTOLIC BLOOD PRESSURE: 75 MMHG | HEART RATE: 108 BPM | SYSTOLIC BLOOD PRESSURE: 123 MMHG | BODY MASS INDEX: 23.22 KG/M2 | OXYGEN SATURATION: 100 %

## 2021-04-15 PROCEDURE — 99072 ADDL SUPL MATRL&STAF TM PHE: CPT

## 2021-04-15 PROCEDURE — 99214 OFFICE O/P EST MOD 30 MIN: CPT

## 2021-04-15 NOTE — PHYSICAL EXAM
[Alert] : alert [No Acute Distress] : no acute distress [Clear to Auscultation] : lungs were clear to auscultation bilaterally [Normal Rate] : normal rate [Soft] : soft [] : right femoral palpable [Scleral Icterus] : scleral icterus [Non-Tender] : Liver Edge: non-tender [Hepatojugular Reflux] : no hepatojugular reflux [Abdominal Bruit] : no abdominal bruit [Ascites Fluid Wave] : no ascites fluid wave [Splenomegaly] : no splenomegaly [Ascites Tense] : no ascites tense [Caput Medusae] : no caput medusae [Depression] : no depression [de-identified] : improved [de-identified] : non tender, mild distention  [TextBox_40] : 2+ [de-identified] :  Less Scratch winifred on back  [de-identified] : Better [FreeTextEntry1] : 30-year-old male with high functioning cerebral palsy and known history of PSC with cholangitis in the past. Multiple stent exchanges for biliary strictures, most recent stent exchange was performed on February 2021. No interval episodes of cholangitis although having significant pruritus that is bothering him from sleep. Currently listed for liver transplant. PSC exception pending. \par \par Labs from 3/26/21 - MELD 12 reviewed\par TB: 3.9, AlkP: 241, AST: 75, ALT: 31, Na: 142, Cr: 0.83, K: 4.3, INR: 1.0 \par WBC: 4.9, Plt: 277\par \par PLAN:C/o nose bleeds start humidifier for plt 277 and inr 1. tb 3.9 looks less today\par -Need exception pending stent in place, start jona. Repeat labs today 2nd vaccine of [employer] [place] last week. Mild side effect, check antibody.\par -RTC 3 mos.

## 2021-04-15 NOTE — REASON FOR VISIT
[Follow-Up] : a follow-up visit for [Parent] : parent [Other: _____] : [unfilled] [FreeTextEntry1] : Listed on waitlist, pending PSC exception  [FreeTextEntry2] : Dr. Yasmany Howard

## 2021-04-15 NOTE — HISTORY OF PRESENT ILLNESS
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 Biggs is a 31 y/o M  here for liver transplant evaluation follow up. Currently listed on liver transplant waitlist. \par \par Interval Events (4/14/21): \par Seen 2 months ago. Most recent stent exchange was performed on February 2021. No interval episodes of cholangitis and his itching is much better today. He also has mild persistent jaundice. Patient recently switched from hydroxyzine to rifampin 150mg twice a day by Dr. Tipton. On azathioprine 50mg daily. \par \par Interim Hx 2/17/2020: He has completed liver transplant testing.  The echocardiogram showed normal LV systolic function, LVEF 50-55%, normal LA size, and normal pulmonary pressures. Stress (5 METS), achieved 92% MPHR, and there was no evidence of arrhythmia or ischemia at peak exertion. Last stent exchange 12/3/2020. no recent episodes of cholangitis. He underwent an EUS for a mass  identified in the pancreatic tail. Appearance characteristic is benign. Suspect an inflammatory pseudomonas. FNA was not performed due to benign appearance He is blodd type AB, low MELD. \par \par PMHx includes cerebral palsy, nephrolithiasis and autoimmune cholangitis, seizures as a child. He was referred by Dr. Yasmany Howard for liver transplant evaluation. Pt has know liver disease since January 2019 when he presented with pruritus, N/V and abdominal pain while living in [place]. He was noted to have liver enzymes elevation and his [place] for healthcare.\par Then subsequently he presented to [facility] and underwent evaluation. His bilirubin started to rise further and then transferred to [facility] in February 2019. He underwent ERCP which showed the distal bile duct stricture and questionable right intrahepatic stricture. Stent was placed in the distal bile duct. However his bilirubin did not improve initially. He underwent MRI with severe right intrahepatic stricture.\par He's undergone serial stent exchanges last one Dec. 3, 2020. His TB peaked 22. He's had multiple cholangitis episodes. Mother recalls in August 2019 he became septic presenting febrile and hypotensive. Additionally, he had a liver biopsy (6/7/2019) that showed a cholestatic pattern of injury with ductular reaction.\par \par Most recent labs from 3/26/21 - MELD 12\par TB: 3.9, AlkP: 241, AST: 75, ALT: 31, Na: 142, Cr: 0.83, K: 4.3, INR: 1.0 \par WBC: 4.9, Plt: 277\par \par Ca 19-9 level, IgG 4 level and bile duct brushings have been unremarkable. \par ARMANDO is negative, Mitochondrial antibody is negative, Viral hepatitis serologies are negative. \par \par -MRCP 11/3/20- Stable appearance of the biliary tree since 7/3/19.  \par 1.6 cm pancreatic tail lesion, in retrospect probable stable from 1/29/20 CT and may represent inflammatory pseudomonas in the setting of autoimmune pancreatitis. F/ u with endoscopic us may  be considered which he is scheduled for on 1/13/21. \par He is currently on 50 mg daily of azathioprine, no prednisone. c/o diarrhea episodes, last colo many years ago. \par \par Surgical Hx: Bilateral knee release for contractures \par \par Social Hx: Finished High school, and completed tech school for web design. \par lives with both parents. Family relocated to [place] and has now moved back to , living in [place], Mother is very supportive.

## 2021-04-16 LAB
AFP-TM SERPL-MCNC: <1.8 NG/ML
ALBUMIN SERPL ELPH-MCNC: 3.1 G/DL
ALP BLD-CCNC: 236 U/L
ALT SERPL-CCNC: 29 U/L
ANION GAP SERPL CALC-SCNC: 12 MMOL/L
AST SERPL-CCNC: 57 U/L
BASOPHILS # BLD AUTO: 0.03 K/UL
BASOPHILS NFR BLD AUTO: 0.5 %
BILIRUB SERPL-MCNC: 2.2 MG/DL
BUN SERPL-MCNC: 16 MG/DL
CALCIUM SERPL-MCNC: 8.5 MG/DL
CANCER AG19-9 SERPL-ACNC: 33 U/ML
CHLORIDE SERPL-SCNC: 103 MMOL/L
CO2 SERPL-SCNC: 24 MMOL/L
COVID-19 SPIKE DOMAIN ANTIBODY INTERPRETATION: POSITIVE
CREAT SERPL-MCNC: 0.81 MG/DL
EOSINOPHIL # BLD AUTO: 0.13 K/UL
EOSINOPHIL NFR BLD AUTO: 2 %
GLUCOSE SERPL-MCNC: 107 MG/DL
HCT VFR BLD CALC: 34.1 %
HGB BLD-MCNC: 11.8 G/DL
IMM GRANULOCYTES NFR BLD AUTO: 0.8 %
INR PPP: 1.16 RATIO
LYMPHOCYTES # BLD AUTO: 0.96 K/UL
LYMPHOCYTES NFR BLD AUTO: 15.1 %
MAN DIFF?: NORMAL
MCHC RBC-ENTMCNC: 34.6 GM/DL
MCHC RBC-ENTMCNC: 35 PG
MCV RBC AUTO: 101.2 FL
MONOCYTES # BLD AUTO: 0.56 K/UL
MONOCYTES NFR BLD AUTO: 8.8 %
NEUTROPHILS # BLD AUTO: 4.62 K/UL
NEUTROPHILS NFR BLD AUTO: 72.8 %
PLATELET # BLD AUTO: 219 K/UL
POTASSIUM SERPL-SCNC: 3.6 MMOL/L
PROT SERPL-MCNC: 6.3 G/DL
PT BLD: 13.7 SEC
RBC # BLD: 3.37 M/UL
RBC # FLD: 13.7 %
SARS-COV-2 AB SERPL IA-ACNC: >250 U/ML
SODIUM SERPL-SCNC: 138 MMOL/L
WBC # FLD AUTO: 6.35 K/UL

## 2021-04-19 ENCOUNTER — LABORATORY RESULT (OUTPATIENT)
Age: 31
End: 2021-04-19

## 2021-04-20 ENCOUNTER — INPATIENT (INPATIENT)
Facility: HOSPITAL | Age: 31
LOS: 0 days | Discharge: HOME | DRG: 872 | End: 2021-04-21
Attending: HOSPITALIST | Admitting: HOSPITALIST
Payer: MEDICAID

## 2021-04-20 VITALS
SYSTOLIC BLOOD PRESSURE: 119 MMHG | WEIGHT: 136.03 LBS | HEART RATE: 137 BPM | RESPIRATION RATE: 18 BRPM | TEMPERATURE: 101 F | OXYGEN SATURATION: 99 % | HEIGHT: 62 IN | DIASTOLIC BLOOD PRESSURE: 86 MMHG

## 2021-04-20 DIAGNOSIS — Z98.890 OTHER SPECIFIED POSTPROCEDURAL STATES: Chronic | ICD-10-CM

## 2021-04-20 LAB
ALBUMIN SERPL ELPH-MCNC: 3.1 G/DL — LOW (ref 3.3–5)
ALP SERPL-CCNC: 248 U/L — HIGH (ref 40–120)
ALT FLD-CCNC: 20 U/L — SIGNIFICANT CHANGE UP (ref 10–45)
ANION GAP SERPL CALC-SCNC: 15 MMOL/L — SIGNIFICANT CHANGE UP (ref 5–17)
APPEARANCE UR: CLEAR — SIGNIFICANT CHANGE UP
APTT BLD: 36.6 SEC — HIGH (ref 27.5–35.5)
AST SERPL-CCNC: 28 U/L — SIGNIFICANT CHANGE UP (ref 10–40)
BASOPHILS # BLD AUTO: 0.04 K/UL — SIGNIFICANT CHANGE UP (ref 0–0.2)
BASOPHILS NFR BLD AUTO: 0.3 % — SIGNIFICANT CHANGE UP (ref 0–2)
BILIRUB DIRECT SERPL-MCNC: 0.9 MG/DL — HIGH (ref 0–0.2)
BILIRUB INDIRECT FLD-MCNC: 0.8 MG/DL — SIGNIFICANT CHANGE UP (ref 0.2–1)
BILIRUB SERPL-MCNC: 1.7 MG/DL — HIGH (ref 0.2–1.2)
BILIRUB SERPL-MCNC: 1.7 MG/DL — HIGH (ref 0.2–1.2)
BILIRUB UR-MCNC: NEGATIVE — SIGNIFICANT CHANGE UP
BUN SERPL-MCNC: 11 MG/DL — SIGNIFICANT CHANGE UP (ref 7–23)
CALCIUM SERPL-MCNC: 8.6 MG/DL — SIGNIFICANT CHANGE UP (ref 8.4–10.5)
CHLORIDE SERPL-SCNC: 103 MMOL/L — SIGNIFICANT CHANGE UP (ref 96–108)
CO2 SERPL-SCNC: 20 MMOL/L — LOW (ref 22–31)
COLOR SPEC: YELLOW — SIGNIFICANT CHANGE UP
CREAT SERPL-MCNC: 0.78 MG/DL — SIGNIFICANT CHANGE UP (ref 0.5–1.3)
DIFF PNL FLD: NEGATIVE — SIGNIFICANT CHANGE UP
EOSINOPHIL # BLD AUTO: 0.02 K/UL — SIGNIFICANT CHANGE UP (ref 0–0.5)
EOSINOPHIL NFR BLD AUTO: 0.2 % — SIGNIFICANT CHANGE UP (ref 0–6)
GAS PNL BLDV: SIGNIFICANT CHANGE UP
GLUCOSE SERPL-MCNC: 109 MG/DL — HIGH (ref 70–99)
GLUCOSE UR QL: NEGATIVE — SIGNIFICANT CHANGE UP
HCT VFR BLD CALC: 31.2 % — LOW (ref 39–50)
HGB BLD-MCNC: 10.4 G/DL — LOW (ref 13–17)
IMM GRANULOCYTES NFR BLD AUTO: 0.7 % — SIGNIFICANT CHANGE UP (ref 0–1.5)
INR BLD: 1.37 RATIO — HIGH (ref 0.88–1.16)
KETONES UR-MCNC: NEGATIVE — SIGNIFICANT CHANGE UP
LEUKOCYTE ESTERASE UR-ACNC: NEGATIVE — SIGNIFICANT CHANGE UP
LYMPHOCYTES # BLD AUTO: 1.05 K/UL — SIGNIFICANT CHANGE UP (ref 1–3.3)
LYMPHOCYTES # BLD AUTO: 8.6 % — LOW (ref 13–44)
MAGNESIUM SERPL-MCNC: 2.1 MG/DL — SIGNIFICANT CHANGE UP (ref 1.6–2.6)
MCHC RBC-ENTMCNC: 33.3 GM/DL — SIGNIFICANT CHANGE UP (ref 32–36)
MCHC RBC-ENTMCNC: 34 PG — SIGNIFICANT CHANGE UP (ref 27–34)
MCV RBC AUTO: 102 FL — HIGH (ref 80–100)
MONOCYTES # BLD AUTO: 0.78 K/UL — SIGNIFICANT CHANGE UP (ref 0–0.9)
MONOCYTES NFR BLD AUTO: 6.4 % — SIGNIFICANT CHANGE UP (ref 2–14)
NEUTROPHILS # BLD AUTO: 10.29 K/UL — HIGH (ref 1.8–7.4)
NEUTROPHILS NFR BLD AUTO: 83.8 % — HIGH (ref 43–77)
NITRITE UR-MCNC: NEGATIVE — SIGNIFICANT CHANGE UP
NRBC # BLD: 0 /100 WBCS — SIGNIFICANT CHANGE UP (ref 0–0)
PH UR: 6.5 — SIGNIFICANT CHANGE UP (ref 5–8)
PHOSPHATE SERPL-MCNC: 2.7 MG/DL — SIGNIFICANT CHANGE UP (ref 2.5–4.5)
PLATELET # BLD AUTO: 457 K/UL — HIGH (ref 150–400)
POTASSIUM SERPL-MCNC: 3.7 MMOL/L — SIGNIFICANT CHANGE UP (ref 3.5–5.3)
POTASSIUM SERPL-SCNC: 3.7 MMOL/L — SIGNIFICANT CHANGE UP (ref 3.5–5.3)
PROT SERPL-MCNC: 7.9 G/DL — SIGNIFICANT CHANGE UP (ref 6–8.3)
PROT UR-MCNC: SIGNIFICANT CHANGE UP
PROTHROM AB SERPL-ACNC: 16.2 SEC — HIGH (ref 10.6–13.6)
RBC # BLD: 3.06 M/UL — LOW (ref 4.2–5.8)
RBC # FLD: 13.3 % — SIGNIFICANT CHANGE UP (ref 10.3–14.5)
SARS-COV-2 RNA SPEC QL NAA+PROBE: SIGNIFICANT CHANGE UP
SODIUM SERPL-SCNC: 138 MMOL/L — SIGNIFICANT CHANGE UP (ref 135–145)
SP GR SPEC: 1.02 — SIGNIFICANT CHANGE UP (ref 1.01–1.02)
UROBILINOGEN FLD QL: NEGATIVE — SIGNIFICANT CHANGE UP
WBC # BLD: 12.27 K/UL — HIGH (ref 3.8–10.5)
WBC # FLD AUTO: 12.27 K/UL — HIGH (ref 3.8–10.5)

## 2021-04-20 PROCEDURE — 93010 ELECTROCARDIOGRAM REPORT: CPT

## 2021-04-20 PROCEDURE — 71045 X-RAY EXAM CHEST 1 VIEW: CPT | Mod: 26

## 2021-04-20 PROCEDURE — 99285 EMERGENCY DEPT VISIT HI MDM: CPT

## 2021-04-20 PROCEDURE — 74177 CT ABD & PELVIS W/CONTRAST: CPT | Mod: 26,MA

## 2021-04-20 RX ORDER — SODIUM CHLORIDE 9 MG/ML
1900 INJECTION, SOLUTION INTRAVENOUS ONCE
Refills: 0 | Status: DISCONTINUED | OUTPATIENT
Start: 2021-04-20 | End: 2021-04-20

## 2021-04-20 RX ORDER — SODIUM CHLORIDE 9 MG/ML
1900 INJECTION INTRAMUSCULAR; INTRAVENOUS; SUBCUTANEOUS ONCE
Refills: 0 | Status: COMPLETED | OUTPATIENT
Start: 2021-04-20 | End: 2021-04-20

## 2021-04-20 RX ORDER — ACETAMINOPHEN 500 MG
650 TABLET ORAL ONCE
Refills: 0 | Status: DISCONTINUED | OUTPATIENT
Start: 2021-04-20 | End: 2021-04-20

## 2021-04-20 RX ORDER — ACETAMINOPHEN 500 MG
650 TABLET ORAL ONCE
Refills: 0 | Status: COMPLETED | OUTPATIENT
Start: 2021-04-20 | End: 2021-04-20

## 2021-04-20 RX ORDER — PIPERACILLIN AND TAZOBACTAM 4; .5 G/20ML; G/20ML
3.38 INJECTION, POWDER, LYOPHILIZED, FOR SOLUTION INTRAVENOUS ONCE
Refills: 0 | Status: COMPLETED | OUTPATIENT
Start: 2021-04-20 | End: 2021-04-20

## 2021-04-20 RX ADMIN — Medication 650 MILLIGRAM(S): at 22:46

## 2021-04-20 RX ADMIN — SODIUM CHLORIDE 1900 MILLILITER(S): 9 INJECTION INTRAMUSCULAR; INTRAVENOUS; SUBCUTANEOUS at 18:17

## 2021-04-20 RX ADMIN — PIPERACILLIN AND TAZOBACTAM 3.38 GRAM(S): 4; .5 INJECTION, POWDER, LYOPHILIZED, FOR SOLUTION INTRAVENOUS at 22:46

## 2021-04-20 RX ADMIN — PIPERACILLIN AND TAZOBACTAM 200 GRAM(S): 4; .5 INJECTION, POWDER, LYOPHILIZED, FOR SOLUTION INTRAVENOUS at 18:17

## 2021-04-20 RX ADMIN — Medication 650 MILLIGRAM(S): at 19:16

## 2021-04-20 RX ADMIN — SODIUM CHLORIDE 1900 MILLILITER(S): 9 INJECTION INTRAMUSCULAR; INTRAVENOUS; SUBCUTANEOUS at 21:30

## 2021-04-20 NOTE — ED PROVIDER NOTE - PHYSICAL EXAMINATION
GEN: Pt ill appearing in NAD, A&Ox3.  PSYCH: Affect and mood appropriate.  EYES: Sclera icteric w/o injection, EOMI, PERRLA.   ENT: Head NCAT. MM dry. Neck supple FROM. Airway patent.  RESP: No chest wall tenderness, CTA b/l, no wheezes, rales, or rhonchi.   CARDIAC: Tachy 125, clear distinct S1, S2, no appreciable murmurs.  ABD: Abdomen soft, mild RUQ ttp, non-distended, no rebound or guarding. No CVAT b/l.  MSK: Moving all extremities w/ contracted LUE/LLE.  NEURO: No focal motor or sensory deficits.  VASC: Radial and dorsalis pedis pulses 2+ b/l. No edema or calf tenderness.  SKIN: +Juandice. No rashes or lesions.

## 2021-04-20 NOTE — ED ADULT NURSE NOTE - PSH
History of biliary stent insertion  stent x2 12/2020  History of biliary stent insertion  removed 7/2019  History of ERCP  performed at [place]

## 2021-04-20 NOTE — ED PROVIDER NOTE - OBJECTIVE STATEMENT
30y M pmhx cerebral palsy (baseline L sided deficits), primary sclerosing cholangitis and bilary stenosis with multiple ERCP and biliary stents (on transplant list) presents to ED accompanied by mother c/o of fever and chills x 9 days. Pt a/w mild RUQ abdominal pain, decreased PO intake, nausea, dry heaving, daily nonbloody brown colored diarrhea, dark yellow urine, shortness of breath. Denies changes in baseline jaundice which has been present for multiple months. As per mother symptoms presented after getting second COVID vaccination on 4/11. Denies cough, chest pain, changes in mentation.  PMD Dr. Juve Bryson  Hepatologist Dr. Juan David Howard

## 2021-04-20 NOTE — ED ADULT NURSE REASSESSMENT NOTE - NS ED NURSE REASSESS COMMENT FT1
Report received from RN Rozina. PT well appearing, A/O x3, mother at bedside. PT made aware urine needs to be collected and he will be receiving a CT scan. VSS- no fever .

## 2021-04-20 NOTE — ED ADULT TRIAGE NOTE - CHIEF COMPLAINT QUOTE
fever x one week after 2nd covid vaccine/ hx of cerebral palsy with baseline weakness in left side of body

## 2021-04-20 NOTE — ED PROVIDER NOTE - PSH
History of biliary stent insertion  stent x2 12/2020  History of biliary stent insertion  removed 7/2019  History of ERCP  performed at [place]

## 2021-04-20 NOTE — ED ADULT NURSE NOTE - OBJECTIVE STATEMENT
· Chief Complaint: The patient is a 30y Male complaining of fever.  · HPI Objective Statement: 30y M pmhx cerebral palsy (baseline L sided deficits), primary sclerosing cholangitis and bilary stenosis with multiple ERCP and biliary stents (on transplant list) presents to ED accompanied by mother c/o of fever and chills x 9 days. Pt a/w mild RUQ abdominal pain, decreased PO intake, nausea, dry heaving, daily nonbloody brown colored diarrhea, dark yellow urine, shortness of breath. Denies changes in baseline jaundice which has been present for multiple months. As per mother symptoms presented after getting second COVID vaccination on 4/11. Denies cough, chest pain, changes in mentation.  	PMD Dr. Juve Bryson  	Hepatologist Dr. Juan David Tipton  GI Dr. Yasmany Howard 31 y/o Male  BIB mother, for fever, chills, x 9 days.  Pt with history of cerebral palsy with left sided upper and lower extremities contracted.  Also reports mild RUQ abdominal pain,  nausea, dry heaving,  decreased po intake, daily nonbloody diarrhea, dark yellow urine, shortness of breath.  Baseline jaundice.  Mother  reports his symptoms started after  getting the 2nd COVID vaccine on 4/11.  Denies cough, chest pain, changes in mentation.  No wheezing.  Abdomen soft, mild RUQ tender to touch.  Rt upper and lower extremities mobile.  No acute respiratory distress noted.

## 2021-04-20 NOTE — ED ADULT NURSE NOTE - NSIMPLEMENTINTERV_GEN_ALL_ED
Implemented All Fall Risk Interventions:  [place] to call system. Call bell, personal items and telephone within reach. Instruct patient to call for assistance. Room bathroom lighting operational. Non-slip footwear when patient is off stretcher. Physically safe environment: no spills, clutter or unnecessary equipment. Stretcher in lowest position, wheels locked, appropriate side rails in place. Provide visual cue, wrist band, yellow gown, etc. Monitor gait and stability. Monitor for mental status changes and reorient to person, place, and time. Review medications for side effects contributing to fall risk. Reinforce activity limits and safety measures with patient and family.

## 2021-04-20 NOTE — ED PROVIDER NOTE - ATTENDING CONTRIBUTION TO CARE
attending Tai: 30yM h/o CP (baseline L sided deficits), primary sclerosing cholangitis on Azathioprine and bilary stenosis with multiple ERCP and biliary stents (on transplant list), last stent placed in Jan 2021 p/w 9 days of fever, chills, decreased PO intake and vomiting. Not tolerating PO. Received 2nd covid vaccine on 4/11. Has had negative COVID swab outpatient. No sick contacts. Febrile and tachycardic on arrival with tenderness in RUQ, mild jaundice. Sepsis. Will obtain labs including vbg with lactate and blood cultures, IVF, empiric abx, cxr, UA/Ucx, covid swab, CT A/P, likely admit.

## 2021-04-21 ENCOUNTER — APPOINTMENT (OUTPATIENT)
Dept: HEPATOLOGY | Facility: CLINIC | Age: 31
End: 2021-04-21
Payer: MEDICAID

## 2021-04-21 ENCOUNTER — TRANSCRIPTION ENCOUNTER (OUTPATIENT)
Age: 31
End: 2021-04-21

## 2021-04-21 VITALS
OXYGEN SATURATION: 99 % | DIASTOLIC BLOOD PRESSURE: 69 MMHG | RESPIRATION RATE: 18 BRPM | HEART RATE: 114 BPM | SYSTOLIC BLOOD PRESSURE: 121 MMHG | TEMPERATURE: 100 F

## 2021-04-21 VITALS
BODY MASS INDEX: 22.71 KG/M2 | DIASTOLIC BLOOD PRESSURE: 74 MMHG | HEART RATE: 137 BPM | RESPIRATION RATE: 16 BRPM | WEIGHT: 133 LBS | TEMPERATURE: 98 F | SYSTOLIC BLOOD PRESSURE: 138 MMHG | HEIGHT: 64 IN

## 2021-04-21 DIAGNOSIS — R50.9 FEVER, UNSPECIFIED: ICD-10-CM

## 2021-04-21 DIAGNOSIS — D53.9 NUTRITIONAL ANEMIA, UNSPECIFIED: ICD-10-CM

## 2021-04-21 DIAGNOSIS — Z29.9 ENCOUNTER FOR PROPHYLACTIC MEASURES, UNSPECIFIED: ICD-10-CM

## 2021-04-21 DIAGNOSIS — R65.10 SYSTEMIC INFLAMMATORY RESPONSE SYNDROME (SIRS) OF NON-INFECTIOUS ORIGIN WITHOUT ACUTE ORGAN DYSFUNCTION: ICD-10-CM

## 2021-04-21 DIAGNOSIS — K86.9 DISEASE OF PANCREAS, UNSPECIFIED: ICD-10-CM

## 2021-04-21 DIAGNOSIS — R16.1 SPLENOMEGALY, NOT ELSEWHERE CLASSIFIED: ICD-10-CM

## 2021-04-21 DIAGNOSIS — K83.01 PRIMARY SCLEROSING CHOLANGITIS: ICD-10-CM

## 2021-04-21 DIAGNOSIS — G80.9 CEREBRAL PALSY, UNSPECIFIED: ICD-10-CM

## 2021-04-21 LAB
ALBUMIN SERPL ELPH-MCNC: 2.7 G/DL — LOW (ref 3.3–5)
ALP SERPL-CCNC: 205 U/L — HIGH (ref 40–120)
ALT FLD-CCNC: 15 U/L — SIGNIFICANT CHANGE UP (ref 10–45)
ANION GAP SERPL CALC-SCNC: 11 MMOL/L — SIGNIFICANT CHANGE UP (ref 5–17)
AST SERPL-CCNC: 26 U/L — SIGNIFICANT CHANGE UP (ref 10–40)
BASOPHILS # BLD AUTO: 0.03 K/UL — SIGNIFICANT CHANGE UP (ref 0–0.2)
BASOPHILS NFR BLD AUTO: 0.4 % — SIGNIFICANT CHANGE UP (ref 0–2)
BILIRUB SERPL-MCNC: 1.4 MG/DL — HIGH (ref 0.2–1.2)
BUN SERPL-MCNC: 8 MG/DL — SIGNIFICANT CHANGE UP (ref 7–23)
CALCIUM SERPL-MCNC: 8.2 MG/DL — LOW (ref 8.4–10.5)
CHLORIDE SERPL-SCNC: 106 MMOL/L — SIGNIFICANT CHANGE UP (ref 96–108)
CO2 SERPL-SCNC: 22 MMOL/L — SIGNIFICANT CHANGE UP (ref 22–31)
CREAT SERPL-MCNC: 0.69 MG/DL — SIGNIFICANT CHANGE UP (ref 0.5–1.3)
EBV EA AB SER IA-ACNC: 12.4 U/ML — HIGH
EBV EA AB TITR SER IF: POSITIVE
EBV EA IGG SER-ACNC: POSITIVE
EBV NA IGG SER IA-ACNC: 392 U/ML — HIGH
EBV PATRN SPEC IB-IMP: SIGNIFICANT CHANGE UP
EBV VCA IGG AVIDITY SER QL IA: POSITIVE
EBV VCA IGM SER IA-ACNC: 15.2 U/ML — SIGNIFICANT CHANGE UP
EBV VCA IGM SER IA-ACNC: 318 U/ML — HIGH
EBV VCA IGM TITR FLD: NEGATIVE — SIGNIFICANT CHANGE UP
EOSINOPHIL # BLD AUTO: 0.06 K/UL — SIGNIFICANT CHANGE UP (ref 0–0.5)
EOSINOPHIL NFR BLD AUTO: 0.8 % — SIGNIFICANT CHANGE UP (ref 0–6)
GLUCOSE SERPL-MCNC: 96 MG/DL — SIGNIFICANT CHANGE UP (ref 70–99)
HCT VFR BLD CALC: 28.5 % — LOW (ref 39–50)
HGB BLD-MCNC: 9.6 G/DL — LOW (ref 13–17)
IMM GRANULOCYTES NFR BLD AUTO: 0.4 % — SIGNIFICANT CHANGE UP (ref 0–1.5)
LYMPHOCYTES # BLD AUTO: 1.12 K/UL — SIGNIFICANT CHANGE UP (ref 1–3.3)
LYMPHOCYTES # BLD AUTO: 14.1 % — SIGNIFICANT CHANGE UP (ref 13–44)
MCHC RBC-ENTMCNC: 33.7 GM/DL — SIGNIFICANT CHANGE UP (ref 32–36)
MCHC RBC-ENTMCNC: 34.5 PG — HIGH (ref 27–34)
MCV RBC AUTO: 102.5 FL — HIGH (ref 80–100)
MONOCYTES # BLD AUTO: 0.52 K/UL — SIGNIFICANT CHANGE UP (ref 0–0.9)
MONOCYTES NFR BLD AUTO: 6.5 % — SIGNIFICANT CHANGE UP (ref 2–14)
NEUTROPHILS # BLD AUTO: 6.21 K/UL — SIGNIFICANT CHANGE UP (ref 1.8–7.4)
NEUTROPHILS NFR BLD AUTO: 77.8 % — HIGH (ref 43–77)
NRBC # BLD: 0 /100 WBCS — SIGNIFICANT CHANGE UP (ref 0–0)
PLATELET # BLD AUTO: 397 K/UL — SIGNIFICANT CHANGE UP (ref 150–400)
POTASSIUM SERPL-MCNC: 4 MMOL/L — SIGNIFICANT CHANGE UP (ref 3.5–5.3)
POTASSIUM SERPL-SCNC: 4 MMOL/L — SIGNIFICANT CHANGE UP (ref 3.5–5.3)
PROT SERPL-MCNC: 6.7 G/DL — SIGNIFICANT CHANGE UP (ref 6–8.3)
RBC # BLD: 2.78 M/UL — LOW (ref 4.2–5.8)
RBC # FLD: 13.2 % — SIGNIFICANT CHANGE UP (ref 10.3–14.5)
SODIUM SERPL-SCNC: 139 MMOL/L — SIGNIFICANT CHANGE UP (ref 135–145)
WBC # BLD: 7.97 K/UL — SIGNIFICANT CHANGE UP (ref 3.8–10.5)
WBC # FLD AUTO: 7.97 K/UL — SIGNIFICANT CHANGE UP (ref 3.8–10.5)

## 2021-04-21 PROCEDURE — 83605 ASSAY OF LACTIC ACID: CPT

## 2021-04-21 PROCEDURE — 99072 ADDL SUPL MATRL&STAF TM PHE: CPT

## 2021-04-21 PROCEDURE — 84100 ASSAY OF PHOSPHORUS: CPT

## 2021-04-21 PROCEDURE — 84132 ASSAY OF SERUM POTASSIUM: CPT

## 2021-04-21 PROCEDURE — 82746 ASSAY OF FOLIC ACID SERUM: CPT

## 2021-04-21 PROCEDURE — 82962 GLUCOSE BLOOD TEST: CPT

## 2021-04-21 PROCEDURE — U0003: CPT

## 2021-04-21 PROCEDURE — 99285 EMERGENCY DEPT VISIT HI MDM: CPT | Mod: 25

## 2021-04-21 PROCEDURE — 99214 OFFICE O/P EST MOD 30 MIN: CPT

## 2021-04-21 PROCEDURE — 86665 EPSTEIN-BARR CAPSID VCA: CPT

## 2021-04-21 PROCEDURE — 74177 CT ABD & PELVIS W/CONTRAST: CPT

## 2021-04-21 PROCEDURE — 81003 URINALYSIS AUTO W/O SCOPE: CPT

## 2021-04-21 PROCEDURE — 82248 BILIRUBIN DIRECT: CPT

## 2021-04-21 PROCEDURE — 80053 COMPREHEN METABOLIC PANEL: CPT

## 2021-04-21 PROCEDURE — 85025 COMPLETE CBC W/AUTO DIFF WBC: CPT

## 2021-04-21 PROCEDURE — 87040 BLOOD CULTURE FOR BACTERIA: CPT

## 2021-04-21 PROCEDURE — 87086 URINE CULTURE/COLONY COUNT: CPT

## 2021-04-21 PROCEDURE — 85014 HEMATOCRIT: CPT

## 2021-04-21 PROCEDURE — 86664 EPSTEIN-BARR NUCLEAR ANTIGEN: CPT

## 2021-04-21 PROCEDURE — 71045 X-RAY EXAM CHEST 1 VIEW: CPT

## 2021-04-21 PROCEDURE — 99223 1ST HOSP IP/OBS HIGH 75: CPT

## 2021-04-21 PROCEDURE — 82803 BLOOD GASES ANY COMBINATION: CPT

## 2021-04-21 PROCEDURE — 96365 THER/PROPH/DIAG IV INF INIT: CPT

## 2021-04-21 PROCEDURE — U0005: CPT

## 2021-04-21 PROCEDURE — 82435 ASSAY OF BLOOD CHLORIDE: CPT

## 2021-04-21 PROCEDURE — 82247 BILIRUBIN TOTAL: CPT

## 2021-04-21 PROCEDURE — 83735 ASSAY OF MAGNESIUM: CPT

## 2021-04-21 PROCEDURE — 82565 ASSAY OF CREATININE: CPT

## 2021-04-21 PROCEDURE — 84295 ASSAY OF SERUM SODIUM: CPT

## 2021-04-21 PROCEDURE — 85018 HEMOGLOBIN: CPT

## 2021-04-21 PROCEDURE — 86663 EPSTEIN-BARR ANTIBODY: CPT

## 2021-04-21 PROCEDURE — 85610 PROTHROMBIN TIME: CPT

## 2021-04-21 PROCEDURE — 85730 THROMBOPLASTIN TIME PARTIAL: CPT

## 2021-04-21 PROCEDURE — 82330 ASSAY OF CALCIUM: CPT

## 2021-04-21 PROCEDURE — 83921 ORGANIC ACID SINGLE QUANT: CPT

## 2021-04-21 PROCEDURE — 82607 VITAMIN B-12: CPT

## 2021-04-21 PROCEDURE — 96366 THER/PROPH/DIAG IV INF ADDON: CPT

## 2021-04-21 PROCEDURE — 82947 ASSAY GLUCOSE BLOOD QUANT: CPT

## 2021-04-21 RX ORDER — SODIUM CHLORIDE 9 MG/ML
1000 INJECTION, SOLUTION INTRAVENOUS ONCE
Refills: 0 | Status: COMPLETED | OUTPATIENT
Start: 2021-04-21 | End: 2021-04-21

## 2021-04-21 RX ORDER — URSODIOL 250 MG/1
1 TABLET, FILM COATED ORAL
Qty: 0 | Refills: 0 | DISCHARGE

## 2021-04-21 RX ORDER — AZATHIOPRINE 100 MG/1
1 TABLET ORAL
Qty: 0 | Refills: 0 | DISCHARGE

## 2021-04-21 RX ORDER — PIPERACILLIN AND TAZOBACTAM 4; .5 G/20ML; G/20ML
3.38 INJECTION, POWDER, LYOPHILIZED, FOR SOLUTION INTRAVENOUS EVERY 8 HOURS
Refills: 0 | Status: DISCONTINUED | OUTPATIENT
Start: 2021-04-21 | End: 2021-04-21

## 2021-04-21 RX ORDER — URSODIOL 250 MG/1
1 TABLET, FILM COATED ORAL
Qty: 0 | Refills: 0 | DISCHARGE
Start: 2021-04-21

## 2021-04-21 RX ORDER — URSODIOL 250 MG/1
250 TABLET, FILM COATED ORAL
Refills: 0 | Status: DISCONTINUED | OUTPATIENT
Start: 2021-04-21 | End: 2021-04-21

## 2021-04-21 RX ADMIN — SODIUM CHLORIDE 1000 MILLILITER(S): 9 INJECTION, SOLUTION INTRAVENOUS at 03:38

## 2021-04-21 RX ADMIN — PIPERACILLIN AND TAZOBACTAM 25 GRAM(S): 4; .5 INJECTION, POWDER, LYOPHILIZED, FOR SOLUTION INTRAVENOUS at 02:27

## 2021-04-21 NOTE — H&P ADULT - NSHPPHYSICALEXAM_GEN_ALL_CORE
T(C): 37 (04-20-21 @ 22:02), Max: 38.6 (04-20-21 @ 18:00)  HR: 107 (04-21-21 @ 00:15) (106 - 137)  BP: 118/71 (04-21-21 @ 00:15) (112/69 - 133/77)  RR: 27 (04-21-21 @ 00:15) (16 - 27)  SpO2: 100% (04-21-21 @ 00:15) (99% - 100%)  Wt(kg): --    PHYSICAL EXAM:  GENERAL: NAD, well-groomed, well-developed  HEAD:  Atraumatic, Normocephalic  EYES: EOMI, PERRLA, conjunctiva and sclera clear  ENMT: No oropharyngeal exudates, erythema or lesions,  Moist mucous membranes  NECK: Supple, no cervical lymphadenopathy, no JVD  NERVOUS SYSTEM:  Alert & Oriented X3, CN II-XII intact, L hand contracted, 5/5 5/5 RUE and RLE strength, 4/5 LUE and LLE strength, full sensation to light touch   CHEST/LUNG: Clear to auscultation bilaterally; No rales, no rhonchi, no wheezing  HEART: tachycardic, regular rhythm; No murmurs  ABDOMEN: Soft, Nontender, Nondistended  EXTREMITIES:  2+ radial Pulses, No cyanosis or edema  SKIN: warm, dry, mildly jaundiced appearance

## 2021-04-21 NOTE — H&P ADULT - NSICDXPASTSURGICALHX_GEN_ALL_CORE_FT
PAST SURGICAL HISTORY:  History of biliary stent insertion removed 7/2019    History of biliary stent insertion stent x2 12/2020    History of ERCP performed at [place]

## 2021-04-21 NOTE — DISCHARGE NOTE PROVIDER - NSDCFUSCHEDAPPT_GEN_ALL_CORE_FT
[facility] [place] ; 04/21/2021 ; NPP Hepatology [address] [facility] ; 05/10/2021 ; [facility] [place] [facility] 05/12/[facility]21 ; NPP Hepatology [address] [facility] [place] ; 07/12/2021 ; NPP Surg TrPl [address] [facility]

## 2021-04-21 NOTE — H&P ADULT - NSHPREVIEWOFSYSTEMS_GEN_ALL_CORE
REVIEW OF SYSTEMS  CONSTITUTIONAL: + fever, + chills, + sweats  EYES: No eye pain, no vision changes  ENMT:  No difficulty hearing, no throat pain  RESPIRATORY: + cough, no wheezing, no sputum production; + shortness of breath  CARDIOVASCULAR: No chest pain, no palpitations, no leg swelling  GASTROINTESTINAL: +RUQ abdominal pain, + nausea, no vomiting, no diarrhea, no constipation, + gas, no black or bloody stool  GENITOURINARY: No dysuria, no hematuria  NEUROLOGICAL: + pressure-like headaches, L sided weakness, no numbness  SKIN: +itching improved, mild persistent jaundice   MUSCULOSKELETAL: No joint pain, no joint swelling; No muscle pain  HEME/LYMPH: No easy bruising, bleeding

## 2021-04-21 NOTE — H&P ADULT - PROBLEM SELECTOR PLAN 3
CT identified new splenomegaly of uncertain etiology ( measuring 14.3cm).   Splenomegaly may be related to portal venous htn.  Hematologic malignancy seems less likely. Check EBV  No thrombus was noted on CT. Check US abdomen.

## 2021-04-21 NOTE — H&P ADULT - ASSESSMENT
This patient is a 31yo gentleman with PMH of cerebral palsy, primary sclerosing cholangitis, biliary strictures s/p stent exchanges (most recently performed in Feb 2021), currently awaiting liver transplant, who presents to the ED with complaint of fever and chills. Patient states that his symptoms started the evening that he received his second Pfizer COVID19 vaccine on 4/11/2021. He had fever to 100.3F, chills and sweats. He also had persistent nausea, decreased PO intake, gas, and RUQ soreness. Patient's mother at bedside notes that patient has had dry cough and dyspnea with exertion when walking distance from bedroom to bathroom in the home.   No other individuals at home were ill. He denies any recent travel.  The patient's mother alerted Dr. Tipton, and was advised to hold azathioprine. A prescription for ciprofloxacin was sent to the pharmacy, however it had not yet been obtained or started.   The patient had recently been started on ursodiol for pruritis and reports improvement in itching.    This patient is a 31yo gentleman with PMH of cerebral palsy, primary sclerosing cholangitis, biliary strictures s/p stent exchanges (most recently performed in Feb 2021), currently awaiting liver transplant, who presents to the ED with complaint of fever and chills, meeting SIRS criteria.

## 2021-04-21 NOTE — DISCHARGE NOTE PROVIDER - CARE PROVIDER_API CALL
Juan David Tipton (MD)  Gastroenterology; Internal Medicine; Transplant Hepatology  [address] [place] [address]  Phone: [phone]  Fax: [phone]  Follow Up Time:

## 2021-04-21 NOTE — H&P ADULT - PROBLEM SELECTOR PLAN 5
CT A/P identified a 2cm homogeneously enhancing pancreatic tail lesion with similar density to the spleen.   [facility] hepatology notes indicate that patient had EUS which identified mass at pancreatic tail, with benign appearance, suspected to be an inflammatory pseudomass. FNA had not been performed.   Clarify with hepatology team regarding need for further outpatient follow-up of this lesion.

## 2021-04-21 NOTE — DISCHARGE NOTE NURSING/CASE MANAGEMENT/SOCIAL WORK - NSDCPNINST_GEN_ALL_CORE
Pt & family @bedside educated on risks of leaving AMA. Pt instructed to come back to ED if symptoms worsen.

## 2021-04-21 NOTE — H&P ADULT - NSHPLABSRESULTS_GEN_ALL_CORE
Labs, imaging  personally reviewed by me.     CBC found leukocytosis with WBC of 12.27. Pt has macrocytic anemia Hgb 10.4, MCV of 102. Platelet count is elevated at 457k.  INR is elevated 1.37.  CMP found elevated Tbili of 1.7, with direct bilirubin of 0.9 (elevated). Alk phos is elevated at 248. Lactate is 2 WNL.  UA is negative.    LABS:                        10.4   12.27 )-----------( 457      ( 2021 18:34 )             31.2     Hgb Trend: 10.4<--  04-20    138  |  103  |  11  ----------------------------<  109<H>  3.7   |  20<L>  |  0.78    Ca    8.6      2021 18:34  Phos  2.7     04-20  Mg     2.1     -20    TPro  7.9  /  Alb  3.1<L>  /  TBili  1.7<H>  /  DBili  0.9<H>  /  AST  28  /  ALT  20  /  AlkPhos  248<H>  20    Creatinine Trend: 0.78<--  PT/INR - ( 2021 18:34 )   PT: 16.2 sec;   INR: 1.37 ratio         PTT - ( 2021 18:34 )  PTT:36.6 sec  Urinalysis Basic - ( 2021 22:00 )    Color: Yellow / Appearance: Clear / S.022 / pH: x  Gluc: x / Ketone: Negative  / Bili: Negative / Urobili: Negative   Blood: x / Protein: Trace / Nitrite: Negative   Leuk Esterase: Negative / RBC: x / WBC x   Sq Epi: x / Non Sq Epi: x / Bacteria: x    Venous Blood Gas:  @ 18:35       7.42/34/67/22/94       VBG Lactate: 2.0    CXR   FINDINGS: Cardiac silhouette is within normal limits.  Lungs are clear.  No pleural effusions or pneumothorax.  No acute osseous abnormality.    IMPRESSION:   Clear lungs.    CT A/P:   FINDINGS:  LOWER CHEST: Clear    LIVER: Normal size and background parenchymal enhancement.  BILE DUCTS: Mild to moderate intrahepatic biliary dilatation, has partially improved after interval placement of 2 discrete biliary stents.  GALLBLADDER: Pneumobilia. No inflammation.  SPLEEN: Newly enlarged, measures 14.3 cm in craniocaudal dimension. No discrete splenic lesion.  PANCREAS: 2 cm homogeneously enhancing pancreatic tail lesion, similar density to the spleen. No peripancreatic edema.  ADRENALS: Within normal limits.  KIDNEYS/URETERS: Within normal limits.    BLADDER: Within normal limits.  REPRODUCTIVE ORGANS: Normal sized prostate.    BOWEL: No bowel obstruction. Appendix is not visualized but there are no secondary findings of acute appendicitis..  PERITONEUM: No free air or free fluid.  VESSELS: Normal in caliber. Main portal vein and splenic vein are patent. Superior mesenteric vein is patent.  RETROPERITONEUM/LYMPH NODES: Subcentimeter in short axis retroperitoneal lymph nodes in the aortocaval and left periaortic chain, nonspecific.  ABDOMINAL WALL: Unremarkable  BONES: No acute osseous abnormality    IMPRESSION:    1. Partial improvement of the intrahepatic biliary dilatation in this patient status post biliary stent placement.  2. New splenomegaly, of uncertain etiology. Correlate for developing portal venous hypertension.  3. Enhancing pancreatic tail lesion, better assessed on the MRI from

## 2021-04-21 NOTE — H&P ADULT - NSHPSOCIALHISTORY_GEN_ALL_CORE
The patient lives with his mother and father.  He has gait instability, however does not use an assistive device to walk  He denies tobacco or alcohol use.

## 2021-04-21 NOTE — H&P ADULT - PROBLEM SELECTOR PLAN 2
The patient meets SIRS criteria based on tachycardia and fevers.  Will give additional 1L LR.   Follow up results of BCx and UCx. UA was negative, thus low suspicion for UTI.  Pt has 2 biliary stents in place. He currently denies abdominal pain and does not have RUQ tenderness on exam. CT did not identify clear source of infection. CXR did not identify pulmonary pathology.   Tbili is actually lower today than it had been on 4/16 (last outpatient labs on [place]).  Will continue empiric zosyn to cover potential intra-abdominal pathology.   Trend CBC with diff. Trend CMP- liver enzymes.  Pt states diarrhea had resolved. If he has loose stools, check GI PCR.

## 2021-04-21 NOTE — DISCHARGE NOTE PROVIDER - HOSPITAL COURSE
This patient elected to leave the hospital AGAINST MEDICAL ADVICE.    This patient is a 29yo gentleman with PMH of cerebral palsy, primary sclerosing cholangitis, biliary strictures s/p stent exchanges (most recently performed in Feb 2021), currently awaiting liver transplant, who presents to the ED with complaint of fever and chills. Patient states that his symptoms started the evening that he received his second Pfizer COVID19 vaccine on 4/11/2021. He had fever to 100.3F, chills and sweats. He also had persistent nausea, decreased PO intake, gas, and RUQ soreness. Patient's mother at bedside notes that patient has had dry cough and dyspnea with exertion when walking distance from bedroom to bathroom in the home.   No other individuals at home were ill. He denies any recent travel.  The patient's mother alerted Dr. Tipton, and was advised to hold azathioprine. A prescription for ciprofloxacin was sent to the pharmacy, however it had not yet been obtained or started.   The patient had recently been started on ursodiol for pruritis and reports improvement in itching.    CBC found leukocytosis with WBC of 12.27. Pt has macrocytic anemia Hgb 10.4, MCV of 102. Platelet count is elevated at 457k.  INR is elevated 1.37. CMP found elevated Tbili of 1.7, with direct bilirubin of 0.9 (elevated). Alk phos is elevated at 248. Lactate is 2 WNL. UA is negative.  CT A/P was obtained which found partial improvement of the intrahepatic biliary dilatation in this patient status post biliary stent placement. New splenomegaly, of uncertain etiology was identified.  Enhancing pancreatic tail lesion was also identified.    Blood and urine cultures were sent. Zosyn was started. Patient was given IVF. Email consult was sent to hepatology team.  The patient expressed that he did not want to stay in the hospital because he was dissatisfied with the protocol for rooming patients. I explained to the patient and his mother that private rooms are currently reserved to individuals with suspected transmissible diseases such as tuberculosis, skin MRSA infections, or meningitis, or if patients elect to pay out of pocket for a private room. I explained that all patients admitted to the hospital get PCR test for COVID19 prior to being sent up to a hospital room to reduce the risk of transmission of COVID19. I explained to the patient and his mother that he is currently being evaluated for suspected infectious cause of his symptoms, and that if he were to leave against medical advice, he may have worsening of his symptoms or death. The patient expressed that he understands the risks of leaving against medical advice and that he intends to visit his hepatologist, Dr. Tipton today after he leaves the hospital. I offered to prescribe oral antibiotics for the patient to  at his pharmacy, however the patient and his mother declined, stating that they would prefer to wait to see Dr. Tipton and receive a prescription from her. I told the patient to stop use of azathioprine, but that he can continue to take ursodiol. I advised that the patient return to the emergency department if he experienced fevers, chills, abdominal pain, nausea, vomiting, worsening of his condition or other symptoms. They expressed understanding. Patient elected to leave the hospital against medical advice.

## 2021-04-21 NOTE — DISCHARGE NOTE NURSING/CASE MANAGEMENT/SOCIAL WORK - PATIENT PORTAL LINK FT
You can access the FollowMyHealth Patient Portal offered by [facility] by registering at the following website: [email or web address]. By joining [employer]’s FollowMyHealth portal, you will also be able to view your health information using other applications (apps) compatible with our system.

## 2021-04-21 NOTE — H&P ADULT - PROBLEM SELECTOR PLAN 1
Continue ursodiol for pruritis.  Continue to hold home dose of azathioprine.  Will send e-mail consult to house hepatology team.   If the patient's abdominal pain recurs or if  he has a rise in his liver enzymes or Tbili, discuss with hepatology team possibly obtaining inpatient MRCP (pt is due for repeat imaging in a month or two)

## 2021-04-22 LAB
CULTURE RESULTS: NO GROWTH — SIGNIFICANT CHANGE UP
FOLATE SERPL-MCNC: 8.9 NG/ML — SIGNIFICANT CHANGE UP
SPECIMEN SOURCE: SIGNIFICANT CHANGE UP
VIT B12 SERPL-MCNC: 357 PG/ML — SIGNIFICANT CHANGE UP (ref 232–1245)

## 2021-04-27 LAB — METHYLMALONATE SERPL-SCNC: 104 NMOL/L — SIGNIFICANT CHANGE UP (ref 0–378)

## 2021-04-27 NOTE — DISCHARGE NOTE PROVIDER - HOSPITAL COURSE
Addended by: IVORY SIMONS on: 4/27/2021 02:35 PM     Modules accepted: Orders     29M with a history of cerebral palsy and primary sclerosing cholangitis on chronic prednisone, recently removal of biliary stent presents with fever, hypotension and suspected seizure.        treated for sepsis due to pseudomonas bacteremia and suspected aspiration pneumonia. repeat blood cultures negative and ID recommending 14 days of antibiotics (cipro on dc) from negative blood cultures.  Seen by GI with outpatient follow up advised.    Seen by neurology, "seizure" likely syncope or febrile seizure.  negative EEG or MRI head.        stable for discharge home.    dc planning 34 minutes.

## 2021-05-07 ENCOUNTER — NON-APPOINTMENT (OUTPATIENT)
Age: 31
End: 2021-05-07

## 2021-05-10 ENCOUNTER — NON-APPOINTMENT (OUTPATIENT)
Age: 31
End: 2021-05-10

## 2021-05-10 ENCOUNTER — OUTPATIENT (OUTPATIENT)
Dept: OUTPATIENT SERVICES | Facility: HOSPITAL | Age: 31
LOS: 1 days | End: 2021-05-10
Payer: MEDICAID

## 2021-05-10 ENCOUNTER — APPOINTMENT (OUTPATIENT)
Dept: MRI IMAGING | Facility: CLINIC | Age: 31
End: 2021-05-10
Payer: MEDICAID

## 2021-05-10 DIAGNOSIS — Z98.890 OTHER SPECIFIED POSTPROCEDURAL STATES: Chronic | ICD-10-CM

## 2021-05-10 DIAGNOSIS — R94.5 ABNORMAL RESULTS OF LIVER FUNCTION STUDIES: ICD-10-CM

## 2021-05-10 PROCEDURE — 74183 MRI ABD W/O CNTR FLWD CNTR: CPT

## 2021-05-10 PROCEDURE — 74183 MRI ABD W/O CNTR FLWD CNTR: CPT | Mod: 26

## 2021-05-10 PROCEDURE — A9585: CPT

## 2021-05-11 ENCOUNTER — OUTPATIENT (OUTPATIENT)
Dept: OUTPATIENT SERVICES | Facility: HOSPITAL | Age: 31
LOS: 1 days | End: 2021-05-11

## 2021-05-11 VITALS
HEART RATE: 67 BPM | HEIGHT: 64 IN | SYSTOLIC BLOOD PRESSURE: 110 MMHG | TEMPERATURE: 99 F | OXYGEN SATURATION: 99 % | DIASTOLIC BLOOD PRESSURE: 70 MMHG | WEIGHT: 130.07 LBS | RESPIRATION RATE: 16 BRPM

## 2021-05-11 DIAGNOSIS — Z98.890 OTHER SPECIFIED POSTPROCEDURAL STATES: Chronic | ICD-10-CM

## 2021-05-11 DIAGNOSIS — K02.9 DENTAL CARIES, UNSPECIFIED: ICD-10-CM

## 2021-05-11 DIAGNOSIS — K01.1 IMPACTED TEETH: ICD-10-CM

## 2021-05-11 LAB
ALBUMIN SERPL ELPH-MCNC: 3.2 G/DL — LOW (ref 3.3–5)
ALP SERPL-CCNC: 261 U/L — HIGH (ref 40–120)
ALT FLD-CCNC: 20 U/L — SIGNIFICANT CHANGE UP (ref 4–41)
ANION GAP SERPL CALC-SCNC: 10 MMOL/L — SIGNIFICANT CHANGE UP (ref 7–14)
AST SERPL-CCNC: 30 U/L — SIGNIFICANT CHANGE UP (ref 4–40)
BILIRUB SERPL-MCNC: 0.8 MG/DL — SIGNIFICANT CHANGE UP (ref 0.2–1.2)
BUN SERPL-MCNC: 9 MG/DL — SIGNIFICANT CHANGE UP (ref 7–23)
CALCIUM SERPL-MCNC: 8.7 MG/DL — SIGNIFICANT CHANGE UP (ref 8.4–10.5)
CHLORIDE SERPL-SCNC: 105 MMOL/L — SIGNIFICANT CHANGE UP (ref 98–107)
CO2 SERPL-SCNC: 25 MMOL/L — SIGNIFICANT CHANGE UP (ref 22–31)
CREAT SERPL-MCNC: 0.67 MG/DL — SIGNIFICANT CHANGE UP (ref 0.5–1.3)
GLUCOSE SERPL-MCNC: 90 MG/DL — SIGNIFICANT CHANGE UP (ref 70–99)
HCT VFR BLD CALC: 28.9 % — LOW (ref 39–50)
HGB BLD-MCNC: 9.7 G/DL — LOW (ref 13–17)
MCHC RBC-ENTMCNC: 33.6 GM/DL — SIGNIFICANT CHANGE UP (ref 32–36)
MCHC RBC-ENTMCNC: 33.8 PG — SIGNIFICANT CHANGE UP (ref 27–34)
MCV RBC AUTO: 100.7 FL — HIGH (ref 80–100)
NRBC # BLD: 0 /100 WBCS — SIGNIFICANT CHANGE UP
NRBC # FLD: 0 K/UL — SIGNIFICANT CHANGE UP
PLATELET # BLD AUTO: 269 K/UL — SIGNIFICANT CHANGE UP (ref 150–400)
POTASSIUM SERPL-MCNC: 3.6 MMOL/L — SIGNIFICANT CHANGE UP (ref 3.5–5.3)
POTASSIUM SERPL-SCNC: 3.6 MMOL/L — SIGNIFICANT CHANGE UP (ref 3.5–5.3)
PROT SERPL-MCNC: 8 G/DL — SIGNIFICANT CHANGE UP (ref 6–8.3)
RBC # BLD: 2.87 M/UL — LOW (ref 4.2–5.8)
RBC # FLD: 12.9 % — SIGNIFICANT CHANGE UP (ref 10.3–14.5)
SODIUM SERPL-SCNC: 140 MMOL/L — SIGNIFICANT CHANGE UP (ref 135–145)
WBC # BLD: 6.1 K/UL — SIGNIFICANT CHANGE UP (ref 3.8–10.5)
WBC # FLD AUTO: 6.1 K/UL — SIGNIFICANT CHANGE UP (ref 3.8–10.5)

## 2021-05-11 RX ORDER — SODIUM CHLORIDE 9 MG/ML
1000 INJECTION, SOLUTION INTRAVENOUS
Refills: 0 | Status: DISCONTINUED | OUTPATIENT
Start: 2021-05-20 | End: 2021-06-03

## 2021-05-11 RX ORDER — SODIUM CHLORIDE 9 MG/ML
3 INJECTION INTRAMUSCULAR; INTRAVENOUS; SUBCUTANEOUS EVERY 8 HOURS
Refills: 0 | Status: DISCONTINUED | OUTPATIENT
Start: 2021-05-20 | End: 2021-06-03

## 2021-05-11 NOTE — H&P PST ADULT - HISTORY OF PRESENT ILLNESS
31 y/o male with Abnormal LFT's, Primary Sclerosing cholangitis (PCS) and cerebral palsy presents to PST preop for exactions teeth #1,16, 17, 32.   pt is on the liver transplant list and needs to have dental caries addressed prior to surgery. 31 y/o male with h/o Abnormal LFT's, Primary Sclerosing cholangitis (PCS) and cerebral palsy presents to PST preop for exactions teeth #1,16, 17, 32.   pt is on the liver transplant list and needs to have dental caries addressed prior to transplant surgery.

## 2021-05-11 NOTE — H&P PST ADULT - NSICDXPASTSURGICALHX_GEN_ALL_CORE_FT
PAST SURGICAL HISTORY:  History of biliary stent insertion multiple insertions and removal   last insertion was Feb 25, 2020    History of ERCP multiple

## 2021-05-11 NOTE — H&P PST ADULT - NSICDXPROBLEM_GEN_ALL_CORE_FT
PROBLEM DIAGNOSES  Problem: Dental caries  Assessment and Plan: preop for extractions Teeth # 1, 16, 17, 32 on 5/20/21  preop instructions given, pt verbalized understanding  GI prophylaxis provided  pt is scheduled for COVID testing preop

## 2021-05-11 NOTE — H&P PST ADULT - RS GEN HX ROS MEA POS PC
r/t ursodiol medication- improving since medication stopped/cough r/t ursodiol medication-pt says cough is improving since medication was stopped/cough

## 2021-05-11 NOTE — H&P PST ADULT - NSICDXPASTMEDICALHX_GEN_ALL_CORE_FT
PAST MEDICAL HISTORY:  Abnormal LFTs     Bile duct stricture     Cerebral palsy     Dental caries     Impacted teeth     PSC (primary sclerosing cholangitis)

## 2021-05-11 NOTE — H&P PST ADULT - GEN GEN HX ROS MEA POS PC
pt reports fever with ursodiol. stopped meds on 5/8/21- fever resolved/weight loss pt reports fever with ursodiol. stopped medication on 5/8/21- fever resolved/weight loss

## 2021-05-12 ENCOUNTER — APPOINTMENT (OUTPATIENT)
Dept: HEPATOLOGY | Facility: CLINIC | Age: 31
End: 2021-05-12
Payer: MEDICAID

## 2021-05-12 VITALS
OXYGEN SATURATION: 100 % | HEART RATE: 12 BPM | BODY MASS INDEX: 22.2 KG/M2 | HEIGHT: 64 IN | WEIGHT: 130 LBS | TEMPERATURE: 98 F | SYSTOLIC BLOOD PRESSURE: 133 MMHG | DIASTOLIC BLOOD PRESSURE: 77 MMHG | RESPIRATION RATE: 12 BRPM

## 2021-05-12 PROCEDURE — 99072 ADDL SUPL MATRL&STAF TM PHE: CPT

## 2021-05-12 PROCEDURE — 99214 OFFICE O/P EST MOD 30 MIN: CPT

## 2021-05-12 RX ORDER — AZATHIOPRINE 50 1/1
50 TABLET ORAL
Qty: 30 | Refills: 2 | Status: DISCONTINUED | COMMUNITY
Start: 2019-09-03 | End: 2021-05-12

## 2021-05-12 RX ORDER — AZITHROMYCIN 250 MG/1
250 TABLET, FILM COATED ORAL
Qty: 1 | Refills: 0 | Status: DISCONTINUED | COMMUNITY
Start: 2021-05-07 | End: 2021-05-12

## 2021-05-12 RX ORDER — CIPROFLOXACIN HYDROCHLORIDE 500 MG/1
500 TABLET, FILM COATED ORAL
Qty: 14 | Refills: 0 | Status: DISCONTINUED | COMMUNITY
Start: 2021-04-20 | End: 2021-05-12

## 2021-05-12 NOTE — PHYSICAL EXAM
[Scleral Icterus] : No Scleral Icterus [Spider Angioma] : No spider angioma(s) were observed [Abdominal Bruit] : no abdominal bruit [Abdominal  Ascites] : no ascites [Liver Size (___ Cm)] : Liver size [unfilled] cm [Non-Tender] : non-tender [Smooth] : smooth [Asterixis] : no asterixis observed [Jaundice] : No jaundice [Depression] : no depression [General Appearance - Alert] : alert [General Appearance - Well Nourished] : well nourished [General Appearance - Well Developed] : well developed [Outer Ear] : the ears and nose were normal in appearance [Neck Appearance] : the appearance of the neck was normal [Exaggerated Use Of Accessory Muscles For Inspiration] : no accessory muscle use [Apical Impulse] : the apical impulse was normal [Heart Rate And Rhythm] : heart rate was normal and rhythm regular [Heart Sounds] : normal S1 and S2 [Heart Sounds Gallop] : no gallops [Murmurs] : no murmurs [Heart Sounds Pericardial Friction Rub] : no pericardial rub [Arterial Pulses Carotid] : carotid pulses were normal with no bruits [Breast Appearance] : normal in appearance [Bowel Sounds] : normal bowel sounds [Abdomen Soft] : soft [Abdomen Tenderness] : non-tender [Abdomen Mass (___ Cm)] : no abdominal mass palpated [Cranial Nerves] : cranial nerves 2-12 were intact [Sensation] : the sensory exam was normal to light touch and pinprick [Motor Exam] : the motor exam was normal [No Focal Deficits] : no focal deficits [FreeTextEntry1] : spastic gait fro cerbral plasy [Oriented To Time, Place, And Person] : oriented to person, place, and time

## 2021-05-12 NOTE — ASSESSMENT
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  Multiple stent exchanges for biliary strictures, most recent stent exchange was performed on February 2021. \par He continues to have ongoing low-grade fevers since last clinic visit.  He has already completed 7-day course of ciprofloxacin and a 3-day course of azithromycin without any improvement.  MRI showing segment 7 microabscesses.  Also significant traumatic biliary ductal dilatation was noted.\par \par Patient is currently listed for liver transplantation on the [facility] list at our center.\par \par \par PLAN\par \par -MRI results was discussed with Dr. Silva.  I also personally discussed with infectious disease specialist Dr. Roque Lancaster.  Plan is to put him on oral antibiotics including levofloxacin 750 mg daily along with metronidazole 500 mg twice a day for 4 weeks. Dr. Lancaster will see him next week in the ID clinic.  We will plan for a follow-up MRI in about 2 weeks.  If he develops any recurrent fever will have him admitted in the hospital for IV antibiotics.\par \par -Follow-up labs in 1 week\par \par -Keep azathioprine discontinued.\par \par -Repeat MRI in 2 weeks to assess improvement in liver abscesses.\par \par -He was recently prescribed Ursodiol by Dr. Solis and was instructed to discontinue rifampin.  Patient has not initiated ursodiol as of yet, and would like to know whether he can initiate ursodiol.  He has no itching symptoms.  I have recommended him not to initiate ursodiol for now.  If he develops any recurrent itching symptoms I will resume rifampin which has been inadvertently discontinued.\par \par \par RTC in 4  weeks

## 2021-05-12 NOTE — HISTORY OF PRESENT ILLNESS
[FreeTextEntry1] : Mr. Javy Biggs is a 30-year-old man with a history of cerebral palsy presents for  evaluation for potential liver transplant.\par \par He has been symptomatic since early 2019. He started itching while he was in [place], along with some nausea and vomiting and abdominal pain. He was noted to have liver enzyme elevation. Then subsequently he presented to [facility] and underwent evaluation. His bilirubin started to rise further and then transferred to [facility] in February 2019. He underwent ERCP which showed the distal bile duct stricture and questionable right intrahepatic stricture. Small stent was placed in the distal bile duct. However his bilirubin did not improve initially. He underwent MRI with severe right intrahepatic stricture. He was discharged home. He was itching for many days but then it improved. \par \par Ca 19-9 level, IgG 4 level and bile duct brushings have been unremarkable. \par \par The patient had another ERCP with stent pull and cholangiogram in april 2019. Right intrahepatic stricture was noted. Then additional brushings were performed.Then a stent was attempted to be advanced into the right intrahepatic system. Although there was significant resistance. Then it was dilated using a 4 mm balloon. Then 7 Welsh 9 cm bile duct stent was placed.The patient had a ER visit about a week or 2 after that. He was noted to have nephrolithiasis.\par \par His LFTs imroved with ERCP and stent. He is now referred to hepatology for further assessment for primary sclerosing cholangitis.\par \par ARMANDO is negative, Mitochondrial antibody is negative, Viral hepatitis serologies are negative, immunugloblin level are unimpressive.\par \par Interval hx: Since last seen in the hepatology clinic on 22-May-2019, he had a follow up ERCP by Dr. Howard, and the old stent was removed, and exchanged with a new one. Brushings were negative for malignant cell. Additionally he had a liver biopsy that showed overall appears cholestatic pattern of injury with ductular reaction. This was further discussed at the hepato-billiary meeting with pathology review. 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 Essentially, work up for underlying CLD were negative for viral serology, autoimmune markers, ceruloplasmin ( higher level not suggestive of Garcia's). His total bili is down to 2.4 mg/dL, , and , and . AMA is negative, but ASMA is low titer + ( 1:20). ARMANDO is also negative.\par Interestingly, his IgG level is high ( 1723 mg/dL). \par His viral serology suggest to protection towards hepatitis A, but he is immune to hepatitis B. I hav recommended vaccine series.\par Ca19-9 was within normal range.\par \par Interval hx ( 9/3/2019): Since last seen he has been apparently hospitalized with fever, and suspected infection. He was treated with IV antibiotics, and was discharged with Cipro which he is still taking ( will finish in 2 days).\par Today he feels well.\par LFTs significantly better since Prednisone was started. His mother reports skin rash in the face, chest, and upper back (like pimples) since the steroid was initiated.\par \par Interval hx-10-3-2019:\par \par Patient has been doing well since last seen. His acne lesions have almost disappeared. He remain on Azathioprine 100 mg daily (increased on 10-2-2019), and Prednisone 10 mg daily. His Azathioprine dose was increased due to poor response on 50 mg daily. He denies any cholestasis symptoms. \par \par Interval history January 6, 2020:\par Recent laboratory tests from 10 December 2019 was revealed. This revealed white cell count of 5.96, hemoglobin 14.8, platelet count 400,000. Liver function tests revealed total bilirubin 1.8, , , alkaline phosphatase 151. Serum creatine was 0.92 mg/dL. INR is 0.93. His liver tests appears to have significantly increased compared to 26 November 2019. At that time his total bilirubin was 1.1, AST 89, , alkaline phosphatase 203.\par \par Interval history February 13, 2020:\par \par Patient presents for a follow up visit today along with his parents. Since last seen that has a significant jump in his liver test particularly total bilirubin level. His total bilirubin is 21.9, AST 65, ALT 44, alkaline phosphatase 129. He recently had an ERCP done through Dr. Yasmany Howard, and his liver function tests has not improved from the spike yesterday. I had a personal discussion with Dr. Howard, and he mentioned that he placed the stent on the right side only and he intends now to repeat an ERCP with bilateral stenting. \par \par \par Internal history dated March 2, 2020\par \par Ms. Biggs returns for a followup visit in the hepatology clinic accompianed  by her mother. On his followup today, patient reports severe persistent itching that is bothersome. He has been taking hydroxyzine with some relief. He also certainly some excoriation marks. He denies any internal episodes of fever, chills, , nausea or vomiting.\par \par His liver function test from 2 of February 2020 revealed total bilirubin 21.9, AST 65, ALT 44, alkaline phosphatase 129. Serum creatinine was 0.65 mg/dL. \par \par He's still waiting for getting the ERCP done by Dr. Howard, and apparently scheduled for this week.  We will reduce his hasn't had reduced to 50 mg daily and prednisone dose to 10 mg daily. I have recommended him to hold off on switching to budesonide.\par \par \par Interval history dated June 8, 2020\par \par Javy returns for a followup visit to hepatology clinic today accompanied by her mother. He reports fever for the last 2 days, and was preceded with some malaise. His fever was as high as her in 3 different it 2 days ago but the intensity has significantly improved as of yesterday. About 2 weeks in the he had an ERCP with stent exchange by Dr. Howard. He denies any chills. He also denies any symptoms of itching or any worsening jaundice or change in color of his urine or stool. Today the clinic she otherwise feels well and denies any new symptoms.\par \par On June 5, 2020 GI blood works that included CBC. This revealed a white cell count of 9.75, hemoglobin 14.2, platelet count 229,000. Recent CEA 19-9 level is within normal range. His INR is also normal and quantitative hemoglobin levels are now within normal range.\par \par He is currently taking azathioprine 50 mg daily.\par \par Interval Hx ( 11/18/2020)\par \par Patient presents for a follow up. He reports doing well- although reports occasional itching. No fever or jaundice.\par \par Recent MRCP- Stable appearance of the biliary tree since 7/3/2019 MRI. 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 Follow-up with endoscopic ultrasound may be considered.\par \par Labs from Sept 17, 2020- , , , and bili 0.9. \par \par Interval Hx (1/12/2021)\par \par Patient presents for evaluation for Liver Transplant. He is being accompanied by his mother. He remains asymptomatic, denies any cholestatic symptoms.\par \par His CBC from Nov 18, 2020- normal CBC, ,000. LFTs were elevated, with total bili 94, AST 94, , . INR was 1.03. His blood type is AB.\par \par He had an ERCP with stent exchange in Dec 02, 2020 though Dr. Yasmany Howard.\par \par Interval Hx (1/27/2021)\par \par Patient presents for a follow up. He has been doing well. No new complaints. He being evaluated for an OLT, and is pending an ECHO and stress test. His blood type is AB +. hIS ca 19-9 is normal. He remains immune to hepatitis A, and hepatitis B.  IgG4 level is within normal range.\par He had an ERCP with stent exchange in Dec 02, 2020 though Dr. Yasmany Howard. His next ERCP will be in March, 2021. \par Since last seen he has an EUS.   A mass was identified in the pancreatic tail. Appearance characteristic is               benign. 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 FNA was not performed due to benign appearance (stabillity based on prior imaging), and difficulty finding a safe path on color doppler (due to blood vessels on the path of the needle). The remainder of the pancreas was examined and was unremarkable. Few Discrete peripancretic, and periportal lymph nodes were also noted. The largest periportal LN measured about 1.8 cm. The appearance characteristic was benign. The endosonographic appearance of parenchyma and the upstream pancreatic duct indicated no duct dilation. Biliary stent was seen in situ.\par He reports somewhat pale stool, but denies any itching.\par \par His LFTs show total bili 1.5, , , .\par \par Interval history dated April 6, 2021\james Palafox presents for a hepatology follow-up appointment accompanied by her mother.  Since last seen he reports he has been having increasing pruritus that is bothering him from sleep.  He has been taking hydroxyzine which is able to control his pruritus to some extent.  He also reports that after taking a warm shower he is having significant itching feeling like burning sensation in his skin.\par \par Since last seen he also completed his evaluation and has been approved for listing for liver transplantation.  He is awaiting dental clearance and also an application for meld score exception.\par \par Since last seen he had an ERCP and a stent exchange performed on February 25, 2021.  No interval episodes of cholangitis.\par \par Recent follow-up labs from 26 March 2021 was reviewed.  This revealed a hemoglobin of 13.1 g/dL with platelet count 277,000.  His liver function test revealed total bilirubin 3.9, AST 75, ALT 31, alkaline phosphatase 241.  Serum creatinine is 0.83 mg/dL.  INR is 1.\par \par Interval history dated April 21, 2021.\james Palafox presents for hepatology follow-up appointment along with his mother.  Area recently had an emergency visit on April 19, 2021 with complaints of fever with a temperature maximum 102 °F associated with cough, runny nose and myalgia.  He denies any nausea, vomiting, abdominal pain or worsening jaundice or pruritus.  Covid 19 test was negative.\par \par He recently received his COVID-19 vaccine second dose Pfizer on April 11, 2021.  He reports he has having symptomatic malaise, fever since that time.  Labs from April 28 and April 21 during his hospital stay was reviewed which revealed leukocytosis with a white cell count of 12.27 on admission which improved to 7.97 at the time of discharge.  His hemoglobin was 9.6 and platelet count of 397,000.  Liver function tests revealed total bilirubin 1.4, normal liver enzymes with AST 26, ALT 15, alkaline phosphatase 205.  His INR was 1.37.\par \par He had a CT scan of the abdomen performed on April 20, 2021 which revealed the following findings-\par 1. Partial improvement of the intrahepatic biliary dilatation in this patient status post biliary stent placement.\par 2. New splenomegaly, of uncertain etiology. Correlate for developing portal venous hypertension.\par 3. Enhancing pancreatic tail lesion, better assessed on the MRI from November, 2020.\par \par Interval history dated May 12, 2021\par \par Javy presents for hepatology follow-up appointment accompanied by his mother.  Today he feels well.  However he has been having intermittent low-grade fevers since April 20, 2021.\par \par MRI from May 10, 2021 revealed- Central biliary stricture as above. Differential includes both inflammatory and neoplastic etiologies, favor benign stricture given relative stability from 2019. Signal abnormality in segment 7 with several subcentimeter rim-enhancing foci concerning for microabscesses. Pancreatic tail lesion demonstrating interval growth however with enhancement characteristics consistent with a splenule. Of note, the spleen demonstrates continued interval growth along with this lesion over time.\par \par Patient had labs drawn yesterday on May 11, 2021 which revealed a white cell count of 6.1, hemoglobin 9.7, platelet count 2 69,000.  Serum sodium was 140, potassium 3.6, creatinine 0.67.  Liver function test revealed total bilirubin 0.8, AST 30, ALT 20, alkaline phosphatase 261.\par \par

## 2021-05-12 NOTE — REVIEW OF SYSTEMS
[Fever] : no fever [Chills] : no chills [Feeling Poorly] : feeling poorly [Difficulty Walking] : difficulty walking [As Noted in HPI] : as noted in HPI [Negative] : Heme/Lymph [FreeTextEntry3] : Reports jaundice [de-identified] : Report significant generalized pruritus particularly during the night [de-identified] : Spastic gait fro cerebral palsy

## 2021-05-13 PROBLEM — R94.5 ABNORMAL RESULTS OF LIVER FUNCTION STUDIES: Chronic | Status: ACTIVE | Noted: 2021-05-11

## 2021-05-13 PROBLEM — K02.9 DENTAL CARIES, UNSPECIFIED: Chronic | Status: ACTIVE | Noted: 2021-05-11

## 2021-05-13 PROBLEM — K83.1 OBSTRUCTION OF BILE DUCT: Chronic | Status: ACTIVE | Noted: 2021-05-11

## 2021-05-17 ENCOUNTER — APPOINTMENT (OUTPATIENT)
Dept: DISASTER EMERGENCY | Facility: CLINIC | Age: 31
End: 2021-05-17

## 2021-05-18 LAB — SARS-COV-2 N GENE NPH QL NAA+PROBE: NOT DETECTED

## 2021-05-19 ENCOUNTER — RESULT REVIEW (OUTPATIENT)
Age: 31
End: 2021-05-19

## 2021-05-19 ENCOUNTER — TRANSCRIPTION ENCOUNTER (OUTPATIENT)
Age: 31
End: 2021-05-19

## 2021-05-19 ENCOUNTER — APPOINTMENT (OUTPATIENT)
Dept: INFECTIOUS DISEASE | Facility: CLINIC | Age: 31
End: 2021-05-19
Payer: MEDICAID

## 2021-05-19 ENCOUNTER — OUTPATIENT (OUTPATIENT)
Dept: OUTPATIENT SERVICES | Facility: HOSPITAL | Age: 31
LOS: 1 days | End: 2021-05-19
Payer: MEDICAID

## 2021-05-19 VITALS
HEIGHT: 64 IN | OXYGEN SATURATION: 100 % | DIASTOLIC BLOOD PRESSURE: 71 MMHG | HEART RATE: 96 BPM | TEMPERATURE: 98.2 F | SYSTOLIC BLOOD PRESSURE: 119 MMHG

## 2021-05-19 DIAGNOSIS — B97.89 OTHER VIRAL AGENTS AS THE CAUSE OF DISEASES CLASSIFIED ELSEWHERE: ICD-10-CM

## 2021-05-19 DIAGNOSIS — Z98.890 OTHER SPECIFIED POSTPROCEDURAL STATES: Chronic | ICD-10-CM

## 2021-05-19 PROCEDURE — 90670 PCV13 VACCINE IM: CPT

## 2021-05-19 PROCEDURE — G0009: CPT

## 2021-05-19 PROCEDURE — 99203 OFFICE O/P NEW LOW 30 MIN: CPT

## 2021-05-19 PROCEDURE — G0463: CPT | Mod: 25

## 2021-05-19 NOTE — ASU PATIENT PROFILE, ADULT - PMH
Abnormal LFTs    Bile duct stricture    Cerebral palsy    Dental caries    Impacted teeth    PSC (primary sclerosing cholangitis)

## 2021-05-19 NOTE — ASU PATIENT PROFILE, ADULT - PSH
History of biliary stent insertion  multiple insertions and removal   last insertion was Feb 25, 2020  History of ERCP  multiple

## 2021-05-20 ENCOUNTER — OUTPATIENT (OUTPATIENT)
Dept: OUTPATIENT SERVICES | Facility: HOSPITAL | Age: 31
LOS: 1 days | Discharge: HOME | End: 2021-05-20

## 2021-05-20 VITALS
RESPIRATION RATE: 14 BRPM | DIASTOLIC BLOOD PRESSURE: 64 MMHG | TEMPERATURE: 98 F | SYSTOLIC BLOOD PRESSURE: 119 MMHG | OXYGEN SATURATION: 99 % | WEIGHT: 130.07 LBS | HEIGHT: 64 IN | HEART RATE: 90 BPM

## 2021-05-20 VITALS
HEART RATE: 92 BPM | DIASTOLIC BLOOD PRESSURE: 72 MMHG | OXYGEN SATURATION: 98 % | SYSTOLIC BLOOD PRESSURE: 127 MMHG | TEMPERATURE: 97 F | RESPIRATION RATE: 14 BRPM

## 2021-05-20 DIAGNOSIS — Z98.890 OTHER SPECIFIED POSTPROCEDURAL STATES: Chronic | ICD-10-CM

## 2021-05-20 DIAGNOSIS — K01.1 IMPACTED TEETH: ICD-10-CM

## 2021-05-20 RX ORDER — IBUPROFEN 200 MG
1 TABLET ORAL
Qty: 28 | Refills: 0
Start: 2021-05-20 | End: 2021-05-26

## 2021-05-20 RX ORDER — CHLORHEXIDINE GLUCONATE 213 G/1000ML
15 SOLUTION TOPICAL
Qty: 210 | Refills: 0
Start: 2021-05-20 | End: 2021-05-26

## 2021-05-20 RX ORDER — ACETAMINOPHEN 500 MG
1 TABLET ORAL
Qty: 28 | Refills: 0
Start: 2021-05-20 | End: 2021-05-26

## 2021-05-20 RX ORDER — HYDROMORPHONE HYDROCHLORIDE 2 MG/ML
0.5 INJECTION INTRAMUSCULAR; INTRAVENOUS; SUBCUTANEOUS
Refills: 0 | Status: DISCONTINUED | OUTPATIENT
Start: 2021-05-20 | End: 2021-05-20

## 2021-05-20 NOTE — ASU PREOP CHECKLIST - ANTIBIOTIC
2018  EMPLOYEE INFORMATION: EMPLOYER INFORMATION:   NAME: [employer]   : 1963 [phone]   DATE OF INJURY/EVENT: 2018          Location: [facility]   Treating Provider: Stephan Galvez MD,MPH  Time In:  3:24 PM Time Out:  3:45 PM      DIAGNOSIS:   1. Strain of left trapezius muscle, subsequent encounter    2. Strain of latissimus dorsi muscle, subsequent encounter      STATUS: This injury is determined to be WORK RELATED.   RETURN TO WORK:  Employee may return to work with restrictions.               RESTRICTIONS:   May lift/push/pull up to 10 lbs with left arm.    No overhead work with left arm.    No repetitive cross-over motion with either arm.    Restrictions are to be followed at work and at home  Restrictions are in effect until next follow-up visit  Diagnostic Testing:   None  ANTICIPATED MAXIMUM MEDICAL IMPROVEMENT:  2 weeks  TREATMENT PLAN:  Start therapy  Medications as needed  Topical cream  FOLLOW UP: 2 weeks, 9.10.18 @ 3:45 pm,  hopefully for last visit     Thank you for the privilege of providing medical care for this injury/condition.  If there are any questions, please call the clinic at Dept: [phone].    Electronically signed on 2018 at 3:45 PM by:   Stephan Galvez MD,MPH   Medical Director  [facility]     Flagyl and levofloxacin last night x 1 week/yes

## 2021-05-20 NOTE — REASON FOR VISIT
[Consultation] : a consultation visit [FreeTextEntry1] : Liver Abscess, Pre Liver Transplant Evaluation

## 2021-05-20 NOTE — PHYSICAL EXAM
[General Appearance - Alert] : alert [General Appearance - In No Acute Distress] : in no acute distress [Sclera] : the sclera and conjunctiva were normal [PERRL With Normal Accommodation] : pupils were equal in size, round, reactive to light [Extraocular Movements] : extraocular movements were intact [Outer Ear] : the ears and nose were normal in appearance [Oropharynx] : the oropharynx was normal with no thrush [Neck Appearance] : the appearance of the neck was normal [Auscultation Breath Sounds / Voice Sounds] : lungs were clear to auscultation bilaterally [Heart Rate And Rhythm] : heart rate was normal and rhythm regular [Heart Sounds] : normal S1 and S2 [Heart Sounds Gallop] : no gallops [Murmurs] : no murmurs [Heart Sounds Pericardial Friction Rub] : no pericardial rub [Edema] : there was no peripheral edema [Bowel Sounds] : normal bowel sounds [Abdomen Soft] : soft [Abdomen Tenderness] : non-tender [] : no hepato-splenomegaly [Abdomen Mass (___ Cm)] : no abdominal mass palpated [No Palpable Adenopathy] : no palpable adenopathy [Musculoskeletal - Swelling] : no joint swelling [Nail Clubbing] : no clubbing  or cyanosis of the fingernails [Motor Tone] : muscle strength and tone were normal [Skin Lesions] : no skin lesions [No Focal Deficits] : no focal deficits

## 2021-05-20 NOTE — H&P ADULT - NSHPREVIEWOFSYSTEMS_GEN_ALL_CORE
Review of Systems:   · Negative General Symptoms	no fever; no chills; no sweating; no fatigue  · General Symptoms	weight loss; pt reports fever with ursodiol. stopped medication on 5/8/21- fever resolved  	  · General Comments	reported weight loss of 15lbs in the last 2 months  · Negative Skin Symptoms	no rash; no itching; no dryness  · Ophthalmologic	negative  · ENMT	negative  · Negative Respiratory and Thorax Symptoms	no wheezing; no dyspnea; no hemoptysis  · Respiratory and Thorax Symptoms	cough  r/t ursodiol medication-pt says cough is improving since medication was stopped  	  · Negative Cardiovascular Symptoms	no chest pain; no palpitations; no dyspnea on exertion; no peripheral edema  · Negative Gastrointestinal Symptoms	no nausea; no vomiting; no diarrhea; no constipation; no change in bowel habits; no jaundice  · Genitourinary	negative  · Musculoskeletal	negative  · Negative Neurological Symptoms	no transient paralysis; no weakness; no paresthesias; no generalized seizures; no focal seizures; no syncope; no tremors; no vertigo; no loss of sensation; no difficulty walking; no headache; no loss of consciousness; no hemiparesis; no confusion; no facial palsy  · Neurological Comments	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 h/o CP  · Psychiatric	negative  · Hematology/Lymphatics	negative  · Endocrine	negative  · Allergic/Immunologic	negative

## 2021-05-20 NOTE — H&P ADULT - HISTORY OF PRESENT ILLNESS
31 y/o male with h/o Abnormal LFT's, Primary Sclerosing cholangitis (PCS) and cerebral palsy presents to [facility] OR for GA for exactions teeth #1,16, 17, 32 with Dr. Fishman

## 2021-05-20 NOTE — HISTORY OF PRESENT ILLNESS
[FreeTextEntry1] : 30 year old male PMH PSC, Cerebral Palsy who presents for evaluation of liver abscesses. Patient presented to [facility] in 4/2021 with fever. Had blood cultures obtained at the time which were with no growth. CT A/P (4/20) without obvious evidence of infection. Patient was discharged on empiric ciprofloxacin. Notes no more fevers after ciprofloxacin initiation. \par \par Patient had outpatient MRI Abdomen on 5/10/21 which revealed Signal abnormality in segment 7 with several subcentimeter rim-enhancing foci concerning for microabscesses. Patient was initiated on Levofloxacin and Metronidazole on 5/12/21. Denies any chills or fevers since. Denies any abdominal pain. \par \par Patient does note chronic cough which started since 4/2020. Denies any chills or fevers. Notes it is intermittently productive. He notes having vomiting episodes due to coughing fits. \par \par PMH: PSC, Cerebral Palsy\par FH: Hypothyroid in the mother. No FH of liver disease\par All: NKDA\par Soc: Born in [place]. Lived in [place] for 1 year ([id number]). Only in [facility]. Unsure of latent TB testing. No FH of TB

## 2021-05-20 NOTE — ASSESSMENT
[FreeTextEntry1] : 30 year-old male PMH PSC, Cerebral Palsy who presents for evaluation of liver abscesses.\par \par Admission to [facility] 4/2021 with fever. Had blood cultures obtained at the time which were with no growth. CT A/P (4/20) without obvious evidence of infection. Patient was discharged on empiric ciprofloxacin. \par \par MRI Abdomen on 5/10/21 which revealed Signal abnormality in segment 7 with several subcentimeter rim-enhancing foci concerning for microabscesses. \par \par initiated on Levofloxacin and Metronidazole on 5/12/21.\par \par Appears that antibiotics are appropriate as fevers have resolved since April. Tentatively planned for repeat MRI abdomen next week (2 weeks from antibiotic initiation). If abscesses are improving/resolved would still complete total 4 weeks of Levofloxacin/Metronidazole.\par \par Patient also had chronic cough (which started in 4/2020). Reassuringly COVID19 PCR in last 72H is negative and patient is s/p COVID19 Vaccination. Quantiferon is also negative \par \par I will check CXR - if unrevealing and cough continues will check CT Chest to be thorough (as patient is pretransplant)\par \par In terms of other pre-transplant workup: Will check CBC, CMP, Mumps IgG, Measles IgG and Strongyloides Antibody\par \par In terms of immunizations:\par Will administer Prevnar today. Pneumovax in 8 weeks\par s/p 2 doses of COVID19 Pfizer vaccine in 4/2021\par HBV immune\par HAV immune \par Tdap 11/2020\par \par Followup: 2 weeks

## 2021-05-20 NOTE — H&P ADULT - NSHPSOCIALHISTORY_GEN_ALL_CORE
Social History:  · Marital Status	Single  · Lives With	parents; Mother     Substance Use History:  · Substance Use	caffeine  · Caffeine Type	coffee; pop/soda  · Caffeine Amount/Frequency	occasional use  · Substance Use Comment	denies drug use     Alcohol Use History:  · Have you ever consumed alcohol	never     Tobacco Usage:  · Tobacco Usage: Never smoker     Passive Smoke Exposure:  · Passive Smoke Exposure	No

## 2021-05-20 NOTE — ASU DISCHARGE PLAN (ADULT/PEDIATRIC) - CALL YOUR DOCTOR IF YOU HAVE ANY OF THE FOLLOWING:
Bleeding that does not stop/Swelling that gets worse/Pain not relieved by Medications/Wound/Surgical Site with redness, or foul smelling discharge or pus Bleeding that does not stop/Swelling that gets worse/Pain not relieved by Medications/Fever greater than (need to indicate Fahrenheit or Celsius)/Wound/Surgical Site with redness, or foul smelling discharge or pus/Nausea and vomiting that does not stop/Unable to urinate

## 2021-05-20 NOTE — H&P ADULT - ASSESSMENT
29 y/o male with h/o Abnormal LFT's, Primary Sclerosing cholangitis (PCS) and cerebral palsy presents to [facility] OR for GA for exactions teeth #1,16, 17, 32 with Dr. Fishman

## 2021-05-20 NOTE — H&P ADULT - NSHPPHYSICALEXAM_GEN_ALL_CORE
Physical Exam:  · Constitutional		well-groomed; no distress  · Eyes		PERRL; EOMI; conjunctiva clear  · ENMT		mouth  · Mouth	normal mouth and gums; moist  · Neck		normal; supple; no JVD; full ROM of neck  · Breasts	detailed exam  · Breasts Details	normal shape  · Back		ROM intact  · Respiratory	airway patent; respirations non-labored; clear to auscultation bilaterally; no wheezes  · Cardiovascular		regular rate and rhythm 	positive S1; positive S2  · Gastrointestinal	soft; nontender; no masses palpable; bowel sounds normal  · Genitourinary	not examined   · Rectal	not examined   · Extremities		no pedal edema  · Vascular	detailed exam   · Radial Pulse	right normal; left normal  · Neurological		alert and oriented x 3; responds to pain; responds to verbal commands; sensation intact  · Motor	normal strength to right upper and lower extremity  · Gait/Balance	altered gait.  · Skin		warm and dry  · Scars	none  · Lymph Nodes		no cervical lymphadenopathy palpated  · Musculoskeletal		no calf tenderness; normal strength  · Musculoskeletal Comments	deformity to left hand and left foot- h/o cerebral palsy  · Psychiatric	Affect and characteristics of appearance, verbalizations, behaviors are appropriate

## 2021-05-20 NOTE — ASU DISCHARGE PLAN (ADULT/PEDIATRIC) - CARE PROVIDER_API CALL
Eyad Fishman (DDS; MD)  OralMaxillofacial Surgery  [address] [place] [address], [facility] [address] [place] [address]  Phone: [phone]  Fax: [phone]  Follow Up Time:

## 2021-05-21 ENCOUNTER — APPOINTMENT (OUTPATIENT)
Dept: RADIOLOGY | Facility: CLINIC | Age: 31
End: 2021-05-21
Payer: MEDICAID

## 2021-05-21 ENCOUNTER — OUTPATIENT (OUTPATIENT)
Dept: OUTPATIENT SERVICES | Facility: HOSPITAL | Age: 31
LOS: 1 days | End: 2021-05-21
Payer: MEDICAID

## 2021-05-21 DIAGNOSIS — Z98.890 OTHER SPECIFIED POSTPROCEDURAL STATES: Chronic | ICD-10-CM

## 2021-05-21 DIAGNOSIS — Z01.818 ENCOUNTER FOR OTHER PREPROCEDURAL EXAMINATION: ICD-10-CM

## 2021-05-21 PROCEDURE — 71046 X-RAY EXAM CHEST 2 VIEWS: CPT | Mod: 26

## 2021-05-21 PROCEDURE — 71046 X-RAY EXAM CHEST 2 VIEWS: CPT

## 2021-05-24 DIAGNOSIS — Z01.818 ENCOUNTER FOR OTHER PREPROCEDURAL EXAMINATION: ICD-10-CM

## 2021-05-24 DIAGNOSIS — Z23 ENCOUNTER FOR IMMUNIZATION: ICD-10-CM

## 2021-05-24 DIAGNOSIS — K75.0 ABSCESS OF LIVER: ICD-10-CM

## 2021-06-01 ENCOUNTER — APPOINTMENT (OUTPATIENT)
Dept: DISASTER EMERGENCY | Facility: CLINIC | Age: 31
End: 2021-06-01

## 2021-06-01 ENCOUNTER — APPOINTMENT (OUTPATIENT)
Dept: INFECTIOUS DISEASE | Facility: CLINIC | Age: 31
End: 2021-06-01

## 2021-06-01 LAB
ALBUMIN SERPL ELPH-MCNC: 3.5 G/DL
ALP BLD-CCNC: 901 U/L
ALT SERPL-CCNC: 78 U/L
ANION GAP SERPL CALC-SCNC: 10 MMOL/L
AST SERPL-CCNC: 136 U/L
BASOPHILS # BLD AUTO: 0.03 K/UL
BASOPHILS NFR BLD AUTO: 0.6 %
BILIRUB SERPL-MCNC: 0.8 MG/DL
BUN SERPL-MCNC: 12 MG/DL
CALCIUM SERPL-MCNC: 9.1 MG/DL
CHLORIDE SERPL-SCNC: 104 MMOL/L
CO2 SERPL-SCNC: 24 MMOL/L
CREAT SERPL-MCNC: 0.89 MG/DL
EOSINOPHIL # BLD AUTO: 0.11 K/UL
EOSINOPHIL NFR BLD AUTO: 2.2 %
GLUCOSE SERPL-MCNC: 95 MG/DL
HCT VFR BLD CALC: 35 %
HGB BLD-MCNC: 11.3 G/DL
IMM GRANULOCYTES NFR BLD AUTO: 0.4 %
LYMPHOCYTES # BLD AUTO: 1.12 K/UL
LYMPHOCYTES NFR BLD AUTO: 22.6 %
MAN DIFF?: NORMAL
MCHC RBC-ENTMCNC: 32.3 GM/DL
MCHC RBC-ENTMCNC: 33 PG
MCV RBC AUTO: 102.3 FL
MEV IGG FLD QL IA: >300 AU/ML
MEV IGG+IGM SER-IMP: POSITIVE
MONOCYTES # BLD AUTO: 0.39 K/UL
MONOCYTES NFR BLD AUTO: 7.9 %
MUV AB SER-ACNC: POSITIVE
MUV IGG SER QL IA: 40.5 AU/ML
NEUTROPHILS # BLD AUTO: 3.28 K/UL
NEUTROPHILS NFR BLD AUTO: 66.3 %
PLATELET # BLD AUTO: 375 K/UL
POTASSIUM SERPL-SCNC: 5 MMOL/L
PROT SERPL-MCNC: 7.9 G/DL
RBC # BLD: 3.42 M/UL
RBC # FLD: 13.5 %
SARS-COV-2 N GENE NPH QL NAA+PROBE: NOT DETECTED
SODIUM SERPL-SCNC: 138 MMOL/L
STRONGYLOIDES AB SER IA-ACNC: POSITIVE
WBC # FLD AUTO: 4.95 K/UL

## 2021-06-02 ENCOUNTER — TRANSCRIPTION ENCOUNTER (OUTPATIENT)
Age: 31
End: 2021-06-02

## 2021-06-03 ENCOUNTER — RESULT REVIEW (OUTPATIENT)
Age: 31
End: 2021-06-03

## 2021-06-03 ENCOUNTER — OUTPATIENT (OUTPATIENT)
Dept: OUTPATIENT SERVICES | Facility: HOSPITAL | Age: 31
LOS: 1 days | End: 2021-06-03
Payer: MEDICAID

## 2021-06-03 ENCOUNTER — APPOINTMENT (OUTPATIENT)
Dept: GASTROENTEROLOGY | Facility: HOSPITAL | Age: 31
End: 2021-06-03

## 2021-06-03 DIAGNOSIS — Z98.890 OTHER SPECIFIED POSTPROCEDURAL STATES: Chronic | ICD-10-CM

## 2021-06-03 DIAGNOSIS — R94.5 ABNORMAL RESULTS OF LIVER FUNCTION STUDIES: ICD-10-CM

## 2021-06-03 PROCEDURE — 43273 ENDOSCOPIC PANCREATOSCOPY: CPT

## 2021-06-03 PROCEDURE — C1773: CPT

## 2021-06-03 PROCEDURE — 43274 ERCP DUCT STENT PLACEMENT: CPT

## 2021-06-03 PROCEDURE — C2617: CPT

## 2021-06-03 PROCEDURE — 88300 SURGICAL PATH GROSS: CPT | Mod: 26

## 2021-06-03 PROCEDURE — 43264 ERCP REMOVE DUCT CALCULI: CPT

## 2021-06-03 PROCEDURE — 43276 ERCP STENT EXCHANGE W/DILATE: CPT

## 2021-06-03 PROCEDURE — 74330 X-RAY BILE/PANC ENDOSCOPY: CPT

## 2021-06-03 PROCEDURE — C1769: CPT

## 2021-06-03 PROCEDURE — 88300 SURGICAL PATH GROSS: CPT

## 2021-06-03 PROCEDURE — 43264 ERCP REMOVE DUCT CALCULI: CPT | Mod: 59

## 2021-06-03 PROCEDURE — C1726: CPT

## 2021-06-03 RX ORDER — INDOMETHACIN 50 MG
100 CAPSULE ORAL ONCE
Refills: 0 | Status: DISCONTINUED | OUTPATIENT
Start: 2021-06-03 | End: 2021-06-17

## 2021-06-07 LAB — SURGICAL PATHOLOGY STUDY: SIGNIFICANT CHANGE UP

## 2021-06-11 ENCOUNTER — OUTPATIENT (OUTPATIENT)
Dept: OUTPATIENT SERVICES | Facility: HOSPITAL | Age: 31
LOS: 1 days | End: 2021-06-11
Payer: MEDICAID

## 2021-06-11 ENCOUNTER — APPOINTMENT (OUTPATIENT)
Dept: MRI IMAGING | Facility: CLINIC | Age: 31
End: 2021-06-11
Payer: MEDICAID

## 2021-06-11 DIAGNOSIS — Z98.890 OTHER SPECIFIED POSTPROCEDURAL STATES: Chronic | ICD-10-CM

## 2021-06-11 DIAGNOSIS — K75.0 ABSCESS OF LIVER: ICD-10-CM

## 2021-06-11 PROCEDURE — 74183 MRI ABD W/O CNTR FLWD CNTR: CPT | Mod: 26

## 2021-06-11 PROCEDURE — 74183 MRI ABD W/O CNTR FLWD CNTR: CPT

## 2021-06-11 PROCEDURE — A9585: CPT

## 2021-06-14 PROBLEM — K83.1: Status: RESOLVED | Noted: 2019-03-07 | Resolved: 2021-06-14

## 2021-06-15 ENCOUNTER — APPOINTMENT (OUTPATIENT)
Dept: TRANSPLANT | Facility: CLINIC | Age: 31
End: 2021-06-15
Payer: MEDICAID

## 2021-06-15 ENCOUNTER — APPOINTMENT (OUTPATIENT)
Dept: HEPATOLOGY | Facility: CLINIC | Age: 31
End: 2021-06-15
Payer: MEDICAID

## 2021-06-15 VITALS
SYSTOLIC BLOOD PRESSURE: 117 MMHG | HEART RATE: 107 BPM | RESPIRATION RATE: 12 BRPM | HEIGHT: 64 IN | TEMPERATURE: 98 F | OXYGEN SATURATION: 100 % | DIASTOLIC BLOOD PRESSURE: 68 MMHG | WEIGHT: 120 LBS | BODY MASS INDEX: 20.49 KG/M2

## 2021-06-15 DIAGNOSIS — K83.1 OBSTRUCTION OF BILE DUCT: ICD-10-CM

## 2021-06-15 PROCEDURE — 99214 OFFICE O/P EST MOD 30 MIN: CPT

## 2021-06-15 RX ORDER — RIFAMPIN 150 MG/1
150 CAPSULE ORAL TWICE DAILY
Qty: 60 | Refills: 1 | Status: DISCONTINUED | COMMUNITY
Start: 2021-04-06 | End: 2021-06-15

## 2021-06-15 RX ORDER — METRONIDAZOLE 500 MG/1
500 TABLET ORAL
Qty: 60 | Refills: 0 | Status: COMPLETED | COMMUNITY
Start: 2021-05-12 | End: 2021-06-15

## 2021-06-15 RX ORDER — URSODIOL 250 MG/1
250 TABLET ORAL DAILY
Qty: 30 | Refills: 3 | Status: DISCONTINUED | COMMUNITY
Start: 2021-04-15 | End: 2021-06-15

## 2021-06-15 RX ORDER — LEVOFLOXACIN 750 MG/1
750 TABLET, FILM COATED ORAL DAILY
Qty: 30 | Refills: 0 | Status: DISCONTINUED | COMMUNITY
Start: 2021-05-12 | End: 2021-06-15

## 2021-06-15 NOTE — ASSESSMENT
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 Multiple stent exchanges for biliary strictures, most recent stent exchange was performed on Giuliana 3, 2021. \par Currently listed for liver transplant. MELD 12. \par \par MRI from May 10, 2021 revealed- Central biliary stricture as above. Differential includes both inflammatory and neoplastic etiologies, favor benign stricture given relative stability from 2019. Signal abnormality in segment 7 with several subcentimeter rim-enhancing foci concerning for microabscesses. Pancreatic tail lesion demonstrating interval growth however with enhancement characteristics consistent with a splenule. Of note, the spleen demonstrates continued interval growth along with this lesion over time. \par \par ERCP on 6/3/21 showing recurrent intrahepatic bile duct stricture and there was bile duct stone s/p balloon sweep removal and subsequent dilation of the right and left intrahepatic system and bilateral bile duct stent placement.\par \par Labs (5/19/21):   (30), ALT 78 (20), AlkP 901 (261), TB 0.8\par Na 138, Cr 0.89,  WBC 4.9, 11.3/35, Plt 375 \par Labs (4/20/21) - AFP<1.8, INR: 1.37, Covid Ab + (>250)\par \par PLAN\par -Review MRI at TB meeting\par -Start megestrol & nutritionist\par -Labs today

## 2021-06-15 NOTE — PHYSICAL EXAM
[Alert] : alert [No Acute Distress] : no acute distress [Scleral Icterus] : scleral icterus [Clear to Auscultation] : lungs were clear to auscultation bilaterally [Normal Rate] : normal rate [Non-Tender] : Liver Edge: non-tender [Soft] : soft [] : right femoral palpable [Hepatojugular Reflux] : no hepatojugular reflux [Abdominal Bruit] : no abdominal bruit [Ascites Fluid Wave] : no ascites fluid wave [Splenomegaly] : no splenomegaly [Ascites Tense] : no ascites tense [Caput Medusae] : no caput medusae [Depression] : no depression [de-identified] : c/o cough to vomit, lost 10lbs [de-identified] : non tender, no ascites,  [FreeTextEntry1] : Left hand and left leg contracture [TextBox_40] : 2+ [de-identified] :  Less Scratch winifred on back  [de-identified] : Better

## 2021-06-15 NOTE — HISTORY OF PRESENT ILLNESS
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 Biggs is a 32 y/o M with history of cerebral palsy who presents for liver transplant evaluation follow up accompanied by his mother. Currently listed on liver transplant waitlist. \par \par PMHx includes cerebral palsy, nephrolithiasis and autoimmune cholangitis, seizures as a child. He was referred by Dr. Yasmany Howard for liver transplant evaluation. Pt has know liver disease since January 2019 when he presented with pruritus, N/V and abdominal pain while living in [place]. He was noted to have liver enzymes elevation and his family traveled back to [place] for healthcare.\par \par Then subsequently he presented to [facility] and underwent evaluation. His bilirubin started to rise further and then transferred to [facility] in February 2019. He underwent ERCP which showed the distal bile duct stricture and questionable right intrahepatic stricture. Stent was placed in the distal bile duct. However his bilirubin did not improve initially. He underwent MRI with severe right intrahepatic stricture.\par He's undergone serial stent exchanges the last ones on 12/3/20; 2/2021 and most recently on 6/3/21. His TB peaked 22. He's had multiple cholangitis episodes. Mother recalls in August 2019 he became septic presenting febrile and hypotensive. Additionally, he had a liver biopsy (6/7/2019) that showed a cholestatic pattern of injury with ductular reaction.\par Ca 19-9 level, IgG 4 level and bile duct brushings have been unremarkable. \par ARMANDO is negative, Mitochondrial antibody is negative, Viral hepatitis serologies are negative.\par \par Interval Events (6/15/21) – Presents for follow up. Will also see Dr. Tipton today. \par Patient continues to not feel well after 4 weeks of antibiotics. Has lost 10 lbs in the past month. . \par Intermittent low-grade fever (). Temp 98 today. c/o abdominal pain intermittently. \par \par Admission to [facility] 4/20/21 with fever. Had blood cultures obtained at the time which were with no growth. CT A/P (4/20) without obvious evidence of infection. Patient was discharged on empiric ciprofloxacin.\par \par MRI from May 10, 2021 revealed- Central biliary stricture as above. Differential includes both inflammatory and neoplastic etiologies, favor benign stricture given relative stability from 2019. Signal abnormality in segment 7 with several subcentimeter rim-enhancing foci concerning for microabscesses. Pancreatic tail lesion demonstrating interval growth however with enhancement characteristics consistent with a splenule. Of note, the spleen demonstrates continued interval growth along with this lesion over time. \par \par He was initiated on Levofloxacin 750mg daily and Metronidazole 500mg BID on 5/12/21 with plan to treat for 4 weeks and plan to repeat MRI as per hepatology and ID. Azathioprine discontinued. \par \par MRI done on 6/11/21 – report pending \par \par More recently, patient had ERCP on 6/3/21 showing recurrent intrahepatic bile duct stricture and there was bile duct stone s/p balloon sweep removal and subsequent dilation of the right and left intrahepatic system and bilateral bile duct stent placement.\par \par Labs (5/19/21): MELD 12\par  (30), ALT 78 (20), AlkP 901 (261), TB 0.8\par Na 138, Cr 0.89,  WBC 4.9, 11.3/35, Plt 375 \par Labs (4/20/21) - AFP<1.8, INR: 1.37, Covid Ab + (>250)\par \par Interval Events (4/14/21): Seen 2 months ago. Most recent stent exchange was performed on February 2021. No interval episodes of cholangitis and his itching is much better today. He also has mild persistent jaundice. Patient recently switched from hydroxyzine to rifampin 150mg twice a day by Dr. Tipton. On azathioprine 50mg daily. \par \par Interim Hx 2/17/2020: He has completed liver transplant testing.  The echocardiogram showed normal LV systolic function, LVEF 50-55%, normal LA size, and normal pulmonary pressures. Stress (5 METS), achieved 92% MPHR, and there was no evidence of arrhythmia or ischemia at peak exertion. Last stent exchange 12/3/2020. no recent episodes of cholangitis. He underwent an EUS for a mass identified in the pancreatic tail. Appearance characteristic is benign. Suspect an inflammatory pseudomonas. FNA was not performed due to benign appea type AB, low MELD.

## 2021-06-15 NOTE — REASON FOR VISIT
[Follow-Up] : a follow-up visit for [Parent] : parent [Other: _____] : [unfilled] [FreeTextEntry2] : Dr. Yasmany Howard  [FreeTextEntry1] : Listed on waitlist

## 2021-06-16 LAB
AFP-TM SERPL-MCNC: 2.3 NG/ML
ALBUMIN SERPL ELPH-MCNC: 3.7 G/DL
ALP BLD-CCNC: 655 U/L
ALT SERPL-CCNC: 34 U/L
ANION GAP SERPL CALC-SCNC: 11 MMOL/L
AST SERPL-CCNC: 74 U/L
BASOPHILS # BLD AUTO: 0.02 K/UL
BASOPHILS NFR BLD AUTO: 0.4 %
BILIRUB SERPL-MCNC: 1.1 MG/DL
BUN SERPL-MCNC: 14 MG/DL
CALCIUM SERPL-MCNC: 9.1 MG/DL
CHLORIDE SERPL-SCNC: 106 MMOL/L
CO2 SERPL-SCNC: 26 MMOL/L
COVID-19 SPIKE DOMAIN ANTIBODY INTERPRETATION: POSITIVE
CREAT SERPL-MCNC: 0.67 MG/DL
EOSINOPHIL # BLD AUTO: 0.04 K/UL
EOSINOPHIL NFR BLD AUTO: 0.9 %
GLUCOSE SERPL-MCNC: 91 MG/DL
HCT VFR BLD CALC: 33.6 %
HGB BLD-MCNC: 10.6 G/DL
IMM GRANULOCYTES NFR BLD AUTO: 0.4 %
INR PPP: 1.12 RATIO
LYMPHOCYTES # BLD AUTO: 1.04 K/UL
LYMPHOCYTES NFR BLD AUTO: 23.4 %
MAN DIFF?: NORMAL
MCHC RBC-ENTMCNC: 31.5 GM/DL
MCHC RBC-ENTMCNC: 32.7 PG
MCV RBC AUTO: 103.7 FL
MONOCYTES # BLD AUTO: 0.23 K/UL
MONOCYTES NFR BLD AUTO: 5.2 %
NEUTROPHILS # BLD AUTO: 3.1 K/UL
NEUTROPHILS NFR BLD AUTO: 69.7 %
PLATELET # BLD AUTO: 244 K/UL
POTASSIUM SERPL-SCNC: 4.2 MMOL/L
PROT SERPL-MCNC: 8.1 G/DL
PT BLD: 13.1 SEC
RBC # BLD: 3.24 M/UL
RBC # FLD: 15.1 %
SARS-COV-2 AB SERPL IA-ACNC: >250 U/ML
SODIUM SERPL-SCNC: 143 MMOL/L
WBC # FLD AUTO: 4.45 K/UL

## 2021-06-17 DIAGNOSIS — B78.9 STRONGYLOIDIASIS, UNSPECIFIED: ICD-10-CM

## 2021-06-25 ENCOUNTER — NON-APPOINTMENT (OUTPATIENT)
Age: 31
End: 2021-06-25

## 2021-06-28 ENCOUNTER — OUTPATIENT (OUTPATIENT)
Dept: OUTPATIENT SERVICES | Facility: HOSPITAL | Age: 31
LOS: 1 days | End: 2021-06-28
Payer: MEDICAID

## 2021-06-28 ENCOUNTER — APPOINTMENT (OUTPATIENT)
Dept: CT IMAGING | Facility: CLINIC | Age: 31
End: 2021-06-28
Payer: MEDICAID

## 2021-06-28 DIAGNOSIS — R05 COUGH: ICD-10-CM

## 2021-06-28 DIAGNOSIS — Z01.818 ENCOUNTER FOR OTHER PREPROCEDURAL EXAMINATION: ICD-10-CM

## 2021-06-28 DIAGNOSIS — Z98.890 OTHER SPECIFIED POSTPROCEDURAL STATES: Chronic | ICD-10-CM

## 2021-06-28 PROCEDURE — 71250 CT THORAX DX C-: CPT

## 2021-06-28 PROCEDURE — 71250 CT THORAX DX C-: CPT | Mod: 26

## 2021-07-12 ENCOUNTER — APPOINTMENT (OUTPATIENT)
Dept: TRANSPLANT | Facility: CLINIC | Age: 31
End: 2021-07-12
Payer: MEDICAID

## 2021-07-12 VITALS
HEIGHT: 64 IN | DIASTOLIC BLOOD PRESSURE: 75 MMHG | OXYGEN SATURATION: 98 % | BODY MASS INDEX: 21.17 KG/M2 | WEIGHT: 124 LBS | HEART RATE: 100 BPM | SYSTOLIC BLOOD PRESSURE: 124 MMHG | TEMPERATURE: 97.9 F | RESPIRATION RATE: 12 BRPM

## 2021-07-12 PROCEDURE — 99213 OFFICE O/P EST LOW 20 MIN: CPT

## 2021-07-12 NOTE — ASSESSMENT
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 Multiple stent exchanges for biliary strictures, most recent stent exchange was performed on Giuliana 3, 2021. \par Currently listed for liver transplant. MELD 12. \par \par ERCP on 6/3/21 showing recurrent intrahepatic bile duct stricture and there was bile duct stone s/p balloon sweep removal and subsequent dilation of the right and left intrahepatic system and bilateral bile duct stent placement.\par \par MRI 6/11/21-with near complete resolution of hepatic segment 7 abnormality/microabscess. \par \par \par PLAN Greatly improved no pain or fever, decreased itching and increased weight gain. Abscesses resolved\par -Labs Today On list low meld\par RTC 3 mos

## 2021-07-12 NOTE — REASON FOR VISIT
[Follow-Up] : a follow-up visit for [Parent] : parent [Other: _____] : [unfilled] [FreeTextEntry1] : Listed for liver transplant  [FreeTextEntry2] : Dr. Yasmany Howard

## 2021-07-12 NOTE — HISTORY OF PRESENT ILLNESS
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 Biggs is a 32 y/o M with history of cerebral palsy who presents for liver transplant evaluation follow up accompanied by his mother. Currently listed on liver transplant waitlist. \par \par PMHx includes cerebral palsy, nephrolithiasis and autoimmune cholangitis, seizures as a child. He was referred by Dr. Yasmany Howard for liver transplant evaluation. Pt has know liver disease since January 2019 when he presented with pruritus, N/V and abdominal pain while living in [place]. He was noted to have liver enzymes elevation and his [place] back to [place] for healthcare.\par \par Then subsequently he presented to [facility] and underwent evaluation. His bilirubin started to rise further and then transferred to [facility] in February 2019. He underwent ERCP which showed the distal bile duct stricture and questionable right intrahepatic stricture. Stent was placed in the distal bile duct. However his bilirubin did not improve initially. He underwent MRI with severe right intrahepatic stricture.\par He's undergone serial stent exchanges the last ones on 12/3/20; 2/2021 and most recently on 6/3/21. His TB peaked 22. He's had multiple cholangitis episodes. Mother recalls in August 2019 he became septic presenting febrile and hypotensive. Additionally, he had a liver biopsy (6/7/2019) that showed a cholestatic pattern of injury with ductular reaction.\par Ca 19-9 level, IgG 4 level and bile duct brushings have been unremarkable. \par ARMANDO is negative, Mitochondrial antibody is negative, Viral hepatitis serologies are negative.\par \par Interval Events (7/12/21) -Pt completed abx treatment (Levofloxacin/ Metronidazole) MRCP (6/12/21) shows \par microabscesses  have improved. \par Feeling \par \par (6/15/21) – Presents for follow up. Will also see Dr. Tipton today. \par Patient continues to not feel well after 4 weeks of antibiotics. Has lost 10 lbs in the past month. . \par Intermittent low-grade fever (). Temp 98 today. c/o abdominal pain intermittently. \par \par Admission to [facility] 4/20/21 with fever. Had blood cultures obtained at the time which were with no growth. CT A/P (4/20) without obvious evidence of infection. Patient was discharged on empiric ciprofloxacin.\par \par MRI from May 10, 2021 revealed- Central biliary stricture as above. Differential includes both inflammatory and neoplastic etiologies, favor benign stricture given relative stability from 2019. Signal abnormality in segment 7 with several subcentimeter rim-enhancing foci concerning for microabscesses. Pancreatic tail lesion demonstrating interval growth however with enhancement characteristics consistent with a splenule. Of note, the spleen demonstrates continued interval growth along with this lesion over time. \par \par He was initiated on Levofloxacin 750mg daily and Metronidazole 500mg BID on 5/12/21 with plan to treat for 4 weeks and plan to repeat MRI as per hepatology and ID. Azathioprine discontinued. \par \par MRI/MRCP 6/11/21 – Stable central billiary stricture. Near complete resolution of the hepatic seg 7 signal abnormality/microabscesses. 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 \par \par More recently, patient had ERCP on 6/3/21 showing recurrent intrahepatic bile duct stricture and there was bile duct stone s/p balloon sweep removal and subsequent dilation of the right and left intrahepatic system and bilateral bile duct stent placement.\par \par Labs (5/19/21): MELD 12\par  (30), ALT 78 (20), AlkP 901 (261), TB 0.8\par Na 138, Cr 0.89,  WBC 4.9, 11.3/35, Plt 375 \par Labs (4/20/21) - AFP<1.8, INR: 1.37, Covid Ab + (>250)\par \par Interval Events (4/14/21): Seen 2 months ago. Most recent stent exchange was performed on February 2021. No interval episodes of cholangitis and his itching is much better today. He also has mild persistent jaundice. Patient recently switched from hydroxyzine to rifampin 150mg twice a day by Dr. Tipton. On azathioprine 50mg daily. \par \par Interim Hx 2/17/2020: He has completed liver transplant testing.  The echocardiogram showed normal LV systolic function, LVEF 50-55%, normal LA size, and normal pulmonary pressures. Stress (5 METS), achieved 92% MPHR, and there was no evidence of arrhythmia or ischemia at peak exertion. Last stent exchange 12/3/2020. no recent episodes of cholangitis. He underwent an EUS for a mass identified in the pancreatic tail. Appearance characteristic is benign. Suspect an inflammatory pseudomonas. FNA was not performed due to benign appea type AB, low MELD.

## 2021-07-12 NOTE — PHYSICAL EXAM
[Alert] : alert [No Acute Distress] : no acute distress [Scleral Icterus] : scleral icterus [Clear to Auscultation] : lungs were clear to auscultation bilaterally [Normal Rate] : normal rate [Non-Tender] : Liver Edge: non-tender [Soft] : soft [] : right femoral palpable [Hepatojugular Reflux] : no hepatojugular reflux [Abdominal Bruit] : no abdominal bruit [Ascites Fluid Wave] : no ascites fluid wave [Splenomegaly] : no splenomegaly [Ascites Tense] : no ascites tense [Caput Medusae] : no caput medusae [Depression] : no depression [de-identified] : non tender, no ascites,  [FreeTextEntry1] : Left hand and left leg contracture [TextBox_40] : 2+ [de-identified] :  Less Scratch winifred on back  [de-identified] : Better

## 2021-07-15 LAB
ALBUMIN SERPL ELPH-MCNC: 3.8 G/DL
ALP BLD-CCNC: 520 U/L
ALT SERPL-CCNC: 105 U/L
ANION GAP SERPL CALC-SCNC: 12 MMOL/L
AST SERPL-CCNC: 62 U/L
BASOPHILS # BLD AUTO: 0.04 K/UL
BASOPHILS NFR BLD AUTO: 0.6 %
BILIRUB SERPL-MCNC: 2.2 MG/DL
BUN SERPL-MCNC: 15 MG/DL
CALCIUM SERPL-MCNC: 9.4 MG/DL
CHLORIDE SERPL-SCNC: 100 MMOL/L
CO2 SERPL-SCNC: 23 MMOL/L
COVID-19 SPIKE DOMAIN ANTIBODY INTERPRETATION: POSITIVE
CREAT SERPL-MCNC: 0.92 MG/DL
EOSINOPHIL # BLD AUTO: 0.12 K/UL
EOSINOPHIL NFR BLD AUTO: 1.8 %
GLUCOSE SERPL-MCNC: 82 MG/DL
HCT VFR BLD CALC: 36.8 %
HGB BLD-MCNC: 11.5 G/DL
IMM GRANULOCYTES NFR BLD AUTO: 0.6 %
INR PPP: 0.98 RATIO
LYMPHOCYTES # BLD AUTO: 1.33 K/UL
LYMPHOCYTES NFR BLD AUTO: 20.3 %
MAN DIFF?: NORMAL
MCHC RBC-ENTMCNC: 31.3 GM/DL
MCHC RBC-ENTMCNC: 32.5 PG
MCV RBC AUTO: 104 FL
MONOCYTES # BLD AUTO: 0.48 K/UL
MONOCYTES NFR BLD AUTO: 7.3 %
NEUTROPHILS # BLD AUTO: 4.55 K/UL
NEUTROPHILS NFR BLD AUTO: 69.4 %
PLATELET # BLD AUTO: 183 K/UL
POTASSIUM SERPL-SCNC: 4.4 MMOL/L
PROT SERPL-MCNC: 8.1 G/DL
PT BLD: 11.7 SEC
RBC # BLD: 3.54 M/UL
RBC # FLD: 14.2 %
SARS-COV-2 AB SERPL IA-ACNC: >250 U/ML
SODIUM SERPL-SCNC: 135 MMOL/L
WBC # FLD AUTO: 6.56 K/UL

## 2021-07-27 ENCOUNTER — APPOINTMENT (OUTPATIENT)
Dept: HEPATOLOGY | Facility: CLINIC | Age: 31
End: 2021-07-27
Payer: MEDICAID

## 2021-07-27 VITALS
HEIGHT: 64 IN | HEART RATE: 86 BPM | BODY MASS INDEX: 21.68 KG/M2 | WEIGHT: 127 LBS | DIASTOLIC BLOOD PRESSURE: 66 MMHG | OXYGEN SATURATION: 99 % | TEMPERATURE: 98 F | SYSTOLIC BLOOD PRESSURE: 129 MMHG | RESPIRATION RATE: 12 BRPM

## 2021-07-27 PROCEDURE — 99214 OFFICE O/P EST MOD 30 MIN: CPT

## 2021-07-28 NOTE — PHYSICAL EXAM
[Liver Size (___ Cm)] : Liver size [unfilled] cm [Non-Tender] : non-tender [Smooth] : smooth [General Appearance - Alert] : alert [General Appearance - Well Developed] : well developed [General Appearance - Well Nourished] : well nourished [Outer Ear] : the ears and nose were normal in appearance [Neck Appearance] : the appearance of the neck was normal [Exaggerated Use Of Accessory Muscles For Inspiration] : no accessory muscle use [Apical Impulse] : the apical impulse was normal [Heart Rate And Rhythm] : heart rate was normal and rhythm regular [Heart Sounds] : normal S1 and S2 [Murmurs] : no murmurs [Heart Sounds Gallop] : no gallops [Heart Sounds Pericardial Friction Rub] : no pericardial rub [Arterial Pulses Carotid] : carotid pulses were normal with no bruits [Breast Appearance] : normal in appearance [Bowel Sounds] : normal bowel sounds [Abdomen Soft] : soft [Abdomen Tenderness] : non-tender [Abdomen Mass (___ Cm)] : no abdominal mass palpated [Cranial Nerves] : cranial nerves 2-12 were intact [Sensation] : the sensory exam was normal to light touch and pinprick [Motor Exam] : the motor exam was normal [No Focal Deficits] : no focal deficits [Oriented To Time, Place, And Person] : oriented to person, place, and time [Scleral Icterus] : No Scleral Icterus [Spider Angioma] : No spider angioma(s) were observed [Abdominal Bruit] : no abdominal bruit [Abdominal  Ascites] : no ascites [Asterixis] : no asterixis observed [Jaundice] : No jaundice [Depression] : no depression [FreeTextEntry1] : spastic gait fro cerbral plasy

## 2021-07-28 NOTE — REVIEW OF SYSTEMS
[As Noted in HPI] : as noted in HPI [Difficulty Walking] : difficulty walking [Negative] : Heme/Lymph [Recent Weight Gain (___ Lbs)] : recent [unfilled] ~Ulb weight gain [Fever] : no fever [Chills] : no chills [Feeling Poorly] : not feeling poorly [Feeling Tired] : not feeling tired [Red Eyes] : eyes not red [FreeTextEntry2] : Lost significant amount of weight since last visit, approximately 16 pounds [FreeTextEntry3] : Minimal icterus [de-identified] : Spastic gait fro cerebral palsy

## 2021-07-28 NOTE — ASSESSMENT
PACU ANESTHESIA ADMISSION NOTE      Procedure: Hysteroscopy, with dilation and curettage      Post op diagnosis:  Endometrial Polyps    ____  Intubated  TV:______       Rate: ______      FiO2: ______    __x__  Patent Airway    ___x_  Full return of protective reflexes    __x__  Full recovery from anesthesia / back to baseline     Vitals:   T:  36.6         R:    15              BP:     134/62             Sat:        100           P: 63      Mental Status:  ___x_ Awake   _____ Alert   _____ Drowsy   _____ Sedated    Nausea/Vomiting:  ____ NO  ____x__Yes,   See Post - Op Orders          Pain Scale (0-10):  __0/10___    Treatment: ____ None    ____ See Post - Op/PCA Orders    Post - Operative Fluids:   ____ Oral   __x__ See Post - Op Orders    Plan: Discharge:   __x__Home       _____Floor     _____Critical Care    _____  Other:_________________    Comments: HD Stable tolerated procedure well [FreeTextEntry1] : I am recommending to repeat the LFTs. I am also recommending to consider coloscopy. If his LFTs trending up, he may need repeat ERCP. We need to rule out any primary colonic pathology.\par \par The bowel preparation was discussed at length. Risks (including bleeding, pain, perforation, incomplete examination, splenic laceration, adverse reactions to medications, aspiration and death), benefits and alternatives were discussed. Patient is agreeable for the colonoscopy. The patient is medically optimized for the procedure. We will schedule the patient for the procedure. Bowel preparation was sent to the pharmacy.\par \par Yasmany Howard MD\par Gastroenterology \par \par

## 2021-07-28 NOTE — ASSESSMENT
[FreeTextEntry1] : 31-year-old male with known history of primary sclerosing cholangitis.  Multiple stent exchanges for biliary strictures, most recent stent exchange was performed on June 2021. \par \par His MRI from May 10, 2021 revealed findings concerning for microabscesses in the liver.  He also had fever prior to his MRI.  He has been treated with levofloxacin and metronidazole for 4 weeks with clinical improvement.  Follow-up MRI now showing resolution of the microabscesses.  Since last visit he was recommended starting megestrol to boost his appetite and which really helped him and he has gained weight.  Feels well.\par \par Patient is currently listed for liver transplantation on the [facility] list at our center.\par \par PLAN\par 1.  Primary sclerosing cholangitis\par Unfortunately patient is continuing to require interval ERCP and stent exchanges.  He also developed some microabscesses in his right lobe which is now treated with 4 weeks course of antibiotics including levofloxacin and metronidazole with clinical and radiological resolution.\par He is currently waitlisted on the UNOS list.  Unfortunately he has a low model for end-stage liver disease score which is not favoring him to potentially pursuing liver transplantation.  We will make an attempt to appeal to the national liver review board for nonstandardized meld exception.\par \par Keep Azathioprine discontinued. \par Will keep Rifampin and Ursodiol discontinued.  I recommended hydroxyzine 25 mg nightly as needed for mild pruritus in his feet.\par Continue with Megestrol 10 ml bid to help boost appetite.\par Follow up labs today.\par \par \par RTC in 4  weeks

## 2021-07-28 NOTE — HISTORY OF PRESENT ILLNESS
[FreeTextEntry1] : Mr. Javy Biggs is a 30-year-old man with a history of cerebral palsy presents for  evaluation for potential liver transplant.\par \par He has been symptomatic since early 2019. He started itching while he was in [place], along with some nausea and vomiting and abdominal pain. He was noted to have liver enzyme elevation. Then subsequently he presented to [facility] and underwent evaluation. His bilirubin started to rise further and then transferred to [facility] in February 2019. He underwent ERCP which showed the distal bile duct stricture and questionable right intrahepatic stricture. Small stent was placed in the distal bile duct. However his bilirubin did not improve initially. He underwent MRI with severe right intrahepatic stricture. He was discharged home. He was itching for many days but then it improved. \par \par Ca 19-9 level, IgG 4 level and bile duct brushings have been unremarkable. \par \par The patient had another ERCP with stent pull and cholangiogram in april 2019. Right intrahepatic stricture was noted. Then additional brushings were performed.Then a stent was attempted to be advanced into the right intrahepatic system. Although there was significant resistance. Then it was dilated using a 4 mm balloon. Then 7 French 9 cm bile duct stent was placed.The patient had a ER visit about a week or 2 after that. He was noted to have nephrolithiasis.\par \par His LFTs imroved with ERCP and stent. He is now referred to hepatology for further assessment for primary sclerosing cholangitis.\par \par ARMANDO is negative, Mitochondrial antibody is negative, Viral hepatitis serologies are negative, immunugloblin level are unimpressive.\par \par Interval hx: Since last seen in the hepatology clinic on 22-May-2019, he had a follow up ERCP by Dr. Howard, and the old stent was removed, and exchanged with a new one. Brushings were negative for malignant cell. Additionally he had a liver biopsy that showed overall appears cholestatic pattern of injury with ductular reaction. This was further discussed at the hepato-billiary meeting with pathology review. 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 Essentially, work up for underlying CLD were negative for viral serology, autoimmune markers, ceruloplasmin ( higher level not suggestive of Garcia's). His total bili is down to 2.4 mg/dL, , and , and . AMA is negative, but ASMA is low titer + ( 1:20). ARMANDO is also negative.\par Interestingly, his IgG level is high ( 1723 mg/dL). \par His viral serology suggest to protection towards hepatitis A, but he is immune to hepatitis B. I hav recommended vaccine series.\par Ca19-9 was within normal range.\par \par Interval hx ( 9/3/2019): Since last seen he has been apparently hospitalized with fever, and suspected infection. He was treated with IV antibiotics, and was discharged with Cipro which he is still taking ( will finish in 2 days).\par Today he feels well.\par LFTs significantly better since Prednisone was started. His mother reports skin rash in the face, chest, and upper back (like pimples) since the steroid was initiated.\par \par Interval hx-10-3-2019:\par \par Patient has been doing well since last seen. His acne lesions have almost disappeared. He remain on Azathioprine 100 mg daily (increased on 10-2-2019), and Prednisone 10 mg daily. His Azathioprine dose was increased due to poor response on 50 mg daily. He denies any cholestasis symptoms. \par \par Interval history January 6, 2020:\par Recent laboratory tests from 10 December 2019 was revealed. This revealed white cell count of 5.96, hemoglobin 14.8, platelet count 400,000. Liver function tests revealed total bilirubin 1.8, , , alkaline phosphatase 151. Serum creatine was 0.92 mg/dL. INR is 0.93. His liver tests appears to have significantly increased compared to 26 November 2019. At that time his total bilirubin was 1.1, AST 89, , alkaline phosphatase 203.\par \par Interval history February 13, 2020:\par \par Patient presents for a follow up visit today along with his parents. Since last seen that has a significant jump in his liver test particularly total bilirubin level. His total bilirubin is 21.9, AST 65, ALT 44, alkaline phosphatase 129. He recently had an ERCP done through Dr. Yasmany Howard, and his liver function tests has not improved from the spike yesterday. I had a personal discussion with Dr. Howard, and he mentioned that he placed the stent on the right side only and he intends now to repeat an ERCP with bilateral stenting. \par \par \par Internal history dated March 2, 2020\par \par Ms. Biggs returns for a followup visit in the hepatology clinic accompianed  by her mother. On his followup today, patient reports severe persistent itching that is bothersome. He has been taking hydroxyzine with some relief. He also certainly some excoriation marks. He denies any internal episodes of fever, chills, , nausea or vomiting.\par \par His liver function test from 2 of February 2020 revealed total bilirubin 21.9, AST 65, ALT 44, alkaline phosphatase 129. Serum creatinine was 0.65 mg/dL. \par \par He's still waiting for getting the ERCP done by Dr. Howard, and apparently scheduled for this week.  We will reduce his hasn't had reduced to 50 mg daily and prednisone dose to 10 mg daily. I have recommended him to hold off on switching to budesonide.\par \par \par Interval history dated June 8, 2020\par \par Javy returns for a followup visit to hepatology clinic today accompanied by her mother. He reports fever for the last 2 days, and was preceded with some malaise. His fever was as high as her in 3 different it 2 days ago but the intensity has significantly improved as of yesterday. About 2 weeks in the he had an ERCP with stent exchange by Dr. Howard. He denies any chills. He also denies any symptoms of itching or any worsening jaundice or change in color of his urine or stool. Today the clinic she otherwise feels well and denies any new symptoms.\par \par On June 5, 2020 GI blood works that included CBC. This revealed a white cell count of 9.75, hemoglobin 14.2, platelet count 229,000. Recent CEA 19-9 level is within normal range. His INR is also normal and quantitative hemoglobin levels are now within normal range.\par \par He is currently taking azathioprine 50 mg daily.\par \par Interval Hx ( 11/18/2020)\par \par Patient presents for a follow up. He reports doing well- although reports occasional itching. No fever or jaundice.\par \par Recent MRCP- Stable appearance of the biliary tree since 7/3/2019 MRI. 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 Follow-up with endoscopic ultrasound may be considered.\par \par Labs from Sept 17, 2020- , , , and bili 0.9. \par \par Interval Hx (1/12/2021)\par \par Patient presents for evaluation for Liver Transplant. He is being accompanied by his mother. He remains asymptomatic, denies any cholestatic symptoms.\par \par His CBC from Nov 18, 2020- normal CBC, ,000. LFTs were elevated, with total bili 94, AST 94, , . INR was 1.03. His blood type is AB.\par \par He had an ERCP with stent exchange in Dec 02, 2020 though Dr. Yasmany Howard.\par \par Interval Hx (1/27/2021)\par \par Patient presents for a follow up. He has been doing well. No new complaints. He being evaluated for an OLT, and is pending an ECHO and stress test. His blood type is AB +. hIS ca 19-9 is normal. He remains immune to hepatitis A, and hepatitis B.  IgG4 level is within normal range.\par He had an ERCP with stent exchange in Dec 02, 2020 though Dr. Yasmany Howard. His next ERCP will be in March, 2021. \par Since last seen he has an EUS.   A mass was identified in the pancreatic tail. Appearance characteristic is               benign. 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 FNA was not performed due to benign appearance (stabillity based on prior imaging), and difficulty finding a safe path on color doppler (due to blood vessels on the path of the needle). The remainder of the pancreas was examined and was unremarkable. Few Discrete peripancretic, and periportal lymph nodes were also noted. The largest periportal LN measured about 1.8 cm. The appearance characteristic was benign. The endosonographic appearance of parenchyma and the upstream pancreatic duct indicated no duct dilation. Biliary stent was seen in situ.\par He reports somewhat pale stool, but denies any itching.\par \par His LFTs show total bili 1.5, , , .\par \par Interval history dated April 6, 2021\james aPlafox presents for a hepatology follow-up appointment accompanied by her mother.  Since last seen he reports he has been having increasing pruritus that is bothering him from sleep.  He has been taking hydroxyzine which is able to control his pruritus to some extent.  He also reports that after taking a warm shower he is having significant itching feeling like burning sensation in his skin.\par \par Since last seen he also completed his evaluation and has been approved for listing for liver transplantation.  He is awaiting dental clearance and also an application for meld score exception.\par \par Since last seen he had an ERCP and a stent exchange performed on February 25, 2021.  No interval episodes of cholangitis.\par \par Recent follow-up labs from 26 March 2021 was reviewed.  This revealed a hemoglobin of 13.1 g/dL with platelet count 277,000.  His liver function test revealed total bilirubin 3.9, AST 75, ALT 31, alkaline phosphatase 241.  Serum creatinine is 0.83 mg/dL.  INR is 1.\par \par Interval history dated April 21, 2021.\james Palafox presents for hepatology follow-up appointment along with his mother.  Area recently had an emergency visit on April 19, 2021 with complaints of fever with a temperature maximum 102 °F associated with cough, runny nose and myalgia.  He denies any nausea, vomiting, abdominal pain or worsening jaundice or pruritus.  Covid 19 test was negative.\par \par He recently received his COVID-19 vaccine second dose Pfizer on April 11, 2021.  He reports he has having symptomatic malaise, fever since that time.  Labs from April 28 and April 21 during his hospital stay was reviewed which revealed leukocytosis with a white cell count of 12.27 on admission which improved to 7.97 at the time of discharge.  His hemoglobin was 9.6 and platelet count of 397,000.  Liver function tests revealed total bilirubin 1.4, normal liver enzymes with AST 26, ALT 15, alkaline phosphatase 205.  His INR was 1.37.\par \par He had a CT scan of the abdomen performed on April 20, 2021 which revealed the following findings-\par 1. Partial improvement of the intrahepatic biliary dilatation in this patient status post biliary stent placement.\par 2. New splenomegaly, of uncertain etiology. Correlate for developing portal venous hypertension.\par 3. Enhancing pancreatic tail lesion, better assessed on the MRI from November, 2020.\par \par Interval history dated May 12, 2021\par juan Palafox presents for hepatology follow-up appointment accompanied by his mother.  Today he feels well.  However he has been having intermittent low-grade fevers since April 20, 2021.\par \par MRI from May 10, 2021 revealed- Central biliary stricture as above. Differential includes both inflammatory and neoplastic etiologies, favor benign stricture given relative stability from 2019. Signal abnormality in segment 7 with several subcentimeter rim-enhancing foci concerning for microabscesses. Pancreatic tail lesion demonstrating interval growth however with enhancement characteristics consistent with a splenule. Of note, the spleen demonstrates continued interval growth along with this lesion over time.\par \par Patient had labs drawn yesterday on May 11, 2021 which revealed a white cell count of 6.1, hemoglobin 9.7, platelet count 2 69,000.  Serum sodium was 140, potassium 3.6, creatinine 0.67.  Liver function test revealed total bilirubin 0.8, AST 30, ALT 20, alkaline phosphatase 261.\par \par Interval history dated Giuliana 15, 2021juan Palafox presents for hepatology follow-up appointment accompanied by his mother.  He was last seen in the hepatology clinic on 12 May 2021.  He is also scheduled to see transplant surgeon Dr. Britton Solis today.  He currently remains waitlisted for liver transplantation on the [facility] list although continues to have a low model for end-stage liver disease score.\par \par He recently had an ERCP on Giuliana 3, 2021 which revealed recurrent intrahepatic bile duct stricture and there was also bile duct stone, status post balloon sweep with removal and subsequent dilatation of the right and left intrahepatic system and bilateral bile duct stent placement.\par \par He has lost significant amount of weight since his last clinic visit, approximately 16 pound weight loss.  He was treated with 4 weeks course of antibiotics including Flagyl and levofloxacin.  I suspect his weight loss may be related to taste change related to Flagyl.\par \par MRI from May 10, 2021 revealed: Right hepatic lobe atrophy with left hepatic lobe hypertrophy. Mild to moderate intrahepatic biliary ductal dilatation in the right and left lobes secondary to a central hilar stricture demonstrates delayed enhancement on postcontrast imaging. Extrahepatic biliary tree is nondilated. Findings are without significant change from prior imaging dating to 2019 favoring benign stricture in the setting of potential primary sclerosing cholangitis however malignancy is not completely excluded.. Posterior division right portal vein is however occluded unchanged from November 2020 however new from prior imaging and February 2019.\par \par Signal abnormality in segment 7 with several subcentimeter rim-enhancing foci concerning for microabscesses.\par \par Pancreatic tail lesion demonstrating interval growth however with enhancement characteristics consistent with a splenule. Of note, the spleen demonstrates continued interval growth along with this lesion over time.\par \par MRI from June 11, 2021 revealed: Right lobe atrophy with left lobe hypertrophy, as on prior. No significant steatosis. Mild to moderate intrahepatic biliary ductal dilatation secondary to a central hilar stricture with delayed enhancement on postcontrast imaging, essentially unchanged from 2019. Extra hepatic biliary tree is not dilated. Given stability, a benign stricture is favored. However, malignancy cannot be entirely excluded in the setting of primary sclerosing cholangitis. Redemonstrated occlusion of the posterior right portal venous branch. Near-complete resolution of the segment 7 signal abnormality/microabscesses with only mild T2 heterogeneity remaining, but no discrete ring enhancement.\par \par Laboratory test results from 19 May 2021 was reviewed.  This revealed hemoglobin 11.3 g/dL, platelet count 3 35,000.  White cell count was 4.95.  Serum sodium 138, potassium 5.0.  Liver function test revealed total bilirubin 0.8, , ALT 78, alkaline phosphatase 901.  Her alkaline phosphatase appears to be significantly elevated compared to prior labs.\par \par Clinically appears to be responding well to antibiotics and his imaging studies also now showing resolution of the microabscesses.\par \par Interval history dated July 27, 2021\par \par Patient presents for hepatology follow-up appointment accompanied by her mother.  Today Javy reports that he has been feeling well.  He has gained weight with megestrol.  His fever has resolved and he denies any nausea, vomiting, abdominal pain.  He is now off antibiotics.  He is also currently off azathioprine.  During his prior visits rifampin as well as also now has been discontinued as well.\par MRI from June 16, 2021 revealed stable central biliary stricture.  Near complete resolution of hepatic segment 7 signal abnormality/microabscesses were noted.  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  I recommended hydroxyzine 25 mg nightly as needed.\par Laboratory test results from 12 July 2021 was reviewed.  This revealed mild anemia with hemoglobin of 11.5, deciliter and platelet count was 183,000.  Liver chemistries revealed mild elevation with total bilirubin 2.2 mg/dL, AST 62, , alkaline phosphatase 520.  His alkaline phosphatase appears to have improved from 901 in 19 May 2021.

## 2021-07-29 LAB
ALBUMIN SERPL ELPH-MCNC: 3.7 G/DL
ALP BLD-CCNC: 398 U/L
ALT SERPL-CCNC: 82 U/L
ANION GAP SERPL CALC-SCNC: 13 MMOL/L
AST SERPL-CCNC: 63 U/L
BASOPHILS # BLD AUTO: 0.04 K/UL
BASOPHILS NFR BLD AUTO: 0.5 %
BILIRUB SERPL-MCNC: 2.6 MG/DL
BUN SERPL-MCNC: 10 MG/DL
CALCIUM SERPL-MCNC: 9.5 MG/DL
CHLORIDE SERPL-SCNC: 105 MMOL/L
CO2 SERPL-SCNC: 20 MMOL/L
CREAT SERPL-MCNC: 0.75 MG/DL
EOSINOPHIL # BLD AUTO: 0.07 K/UL
EOSINOPHIL NFR BLD AUTO: 0.9 %
GLUCOSE SERPL-MCNC: 93 MG/DL
HCT VFR BLD CALC: 40.1 %
HGB BLD-MCNC: 12.7 G/DL
IMM GRANULOCYTES NFR BLD AUTO: 0.5 %
INR PPP: 1.11 RATIO
LYMPHOCYTES # BLD AUTO: 1.68 K/UL
LYMPHOCYTES NFR BLD AUTO: 22.2 %
MAN DIFF?: NORMAL
MCHC RBC-ENTMCNC: 31.7 GM/DL
MCHC RBC-ENTMCNC: 31.9 PG
MCV RBC AUTO: 100.8 FL
MONOCYTES # BLD AUTO: 0.5 K/UL
MONOCYTES NFR BLD AUTO: 6.6 %
NEUTROPHILS # BLD AUTO: 5.24 K/UL
NEUTROPHILS NFR BLD AUTO: 69.3 %
PLATELET # BLD AUTO: 296 K/UL
POTASSIUM SERPL-SCNC: 4 MMOL/L
PROT SERPL-MCNC: 8.4 G/DL
PT BLD: 13 SEC
RBC # BLD: 3.98 M/UL
RBC # FLD: 14 %
SODIUM SERPL-SCNC: 138 MMOL/L
WBC # FLD AUTO: 7.57 K/UL

## 2021-08-16 ENCOUNTER — TRANSCRIPTION ENCOUNTER (OUTPATIENT)
Age: 31
End: 2021-08-16

## 2021-08-17 ENCOUNTER — OUTPATIENT (OUTPATIENT)
Dept: OUTPATIENT SERVICES | Facility: HOSPITAL | Age: 31
LOS: 1 days | End: 2021-08-17
Payer: MEDICAID

## 2021-08-17 ENCOUNTER — APPOINTMENT (OUTPATIENT)
Dept: INFECTIOUS DISEASE | Facility: CLINIC | Age: 31
End: 2021-08-17
Payer: MEDICAID

## 2021-08-17 VITALS
HEIGHT: 64 IN | TEMPERATURE: 98.5 F | WEIGHT: 130 LBS | BODY MASS INDEX: 22.2 KG/M2 | SYSTOLIC BLOOD PRESSURE: 123 MMHG | OXYGEN SATURATION: 100 % | DIASTOLIC BLOOD PRESSURE: 79 MMHG | HEART RATE: 77 BPM

## 2021-08-17 DIAGNOSIS — B97.89 OTHER VIRAL AGENTS AS THE CAUSE OF DISEASES CLASSIFIED ELSEWHERE: ICD-10-CM

## 2021-08-17 DIAGNOSIS — Z98.890 OTHER SPECIFIED POSTPROCEDURAL STATES: Chronic | ICD-10-CM

## 2021-08-17 PROCEDURE — 99213 OFFICE O/P EST LOW 20 MIN: CPT | Mod: 25

## 2021-08-17 PROCEDURE — G0009: CPT

## 2021-08-17 PROCEDURE — 90732 PPSV23 VACC 2 YRS+ SUBQ/IM: CPT

## 2021-08-17 PROCEDURE — G0463: CPT | Mod: 25

## 2021-08-21 ENCOUNTER — TRANSCRIPTION ENCOUNTER (OUTPATIENT)
Age: 31
End: 2021-08-21

## 2021-08-21 NOTE — HISTORY OF PRESENT ILLNESS
[FreeTextEntry1] : 31 year old male PMH PSC, Cerebral Palsy who presents for evaluation of liver abscesses. Patient presented to [facility] in 4/2021 with fever. Had blood cultures obtained at the time which were with no growth. CT A/P (4/20) without obvious evidence of infection. Patient was discharged on empiric ciprofloxacin. Notes no more fevers after ciprofloxacin initiation. \par \par Patient had outpatient MRI Abdomen on 5/10/21 which revealed Signal abnormality in segment 7 with several subcentimeter rim-enhancing foci concerning for microabscesses. Completed 4 week course of Levofloxacin and Metronidazole. Repeat MRI with near resolution of abscesses. No fevers or chills since. \par \par Previously had chronic cough. Had CT Chest which did not reveal any obvious abnormality. Cough has resolved.

## 2021-08-21 NOTE — PHYSICAL EXAM
[General Appearance - Alert] : alert [General Appearance - In No Acute Distress] : in no acute distress [Sclera] : the sclera and conjunctiva were normal [PERRL With Normal Accommodation] : pupils were equal in size, round, reactive to light [Extraocular Movements] : extraocular movements were intact [Outer Ear] : the ears and nose were normal in appearance [Oropharynx] : the oropharynx was normal with no thrush [Neck Appearance] : the appearance of the neck was normal [Auscultation Breath Sounds / Voice Sounds] : lungs were clear to auscultation bilaterally [Heart Sounds] : normal S1 and S2 [Heart Rate And Rhythm] : heart rate was normal and rhythm regular [Heart Sounds Gallop] : no gallops [Murmurs] : no murmurs [Edema] : there was no peripheral edema [Heart Sounds Pericardial Friction Rub] : no pericardial rub [Bowel Sounds] : normal bowel sounds [Abdomen Soft] : soft [Abdomen Tenderness] : non-tender [] : no hepato-splenomegaly [Abdomen Mass (___ Cm)] : no abdominal mass palpated [No Palpable Adenopathy] : no palpable adenopathy [Musculoskeletal - Swelling] : no joint swelling [Motor Tone] : muscle strength and tone were normal [Nail Clubbing] : no clubbing  or cyanosis of the fingernails [Skin Lesions] : no skin lesions [No Focal Deficits] : no focal deficits

## 2021-08-21 NOTE — ASSESSMENT
[FreeTextEntry1] : 31 year-old male PMH PSC, Cerebral Palsy who presents for evaluation of liver abscesses.\par \par Admission to [facility] 4/2021 with fever. Had blood cultures obtained at the time which were with no growth. CT A/P (4/20) without obvious evidence of infection. Patient was discharged on empiric ciprofloxacin. \par \par Patient had outpatient MRI Abdomen on 5/10/21 which revealed Signal abnormality in segment 7 with several subcentimeter rim-enhancing foci concerning for microabscesses. Completed 4 week course of Levofloxacin and Metronidazole. Repeat MRI with near resolution of abscesses.\par \par Patient also had chronic cough (which started in 4/2020).\par CT Chest was clear. 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

## 2021-08-24 ENCOUNTER — APPOINTMENT (OUTPATIENT)
Dept: HEPATOLOGY | Facility: CLINIC | Age: 31
End: 2021-08-24
Payer: MEDICAID

## 2021-08-24 VITALS
RESPIRATION RATE: 12 BRPM | SYSTOLIC BLOOD PRESSURE: 135 MMHG | WEIGHT: 130 LBS | BODY MASS INDEX: 22.2 KG/M2 | DIASTOLIC BLOOD PRESSURE: 84 MMHG | HEART RATE: 70 BPM | TEMPERATURE: 98 F | OXYGEN SATURATION: 100 % | HEIGHT: 64 IN

## 2021-08-24 PROCEDURE — 99214 OFFICE O/P EST MOD 30 MIN: CPT

## 2021-08-25 DIAGNOSIS — Z23 ENCOUNTER FOR IMMUNIZATION: ICD-10-CM

## 2021-08-25 DIAGNOSIS — Z01.818 ENCOUNTER FOR OTHER PREPROCEDURAL EXAMINATION: ICD-10-CM

## 2021-08-29 LAB
ALBUMIN SERPL ELPH-MCNC: 3.6 G/DL
ALP BLD-CCNC: 356 U/L
ALT SERPL-CCNC: 55 U/L
ANION GAP SERPL CALC-SCNC: 11 MMOL/L
AST SERPL-CCNC: 70 U/L
BASOPHILS # BLD AUTO: 0.04 K/UL
BASOPHILS NFR BLD AUTO: 0.5 %
BILIRUB SERPL-MCNC: 4.4 MG/DL
BUN SERPL-MCNC: 11 MG/DL
CALCIUM SERPL-MCNC: 9.5 MG/DL
CHLORIDE SERPL-SCNC: 104 MMOL/L
CO2 SERPL-SCNC: 23 MMOL/L
CREAT SERPL-MCNC: 0.87 MG/DL
EOSINOPHIL # BLD AUTO: 0.11 K/UL
EOSINOPHIL NFR BLD AUTO: 1.4 %
GLUCOSE SERPL-MCNC: 85 MG/DL
HCT VFR BLD CALC: 39.1 %
HGB BLD-MCNC: 12.9 G/DL
IMM GRANULOCYTES NFR BLD AUTO: 0.5 %
INR PPP: 1.18 RATIO
LYMPHOCYTES # BLD AUTO: 1.9 K/UL
LYMPHOCYTES NFR BLD AUTO: 23.7 %
MAN DIFF?: NORMAL
MCHC RBC-ENTMCNC: 33 GM/DL
MCHC RBC-ENTMCNC: 33.1 PG
MCV RBC AUTO: 100.3 FL
MONOCYTES # BLD AUTO: 0.52 K/UL
MONOCYTES NFR BLD AUTO: 6.5 %
NEUTROPHILS # BLD AUTO: 5.4 K/UL
NEUTROPHILS NFR BLD AUTO: 67.4 %
PLATELET # BLD AUTO: 331 K/UL
POTASSIUM SERPL-SCNC: 3.9 MMOL/L
PROT SERPL-MCNC: 8.2 G/DL
PT BLD: 13.8 SEC
RBC # BLD: 3.9 M/UL
RBC # FLD: 14.3 %
SODIUM SERPL-SCNC: 138 MMOL/L
WBC # FLD AUTO: 8.01 K/UL

## 2021-09-06 ENCOUNTER — APPOINTMENT (OUTPATIENT)
Dept: DISASTER EMERGENCY | Facility: CLINIC | Age: 31
End: 2021-09-06

## 2021-09-07 LAB — SARS-COV-2 N GENE NPH QL NAA+PROBE: NOT DETECTED

## 2021-09-07 RX ORDER — MEGESTROL ACETATE 40 MG/ML
400 SUSPENSION ORAL DAILY
Qty: 1 | Refills: 2 | Status: DISCONTINUED | COMMUNITY
Start: 2021-06-15 | End: 2021-09-07

## 2021-09-09 ENCOUNTER — OUTPATIENT (OUTPATIENT)
Dept: OUTPATIENT SERVICES | Facility: HOSPITAL | Age: 31
LOS: 1 days | End: 2021-09-09
Payer: MEDICAID

## 2021-09-09 ENCOUNTER — RX RENEWAL (OUTPATIENT)
Age: 31
End: 2021-09-09

## 2021-09-09 ENCOUNTER — TRANSCRIPTION ENCOUNTER (OUTPATIENT)
Age: 31
End: 2021-09-09

## 2021-09-09 ENCOUNTER — RESULT REVIEW (OUTPATIENT)
Age: 31
End: 2021-09-09

## 2021-09-09 ENCOUNTER — APPOINTMENT (OUTPATIENT)
Dept: GASTROENTEROLOGY | Facility: HOSPITAL | Age: 31
End: 2021-09-09

## 2021-09-09 DIAGNOSIS — Z98.890 OTHER SPECIFIED POSTPROCEDURAL STATES: Chronic | ICD-10-CM

## 2021-09-09 DIAGNOSIS — K83.1 OBSTRUCTION OF BILE DUCT: ICD-10-CM

## 2021-09-09 DIAGNOSIS — R94.5 ABNORMAL RESULTS OF LIVER FUNCTION STUDIES: ICD-10-CM

## 2021-09-09 DIAGNOSIS — K86.89 OTHER SPECIFIED DISEASES OF PANCREAS: ICD-10-CM

## 2021-09-09 PROCEDURE — 43277 ERCP EA DUCT/AMPULLA DILATE: CPT | Mod: XU

## 2021-09-09 PROCEDURE — C1769: CPT

## 2021-09-09 PROCEDURE — 43264 ERCP REMOVE DUCT CALCULI: CPT

## 2021-09-09 PROCEDURE — C1726: CPT

## 2021-09-09 PROCEDURE — C1773: CPT

## 2021-09-09 PROCEDURE — C2617: CPT

## 2021-09-09 PROCEDURE — 88300 SURGICAL PATH GROSS: CPT

## 2021-09-09 PROCEDURE — 43276 ERCP STENT EXCHANGE W/DILATE: CPT | Mod: 59

## 2021-09-09 PROCEDURE — 74330 X-RAY BILE/PANC ENDOSCOPY: CPT

## 2021-09-09 PROCEDURE — 43264 ERCP REMOVE DUCT CALCULI: CPT | Mod: 59

## 2021-09-09 PROCEDURE — 43275 ERCP REMOVE FORGN BODY DUCT: CPT

## 2021-09-09 PROCEDURE — 88300 SURGICAL PATH GROSS: CPT | Mod: 26

## 2021-09-09 RX ORDER — INDOMETHACIN 50 MG
100 CAPSULE ORAL ONCE
Refills: 0 | Status: DISCONTINUED | OUTPATIENT
Start: 2021-09-09 | End: 2021-09-23

## 2021-09-09 RX ORDER — CIPROFLOXACIN LACTATE 400MG/40ML
400 VIAL (ML) INTRAVENOUS ONCE
Refills: 0 | Status: DISCONTINUED | OUTPATIENT
Start: 2021-09-09 | End: 2021-09-23

## 2021-09-10 LAB — SURGICAL PATHOLOGY STUDY: SIGNIFICANT CHANGE UP

## 2021-09-14 LAB
ALBUMIN SERPL ELPH-MCNC: 3.4 G/DL
ALP BLD-CCNC: 373 U/L
ALT SERPL-CCNC: 64 U/L
ANION GAP SERPL CALC-SCNC: 16 MMOL/L
AST SERPL-CCNC: 73 U/L
BASOPHILS # BLD AUTO: 0.05 K/UL
BASOPHILS NFR BLD AUTO: 0.6 %
BILIRUB SERPL-MCNC: 6.2 MG/DL
BUN SERPL-MCNC: 12 MG/DL
CALCIUM SERPL-MCNC: 9 MG/DL
CHLORIDE SERPL-SCNC: 98 MMOL/L
CO2 SERPL-SCNC: 22 MMOL/L
CREAT SERPL-MCNC: 0.76 MG/DL
EOSINOPHIL # BLD AUTO: 0.24 K/UL
EOSINOPHIL NFR BLD AUTO: 2.9 %
GLUCOSE SERPL-MCNC: 100 MG/DL
HCT VFR BLD CALC: 40.2 %
HGB BLD-MCNC: 12.8 G/DL
IMM GRANULOCYTES NFR BLD AUTO: 1.1 %
INR PPP: 1.13 RATIO
LYMPHOCYTES # BLD AUTO: 1.94 K/UL
LYMPHOCYTES NFR BLD AUTO: 23.7 %
MAN DIFF?: NORMAL
MCHC RBC-ENTMCNC: 31.8 GM/DL
MCHC RBC-ENTMCNC: 32.5 PG
MCV RBC AUTO: 102 FL
MONOCYTES # BLD AUTO: 0.52 K/UL
MONOCYTES NFR BLD AUTO: 6.3 %
NEUTROPHILS # BLD AUTO: 5.35 K/UL
NEUTROPHILS NFR BLD AUTO: 65.4 %
PLATELET # BLD AUTO: 289 K/UL
POTASSIUM SERPL-SCNC: 4.2 MMOL/L
PROT SERPL-MCNC: 8.1 G/DL
PT BLD: 13.4 SEC
RBC # BLD: 3.94 M/UL
RBC # FLD: 14.9 %
SODIUM SERPL-SCNC: 136 MMOL/L
WBC # FLD AUTO: 8.19 K/UL

## 2021-09-16 LAB — BILIRUB DIRECT SERPL-MCNC: 4.8 MG/DL

## 2021-09-22 ENCOUNTER — TRANSCRIPTION ENCOUNTER (OUTPATIENT)
Age: 31
End: 2021-09-22

## 2021-09-22 RX ORDER — IVERMECTIN 3 MG/1
3 TABLET ORAL DAILY
Qty: 8 | Refills: 0 | Status: DISCONTINUED | COMMUNITY
Start: 2021-06-17 | End: 2021-09-22

## 2021-09-23 ENCOUNTER — APPOINTMENT (OUTPATIENT)
Dept: TRANSPLANT | Facility: CLINIC | Age: 31
End: 2021-09-23
Payer: MEDICAID

## 2021-09-23 VITALS
RESPIRATION RATE: 16 BRPM | OXYGEN SATURATION: 99 % | WEIGHT: 132 LBS | BODY MASS INDEX: 22.53 KG/M2 | HEIGHT: 64 IN | SYSTOLIC BLOOD PRESSURE: 117 MMHG | TEMPERATURE: 98.4 F | DIASTOLIC BLOOD PRESSURE: 76 MMHG | HEART RATE: 85 BPM

## 2021-09-23 PROCEDURE — 99215 OFFICE O/P EST HI 40 MIN: CPT

## 2021-09-23 NOTE — ASSESSMENT
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 Multiple stent exchanges for biliary strictures, most recent stent exchange was performed on 9/9/2021. \par Currently listed for liver transplant. MELD 12. \par \par ERCP on 9/9/21- Strictures in the right intrahepatic biliary branches and left intrahepatic biliary branches.  \par \par MRI 6/11/21-with near complete resolution of hepatic segment 7 abnormality/microabscess. \par \par Abscesses resolved, s/p ERCP with stent exchange 9/9/21. \par \par \par PLAN . \par -Repeat MR MRCP\par -Labs Today On list low meld\par -RTC -- mos

## 2021-09-23 NOTE — PHYSICAL EXAM
[Alert] : alert [No Acute Distress] : no acute distress [Scleral Icterus] : scleral icterus [Clear to Auscultation] : lungs were clear to auscultation bilaterally [Normal Rate] : normal rate [Non-Tender] : Liver Edge: non-tender [Soft] : soft [] : right femoral palpable [Hepatojugular Reflux] : no hepatojugular reflux [Abdominal Bruit] : no abdominal bruit [Ascites Fluid Wave] : no ascites fluid wave [Splenomegaly] : no splenomegaly [Ascites Tense] : no ascites tense [Caput Medusae] : no caput medusae [Asterixis] : no asterixis [Hepatic Encephalopathy] : no hepatic encephalopathy [Depression] : no depression [de-identified] : non tender, no ascites,  [FreeTextEntry1] : Left hand and left leg contracture  no recent scratch marks. [TextBox_40] : 2+ [de-identified] : Better

## 2021-09-23 NOTE — HISTORY OF PRESENT ILLNESS
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 Biggs is a 30 y/o M with history of cerebral palsy who presents for liver transplant evaluation follow up accompanied by his mother. Currently listed on liver transplant waitlist. \par \par PMHx includes cerebral palsy, nephrolithiasis and autoimmune cholangitis, seizures as a child. He was referred by Dr. Yasmany Howard for liver transplant evaluation.\par \par Pt has know liver disease since January 2019 when he presented with pruritus, N/V and abdominal pain while living in [place]. He was noted to have liver enzymes elevation and his [place] traveled back to [place] for healthcare.\par \par He then subsequently presented to [facility] and underwent evaluation. His bilirubin started to rise further and then transferred to [facility] in February 2019. He underwent ERCP which showed the distal bile duct stricture and questionable right intrahepatic stricture. Stent was placed in the distal bile duct. However his bilirubin did not improve initially. He underwent MRI with severe right intrahepatic stricture.\par \par He's undergone serial stent exchanges the last one on 9/9/211. His TB peaked 22. He's had multiple cholangitis episodes. Mother recalls in August 2019 he became septic presenting febrile and hypotensive. Additionally, he had a liver biopsy (6/7/2019) that showed a cholestatic pattern of injury with ductular reaction.\par Ca 19-9 level, IgG 4 level and bile duct brushings have been unremarkable. \par ARMANDO is negative, Mitochondrial antibody is negative, Viral hepatitis serologies are negative.\par \par Interval Events (7/12/21) -Pt completed abx treatment (Levofloxacin/ Metronidazole) MRCP (6/12/21) shows \par microabscesses  have improved. \par Feeling \par \par (6/15/21) – Presents for follow up. Will also see Dr. Tipton today. \par Patient continues to not feel well after 4 weeks of antibiotics. Has lost 10 lbs in the past month. . \par Intermittent low-grade fever (). Temp 98 today. c/o abdominal pain intermittently. \par \par Admission to [facility] 4/20/21 with fever. Had blood cultures obtained at the time which were with no growth. CT A/P (4/20) without obvious evidence of infection. Patient was discharged on empiric ciprofloxacin.\par \par MRI from May 10, 2021 revealed- Central biliary stricture as above. Differential includes both inflammatory and neoplastic etiologies, favor benign stricture given relative stability from 2019. Signal abnormality in segment 7 with several subcentimeter rim-enhancing foci concerning for microabscesses. Pancreatic tail lesion demonstrating interval growth however with enhancement characteristics consistent with a splenule. Of note, the spleen demonstrates continued interval growth along with this lesion over time. \par \par He was initiated on Levofloxacin 750mg daily and Metronidazole 500mg BID on 5/12/21 with plan to treat for 4 weeks and plan to repeat MRI as per hepatology and ID. Azathioprine discontinued. \par \par MRI/MRCP 6/11/21 – Stable central billiary stricture. Near complete resolution of the hepatic seg 7 signal abnormality/microabscesses. 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 \par \par More recently, patient had ERCP on 6/3/21 showing recurrent intrahepatic bile duct stricture and there was bile duct stone s/p balloon sweep removal and subsequent dilation of the right and left intrahepatic system and bilateral bile duct stent placement.\par \par Labs (9/13/21): MELD 12\par WBC 8.19, H/H 12.8/40.2, \par Na 136, Cr 0.76\par TB 6.2, AST 73, ALT 64,  (356, 398, 520) \par DB 4.8\par INR 1.13 \par \par Interval Events  (9/23/21)  Stent exchanged on the 9th, no recent fevers or pain. TB 6 alk phos 373, down from a high of 520. Off of all antibiotics, itching diminished\par Pt reports feeling well \par \par

## 2021-10-17 ENCOUNTER — TRANSCRIPTION ENCOUNTER (OUTPATIENT)
Age: 31
End: 2021-10-17

## 2021-10-25 ENCOUNTER — LABORATORY RESULT (OUTPATIENT)
Age: 31
End: 2021-10-25

## 2021-11-04 NOTE — PRE-ANESTHESIA EVALUATION ADULT - NSANTHDIETYNSD_GEN_ALL_CORE
[facility]                        Interventional Cardiology  Discharge Instructions   Post Right Heart Cath      AFTER YOU GO HOME:    DO drink plenty of fluids    DO resume your regular diet and medications unless otherwise instructed by your Primary Physician    Do Not scrub the procedure site vigorously    No lotion or powder to the puncture site for 3 days    CALL YOUR PRIMARY PHYSICIAN IF: You may resume all normal activity.  Monitor neck site for bleeding, swelling, or voice changes. If you notice bleeding or swelling immediately apply pressure to the site and call number below to speak with Cardiology Fellow.  If you experience any changes in your breathing you should call your doctor immediately or come to the closest Emergency Department.  Do not drive yourself.    ADDITIONAL INSTRUCTIONS: Medications: You are to resume all home medications including anticoagulation therapy unless otherwise advised by your primary cardiologist or nurse coordinator.    Follow Up: Per your primary cardiology team    If you have any questions or concerns regarding your procedure site please call [phone] at anytime and ask for Cardiology Fellow on call.  They are available 24 hours a day.  You may also contact the Cardiology Clinic after hours number at [phone].                                                       Telephone Numbers [phone] Monday-Friday 8:00 am to 4:30 pm    [phone] After 4:30 pm Monday-Friday, Weekends & Holidays  Ask for Interventional Cardiologist on call. Someone is on call 24 hours/day   [facility] toll free number [phone] Monday-Friday 8:00 am to 4:30 pm   [facility] Emergency Dept [phone]                  Yes

## 2021-11-06 ENCOUNTER — APPOINTMENT (OUTPATIENT)
Dept: DISASTER EMERGENCY | Facility: CLINIC | Age: 31
End: 2021-11-06

## 2021-11-07 LAB — SARS-COV-2 N GENE NPH QL NAA+PROBE: NOT DETECTED

## 2021-11-09 ENCOUNTER — RESULT REVIEW (OUTPATIENT)
Age: 31
End: 2021-11-09

## 2021-11-09 ENCOUNTER — TRANSCRIPTION ENCOUNTER (OUTPATIENT)
Age: 31
End: 2021-11-09

## 2021-11-09 ENCOUNTER — APPOINTMENT (OUTPATIENT)
Dept: GASTROENTEROLOGY | Facility: HOSPITAL | Age: 31
End: 2021-11-09

## 2021-11-09 ENCOUNTER — OUTPATIENT (OUTPATIENT)
Dept: OUTPATIENT SERVICES | Facility: HOSPITAL | Age: 31
LOS: 1 days | End: 2021-11-09
Payer: MEDICAID

## 2021-11-09 DIAGNOSIS — Z98.890 OTHER SPECIFIED POSTPROCEDURAL STATES: Chronic | ICD-10-CM

## 2021-11-09 DIAGNOSIS — K83.1 OBSTRUCTION OF BILE DUCT: ICD-10-CM

## 2021-11-09 PROCEDURE — C1773: CPT

## 2021-11-09 PROCEDURE — 43274 ERCP DUCT STENT PLACEMENT: CPT

## 2021-11-09 PROCEDURE — 88300 SURGICAL PATH GROSS: CPT | Mod: 26

## 2021-11-09 PROCEDURE — C1769: CPT

## 2021-11-09 PROCEDURE — 74330 X-RAY BILE/PANC ENDOSCOPY: CPT

## 2021-11-09 PROCEDURE — C1726: CPT

## 2021-11-09 PROCEDURE — 88300 SURGICAL PATH GROSS: CPT

## 2021-11-09 PROCEDURE — C2617: CPT

## 2021-11-09 PROCEDURE — 43276 ERCP STENT EXCHANGE W/DILATE: CPT | Mod: 59

## 2021-11-10 LAB — SURGICAL PATHOLOGY STUDY: SIGNIFICANT CHANGE UP

## 2021-11-15 LAB
ALBUMIN SERPL ELPH-MCNC: 2.7 G/DL
ALP BLD-CCNC: 336 U/L
ALT SERPL-CCNC: 36 U/L
ANION GAP SERPL CALC-SCNC: 11 MMOL/L
AST SERPL-CCNC: 76 U/L
BASOPHILS # BLD AUTO: 0.07 K/UL
BASOPHILS NFR BLD AUTO: 0.9 %
BILIRUB SERPL-MCNC: 6.1 MG/DL
BUN SERPL-MCNC: 14 MG/DL
CALCIUM SERPL-MCNC: 8.5 MG/DL
CHLORIDE SERPL-SCNC: 108 MMOL/L
CO2 SERPL-SCNC: 22 MMOL/L
CREAT SERPL-MCNC: 0.76 MG/DL
EOSINOPHIL # BLD AUTO: 0.26 K/UL
EOSINOPHIL NFR BLD AUTO: 3.2 %
GLUCOSE SERPL-MCNC: 101 MG/DL
HCT VFR BLD CALC: 34.6 %
HGB BLD-MCNC: 11.5 G/DL
IMM GRANULOCYTES NFR BLD AUTO: 0.7 %
INR PPP: 1.09 RATIO
LYMPHOCYTES # BLD AUTO: 1.55 K/UL
LYMPHOCYTES NFR BLD AUTO: 19.4 %
MAN DIFF?: NORMAL
MCHC RBC-ENTMCNC: 33.1 PG
MCHC RBC-ENTMCNC: 33.2 GM/DL
MCV RBC AUTO: 99.7 FL
MONOCYTES # BLD AUTO: 0.5 K/UL
MONOCYTES NFR BLD AUTO: 6.2 %
NEUTROPHILS # BLD AUTO: 5.57 K/UL
NEUTROPHILS NFR BLD AUTO: 69.6 %
PLATELET # BLD AUTO: 321 K/UL
POTASSIUM SERPL-SCNC: 3.8 MMOL/L
PROT SERPL-MCNC: 7.3 G/DL
PT BLD: 12.8 SEC
RBC # BLD: 3.47 M/UL
RBC # FLD: 14.9 %
SODIUM SERPL-SCNC: 141 MMOL/L
WBC # FLD AUTO: 8.01 K/UL

## 2021-11-17 ENCOUNTER — RX RENEWAL (OUTPATIENT)
Age: 31
End: 2021-11-17

## 2021-11-17 ENCOUNTER — APPOINTMENT (OUTPATIENT)
Dept: HEPATOLOGY | Facility: CLINIC | Age: 31
End: 2021-11-17
Payer: MEDICAID

## 2021-11-17 VITALS
HEIGHT: 64 IN | BODY MASS INDEX: 22.02 KG/M2 | HEART RATE: 78 BPM | WEIGHT: 129 LBS | TEMPERATURE: 98.4 F | DIASTOLIC BLOOD PRESSURE: 74 MMHG | OXYGEN SATURATION: 99 % | SYSTOLIC BLOOD PRESSURE: 118 MMHG | RESPIRATION RATE: 16 BRPM

## 2021-11-17 PROCEDURE — 99214 OFFICE O/P EST MOD 30 MIN: CPT

## 2021-11-17 RX ORDER — AMOXICILLIN AND CLAVULANATE POTASSIUM 875; 125 MG/1; MG/1
875-125 TABLET, COATED ORAL
Qty: 10 | Refills: 0 | Status: DISCONTINUED | COMMUNITY
Start: 2021-11-10 | End: 2021-11-17

## 2021-11-17 RX ORDER — CHOLESTYRAMINE 4 G/5.5G
4 POWDER, FOR SUSPENSION ORAL TWICE DAILY
Qty: 360 | Refills: 1 | Status: DISCONTINUED | COMMUNITY
Start: 2021-11-17 | End: 2021-11-17

## 2021-11-28 LAB
25(OH)D3 SERPL-MCNC: 8 NG/ML
ALBUMIN SERPL ELPH-MCNC: 2.9 G/DL
ALP BLD-CCNC: 317 U/L
ALT SERPL-CCNC: 52 U/L
AMMONIA PLAS-MCNC: <10 UMOL/L
ANION GAP SERPL CALC-SCNC: 12 MMOL/L
AST SERPL-CCNC: 94 U/L
BASOPHILS # BLD AUTO: 0.07 K/UL
BASOPHILS NFR BLD AUTO: 0.7 %
BILIRUB SERPL-MCNC: 4.3 MG/DL
BUN SERPL-MCNC: 8 MG/DL
CALCIUM SERPL-MCNC: 8.9 MG/DL
CHLORIDE SERPL-SCNC: 109 MMOL/L
CO2 SERPL-SCNC: 18 MMOL/L
CREAT SERPL-MCNC: 0.72 MG/DL
EOSINOPHIL # BLD AUTO: 0.18 K/UL
EOSINOPHIL NFR BLD AUTO: 1.8 %
GLUCOSE SERPL-MCNC: 105 MG/DL
HCT VFR BLD CALC: 36.8 %
HGB BLD-MCNC: 11.3 G/DL
IMM GRANULOCYTES NFR BLD AUTO: 0.4 %
INR PPP: 1.04 RATIO
LYMPHOCYTES # BLD AUTO: 1.77 K/UL
LYMPHOCYTES NFR BLD AUTO: 18 %
MAN DIFF?: NORMAL
MCHC RBC-ENTMCNC: 30.7 GM/DL
MCHC RBC-ENTMCNC: 31.8 PG
MCV RBC AUTO: 103.7 FL
MONOCYTES # BLD AUTO: 0.5 K/UL
MONOCYTES NFR BLD AUTO: 5.1 %
NEUTROPHILS # BLD AUTO: 7.3 K/UL
NEUTROPHILS NFR BLD AUTO: 74 %
PLATELET # BLD AUTO: 351 K/UL
POTASSIUM SERPL-SCNC: 4.1 MMOL/L
PROT SERPL-MCNC: 7.8 G/DL
PT BLD: 12.3 SEC
RBC # BLD: 3.55 M/UL
RBC # FLD: 15.7 %
SODIUM SERPL-SCNC: 139 MMOL/L
WBC # FLD AUTO: 9.86 K/UL

## 2021-11-29 ENCOUNTER — NON-APPOINTMENT (OUTPATIENT)
Age: 31
End: 2021-11-29

## 2021-11-30 ENCOUNTER — APPOINTMENT (OUTPATIENT)
Dept: HEPATOLOGY | Facility: CLINIC | Age: 31
End: 2021-11-30

## 2021-12-03 LAB — VIT A SERPL-MCNC: 11.4 UG/DL

## 2021-12-14 ENCOUNTER — APPOINTMENT (OUTPATIENT)
Dept: HEPATOLOGY | Facility: CLINIC | Age: 31
End: 2021-12-14
Payer: MEDICAID

## 2021-12-14 VITALS
DIASTOLIC BLOOD PRESSURE: 80 MMHG | OXYGEN SATURATION: 100 % | WEIGHT: 134 LBS | RESPIRATION RATE: 16 BRPM | BODY MASS INDEX: 22.88 KG/M2 | SYSTOLIC BLOOD PRESSURE: 141 MMHG | HEIGHT: 64 IN | TEMPERATURE: 97.8 F | HEART RATE: 82 BPM

## 2021-12-14 DIAGNOSIS — R79.89 OTHER SPECIFIED ABNORMAL FINDINGS OF BLOOD CHEMISTRY: ICD-10-CM

## 2021-12-14 PROCEDURE — 99214 OFFICE O/P EST MOD 30 MIN: CPT

## 2021-12-19 LAB
ALBUMIN SERPL ELPH-MCNC: 3.6 G/DL
ALP BLD-CCNC: 265 U/L
ALT SERPL-CCNC: 66 U/L
ANION GAP SERPL CALC-SCNC: 11 MMOL/L
AST SERPL-CCNC: 61 U/L
BASOPHILS # BLD AUTO: 0.06 K/UL
BASOPHILS NFR BLD AUTO: 0.6 %
BILIRUB SERPL-MCNC: 2.8 MG/DL
BUN SERPL-MCNC: 10 MG/DL
CALCIUM SERPL-MCNC: 9.3 MG/DL
CHLORIDE SERPL-SCNC: 103 MMOL/L
CO2 SERPL-SCNC: 21 MMOL/L
CREAT SERPL-MCNC: 0.75 MG/DL
EOSINOPHIL # BLD AUTO: 0.15 K/UL
EOSINOPHIL NFR BLD AUTO: 1.4 %
GLUCOSE SERPL-MCNC: 77 MG/DL
HCT VFR BLD CALC: 39.8 %
HGB BLD-MCNC: 12.8 G/DL
IMM GRANULOCYTES NFR BLD AUTO: 0.5 %
INR PPP: 1.3 RATIO
LYMPHOCYTES # BLD AUTO: 2.12 K/UL
LYMPHOCYTES NFR BLD AUTO: 20.2 %
MAN DIFF?: NORMAL
MCHC RBC-ENTMCNC: 32.2 GM/DL
MCHC RBC-ENTMCNC: 32.6 PG
MCV RBC AUTO: 101.3 FL
MONOCYTES # BLD AUTO: 0.61 K/UL
MONOCYTES NFR BLD AUTO: 5.8 %
NEUTROPHILS # BLD AUTO: 7.51 K/UL
NEUTROPHILS NFR BLD AUTO: 71.5 %
PLATELET # BLD AUTO: 362 K/UL
POTASSIUM SERPL-SCNC: 4.5 MMOL/L
PROT SERPL-MCNC: 8.4 G/DL
PT BLD: 15.2 SEC
RBC # BLD: 3.93 M/UL
RBC # FLD: 14.7 %
SODIUM SERPL-SCNC: 135 MMOL/L
WBC # FLD AUTO: 10.5 K/UL

## 2022-01-10 ENCOUNTER — RX RENEWAL (OUTPATIENT)
Age: 32
End: 2022-01-10

## 2022-01-12 NOTE — ASU DISCHARGE PLAN (ADULT/PEDIATRIC) - ***IN THE EVENT THAT YOU DEVELOP A COMPLICATION AND YOU ARE UNABLE TO REACH YOUR OWN PHYSICIAN, YOU MAY CONTACT:
At [facility], we strive to deliver an exceptional experience to you, every time we see you. If you receive a survey, please complete it as we do value your feedback.  If you have MyChart, you can expect to receive results automatically within 24 hours of their completion.  Your provider will send a note interpreting your results as well.   If you do not have MyChart, you should receive your results in about a week by mail.    Your care team:                            Family Medicine Internal Medicine   MD Ludin Paniagua MD Shantel Branch-Fleming, MD Srinivasa Vaka, MD Katya Belousova, PAKEYSHA Suero, APRN CNP    Fer Gates, MD Pediatrics   Giovany Lowe, PAKEYSHA Hurley, CNP MD Antonina Sun APRN CNP   MD Chrissy Altman MD Deborah Mielke, MD Kendra Pinzon, APRN [facility]      Clinic hours: Monday - Thursday 7 am-6 pm; Fridays 7 am-5 pm.   Urgent care: Monday - Friday 10 am- 8 pm; Saturday and Sunday 9 am-5 pm.    Clinic: [phone]       [facility]: Monday - Thursday 8 am - 7 pm; Friday 8 am - 6 pm  [facility]: [phone]     Use [email or web address] for 24/7 diagnosis and treatment of dozens of conditions.    Statement Selected

## 2022-01-25 ENCOUNTER — APPOINTMENT (OUTPATIENT)
Dept: TRANSPLANT | Facility: CLINIC | Age: 32
End: 2022-01-25
Payer: MEDICAID

## 2022-01-25 VITALS
BODY MASS INDEX: 22.71 KG/M2 | OXYGEN SATURATION: 100 % | SYSTOLIC BLOOD PRESSURE: 120 MMHG | DIASTOLIC BLOOD PRESSURE: 68 MMHG | RESPIRATION RATE: 16 BRPM | TEMPERATURE: 98.1 F | HEART RATE: 127 BPM | HEIGHT: 64 IN | WEIGHT: 133 LBS

## 2022-01-25 VITALS — HEART RATE: 118 BPM

## 2022-01-25 PROCEDURE — 99215 OFFICE O/P EST HI 40 MIN: CPT

## 2022-01-25 NOTE — ASSESSMENT
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 Multiple stent exchanges for biliary strictures, most recent stent exchange was performed on 11/9/2021\par Currently listed for liver transplant. MELD 12. \par \par MRI/MRCP 6/11/21-with near complete resolution of hepatic segment 7 abnormality/microabscess. \par \par Abscesses resolved, s/p ERCP with stent exchange 11/9//21. \par \par \par PLAN . Follow labs and new MRCP, appeal in progress with recent febrile illness.\par -Repeat MR MRCP\par -Labs Today On list low meld-Appeal submitted awaiting Regional review board decision \par -RTC -  4 months

## 2022-01-25 NOTE — HISTORY OF PRESENT ILLNESS
[Primary Sclerosing Cholangitis] : Primary Sclerosing Cholangitis [Previous Transplant] : No history of previous transplant [FreeTextEntry1] : 9 [FreeTextEntry2] : AB  [TextBox_42] : Mr. Biggs is a 32 y/o M with history of cerebral palsy who presents for liver transplant evaluation follow up accompanied by his mother. Currently listed on liver transplant waitlist. \par \par PMHx includes cerebral palsy, nephrolithiasis and autoimmune cholangitis, seizures as a child. He was referred by Dr. Yasmany Howard for liver transplant evaluation.\par \par Pt has know liver disease since January 2019 when he presented with pruritus, N/V and abdominal pain while living in [place]. He was noted to have liver enzymes elevation and his [place] back [place] for healthcare.\par \par He then subsequently presented to [facility] and underwent evaluation. His bilirubin started to rise further and then transferred to [facility] in February 2019. He underwent ERCP which showed the distal bile duct stricture and questionable right intrahepatic stricture. Stent was placed in the distal bile duct. However his bilirubin did not improve initially. He underwent MRI with severe right intrahepatic stricture.\par \par He's undergone serial stent exchanges every 3 months. His TB peaked 22. He's had multiple cholangitis episodes. Mother recalls in August 2019 he became septic presenting febrile and hypotensive. Additionally, he had a liver biopsy (6/7/2019) that showed a cholestatic pattern of injury with ductular reaction.\par \par Ca 19-9 level, IgG 4 level and bile duct brushings have been unremarkable. \par ARMANDO is negative, Mitochondrial antibody is negative, Viral hepatitis serologies are negative.\par \par Interval Events \par (1/25/2022) - Last stent exchange 11/9/2021. Reports a febrile/chills episode 2 weeks ago w/o associated symptoms, no ABD pain, no cold symptoms.  Reports mother gave advil for symptom relief.  Weight stable today 133 lbs good appetite.  Last MRCP 6/11/2021. Recieved Covid Booster, Terry >250. \par \par (9/23/21)  Stent exchanged on the 9th, no recent fevers or pain. TB 6 alk phos 373, down from a high of 520. Off of all antibiotics, itching diminished\par Pt reports feeling well \par \par (7/12/21) -Pt completed abx treatment (Levofloxacin/ Metronidazole) MRCP (6/12/21) shows \par microabscesses  have improved. \par Feeling \par \par (6/15/21) – Presents for follow up. Will also see Dr. Tipton today. \par Patient continues to not feel well after 4 weeks of antibiotics. Has lost 10 lbs in the past month. . \par Intermittent low-grade fever (). Temp 98 today. c/o abdominal pain intermittently. \par \par Admission to [facility] 4/20/21 with fever. Had blood cultures obtained at the time which were with no growth. CT A/P (4/20) without obvious evidence of infection. Patient was discharged on empiric ciprofloxacin.\par \par MRI from May 10, 2021 revealed- Central biliary stricture as above. Differential includes both inflammatory and neoplastic etiologies, favor benign stricture given relative stability from 2019. Signal abnormality in segment 7 with several subcentimeter rim-enhancing foci concerning for microabscesses. Pancreatic tail lesion demonstrating interval growth however with enhancement characteristics consistent with a splenule. Of note, the spleen demonstrates continued interval growth along with this lesion over time. \par \par He was initiated on Levofloxacin 750mg daily and Metronidazole 500mg BID on 5/12/21 with plan to treat for 4 weeks and plan to repeat MRI as per hepatology and ID. Azathioprine discontinued. \par \par MRI/MRCP 6/11/21 – Stable central billiary stricture. Near complete resolution of the hepatic seg 7 signal abnormality/microabscesses. 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 \par \par More recently, patient had ERCP on 6/3/21 showing recurrent intrahepatic bile duct stricture and there was bile duct stone s/p balloon sweep removal and subsequent dilation of the right and left intrahepatic system and bilateral bile duct stent placement.\par \par Labs (12/14/21): \par TB 2.8, AST 61, ALT 66, \par Na 135, K 4.5, Cr 0.75\par INR 1.30 \par \par

## 2022-01-25 NOTE — REVIEW OF SYSTEMS
Noted.   TONI Fournier, NP-C  [facility]     [Negative] : Heme/Lymph [FreeTextEntry9] : left hand and leg contractures

## 2022-01-25 NOTE — PHYSICAL EXAM
[Alert] : alert [No Acute Distress] : no acute distress [Scleral Icterus] : scleral icterus [Clear to Auscultation] : lungs were clear to auscultation bilaterally [Normal Rate] : normal rate [Non-Tender] : Liver Edge: non-tender [Soft] : soft [] : right femoral palpable [Hepatojugular Reflux] : no hepatojugular reflux [Abdominal Bruit] : no abdominal bruit [Ascites Fluid Wave] : no ascites fluid wave [Splenomegaly] : no splenomegaly [Ascites Tense] : no ascites tense [Caput Medusae] : no caput medusae [Asterixis] : no asterixis [Hepatic Encephalopathy] : no hepatic encephalopathy [Depression] : no depression [de-identified] : improved jaundice [de-identified] : non tender, no ascites,  [FreeTextEntry1] : Left hand and left leg contracture  no recent scratch marks. [TextBox_40] : 2+ [de-identified] : Better

## 2022-01-27 LAB
ALBUMIN SERPL ELPH-MCNC: 3.3 G/DL
ALP BLD-CCNC: 490 U/L
ALT SERPL-CCNC: 70 U/L
ANION GAP SERPL CALC-SCNC: 11 MMOL/L
AST SERPL-CCNC: 89 U/L
BASOPHILS # BLD AUTO: 0.05 K/UL
BASOPHILS NFR BLD AUTO: 0.8 %
BILIRUB SERPL-MCNC: 3.8 MG/DL
BUN SERPL-MCNC: 11 MG/DL
CALCIUM SERPL-MCNC: 8.9 MG/DL
CHLORIDE SERPL-SCNC: 103 MMOL/L
CO2 SERPL-SCNC: 23 MMOL/L
CREAT SERPL-MCNC: 0.75 MG/DL
EOSINOPHIL # BLD AUTO: 0.07 K/UL
EOSINOPHIL NFR BLD AUTO: 1.1 %
GLUCOSE SERPL-MCNC: 114 MG/DL
HCT VFR BLD CALC: 34.8 %
HGB BLD-MCNC: 11.3 G/DL
IMM GRANULOCYTES NFR BLD AUTO: 0.3 %
INR PPP: 1.2 RATIO
LYMPHOCYTES # BLD AUTO: 0.98 K/UL
LYMPHOCYTES NFR BLD AUTO: 15.8 %
MAN DIFF?: NORMAL
MCHC RBC-ENTMCNC: 32.1 PG
MCHC RBC-ENTMCNC: 32.5 GM/DL
MCV RBC AUTO: 98.9 FL
MONOCYTES # BLD AUTO: 0.34 K/UL
MONOCYTES NFR BLD AUTO: 5.5 %
NEUTROPHILS # BLD AUTO: 4.74 K/UL
NEUTROPHILS NFR BLD AUTO: 76.5 %
PLATELET # BLD AUTO: 197 K/UL
POTASSIUM SERPL-SCNC: 3.6 MMOL/L
PROT SERPL-MCNC: 7.9 G/DL
PT BLD: 14 SEC
RBC # BLD: 3.52 M/UL
RBC # FLD: 14.1 %
SODIUM SERPL-SCNC: 138 MMOL/L
WBC # FLD AUTO: 6.2 K/UL

## 2022-01-27 NOTE — ED ADULT NURSE NOTE - NS ED NOTE ABUSE RESPONSE YN
IMPRESSION:  Acute hypoxic respiratory failure  cardiac arrest  HAGMA  multi lobar pneumonia due to covid 19   Sepsis/ Septic   L PTX  S/p bronchoscopy on 1/21/2022    SUGGEST:      CNS:  SAT today      HEENT: Oral care    PULMONARY: No SBT, HOB @ 45 degrees.   Decrease Fio2 to 60%, as necessary to maintain sats > 90%<94  CT to suction   Dexamethasone 6mg Q12  monitor driving pressure  Keep PEEP at 12.       CARDIOVASCULAR:  avoid overload Keep i<=Os      GI: GI prophylaxis.    OG feeding     RENAL:  Follow up lytes.    Correct as needed  F/u NA today, adjust free water intake    INFECTIOUS DISEASE:   ID F/u, send DTA   inflammatory markers CRP, d-dimer, fibrinogen  Follow up cultures.   antivirals per ID  repeat procal, moniotr off abx    HEMATOLOGICAL: Lovenox. F/u CBC today    ENDOCRINE:  Follow up FS.  Insulin protocol if needed.    MUSCULOSKELETAL: bedrest    Left IJ  monitor in ICU  DNR  poor prognosis  The patient is critically ill with a high probability of deterioration. IMPRESSION:  Acute hypoxic respiratory failure  cardiac arrest  HAGMA  multi lobar pneumonia due to covid 19   Sepsis/ Septic   L PTX  S/p bronchoscopy on 1/21/2022    SUGGEST:      CNS:  SAT today, restart propfol, oxycodone, dilaudid pushes PRN.(stop fentanyl)  Repeat head CT today.    HEENT: Oral care    PULMONARY: No SBT, HOB @ 45 degrees.   Decrease Fio2 to 60%, as necessary to maintain sats > 90%<94  CT to suction   Dexamethasone 6mg Q12  monitor driving pressure  Keep PEEP at 12.       CARDIOVASCULAR:  avoid overload Keep i<=Os      GI: GI prophylaxis.    OG feeding     RENAL:  Follow up lytes.    Correct as needed  F/u NA today, adjust free water intake    INFECTIOUS DISEASE:   ID F/u, send DTA   inflammatory markers CRP, d-dimer, fibrinogen  Follow up cultures.   antivirals per ID  repeat procal, moniotr off abx    HEMATOLOGICAL: Lovenox. F/u CBC today    ENDOCRINE:  Follow up FS.  Insulin protocol if needed.    MUSCULOSKELETAL: bedrest    Left IJ  monitor in ICU  DNR  poor prognosis  The patient is critically ill with a high probability of deterioration. Unable to assess due to medical condition

## 2022-02-07 ENCOUNTER — APPOINTMENT (OUTPATIENT)
Dept: MRI IMAGING | Facility: CLINIC | Age: 32
End: 2022-02-07
Payer: MEDICAID

## 2022-02-07 ENCOUNTER — OUTPATIENT (OUTPATIENT)
Dept: OUTPATIENT SERVICES | Facility: HOSPITAL | Age: 32
LOS: 1 days | End: 2022-02-07
Payer: MEDICAID

## 2022-02-07 DIAGNOSIS — Z98.890 OTHER SPECIFIED POSTPROCEDURAL STATES: Chronic | ICD-10-CM

## 2022-02-07 DIAGNOSIS — K83.01 PRIMARY SCLEROSING CHOLANGITIS: ICD-10-CM

## 2022-02-07 PROCEDURE — 74183 MRI ABD W/O CNTR FLWD CNTR: CPT | Mod: 26

## 2022-02-07 PROCEDURE — A9585: CPT

## 2022-02-07 PROCEDURE — 74183 MRI ABD W/O CNTR FLWD CNTR: CPT

## 2022-02-08 ENCOUNTER — TRANSCRIPTION ENCOUNTER (OUTPATIENT)
Age: 32
End: 2022-02-08

## 2022-02-08 ENCOUNTER — APPOINTMENT (OUTPATIENT)
Dept: GASTROENTEROLOGY | Facility: HOSPITAL | Age: 32
End: 2022-02-08

## 2022-02-08 ENCOUNTER — RESULT REVIEW (OUTPATIENT)
Age: 32
End: 2022-02-08

## 2022-02-08 ENCOUNTER — OUTPATIENT (OUTPATIENT)
Dept: OUTPATIENT SERVICES | Facility: HOSPITAL | Age: 32
LOS: 1 days | End: 2022-02-08
Payer: MEDICAID

## 2022-02-08 DIAGNOSIS — Z98.890 OTHER SPECIFIED POSTPROCEDURAL STATES: Chronic | ICD-10-CM

## 2022-02-08 DIAGNOSIS — K83.1 OBSTRUCTION OF BILE DUCT: ICD-10-CM

## 2022-02-08 PROCEDURE — 43276 ERCP STENT EXCHANGE W/DILATE: CPT

## 2022-02-08 PROCEDURE — 74329 X-RAY FOR PANCREAS ENDOSCOPY: CPT

## 2022-02-08 PROCEDURE — C2617: CPT

## 2022-02-08 PROCEDURE — 88300 SURGICAL PATH GROSS: CPT | Mod: 26

## 2022-02-08 PROCEDURE — C1769: CPT

## 2022-02-08 PROCEDURE — 88300 SURGICAL PATH GROSS: CPT

## 2022-02-08 PROCEDURE — C9399: CPT

## 2022-02-08 PROCEDURE — C1773: CPT

## 2022-02-08 PROCEDURE — C1726: CPT

## 2022-02-08 DEVICE — GWIRE VISIGLIDE STRT TIP 270CM .035: Type: IMPLANTABLE DEVICE | Status: FUNCTIONAL

## 2022-02-08 DEVICE — CATH BLLN BIL RX 9-12CM: Type: IMPLANTABLE DEVICE | Status: FUNCTIONAL

## 2022-02-08 DEVICE — GWIRE VISIGLIDE ANG TIP 270CM .025: Type: IMPLANTABLE DEVICE | Status: FUNCTIONAL

## 2022-02-08 DEVICE — CATH BLN RX HURRIC 4MMX4X180CM: Type: IMPLANTABLE DEVICE | Status: FUNCTIONAL

## 2022-02-08 DEVICE — IMPLANTABLE DEVICE: Type: IMPLANTABLE DEVICE | Status: FUNCTIONAL

## 2022-02-08 RX ORDER — CIPROFLOXACIN LACTATE 400MG/40ML
400 VIAL (ML) INTRAVENOUS ONCE
Refills: 0 | Status: DISCONTINUED | OUTPATIENT
Start: 2022-02-08 | End: 2022-02-22

## 2022-02-08 RX ORDER — INDOMETHACIN 50 MG
100 CAPSULE ORAL ONCE
Refills: 0 | Status: DISCONTINUED | OUTPATIENT
Start: 2022-02-08 | End: 2022-02-22

## 2022-02-09 LAB — SURGICAL PATHOLOGY STUDY: SIGNIFICANT CHANGE UP

## 2022-02-16 ENCOUNTER — APPOINTMENT (OUTPATIENT)
Dept: HEPATOLOGY | Facility: CLINIC | Age: 32
End: 2022-02-16
Payer: MEDICAID

## 2022-02-16 ENCOUNTER — LABORATORY RESULT (OUTPATIENT)
Age: 32
End: 2022-02-16

## 2022-02-16 VITALS
TEMPERATURE: 97.7 F | WEIGHT: 129 LBS | BODY MASS INDEX: 22.02 KG/M2 | OXYGEN SATURATION: 100 % | RESPIRATION RATE: 16 BRPM | SYSTOLIC BLOOD PRESSURE: 118 MMHG | HEART RATE: 97 BPM | HEIGHT: 64 IN | DIASTOLIC BLOOD PRESSURE: 72 MMHG

## 2022-02-16 PROCEDURE — 99214 OFFICE O/P EST MOD 30 MIN: CPT

## 2022-02-20 LAB
ALBUMIN SERPL ELPH-MCNC: 3.2 G/DL
ALP BLD-CCNC: 688 U/L
ALT SERPL-CCNC: 114 U/L
ANION GAP SERPL CALC-SCNC: 11 MMOL/L
APPEARANCE: CLEAR
AST SERPL-CCNC: 188 U/L
BASOPHILS # BLD AUTO: 0.04 K/UL
BASOPHILS NFR BLD AUTO: 0.6 %
BILIRUB SERPL-MCNC: 4.4 MG/DL
BILIRUBIN URINE: ABNORMAL
BLOOD URINE: ABNORMAL
BUN SERPL-MCNC: 10 MG/DL
CALCIUM SERPL-MCNC: 9 MG/DL
CHLORIDE SERPL-SCNC: 105 MMOL/L
CO2 SERPL-SCNC: 23 MMOL/L
COLOR: ABNORMAL
CREAT SERPL-MCNC: 0.81 MG/DL
EOSINOPHIL # BLD AUTO: 0.2 K/UL
EOSINOPHIL NFR BLD AUTO: 3 %
GLUCOSE QUALITATIVE U: NEGATIVE
GLUCOSE SERPL-MCNC: 90 MG/DL
HCT VFR BLD CALC: 36.4 %
HGB BLD-MCNC: 11.2 G/DL
IMM GRANULOCYTES NFR BLD AUTO: 0.6 %
INR PPP: 1.12 RATIO
KETONES URINE: NEGATIVE
LEUKOCYTE ESTERASE URINE: ABNORMAL
LYMPHOCYTES # BLD AUTO: 1.46 K/UL
LYMPHOCYTES NFR BLD AUTO: 22.1 %
MAN DIFF?: NORMAL
MCHC RBC-ENTMCNC: 30.8 GM/DL
MCHC RBC-ENTMCNC: 32.1 PG
MCV RBC AUTO: 104.3 FL
MONOCYTES # BLD AUTO: 0.36 K/UL
MONOCYTES NFR BLD AUTO: 5.4 %
NEUTROPHILS # BLD AUTO: 4.51 K/UL
NEUTROPHILS NFR BLD AUTO: 68.3 %
NITRITE URINE: NEGATIVE
PH URINE: 6
PLATELET # BLD AUTO: 327 K/UL
POTASSIUM SERPL-SCNC: 4.5 MMOL/L
PROT SERPL-MCNC: 7.8 G/DL
PROTEIN URINE: NEGATIVE
PT BLD: 13.2 SEC
RBC # BLD: 3.49 M/UL
RBC # FLD: 14.9 %
SODIUM SERPL-SCNC: 139 MMOL/L
SPECIFIC GRAVITY URINE: >=1.03
UROBILINOGEN URINE: NORMAL
WBC # FLD AUTO: 6.61 K/UL

## 2022-02-23 ENCOUNTER — OUTPATIENT (OUTPATIENT)
Dept: OUTPATIENT SERVICES | Facility: HOSPITAL | Age: 32
LOS: 1 days | End: 2022-02-23
Payer: MEDICAID

## 2022-02-23 ENCOUNTER — APPOINTMENT (OUTPATIENT)
Dept: INFECTIOUS DISEASE | Facility: CLINIC | Age: 32
End: 2022-02-23
Payer: MEDICAID

## 2022-02-23 VITALS
OXYGEN SATURATION: 100 % | WEIGHT: 131 LBS | SYSTOLIC BLOOD PRESSURE: 128 MMHG | HEART RATE: 90 BPM | HEIGHT: 64 IN | RESPIRATION RATE: 16 BRPM | BODY MASS INDEX: 22.36 KG/M2 | TEMPERATURE: 98 F | DIASTOLIC BLOOD PRESSURE: 74 MMHG

## 2022-02-23 DIAGNOSIS — Z98.890 OTHER SPECIFIED POSTPROCEDURAL STATES: Chronic | ICD-10-CM

## 2022-02-23 DIAGNOSIS — B97.89 OTHER VIRAL AGENTS AS THE CAUSE OF DISEASES CLASSIFIED ELSEWHERE: ICD-10-CM

## 2022-02-23 PROCEDURE — G0463: CPT

## 2022-02-23 PROCEDURE — 99213 OFFICE O/P EST LOW 20 MIN: CPT

## 2022-02-24 LAB
ALBUMIN SERPL ELPH-MCNC: 3.3 G/DL
ALP BLD-CCNC: 622 U/L
ALT SERPL-CCNC: 121 U/L
ANION GAP SERPL CALC-SCNC: 9 MMOL/L
AST SERPL-CCNC: 146 U/L
BASOPHILS # BLD AUTO: 0.07 K/UL
BASOPHILS NFR BLD AUTO: 1.2 %
BILIRUB SERPL-MCNC: 4.4 MG/DL
BUN SERPL-MCNC: 9 MG/DL
CALCIUM SERPL-MCNC: 9 MG/DL
CHLORIDE SERPL-SCNC: 106 MMOL/L
CO2 SERPL-SCNC: 25 MMOL/L
CREAT SERPL-MCNC: 0.8 MG/DL
EOSINOPHIL # BLD AUTO: 0.19 K/UL
EOSINOPHIL NFR BLD AUTO: 3.3 %
GLUCOSE SERPL-MCNC: 88 MG/DL
HCT VFR BLD CALC: 36.2 %
HGB BLD-MCNC: 11.6 G/DL
IMM GRANULOCYTES NFR BLD AUTO: 0.4 %
LYMPHOCYTES # BLD AUTO: 1.32 K/UL
LYMPHOCYTES NFR BLD AUTO: 23.2 %
MAN DIFF?: NORMAL
MCHC RBC-ENTMCNC: 32 GM/DL
MCHC RBC-ENTMCNC: 33.1 PG
MCV RBC AUTO: 103.4 FL
MONOCYTES # BLD AUTO: 0.41 K/UL
MONOCYTES NFR BLD AUTO: 7.2 %
NEUTROPHILS # BLD AUTO: 3.67 K/UL
NEUTROPHILS NFR BLD AUTO: 64.7 %
PLATELET # BLD AUTO: 282 K/UL
POTASSIUM SERPL-SCNC: 3.6 MMOL/L
PROT SERPL-MCNC: 8.1 G/DL
RBC # BLD: 3.5 M/UL
RBC # FLD: 14.9 %
SODIUM SERPL-SCNC: 140 MMOL/L
WBC # FLD AUTO: 5.68 K/UL

## 2022-02-24 NOTE — HISTORY OF PRESENT ILLNESS
[FreeTextEntry1] : 31 year old male PMH PSC, Cerebral Palsy who presents to ID office for evaluation of fevers. \par \par Patient was previously seen in 2021. Patient presented to [facility] in 4/2021 with fever. Had blood cultures obtained at the time which were with no growth. CT A/P (4/20) without obvious evidence of infection. Patient was discharged on empiric ciprofloxacin. Notes no more fevers after ciprofloxacin initiation. Patient had outpatient MRI Abdomen on 5/10/21 which revealed Signal abnormality in segment 7 with several subcentimeter rim-enhancing foci concerning for microabscesses. Completed 4 week course of Levofloxacin and Metronidazole. Repeat MRI with near resolution of abscesses. \par \par Patient was having fevers for 2-3 weeks at the beginning of 2/2022. Tmax of 101. He underwent ERCP with stent exchange on February 8, 2022 by Dr. Howard. He reports his fever episode started few days before ERCP was done. He also had an MRCP done on February 7, 2022 that revealed stable intrahepatic biliary ductal dilatation and central strictures. No evidence of residual hepatic abscess. Patient was empirically started on Augmentin 875 mg PO Q12H x 7 days. \par \par Notes no fevers over the last 7 days. Notes today is last day of antibiotics. Notes some worsening reflux with associated cough over last 1-2 days but denies any productive cough. Denies sore throat or rhinorrhea. Denies N/V/D or abdominal pain. Does note a pruritic hypopigmented rash on his RLE but denies pain at the site

## 2022-02-24 NOTE — PHYSICAL EXAM
[General Appearance - Alert] : alert [General Appearance - In No Acute Distress] : in no acute distress [Sclera] : the sclera and conjunctiva were normal [PERRL With Normal Accommodation] : pupils were equal in size, round, reactive to light [Extraocular Movements] : extraocular movements were intact [Oropharynx] : the oropharynx was normal with no thrush [Outer Ear] : the ears and nose were normal in appearance [Neck Appearance] : the appearance of the neck was normal [Auscultation Breath Sounds / Voice Sounds] : lungs were clear to auscultation bilaterally [Heart Rate And Rhythm] : heart rate was normal and rhythm regular [Heart Sounds] : normal S1 and S2 [Heart Sounds Gallop] : no gallops [Murmurs] : no murmurs [Heart Sounds Pericardial Friction Rub] : no pericardial rub [Edema] : there was no peripheral edema [Bowel Sounds] : normal bowel sounds [Abdomen Soft] : soft [Abdomen Tenderness] : non-tender [] : no hepato-splenomegaly [No Palpable Adenopathy] : no palpable adenopathy [Abdomen Mass (___ Cm)] : no abdominal mass palpated [Musculoskeletal - Swelling] : no joint swelling [Nail Clubbing] : no clubbing  or cyanosis of the fingernails [Motor Tone] : muscle strength and tone were normal [Skin Lesions] : no skin lesions [FreeTextEntry1] : hypopigmented spots on his RLE (not present elsewhere) [No Focal Deficits] : no focal deficits

## 2022-02-24 NOTE — ASSESSMENT
[FreeTextEntry1] : 31 year old male PMH PSC, Cerebral Palsy who presents to ID office for evaluation of fevers. \par \par Developed fevers at beginning of 2/2022 prior to ERCP\par s/p ERCP with stent exchange on February 8, 2022\par MRCP (2/7) with stable intrahepatic biliary ductal dilatation and central strictures. No evidence of residual hepatic abscess. \par \par U/A (2/16) 9 WBC and 15 RBC\par UCx (2/16) with 10-49K E coli (Augmentin Susceptible)\par BCx (2/16) NGTD\par \par s/p  Augmentin 875 mg PO Q12H x 7 days with fever resolution\par \par At this point unclear etiology of initial fevers\par Doubt UTI given low level pyuria and low CFU count of E coli\par ?Biliary Infection which responded to antibiotics\par \par Will check CBC and CMP today\par Will monitor off of further antibiotic\par Advised patient and mom to contact me if fevers recur for further guidance

## 2022-02-28 DIAGNOSIS — R50.9 FEVER, UNSPECIFIED: ICD-10-CM

## 2022-03-01 ENCOUNTER — APPOINTMENT (OUTPATIENT)
Dept: HEPATOLOGY | Facility: CLINIC | Age: 32
End: 2022-03-01
Payer: MEDICAID

## 2022-03-01 VITALS
SYSTOLIC BLOOD PRESSURE: 122 MMHG | RESPIRATION RATE: 16 BRPM | BODY MASS INDEX: 22.36 KG/M2 | DIASTOLIC BLOOD PRESSURE: 67 MMHG | HEART RATE: 86 BPM | TEMPERATURE: 98 F | HEIGHT: 64 IN | WEIGHT: 131 LBS | OXYGEN SATURATION: 100 %

## 2022-03-01 DIAGNOSIS — K21.9 GASTRO-ESOPHAGEAL REFLUX DISEASE W/OUT ESOPHAGITIS: ICD-10-CM

## 2022-03-01 PROCEDURE — 99214 OFFICE O/P EST MOD 30 MIN: CPT

## 2022-03-07 LAB — BACTERIA BLD CULT: NORMAL

## 2022-03-26 LAB
ALBUMIN SERPL ELPH-MCNC: 3.1 G/DL
ALP BLD-CCNC: 594 U/L
ALT SERPL-CCNC: 89 U/L
ANION GAP SERPL CALC-SCNC: 9 MMOL/L
AST SERPL-CCNC: 138 U/L
BASOPHILS # BLD AUTO: 0.04 K/UL
BASOPHILS NFR BLD AUTO: 0.6 %
BILIRUB SERPL-MCNC: 5.8 MG/DL
BUN SERPL-MCNC: 11 MG/DL
CALCIUM SERPL-MCNC: 8.5 MG/DL
CHLORIDE SERPL-SCNC: 104 MMOL/L
CO2 SERPL-SCNC: 24 MMOL/L
CREAT SERPL-MCNC: 0.82 MG/DL
EGFR: 122 ML/MIN/1.73M2
EOSINOPHIL # BLD AUTO: 0.14 K/UL
EOSINOPHIL NFR BLD AUTO: 2.3 %
GLUCOSE SERPL-MCNC: 91 MG/DL
HCT VFR BLD CALC: 34.8 %
HGB BLD-MCNC: 11.2 G/DL
IMM GRANULOCYTES NFR BLD AUTO: 0.5 %
INR PPP: 1.14 RATIO
LYMPHOCYTES # BLD AUTO: 1.24 K/UL
LYMPHOCYTES NFR BLD AUTO: 20.1 %
MAN DIFF?: NORMAL
MCHC RBC-ENTMCNC: 32.2 GM/DL
MCHC RBC-ENTMCNC: 33.1 PG
MCV RBC AUTO: 103 FL
MONOCYTES # BLD AUTO: 0.36 K/UL
MONOCYTES NFR BLD AUTO: 5.8 %
NEUTROPHILS # BLD AUTO: 4.35 K/UL
NEUTROPHILS NFR BLD AUTO: 70.7 %
PLATELET # BLD AUTO: 304 K/UL
POTASSIUM SERPL-SCNC: 4.1 MMOL/L
PROT SERPL-MCNC: 8.3 G/DL
PT BLD: 13.3 SEC
RBC # BLD: 3.38 M/UL
RBC # FLD: 14.4 %
SODIUM SERPL-SCNC: 138 MMOL/L
WBC # FLD AUTO: 6.16 K/UL

## 2022-04-01 ENCOUNTER — RESULT REVIEW (OUTPATIENT)
Age: 32
End: 2022-04-01

## 2022-04-02 ENCOUNTER — OUTPATIENT (OUTPATIENT)
Dept: OUTPATIENT SERVICES | Facility: HOSPITAL | Age: 32
LOS: 1 days | End: 2022-04-02
Payer: MEDICAID

## 2022-04-02 ENCOUNTER — APPOINTMENT (OUTPATIENT)
Dept: CT IMAGING | Facility: CLINIC | Age: 32
End: 2022-04-02
Payer: MEDICAID

## 2022-04-02 DIAGNOSIS — Z98.890 OTHER SPECIFIED POSTPROCEDURAL STATES: Chronic | ICD-10-CM

## 2022-04-02 DIAGNOSIS — Z00.8 ENCOUNTER FOR OTHER GENERAL EXAMINATION: ICD-10-CM

## 2022-04-02 DIAGNOSIS — R05.9 COUGH, UNSPECIFIED: ICD-10-CM

## 2022-04-02 PROCEDURE — 71260 CT THORAX DX C+: CPT

## 2022-04-02 PROCEDURE — 71260 CT THORAX DX C+: CPT | Mod: 26

## 2022-04-10 LAB
ALBUMIN SERPL ELPH-MCNC: 3 G/DL
ALP BLD-CCNC: 460 U/L
ALT SERPL-CCNC: 57 U/L
ANION GAP SERPL CALC-SCNC: 9 MMOL/L
AST SERPL-CCNC: 88 U/L
BASOPHILS # BLD AUTO: 0.03 K/UL
BASOPHILS NFR BLD AUTO: 0.6 %
BILIRUB SERPL-MCNC: 5 MG/DL
BUN SERPL-MCNC: 14 MG/DL
CALCIUM SERPL-MCNC: 8.5 MG/DL
CANCER AG19-9 SERPL-ACNC: 12 U/ML
CHLORIDE SERPL-SCNC: 104 MMOL/L
CO2 SERPL-SCNC: 25 MMOL/L
CREAT SERPL-MCNC: 0.81 MG/DL
EGFR: 122 ML/MIN/1.73M2
EOSINOPHIL # BLD AUTO: 0.16 K/UL
EOSINOPHIL NFR BLD AUTO: 3.3 %
GLUCOSE SERPL-MCNC: 84 MG/DL
HCT VFR BLD CALC: 35.6 %
HGB BLD-MCNC: 11.2 G/DL
IMM GRANULOCYTES NFR BLD AUTO: 0.2 %
INR PPP: 1.14 RATIO
LYMPHOCYTES # BLD AUTO: 1.3 K/UL
LYMPHOCYTES NFR BLD AUTO: 26.9 %
MAN DIFF?: NORMAL
MCHC RBC-ENTMCNC: 31.5 GM/DL
MCHC RBC-ENTMCNC: 32.7 PG
MCV RBC AUTO: 104.1 FL
MONOCYTES # BLD AUTO: 0.35 K/UL
MONOCYTES NFR BLD AUTO: 7.2 %
NEUTROPHILS # BLD AUTO: 2.98 K/UL
NEUTROPHILS NFR BLD AUTO: 61.8 %
PLATELET # BLD AUTO: 161 K/UL
POTASSIUM SERPL-SCNC: 3.7 MMOL/L
PROT SERPL-MCNC: 8 G/DL
PT BLD: 13.2 SEC
RBC # BLD: 3.42 M/UL
RBC # FLD: 14.2 %
SODIUM SERPL-SCNC: 138 MMOL/L
WBC # FLD AUTO: 4.83 K/UL

## 2022-04-27 ENCOUNTER — APPOINTMENT (OUTPATIENT)
Dept: TRANSPLANT | Facility: CLINIC | Age: 32
End: 2022-04-27
Payer: MEDICAID

## 2022-04-27 ENCOUNTER — LABORATORY RESULT (OUTPATIENT)
Age: 32
End: 2022-04-27

## 2022-04-27 VITALS
OXYGEN SATURATION: 100 % | DIASTOLIC BLOOD PRESSURE: 76 MMHG | BODY MASS INDEX: 21 KG/M2 | SYSTOLIC BLOOD PRESSURE: 123 MMHG | WEIGHT: 123 LBS | HEIGHT: 64 IN | HEART RATE: 85 BPM | TEMPERATURE: 98 F | RESPIRATION RATE: 16 BRPM

## 2022-04-27 PROCEDURE — 99214 OFFICE O/P EST MOD 30 MIN: CPT

## 2022-04-27 NOTE — PHYSICAL EXAM
[Alert] : alert [No Acute Distress] : no acute distress [Scleral Icterus] : scleral icterus [Hepatojugular Reflux] : no hepatojugular reflux [Clear to Auscultation] : lungs were clear to auscultation bilaterally [Normal Rate] : normal rate [Abdominal Bruit] : no abdominal bruit [Ascites Fluid Wave] : no ascites fluid wave [Splenomegaly] : no splenomegaly [Ascites Tense] : no ascites tense [Caput Medusae] : no caput medusae [Non-Tender] : Liver Edge: non-tender [Soft] : soft [] : right femoral palpable [Asterixis] : no asterixis [Hepatic Encephalopathy] : no hepatic encephalopathy [Depression] : no depression [de-identified] : improved jaundice [de-identified] : non tender, no ascites,  [FreeTextEntry1] : Left hand and left leg contracture  no recent scratch marks. [TextBox_40] : 2+ [de-identified] : Better

## 2022-04-27 NOTE — HISTORY OF PRESENT ILLNESS
[Primary Sclerosing Cholangitis] : Primary Sclerosing Cholangitis [Previous Transplant] : No history of previous transplant [FreeTextEntry1] : Listed at 14 [FreeTextEntry2] : AB  [TextBox_42] : Mr. Biggs is a 30 y/o M with history of cerebral palsy who presents for liver transplant evaluation follow up accompanied by his mother. Currently listed on liver transplant waitlist. \par \par PMHx includes cerebral palsy, nephrolithiasis and autoimmune cholangitis, seizures as a child. He was referred by Dr. Yasmany Howard for liver transplant evaluation.\par \par Pt has know liver disease since January 2019 when he presented with pruritus, N/V and abdominal pain while living in [place]. He was noted to have liver enzymes elevation and his family traveled back to [place] for healthcare.\par \par He then subsequently presented to [facility] and underwent evaluation. His bilirubin started to rise further and then transferred to [facility] in February 2019. He underwent ERCP which showed the distal bile duct stricture and questionable right intrahepatic stricture. Stent was placed in the distal bile duct. However his bilirubin did not improve initially. He underwent MRI with severe right intrahepatic stricture.\par \par He's undergone serial stent exchanges every 3 months. His TB peaked 22. He's had multiple cholangitis episodes. Mother recalls in August 2019 he became septic presenting febrile and hypotensive. Additionally, he had a liver biopsy (6/7/2019) that showed a cholestatic pattern of injury with ductular reaction.\par \par Ca 19-9 level, IgG 4 level and bile duct brushings have been unremarkable. \par ARMANDO is negative, Mitochondrial antibody is negative, Viral hepatitis serologies are negative.\par \par Interval Events \par (1/25/2022) - Last stent exchange 11/9/2021. Reports a febrile/chills episode 2 weeks ago w/o associated symptoms, no ABD pain, no cold symptoms.  Reports mother gave advil for symptom relief.  Weight stable today 133 lbs good appetite.  Last MRCP 6/11/2021. Recieved Covid Booster, Terry >250. \par \par (9/23/21)  Stent exchanged on the 9th, no recent fevers or pain. TB 6 alk phos 373, down from a high of 520. Off of all antibiotics, itching diminished\par Pt reports feeling well \par \par (7/12/21) -Pt completed abx treatment (Levofloxacin/ Metronidazole) MRCP (6/12/21) shows \par microabscesses  have improved. \par Feeling \par \par (6/15/21) – Presents for follow up. Will also see Dr. Tipton today. \par Patient continues to not feel well after 4 weeks of antibiotics. Has lost 10 lbs in the past month. . \par Intermittent low-grade fever (). Temp 98 today. c/o abdominal pain intermittently. \par \par Admission to [facility] 4/20/21 with fever. Had blood cultures obtained at the time which were with no growth. CT A/P (4/20) without obvious evidence of infection. Patient was discharged on empiric ciprofloxacin.\par \par MRI from May 10, 2021 revealed- Central biliary stricture as above. Differential includes both inflammatory and neoplastic etiologies, favor benign stricture given relative stability from 2019. Signal abnormality in segment 7 with several subcentimeter rim-enhancing foci concerning for microabscesses. Pancreatic tail lesion demonstrating interval growth however with enhancement characteristics consistent with a splenule. Of note, the spleen demonstrates continued interval growth along with this lesion over time. \par \par He was initiated on Levofloxacin 750mg daily and Metronidazole 500mg BID on 5/12/21 with plan to treat for 4 weeks and plan to repeat MRI as per hepatology and ID. Azathioprine discontinued. \par \par MRI/MRCP 6/11/21 – Stable central billiary stricture. Near complete resolution of the hepatic seg 7 signal abnormality/microabscesses. 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 \par \par More recently, patient had ERCP on 6/3/21 showing recurrent intrahepatic bile duct stricture and there was bile duct stone s/p balloon sweep removal and subsequent dilation of the right and left intrahepatic system and bilateral bile duct stent placement.\par \par Labs (12/14/21): \par TB 2.8, AST 61, ALT 66, \par Na 135, K 4.5, Cr 0.75\par INR 1.30 \par \par

## 2022-04-27 NOTE — ASSESSMENT
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 Followed closely by Dr. Tipton.\par \par Remains good OLTx candidate.  Will f/u with hepatology.

## 2022-04-28 LAB
ALBUMIN SERPL ELPH-MCNC: 2.9 G/DL
ALP BLD-CCNC: 392 U/L
ALT SERPL-CCNC: 35 U/L
ANION GAP SERPL CALC-SCNC: 10 MMOL/L
AST SERPL-CCNC: 71 U/L
BILIRUB SERPL-MCNC: 6 MG/DL
BUN SERPL-MCNC: 11 MG/DL
CALCIUM SERPL-MCNC: 8.6 MG/DL
CHLORIDE SERPL-SCNC: 103 MMOL/L
CO2 SERPL-SCNC: 23 MMOL/L
CREAT SERPL-MCNC: 0.78 MG/DL
EGFR: 124 ML/MIN/1.73M2
GLUCOSE SERPL-MCNC: 87 MG/DL
INR PPP: 1.14 RATIO
POTASSIUM SERPL-SCNC: 3.8 MMOL/L
PROT SERPL-MCNC: 8.4 G/DL
PT BLD: 13.4 SEC
SODIUM SERPL-SCNC: 136 MMOL/L

## 2022-04-29 LAB
BASOPHILS # BLD AUTO: 0.04 K/UL
BASOPHILS NFR BLD AUTO: 0.7 %
EOSINOPHIL # BLD AUTO: 0.11 K/UL
EOSINOPHIL NFR BLD AUTO: 1.8 %
HCT VFR BLD CALC: 36.2 %
HGB BLD-MCNC: 11.5 G/DL
IMM GRANULOCYTES NFR BLD AUTO: 0.5 %
LYMPHOCYTES # BLD AUTO: 1.21 K/UL
LYMPHOCYTES NFR BLD AUTO: 19.9 %
MAN DIFF?: NORMAL
MCHC RBC-ENTMCNC: 31.8 GM/DL
MCHC RBC-ENTMCNC: 33.6 PG
MCV RBC AUTO: 105.8 FL
MONOCYTES # BLD AUTO: 0.47 K/UL
MONOCYTES NFR BLD AUTO: 7.7 %
NEUTROPHILS # BLD AUTO: 4.22 K/UL
NEUTROPHILS NFR BLD AUTO: 69.4 %
PLATELET # BLD AUTO: 231 K/UL
RBC # BLD: 3.42 M/UL
RBC # FLD: 14.1 %
WBC # FLD AUTO: 6.08 K/UL

## 2022-05-09 ENCOUNTER — APPOINTMENT (OUTPATIENT)
Age: 32
End: 2022-05-09
Payer: MEDICAID

## 2022-05-09 ENCOUNTER — NON-APPOINTMENT (OUTPATIENT)
Age: 32
End: 2022-05-09

## 2022-05-09 VITALS
WEIGHT: 120 LBS | SYSTOLIC BLOOD PRESSURE: 118 MMHG | HEIGHT: 64 IN | BODY MASS INDEX: 20.49 KG/M2 | DIASTOLIC BLOOD PRESSURE: 62 MMHG | OXYGEN SATURATION: 100 % | HEART RATE: 73 BPM

## 2022-05-09 PROCEDURE — 93000 ELECTROCARDIOGRAM COMPLETE: CPT | Mod: NC

## 2022-05-09 PROCEDURE — 99214 OFFICE O/P EST MOD 30 MIN: CPT

## 2022-05-09 NOTE — DISCUSSION/SUMMARY
[Patient] : the patient [FreeTextEntry2] : mother [FreeTextEntry1] : 31 year old continuing evaluation for liver transplant has no cardiac history , no cardiac finding, had normal cardiac echo 18 month ago and stress test with poor exertional capacity, but no exercise induced arrhythmias and no evidence for ischemia. Will repeat these tests.

## 2022-05-09 NOTE — HISTORY OF PRESENT ILLNESS
[FreeTextEntry1] : Mr. Javy Biggs is a 31 year old man here for cardiovascular evaluation in anticipation of liver transplant. He has primary sclerosing cholangitis. He also has history of cerebral palsy. There is no cardiac history. He is sedentary with most activities confined to the home.

## 2022-05-09 NOTE — PHYSICAL EXAM
[5th Left ICS - MCL] : palpated at the 5th LICS in the midclavicular line [Normal Rate] : normal [Rhythm Regular] : regular [Normal S1] : normal S1 [No Murmur] : no murmurs heard [No Pitting Edema] : no pitting edema present [Right Carotid Bruit] : no bruit heard over the right carotid [Left Carotid Bruit] : no bruit heard over the left carotid [2+] : left 2+ [No Abnormalities] : the abdominal aorta was not enlarged and no bruit was heard [Normal] : alert and oriented, normal memory [de-identified] : scleral icterus

## 2022-05-10 ENCOUNTER — APPOINTMENT (OUTPATIENT)
Dept: HEPATOLOGY | Facility: CLINIC | Age: 32
End: 2022-05-10
Payer: MEDICAID

## 2022-05-10 VITALS
OXYGEN SATURATION: 97 % | RESPIRATION RATE: 16 BRPM | WEIGHT: 123 LBS | DIASTOLIC BLOOD PRESSURE: 76 MMHG | SYSTOLIC BLOOD PRESSURE: 127 MMHG | TEMPERATURE: 97.8 F | HEIGHT: 64 IN | BODY MASS INDEX: 21 KG/M2 | HEART RATE: 98 BPM

## 2022-05-10 PROCEDURE — 99214 OFFICE O/P EST MOD 30 MIN: CPT

## 2022-05-10 RX ORDER — CIPROFLOXACIN HYDROCHLORIDE 500 MG/1
500 TABLET, FILM COATED ORAL
Qty: 6 | Refills: 0 | Status: DISCONTINUED | COMMUNITY
Start: 2021-11-10 | End: 2022-05-10

## 2022-05-10 RX ORDER — AMOXICILLIN AND CLAVULANATE POTASSIUM 600; 42.9 MG/5ML; MG/5ML
600-42.9 FOR SUSPENSION ORAL
Qty: 2 | Refills: 0 | Status: DISCONTINUED | COMMUNITY
Start: 2022-02-16 | End: 2022-05-10

## 2022-05-10 RX ORDER — CIPROFLOXACIN HYDROCHLORIDE 500 MG/1
500 TABLET, FILM COATED ORAL
Qty: 10 | Refills: 0 | Status: DISCONTINUED | COMMUNITY
Start: 2021-09-09 | End: 2022-05-10

## 2022-05-10 RX ORDER — PANTOPRAZOLE 20 MG/1
20 TABLET, DELAYED RELEASE ORAL DAILY
Qty: 30 | Refills: 3 | Status: DISCONTINUED | COMMUNITY
Start: 2022-03-01 | End: 2022-05-10

## 2022-05-19 ENCOUNTER — TRANSCRIPTION ENCOUNTER (OUTPATIENT)
Age: 32
End: 2022-05-19

## 2022-05-20 ENCOUNTER — OUTPATIENT (OUTPATIENT)
Dept: OUTPATIENT SERVICES | Facility: HOSPITAL | Age: 32
LOS: 1 days | End: 2022-05-20
Payer: MEDICAID

## 2022-05-20 ENCOUNTER — APPOINTMENT (OUTPATIENT)
Dept: GASTROENTEROLOGY | Facility: HOSPITAL | Age: 32
End: 2022-05-20

## 2022-05-20 DIAGNOSIS — Z98.890 OTHER SPECIFIED POSTPROCEDURAL STATES: Chronic | ICD-10-CM

## 2022-05-20 DIAGNOSIS — K83.1 OBSTRUCTION OF BILE DUCT: ICD-10-CM

## 2022-05-20 DIAGNOSIS — R79.89 OTHER SPECIFIED ABNORMAL FINDINGS OF BLOOD CHEMISTRY: ICD-10-CM

## 2022-05-20 PROCEDURE — C2617: CPT

## 2022-05-20 PROCEDURE — C1726: CPT

## 2022-05-20 PROCEDURE — 43276 ERCP STENT EXCHANGE W/DILATE: CPT

## 2022-05-20 PROCEDURE — C1769: CPT

## 2022-05-20 PROCEDURE — C1773: CPT

## 2022-05-20 PROCEDURE — 74328 X-RAY BILE DUCT ENDOSCOPY: CPT

## 2022-05-20 DEVICE — SPHINCTERTOME JAG RX 39 PRELOADED CANN: Type: IMPLANTABLE DEVICE | Status: FUNCTIONAL

## 2022-05-20 DEVICE — IMPLANTABLE DEVICE: Type: IMPLANTABLE DEVICE | Status: FUNCTIONAL

## 2022-05-20 DEVICE — GWIRE VISIGLIDE ST TIP 270CM: Type: IMPLANTABLE DEVICE | Status: FUNCTIONAL

## 2022-05-20 DEVICE — CATH BLN RX HURRIC 4MMX4X180CM: Type: IMPLANTABLE DEVICE | Status: FUNCTIONAL

## 2022-05-20 DEVICE — CATH BLLN BIL RX 9-12CM: Type: IMPLANTABLE DEVICE | Status: FUNCTIONAL

## 2022-05-20 RX ORDER — CIPROFLOXACIN LACTATE 400MG/40ML
400 VIAL (ML) INTRAVENOUS ONCE
Refills: 0 | Status: DISCONTINUED | OUTPATIENT
Start: 2022-05-20 | End: 2022-06-03

## 2022-05-20 RX ORDER — INDOMETHACIN 50 MG
100 CAPSULE ORAL ONCE
Refills: 0 | Status: DISCONTINUED | OUTPATIENT
Start: 2022-05-20 | End: 2022-06-03

## 2022-05-20 NOTE — PHYSICAL EXAM
[General Appearance - Alert] : alert [General Appearance - In No Acute Distress] : in no acute distress [PERRL With Normal Accommodation] : pupils were equal in size, round, and reactive to light [Extraocular Movements] : extraocular movements were intact [Outer Ear] : the ears and nose were normal in appearance [Oropharynx] : the oropharynx was normal [Neck Appearance] : the appearance of the neck was normal [Neck Cervical Mass (___cm)] : no neck mass was observed [Jugular Venous Distention Increased] : there was no jugular-venous distention [Thyroid Diffuse Enlargement] : the thyroid was not enlarged [Thyroid Nodule] : there were no palpable thyroid nodules [Auscultation Breath Sounds / Voice Sounds] : lungs were clear to auscultation bilaterally [Heart Rate And Rhythm] : heart rate was normal and rhythm regular [Heart Sounds] : normal S1 and S2 [Heart Sounds Gallop] : no gallops [Murmurs] : no murmurs [Heart Sounds Pericardial Friction Rub] : no pericardial rub [Full Pulse] : the pedal pulses are present [Edema] : there was no peripheral edema [Bowel Sounds] : normal bowel sounds [Abdomen Soft] : soft [Abdomen Tenderness] : non-tender [] : no hepato-splenomegaly [Abdomen Mass (___ Cm)] : no abdominal mass palpated [Cervical Lymph Nodes Enlarged Posterior Bilaterally] : posterior cervical [Cervical Lymph Nodes Enlarged Anterior Bilaterally] : anterior cervical [Supraclavicular Lymph Nodes Enlarged Bilaterally] : supraclavicular [No CVA Tenderness] : no ~M costovertebral angle tenderness [No Spinal Tenderness] : no spinal tenderness [No Focal Deficits] : no focal deficits [Oriented To Time, Place, And Person] : oriented to person, place, and time [Impaired Insight] : insight and judgment were intact [Affect] : the affect was normal [FreeTextEntry1] : Hypopigmentation, scratch marks noted

## 2022-05-25 LAB
ABO + RH PNL BLD: NORMAL
ALBUMIN SERPL ELPH-MCNC: 2.9 G/DL
ALP BLD-CCNC: 285 U/L
ALT SERPL-CCNC: 36 U/L
ANION GAP SERPL CALC-SCNC: 10 MMOL/L
AST SERPL-CCNC: 61 U/L
BASOPHILS # BLD AUTO: 0.06 K/UL
BASOPHILS NFR BLD AUTO: 0.8 %
BILIRUB SERPL-MCNC: 3.4 MG/DL
BUN SERPL-MCNC: 9 MG/DL
CALCIUM SERPL-MCNC: 8.5 MG/DL
CHLORIDE SERPL-SCNC: 106 MMOL/L
CHOLEST SERPL-MCNC: 132 MG/DL
CMV IGG SERPL QL: <0.2 U/ML
CMV IGG SERPL-IMP: NEGATIVE
CO2 SERPL-SCNC: 23 MMOL/L
COVID-19 SPIKE DOMAIN ANTIBODY INTERPRETATION: POSITIVE
CREAT SERPL-MCNC: 0.81 MG/DL
EBV DNA SERPL NAA+PROBE-ACNC: NOT DETECTED IU/ML
EBV EA AB SER IA-ACNC: 13.4 U/ML
EBV EA AB TITR SER IF: POSITIVE
EBV EA IGG SER QL IA: >600 U/ML
EBV EA IGG SER-ACNC: POSITIVE
EBV EA IGM SER IA-ACNC: NEGATIVE
EBV PATRN SPEC IB-IMP: NORMAL
EBV VCA IGG SER IA-ACNC: >750 U/ML
EBV VCA IGM SER QL IA: 20.1 U/ML
EGFR: 122 ML/MIN/1.73M2
EOSINOPHIL # BLD AUTO: 0.1 K/UL
EOSINOPHIL NFR BLD AUTO: 1.4 %
EPSTEIN-BARR VIRUS CAPSID ANTIGEN IGG: POSITIVE
ESTIMATED AVERAGE GLUCOSE: 77 MG/DL
GLUCOSE SERPL-MCNC: 103 MG/DL
HAV IGM SER QL: NONREACTIVE
HBA1C MFR BLD HPLC: 4.3 %
HBV CORE IGG+IGM SER QL: NONREACTIVE
HBV SURFACE AB SER QL: REACTIVE
HBV SURFACE AB SERPL IA-ACNC: >1000 MIU/ML
HBV SURFACE AG SER QL: NONREACTIVE
HCT VFR BLD CALC: 33.8 %
HCV AB SER QL: NONREACTIVE
HCV S/CO RATIO: 0.15 S/CO
HDLC SERPL-MCNC: 23 MG/DL
HEPATITIS A IGG ANTIBODY: REACTIVE
HGB BLD-MCNC: 11.1 G/DL
HIV1+2 AB SPEC QL IA.RAPID: NONREACTIVE
HSV 1+2 IGG SER IA-IMP: NEGATIVE
HSV 1+2 IGG SER IA-IMP: NEGATIVE
HSV1 IGG SER QL: 0.64 INDEX
HSV2 IGG SER QL: 0.42 INDEX
IMM GRANULOCYTES NFR BLD AUTO: 0.4 %
INR PPP: 1.09 RATIO
LDLC SERPL CALC-MCNC: 92 MG/DL
LYMPHOCYTES # BLD AUTO: 1.77 K/UL
LYMPHOCYTES NFR BLD AUTO: 24.9 %
M TB IFN-G BLD-IMP: NEGATIVE
MAGNESIUM SERPL-MCNC: 2.1 MG/DL
MAN DIFF?: NORMAL
MCHC RBC-ENTMCNC: 32.8 GM/DL
MCHC RBC-ENTMCNC: 33.3 PG
MCV RBC AUTO: 101.5 FL
MONOCYTES # BLD AUTO: 0.46 K/UL
MONOCYTES NFR BLD AUTO: 6.5 %
NEUTROPHILS # BLD AUTO: 4.68 K/UL
NEUTROPHILS NFR BLD AUTO: 66 %
NONHDLC SERPL-MCNC: 109 MG/DL
PHOSPHATE SERPL-MCNC: 2.4 MG/DL
PLATELET # BLD AUTO: 263 K/UL
POTASSIUM SERPL-SCNC: 3.7 MMOL/L
PROT SERPL-MCNC: 7.8 G/DL
PT BLD: 12.8 SEC
QUANTIFERON TB PLUS MITOGEN MINUS NIL: 2.57 IU/ML
QUANTIFERON TB PLUS NIL: 0.01 IU/ML
QUANTIFERON TB PLUS TB1 MINUS NIL: 0 IU/ML
QUANTIFERON TB PLUS TB2 MINUS NIL: 0 IU/ML
RBC # BLD: 3.33 M/UL
RBC # FLD: 14.6 %
RUBV IGG FLD-ACNC: 2 INDEX
RUBV IGG SER-IMP: POSITIVE
SARS-COV-2 AB SERPL IA-ACNC: >250 U/ML
SARS-COV-2 N GENE NPH QL NAA+PROBE: NOT DETECTED
SODIUM SERPL-SCNC: 139 MMOL/L
T GONDII AB SER-IMP: NEGATIVE
T GONDII IGG SER QL: <3 IU/ML
T PALLIDUM AB SER QL IA: NEGATIVE
TRIGL SERPL-MCNC: 83 MG/DL
VZV AB TITR SER: POSITIVE
VZV IGG SER IF-ACNC: 2974 INDEX
WBC # FLD AUTO: 7.1 K/UL

## 2022-05-26 ENCOUNTER — APPOINTMENT (OUTPATIENT)
Dept: CARDIOLOGY | Facility: CLINIC | Age: 32
End: 2022-05-26
Payer: COMMERCIAL

## 2022-05-26 PROCEDURE — 93306 TTE W/DOPPLER COMPLETE: CPT

## 2022-05-26 PROCEDURE — 99072 ADDL SUPL MATRL&STAF TM PHE: CPT

## 2022-05-31 ENCOUNTER — APPOINTMENT (OUTPATIENT)
Dept: CARDIOLOGY | Facility: CLINIC | Age: 32
End: 2022-05-31
Payer: COMMERCIAL

## 2022-05-31 PROCEDURE — 93015 CV STRESS TEST SUPVJ I&R: CPT

## 2022-05-31 PROCEDURE — 99072 ADDL SUPL MATRL&STAF TM PHE: CPT

## 2022-06-06 ENCOUNTER — APPOINTMENT (OUTPATIENT)
Dept: UROLOGY | Facility: CLINIC | Age: 32
End: 2022-06-06
Payer: MEDICAID

## 2022-06-06 VITALS
HEART RATE: 68 BPM | DIASTOLIC BLOOD PRESSURE: 61 MMHG | WEIGHT: 130 LBS | OXYGEN SATURATION: 97 % | SYSTOLIC BLOOD PRESSURE: 109 MMHG | BODY MASS INDEX: 22.2 KG/M2 | HEIGHT: 64 IN

## 2022-06-06 PROCEDURE — 99204 OFFICE O/P NEW MOD 45 MIN: CPT

## 2022-06-06 NOTE — END OF VISIT
[FreeTextEntry3] : I agree with his request for circumcision and went over all pros, cons, and possible complications with the patient and his mother. This will be scheduled as an ambulatory surgical procedure.

## 2022-06-06 NOTE — HISTORY OF PRESENT ILLNESS
[FreeTextEntry1] : 31M presents today with a CC of phimosis. Pt wishes to have a circumcision due to difficulties in retracting his foreskin. Pt has cerebral palsy with some gait disturbance and contractions of the left upper extremity. He is however communicative and understanding.

## 2022-06-06 NOTE — PHYSICAL EXAM
[General Appearance - Well Developed] : well developed [General Appearance - Well Nourished] : well nourished [Normal Appearance] : normal appearance [Well Groomed] : well groomed [General Appearance - In No Acute Distress] : no acute distress [Edema] : no peripheral edema [Respiration, Rhythm And Depth] : normal respiratory rhythm and effort [Exaggerated Use Of Accessory Muscles For Inspiration] : no accessory muscle use [Abdomen Soft] : soft [Abdomen Tenderness] : non-tender [Costovertebral Angle Tenderness] : no ~M costovertebral angle tenderness [Urethral Meatus] : meatus normal [Urinary Bladder Findings] : the bladder was normal on palpation [Scrotum] : the scrotum was normal [Testes Mass (___cm)] : there were no testicular masses [No Prostate Nodules] : no prostate nodules [Normal Station and Gait] : the gait and station were normal for the patient's age [] : no rash [No Focal Deficits] : no focal deficits [Oriented To Time, Place, And Person] : oriented to person, place, and time [Affect] : the affect was normal [Mood] : the mood was normal [Not Anxious] : not anxious [No Palpable Adenopathy] : no palpable adenopathy [FreeTextEntry1] : Foreskin is phimotic and difficult to retract.

## 2022-06-13 ENCOUNTER — NON-APPOINTMENT (OUTPATIENT)
Age: 32
End: 2022-06-13

## 2022-06-13 ENCOUNTER — APPOINTMENT (OUTPATIENT)
Dept: CARDIOLOGY | Facility: CLINIC | Age: 32
End: 2022-06-13

## 2022-06-18 LAB
ALBUMIN SERPL ELPH-MCNC: 3.1 G/DL
ALP BLD-CCNC: 339 U/L
ALT SERPL-CCNC: 60 U/L
ANION GAP SERPL CALC-SCNC: 11 MMOL/L
AST SERPL-CCNC: 100 U/L
BASOPHILS # BLD AUTO: 0.05 K/UL
BASOPHILS NFR BLD AUTO: 0.8 %
BILIRUB SERPL-MCNC: 4.5 MG/DL
BUN SERPL-MCNC: 14 MG/DL
CALCIUM SERPL-MCNC: 9 MG/DL
CHLORIDE SERPL-SCNC: 104 MMOL/L
CO2 SERPL-SCNC: 22 MMOL/L
CREAT SERPL-MCNC: 0.82 MG/DL
EGFR: 122 ML/MIN/1.73M2
EOSINOPHIL # BLD AUTO: 0.19 K/UL
EOSINOPHIL NFR BLD AUTO: 3.1 %
GLUCOSE SERPL-MCNC: 83 MG/DL
HCT VFR BLD CALC: 37.5 %
HGB BLD-MCNC: 12.3 G/DL
IMM GRANULOCYTES NFR BLD AUTO: 0.5 %
INR PPP: 1.12 RATIO
LYMPHOCYTES # BLD AUTO: 1.68 K/UL
LYMPHOCYTES NFR BLD AUTO: 27 %
MAN DIFF?: NORMAL
MCHC RBC-ENTMCNC: 32.8 GM/DL
MCHC RBC-ENTMCNC: 34 PG
MCV RBC AUTO: 103.6 FL
MONOCYTES # BLD AUTO: 0.32 K/UL
MONOCYTES NFR BLD AUTO: 5.1 %
NEUTROPHILS # BLD AUTO: 3.95 K/UL
NEUTROPHILS NFR BLD AUTO: 63.5 %
PLATELET # BLD AUTO: 258 K/UL
POTASSIUM SERPL-SCNC: 4.6 MMOL/L
PROT SERPL-MCNC: 8 G/DL
PT BLD: 13.2 SEC
RBC # BLD: 3.62 M/UL
RBC # FLD: 15 %
SODIUM SERPL-SCNC: 137 MMOL/L
WBC # FLD AUTO: 6.22 K/UL

## 2022-06-21 ENCOUNTER — APPOINTMENT (OUTPATIENT)
Dept: HEPATOLOGY | Facility: CLINIC | Age: 32
End: 2022-06-21
Payer: COMMERCIAL

## 2022-06-21 VITALS
WEIGHT: 135 LBS | SYSTOLIC BLOOD PRESSURE: 131 MMHG | BODY MASS INDEX: 23.05 KG/M2 | OXYGEN SATURATION: 100 % | TEMPERATURE: 97.3 F | HEART RATE: 103 BPM | DIASTOLIC BLOOD PRESSURE: 73 MMHG | HEIGHT: 64 IN | RESPIRATION RATE: 14 BRPM

## 2022-06-21 PROCEDURE — 99214 OFFICE O/P EST MOD 30 MIN: CPT

## 2022-06-21 PROCEDURE — 99072 ADDL SUPL MATRL&STAF TM PHE: CPT

## 2022-06-21 RX ORDER — MEGESTROL ACETATE 40 MG/ML
40 SUSPENSION ORAL DAILY
Qty: 600 | Refills: 0 | Status: DISCONTINUED | COMMUNITY
Start: 2022-05-10 | End: 2022-06-21

## 2022-06-21 RX ORDER — CIPROFLOXACIN HYDROCHLORIDE 500 MG/1
500 TABLET, FILM COATED ORAL
Qty: 6 | Refills: 0 | Status: DISCONTINUED | COMMUNITY
Start: 2022-05-20 | End: 2022-06-21

## 2022-06-22 ENCOUNTER — NON-APPOINTMENT (OUTPATIENT)
Age: 32
End: 2022-06-22

## 2022-06-22 LAB
ALBUMIN SERPL ELPH-MCNC: 3 G/DL
ALP BLD-CCNC: 263 U/L
ALT SERPL-CCNC: 47 U/L
ANION GAP SERPL CALC-SCNC: 8 MMOL/L
AST SERPL-CCNC: 60 U/L
BASOPHILS # BLD AUTO: 0.06 K/UL
BASOPHILS NFR BLD AUTO: 0.6 %
BILIRUB SERPL-MCNC: 6.1 MG/DL
BUN SERPL-MCNC: 16 MG/DL
CALCIUM SERPL-MCNC: 8.7 MG/DL
CANCER AG19-9 SERPL-ACNC: 22 U/ML
CHLORIDE SERPL-SCNC: 105 MMOL/L
CO2 SERPL-SCNC: 24 MMOL/L
CREAT SERPL-MCNC: 0.77 MG/DL
EGFR: 124 ML/MIN/1.73M2
EOSINOPHIL # BLD AUTO: 0.13 K/UL
EOSINOPHIL NFR BLD AUTO: 1.3 %
GLUCOSE SERPL-MCNC: 79 MG/DL
HCT VFR BLD CALC: 37.7 %
HGB BLD-MCNC: 12.2 G/DL
IMM GRANULOCYTES NFR BLD AUTO: 0.4 %
INR PPP: 1.19 RATIO
LYMPHOCYTES # BLD AUTO: 1.68 K/UL
LYMPHOCYTES NFR BLD AUTO: 17.1 %
MAN DIFF?: NORMAL
MCHC RBC-ENTMCNC: 32.4 GM/DL
MCHC RBC-ENTMCNC: 33.5 PG
MCV RBC AUTO: 103.6 FL
MONOCYTES # BLD AUTO: 0.83 K/UL
MONOCYTES NFR BLD AUTO: 8.4 %
NEUTROPHILS # BLD AUTO: 7.1 K/UL
NEUTROPHILS NFR BLD AUTO: 72.2 %
PLATELET # BLD AUTO: 272 K/UL
POTASSIUM SERPL-SCNC: 3.9 MMOL/L
PROT SERPL-MCNC: 7.9 G/DL
PT BLD: 13.8 SEC
RBC # BLD: 3.64 M/UL
RBC # FLD: 15.2 %
SODIUM SERPL-SCNC: 138 MMOL/L
WBC # FLD AUTO: 9.84 K/UL

## 2022-06-24 NOTE — ED ADULT NURSE NOTE - NSFALLRSKINDICATORS_ED_ALL_ED
Patient called because she wants to see if she can reschedule her appt on 7/7. Told patient I would have the  give her a callback. yes

## 2022-07-05 ENCOUNTER — RX RENEWAL (OUTPATIENT)
Age: 32
End: 2022-07-05

## 2022-07-15 ENCOUNTER — OUTPATIENT (OUTPATIENT)
Dept: OUTPATIENT SERVICES | Facility: HOSPITAL | Age: 32
LOS: 1 days | End: 2022-07-15
Payer: MEDICAID

## 2022-07-15 ENCOUNTER — APPOINTMENT (OUTPATIENT)
Dept: MRI IMAGING | Facility: CLINIC | Age: 32
End: 2022-07-15

## 2022-07-15 DIAGNOSIS — Z00.8 ENCOUNTER FOR OTHER GENERAL EXAMINATION: ICD-10-CM

## 2022-07-15 DIAGNOSIS — Z98.890 OTHER SPECIFIED POSTPROCEDURAL STATES: Chronic | ICD-10-CM

## 2022-07-15 DIAGNOSIS — K83.01 PRIMARY SCLEROSING CHOLANGITIS: ICD-10-CM

## 2022-07-15 PROCEDURE — A9585: CPT

## 2022-07-15 PROCEDURE — 74183 MRI ABD W/O CNTR FLWD CNTR: CPT | Mod: 26

## 2022-07-15 PROCEDURE — 74183 MRI ABD W/O CNTR FLWD CNTR: CPT

## 2022-07-22 ENCOUNTER — OUTPATIENT (OUTPATIENT)
Dept: OUTPATIENT SERVICES | Facility: HOSPITAL | Age: 32
LOS: 1 days | End: 2022-07-22
Payer: MEDICAID

## 2022-07-22 VITALS
WEIGHT: 132.28 LBS | HEART RATE: 82 BPM | RESPIRATION RATE: 16 BRPM | OXYGEN SATURATION: 100 % | DIASTOLIC BLOOD PRESSURE: 66 MMHG | TEMPERATURE: 98 F | SYSTOLIC BLOOD PRESSURE: 124 MMHG | HEIGHT: 64 IN

## 2022-07-22 DIAGNOSIS — N47.1 PHIMOSIS: ICD-10-CM

## 2022-07-22 DIAGNOSIS — Z98.890 OTHER SPECIFIED POSTPROCEDURAL STATES: Chronic | ICD-10-CM

## 2022-07-22 DIAGNOSIS — Z01.818 ENCOUNTER FOR OTHER PREPROCEDURAL EXAMINATION: ICD-10-CM

## 2022-07-22 DIAGNOSIS — K08.409 PARTIAL LOSS OF TEETH, UNSPECIFIED CAUSE, UNSPECIFIED CLASS: Chronic | ICD-10-CM

## 2022-07-22 LAB
ANION GAP SERPL CALC-SCNC: 4 MMOL/L — LOW (ref 5–17)
APPEARANCE UR: ABNORMAL
APTT BLD: 37.6 SEC — HIGH (ref 27.5–35.5)
BASOPHILS # BLD AUTO: 0.07 K/UL — SIGNIFICANT CHANGE UP (ref 0–0.2)
BASOPHILS NFR BLD AUTO: 0.8 % — SIGNIFICANT CHANGE UP (ref 0–2)
BILIRUB UR-MCNC: ABNORMAL
BUN SERPL-MCNC: 13 MG/DL — SIGNIFICANT CHANGE UP (ref 7–23)
CALCIUM SERPL-MCNC: 8.8 MG/DL — SIGNIFICANT CHANGE UP (ref 8.5–10.1)
CHLORIDE SERPL-SCNC: 107 MMOL/L — SIGNIFICANT CHANGE UP (ref 96–108)
CO2 SERPL-SCNC: 23 MMOL/L — SIGNIFICANT CHANGE UP (ref 22–31)
COLOR SPEC: ABNORMAL
CREAT SERPL-MCNC: 0.92 MG/DL — SIGNIFICANT CHANGE UP (ref 0.5–1.3)
DIFF PNL FLD: ABNORMAL
EGFR: 115 ML/MIN/1.73M2 — SIGNIFICANT CHANGE UP
EOSINOPHIL # BLD AUTO: 0.19 K/UL — SIGNIFICANT CHANGE UP (ref 0–0.5)
EOSINOPHIL NFR BLD AUTO: 2.2 % — SIGNIFICANT CHANGE UP (ref 0–6)
GLUCOSE SERPL-MCNC: 89 MG/DL — SIGNIFICANT CHANGE UP (ref 70–99)
GLUCOSE UR QL: NEGATIVE — SIGNIFICANT CHANGE UP
HCT VFR BLD CALC: 35.9 % — LOW (ref 39–50)
HGB BLD-MCNC: 12 G/DL — LOW (ref 13–17)
IMM GRANULOCYTES NFR BLD AUTO: 0.6 % — SIGNIFICANT CHANGE UP (ref 0–1.5)
INR BLD: 1.22 RATIO — HIGH (ref 0.88–1.16)
KETONES UR-MCNC: ABNORMAL
LEUKOCYTE ESTERASE UR-ACNC: ABNORMAL
LYMPHOCYTES # BLD AUTO: 1.78 K/UL — SIGNIFICANT CHANGE UP (ref 1–3.3)
LYMPHOCYTES # BLD AUTO: 20.8 % — SIGNIFICANT CHANGE UP (ref 13–44)
MCHC RBC-ENTMCNC: 33.4 GM/DL — SIGNIFICANT CHANGE UP (ref 32–36)
MCHC RBC-ENTMCNC: 33.4 PG — SIGNIFICANT CHANGE UP (ref 27–34)
MCV RBC AUTO: 100 FL — SIGNIFICANT CHANGE UP (ref 80–100)
MONOCYTES # BLD AUTO: 0.42 K/UL — SIGNIFICANT CHANGE UP (ref 0–0.9)
MONOCYTES NFR BLD AUTO: 4.9 % — SIGNIFICANT CHANGE UP (ref 2–14)
NEUTROPHILS # BLD AUTO: 6.03 K/UL — SIGNIFICANT CHANGE UP (ref 1.8–7.4)
NEUTROPHILS NFR BLD AUTO: 70.7 % — SIGNIFICANT CHANGE UP (ref 43–77)
NITRITE UR-MCNC: POSITIVE
PH UR: 6 — SIGNIFICANT CHANGE UP (ref 5–8)
PLATELET # BLD AUTO: 285 K/UL — SIGNIFICANT CHANGE UP (ref 150–400)
POTASSIUM SERPL-MCNC: 3.3 MMOL/L — LOW (ref 3.5–5.3)
POTASSIUM SERPL-SCNC: 3.3 MMOL/L — LOW (ref 3.5–5.3)
PROT UR-MCNC: 30 MG/DL
PROTHROM AB SERPL-ACNC: 14.2 SEC — HIGH (ref 10.5–13.4)
RBC # BLD: 3.59 M/UL — LOW (ref 4.2–5.8)
RBC # FLD: 13.5 % — SIGNIFICANT CHANGE UP (ref 10.3–14.5)
SODIUM SERPL-SCNC: 134 MMOL/L — LOW (ref 135–145)
SP GR SPEC: 1.02 — SIGNIFICANT CHANGE UP (ref 1.01–1.02)
UROBILINOGEN FLD QL: 8
WBC # BLD: 8.54 K/UL — SIGNIFICANT CHANGE UP (ref 3.8–10.5)
WBC # FLD AUTO: 8.54 K/UL — SIGNIFICANT CHANGE UP (ref 3.8–10.5)

## 2022-07-22 PROCEDURE — 86901 BLOOD TYPING SEROLOGIC RH(D): CPT

## 2022-07-22 PROCEDURE — 85025 COMPLETE CBC W/AUTO DIFF WBC: CPT

## 2022-07-22 PROCEDURE — 86850 RBC ANTIBODY SCREEN: CPT

## 2022-07-22 PROCEDURE — 85730 THROMBOPLASTIN TIME PARTIAL: CPT

## 2022-07-22 PROCEDURE — 93005 ELECTROCARDIOGRAM TRACING: CPT

## 2022-07-22 PROCEDURE — 86900 BLOOD TYPING SEROLOGIC ABO: CPT

## 2022-07-22 PROCEDURE — 80048 BASIC METABOLIC PNL TOTAL CA: CPT

## 2022-07-22 PROCEDURE — 93010 ELECTROCARDIOGRAM REPORT: CPT

## 2022-07-22 PROCEDURE — 99214 OFFICE O/P EST MOD 30 MIN: CPT | Mod: 25

## 2022-07-22 PROCEDURE — 87086 URINE CULTURE/COLONY COUNT: CPT

## 2022-07-22 PROCEDURE — 36415 COLL VENOUS BLD VENIPUNCTURE: CPT

## 2022-07-22 PROCEDURE — 81001 URINALYSIS AUTO W/SCOPE: CPT

## 2022-07-22 PROCEDURE — 85610 PROTHROMBIN TIME: CPT

## 2022-07-22 RX ORDER — CIPROFLOXACIN LACTATE 400MG/40ML
1 VIAL (ML) INTRAVENOUS
Qty: 0 | Refills: 0 | DISCHARGE

## 2022-07-22 NOTE — H&P PST ADULT - NSICDXPASTMEDICALHX_GEN_ALL_CORE_FT
PAST MEDICAL HISTORY:  Abnormal LFTs     Bile duct stricture     Cerebral palsy     Dental caries     History of seizures at birth    Impacted teeth wisdom teeth    Phimosis     PSC (primary sclerosing cholangitis) On liver transplant list     PAST MEDICAL HISTORY:  Abnormal LFTs     Anemia     Bile duct stricture     Cerebral palsy     Dental caries     History of seizures at birth    Impacted teeth wisdom teeth    Phimosis     PSC (primary sclerosing cholangitis) On liver transplant list

## 2022-07-22 NOTE — H&P PST ADULT - ASSESSMENT
32 y.o male scheduled for  32 y.o male scheduled for Circumcision   Plan  1. Stop all NSAIDS, herbal supplements and vitamins for 7 days.  2. NPO at midnight.  3. Take the following medications--none---with small sips of water on the morning of your procedure/surgery.  4. Labs, EKG as per surgeon  5. COVID swab to be done 7/29/2022

## 2022-07-22 NOTE — H&P PST ADULT - NS PRO AD PATIENT TYPE ON CHART
[address] [facility] [address] [address] [address]  [phone]                Pharmacologic Stress Nuclear Gated SPECT Study    Name: RAKESH VALENZUELA [facility] [place] Account Number: [de-identified]    :  1958          Sex: male         Date of Study:  2021    Height: 5' 8\" (172.7 cm)         Weight: 242 lb (109.8 kg)     Ordering Provider: Joellen Haley. Ivana Moore MD          PCP: Corina Baez MD      Cardiologist: Marie Fotoe             Interpreting Physician: Sharmila Tucker. Pearl Stauffer, DO  _________________________________________________________________________________    Indication:   Detecting the presence and location of coronary artery disease    Clinical History:   Patient has no known history of coronary artery disease. Resting ECG:    Sinus rhythm, 77 bpm.  Borderline axis. Nonspecific ST-T waves. Q waves in V1 through V4. Procedure:   Pharmacologic stress testing was performed with regadenoson 0.4 mg for 15 seconds. Additionally, low-level exercise was performed along with the infusion. The heart rate was 77 at baseline and jenny to 138 beats during the infusion. This corresponds to 87% of maximum predicted heart rate. The blood pressure at baseline was 112/62 and blood pressure at the end of infusion was 118/72. Blood pressure response was normal during the stress procedure. The patient tolerated the infusion well. ECG during the infusion did not change. IMAGING: Myocardial perfusion imaging was performed at rest 30-35 minutes following the intravenous injection of 10.4 mCi of (Tc-Sestamibi) followed by 10 ml of Normal Saline. As per infusion protocol, the patient was injected intravenously with 34.0 mCi of (Tc-Sestamibi) followed by 10 ml of Normal Saline. Gated post-stress tomographic imaging was performed 20-25 minutes after stress. FINDINGS: The overall quality of the study was good.      Left ventricular cavity size was noted to be enlarged on both rest and stress studies. Rotational analog analysis demonstrated soft tissue breast attenuation and soft tissue diaphragmatic attenuation. The gated SPECT stress imaging in the short, vertical long, and horizontal long axis demonstrated     A moderate defect was present in the basal inferior, mid inferior and apical inferior wall(s) that was  large sized by quantification. The resting images show no change. Gated SPECT left ventricular ejection fraction was calculated to be 25%, with inferior hypokinesis. Impression:    ECG during the infusion did not change. The myocardial perfusion imaging was abnormal.    The abnormality was a a large sized fixed defect in the inferior wall suggestive of a prior MI    Overall left ventricular systolic function was abnormal with regional wall motion abnormalities. Intermediate risk general pharmacologic stress test.    Thank you for sending your patient to this [facility].      Electronically signed by Maida Wheeler DO on 12/13/21 at 1:20 PM EST Health Care Proxy (HCP)

## 2022-07-22 NOTE — H&P PST ADULT - GASTROINTESTINAL COMMENTS
followed by Dr Tipton, ERCP, stents replaced every 3 months, on Liver Transplant List followed by Hepatologist Dr Tipton, ERCP, stents replaced every 3 months, on Liver Transplant List

## 2022-07-22 NOTE — H&P PST ADULT - NEUROLOGICAL COMMENTS
Cerebral Palsy Cerebral palsy, left arm and hand contracture, gait asymmetrical Cerebral palsy, left arm and hand contracture, left leg deformity , gait asymmetrical

## 2022-07-22 NOTE — H&P PST ADULT - NSICDXPASTSURGICALHX_GEN_ALL_CORE_FT
PAST SURGICAL HISTORY:  History of ERCP multiple with stent insertions/replacement every 3 months ---last 5/20/2022    [place] teeth extracted 2021

## 2022-07-22 NOTE — H&P PST ADULT - HISTORY OF PRESENT ILLNESS
32 y.o    32 y.o  WD, WN male, with Cerebral Palsy presents to PST with michelle Nan in attendance. Patient with  hx of phimosis and uncircumcised penis.  He has followed with urology and scheduled for a Circumcision

## 2022-07-23 DIAGNOSIS — Z01.818 ENCOUNTER FOR OTHER PREPROCEDURAL EXAMINATION: ICD-10-CM

## 2022-07-23 DIAGNOSIS — N47.1 PHIMOSIS: ICD-10-CM

## 2022-07-23 LAB
CULTURE RESULTS: SIGNIFICANT CHANGE UP
SPECIMEN SOURCE: SIGNIFICANT CHANGE UP

## 2022-07-26 ENCOUNTER — APPOINTMENT (OUTPATIENT)
Dept: HEPATOLOGY | Facility: CLINIC | Age: 32
End: 2022-07-26

## 2022-07-26 VITALS
OXYGEN SATURATION: 100 % | DIASTOLIC BLOOD PRESSURE: 67 MMHG | TEMPERATURE: 97.8 F | HEIGHT: 64 IN | BODY MASS INDEX: 22.88 KG/M2 | HEART RATE: 78 BPM | RESPIRATION RATE: 16 BRPM | SYSTOLIC BLOOD PRESSURE: 118 MMHG | WEIGHT: 134 LBS

## 2022-07-26 PROCEDURE — 99214 OFFICE O/P EST MOD 30 MIN: CPT

## 2022-07-26 RX ORDER — CHLORHEXIDINE GLUCONATE 4 %
325 (65 FE) LIQUID (ML) TOPICAL
Qty: 30 | Refills: 2 | Status: DISCONTINUED | COMMUNITY
Start: 2022-07-05 | End: 2022-07-26

## 2022-07-26 NOTE — HISTORY OF PRESENT ILLNESS
[FreeTextEntry1] : Mr. JAVY BIGGS a 31 year Other race male  presents today for  a hepatology appointment. He has been  has been referred to us by Dr. Yasmany Howard. .\par \par History based on initial hepatology clinic visit:\par Mr. Javy Biggs is a 30-year-old man with a history of cerebral palsy presents for  evaluation for potential liver transplant.\par \par He has been symptomatic since early 2019. He started itching while he was in [place], along with some nausea and vomiting and abdominal pain. He was noted to have liver enzyme elevation. Then subsequently he presented to [facility] and underwent evaluation. His bilirubin started to rise further and then transferred to [facility] in February 2019. He underwent ERCP which showed the distal bile duct stricture and questionable right intrahepatic stricture. Small stent was placed in the distal bile duct. However his bilirubin did not improve initially. He underwent MRI with severe right intrahepatic stricture. He was discharged home. He was itching for many days but then it improved. \par \par Ca 19-9 level, IgG 4 level and bile duct brushings have been unremarkable. \par \par The patient had another ERCP with stent pull and cholangiogram in april 2019. Right intrahepatic stricture was noted. Then additional brushings were performed.Then a stent was attempted to be advanced into the right intrahepatic system. Although there was significant resistance. Then it was dilated using a 4 mm balloon. Then 7 French 9 cm bile duct stent was placed.The patient had a ER visit about a week or 2 after that. He was noted to have nephrolithiasis.\par \par His LFTs imroved with ERCP and stent. He is now referred to hepatology for further assessment for primary sclerosing cholangitis.\par \par ARMANDO is negative, Mitochondrial antibody is negative, Viral hepatitis serologies are negative, immunugloblin level are unimpressive.\par \par Interval hx: Since last seen in the hepatology clinic on 22-May-2019, he had a follow up ERCP by Dr. Howard, and the old stent was removed, and exchanged with a new one. Brushings were negative for malignant cell. Additionally he had a liver biopsy that showed overall appears cholestatic pattern of injury with ductular reaction. This was further discussed at the hepato-billiary meeting with pathology review. 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 Essentially, work up for underlying CLD were negative for viral serology, autoimmune markers, ceruloplasmin ( higher level not suggestive of Garcia's). His total bili is down to 2.4 mg/dL, , and , and . AMA is negative, but ASMA is low titer + ( 1:20). ARMANDO is also negative.\par Interestingly, his IgG level is high ( 1723 mg/dL). \par His viral serology suggest to protection towards hepatitis A, but he is immune to hepatitis B. I hav recommended vaccine series.\par Ca19-9 was within normal range.\par \par Interval hx ( 9/3/2019): Since last seen he has been apparently hospitalized with fever, and suspected infection. He was treated with IV antibiotics, and was discharged with Cipro which he is still taking ( will finish in 2 days).\par Today he feels well.\par LFTs significantly better since Prednisone was started. His mother reports skin rash in the face, chest, and upper back (like pimples) since the steroid was initiated.\par \par Interval hx-10-3-2019:\par \par Patient has been doing well since last seen. His acne lesions have almost disappeared. He remain on Azathioprine 100 mg daily (increased on 10-2-2019), and Prednisone 10 mg daily. His Azathioprine dose was increased due to poor response on 50 mg daily. He denies any cholestasis symptoms. \par \par Interval history January 6, 2020:\par Recent laboratory tests from 10 December 2019 was revealed. This revealed white cell count of 5.96, hemoglobin 14.8, platelet count 400,000. Liver function tests revealed total bilirubin 1.8, , , alkaline phosphatase 151. Serum creatine was 0.92 mg/dL. INR is 0.93. His liver tests appears to have significantly increased compared to 26 November 2019. At that time his total bilirubin was 1.1, AST 89, , alkaline phosphatase 203.\par \par Interval history February 13, 2020:\par \par Patient presents for a follow up visit today along with his parents. Since last seen that has a significant jump in his liver test particularly total bilirubin level. His total bilirubin is 21.9, AST 65, ALT 44, alkaline phosphatase 129. He recently had an ERCP done through Dr. Yamsany Howard, and his liver function tests has not improved from the spike yesterday. I had a personal discussion with Dr. Howard, and he mentioned that he placed the stent on the right side only and he intends now to repeat an ERCP with bilateral stenting. \par \par \par Internal history dated March 2, 2020\par \par Ms. Biggs returns for a followup visit in the hepatology clinic accompianed  by her mother. On his followup today, patient reports severe persistent itching that is bothersome. He has been taking hydroxyzine with some relief. He also certainly some excoriation marks. He denies any internal episodes of fever, chills, , nausea or vomiting.\par \par His liver function test from 2 of February 2020 revealed total bilirubin 21.9, AST 65, ALT 44, alkaline phosphatase 129. Serum creatinine was 0.65 mg/dL. \par \par He's still waiting for getting the ERCP done by Dr. Howard, and apparently scheduled for this week.  We will reduce his hasn't had reduced to 50 mg daily and prednisone dose to 10 mg daily. I have recommended him to hold off on switching to budesonide.\par \par \par Interval history dated June 8, 2020\par \james Palafox returns for a followup visit to hepatology clinic today accompanied by her mother. He reports fever for the last 2 days, and was preceded with some malaise. His fever was as high as her in 3 different it 2 days ago but the intensity has significantly improved as of yesterday. About 2 weeks in the he had an ERCP with stent exchange by Dr. Howard. He denies any chills. He also denies any symptoms of itching or any worsening jaundice or change in color of his urine or stool. Today the clinic she otherwise feels well and denies any new symptoms.\par \par On June 5, 2020 GI blood works that included CBC. This revealed a white cell count of 9.75, hemoglobin 14.2, platelet count 229,000. Recent CEA 19-9 level is within normal range. His INR is also normal and quantitative hemoglobin levels are now within normal range.\par \par He is currently taking azathioprine 50 mg daily.\par \par Interval Hx ( 11/18/2020)\par \par Patient presents for a follow up. He reports doing well- although reports occasional itching. No fever or jaundice.\par \par Recent MRCP- Stable appearance of the biliary tree since 7/3/2019 MRI. 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 Follow-up with endoscopic ultrasound may be considered.\par \par Labs from Sept 17, 2020- , , , and bili 0.9. \par \par Interval Hx (1/12/2021)\par \par Patient presents for evaluation for Liver Transplant. He is being accompanied by his mother. He remains asymptomatic, denies any cholestatic symptoms.\par \par His CBC from Nov 18, 2020- normal CBC, ,000. LFTs were elevated, with total bili 94, AST 94, , . INR was 1.03. His blood type is AB.\par \par He had an ERCP with stent exchange in Dec 02, 2020 though Dr. Yasmany Howard.\par \par Interval Hx (1/27/2021)\par \par Patient presents for a follow up. He has been doing well. No new complaints. He being evaluated for an OLT, and is pending an ECHO and stress test. His blood type is AB +. hIS ca 19-9 is normal. He remains immune to hepatitis A, and hepatitis B.  IgG4 level is within normal range.\par He had an ERCP with stent exchange in Dec 02, 2020 though Dr. Yasmany Howard. His next ERCP will be in March, 2021. \par Since last seen he has an EUS.   A mass was identified in the pancreatic tail. Appearance characteristic is               benign. 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 FNA was not performed due to benign appearance (stabillity based on prior imaging), and difficulty finding a safe path on color doppler (due to blood vessels on the path of the needle). The remainder of the pancreas was examined and was unremarkable. Few Discrete peripancretic, and periportal lymph nodes were also noted. The largest periportal LN measured about 1.8 cm. The appearance characteristic was benign. The endosonographic appearance of parenchyma and the upstream pancreatic duct indicated no duct dilation. Biliary stent was seen in situ.\par He reports somewhat pale stool, but denies any itching.\par \par His LFTs show total bili 1.5, , , .\par \par Interval history dated April 6, 2021\james Palafox presents for a hepatology follow-up appointment accompanied by her mother.  Since last seen he reports he has been having increasing pruritus that is bothering him from sleep.  He has been taking hydroxyzine which is able to control his pruritus to some extent.  He also reports that after taking a warm shower he is having significant itching feeling like burning sensation in his skin.\par \par Since last seen he also completed his evaluation and has been approved for listing for liver transplantation.  He is awaiting dental clearance and also an application for meld score exception.\par \par Since last seen he had an ERCP and a stent exchange performed on February 25, 2021.  No interval episodes of cholangitis.\par \par Recent follow-up labs from 26 March 2021 was reviewed.  This revealed a hemoglobin of 13.1 g/dL with platelet count 277,000.  His liver function test revealed total bilirubin 3.9, AST 75, ALT 31, alkaline phosphatase 241.  Serum creatinine is 0.83 mg/dL.  INR is 1.\par \par Interval history dated April 21, 2021.\james Palafox presents for hepatology follow-up appointment along with his mother.  Area recently had an emergency visit on April 19, 2021 with complaints of fever with a temperature maximum 102 °F associated with cough, runny nose and myalgia.  He denies any nausea, vomiting, abdominal pain or worsening jaundice or pruritus.  Covid 19 test was negative.\par \par He recently received his COVID-19 vaccine second dose Pfizer on April 11, 2021.  He reports he has having symptomatic malaise, fever since that time.  Labs from April 28 and April 21 during his hospital stay was reviewed which revealed leukocytosis with a white cell count of 12.27 on admission which improved to 7.97 at the time of discharge.  His hemoglobin was 9.6 and platelet count of 397,000.  Liver function tests revealed total bilirubin 1.4, normal liver enzymes with AST 26, ALT 15, alkaline phosphatase 205.  His INR was 1.37.\par \par He had a CT scan of the abdomen performed on April 20, 2021 which revealed the following findings-\par 1. Partial improvement of the intrahepatic biliary dilatation in this patient status post biliary stent placement.\par 2. New splenomegaly, of uncertain etiology. Correlate for developing portal venous hypertension.\par 3. Enhancing pancreatic tail lesion, better assessed on the MRI from November, 2020.\par \par Interval history dated May 12, 2021\par juan Palafox presents for hepatology follow-up appointment accompanied by his mother.  Today he feels well.  However he has been having intermittent low-grade fevers since April 20, 2021.\par \par MRI from May 10, 2021 revealed- Central biliary stricture as above. Differential includes both inflammatory and neoplastic etiologies, favor benign stricture given relative stability from 2019. Signal abnormality in segment 7 with several subcentimeter rim-enhancing foci concerning for microabscesses. Pancreatic tail lesion demonstrating interval growth however with enhancement characteristics consistent with a splenule. Of note, the spleen demonstrates continued interval growth along with this lesion over time.\par \par Patient had labs drawn yesterday on May 11, 2021 which revealed a white cell count of 6.1, hemoglobin 9.7, platelet count 2 69,000.  Serum sodium was 140, potassium 3.6, creatinine 0.67.  Liver function test revealed total bilirubin 0.8, AST 30, ALT 20, alkaline phosphatase 261.\par \par Interval history dated Giuliana 15, 2021juan Palafox presents for hepatology follow-up appointment accompanied by his mother.  He was last seen in the hepatology clinic on 12 May 2021.  He is also scheduled to see transplant surgeon Dr. Britton Solis today.  He currently remains waitlisted for liver transplantation on the [facility] list although continues to have a low model for end-stage liver disease score.\par \par He recently had an ERCP on Giuliana 3, 2021 which revealed recurrent intrahepatic bile duct stricture and there was also bile duct stone, status post balloon sweep with removal and subsequent dilatation of the right and left intrahepatic system and bilateral bile duct stent placement.\par \par He has lost significant amount of weight since his last clinic visit, approximately 16 pound weight loss.  He was treated with 4 weeks course of antibiotics including Flagyl and levofloxacin.  I suspect his weight loss may be related to taste change related to Flagyl.\par \par MRI from May 10, 2021 revealed: Right hepatic lobe atrophy with left hepatic lobe hypertrophy. Mild to moderate intrahepatic biliary ductal dilatation in the right and left lobes secondary to a central hilar stricture demonstrates delayed enhancement on postcontrast imaging. Extrahepatic biliary tree is nondilated. Findings are without significant change from prior imaging dating to 2019 favoring benign stricture in the setting of potential primary sclerosing cholangitis however malignancy is not completely excluded.. Posterior division right portal vein is however occluded unchanged from November 2020 however new from prior imaging and February 2019.\par \par Signal abnormality in segment 7 with several subcentimeter rim-enhancing foci concerning for microabscesses.\par \par Pancreatic tail lesion demonstrating interval growth however with enhancement characteristics consistent with a splenule. Of note, the spleen demonstrates continued interval growth along with this lesion over time.\par \par MRI from June 11, 2021 revealed: Right lobe atrophy with left lobe hypertrophy, as on prior. No significant steatosis. Mild to moderate intrahepatic biliary ductal dilatation secondary to a central hilar stricture with delayed enhancement on postcontrast imaging, essentially unchanged from 2019. Extra hepatic biliary tree is not dilated. Given stability, a benign stricture is favored. However, malignancy cannot be entirely excluded in the setting of primary sclerosing cholangitis. Redemonstrated occlusion of the posterior right portal venous branch. Near-complete resolution of the segment 7 signal abnormality/microabscesses with only mild T2 heterogeneity remaining, but no discrete ring enhancement.\par \par Laboratory test results from 19 May 2021 was reviewed.  This revealed hemoglobin 11.3 g/dL, platelet count 3 35,000.  White cell count was 4.95.  Serum sodium 138, potassium 5.0.  Liver function test revealed total bilirubin 0.8, , ALT 78, alkaline phosphatase 901.  Her alkaline phosphatase appears to be significantly elevated compared to prior labs.\par \par Clinically appears to be responding well to antibiotics and his imaging studies also now showing resolution of the microabscesses.\par \par Interval history dated July 27, 2021\par \par Patient presents for hepatology follow-up appointment accompanied by her mother.  Today Javy reports that he has been feeling well.  He has gained weight with megestrol.  His fever has resolved and he denies any nausea, vomiting, abdominal pain.  He is now off antibiotics.  He is also currently off azathioprine.  During his prior visits rifampin as well as also now has been discontinued as well.\par MRI from June 16, 2021 revealed stable central biliary stricture.  Near complete resolution of hepatic segment 7 signal abnormality/microabscesses were noted.  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  I recommended hydroxyzine 25 mg nightly as needed.\par Laboratory test results from 12 July 2021 was reviewed.  This revealed mild anemia with hemoglobin of 11.5, deciliter and platelet count was 183,000.  Liver chemistries revealed mild elevation with total bilirubin 2.2 mg/dL, AST 62, , alkaline phosphatase 520.  His alkaline phosphatase appears to have improved from 901 in 19 May 2021.\par \par Interval history dated November 17, 2021\par \james Palafox presents to the hepatology clinic today accompanied by her mother.  Javy recently had an ERCP by Dr. Howard due to worsening jaundice and her stents were exchanged with 2 plastic stents.  She continues to have persistent jaundice and also complains of significant pruritus symptoms today.  He has been taking hydroxyzine on and off but it is not helping her very well.  She also has recurrent MRSA skin infection.  No interval hospitalization otherwise.\par \par Recent labs from 15/2021 was reviewed.  This revealed hemoglobin 11.5 g/dL with a platelet count 321,000.  INR was 1.09.  Liver test revealed total bilirubin 6.1 mg/dL which is improved from 6.4 mg/dL following his ERCP.  AST was 76 ALT 36, alkaline phosphatase 336 units/L.  Serum creatinine was 0.76 mg/dL.\par \par \par Interval history dated February 16, 2022\par \james Palafox presents for a hepatology follow-up appointment.  He is accompanied to the clinic by his mother.  Today on his follow-up visit he reports intermittent fever especially at night time ranging between 99 through 101 degrees vomiting.  Per mother his temperature yesterday was 99.7 dignified and it.  His vitals are stable today.  He otherwise feeling well.  He recently had a ERCP with stent exchange on February 8, 2022 by Dr. Howard.  He reports his fever episode started few days before ERCP was done.  He also had an MRCP done on February 7, 2022 that revealed stable intrahepatic biliary ductal dilatation and central strictures. No evidence of residual hepatic abscess.\par \par Interval history dated March 01, 2022\par \james Palafox presents for a follow up visit. He is accompanied by her mother.   Today, he feels well, and his fever resolved.  He was given a 7-day course of Augmentin 875 mg twice a day which he completed.\par Laboratory test results from 23 February 2022 was reviewed.  This revealed a white cell count of 5.68, hemoglobin 11.6 g/dL, platelet count 282,000.  Serum sodium 140, potassium 3.6 mmol/L.  Serum creatinine 0.80 mg/dL.  Liver test revealed total bilirubin 4.4 mg/dL, , , alkaline phosphatase 622.  INR 1.12.  \par \par Interval history dated May 10, 2022\par \james Patient presents for hepatology follow-up appointment.  He is accompanied by his mother.  He denies any significant cholestatic symptoms although still appears to be jaundiced.  Mother thinks his eyes are more yellow recently.  He is scheduled for an ERCP by Dr. Howard in the upcoming weeks.  He also is losing weight and reports low appetite.\par Recent labs from 27 April 2022 was reviewed.  This revealed a hemoglobin of 11.5 g/dL with platelet count 231,000.  Liver test revealed total bilirubin 6 mg/dL, AST 71, ALT 35, alkaline phosphatase 392 units/L.  Serum creatinine was 0.78 mg/dL.  INR 1.14.\par \par \par Interval Hx dated June 21, 2022\par \james Patient presents for a follow up. He is being accompanied to the clinic by his mother.He reports feeling nausea but no vomiting, also had some chills without fever yesterday. He also reports new itching symptoms.  He remains on cholestyramine.  Last MRI was from Feb 2022. He had ERCP with stent exchange on May 20, 2022. \par \par Interval Hx dated July 26, 2022\par \par Patient presents for follow up. He feels more itching since last visit. Denied any fever, chills or rigor. He is currently temporarily inactive and waiting for insurance approval for transplant.\par MRI from July 15, 2022:\par Redemonstrated central biliary strictures involving the central left and right ducts as they converge on the minh hepatis with delayed enhancement. New since prior, there is mild interval increase in biliary ductal dilatation and irregularity of a couple segment 7 ducts. No focal masslike lesion. Differential for biliary stricture, however, includes both benign and malignant etiologies.\par Additionally, there is now thrombosis/occlusion of the anterior branch of the right portal vein. Redemonstrated chronic thrombosis of the posterior branch of the right portal vein.\par \par

## 2022-07-26 NOTE — ASSESSMENT
[FreeTextEntry1] : 32-year-old male with known history of primary sclerosing cholangitis.  Multiple stent exchanges for biliary strictures, most recent stent exchange was performed on May 20, 2022.  He appears slightly more jaundiced than prior visit. Stable weight. Reports worsening itching. Follow up MRI shows central biliary strictures involving the central left and right ducts as they converge on the minh hepatis with delayed enhancement. New since prior, there is mild interval increase in biliary ductal dilatation and irregularity of a couple segment 7 ducts. No focal masslike lesion. Additionally, there is now thrombosis/occlusion of the anterior branch of the right portal vein. 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  Blood type AB. He has signed consent to accept HCV positive organ. \par \par PLAN\par 1.  Primary sclerosing cholangitis\par Unfortunately patient is continuing to require interval ERCP and stent exchanges.  He also developed some microabscesses in his right lobe which  was needed prolonged antibiotic treatment course in May 2021with 4 weeks course of antibiotics including levofloxacin and metronidazole with clinical and radiological resolution.\par He is currently waitlisted on the [facility]OS list. \par \par  We will update his MELD Na score.We applied for a nonstandardized meld exception but was declined by the national liver review board.\par \par I recommended him to continue with cholestyramine 4 g twice a day mixed with water/juice as needed for pruritus. In addition I recommended him to take hydroxyzine nightly 25 to 50 mg to help resolve his nighttime pruritus and possibly help him sleep well as needed if recurrent pruritus.  However he feels well and he is reusing these measures less often since the stent exchanges his pruritus improved significantly.\par \par He will also continue with ferrous sulfate 325 mg once daily for his chronic anemia which is responding with iron supplements.\par \par I recommended follow-up labs with CBC, CMP, PT/INR.  \par \par \par Return to hepatology clinic in 1 month.

## 2022-07-26 NOTE — REVIEW OF SYSTEMS
[Recent Weight Gain (___ Lbs)] : recent [unfilled] ~Ulb weight gain [As Noted in HPI] : as noted in HPI [Difficulty Walking] : difficulty walking [Negative] : Heme/Lymph [Fever] : no fever [Chills] : no chills [Feeling Poorly] : not feeling poorly [Feeling Tired] : not feeling tired [Red Eyes] : eyes not red [FreeTextEntry2] : Gained about 15 pounds of weight [de-identified] : Spastic gait fro cerebral palsy

## 2022-07-26 NOTE — PHYSICAL EXAM
[General Appearance - Alert] : alert [General Appearance - Well Nourished] : well nourished [General Appearance - Well Developed] : well developed [Outer Ear] : the ears and nose were normal in appearance [Neck Appearance] : the appearance of the neck was normal [Exaggerated Use Of Accessory Muscles For Inspiration] : no accessory muscle use [] : no respiratory distress [Auscultation Breath Sounds / Voice Sounds] : lungs were clear to auscultation bilaterally [Apical Impulse] : the apical impulse was normal [Heart Rate And Rhythm] : heart rate was normal and rhythm regular [Heart Sounds] : normal S1 and S2 [Heart Sounds Gallop] : no gallops [Murmurs] : no murmurs [Heart Sounds Pericardial Friction Rub] : no pericardial rub [Arterial Pulses Carotid] : carotid pulses were normal with no bruits [Bowel Sounds] : normal bowel sounds [Abdomen Soft] : soft [Abdomen Tenderness] : non-tender [Abdomen Mass (___ Cm)] : no abdominal mass palpated [Cranial Nerves] : cranial nerves 2-12 were intact [Sensation] : the sensory exam was normal to light touch and pinprick [Motor Exam] : the motor exam was normal [No Focal Deficits] : no focal deficits [Oriented To Time, Place, And Person] : oriented to person, place, and time [FreeTextEntry1] : spastic gait fro cerbral plasy

## 2022-07-29 LAB
25(OH)D3 SERPL-MCNC: 40.3 NG/ML
ALBUMIN SERPL ELPH-MCNC: 3.1 G/DL
ALP BLD-CCNC: 363 U/L
ALT SERPL-CCNC: 33 U/L
ANION GAP SERPL CALC-SCNC: 11 MMOL/L
AST SERPL-CCNC: 80 U/L
BASOPHILS # BLD AUTO: 0.07 K/UL
BASOPHILS NFR BLD AUTO: 1 %
BILIRUB SERPL-MCNC: 5.5 MG/DL
BUN SERPL-MCNC: 12 MG/DL
CALCIUM SERPL-MCNC: 8.9 MG/DL
CHLORIDE SERPL-SCNC: 104 MMOL/L
CO2 SERPL-SCNC: 23 MMOL/L
CREAT SERPL-MCNC: 0.85 MG/DL
EGFR: 120 ML/MIN/1.73M2
EOSINOPHIL # BLD AUTO: 0.22 K/UL
EOSINOPHIL NFR BLD AUTO: 3 %
GLUCOSE SERPL-MCNC: 83 MG/DL
HCT VFR BLD CALC: 37.6 %
HGB BLD-MCNC: 12.1 G/DL
IMM GRANULOCYTES NFR BLD AUTO: 0.3 %
INR PPP: 1.11 RATIO
LYMPHOCYTES # BLD AUTO: 1.7 K/UL
LYMPHOCYTES NFR BLD AUTO: 23.2 %
MAN DIFF?: NORMAL
MCHC RBC-ENTMCNC: 32.2 GM/DL
MCHC RBC-ENTMCNC: 33 PG
MCV RBC AUTO: 102.5 FL
MONOCYTES # BLD AUTO: 0.41 K/UL
MONOCYTES NFR BLD AUTO: 5.6 %
NEUTROPHILS # BLD AUTO: 4.9 K/UL
NEUTROPHILS NFR BLD AUTO: 66.9 %
PLATELET # BLD AUTO: 289 K/UL
POTASSIUM SERPL-SCNC: 4 MMOL/L
PROT SERPL-MCNC: 8.7 G/DL
PT BLD: 12.9 SEC
RBC # BLD: 3.67 M/UL
RBC # FLD: 13.5 %
SODIUM SERPL-SCNC: 137 MMOL/L
WBC # FLD AUTO: 7.32 K/UL

## 2022-07-31 RX ORDER — SODIUM CHLORIDE 9 MG/ML
1000 INJECTION, SOLUTION INTRAVENOUS
Refills: 0 | Status: DISCONTINUED | OUTPATIENT
Start: 2022-08-01 | End: 2022-08-01

## 2022-07-31 RX ORDER — OXYCODONE HYDROCHLORIDE 5 MG/1
10 TABLET ORAL ONCE
Refills: 0 | Status: DISCONTINUED | OUTPATIENT
Start: 2022-08-01 | End: 2022-08-01

## 2022-07-31 RX ORDER — FENTANYL CITRATE 50 UG/ML
50 INJECTION INTRAVENOUS
Refills: 0 | Status: DISCONTINUED | OUTPATIENT
Start: 2022-08-01 | End: 2022-08-01

## 2022-07-31 RX ORDER — ONDANSETRON 8 MG/1
4 TABLET, FILM COATED ORAL ONCE
Refills: 0 | Status: DISCONTINUED | OUTPATIENT
Start: 2022-08-01 | End: 2022-08-01

## 2022-08-01 ENCOUNTER — OUTPATIENT (OUTPATIENT)
Dept: INPATIENT UNIT | Facility: HOSPITAL | Age: 32
LOS: 1 days | Discharge: HOME | End: 2022-08-01
Payer: MEDICAID

## 2022-08-01 ENCOUNTER — TRANSCRIPTION ENCOUNTER (OUTPATIENT)
Age: 32
End: 2022-08-01

## 2022-08-01 ENCOUNTER — RESULT REVIEW (OUTPATIENT)
Age: 32
End: 2022-08-01

## 2022-08-01 ENCOUNTER — APPOINTMENT (OUTPATIENT)
Dept: UROLOGY | Facility: HOSPITAL | Age: 32
End: 2022-08-01

## 2022-08-01 VITALS
HEART RATE: 77 BPM | TEMPERATURE: 99 F | SYSTOLIC BLOOD PRESSURE: 131 MMHG | RESPIRATION RATE: 20 BRPM | DIASTOLIC BLOOD PRESSURE: 63 MMHG | OXYGEN SATURATION: 99 %

## 2022-08-01 VITALS
SYSTOLIC BLOOD PRESSURE: 122 MMHG | DIASTOLIC BLOOD PRESSURE: 67 MMHG | OXYGEN SATURATION: 100 % | TEMPERATURE: 98 F | RESPIRATION RATE: 16 BRPM | HEART RATE: 83 BPM | WEIGHT: 130.07 LBS | HEIGHT: 64 IN

## 2022-08-01 DIAGNOSIS — K08.409 PARTIAL LOSS OF TEETH, UNSPECIFIED CAUSE, UNSPECIFIED CLASS: Chronic | ICD-10-CM

## 2022-08-01 DIAGNOSIS — N47.1 PHIMOSIS: ICD-10-CM

## 2022-08-01 DIAGNOSIS — Z98.890 OTHER SPECIFIED POSTPROCEDURAL STATES: Chronic | ICD-10-CM

## 2022-08-01 PROCEDURE — 88304 TISSUE EXAM BY PATHOLOGIST: CPT

## 2022-08-01 PROCEDURE — 54161 CIRCUM 28 DAYS OR OLDER: CPT

## 2022-08-01 PROCEDURE — 88304 TISSUE EXAM BY PATHOLOGIST: CPT | Mod: 26

## 2022-08-01 RX ORDER — OXYCODONE HYDROCHLORIDE 5 MG/1
5 TABLET ORAL ONCE
Refills: 0 | Status: DISCONTINUED | OUTPATIENT
Start: 2022-08-01 | End: 2022-08-01

## 2022-08-01 RX ORDER — CEPHALEXIN 500 MG
1 CAPSULE ORAL
Qty: 6 | Refills: 0
Start: 2022-08-01 | End: 2022-08-03

## 2022-08-01 RX ORDER — IBUPROFEN 200 MG
1 TABLET ORAL
Qty: 15 | Refills: 0
Start: 2022-08-01 | End: 2022-08-05

## 2022-08-01 RX ADMIN — FENTANYL CITRATE 50 MICROGRAM(S): 50 INJECTION INTRAVENOUS at 13:34

## 2022-08-01 RX ADMIN — OXYCODONE HYDROCHLORIDE 5 MILLIGRAM(S): 5 TABLET ORAL at 10:34

## 2022-08-01 RX ADMIN — SODIUM CHLORIDE 75 MILLILITER(S): 9 INJECTION, SOLUTION INTRAVENOUS at 11:06

## 2022-08-01 RX ADMIN — OXYCODONE HYDROCHLORIDE 5 MILLIGRAM(S): 5 TABLET ORAL at 13:34

## 2022-08-01 RX ADMIN — FENTANYL CITRATE 50 MICROGRAM(S): 50 INJECTION INTRAVENOUS at 10:30

## 2022-08-01 NOTE — ASU PATIENT PROFILE, ADULT - NSICDXPASTSURGICALHX_GEN_ALL_CORE_FT
PAST SURGICAL HISTORY:  History of ERCP multiple with stent insertions/replacement every 3 months ---last 5/20/2022    [place] teeth extracted 2021

## 2022-08-01 NOTE — ASU PATIENT PROFILE, ADULT - NSICDXPASTMEDICALHX_GEN_ALL_CORE_FT
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

## 2022-08-01 NOTE — ASU DISCHARGE PLAN (ADULT/PEDIATRIC) - CARE PROVIDER_API CALL
Yasmany Bello)  Urology  [address], [address] [place] [address]  Phone: [phone]  Fax: [phone]  Scheduled Appointment: 08/17/2022 04:10 PM

## 2022-08-01 NOTE — ASU DISCHARGE PLAN (ADULT/PEDIATRIC) - ASU DC SPECIAL INSTRUCTIONSFT
Bacitracin ointment to incision twice a day for next 1 week.   Ice pack 10 minutes on and 10 minutes off as needed.

## 2022-08-02 LAB — SARS-COV-2 N GENE NPH QL NAA+PROBE: NOT DETECTED

## 2022-08-03 DIAGNOSIS — R56.9 UNSPECIFIED CONVULSIONS: ICD-10-CM

## 2022-08-03 DIAGNOSIS — N47.1 PHIMOSIS: ICD-10-CM

## 2022-08-03 DIAGNOSIS — D64.9 ANEMIA, UNSPECIFIED: ICD-10-CM

## 2022-08-03 DIAGNOSIS — N47.7 OTHER INFLAMMATORY DISEASES OF PREPUCE: ICD-10-CM

## 2022-08-15 PROBLEM — K83.01 PRIMARY SCLEROSING CHOLANGITIS: Chronic | Status: ACTIVE | Noted: 2019-07-08

## 2022-08-15 PROBLEM — N47.1 PHIMOSIS: Chronic | Status: ACTIVE | Noted: 2022-07-22

## 2022-08-15 PROBLEM — K01.1 IMPACTED TEETH: Chronic | Status: ACTIVE | Noted: 2021-05-11

## 2022-08-15 PROBLEM — Z87.898 PERSONAL HISTORY OF OTHER SPECIFIED CONDITIONS: Chronic | Status: ACTIVE | Noted: 2022-07-22

## 2022-08-15 PROBLEM — D64.9 ANEMIA, UNSPECIFIED: Chronic | Status: ACTIVE | Noted: 2022-07-22

## 2022-08-17 ENCOUNTER — APPOINTMENT (OUTPATIENT)
Dept: UROLOGY | Facility: CLINIC | Age: 32
End: 2022-08-17

## 2022-08-17 VITALS
BODY MASS INDEX: 22.88 KG/M2 | HEIGHT: 64 IN | SYSTOLIC BLOOD PRESSURE: 122 MMHG | WEIGHT: 134 LBS | DIASTOLIC BLOOD PRESSURE: 71 MMHG

## 2022-08-17 PROCEDURE — 99213 OFFICE O/P EST LOW 20 MIN: CPT

## 2022-08-17 NOTE — HISTORY OF PRESENT ILLNESS
[FreeTextEntry1] : 32 year old male presents for follow up. \par Initial few days after surgery had lot of discomfort. Now better. \par \par Status post Circumcision done on 8/1/22 at [facility].

## 2022-08-17 NOTE — ASSESSMENT
[FreeTextEntry1] : Status post Circumcision:\par Discussed Pathology. \par Healing well. \par \par Return to office in 6 weeks or sooner if any issues.

## 2022-08-24 ENCOUNTER — NON-APPOINTMENT (OUTPATIENT)
Age: 32
End: 2022-08-24

## 2022-08-24 LAB
AFP-TM SERPL-MCNC: 2.1 NG/ML
ALBUMIN SERPL ELPH-MCNC: 2.7 G/DL
ALP BLD-CCNC: 349 U/L
ALT SERPL-CCNC: 39 U/L
ANION GAP SERPL CALC-SCNC: 12 MMOL/L
AST SERPL-CCNC: 80 U/L
BASOPHILS # BLD AUTO: 0.06 K/UL
BASOPHILS NFR BLD AUTO: 0.9 %
BILIRUB SERPL-MCNC: 4.2 MG/DL
BUN SERPL-MCNC: 12 MG/DL
CALCIUM SERPL-MCNC: 8.5 MG/DL
CHLORIDE SERPL-SCNC: 104 MMOL/L
CO2 SERPL-SCNC: 23 MMOL/L
CREAT SERPL-MCNC: 0.84 MG/DL
EGFR: 120 ML/MIN/1.73M2
EOSINOPHIL # BLD AUTO: 0.17 K/UL
EOSINOPHIL NFR BLD AUTO: 2.7 %
GLUCOSE SERPL-MCNC: 96 MG/DL
HCT VFR BLD CALC: 34.9 %
HGB BLD-MCNC: 11.4 G/DL
IMM GRANULOCYTES NFR BLD AUTO: 0.2 %
INR PPP: 1.37 RATIO
LYMPHOCYTES # BLD AUTO: 1.26 K/UL
LYMPHOCYTES NFR BLD AUTO: 19.8 %
MAN DIFF?: NORMAL
MCHC RBC-ENTMCNC: 32.7 GM/DL
MCHC RBC-ENTMCNC: 33.3 PG
MCV RBC AUTO: 102 FL
MONOCYTES # BLD AUTO: 0.4 K/UL
MONOCYTES NFR BLD AUTO: 6.3 %
NEUTROPHILS # BLD AUTO: 4.46 K/UL
NEUTROPHILS NFR BLD AUTO: 70.1 %
PLATELET # BLD AUTO: 219 K/UL
POTASSIUM SERPL-SCNC: 3.4 MMOL/L
PROT SERPL-MCNC: 6.8 G/DL
PT BLD: 16.2 SEC
RBC # BLD: 3.42 M/UL
RBC # FLD: 14 %
SODIUM SERPL-SCNC: 139 MMOL/L
WBC # FLD AUTO: 6.36 K/UL

## 2022-08-30 ENCOUNTER — LABORATORY RESULT (OUTPATIENT)
Age: 32
End: 2022-08-30

## 2022-08-30 ENCOUNTER — APPOINTMENT (OUTPATIENT)
Dept: HEPATOLOGY | Facility: CLINIC | Age: 32
End: 2022-08-30

## 2022-08-30 VITALS
RESPIRATION RATE: 16 BRPM | TEMPERATURE: 98.2 F | DIASTOLIC BLOOD PRESSURE: 63 MMHG | WEIGHT: 133 LBS | SYSTOLIC BLOOD PRESSURE: 116 MMHG | HEIGHT: 64 IN | HEART RATE: 83 BPM | BODY MASS INDEX: 22.71 KG/M2 | OXYGEN SATURATION: 100 %

## 2022-08-30 PROCEDURE — 99214 OFFICE O/P EST MOD 30 MIN: CPT

## 2022-08-30 NOTE — REASON FOR VISIT
[Follow-Up: _____] : a [unfilled] follow-up visit
Current and Past Psychiatric Diagnoses/Presenting Symptoms

## 2022-08-31 LAB
ALBUMIN SERPL ELPH-MCNC: 2.9 G/DL
ALP BLD-CCNC: 352 U/L
ALT SERPL-CCNC: 31 U/L
ANION GAP SERPL CALC-SCNC: 9 MMOL/L
AST SERPL-CCNC: 60 U/L
BASOPHILS # BLD AUTO: 0 K/UL
BASOPHILS NFR BLD AUTO: 0 %
BILIRUB SERPL-MCNC: 2.6 MG/DL
BUN SERPL-MCNC: 9 MG/DL
CALCIUM SERPL-MCNC: 8.6 MG/DL
CHLORIDE SERPL-SCNC: 108 MMOL/L
CO2 SERPL-SCNC: 25 MMOL/L
CREAT SERPL-MCNC: 0.85 MG/DL
EGFR: 120 ML/MIN/1.73M2
EOSINOPHIL # BLD AUTO: 0.33 K/UL
EOSINOPHIL NFR BLD AUTO: 5.2 %
GLUCOSE SERPL-MCNC: 98 MG/DL
HCT VFR BLD CALC: 33.9 %
HGB BLD-MCNC: 10.5 G/DL
INR PPP: 1.42 RATIO
LYMPHOCYTES # BLD AUTO: 1.21 K/UL
LYMPHOCYTES NFR BLD AUTO: 19.1 %
MAN DIFF?: NORMAL
MCHC RBC-ENTMCNC: 31 GM/DL
MCHC RBC-ENTMCNC: 32.7 PG
MCV RBC AUTO: 105.6 FL
MONOCYTES # BLD AUTO: 0.28 K/UL
MONOCYTES NFR BLD AUTO: 4.4 %
NEUTROPHILS # BLD AUTO: 4.52 K/UL
NEUTROPHILS NFR BLD AUTO: 71.3 %
PLATELET # BLD AUTO: 270 K/UL
POTASSIUM SERPL-SCNC: 4.8 MMOL/L
PROT SERPL-MCNC: 7.2 G/DL
PT BLD: 16.5 SEC
RBC # BLD: 3.21 M/UL
RBC # FLD: 13.5 %
SODIUM SERPL-SCNC: 142 MMOL/L
WBC # FLD AUTO: 6.34 K/UL

## 2022-09-05 ENCOUNTER — TRANSCRIPTION ENCOUNTER (OUTPATIENT)
Age: 32
End: 2022-09-05

## 2022-09-06 ENCOUNTER — APPOINTMENT (OUTPATIENT)
Dept: GASTROENTEROLOGY | Facility: HOSPITAL | Age: 32
End: 2022-09-06

## 2022-09-06 ENCOUNTER — OUTPATIENT (OUTPATIENT)
Dept: OUTPATIENT SERVICES | Facility: HOSPITAL | Age: 32
LOS: 1 days | End: 2022-09-06
Payer: MEDICAID

## 2022-09-06 DIAGNOSIS — Z98.890 OTHER SPECIFIED POSTPROCEDURAL STATES: Chronic | ICD-10-CM

## 2022-09-06 DIAGNOSIS — K08.409 PARTIAL LOSS OF TEETH, UNSPECIFIED CAUSE, UNSPECIFIED CLASS: Chronic | ICD-10-CM

## 2022-09-06 DIAGNOSIS — K80.50 CALCULUS OF BILE DUCT WITHOUT CHOLANGITIS OR CHOLECYSTITIS WITHOUT OBSTRUCTION: ICD-10-CM

## 2022-09-06 PROCEDURE — 43276 ERCP STENT EXCHANGE W/DILATE: CPT | Mod: 59

## 2022-09-06 PROCEDURE — C1773: CPT

## 2022-09-06 PROCEDURE — C1726: CPT

## 2022-09-06 PROCEDURE — C1769: CPT

## 2022-09-06 PROCEDURE — 74330 X-RAY BILE/PANC ENDOSCOPY: CPT

## 2022-09-06 PROCEDURE — C9399: CPT

## 2022-09-06 PROCEDURE — C2617: CPT

## 2022-09-06 PROCEDURE — 43235 EGD DIAGNOSTIC BRUSH WASH: CPT | Mod: 59

## 2022-09-06 PROCEDURE — 43276 ERCP STENT EXCHANGE W/DILATE: CPT

## 2022-09-06 DEVICE — GWIRE VISIGLIDE ANG TIP 270CM .025: Type: IMPLANTABLE DEVICE | Status: FUNCTIONAL

## 2022-09-06 DEVICE — IMPLANTABLE DEVICE: Type: IMPLANTABLE DEVICE | Status: FUNCTIONAL

## 2022-09-06 DEVICE — CATH BLLN BIL RX 9-12CM: Type: IMPLANTABLE DEVICE | Status: FUNCTIONAL

## 2022-09-06 DEVICE — CATH BLN RX HURRIC 4MMX4X180CM: Type: IMPLANTABLE DEVICE | Status: FUNCTIONAL

## 2022-09-06 DEVICE — GWIRE JAGTOME REVOLUTION RX 260CM/0.025IN: Type: IMPLANTABLE DEVICE | Status: FUNCTIONAL

## 2022-09-06 NOTE — HISTORY OF PRESENT ILLNESS
[de-identified] : Patient arrived for ERCP for autoimmune cholangitis. He is being followed by transplant hepatology.

## 2022-09-07 NOTE — ASSESSMENT
[FreeTextEntry1] : 32-year-old male with known history of primary sclerosing cholangitis.  Multiple stent exchanges for biliary strictures, most recent stent exchange was performed on May 20, 2022. Follow up MRI shows central biliary strictures involving the central left and right ducts as they converge on the minh hepatis with delayed enhancement. New since prior, there is mild interval increase in biliary ductal dilatation and irregularity of a couple segment 7 ducts. No focal masslike lesion. Additionally, there is thrombosis/occlusion of the anterior branch of the right portal vein. Redemonstrated chronic thrombosis of the posterior branch of the right portal vein.\par \par Patient is currently listed for liver transplantation on the [facility] list at our center.  Blood type AB. He has signed consent to accept HCV positive organ. \par \par PLAN\par 1.  Primary sclerosing cholangitis\par Unfortunately patient is continuing to require interval ERCP and stent exchanges.  He also developed some microabscesses in his right lobe which needed prolonged antibiotic treatment course in May 2021with 4 weeks course of antibiotics including levofloxacin and metronidazole with clinical and radiological resolution.\par He is currently waitlisted on the [facility] list. \par We applied for a nonstandardized meld exception but was declined by the national liver review board.\par \par I recommended him to continue with cholestyramine 4 g twice a day mixed with water/juice as needed for pruritus. In addition I recommended him to take hydroxyzine nightly 25 to 50 mg to help resolve his nighttime pruritus and possibly help him sleep well as needed if recurrent pruritus.  However he feels well and he is reusing these measures less often since the stent exchanges his pruritus improved significantly.\par \par He will also continue with ferrous sulfate 325 mg once daily for his chronic anemia which is responding with iron supplements.\par \par In view of his recent fever I recommended ciprofloxacin 500 mg twice a day for 7 days.  I recommended follow-up labs with CBC, CMP, PT/INR to be done today.\par \par ERCP with stent exchange planned on September 6, 2022 by Dr. Howard.\par \par #2.  Portal vein thrombosis\par Continue with Eliquis 5 mg twice daily.\par \par #3.  Vitamin D deficiency\par Continue with ergocalciferol 50,000 international unit weekly.\par \par Return to hepatology clinic in 1 month.

## 2022-09-07 NOTE — REVIEW OF SYSTEMS
[Recent Weight Gain (___ Lbs)] : recent [unfilled] ~Ulb weight gain [As Noted in HPI] : as noted in HPI [Difficulty Walking] : difficulty walking [Negative] : Heme/Lymph [Fever] : fever [Chills] : no chills [Feeling Poorly] : not feeling poorly [Feeling Tired] : not feeling tired [Red Eyes] : eyes not red [de-identified] : Spastic gait fro cerebral palsy

## 2022-09-07 NOTE — HISTORY OF PRESENT ILLNESS
[FreeTextEntry1] : Mr. JAVY BIGGS a 31 year Other race male  presents today for  a hepatology appointment. He has been  has been referred to us by Dr. Yasmany Howard. .\par \par History based on initial hepatology clinic visit:\par Mr. Javy Biggs is a 30-year-old man with a history of cerebral palsy presents for  evaluation for potential liver transplant.\par \par He has been symptomatic since early 2019. He started itching while he was in [place], along with some nausea and vomiting and abdominal pain. He was noted to have liver enzyme elevation. Then subsequently he presented to [facility] and underwent evaluation. His bilirubin started to rise further and then transferred to [facility] in February 2019. He underwent ERCP which showed the distal bile duct stricture and questionable right intrahepatic stricture. Small stent was placed in the distal bile duct. However his bilirubin did not improve initially. He underwent MRI with severe right intrahepatic stricture. He was discharged home. He was itching for many days but then it improved. \par \par Ca 19-9 level, IgG 4 level and bile duct brushings have been unremarkable. \par \par The patient had another ERCP with stent pull and cholangiogram in april 2019. Right intrahepatic stricture was noted. Then additional brushings were performed.Then a stent was attempted to be advanced into the right intrahepatic system. Although there was significant resistance. Then it was dilated using a 4 mm balloon. Then 7 French 9 cm bile duct stent was placed.The patient had a ER visit about a week or 2 after that. He was noted to have nephrolithiasis.\par \par His LFTs imroved with ERCP and stent. He is now referred to hepatology for further assessment for primary sclerosing cholangitis.\par \par ARMANDO is negative, Mitochondrial antibody is negative, Viral hepatitis serologies are negative, immunugloblin level are unimpressive.\par \par Interval hx: Since last seen in the hepatology clinic on 22-May-2019, he had a follow up ERCP by Dr. Howard, and the old stent was removed, and exchanged with a new one. Brushings were negative for malignant cell. Additionally he had a liver biopsy that showed overall appears cholestatic pattern of injury with ductular reaction. This was further discussed at the hepato-billiary meeting with pathology review. 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 Essentially, work up for underlying CLD were negative for viral serology, autoimmune markers, ceruloplasmin ( higher level not suggestive of Garcia's). His total bili is down to 2.4 mg/dL, , and , and . AMA is negative, but ASMA is low titer + ( 1:20). ARMANDO is also negative.\par Interestingly, his IgG level is high ( 1723 mg/dL). \par His viral serology suggest to protection towards hepatitis A, but he is immune to hepatitis B. I hav recommended vaccine series.\par Ca19-9 was within normal range.\par \par Interval hx ( 9/3/2019): Since last seen he has been apparently hospitalized with fever, and suspected infection. He was treated with IV antibiotics, and was discharged with Cipro which he is still taking ( will finish in 2 days).\par Today he feels well.\par LFTs significantly better since Prednisone was started. His mother reports skin rash in the face, chest, and upper back (like pimples) since the steroid was initiated.\par \par Interval hx-10-3-2019:\par \par Patient has been doing well since last seen. His acne lesions have almost disappeared. He remain on Azathioprine 100 mg daily (increased on 10-2-2019), and Prednisone 10 mg daily. His Azathioprine dose was increased due to poor response on 50 mg daily. He denies any cholestasis symptoms. \par \par Interval history January 6, 2020:\par Recent laboratory tests from 10 December 2019 was revealed. This revealed white cell count of 5.96, hemoglobin 14.8, platelet count 400,000. Liver function tests revealed total bilirubin 1.8, , , alkaline phosphatase 151. Serum creatine was 0.92 mg/dL. INR is 0.93. His liver tests appears to have significantly increased compared to 26 November 2019. At that time his total bilirubin was 1.1, AST 89, , alkaline phosphatase 203.\par \par Interval history February 13, 2020:\par \par Patient presents for a follow up visit today along with his parents. Since last seen that has a significant jump in his liver test particularly total bilirubin level. His total bilirubin is 21.9, AST 65, ALT 44, alkaline phosphatase 129. He recently had an ERCP done through Dr. Yasmany Howard, and his liver function tests has not improved from the spike yesterday. I had a personal discussion with Dr. Howard, and he mentioned that he placed the stent on the right side only and he intends now to repeat an ERCP with bilateral stenting. \par \par \par Internal history dated March 2, 2020\par \par Ms. Biggs returns for a followup visit in the hepatology clinic accompianed  by her mother. On his followup today, patient reports severe persistent itching that is bothersome. He has been taking hydroxyzine with some relief. He also certainly some excoriation marks. He denies any internal episodes of fever, chills, , nausea or vomiting.\par \par His liver function test from 2 of February 2020 revealed total bilirubin 21.9, AST 65, ALT 44, alkaline phosphatase 129. Serum creatinine was 0.65 mg/dL. \par \par He's still waiting for getting the ERCP done by Dr. Howard, and apparently scheduled for this week.  We will reduce his hasn't had reduced to 50 mg daily and prednisone dose to 10 mg daily. I have recommended him to hold off on switching to budesonide.\par \par \par Interval history dated June 8, 2020\par \james Palafox returns for a followup visit to hepatology clinic today accompanied by her mother. He reports fever for the last 2 days, and was preceded with some malaise. His fever was as high as her in 3 different it 2 days ago but the intensity has significantly improved as of yesterday. About 2 weeks in the he had an ERCP with stent exchange by Dr. Howard. He denies any chills. He also denies any symptoms of itching or any worsening jaundice or change in color of his urine or stool. Today the clinic she otherwise feels well and denies any new symptoms.\par \par On June 5, 2020 GI blood works that included CBC. This revealed a white cell count of 9.75, hemoglobin 14.2, platelet count 229,000. Recent CEA 19-9 level is within normal range. His INR is also normal and quantitative hemoglobin levels are now within normal range.\par \par He is currently taking azathioprine 50 mg daily.\par \par Interval Hx ( 11/18/2020)\par \par Patient presents for a follow up. He reports doing well- although reports occasional itching. No fever or jaundice.\par \par Recent MRCP- Stable appearance of the biliary tree since 7/3/2019 MRI. 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 Follow-up with endoscopic ultrasound may be considered.\par \par Labs from Sept 17, 2020- , , , and bili 0.9. \par \par Interval Hx (1/12/2021)\par \par Patient presents for evaluation for Liver Transplant. He is being accompanied by his mother. He remains asymptomatic, denies any cholestatic symptoms.\par \par His CBC from Nov 18, 2020- normal CBC, ,000. LFTs were elevated, with total bili 94, AST 94, , . INR was 1.03. His blood type is AB.\par \par He had an ERCP with stent exchange in Dec 02, 2020 though Dr. Yasmany Howard.\par \par Interval Hx (1/27/2021)\par \par Patient presents for a follow up. He has been doing well. No new complaints. He being evaluated for an OLT, and is pending an ECHO and stress test. His blood type is AB +. hIS ca 19-9 is normal. He remains immune to hepatitis A, and hepatitis B.  IgG4 level is within normal range.\par He had an ERCP with stent exchange in Dec 02, 2020 though Dr. Yasmany Howard. His next ERCP will be in March, 2021. \par Since last seen he has an EUS.   A mass was identified in the pancreatic tail. Appearance characteristic is               benign. 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 FNA was not performed due to benign appearance (stabillity based on prior imaging), and difficulty finding a safe path on color doppler (due to blood vessels on the path of the needle). The remainder of the pancreas was examined and was unremarkable. Few Discrete peripancretic, and periportal lymph nodes were also noted. The largest periportal LN measured about 1.8 cm. The appearance characteristic was benign. The endosonographic appearance of parenchyma and the upstream pancreatic duct indicated no duct dilation. Biliary stent was seen in situ.\par He reports somewhat pale stool, but denies any itching.\par \par His LFTs show total bili 1.5, , , .\par \par Interval history dated April 6, 2021\james Palafox presents for a hepatology follow-up appointment accompanied by her mother.  Since last seen he reports he has been having increasing pruritus that is bothering him from sleep.  He has been taking hydroxyzine which is able to control his pruritus to some extent.  He also reports that after taking a warm shower he is having significant itching feeling like burning sensation in his skin.\par \par Since last seen he also completed his evaluation and has been approved for listing for liver transplantation.  He is awaiting dental clearance and also an application for meld score exception.\par \par Since last seen he had an ERCP and a stent exchange performed on February 25, 2021.  No interval episodes of cholangitis.\par \par Recent follow-up labs from 26 March 2021 was reviewed.  This revealed a hemoglobin of 13.1 g/dL with platelet count 277,000.  His liver function test revealed total bilirubin 3.9, AST 75, ALT 31, alkaline phosphatase 241.  Serum creatinine is 0.83 mg/dL.  INR is 1.\par \par Interval history dated April 21, 2021.\james Palafox presents for hepatology follow-up appointment along with his mother.  Area recently had an emergency visit on April 19, 2021 with complaints of fever with a temperature maximum 102 °F associated with cough, runny nose and myalgia.  He denies any nausea, vomiting, abdominal pain or worsening jaundice or pruritus.  Covid 19 test was negative.\par \par He recently received his COVID-19 vaccine second dose Pfizer on April 11, 2021.  He reports he has having symptomatic malaise, fever since that time.  Labs from April 28 and April 21 during his hospital stay was reviewed which revealed leukocytosis with a white cell count of 12.27 on admission which improved to 7.97 at the time of discharge.  His hemoglobin was 9.6 and platelet count of 397,000.  Liver function tests revealed total bilirubin 1.4, normal liver enzymes with AST 26, ALT 15, alkaline phosphatase 205.  His INR was 1.37.\par \par He had a CT scan of the abdomen performed on April 20, 2021 which revealed the following findings-\par 1. Partial improvement of the intrahepatic biliary dilatation in this patient status post biliary stent placement.\par 2. New splenomegaly, of uncertain etiology. Correlate for developing portal venous hypertension.\par 3. Enhancing pancreatic tail lesion, better assessed on the MRI from November, 2020.\par \par Interval history dated May 12, 2021\par juan Palafox presents for hepatology follow-up appointment accompanied by his mother.  Today he feels well.  However he has been having intermittent low-grade fevers since April 20, 2021.\par \par MRI from May 10, 2021 revealed- Central biliary stricture as above. Differential includes both inflammatory and neoplastic etiologies, favor benign stricture given relative stability from 2019. Signal abnormality in segment 7 with several subcentimeter rim-enhancing foci concerning for microabscesses. Pancreatic tail lesion demonstrating interval growth however with enhancement characteristics consistent with a splenule. Of note, the spleen demonstrates continued interval growth along with this lesion over time.\par \par Patient had labs drawn yesterday on May 11, 2021 which revealed a white cell count of 6.1, hemoglobin 9.7, platelet count 2 69,000.  Serum sodium was 140, potassium 3.6, creatinine 0.67.  Liver function test revealed total bilirubin 0.8, AST 30, ALT 20, alkaline phosphatase 261.\par \par Interval history dated Giuliana 15, 2021juan Palafox presents for hepatology follow-up appointment accompanied by his mother.  He was last seen in the hepatology clinic on 12 May 2021.  He is also scheduled to see transplant surgeon Dr. Britton Solis today.  He currently remains waitlisted for liver transplantation on the [facility] list although continues to have a low model for end-stage liver disease score.\par \par He recently had an ERCP on Giuliana 3, 2021 which revealed recurrent intrahepatic bile duct stricture and there was also bile duct stone, status post balloon sweep with removal and subsequent dilatation of the right and left intrahepatic system and bilateral bile duct stent placement.\par \par He has lost significant amount of weight since his last clinic visit, approximately 16 pound weight loss.  He was treated with 4 weeks course of antibiotics including Flagyl and levofloxacin.  I suspect his weight loss may be related to taste change related to Flagyl.\par \par MRI from May 10, 2021 revealed: Right hepatic lobe atrophy with left hepatic lobe hypertrophy. Mild to moderate intrahepatic biliary ductal dilatation in the right and left lobes secondary to a central hilar stricture demonstrates delayed enhancement on postcontrast imaging. Extrahepatic biliary tree is nondilated. Findings are without significant change from prior imaging dating to 2019 favoring benign stricture in the setting of potential primary sclerosing cholangitis however malignancy is not completely excluded.. Posterior division right portal vein is however occluded unchanged from November 2020 however new from prior imaging and February 2019.\par \par Signal abnormality in segment 7 with several subcentimeter rim-enhancing foci concerning for microabscesses.\par \par Pancreatic tail lesion demonstrating interval growth however with enhancement characteristics consistent with a splenule. Of note, the spleen demonstrates continued interval growth along with this lesion over time.\par \par MRI from June 11, 2021 revealed: Right lobe atrophy with left lobe hypertrophy, as on prior. No significant steatosis. Mild to moderate intrahepatic biliary ductal dilatation secondary to a central hilar stricture with delayed enhancement on postcontrast imaging, essentially unchanged from 2019. Extra hepatic biliary tree is not dilated. Given stability, a benign stricture is favored. However, malignancy cannot be entirely excluded in the setting of primary sclerosing cholangitis. Redemonstrated occlusion of the posterior right portal venous branch. Near-complete resolution of the segment 7 signal abnormality/microabscesses with only mild T2 heterogeneity remaining, but no discrete ring enhancement.\par \par Laboratory test results from 19 May 2021 was reviewed.  This revealed hemoglobin 11.3 g/dL, platelet count 3 35,000.  White cell count was 4.95.  Serum sodium 138, potassium 5.0.  Liver function test revealed total bilirubin 0.8, , ALT 78, alkaline phosphatase 901.  Her alkaline phosphatase appears to be significantly elevated compared to prior labs.\par \par Clinically appears to be responding well to antibiotics and his imaging studies also now showing resolution of the microabscesses.\par \par Interval history dated July 27, 2021\par \par Patient presents for hepatology follow-up appointment accompanied by her mother.  Today Javy reports that he has been feeling well.  He has gained weight with megestrol.  His fever has resolved and he denies any nausea, vomiting, abdominal pain.  He is now off antibiotics.  He is also currently off azathioprine.  During his prior visits rifampin as well as also now has been discontinued as well.\par MRI from June 16, 2021 revealed stable central biliary stricture.  Near complete resolution of hepatic segment 7 signal abnormality/microabscesses were noted.  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  I recommended hydroxyzine 25 mg nightly as needed.\par Laboratory test results from 12 July 2021 was reviewed.  This revealed mild anemia with hemoglobin of 11.5, deciliter and platelet count was 183,000.  Liver chemistries revealed mild elevation with total bilirubin 2.2 mg/dL, AST 62, , alkaline phosphatase 520.  His alkaline phosphatase appears to have improved from 901 in 19 May 2021.\par \par Interval history dated November 17, 2021\par \james Palafox presents to the hepatology clinic today accompanied by her mother.  Javy recently had an ERCP by Dr. Howard due to worsening jaundice and her stents were exchanged with 2 plastic stents.  She continues to have persistent jaundice and also complains of significant pruritus symptoms today.  He has been taking hydroxyzine on and off but it is not helping her very well.  She also has recurrent MRSA skin infection.  No interval hospitalization otherwise.\par \par Recent labs from 15/2021 was reviewed.  This revealed hemoglobin 11.5 g/dL with a platelet count 321,000.  INR was 1.09.  Liver test revealed total bilirubin 6.1 mg/dL which is improved from 6.4 mg/dL following his ERCP.  AST was 76 ALT 36, alkaline phosphatase 336 units/L.  Serum creatinine was 0.76 mg/dL.\par \par \par Interval history dated February 16, 2022\par \james Palafox presents for a hepatology follow-up appointment.  He is accompanied to the clinic by his mother.  Today on his follow-up visit he reports intermittent fever especially at night time ranging between 99 through 101 degrees vomiting.  Per mother his temperature yesterday was 99.7 dignified and it.  His vitals are stable today.  He otherwise feeling well.  He recently had a ERCP with stent exchange on February 8, 2022 by Dr. Howard.  He reports his fever episode started few days before ERCP was done.  He also had an MRCP done on February 7, 2022 that revealed stable intrahepatic biliary ductal dilatation and central strictures. No evidence of residual hepatic abscess.\par \par Interval history dated March 01, 2022\par \james Palafox presents for a follow up visit. He is accompanied by her mother.   Today, he feels well, and his fever resolved.  He was given a 7-day course of Augmentin 875 mg twice a day which he completed.\par Laboratory test results from 23 February 2022 was reviewed.  This revealed a white cell count of 5.68, hemoglobin 11.6 g/dL, platelet count 282,000.  Serum sodium 140, potassium 3.6 mmol/L.  Serum creatinine 0.80 mg/dL.  Liver test revealed total bilirubin 4.4 mg/dL, , , alkaline phosphatase 622.  INR 1.12.  \par \par Interval history dated May 10, 2022\par \james Patient presents for hepatology follow-up appointment.  He is accompanied by his mother.  He denies any significant cholestatic symptoms although still appears to be jaundiced.  Mother thinks his eyes are more yellow recently.  He is scheduled for an ERCP by Dr. Howard in the upcoming weeks.  He also is losing weight and reports low appetite.\par Recent labs from 27 April 2022 was reviewed.  This revealed a hemoglobin of 11.5 g/dL with platelet count 231,000.  Liver test revealed total bilirubin 6 mg/dL, AST 71, ALT 35, alkaline phosphatase 392 units/L.  Serum creatinine was 0.78 mg/dL.  INR 1.14.\par \par \par Interval Hx dated June 21, 2022\par \james Patient presents for a follow up. He is being accompanied to the clinic by his mother.He reports feeling nausea but no vomiting, also had some chills without fever yesterday. He also reports new itching symptoms.  He remains on cholestyramine.  Last MRI was from Feb 2022. He had ERCP with stent exchange on May 20, 2022. \par \par Interval Hx dated July 26, 2022\par \par Patient presents for follow up. He feels more itching since last visit. Denied any fever, chills or rigor. He is currently temporarily inactive and waiting for insurance approval for transplant.\par MRI from July 15, 2022:\par Redemonstrated central biliary strictures involving the central left and right ducts as they converge on the minh hepatis with delayed enhancement. New since prior, there is mild interval increase in biliary ductal dilatation and irregularity of a couple segment 7 ducts. No focal masslike lesion. Differential for biliary stricture, however, includes both benign and malignant etiologies.\par Additionally, there is now thrombosis/occlusion of the anterior branch of the right portal vein. Redemonstrated chronic thrombosis of the posterior branch of the right portal vein.\par \par Interval history dated August 2022\par \par Patient presents for hepatology follow-up appointment.  He was last seen in the hepatology clinic on July 26, 2022.  Patient reports over the last few days he has been feeling tired and febrile.  Denies any chills or rigor.  Mild pruritus.  He is scheduled for an ERCP next week with Dr. Howard.  He is currently actively waitlisted on the [facility] waitlist.  Blood type AB.\par Labs were reviewed from 24 August 2022 that revealed white cell count of 6.36, hemoglobin 11.4 g/dL, platelet count 219,000.  Alpha-fetoprotein level was 2.1 ng/mL.  Liver test revealed total bilirubin 4.2 mg/dL, AST 80, ALT 39, alkaline phosphatase 349 units/L.  This is stable compared to prior labs.  Serum creatinine was 0.84 mg/dL.  He does have mild hypokalemia with a potassium of 3.4 mmol/L.  INR 1.37.\par \par \par

## 2022-09-07 NOTE — PHYSICAL EXAM
[General Appearance - Alert] : alert [General Appearance - Well Nourished] : well nourished [General Appearance - Well Developed] : well developed [Outer Ear] : the ears and nose were normal in appearance [Neck Appearance] : the appearance of the neck was normal [] : no respiratory distress [Exaggerated Use Of Accessory Muscles For Inspiration] : no accessory muscle use [Auscultation Breath Sounds / Voice Sounds] : lungs were clear to auscultation bilaterally [Apical Impulse] : the apical impulse was normal [Heart Rate And Rhythm] : heart rate was normal and rhythm regular [Heart Sounds] : normal S1 and S2 [Heart Sounds Gallop] : no gallops [Murmurs] : no murmurs [Heart Sounds Pericardial Friction Rub] : no pericardial rub [Arterial Pulses Carotid] : carotid pulses were normal with no bruits [Bowel Sounds] : normal bowel sounds [Abdomen Soft] : soft [Abdomen Tenderness] : non-tender [Abdomen Mass (___ Cm)] : no abdominal mass palpated [Cranial Nerves] : cranial nerves 2-12 were intact [Sensation] : the sensory exam was normal to light touch and pinprick [Motor Exam] : the motor exam was normal [No Focal Deficits] : no focal deficits [Oriented To Time, Place, And Person] : oriented to person, place, and time [FreeTextEntry1] : spastic gait fro cerbral plasy

## 2022-09-13 ENCOUNTER — NON-APPOINTMENT (OUTPATIENT)
Age: 32
End: 2022-09-13

## 2022-09-14 ENCOUNTER — TRANSCRIPTION ENCOUNTER (OUTPATIENT)
Age: 32
End: 2022-09-14

## 2022-09-14 ENCOUNTER — NON-APPOINTMENT (OUTPATIENT)
Age: 32
End: 2022-09-14

## 2022-09-16 ENCOUNTER — APPOINTMENT (OUTPATIENT)
Dept: DISASTER EMERGENCY | Facility: HOSPITAL | Age: 32
End: 2022-09-16

## 2022-09-16 ENCOUNTER — OUTPATIENT (OUTPATIENT)
Dept: OUTPATIENT SERVICES | Facility: HOSPITAL | Age: 32
LOS: 1 days | End: 2022-09-16

## 2022-09-16 VITALS
HEART RATE: 86 BPM | DIASTOLIC BLOOD PRESSURE: 74 MMHG | RESPIRATION RATE: 17 BRPM | SYSTOLIC BLOOD PRESSURE: 110 MMHG | TEMPERATURE: 98 F | OXYGEN SATURATION: 100 %

## 2022-09-16 VITALS
DIASTOLIC BLOOD PRESSURE: 70 MMHG | SYSTOLIC BLOOD PRESSURE: 129 MMHG | TEMPERATURE: 99 F | HEART RATE: 103 BPM | RESPIRATION RATE: 17 BRPM | HEIGHT: 64 IN | WEIGHT: 130.07 LBS | OXYGEN SATURATION: 100 %

## 2022-09-16 DIAGNOSIS — U07.1 COVID-19: ICD-10-CM

## 2022-09-16 DIAGNOSIS — Z98.890 OTHER SPECIFIED POSTPROCEDURAL STATES: Chronic | ICD-10-CM

## 2022-09-16 DIAGNOSIS — K08.409 PARTIAL LOSS OF TEETH, UNSPECIFIED CAUSE, UNSPECIFIED CLASS: Chronic | ICD-10-CM

## 2022-09-16 RX ORDER — BEBTELOVIMAB 87.5 MG/ML
175 INJECTION, SOLUTION INTRAVENOUS ONCE
Refills: 0 | Status: COMPLETED | OUTPATIENT
Start: 2022-09-16 | End: 2022-09-16

## 2022-09-16 RX ADMIN — BEBTELOVIMAB 175 MILLIGRAM(S): 87.5 INJECTION, SOLUTION INTRAVENOUS at 14:48

## 2022-09-16 NOTE — MONOCLONAL ANTIBODY INFUSION - HOME MEDICATIONS
oxycodone-acetaminophen 5 mg-325 mg oral tablet , 1 tab(s) orally every 6 hours MDD:4 tabs  ibuprofen 600 mg oral tablet , 1 tab(s) orally every 8 hours   cephalexin 500 mg oral tablet , 1 tab(s) orally 2 times a day   Vitamin D3 1250 mcg (50,000 intl units) oral capsule , 1 cap(s) orally once a week  ferrous sulfate 325 mg (65 mg elemental iron) oral tablet , 1 tab(s) orally once a day  acetaminophen 650 mg oral tablet, extended release , 1 tab(s) orally every 6 hours, As Needed

## 2022-09-16 NOTE — MONOCLONAL ANTIBODY INFUSION - ASSESSMENT AND PLAN
CC: Monoclonal Antibody Infusion/COVID 19 Positive  32y Male with hx of cerebral palsy, chronic liver disease, and recent dx of COVID 19+ who presents today for elective Bebtelovimab. Patient has been screened and was deemed to be a candidate.    Symptoms/ Criteria  Date of Symptom Onset: 9/14/22  Symptoms: fever, cough, chills   Date of Positive COVID PCR: 9/14/22  Risk Profile includes:  cerebral palsy    Vaccination Status: Pfizer x 3    PMHx:  Infection due to severe acute respiratory syndrome coronavirus 2 (SARS-CoV-2)    ASSESSMENT:  Pt is COVID positive and symptomatic who was referred for Bebtelovimab monoclonal antibody treatment.    PLAN:  - MAB treatment explained to patient. I have reviewed the Bebtelovimab Emergency Use Authorization (EUA).   - Consent for MAB obtained.   - Risk & benefits discussed. Patient verbalized understanding of plan and agrees to treatment. All questions answered.  - 175mg of Bebtelovimab administered as a single intravenous injection over at least 30 seconds.   - Observe patient for one hour post medication administration and then if stable, discharge home with outpatient follow up as planned by PCP.    POST ASSESSMENT:   Patient completed MAB, and monitored x 1 hour post-infusion with no adverse reactions noted, remained hemodynamically stable.  - Patient tolerated injection well; denies complaints of chest pain/SOB/dizziness/palpitations.   - VSS for discharge home.  - D/C instructions given/ fact sheet included.  - Patient was instructed to self-isolate and use infection control measures (e.g wear mask, isolate, social distance, avoid sharing personal items, clean and disinfect "high touch" surfaces, and frequent handwashing according to the CDC guidelines.   - The patient was informed on what symptoms to be aware of for the next couple of days, and if there are any issues to call the 24/7 clinical call center. Patient was instructed to follow up with primary care provider as needed.    DISCHARGE at 4pm.

## 2022-09-16 NOTE — MONOCLONAL ANTIBODY INFUSION - EXAM
Exam/findings:  T(C): 37 (09-16-22 @ 14:36), Max: 37 (09-16-22 @ 14:36)  HR: 103 (09-16-22 @ 14:36) (103 - 103)  BP: 129/70 (09-16-22 @ 14:36) (129/70 - 129/70)  RR: 17 (09-16-22 @ 14:36) (17 - 17)  SpO2: 100% (09-16-22 @ 14:36) (100% - 100%)    PE:   Appearance: NAD	  HEENT:  NC/AT  Cardiovascular:  No edema  Respiratory: no use of accessory muscles  Gastrointestinal:  non-distended   Skin: warm and dry  Neurologic: Non-focal  Extremities: Normal range of motion

## 2022-09-17 ENCOUNTER — TRANSCRIPTION ENCOUNTER (OUTPATIENT)
Age: 32
End: 2022-09-17

## 2022-09-19 ENCOUNTER — NON-APPOINTMENT (OUTPATIENT)
Age: 32
End: 2022-09-19

## 2022-09-24 ENCOUNTER — INPATIENT (INPATIENT)
Facility: HOSPITAL | Age: 32
LOS: 5 days | Discharge: HOME | DRG: 864 | End: 2022-09-30
Attending: HOSPITALIST | Admitting: INTERNAL MEDICINE
Payer: MEDICAID

## 2022-09-24 VITALS
TEMPERATURE: 100 F | WEIGHT: 125 LBS | SYSTOLIC BLOOD PRESSURE: 130 MMHG | DIASTOLIC BLOOD PRESSURE: 61 MMHG | HEART RATE: 136 BPM | HEIGHT: 64 IN | OXYGEN SATURATION: 100 % | RESPIRATION RATE: 20 BRPM

## 2022-09-24 DIAGNOSIS — Z98.890 OTHER SPECIFIED POSTPROCEDURAL STATES: Chronic | ICD-10-CM

## 2022-09-24 DIAGNOSIS — U07.1 COVID-19: ICD-10-CM

## 2022-09-24 DIAGNOSIS — K83.01 PRIMARY SCLEROSING CHOLANGITIS: ICD-10-CM

## 2022-09-24 DIAGNOSIS — Z29.9 ENCOUNTER FOR PROPHYLACTIC MEASURES, UNSPECIFIED: ICD-10-CM

## 2022-09-24 DIAGNOSIS — K08.409 PARTIAL LOSS OF TEETH, UNSPECIFIED CAUSE, UNSPECIFIED CLASS: Chronic | ICD-10-CM

## 2022-09-24 LAB
ALBUMIN SERPL ELPH-MCNC: 2.6 G/DL — LOW (ref 3.3–5)
ALP SERPL-CCNC: 406 U/L — HIGH (ref 40–120)
ALT FLD-CCNC: 19 U/L — SIGNIFICANT CHANGE UP (ref 10–45)
ANION GAP SERPL CALC-SCNC: 9 MMOL/L — SIGNIFICANT CHANGE UP (ref 5–17)
APTT BLD: 35.1 SEC — SIGNIFICANT CHANGE UP (ref 27.5–35.5)
AST SERPL-CCNC: 42 U/L — HIGH (ref 10–40)
BASE EXCESS BLDV CALC-SCNC: -1.2 MMOL/L — SIGNIFICANT CHANGE UP (ref -2–3)
BASOPHILS # BLD AUTO: 0.02 K/UL — SIGNIFICANT CHANGE UP (ref 0–0.2)
BASOPHILS NFR BLD AUTO: 0.3 % — SIGNIFICANT CHANGE UP (ref 0–2)
BILIRUB DIRECT SERPL-MCNC: 1.6 MG/DL — HIGH (ref 0–0.3)
BILIRUB INDIRECT FLD-MCNC: 0.8 MG/DL — SIGNIFICANT CHANGE UP (ref 0.2–1)
BILIRUB SERPL-MCNC: 2.4 MG/DL — HIGH (ref 0.2–1.2)
BILIRUB SERPL-MCNC: 2.4 MG/DL — HIGH (ref 0.2–1.2)
BUN SERPL-MCNC: 12 MG/DL — SIGNIFICANT CHANGE UP (ref 7–23)
CA-I SERPL-SCNC: 1.11 MMOL/L — LOW (ref 1.15–1.33)
CALCIUM SERPL-MCNC: 8.4 MG/DL — SIGNIFICANT CHANGE UP (ref 8.4–10.5)
CHLORIDE BLDV-SCNC: 107 MMOL/L — SIGNIFICANT CHANGE UP (ref 96–108)
CHLORIDE SERPL-SCNC: 105 MMOL/L — SIGNIFICANT CHANGE UP (ref 96–108)
CO2 BLDV-SCNC: 25 MMOL/L — SIGNIFICANT CHANGE UP (ref 22–26)
CO2 SERPL-SCNC: 25 MMOL/L — SIGNIFICANT CHANGE UP (ref 22–31)
CREAT SERPL-MCNC: 0.71 MG/DL — SIGNIFICANT CHANGE UP (ref 0.5–1.3)
D DIMER BLD IA.RAPID-MCNC: 1159 NG/ML DDU — HIGH
EGFR: 127 ML/MIN/1.73M2 — SIGNIFICANT CHANGE UP
EOSINOPHIL # BLD AUTO: 0.04 K/UL — SIGNIFICANT CHANGE UP (ref 0–0.5)
EOSINOPHIL NFR BLD AUTO: 0.7 % — SIGNIFICANT CHANGE UP (ref 0–6)
GAS PNL BLDV: 136 MMOL/L — SIGNIFICANT CHANGE UP (ref 136–145)
GAS PNL BLDV: SIGNIFICANT CHANGE UP
GAS PNL BLDV: SIGNIFICANT CHANGE UP
GLUCOSE BLDV-MCNC: 90 MG/DL — SIGNIFICANT CHANGE UP (ref 70–99)
GLUCOSE SERPL-MCNC: 96 MG/DL — SIGNIFICANT CHANGE UP (ref 70–99)
HCO3 BLDV-SCNC: 24 MMOL/L — SIGNIFICANT CHANGE UP (ref 22–29)
HCT VFR BLD CALC: 26.6 % — LOW (ref 39–50)
HCT VFR BLDA CALC: 24 % — LOW (ref 39–51)
HGB BLD CALC-MCNC: 8.1 G/DL — LOW (ref 12.6–17.4)
HGB BLD-MCNC: 9 G/DL — LOW (ref 13–17)
IMM GRANULOCYTES NFR BLD AUTO: 0.5 % — SIGNIFICANT CHANGE UP (ref 0–0.9)
INR BLD: 1.82 RATIO — HIGH (ref 0.88–1.16)
LACTATE BLDV-MCNC: 1.7 MMOL/L — SIGNIFICANT CHANGE UP (ref 0.5–2)
LDH SERPL L TO P-CCNC: 111 U/L — SIGNIFICANT CHANGE UP (ref 50–242)
LYMPHOCYTES # BLD AUTO: 0.55 K/UL — LOW (ref 1–3.3)
LYMPHOCYTES # BLD AUTO: 9.1 % — LOW (ref 13–44)
MAGNESIUM SERPL-MCNC: 1.9 MG/DL — SIGNIFICANT CHANGE UP (ref 1.6–2.6)
MCHC RBC-ENTMCNC: 33.2 PG — SIGNIFICANT CHANGE UP (ref 27–34)
MCHC RBC-ENTMCNC: 33.8 GM/DL — SIGNIFICANT CHANGE UP (ref 32–36)
MCV RBC AUTO: 98.2 FL — SIGNIFICANT CHANGE UP (ref 80–100)
MONOCYTES # BLD AUTO: 0.5 K/UL — SIGNIFICANT CHANGE UP (ref 0–0.9)
MONOCYTES NFR BLD AUTO: 8.3 % — SIGNIFICANT CHANGE UP (ref 2–14)
NEUTROPHILS # BLD AUTO: 4.89 K/UL — SIGNIFICANT CHANGE UP (ref 1.8–7.4)
NEUTROPHILS NFR BLD AUTO: 81.1 % — HIGH (ref 43–77)
NRBC # BLD: 0 /100 WBCS — SIGNIFICANT CHANGE UP (ref 0–0)
PCO2 BLDV: 39 MMHG — LOW (ref 42–55)
PH BLDV: 7.39 — SIGNIFICANT CHANGE UP (ref 7.32–7.43)
PHOSPHATE SERPL-MCNC: 1.5 MG/DL — LOW (ref 2.5–4.5)
PLATELET # BLD AUTO: 256 K/UL — SIGNIFICANT CHANGE UP (ref 150–400)
PO2 BLDV: 41 MMHG — SIGNIFICANT CHANGE UP (ref 25–45)
POTASSIUM BLDV-SCNC: 3.5 MMOL/L — SIGNIFICANT CHANGE UP (ref 3.5–5.1)
POTASSIUM SERPL-MCNC: 3.8 MMOL/L — SIGNIFICANT CHANGE UP (ref 3.5–5.3)
POTASSIUM SERPL-SCNC: 3.8 MMOL/L — SIGNIFICANT CHANGE UP (ref 3.5–5.3)
PROT SERPL-MCNC: 7.9 G/DL — SIGNIFICANT CHANGE UP (ref 6–8.3)
PROTHROM AB SERPL-ACNC: 21.1 SEC — HIGH (ref 10.5–13.4)
RBC # BLD: 2.71 M/UL — LOW (ref 4.2–5.8)
RBC # FLD: 13.4 % — SIGNIFICANT CHANGE UP (ref 10.3–14.5)
SAO2 % BLDV: 81.8 % — SIGNIFICANT CHANGE UP (ref 67–88)
SARS-COV-2 RNA SPEC QL NAA+PROBE: DETECTED
SODIUM SERPL-SCNC: 139 MMOL/L — SIGNIFICANT CHANGE UP (ref 135–145)
WBC # BLD: 6.03 K/UL — SIGNIFICANT CHANGE UP (ref 3.8–10.5)
WBC # FLD AUTO: 6.03 K/UL — SIGNIFICANT CHANGE UP (ref 3.8–10.5)

## 2022-09-24 PROCEDURE — 71275 CT ANGIOGRAPHY CHEST: CPT | Mod: 26

## 2022-09-24 PROCEDURE — 93010 ELECTROCARDIOGRAM REPORT: CPT

## 2022-09-24 PROCEDURE — 99285 EMERGENCY DEPT VISIT HI MDM: CPT | Mod: 25

## 2022-09-24 PROCEDURE — 99223 1ST HOSP IP/OBS HIGH 75: CPT

## 2022-09-24 PROCEDURE — 71045 X-RAY EXAM CHEST 1 VIEW: CPT | Mod: 26

## 2022-09-24 RX ORDER — ALBUTEROL 90 UG/1
2 AEROSOL, METERED ORAL EVERY 4 HOURS
Refills: 0 | Status: DISCONTINUED | OUTPATIENT
Start: 2022-09-24 | End: 2022-09-30

## 2022-09-24 RX ORDER — FERROUS SULFATE 325(65) MG
1 TABLET ORAL
Qty: 0 | Refills: 0 | DISCHARGE

## 2022-09-24 RX ORDER — REMDESIVIR 5 MG/ML
100 INJECTION INTRAVENOUS EVERY 24 HOURS
Refills: 0 | Status: DISCONTINUED | OUTPATIENT
Start: 2022-09-26 | End: 2022-09-26

## 2022-09-24 RX ORDER — ACETAMINOPHEN 500 MG
650 TABLET ORAL ONCE
Refills: 0 | Status: COMPLETED | OUTPATIENT
Start: 2022-09-24 | End: 2022-09-24

## 2022-09-24 RX ORDER — SODIUM,POTASSIUM PHOSPHATES 278-250MG
1 POWDER IN PACKET (EA) ORAL ONCE
Refills: 0 | Status: COMPLETED | OUTPATIENT
Start: 2022-09-24 | End: 2022-09-24

## 2022-09-24 RX ORDER — ACETAMINOPHEN 500 MG
650 TABLET ORAL EVERY 4 HOURS
Refills: 0 | Status: DISCONTINUED | OUTPATIENT
Start: 2022-09-24 | End: 2022-09-25

## 2022-09-24 RX ORDER — CHOLECALCIFEROL (VITAMIN D3) 125 MCG
1 CAPSULE ORAL
Qty: 0 | Refills: 0 | DISCHARGE

## 2022-09-24 RX ORDER — POTASSIUM PHOSPHATE, MONOBASIC POTASSIUM PHOSPHATE, DIBASIC 236; 224 MG/ML; MG/ML
15 INJECTION, SOLUTION INTRAVENOUS ONCE
Refills: 0 | Status: DISCONTINUED | OUTPATIENT
Start: 2022-09-24 | End: 2022-09-25

## 2022-09-24 RX ORDER — SODIUM CHLORIDE 9 MG/ML
1000 INJECTION INTRAMUSCULAR; INTRAVENOUS; SUBCUTANEOUS
Refills: 0 | Status: DISCONTINUED | OUTPATIENT
Start: 2022-09-24 | End: 2022-09-25

## 2022-09-24 RX ORDER — REMDESIVIR 5 MG/ML
200 INJECTION INTRAVENOUS EVERY 24 HOURS
Refills: 0 | Status: COMPLETED | OUTPATIENT
Start: 2022-09-25 | End: 2022-09-25

## 2022-09-24 RX ORDER — APIXABAN 2.5 MG/1
5 TABLET, FILM COATED ORAL
Refills: 0 | Status: DISCONTINUED | OUTPATIENT
Start: 2022-09-24 | End: 2022-09-28

## 2022-09-24 RX ORDER — ACETAMINOPHEN 500 MG
650 TABLET ORAL EVERY 6 HOURS
Refills: 0 | Status: DISCONTINUED | OUTPATIENT
Start: 2022-09-24 | End: 2022-09-25

## 2022-09-24 RX ORDER — ONDANSETRON 8 MG/1
4 TABLET, FILM COATED ORAL EVERY 8 HOURS
Refills: 0 | Status: DISCONTINUED | OUTPATIENT
Start: 2022-09-24 | End: 2022-09-30

## 2022-09-24 RX ORDER — SODIUM CHLORIDE 9 MG/ML
1000 INJECTION INTRAMUSCULAR; INTRAVENOUS; SUBCUTANEOUS ONCE
Refills: 0 | Status: COMPLETED | OUTPATIENT
Start: 2022-09-24 | End: 2022-09-24

## 2022-09-24 RX ORDER — REMDESIVIR 5 MG/ML
INJECTION INTRAVENOUS
Refills: 0 | Status: DISCONTINUED | OUTPATIENT
Start: 2022-09-25 | End: 2022-09-26

## 2022-09-24 RX ORDER — LANOLIN ALCOHOL/MO/W.PET/CERES
3 CREAM (GRAM) TOPICAL AT BEDTIME
Refills: 0 | Status: DISCONTINUED | OUTPATIENT
Start: 2022-09-24 | End: 2022-09-30

## 2022-09-24 RX ORDER — GUAIFENESIN/DEXTROMETHORPHAN 600MG-30MG
10 TABLET, EXTENDED RELEASE 12 HR ORAL EVERY 4 HOURS
Refills: 0 | Status: DISCONTINUED | OUTPATIENT
Start: 2022-09-24 | End: 2022-09-25

## 2022-09-24 RX ADMIN — Medication 1 PACKET(S): at 16:41

## 2022-09-24 RX ADMIN — Medication 650 MILLIGRAM(S): at 15:46

## 2022-09-24 RX ADMIN — SODIUM CHLORIDE 1000 MILLILITER(S): 9 INJECTION INTRAMUSCULAR; INTRAVENOUS; SUBCUTANEOUS at 15:47

## 2022-09-24 NOTE — ED PROVIDER NOTE - PROGRESS NOTE DETAILS
Attending MD Pérez: Call back from Dr. Parsons, discussed with NP, no space on [address], requesting admission to medicine and hepatology will follow.  Hospitalist paged. Attending MD Pérez: Spoke with Dr. Lima of Hepatology, patient to be admitted under Dr. Tipton.  Reports she will get in touch with the NP to discuss case.  Can put the admission in under Dr. Tipton. Attending MD Pérez: Spoke with hospitalist, discussed CTA Chest, will obtain.  Current .  Will admit to hospitalist, will obtain CTA prior to RTM.

## 2022-09-24 NOTE — H&P ADULT - ASSESSMENT
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

## 2022-09-24 NOTE — ED ADULT NURSE NOTE - NSICDXPASTSURGICALHX_GEN_ALL_CORE_FT
PAST SURGICAL HISTORY:  History of ERCP multiple with stent insertions/replacement every 3 months ---last 5/20/2022    [place] teeth extracted 2021

## 2022-09-24 NOTE — ED PROVIDER NOTE - SHIFT CHANGE DETAILS
Attending MD Pérez: 32M w PSG on transplant list for liver, liver failure, COVID x 10d, IVFs, po challenge, on Eliquis, pending hepatology, labs Attending MD Pérez: 32M w PSC, CP on transplant list for liver, hepatic failure, COVID x 10d, IVFs, po challenge, on Eliquis, pending hepatology, labs.  Discussed Dimer, report obtained in setting of COVID as disease marker, no hypoxia

## 2022-09-24 NOTE — H&P ADULT - NSHPPHYSICALEXAM_GEN_ALL_CORE
Vital Signs Last 24 Hrs  T(C): 36.9 (24 Sep 2022 19:09), Max: 38.7 (24 Sep 2022 15:22)  T(F): 98.4 (24 Sep 2022 19:09), Max: 101.7 (24 Sep 2022 15:22)  HR: 108 (24 Sep 2022 19:09) (98 - 136)  BP: 104/66 (24 Sep 2022 19:09) (104/66 - 130/61)  BP(mean): 75 (24 Sep 2022 19:09) (75 - 75)  RR: 18 (24 Sep 2022 19:09) (18 - 20)  SpO2: 100% (24 Sep 2022 19:09) (99% - 100%)    Parameters below as of 24 Sep 2022 19:09  Patient On (Oxygen Delivery Method): room air

## 2022-09-24 NOTE — ED PROVIDER NOTE - CLINICAL SUMMARY MEDICAL DECISION MAKING FREE TEXT BOX
pt with CP PSC not on immune suppressants presents with persistent fevers iso recent covid infection despite mab infusion. no hypoxia on RA. gait assessment limited by functional status. not in extremis. no other localizing infectious symptoms reported. despite tachycardia, will hold on CTA/dimer as pt actively anticoagulated with no other symptoms consistent with PE. fluids pain control, expanded electrolytes iso decreased po intake. replete as needed. likely TBA

## 2022-09-24 NOTE — H&P ADULT - HISTORY OF PRESENT ILLNESS
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 Pt had tested positive for COVID infection 10-days ago for which he received monoclonal antibodies. He has been having persistent fevers, dry cough and dyspnea on exertion. He also had one episode of diarrhea a few days ago. Denied CP, palpitations, abdominal pain, leg swelling

## 2022-09-24 NOTE — H&P ADULT - NSHPLABSRESULTS_GEN_ALL_CORE
9.0    6.03  )-----------( 256      ( 24 Sep 2022 15:48 )             26.6       09-24    139  |  105  |  12  ----------------------------<  96  3.8   |  25  |  0.71    Ca    8.4      24 Sep 2022 15:48  Phos  1.5     09-24  Mg     1.9     09-24    TPro  7.9  /  Alb  2.6<L>  /  TBili  2.4<H>  /  DBili  1.6<H>  /  AST  42<H>  /  ALT  19  /  AlkPhos  406<H>  09-24            LIVER FUNCTIONS - ( 24 Sep 2022 15:48 )  Alb: 2.6 g/dL / Pro: 7.9 g/dL / ALK PHOS: 406 U/L / ALT: 19 U/L / AST: 42 U/L / GGT: x             PT/INR - ( 24 Sep 2022 15:48 )   PT: 21.1 sec;   INR: 1.82 ratio         PTT - ( 24 Sep 2022 15:48 )  PTT:35.1 sec      I have personally reviewed imaging   I have personally reviewed EKG

## 2022-09-24 NOTE — ED PROVIDER NOTE - NS ED ROS FT
Constitutional: +fevers, chills  HEENT: no HA, vision changes, rhinorrhea, sore throat  Cardiac: no chest pain, palpitations  Respiratory: no SOB, cough or hemoptysis  GI: +nausea no v/d/c, abd pain, bloody or dark stools  : no dysuria, frequency, or hematuria  MSK: no joint pain, neck pain or back pain  Skin: no rashes, jaundice, pruritis  Neuro: no changes to baseline mobility/rigidity.   ROS otherwise neg except as per hpi

## 2022-09-24 NOTE — H&P ADULT - PROBLEM SELECTOR PLAN 1
-pt with no hypoxia so no need for steroids at this time  -s/p monoclonal antibodies  -will start 5-day course of remdesivir  -tylenol prn for fever  -Antitussives prn for cough

## 2022-09-24 NOTE — ED PROVIDER NOTE - OBJECTIVE STATEMENT
hx CP, primary sclerosing cholangitis on liver transplant list presents with persistent fevers, generalized fatigue and decreased PO intake x3d in the setting of known covid infection 10d ago s/p mab infusion. pt presents tachycardic without pleuritic pain, hormonal medication use, recent surgery or hemoptysis. on eliquis to prevent clotting pre-transplant. not on any immunosuppressants/steroids. +nausea. no vomiting or diarrhea. no cough or sputum production.

## 2022-09-24 NOTE — ED PROVIDER NOTE - NSICDXPASTSURGICALHX_GEN_ALL_CORE_FT
PAST SURGICAL HISTORY:  History of ERCP multiple with stent insertions/replacement every 3 months ---last 5/20/2022    [place] teeth extracted 2021

## 2022-09-24 NOTE — H&P ADULT - NSHPREVIEWOFSYSTEMS_GEN_ALL_CORE
GEN: no night sweats +change in appetite  EYES: no changes in vision or diplopia   ENT: no epistaxis, sinus pain, gingival bleeding, odynophagia or dysphagia  CV: no CP, PND or palpitations  RESP: +cough, no wheezing, or hemoptysis  GI: no hematemesis, hematochezia, or melena  : no dysuria, polyuria, or hematuria  MSK: no arthralgias or joint swelling   NEURO: no gross sensory changes, numbness, focal deficits  PSYCH: no depression or changes in concentration  HEME/ONC: no purpura, petechiae or night sweats  SKIN: no pruritus, hair loss or skin lesions

## 2022-09-24 NOTE — ED PROVIDER NOTE - NS ED ATTENDING STATEMENT MOD
I have seen and examined this patient and fully participated in the care of this patient as the teaching attending.  The service was shared with the YEMI.  I reviewed and verified the documentation and independently performed the documented:

## 2022-09-24 NOTE — ED PROVIDER NOTE - PHYSICAL EXAMINATION
General: non-toxic, NAD  HEENT: NCAT, PERRL no icterus.   Cardiac: tachycardic no murmurs, 2+ peripheral pulses  Resp: CTAB  Abdomen: soft, non-distended, bowel sounds present, no ttp, no rebound or guarding. no organomegaly  Extremities: no peripheral edema, calf tenderness, or leg size discrepancies  Skin: no rashes  Neuro: AAOx4, spasm of extremities x4. sensation grossly intact  Psych: flat affect

## 2022-09-24 NOTE — ED ADULT NURSE NOTE - OBJECTIVE STATEMENT
Pt complaining of covid-19 infection.  Pt reports symptoms and positive test 10 days ago, received monoclonals 1 day after symptoms onset.  Fevers, chills, nausea, decreased PO intake has not improved.  Pt hx cerebral palsy, liver failure waiting for transplant.  Pt conversive, abdomen soft/non-distended/non-tender, denies chest pain, shortness of breath, cough, abdominal pain, vomiting, diarrhea. Nothing for fever yet today.

## 2022-09-24 NOTE — H&P ADULT - NSICDXPASTSURGICALHX_GEN_ALL_CORE_FT
PAST SURGICAL HISTORY:  History of ERCP multiple with stent insertions/replacement every 3 months ---last 5/20/2022    [place] teeth extracted 2021

## 2022-09-24 NOTE — ED ADULT NURSE REASSESSMENT NOTE - NS ED NURSE REASSESS COMMENT FT1
attempt one to give report: placed on hold for over 5 minutes   attempt two to give report: [facility] RN states he is unable to take report at this time because he is dealing with an issue, advised pt in for transport at this time

## 2022-09-24 NOTE — ED PROVIDER NOTE - ATTENDING CONTRIBUTION TO CARE
Attending MD Rai.  Agree with HPI by resident physician.  Pt txed to Ed because of tachycardia and pleuritic CP with concern for poss PE vs. inf.

## 2022-09-25 LAB
ALBUMIN SERPL ELPH-MCNC: 2.5 G/DL — LOW (ref 3.3–5)
ALP SERPL-CCNC: 368 U/L — HIGH (ref 40–120)
ALT FLD-CCNC: 15 U/L — SIGNIFICANT CHANGE UP (ref 10–45)
ANION GAP SERPL CALC-SCNC: 10 MMOL/L — SIGNIFICANT CHANGE UP (ref 5–17)
APTT BLD: 35 SEC — SIGNIFICANT CHANGE UP (ref 27.5–35.5)
AST SERPL-CCNC: 36 U/L — SIGNIFICANT CHANGE UP (ref 10–40)
BILIRUB SERPL-MCNC: 2.2 MG/DL — HIGH (ref 0.2–1.2)
BUN SERPL-MCNC: 11 MG/DL — SIGNIFICANT CHANGE UP (ref 7–23)
CALCIUM SERPL-MCNC: 8.1 MG/DL — LOW (ref 8.4–10.5)
CHLORIDE SERPL-SCNC: 105 MMOL/L — SIGNIFICANT CHANGE UP (ref 96–108)
CO2 SERPL-SCNC: 22 MMOL/L — SIGNIFICANT CHANGE UP (ref 22–31)
CREAT SERPL-MCNC: 0.69 MG/DL — SIGNIFICANT CHANGE UP (ref 0.5–1.3)
CRP SERPL-MCNC: 103 MG/L — HIGH (ref 0–4)
EGFR: 128 ML/MIN/1.73M2 — SIGNIFICANT CHANGE UP
FERRITIN SERPL-MCNC: 644 NG/ML — HIGH (ref 30–400)
FERRITIN SERPL-MCNC: 657 NG/ML — HIGH (ref 30–400)
GLUCOSE SERPL-MCNC: 89 MG/DL — SIGNIFICANT CHANGE UP (ref 70–99)
HCT VFR BLD CALC: 26.9 % — LOW (ref 39–50)
HGB BLD-MCNC: 8.7 G/DL — LOW (ref 13–17)
INR BLD: 1.69 RATIO — HIGH (ref 0.88–1.16)
LDH SERPL L TO P-CCNC: 116 U/L — SIGNIFICANT CHANGE UP (ref 50–242)
MCHC RBC-ENTMCNC: 32.3 GM/DL — SIGNIFICANT CHANGE UP (ref 32–36)
MCHC RBC-ENTMCNC: 32.8 PG — SIGNIFICANT CHANGE UP (ref 27–34)
MCV RBC AUTO: 101.5 FL — HIGH (ref 80–100)
NRBC # BLD: 0 /100 WBCS — SIGNIFICANT CHANGE UP (ref 0–0)
PLATELET # BLD AUTO: 277 K/UL — SIGNIFICANT CHANGE UP (ref 150–400)
POTASSIUM SERPL-MCNC: 3.8 MMOL/L — SIGNIFICANT CHANGE UP (ref 3.5–5.3)
POTASSIUM SERPL-SCNC: 3.8 MMOL/L — SIGNIFICANT CHANGE UP (ref 3.5–5.3)
PROCALCITONIN SERPL-MCNC: 0.38 NG/ML — HIGH (ref 0.02–0.1)
PROT SERPL-MCNC: 7.1 G/DL — SIGNIFICANT CHANGE UP (ref 6–8.3)
PROTHROM AB SERPL-ACNC: 19.7 SEC — HIGH (ref 10.5–13.4)
RBC # BLD: 2.65 M/UL — LOW (ref 4.2–5.8)
RBC # FLD: 13.7 % — SIGNIFICANT CHANGE UP (ref 10.3–14.5)
SODIUM SERPL-SCNC: 137 MMOL/L — SIGNIFICANT CHANGE UP (ref 135–145)
WBC # BLD: 7.1 K/UL — SIGNIFICANT CHANGE UP (ref 3.8–10.5)
WBC # FLD AUTO: 7.1 K/UL — SIGNIFICANT CHANGE UP (ref 3.8–10.5)

## 2022-09-25 PROCEDURE — 99223 1ST HOSP IP/OBS HIGH 75: CPT

## 2022-09-25 PROCEDURE — 99233 SBSQ HOSP IP/OBS HIGH 50: CPT

## 2022-09-25 RX ORDER — ACETAMINOPHEN 500 MG
650 TABLET ORAL EVERY 8 HOURS
Refills: 0 | Status: DISCONTINUED | OUTPATIENT
Start: 2022-09-25 | End: 2022-09-30

## 2022-09-25 RX ORDER — INFLUENZA VIRUS VACCINE 15; 15; 15; 15 UG/.5ML; UG/.5ML; UG/.5ML; UG/.5ML
0.5 SUSPENSION INTRAMUSCULAR ONCE
Refills: 0 | Status: DISCONTINUED | OUTPATIENT
Start: 2022-09-25 | End: 2022-09-30

## 2022-09-25 RX ORDER — POTASSIUM PHOSPHATE, MONOBASIC POTASSIUM PHOSPHATE, DIBASIC 236; 224 MG/ML; MG/ML
15 INJECTION, SOLUTION INTRAVENOUS ONCE
Refills: 0 | Status: COMPLETED | OUTPATIENT
Start: 2022-09-25 | End: 2022-09-25

## 2022-09-25 RX ORDER — GUAIFENESIN/DEXTROMETHORPHAN 600MG-30MG
10 TABLET, EXTENDED RELEASE 12 HR ORAL EVERY 4 HOURS
Refills: 0 | Status: COMPLETED | OUTPATIENT
Start: 2022-09-25 | End: 2022-09-27

## 2022-09-25 RX ORDER — ACETAMINOPHEN 500 MG
325 TABLET ORAL ONCE
Refills: 0 | Status: COMPLETED | OUTPATIENT
Start: 2022-09-25 | End: 2022-09-25

## 2022-09-25 RX ADMIN — Medication 10 MILLILITER(S): at 02:32

## 2022-09-25 RX ADMIN — APIXABAN 5 MILLIGRAM(S): 2.5 TABLET, FILM COATED ORAL at 02:33

## 2022-09-25 RX ADMIN — Medication 325 MILLIGRAM(S): at 04:24

## 2022-09-25 RX ADMIN — Medication 325 MILLIGRAM(S): at 03:24

## 2022-09-25 RX ADMIN — Medication 650 MILLIGRAM(S): at 01:45

## 2022-09-25 RX ADMIN — POTASSIUM PHOSPHATE, MONOBASIC POTASSIUM PHOSPHATE, DIBASIC 62.5 MILLIMOLE(S): 236; 224 INJECTION, SOLUTION INTRAVENOUS at 13:50

## 2022-09-25 RX ADMIN — Medication 10 MILLILITER(S): at 09:42

## 2022-09-25 RX ADMIN — APIXABAN 5 MILLIGRAM(S): 2.5 TABLET, FILM COATED ORAL at 17:27

## 2022-09-25 RX ADMIN — Medication 10 MILLILITER(S): at 17:27

## 2022-09-25 RX ADMIN — REMDESIVIR 500 MILLIGRAM(S): 5 INJECTION INTRAVENOUS at 11:01

## 2022-09-25 RX ADMIN — Medication 650 MILLIGRAM(S): at 21:28

## 2022-09-25 RX ADMIN — Medication 650 MILLIGRAM(S): at 22:28

## 2022-09-25 RX ADMIN — Medication 10 MILLILITER(S): at 21:28

## 2022-09-25 RX ADMIN — Medication 10 MILLILITER(S): at 13:51

## 2022-09-25 RX ADMIN — Medication 650 MILLIGRAM(S): at 02:45

## 2022-09-25 NOTE — CONSULT NOTE ADULT - ASSESSMENT
All recommendations are tentative until note is attested by attending.     Luz Parsons, PGY-4   Gastroenterology/Hepatology Fellow  Available on [employer] Teams  [phone] ([facility] Short Range Pager)  [phone] ([facility] Long Range Pager)    After 5pm, please contact the on-call GI fellow. 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 Pt had tested positive for COVID infection 10-days ago for which he received monoclonal antibodies. He has been having persistent fevers, dry cough and dyspnea on exertion.     #elevated alk phosp   Patient with alk phosp 400 (baseline 200-300)  Last stent exchange on 9/6.    #PSC on liver transplant list   MELD 16 (9/25).      #COVID-19 infection:   Patient presenting for persistent coughing and fevers, currently improved.     Recommendations:   - continue supportive management of COVID-19   - daily MELD scores (trend CMP, INR daily)   - rest of care per primary team     All recommendations are tentative until note is attested by attending.     Luz Parsons, PGY-4   Gastroenterology/Hepatology Fellow  Available on Microsoft Teams  [phone] ([facility] Short Range Pager)  [phone] ([facility] Long Range Pager)    After 5pm, please contact the on-call GI fellow. [phone]

## 2022-09-25 NOTE — CONSULT NOTE ADULT - SUBJECTIVE AND OBJECTIVE BOX
HPI:    Allergies:  No Known Allergies      Home Medications:    Hospital Medications:  acetaminophen     Tablet .. 650 milliGRAM(s) Oral every 6 hours PRN  acetaminophen     Tablet .. 650 milliGRAM(s) Oral every 4 hours PRN  acetaminophen  Suppository .. 650 milliGRAM(s) Rectal every 4 hours PRN  ALBUTerol    90 MICROgram(s) HFA Inhaler 2 Puff(s) Inhalation every 4 hours PRN  aluminum hydroxide/magnesium hydroxide/simethicone Suspension 30 milliLiter(s) Oral every 4 hours PRN  apixaban 5 milliGRAM(s) Oral two times a day  benzonatate 100 milliGRAM(s) Oral three times a day PRN  guaifenesin/dextromethorphan Oral Liquid 10 milliLiter(s) Oral every 4 hours PRN  influenza   Vaccine 0.5 milliLiter(s) IntraMuscular once  melatonin 3 milliGRAM(s) Oral at bedtime PRN  ondansetron Injectable 4 milliGRAM(s) IV Push every 8 hours PRN  potassium phosphate IVPB 15 milliMole(s) IV Intermittent once  remdesivir  IVPB   IV Intermittent   remdesivir  IVPB 200 milliGRAM(s) IV Intermittent every 24 hours  sodium chloride 0.9%. 1000 milliLiter(s) IV Continuous <Continuous>      PMHX/PSHX:  Cerebral palsy    Cerebral palsy    PSC (primary sclerosing cholangitis)    Abnormal LFTs    Bile duct stricture    Dental caries    Impacted teeth    Phimosis    History of seizures    Anemia    No significant past surgical history    No significant past surgical history    History of ERCP    History of biliary stent insertion    History of biliary stent insertion    [place] teeth extracted        Family history:  No pertinent family history in first degree relatives        Denies family history of colon cancer/polyps, stomach cancer/polyps, pancreatic cancer/masses, liver cancer/disease, ovarian cancer and endometrial cancer.    Social History:   Tob: Denies  EtOH: Denies  Illicit Drugs: Denies    ROS:     General:  No wt loss, fevers, chills, night sweats, fatigue  Eyes:  Good vision, no reported pain  ENT:  No sore throat, pain, runny nose, dysphagia  CV:  No pain, palpitations, hypo/hypertension  Pulm:  No dyspnea, cough, tachypnea, wheezing  GI:  see HPI  :  No pain, bleeding, incontinence, nocturia  Muscle:  No pain, weakness  Neuro:  No weakness, tingling, memory problems  Psych:  No fatigue, insomnia, mood problems, depression  Endocrine:  No polyuria, polydipsia, cold/heat intolerance  Heme:  No petechiae, ecchymosis, easy bruisability  Skin:  No rash, tattoos, scars, edema    PHYSICAL EXAM:     GENERAL:  No acute distress  HEENT:  NCAT, no scleral icterus   CHEST:  no respiratory distress  HEART:  Regular rate and rhythm  ABDOMEN:  Soft, non-tender, non-distended, normoactive bowel sounds,  no masses  EXTREMITIES: No edema  SKIN:  No rash/erythema/ecchymoses/petechiae/wounds/abscess/warm/dry  NEURO:  Alert and oriented x 3, no asterixis    Vital Signs:  Vital Signs Last 24 Hrs  T(C): 36.9 (25 Sep 2022 05:13), Max: 39.4 (25 Sep 2022 01:20)  T(F): 98.5 (25 Sep 2022 05:13), Max: 103 (25 Sep 2022 01:20)  HR: 102 (25 Sep 2022 05:13) (98 - 136)  BP: 109/64 (25 Sep 2022 05:13) (104/66 - 130/61)  BP(mean): 75 (24 Sep 2022 19:09) (75 - 75)  RR: 18 (25 Sep 2022 05:13) (18 - 20)  SpO2: 100% (25 Sep 2022 05:13) (99% - 100%)    Parameters below as of 25 Sep 2022 05:13  Patient On (Oxygen Delivery Method): room air      Daily Height in cm: 162.56 (24 Sep 2022 14:36)    Daily     LABS:                        8.7    7.10  )-----------( 277      ( 25 Sep 2022 07:07 )             26.9     Mean Cell Volume: 101.5 fl (09-25-22 @ 07:07)    09-24    139  |  105  |  12  ----------------------------<  96  3.8   |  25  |  0.71    Ca    8.4      24 Sep 2022 15:48  Phos  1.5     09-24  Mg     1.9     09-24    TPro  7.9  /  Alb  2.6<L>  /  TBili  2.4<H>  /  DBili  1.6<H>  /  AST  42<H>  /  ALT  19  /  AlkPhos  406<H>  09-24    LIVER FUNCTIONS - ( 24 Sep 2022 15:48 )  Alb: 2.6 g/dL / Pro: 7.9 g/dL / ALK PHOS: 406 U/L / ALT: 19 U/L / AST: 42 U/L / GGT: x           PT/INR - ( 25 Sep 2022 07:07 )   PT: 19.7 sec;   INR: 1.69 ratio         PTT - ( 25 Sep 2022 07:07 )  PTT:35.0 sec                            8.7    7.10  )-----------( 277      ( 25 Sep 2022 07:07 )             26.9                         9.0    6.03  )-----------( 256      ( 24 Sep 2022 15:48 )             26.6       Imaging:             HPI: 32M PMH Cerebral palsy, 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 Pt had tested positive for COVID infection 10-days ago for which he received monoclonal antibodies. He has been having persistent fevers, dry cough and dyspnea on exertion.     Patient with alk phosp 400 (baseline 200-300), MELD 16 (9/25). Last stent exchange on 9/6.     Allergies:  No Known Allergies      Home Medications:    Hospital Medications:  acetaminophen     Tablet .. 650 milliGRAM(s) Oral every 6 hours PRN  acetaminophen     Tablet .. 650 milliGRAM(s) Oral every 4 hours PRN  acetaminophen  Suppository .. 650 milliGRAM(s) Rectal every 4 hours PRN  ALBUTerol    90 MICROgram(s) HFA Inhaler 2 Puff(s) Inhalation every 4 hours PRN  aluminum hydroxide/magnesium hydroxide/simethicone Suspension 30 milliLiter(s) Oral every 4 hours PRN  apixaban 5 milliGRAM(s) Oral two times a day  benzonatate 100 milliGRAM(s) Oral three times a day PRN  guaifenesin/dextromethorphan Oral Liquid 10 milliLiter(s) Oral every 4 hours PRN  influenza   Vaccine 0.5 milliLiter(s) IntraMuscular once  melatonin 3 milliGRAM(s) Oral at bedtime PRN  ondansetron Injectable 4 milliGRAM(s) IV Push every 8 hours PRN  potassium phosphate IVPB 15 milliMole(s) IV Intermittent once  remdesivir  IVPB   IV Intermittent   remdesivir  IVPB 200 milliGRAM(s) IV Intermittent every 24 hours  sodium chloride 0.9%. 1000 milliLiter(s) IV Continuous <Continuous>      PMHX/PSHX:  Cerebral palsy    Cerebral palsy    PSC (primary sclerosing cholangitis)    Abnormal LFTs    Bile duct stricture    Dental caries    Impacted teeth    Phimosis    History of seizures    Anemia    No significant past surgical history    No significant past surgical history    History of ERCP    History of biliary stent insertion    History of biliary stent insertion    [place] teeth extracted        Family history:  No pertinent family history in first degree relatives        Denies family history of colon cancer/polyps, stomach cancer/polyps, pancreatic cancer/masses, liver cancer/disease, ovarian cancer and endometrial cancer.    Social History:   Tob: Denies  EtOH: Denies  Illicit Drugs: Denies    ROS:     General:  No wt loss, fevers, chills, night sweats, fatigue  Eyes:  Good vision, no reported pain  ENT:  No sore throat, pain, runny nose, dysphagia  CV:  No pain, palpitations, hypo/hypertension  Pulm:  No dyspnea, cough, tachypnea, wheezing  GI:  see HPI  :  No pain, bleeding, incontinence, nocturia  Muscle:  No pain, weakness  Neuro:  No weakness, tingling, memory problems  Psych:  No fatigue, insomnia, mood problems, depression  Endocrine:  No polyuria, polydipsia, cold/heat intolerance  Heme:  No petechiae, ecchymosis, easy bruisability  Skin:  No rash, tattoos, scars, edema    PHYSICAL EXAM:     GENERAL:  No acute distress, patient appears comfortable   HEENT:  NCAT, no scleral icterus   CHEST:  no respiratory distress  HEART:  Regular rate and rhythm  ABDOMEN:  Soft, non-tender, non-distended, no masses  EXTREMITIES: No edema  SKIN:  No rash/erythema/ecchymoses/petechiae/wounds/abscess/warm/dry  NEURO:  Alert and oriented, no asterixis    Vital Signs:  Vital Signs Last 24 Hrs  T(C): 36.9 (25 Sep 2022 05:13), Max: 39.4 (25 Sep 2022 01:20)  T(F): 98.5 (25 Sep 2022 05:13), Max: 103 (25 Sep 2022 01:20)  HR: 102 (25 Sep 2022 05:13) (98 - 136)  BP: 109/64 (25 Sep 2022 05:13) (104/66 - 130/61)  BP(mean): 75 (24 Sep 2022 19:09) (75 - 75)  RR: 18 (25 Sep 2022 05:13) (18 - 20)  SpO2: 100% (25 Sep 2022 05:13) (99% - 100%)    Parameters below as of 25 Sep 2022 05:13  Patient On (Oxygen Delivery Method): room air      Daily Height in cm: 162.56 (24 Sep 2022 14:36)    Daily     LABS:                        8.7    7.10  )-----------( 277      ( 25 Sep 2022 07:07 )             26.9     Mean Cell Volume: 101.5 fl (09-25-22 @ 07:07)    09-24    139  |  105  |  12  ----------------------------<  96  3.8   |  25  |  0.71    Ca    8.4      24 Sep 2022 15:48  Phos  1.5     09-24  Mg     1.9     09-24    TPro  7.9  /  Alb  2.6<L>  /  TBili  2.4<H>  /  DBili  1.6<H>  /  AST  42<H>  /  ALT  19  /  AlkPhos  406<H>  09-24    LIVER FUNCTIONS - ( 24 Sep 2022 15:48 )  Alb: 2.6 g/dL / Pro: 7.9 g/dL / ALK PHOS: 406 U/L / ALT: 19 U/L / AST: 42 U/L / GGT: x           PT/INR - ( 25 Sep 2022 07:07 )   PT: 19.7 sec;   INR: 1.69 ratio         PTT - ( 25 Sep 2022 07:07 )  PTT:35.0 sec                            8.7    7.10  )-----------( 277      ( 25 Sep 2022 07:07 )             26.9                         9.0    6.03  )-----------( 256      ( 24 Sep 2022 15:48 )             26.6       Imaging:

## 2022-09-25 NOTE — CHART NOTE - NSCHARTNOTEFT_GEN_A_CORE
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  [id number] Medicine NP note    CC: Tachycardia, Hr in 140's  Pt asymptomatic, hx of cerebral palsy    Vital Signs Last 24 Hrs  T(C): 37.7 (25 Sep 2022 00:40), Max: 38.7 (24 Sep 2022 15:22)  T(F): 99.8 (25 Sep 2022 00:40), Max: 101.7 (24 Sep 2022 15:22)  HR: 130 (25 Sep 2022 00:40) (98 - 136)  BP: 111/57 (25 Sep 2022 00:40) (104/66 - 130/61)  BP(mean): 75 (24 Sep 2022 19:09) (75 - 75)  RR: 18 (25 Sep 2022 00:40) (18 - 20)  SpO2: 100% (25 Sep 2022 00:40) (99% - 100%)    Parameters below as of 25 Sep 2022 00:40  Patient On (Oxygen Delivery Method): room air    A/P    32M PMH of afib, Cerebral palsy, 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

## 2022-09-25 NOTE — PROGRESS NOTE ADULT - ASSESSMENT
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

## 2022-09-25 NOTE — PROGRESS NOTE ADULT - SUBJECTIVE AND OBJECTIVE BOX
Willie Pendleton M.D.  Division of Hospital Medicine  Available on TEAMS    Patient is a 32y old  Male who presents with a chief complaint of Persistent cough and fever (25 Sep 2022 07:58)    SUBJECTIVE / OVERNIGHT EVENTS: Patient seen and examined with mother at bedside. Tachycardic and then febrile O/N, improved w/ PO Tylenol administration. Patient state he has no active complaints including denial of CP, SOB at rest, palpitations; cough persists but controlled w/ Robitussin administration, which patient requests order be made ATC instead of PRN. Discussed w/ hepatology - ok to continue Remdesivir w/ monitoring and supportive tx for COVID 19.     OBJECTIVE:  Vital Signs Last 24 Hrs  T(F): 98.5 (25 Sep 2022 05:13), Max: 103 (25 Sep 2022 01:20)  HR: 102 (25 Sep 2022 05:13) (98 - 136)  BP: 109/64 (25 Sep 2022 05:13) (104/66 - 130/61)  RR: 18 (25 Sep 2022 05:13) (18 - 20)  SpO2: 100% (25 Sep 2022 05:13) (99% - 100%)    Parameters below as of 25 Sep 2022 05:13  Patient On (Oxygen Delivery Method): room air    I&O's Summary  24 Sep 2022 07:01  -  25 Sep 2022 07:00  --------------------------------------------------------  IN: 100 mL / OUT: 1 mL / NET: 99 mL    Physical Examination:  GEN: young man, sitting up in bed in NAD  PSYCH: A&Ox3, mood and affect appear appropriate   NEURO: no focal neurologic deficits appreciated  NECK: supple, no e/o elevated JVP  RESPI: no accessory muscle use, B/L air entry, CTAB   CARDIO: regular rate/rhythm, no LE edema B/L  ABD: soft, NT, ND  EXT: patient able to move all extremities spontaneously  VASC: peripheral pulses palpated    Labs:                     8.7    7.10  )-----------( 277      ( 25 Sep 2022 07:07 )             26.9     09-25  137  |  105  |  11  ----------------------------<  89  3.8   |  22  |  0.69    Ca    8.1<L>      25 Sep 2022 07:06  Phos  1.5     09-24  Mg     1.9     09-24    TPro  7.1  /  Alb  2.5<L>  /  TBili  2.2<H>  /  DBili  x   /  AST  36  /  ALT  15  /  AlkPhos  368<H>  09-25    PT/INR - ( 25 Sep 2022 07:07 )   PT: 19.7 sec;   INR: 1.69 ratio    PTT - ( 25 Sep 2022 07:07 )  PTT:35.0 sec    Consultant(s) Notes Reviewed: Hepatology  Care Discussed with Consultants/Other Providers: Hepatology fellow, CHOCO Harris    MEDICATIONS  (STANDING):  apixaban 5 milliGRAM(s) Oral two times a day  guaifenesin/dextromethorphan Oral Liquid 10 milliLiter(s) Oral every 4 hours  influenza   Vaccine 0.5 milliLiter(s) IntraMuscular once  potassium phosphate IVPB 15 milliMole(s) IV Intermittent once  remdesivir  IVPB   IV Intermittent     MEDICATIONS  (PRN):  acetaminophen     Tablet .. 650 milliGRAM(s) Oral every 8 hours PRN Temp greater or equal to 38.5C (101.3F)  ALBUTerol    90 MICROgram(s) HFA Inhaler 2 Puff(s) Inhalation every 4 hours PRN Shortness of Breath and/or Wheezing  benzonatate 100 milliGRAM(s) Oral three times a day PRN Cough  melatonin 3 milliGRAM(s) Oral at bedtime PRN Insomnia  ondansetron Injectable 4 milliGRAM(s) IV Push every 8 hours PRN Nausea and/or Vomiting Passed

## 2022-09-25 NOTE — PATIENT PROFILE ADULT - FALL HARM RISK - HARM RISK INTERVENTIONS
Assistance with ambulation/Assistance OOB with selected safe patient handling equipment/Communicate Risk of Fall with Harm to all staff/Discuss with provider need for PT consult/Monitor gait and stability/Reinforce activity limits and safety measures with patient and family/Tailored Fall Risk Interventions/Visual Cue: Yellow wristband and red socks/Bed in lowest position, wheels locked, appropriate side rails in place/Call bell, personal items and telephone in reach/Instruct patient to call for assistance before getting out of bed or chair/Non-slip footwear when patient is out of bed/[place] to call system/Physically safe environment - no spills, clutter or unnecessary equipment/Purposeful Proactive Rounding/Room/bathroom lighting operational, light cord in reach

## 2022-09-26 LAB
ALBUMIN SERPL ELPH-MCNC: 2.3 G/DL — LOW (ref 3.3–5)
ALP SERPL-CCNC: 441 U/L — HIGH (ref 40–120)
ALT FLD-CCNC: 18 U/L — SIGNIFICANT CHANGE UP (ref 10–45)
ANION GAP SERPL CALC-SCNC: 8 MMOL/L — SIGNIFICANT CHANGE UP (ref 5–17)
APPEARANCE UR: CLEAR — SIGNIFICANT CHANGE UP
AST SERPL-CCNC: 48 U/L — HIGH (ref 10–40)
BACTERIA # UR AUTO: NEGATIVE — SIGNIFICANT CHANGE UP
BASE EXCESS BLDV CALC-SCNC: -0.5 MMOL/L — SIGNIFICANT CHANGE UP (ref -2–3)
BASOPHILS # BLD AUTO: 0.03 K/UL — SIGNIFICANT CHANGE UP (ref 0–0.2)
BASOPHILS NFR BLD AUTO: 0.3 % — SIGNIFICANT CHANGE UP (ref 0–2)
BILIRUB SERPL-MCNC: 2.2 MG/DL — HIGH (ref 0.2–1.2)
BILIRUB UR-MCNC: ABNORMAL
BUN SERPL-MCNC: 10 MG/DL — SIGNIFICANT CHANGE UP (ref 7–23)
CA-I SERPL-SCNC: 1.13 MMOL/L — LOW (ref 1.15–1.33)
CALCIUM SERPL-MCNC: 8.1 MG/DL — LOW (ref 8.4–10.5)
CHLORIDE BLDV-SCNC: 106 MMOL/L — SIGNIFICANT CHANGE UP (ref 96–108)
CHLORIDE SERPL-SCNC: 106 MMOL/L — SIGNIFICANT CHANGE UP (ref 96–108)
CO2 BLDV-SCNC: 26 MMOL/L — SIGNIFICANT CHANGE UP (ref 22–26)
CO2 SERPL-SCNC: 24 MMOL/L — SIGNIFICANT CHANGE UP (ref 22–31)
COLOR SPEC: ABNORMAL
CREAT SERPL-MCNC: 0.61 MG/DL — SIGNIFICANT CHANGE UP (ref 0.5–1.3)
CRP SERPL-MCNC: 108 MG/L — HIGH (ref 0–4)
D DIMER BLD IA.RAPID-MCNC: 1527 NG/ML DDU — HIGH
DIFF PNL FLD: ABNORMAL
EGFR: 133 ML/MIN/1.73M2 — SIGNIFICANT CHANGE UP
EOSINOPHIL # BLD AUTO: 0.08 K/UL — SIGNIFICANT CHANGE UP (ref 0–0.5)
EOSINOPHIL NFR BLD AUTO: 0.9 % — SIGNIFICANT CHANGE UP (ref 0–6)
EPI CELLS # UR: 1 /HPF — SIGNIFICANT CHANGE UP
FERRITIN SERPL-MCNC: 715 NG/ML — HIGH (ref 30–400)
GAS PNL BLDV: 137 MMOL/L — SIGNIFICANT CHANGE UP (ref 136–145)
GAS PNL BLDV: SIGNIFICANT CHANGE UP
GAS PNL BLDV: SIGNIFICANT CHANGE UP
GLUCOSE BLDV-MCNC: 97 MG/DL — SIGNIFICANT CHANGE UP (ref 70–99)
GLUCOSE SERPL-MCNC: 102 MG/DL — HIGH (ref 70–99)
GLUCOSE UR QL: NEGATIVE — SIGNIFICANT CHANGE UP
HCO3 BLDV-SCNC: 25 MMOL/L — SIGNIFICANT CHANGE UP (ref 22–29)
HCT VFR BLD CALC: 29 % — LOW (ref 39–50)
HCT VFR BLDA CALC: 30 % — LOW (ref 39–51)
HGB BLD CALC-MCNC: 10 G/DL — LOW (ref 12.6–17.4)
HGB BLD-MCNC: 9.4 G/DL — LOW (ref 13–17)
HYALINE CASTS # UR AUTO: ABNORMAL /LPF
IMM GRANULOCYTES NFR BLD AUTO: 0.6 % — SIGNIFICANT CHANGE UP (ref 0–0.9)
INR BLD: 1.61 RATIO — HIGH (ref 0.88–1.16)
KETONES UR-MCNC: NEGATIVE — SIGNIFICANT CHANGE UP
LACTATE BLDV-MCNC: 1.7 MMOL/L — SIGNIFICANT CHANGE UP (ref 0.5–2)
LEUKOCYTE ESTERASE UR-ACNC: NEGATIVE — SIGNIFICANT CHANGE UP
LYMPHOCYTES # BLD AUTO: 0.84 K/UL — LOW (ref 1–3.3)
LYMPHOCYTES # BLD AUTO: 9 % — LOW (ref 13–44)
MAGNESIUM SERPL-MCNC: 2 MG/DL — SIGNIFICANT CHANGE UP (ref 1.6–2.6)
MCHC RBC-ENTMCNC: 32.4 GM/DL — SIGNIFICANT CHANGE UP (ref 32–36)
MCHC RBC-ENTMCNC: 32.6 PG — SIGNIFICANT CHANGE UP (ref 27–34)
MCV RBC AUTO: 100.7 FL — HIGH (ref 80–100)
MONOCYTES # BLD AUTO: 0.6 K/UL — SIGNIFICANT CHANGE UP (ref 0–0.9)
MONOCYTES NFR BLD AUTO: 6.4 % — SIGNIFICANT CHANGE UP (ref 2–14)
NEUTROPHILS # BLD AUTO: 7.75 K/UL — HIGH (ref 1.8–7.4)
NEUTROPHILS NFR BLD AUTO: 82.8 % — HIGH (ref 43–77)
NITRITE UR-MCNC: NEGATIVE — SIGNIFICANT CHANGE UP
NRBC # BLD: 0 /100 WBCS — SIGNIFICANT CHANGE UP (ref 0–0)
PCO2 BLDV: 43 MMHG — SIGNIFICANT CHANGE UP (ref 42–55)
PH BLDV: 7.37 — SIGNIFICANT CHANGE UP (ref 7.32–7.43)
PH UR: 6 — SIGNIFICANT CHANGE UP (ref 5–8)
PHOSPHATE SERPL-MCNC: 2.4 MG/DL — LOW (ref 2.5–4.5)
PLATELET # BLD AUTO: 313 K/UL — SIGNIFICANT CHANGE UP (ref 150–400)
PO2 BLDV: 65 MMHG — HIGH (ref 25–45)
POTASSIUM BLDV-SCNC: 3.8 MMOL/L — SIGNIFICANT CHANGE UP (ref 3.5–5.1)
POTASSIUM SERPL-MCNC: 3.6 MMOL/L — SIGNIFICANT CHANGE UP (ref 3.5–5.3)
POTASSIUM SERPL-SCNC: 3.6 MMOL/L — SIGNIFICANT CHANGE UP (ref 3.5–5.3)
PROCALCITONIN SERPL-MCNC: 0.45 NG/ML — HIGH (ref 0.02–0.1)
PROT SERPL-MCNC: 7.5 G/DL — SIGNIFICANT CHANGE UP (ref 6–8.3)
PROT UR-MCNC: ABNORMAL
PROTHROM AB SERPL-ACNC: 18.6 SEC — HIGH (ref 10.5–13.4)
RAPID RVP RESULT: SIGNIFICANT CHANGE UP
RBC # BLD: 2.88 M/UL — LOW (ref 4.2–5.8)
RBC # FLD: 13.8 % — SIGNIFICANT CHANGE UP (ref 10.3–14.5)
RBC CASTS # UR COMP ASSIST: 70 /HPF — HIGH (ref 0–4)
SAO2 % BLDV: 93.4 % — HIGH (ref 67–88)
SARS-COV-2 RNA SPEC QL NAA+PROBE: SIGNIFICANT CHANGE UP
SODIUM SERPL-SCNC: 138 MMOL/L — SIGNIFICANT CHANGE UP (ref 135–145)
SP GR SPEC: 1.04 — HIGH (ref 1.01–1.02)
UROBILINOGEN FLD QL: ABNORMAL
WBC # BLD: 9.36 K/UL — SIGNIFICANT CHANGE UP (ref 3.8–10.5)
WBC # FLD AUTO: 9.36 K/UL — SIGNIFICANT CHANGE UP (ref 3.8–10.5)
WBC UR QL: 3 /HPF — SIGNIFICANT CHANGE UP (ref 0–5)

## 2022-09-26 PROCEDURE — 99232 SBSQ HOSP IP/OBS MODERATE 35: CPT | Mod: GC

## 2022-09-26 PROCEDURE — 99223 1ST HOSP IP/OBS HIGH 75: CPT

## 2022-09-26 PROCEDURE — 99233 SBSQ HOSP IP/OBS HIGH 50: CPT

## 2022-09-26 PROCEDURE — 71045 X-RAY EXAM CHEST 1 VIEW: CPT | Mod: 26

## 2022-09-26 RX ADMIN — Medication 10 MILLILITER(S): at 09:52

## 2022-09-26 RX ADMIN — Medication 10 MILLILITER(S): at 21:03

## 2022-09-26 RX ADMIN — Medication 650 MILLIGRAM(S): at 21:02

## 2022-09-26 RX ADMIN — Medication 650 MILLIGRAM(S): at 22:15

## 2022-09-26 RX ADMIN — Medication 10 MILLILITER(S): at 01:43

## 2022-09-26 RX ADMIN — Medication 10 MILLILITER(S): at 06:21

## 2022-09-26 RX ADMIN — APIXABAN 5 MILLIGRAM(S): 2.5 TABLET, FILM COATED ORAL at 17:15

## 2022-09-26 RX ADMIN — APIXABAN 5 MILLIGRAM(S): 2.5 TABLET, FILM COATED ORAL at 06:21

## 2022-09-26 RX ADMIN — Medication 10 MILLILITER(S): at 17:15

## 2022-09-26 RX ADMIN — Medication 10 MILLILITER(S): at 14:09

## 2022-09-26 RX ADMIN — REMDESIVIR 500 MILLIGRAM(S): 5 INJECTION INTRAVENOUS at 09:46

## 2022-09-26 NOTE — PROGRESS NOTE ADULT - ASSESSMENT
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 Pt had tested positive for COVID infection 10-days ago for which he received monoclonal antibodies. He has been having persistent fevers, dry cough and dyspnea on exertion.     #elevated alk phosp   Patient with alk phosp 400 (baseline 200-300)  Last stent exchange on 9/6.    #PSC on liver transplant list   MELD 16 (9/25).      #COVID-19 infection:   Patient presenting for persistent coughing and fevers, currently improved.     Recommendations:   - continue supportive management of COVID-19; okay to proceed with remdesivir   - daily MELD scores (trend CMP, INR daily)   - rest of care per primary team     All recommendations are tentative until note is attested by attending.     Luz Parsons, PGY-4   Gastroenterology/Hepatology Fellow  Available on [employer] Teams  [phone] ([facility] Short Range Pager)  [phone] ([facility] Long Range Pager)    After 5pm, please contact the on-call GI fellow. 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 Pt had tested positive for COVID infection 10-days ago for which he received monoclonal antibodies. He has been having persistent fevers, dry cough and dyspnea on exertion.     #elevated alk phosp   Patient with alk phosp 400 (baseline 200-300)  Last stent exchange on 9/6.    #PSC on liver transplant list   MELD 16 (9/25).      #COVID-19 infection:   Patient presenting for persistent coughing and fevers, currently improved.     Recommendations:   - continue supportive management of COVID-19; okay to proceed with remdesivir   - daily MELD scores (trend CMP, INR daily)   - ID consult for continued fevers   - rest of care per primary team     All recommendations are tentative until note is attested by attending.     Luz Parsons, PGY-4   Gastroenterology/Hepatology Fellow  Available on Microsoft Teams  [phone] ([facility] Short Range Pager)  [phone] ([facility] Long Range Pager)    After 5pm, please contact the on-call GI fellow. [phone]

## 2022-09-26 NOTE — PROVIDER CONTACT NOTE (OTHER) - SITUATION
Patient immunocompetent diagnosed with Covid >10 days ago. Chest imaging clears, no evidence of covid PNA. As per ID physician, elevated temp is not Covid related. No further isolation required.

## 2022-09-26 NOTE — PROGRESS NOTE ADULT - SUBJECTIVE AND OBJECTIVE BOX
Patient is a 32y old  Male who presents with a chief complaint of Persistent cough and fever (26 Sep 2022 14:04)        SUBJECTIVE / OVERNIGHT EVENTS: Spoke with patient and his mother at bedside. Patient spiked fever early this morning. He also reports gagging every time he eats and not able to eat anything because of this. Denies any abd pain. Denies sob or cough.       MEDICATIONS  (STANDING):  apixaban 5 milliGRAM(s) Oral two times a day  guaifenesin/dextromethorphan Oral Liquid 10 milliLiter(s) Oral every 4 hours  influenza   Vaccine 0.5 milliLiter(s) IntraMuscular once    MEDICATIONS  (PRN):  acetaminophen     Tablet .. 650 milliGRAM(s) Oral every 8 hours PRN Temp greater or equal to 38.5C (101.3F)  ALBUTerol    90 MICROgram(s) HFA Inhaler 2 Puff(s) Inhalation every 4 hours PRN Shortness of Breath and/or Wheezing  benzonatate 100 milliGRAM(s) Oral three times a day PRN Cough  melatonin 3 milliGRAM(s) Oral at bedtime PRN Insomnia  ondansetron Injectable 4 milliGRAM(s) IV Push every 8 hours PRN Nausea and/or Vomiting      Vital Signs Last 24 Hrs  T(C): 38.1 (26 Sep 2022 17:00), Max: 39.2 (25 Sep 2022 21:14)  T(F): 100.6 (26 Sep 2022 17:00), Max: 102.6 (25 Sep 2022 21:14)  HR: 108 (26 Sep 2022 10:04) (96 - 125)  BP: 113/68 (26 Sep 2022 10:04) (110/65 - 120/77)  BP(mean): --  RR: 18 (26 Sep 2022 10:04) (18 - 18)  SpO2: 100% (26 Sep 2022 10:04) (100% - 100%)    Parameters below as of 26 Sep 2022 10:04  Patient On (Oxygen Delivery Method): room air      CAPILLARY BLOOD GLUCOSE        I&O's Summary    25 Sep 2022 07:01  -  26 Sep 2022 07:00  --------------------------------------------------------  IN: 600 mL / OUT: 0 mL / NET: 600 mL    26 Sep 2022 07:01  -  26 Sep 2022 19:21  --------------------------------------------------------  IN: 480 mL / OUT: 0 mL / NET: 480 mL          PHYSICAL EXAM:   GENERAL: NAD,    HEAD:  Atraumatic, Normocephalic  EYES: EOMI, PERRLA, conjunctiva and sclera clear  NECK: Supple,    CHEST/LUNG: Clear to auscultation bilaterally; No wheeze  HEART: S1S2 normal. Regular rate and rhythm; slight systolic murmur.   ABDOMEN: Soft, Nontender, slightly distended; Bowel sounds present  EXTREMITIES:  slight le edema  PSYCH/Neuro: Awake and answers questions. some contraction of UE. h/o CP.   SKIN: No rashes or lesions      LABS:                        9.4    9.36  )-----------( 313      ( 26 Sep 2022 07:05 )             29.0     09-26    138  |  106  |  10  ----------------------------<  102<H>  3.6   |  24  |  0.61    Ca    8.1<L>      26 Sep 2022 07:01  Phos  2.4     09-26  Mg     2.0     09-26    TPro  7.5  /  Alb  2.3<L>  /  TBili  2.2<H>  /  DBili  x   /  AST  48<H>  /  ALT  18  /  AlkPhos  441<H>  09-26    PT/INR - ( 26 Sep 2022 07:03 )   PT: 18.6 sec;   INR: 1.61 ratio         PTT - ( 25 Sep 2022 07:07 )  PTT:35.0 sec          < from: CT Angio Chest PE Protocol w/ IV Cont (09.24.22 @ 18:54) >  IMPRESSION:    No central or lobar PE. Limited evaluation of segmental and subsegmental   branches due to motion artifact.    Partially imaged nonspecific pericholecystic fluid and right upper   quadrant ascites.    --- End of Report ---    < end of copied text >    RADIOLOGY & ADDITIONAL TESTS:    Imaging Personally Reviewed: ct chest   Consultant(s) Notes Reviewed:  hepatology and ID  Care Discussed with Consultants/Other Providers: ID attending

## 2022-09-26 NOTE — PROGRESS NOTE ADULT - SUBJECTIVE AND OBJECTIVE BOX
Gastroenterology/Hepatology Progress Note    Interval Events:   - family would like to transfer facilities to another hospital   - patient on cooling blanket (Tmax 103 on 9/25)     Allergies:  No Known Allergies      Hospital Medications:  acetaminophen     Tablet .. 650 milliGRAM(s) Oral every 8 hours PRN  ALBUTerol    90 MICROgram(s) HFA Inhaler 2 Puff(s) Inhalation every 4 hours PRN  apixaban 5 milliGRAM(s) Oral two times a day  benzonatate 100 milliGRAM(s) Oral three times a day PRN  guaifenesin/dextromethorphan Oral Liquid 10 milliLiter(s) Oral every 4 hours  influenza   Vaccine 0.5 milliLiter(s) IntraMuscular once  melatonin 3 milliGRAM(s) Oral at bedtime PRN  ondansetron Injectable 4 milliGRAM(s) IV Push every 8 hours PRN  remdesivir  IVPB   IV Intermittent   remdesivir  IVPB 100 milliGRAM(s) IV Intermittent every 24 hours      ROS: 14 point ROS negative unless otherwise state in subjective    PHYSICAL EXAM:   Vital Signs:  Vital Signs Last 24 Hrs  T(C): 38 (26 Sep 2022 06:26), Max: 39.2 (25 Sep 2022 21:14)  T(F): 100.4 (26 Sep 2022 06:26), Max: 102.6 (25 Sep 2022 21:14)  HR: 107 (26 Sep 2022 04:39) (96 - 125)  BP: 120/77 (26 Sep 2022 04:39) (110/65 - 120/77)  BP(mean): --  RR: 18 (26 Sep 2022 04:39) (18 - 18)  SpO2: 100% (26 Sep 2022 04:39) (100% - 100%)    Parameters below as of 26 Sep 2022 04:39  Patient On (Oxygen Delivery Method): room air      Daily     Daily     GENERAL:  No acute distress, patient reports feeling cold   HEENT:  NCAT, no scleral icterus  CHEST: no resp distress  HEART:  RRR  ABDOMEN:  Soft, non-tender, non-distended, normoactive bowel sounds, no masses  EXTREMITIES:  No cyanosis, clubbing, or edema  SKIN:  No rash/erythema/ecchymoses/petechiae/wounds/abscess/warm/dry  NEURO:  Alert and oriented x 3, no asterixis, no tremor    LABS:                        8.7    7.10  )-----------( 277      ( 25 Sep 2022 07:07 )             26.9     Mean Cell Volume: 101.5 fl (09-25-22 @ 07:07)    09-25    137  |  105  |  11  ----------------------------<  89  3.8   |  22  |  0.69    Ca    8.1<L>      25 Sep 2022 07:06  Phos  1.5     09-24  Mg     1.9     09-24    TPro  7.1  /  Alb  2.5<L>  /  TBili  2.2<H>  /  DBili  x   /  AST  36  /  ALT  15  /  AlkPhos  368<H>  09-25    LIVER FUNCTIONS - ( 25 Sep 2022 07:06 )  Alb: 2.5 g/dL / Pro: 7.1 g/dL / ALK PHOS: 368 U/L / ALT: 15 U/L / AST: 36 U/L / GGT: x           PT/INR - ( 25 Sep 2022 07:07 )   PT: 19.7 sec;   INR: 1.69 ratio         PTT - ( 25 Sep 2022 07:07 )  PTT:35.0 sec          Imaging:

## 2022-09-26 NOTE — CONSULT NOTE ADULT - ASSESSMENT
32 year old M with PMHx of Cerebral palsy, Primary Sclerosing Cholangitis, recent ERCP with stent change on 9/6, on liver transplant list p/w persistent fevers, cough and generalized fatigue. Pt had tested positive for COVID on 9/14 for which he received monoclonal antibodies on 9/16. He has been having persistent fevers, dry cough and dyspnea on exertion. He also had one episode of diarrhea a few days ago.    Spiking fevers 102.6 F  Covid test positive   CT angio chest: no consolidations or pleural effusions   On room air saturating 99%  Blood Cx on 9/24: neg   UA positive, U Cx on 9/24: NG  Started on Remdesivir on 9/25    Workup from [facility]:  EBV IgG positive   CMV IgG neg   HSV 1-2 IgG neg   HBsAB reactive   HCV Ab non reactive   MMR IgG positive  Varicella IgG positive  Toxo IgG positive   QuantiFeron neg    Strongyloides Ab positive   HAV IgG reactive   HIV Test neg     #Covid infection tested on 9/14 s/p monoclonal antibodies on 9/16  #Primary sclerosing cholangitis on list for liver transplant     Recommendations:     PT TO BE SEEN. PRELIM NOTE  PENDING RECS. PLEASE WAIT FOR FINAL RECS AFTER DISCUSSION WITH ATTENDING#                 32 year old M with PMHx of Cerebral palsy, Primary Sclerosing Cholangitis, recent ERCP with stent change on 9/6, on liver transplant list p/w persistent fevers, cough and generalized fatigue. Pt had tested positive for COVID on 9/14 for which he received monoclonal antibodies on 9/16. He has been having persistent fevers, dry cough and dyspnea on exertion. He also had one episode of diarrhea a few days ago.    Spiking fevers 102.6 F  Covid test positive   CT angio chest: no consolidations or pleural effusions   On room air saturating 99%  Blood Cx on 9/24: neg   UA positive, U Cx on 9/24: NG  Started on Remdesivir on 9/25    Workup from [facility]:  EBV IgG positive   CMV IgG neg   HSV 1-2 IgG neg   HBsAB reactive   HCV Ab non reactive   MMR IgG positive  Varicella IgG positive  Toxo IgG positive   QuantiFeron neg    Strongyloides Ab positive   HAV IgG reactive   HIV Test neg     #Covid infection tested on 9/14 s/p monoclonal antibodies on 9/16  #Primary sclerosing cholangitis on list for liver transplant     Recommendations:   -Remdesivir discontinued   -Follow up with RVP ordered   -If RVP neg - would get CT AP with IV contrast to R/O intraabdominal infectious process  -Get bilateral lower extremity US doppler   -Monitor LFTs   -Monitor T curve and WBC curve, low threshold to start antibiotics   -Discussed with primary team     Seen and discussed with Dr Brianna Hayes MD, PGY4  ID fellow  [employer] Teams Preferred  After 5pm/weekends call [phone]                 32 year old M with PMHx of Cerebral palsy, Primary Sclerosing Cholangitis, recent ERCP with stent change on 9/6, on liver transplant list p/w persistent fevers, cough and generalized fatigue. Pt had tested positive for COVID on 9/14 for which he received monoclonal antibodies on 9/16. He has been having persistent fevers, dry cough and dyspnea on exertion. He also had one episode of diarrhea a few days ago.    Spiking fevers 102.6 F  Covid test positive   CT angio chest: no consolidations or pleural effusions   On room air saturating 99%  Blood Cx on 9/24: neg   UA positive, U Cx on 9/24: NG  Started on Remdesivir on 9/25    Workup from [facility]:  EBV IgG positive   CMV IgG neg   HSV 1-2 IgG neg   HBsAB reactive   HCV Ab non reactive   MMR IgG positive  Varicella IgG positive  Toxo IgG positive   QuantiFeron neg    Strongyloides Ab positive   HAV IgG reactive   HIV Test neg     #Covid infection tested on 9/14 s/p monoclonal antibodies on 9/16  #Fever  #Primary sclerosing cholangitis on list for liver transplant     Recommendations:   -Remdesivir discontinued   -Follow up with RVP ordered   -If RVP neg - would get CT AP with IV contrast to R/O intraabdominal infectious process  -Get bilateral lower extremity US doppler   -Monitor LFTs   -Monitor T curve and WBC curve, low threshold to start antibiotics   -Discussed with primary team     Seen and discussed with Dr Brianna Hayes MD, PGY4  ID fellow  [employer] Teams Preferred  After 5pm/weekends call [phone]

## 2022-09-26 NOTE — CONSULT NOTE ADULT - SUBJECTIVE AND OBJECTIVE BOX
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 Pt had tested positive for COVID infection 10-days ago for which he received monoclonal antibodies. He has been having persistent fevers, dry cough and dyspnea on exertion. He also had one episode of diarrhea a few days ago. Denied CP, palpitations, abdominal pain, leg swelling.    REVIEW OF SYSTEMS  [  ] ROS unobtainable because:    [  ] All other systems negative except as noted below    Constitutional:  [ ] fever [ ] chills  [ ] weight loss  [ ]night sweat  [ ]poor appetite/PO intake [ ]fatigue   Skin:  [ ] rash [ ] phlebitis	  Eyes: [ ] icterus [ ] pain  [ ] discharge	  ENMT: [ ] sore throat  [ ] thrush [ ] ulcers [ ] exudates [ ]anosmia  Respiratory: [ ] dyspnea [ ] hemoptysis [ ] cough [ ] sputum	  Cardiovascular:  [ ] chest pain [ ] palpitations [ ] edema	  Gastrointestinal:  [ ] nausea [ ] vomiting [ ] diarrhea [ ] constipation [ ] pain	  Genitourinary:  [ ] dysuria [ ] frequency [ ] hematuria [ ] discharge [ ] flank pain  [ ] incontinence  Musculoskeletal:  [ ] myalgias [ ] arthralgias [ ] arthritis  [ ] back pain  Neurological:  [ ] headache [ ] weakness [ ] seizures  [ ] confusion/altered mental status    prior hospital charts reviewed [V]  primary team notes reviewed [V]  other consultant notes reviewed [V]    PAST MEDICAL & SURGICAL HISTORY:  Cerebral palsy    PSC (primary sclerosing cholangitis)  On liver transplant list    Abnormal LFTs    Bile duct stricture    Dental caries    Impacted teeth  wisdom teeth    Phimosis    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    Anemia    History of ERCP  multiple with stent insertions/replacement every 3 months ---last 5/20/2022    [place] teeth extracted  2021    SOCIAL HISTORY:  - Denied smoking/vaping/alcohol/recreational drug use    FAMILY HISTORY:  No pertinent family history in first degree relatives    Allergies  No Known Allergies    ANTIMICROBIALS:  remdesivir  IVPB    remdesivir  IVPB 100 every 24 hours    ANTIMICROBIALS (past 90 days):  MEDICATIONS  (STANDING):    remdesivir  IVPB   500 mL/Hr IV Intermittent (09-25-22 @ 11:01)    remdesivir  IVPB   500 mL/Hr IV Intermittent (09-26-22 @ 09:46)    OTHER MEDS:   MEDICATIONS  (STANDING):  acetaminophen     Tablet .. 650 every 8 hours PRN  ALBUTerol    90 MICROgram(s) HFA Inhaler 2 every 4 hours PRN  apixaban 5 two times a day  benzonatate 100 three times a day PRN  guaifenesin/dextromethorphan Oral Liquid 10 every 4 hours  influenza   Vaccine 0.5 once  melatonin 3 at bedtime PRN  ondansetron Injectable 4 every 8 hours PRN    VITALS:  Vital Signs Last 24 Hrs  T(F): 99.6 (09-26-22 @ 10:04), Max: 103 (09-25-22 @ 01:20)    Vital Signs Last 24 Hrs  HR: 108 (09-26-22 @ 10:04) (96 - 125)  BP: 113/68 (09-26-22 @ 10:04) (110/65 - 120/77)  RR: 18 (09-26-22 @ 10:04)  SpO2: 100% (09-26-22 @ 10:04) (100% - 100%)  Wt(kg): --    EXAM:  Physical Exam:  Constitutional:  well preserved, comfortable  Head/Eyes: no icterus, PERRL, EOMI  ENT:  supple; no thrush  LUNGS:  CTA  CVS:  normal S1, S2, no murmur  Abd:  soft, non-tender; non-distended  Ext:  no edema  Vascular:  IV site no erythema tenderness or discharge  MSK:  joints without swelling  Neuro: AAO X 3, non- focal    Labs:                        9.4    9.36  )-----------( 313      ( 26 Sep 2022 07:05 )             29.0     09-26    138  |  106  |  10  ----------------------------<  102<H>  3.6   |  24  |  0.61    Ca    8.1<L>      26 Sep 2022 07:01  Phos  2.4     09-26  Mg     2.0     09-26    TPro  7.5  /  Alb  2.3<L>  /  TBili  2.2<H>  /  DBili  x   /  AST  48<H>  /  ALT  18  /  AlkPhos  441<H>  09-26    WBC Trend:  WBC Count: 9.36 (09-26-22 @ 07:05)  WBC Count: 7.10 (09-25-22 @ 07:07)  WBC Count: 6.03 (09-24-22 @ 15:48)    Auto Neutrophil #: 7.75 K/uL (09-26-22 @ 07:05)  Auto Neutrophil #: 4.89 K/uL (09-24-22 @ 15:48)  Auto Neutrophil #: 6.03 K/uL (07-22-22 @ 08:33)    Creatine Trend:  Creatinine, Serum: 0.61 (09-26)  Creatinine, Serum: 0.69 (09-25)  Creatinine, Serum: 0.71 (09-24)    Liver Biochemical Testing Trend:  Alanine Aminotransferase (ALT/SGPT): 18 (09-26)  Alanine Aminotransferase (ALT/SGPT): 15 (09-25)  Alanine Aminotransferase (ALT/SGPT): 19 (09-24)  Aspartate Aminotransferase (AST/SGOT): 48 (09-26-22 @ 07:01)  Aspartate Aminotransferase (AST/SGOT): 36 (09-25-22 @ 07:06)  Aspartate Aminotransferase (AST/SGOT): 42 (09-24-22 @ 15:48)  Bilirubin Total, Serum: 2.2 (09-26)  Bilirubin Total, Serum: 2.2 (09-25)  Bilirubin Total, Serum: 2.4 (09-24)  Bilirubin Direct, Serum: 1.6 (09-24)  Bilirubin Total, Serum: 2.4 (09-24)    Trend LDH  09-25-22 @ 07:06  116  09-24-22 @ 15:48  111    Auto Eosinophil %: 0.9 % (09-26-22 @ 07:05)  Auto Eosinophil %: 0.7 % (09-24-22 @ 15:48)    MICROBIOLOGY:    Culture - Blood (collected 24 Sep 2022 15:53)  Source: .Blood Blood-Peripheral  Preliminary Report:    No growth to date.    Culture - Blood (collected 24 Sep 2022 15:42)  Source: .Blood Blood-Peripheral  Preliminary Report:    No growth to date.    Culture - Urine (collected 22 Jul 2022 12:19)  Source: Clean Catch None  Final Report:    <10,000 CFU/mL Normal Urogenital Gisel    Culture - Urine (collected 21 Apr 2021 05:27)  Source: .Urine Clean Catch (Midstream)  Final Report:    No growth    Culture - Blood (collected 20 Apr 2021 22:52)  Source: .Blood Blood-Peripheral  Final Report:    No Growth Final    Culture - Blood (collected 20 Apr 2021 22:52)  Source: .Blood Blood-Peripheral  Final Report:    No Growth Final    COVID-19 PCR: Detected (09-24-22 @ 15:45)    Procalcitonin, Serum: 0.45 (09-26)  Procalcitonin, Serum: 0.38 (09-24)    C-Reactive Protein, Serum: 108 (09-26)  C-Reactive Protein, Serum: 103 (09-25)    Ferritin, Serum: 715 (09-26)  Ferritin, Serum: 644 (09-25)  Ferritin, Serum: 657 (09-24)    D-Dimer Assay, Quantitative: 1527 (09-26)  D-Dimer Assay, Quantitative: 1159 (09-24)    Lactate Dehydrogenase, Serum: 116 (09-25)  Lactate Dehydrogenase, Serum: 111 (09-24)    Blood Gas Venous - Lactate: 1.7 (09-24 @ 19:05)    RADIOLOGY:  imaging below personally reviewed   HPI:    32 year old M with PMHx of Cerebral palsy, 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 Pt had tested positive for COVID infection 10-days ago for which he received monoclonal antibodies. He has been having persistent fevers, dry cough and dyspnea on exertion. He also had one episode of diarrhea a few days ago. Denied CP, palpitations, abdominal pain, leg swelling. Transplant ID was consulted for fevers, Covid infection and pre-liver transplant eval.    REVIEW OF SYSTEMS  [  ] ROS unobtainable because:    [X] All other systems negative except as noted below    Constitutional:  [X] fever [ ] chills  [ ] weight loss  [ ]night sweat  [ ]poor appetite/PO intake [ ]fatigue   Skin:  [ ] rash [ ] phlebitis	  Eyes: [ ] icterus [ ] pain  [ ] discharge	  ENMT: [ ] sore throat  [ ] thrush [ ] ulcers [ ] exudates [ ]anosmia  Respiratory: [ ] dyspnea [ ] hemoptysis [X] cough [ ] sputum	  Cardiovascular:  [ ] chest pain [ ] palpitations [ ] edema	  Gastrointestinal:  [ ] nausea [ ] vomiting [ ] diarrhea [ ] constipation [ ] pain	  Genitourinary:  [ ] dysuria [ ] frequency [ ] hematuria [ ] discharge [ ] flank pain  [ ] incontinence  Musculoskeletal:  [ ] myalgias [ ] arthralgias [ ] arthritis  [ ] back pain  Neurological:  [ ] headache [ ] weakness [ ] seizures  [ ] confusion/altered mental status    prior hospital charts reviewed [V]  primary team notes reviewed [V]  other consultant notes reviewed [V]    PAST MEDICAL & SURGICAL HISTORY:  Cerebral palsy    PSC (primary sclerosing cholangitis)  On liver transplant list    Abnormal LFTs    Bile duct stricture    Dental caries    Impacted teeth  wisdom teeth    Phimosis    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    Anemia    History of ERCP  multiple with stent insertions/replacement every 3 months ---last 5/20/2022    [place] teeth extracted  2021    SOCIAL HISTORY:  - Denied smoking/vaping/alcohol/recreational drug use    FAMILY HISTORY:  No pertinent family history in first degree relatives    Allergies  No Known Allergies    ANTIMICROBIALS:  remdesivir  IVPB    remdesivir  IVPB 100 every 24 hours    ANTIMICROBIALS (past 90 days):  MEDICATIONS  (STANDING):    remdesivir  IVPB   500 mL/Hr IV Intermittent (09-25-22 @ 11:01)    remdesivir  IVPB   500 mL/Hr IV Intermittent (09-26-22 @ 09:46)    OTHER MEDS:   MEDICATIONS  (STANDING):  acetaminophen     Tablet .. 650 every 8 hours PRN  ALBUTerol    90 MICROgram(s) HFA Inhaler 2 every 4 hours PRN  apixaban 5 two times a day  benzonatate 100 three times a day PRN  guaifenesin/dextromethorphan Oral Liquid 10 every 4 hours  influenza   Vaccine 0.5 once  melatonin 3 at bedtime PRN  ondansetron Injectable 4 every 8 hours PRN    VITALS:  Vital Signs Last 24 Hrs  T(F): 99.6 (09-26-22 @ 10:04), Max: 103 (09-25-22 @ 01:20)    Vital Signs Last 24 Hrs  HR: 108 (09-26-22 @ 10:04) (96 - 125)  BP: 113/68 (09-26-22 @ 10:04) (110/65 - 120/77)  RR: 18 (09-26-22 @ 10:04)  SpO2: 100% (09-26-22 @ 10:04) (100% - 100%)  Wt(kg): --    EXAM:  Physical Exam:  Constitutional:  well preserved, comfortable  Head/Eyes: no icterus, PERRL, EOMI  ENT:  supple; no thrush  LUNGS:  CTA  CVS:  normal S1, S2, no murmur  Abd:  soft, non-tender; non-distended  Ext:  no edema  Vascular:  IV site no erythema tenderness or discharge  MSK:  joints without swelling  Neuro: AAO X 3, non- focal    Labs:                        9.4    9.36  )-----------( 313      ( 26 Sep 2022 07:05 )             29.0     09-26    138  |  106  |  10  ----------------------------<  102<H>  3.6   |  24  |  0.61    Ca    8.1<L>      26 Sep 2022 07:01  Phos  2.4     09-26  Mg     2.0     09-26    TPro  7.5  /  Alb  2.3<L>  /  TBili  2.2<H>  /  DBili  x   /  AST  48<H>  /  ALT  18  /  AlkPhos  441<H>  09-26    WBC Trend:  WBC Count: 9.36 (09-26-22 @ 07:05)  WBC Count: 7.10 (09-25-22 @ 07:07)  WBC Count: 6.03 (09-24-22 @ 15:48)    Auto Neutrophil #: 7.75 K/uL (09-26-22 @ 07:05)  Auto Neutrophil #: 4.89 K/uL (09-24-22 @ 15:48)  Auto Neutrophil #: 6.03 K/uL (07-22-22 @ 08:33)    Creatine Trend:  Creatinine, Serum: 0.61 (09-26)  Creatinine, Serum: 0.69 (09-25)  Creatinine, Serum: 0.71 (09-24)    Liver Biochemical Testing Trend:  Alanine Aminotransferase (ALT/SGPT): 18 (09-26)  Alanine Aminotransferase (ALT/SGPT): 15 (09-25)  Alanine Aminotransferase (ALT/SGPT): 19 (09-24)  Aspartate Aminotransferase (AST/SGOT): 48 (09-26-22 @ 07:01)  Aspartate Aminotransferase (AST/SGOT): 36 (09-25-22 @ 07:06)  Aspartate Aminotransferase (AST/SGOT): 42 (09-24-22 @ 15:48)  Bilirubin Total, Serum: 2.2 (09-26)  Bilirubin Total, Serum: 2.2 (09-25)  Bilirubin Total, Serum: 2.4 (09-24)  Bilirubin Direct, Serum: 1.6 (09-24)  Bilirubin Total, Serum: 2.4 (09-24)    Trend LDH  09-25-22 @ 07:06  116  09-24-22 @ 15:48  111    Auto Eosinophil %: 0.9 % (09-26-22 @ 07:05)  Auto Eosinophil %: 0.7 % (09-24-22 @ 15:48)    MICROBIOLOGY:    Culture - Blood (collected 24 Sep 2022 15:53)  Source: .Blood Blood-Peripheral  Preliminary Report:    No growth to date.    Culture - Blood (collected 24 Sep 2022 15:42)  Source: .Blood Blood-Peripheral  Preliminary Report:    No growth to date.    Culture - Urine (collected 22 Jul 2022 12:19)  Source: Clean Catch None  Final Report:    <10,000 CFU/mL Normal Urogenital Gisel    Culture - Urine (collected 21 Apr 2021 05:27)  Source: .Urine Clean Catch (Midstream)  Final Report:    No growth    Culture - Blood (collected 20 Apr 2021 22:52)  Source: .Blood Blood-Peripheral  Final Report:    No Growth Final    Culture - Blood (collected 20 Apr 2021 22:52)  Source: .Blood Blood-Peripheral  Final Report:    No Growth Final    COVID-19 PCR: Detected (09-24-22 @ 15:45)    Procalcitonin, Serum: 0.45 (09-26)  Procalcitonin, Serum: 0.38 (09-24)    C-Reactive Protein, Serum: 108 (09-26)  C-Reactive Protein, Serum: 103 (09-25)    Ferritin, Serum: 715 (09-26)  Ferritin, Serum: 644 (09-25)  Ferritin, Serum: 657 (09-24)    D-Dimer Assay, Quantitative: 1527 (09-26)  D-Dimer Assay, Quantitative: 1159 (09-24)    Lactate Dehydrogenase, Serum: 116 (09-25)  Lactate Dehydrogenase, Serum: 111 (09-24)    Blood Gas Venous - Lactate: 1.7 (09-24 @ 19:05)    RADIOLOGY:  imaging below personally reviewed   HPI:    32 year old M with PMHx of Cerebral palsy, 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 Pt had tested positive for COVID infection 10-days ago for which he received monoclonal antibodies. He has been having persistent fevers, dry cough and dyspnea on exertion. He also had one episode of diarrhea a few days ago. Denied CP, palpitations, abdominal pain, leg swelling. Transplant ID was consulted for fevers, Covid infection and pre-liver transplant eval.    REVIEW OF SYSTEMS  [  ] ROS unobtainable because:    [X] All other systems negative except as noted below    Constitutional:  [X] fever [ ] chills  [ ] weight loss  [ ]night sweat  [ ]poor appetite/PO intake [ ]fatigue   Skin:  [ ] rash [ ] phlebitis	  Eyes: [ ] icterus [ ] pain  [ ] discharge	  ENMT: [ ] sore throat  [ ] thrush [ ] ulcers [ ] exudates [ ]anosmia  Respiratory: [ ] dyspnea [ ] hemoptysis [X] cough [ ] sputum	  Cardiovascular:  [ ] chest pain [ ] palpitations [ ] edema	  Gastrointestinal:  [ ] nausea [ ] vomiting [ ] diarrhea [ ] constipation [ ] pain	  Genitourinary:  [ ] dysuria [ ] frequency [ ] hematuria [ ] discharge [ ] flank pain  [ ] incontinence  Musculoskeletal:  [ ] myalgias [ ] arthralgias [ ] arthritis  [ ] back pain  Neurological:  [ ] headache [ ] weakness [ ] seizures  [ ] confusion/altered mental status    prior hospital charts reviewed [V]  primary team notes reviewed [V]  other consultant notes reviewed [V]    PAST MEDICAL & SURGICAL HISTORY:  Cerebral palsy    PSC (primary sclerosing cholangitis)  On liver transplant list    Abnormal LFTs    Bile duct stricture    Dental caries    Impacted teeth  wisdom teeth    Phimosis    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    Anemia    History of ERCP  multiple with stent insertions/replacement every 3 months ---last 5/20/2022    [place] teeth extracted  2021    SOCIAL HISTORY:  - Denied smoking/vaping/alcohol/recreational drug use    FAMILY HISTORY:  No pertinent family history in first degree relatives    Allergies  No Known Allergies    ANTIMICROBIALS:  remdesivir  IVPB    remdesivir  IVPB 100 every 24 hours    ANTIMICROBIALS (past 90 days):  MEDICATIONS  (STANDING):    remdesivir  IVPB   500 mL/Hr IV Intermittent (09-25-22 @ 11:01)    remdesivir  IVPB   500 mL/Hr IV Intermittent (09-26-22 @ 09:46)    OTHER MEDS:   MEDICATIONS  (STANDING):  acetaminophen     Tablet .. 650 every 8 hours PRN  ALBUTerol    90 MICROgram(s) HFA Inhaler 2 every 4 hours PRN  apixaban 5 two times a day  benzonatate 100 three times a day PRN  guaifenesin/dextromethorphan Oral Liquid 10 every 4 hours  influenza   Vaccine 0.5 once  melatonin 3 at bedtime PRN  ondansetron Injectable 4 every 8 hours PRN    VITALS:  Vital Signs Last 24 Hrs  T(F): 99.6 (09-26-22 @ 10:04), Max: 103 (09-25-22 @ 01:20)    Vital Signs Last 24 Hrs  HR: 108 (09-26-22 @ 10:04) (96 - 125)  BP: 113/68 (09-26-22 @ 10:04) (110/65 - 120/77)  RR: 18 (09-26-22 @ 10:04)  SpO2: 100% (09-26-22 @ 10:04) (100% - 100%)  Wt(kg): --    EXAM:  Physical Exam:  Constitutional:  well preserved, comfortable  Head/Eyes: no icterus, PERRL, EOMI  ENT:  supple; no thrush  LUNGS:  CTA  CVS:  normal S1, S2, no murmur  Abd:  soft, non-tender; non-distended  Ext:  no edema  Vascular:  IV site no erythema tenderness or discharge  MSK:  joints without swelling  Neuro: AAO X 3, non- focal    Labs:                        9.4    9.36  )-----------( 313      ( 26 Sep 2022 07:05 )             29.0     09-26    138  |  106  |  10  ----------------------------<  102<H>  3.6   |  24  |  0.61    Ca    8.1<L>      26 Sep 2022 07:01  Phos  2.4     09-26  Mg     2.0     09-26    TPro  7.5  /  Alb  2.3<L>  /  TBili  2.2<H>  /  DBili  x   /  AST  48<H>  /  ALT  18  /  AlkPhos  441<H>  09-26    WBC Trend:  WBC Count: 9.36 (09-26-22 @ 07:05)  WBC Count: 7.10 (09-25-22 @ 07:07)  WBC Count: 6.03 (09-24-22 @ 15:48)    Auto Neutrophil #: 7.75 K/uL (09-26-22 @ 07:05)  Auto Neutrophil #: 4.89 K/uL (09-24-22 @ 15:48)  Auto Neutrophil #: 6.03 K/uL (07-22-22 @ 08:33)    Creatine Trend:  Creatinine, Serum: 0.61 (09-26)  Creatinine, Serum: 0.69 (09-25)  Creatinine, Serum: 0.71 (09-24)    Liver Biochemical Testing Trend:  Alanine Aminotransferase (ALT/SGPT): 18 (09-26)  Alanine Aminotransferase (ALT/SGPT): 15 (09-25)  Alanine Aminotransferase (ALT/SGPT): 19 (09-24)  Aspartate Aminotransferase (AST/SGOT): 48 (09-26-22 @ 07:01)  Aspartate Aminotransferase (AST/SGOT): 36 (09-25-22 @ 07:06)  Aspartate Aminotransferase (AST/SGOT): 42 (09-24-22 @ 15:48)  Bilirubin Total, Serum: 2.2 (09-26)  Bilirubin Total, Serum: 2.2 (09-25)  Bilirubin Total, Serum: 2.4 (09-24)  Bilirubin Direct, Serum: 1.6 (09-24)  Bilirubin Total, Serum: 2.4 (09-24)    Trend LDH  09-25-22 @ 07:06  116  09-24-22 @ 15:48  111    Auto Eosinophil %: 0.9 % (09-26-22 @ 07:05)  Auto Eosinophil %: 0.7 % (09-24-22 @ 15:48)    MICROBIOLOGY:    Culture - Blood (collected 24 Sep 2022 15:53)  Source: .Blood Blood-Peripheral  Preliminary Report:    No growth to date.    Culture - Blood (collected 24 Sep 2022 15:42)  Source: .Blood Blood-Peripheral  Preliminary Report:    No growth to date.    Culture - Urine (collected 22 Jul 2022 12:19)  Source: Clean Catch None  Final Report:    <10,000 CFU/mL Normal Urogenital Gisel    Culture - Urine (collected 21 Apr 2021 05:27)  Source: .Urine Clean Catch (Midstream)  Final Report:    No growth    Culture - Blood (collected 20 Apr 2021 22:52)  Source: .Blood Blood-Peripheral  Final Report:    No Growth Final    Culture - Blood (collected 20 Apr 2021 22:52)  Source: .Blood Blood-Peripheral  Final Report:    No Growth Final    COVID-19 PCR: Detected (09-24-22 @ 15:45)    Procalcitonin, Serum: 0.45 (09-26)  Procalcitonin, Serum: 0.38 (09-24)    C-Reactive Protein, Serum: 108 (09-26)  C-Reactive Protein, Serum: 103 (09-25)    Ferritin, Serum: 715 (09-26)  Ferritin, Serum: 644 (09-25)  Ferritin, Serum: 657 (09-24)    D-Dimer Assay, Quantitative: 1527 (09-26)  D-Dimer Assay, Quantitative: 1159 (09-24)    Lactate Dehydrogenase, Serum: 116 (09-25)  Lactate Dehydrogenase, Serum: 111 (09-24)    Blood Gas Venous - Lactate: 1.7 (09-24 @ 19:05)    RADIOLOGY:    <The imaging below has been reviewed and visualized by me independently. Findings as detailed in report below>    < from: CT Angio Chest PE Protocol w/ IV Cont (09.24.22 @ 18:54) >  IMPRESSION:    No central or lobar PE. Limited evaluation of segmental and subsegmental   branches due to motion artifact.    Partially imaged nonspecific pericholecystic fluid and right upper   quadrant ascites.    < end of copied text >

## 2022-09-27 LAB
ALBUMIN SERPL ELPH-MCNC: 2.1 G/DL — LOW (ref 3.3–5)
ALP SERPL-CCNC: 520 U/L — HIGH (ref 40–120)
ALT FLD-CCNC: 21 U/L — SIGNIFICANT CHANGE UP (ref 10–45)
ANION GAP SERPL CALC-SCNC: 7 MMOL/L — SIGNIFICANT CHANGE UP (ref 5–17)
AST SERPL-CCNC: 63 U/L — HIGH (ref 10–40)
BILIRUB SERPL-MCNC: 2.1 MG/DL — HIGH (ref 0.2–1.2)
BUN SERPL-MCNC: 12 MG/DL — SIGNIFICANT CHANGE UP (ref 7–23)
CALCIUM SERPL-MCNC: 8 MG/DL — LOW (ref 8.4–10.5)
CHLORIDE SERPL-SCNC: 104 MMOL/L — SIGNIFICANT CHANGE UP (ref 96–108)
CO2 SERPL-SCNC: 25 MMOL/L — SIGNIFICANT CHANGE UP (ref 22–31)
CREAT SERPL-MCNC: 0.7 MG/DL — SIGNIFICANT CHANGE UP (ref 0.5–1.3)
EGFR: 127 ML/MIN/1.73M2 — SIGNIFICANT CHANGE UP
GLUCOSE SERPL-MCNC: 102 MG/DL — HIGH (ref 70–99)
HCT VFR BLD CALC: 26.9 % — LOW (ref 39–50)
HGB BLD-MCNC: 8.8 G/DL — LOW (ref 13–17)
INR BLD: 1.97 RATIO — HIGH (ref 0.88–1.16)
MCHC RBC-ENTMCNC: 32.7 GM/DL — SIGNIFICANT CHANGE UP (ref 32–36)
MCHC RBC-ENTMCNC: 33.5 PG — SIGNIFICANT CHANGE UP (ref 27–34)
MCV RBC AUTO: 102.3 FL — HIGH (ref 80–100)
NRBC # BLD: 0 /100 WBCS — SIGNIFICANT CHANGE UP (ref 0–0)
PLATELET # BLD AUTO: 287 K/UL — SIGNIFICANT CHANGE UP (ref 150–400)
POTASSIUM SERPL-MCNC: 3.5 MMOL/L — SIGNIFICANT CHANGE UP (ref 3.5–5.3)
POTASSIUM SERPL-SCNC: 3.5 MMOL/L — SIGNIFICANT CHANGE UP (ref 3.5–5.3)
PROT SERPL-MCNC: 6.9 G/DL — SIGNIFICANT CHANGE UP (ref 6–8.3)
PROTHROM AB SERPL-ACNC: 23 SEC — HIGH (ref 10.5–13.4)
RBC # BLD: 2.63 M/UL — LOW (ref 4.2–5.8)
RBC # FLD: 14.2 % — SIGNIFICANT CHANGE UP (ref 10.3–14.5)
SODIUM SERPL-SCNC: 136 MMOL/L — SIGNIFICANT CHANGE UP (ref 135–145)
WBC # BLD: 8.36 K/UL — SIGNIFICANT CHANGE UP (ref 3.8–10.5)
WBC # FLD AUTO: 8.36 K/UL — SIGNIFICANT CHANGE UP (ref 3.8–10.5)

## 2022-09-27 PROCEDURE — 74177 CT ABD & PELVIS W/CONTRAST: CPT | Mod: 26

## 2022-09-27 PROCEDURE — 93976 VASCULAR STUDY: CPT | Mod: 26

## 2022-09-27 PROCEDURE — 99233 SBSQ HOSP IP/OBS HIGH 50: CPT

## 2022-09-27 PROCEDURE — 93970 EXTREMITY STUDY: CPT | Mod: 26

## 2022-09-27 PROCEDURE — 99232 SBSQ HOSP IP/OBS MODERATE 35: CPT | Mod: GC

## 2022-09-27 PROCEDURE — 76705 ECHO EXAM OF ABDOMEN: CPT | Mod: 26,XU

## 2022-09-27 RX ORDER — PIPERACILLIN AND TAZOBACTAM 4; .5 G/20ML; G/20ML
3.38 INJECTION, POWDER, LYOPHILIZED, FOR SOLUTION INTRAVENOUS ONCE
Refills: 0 | Status: COMPLETED | OUTPATIENT
Start: 2022-09-27 | End: 2022-09-27

## 2022-09-27 RX ORDER — PIPERACILLIN AND TAZOBACTAM 4; .5 G/20ML; G/20ML
3.38 INJECTION, POWDER, LYOPHILIZED, FOR SOLUTION INTRAVENOUS EVERY 8 HOURS
Refills: 0 | Status: DISCONTINUED | OUTPATIENT
Start: 2022-09-28 | End: 2022-09-30

## 2022-09-27 RX ADMIN — APIXABAN 5 MILLIGRAM(S): 2.5 TABLET, FILM COATED ORAL at 06:11

## 2022-09-27 RX ADMIN — Medication 650 MILLIGRAM(S): at 15:46

## 2022-09-27 RX ADMIN — Medication 100 MILLIGRAM(S): at 23:02

## 2022-09-27 RX ADMIN — PIPERACILLIN AND TAZOBACTAM 25 GRAM(S): 4; .5 INJECTION, POWDER, LYOPHILIZED, FOR SOLUTION INTRAVENOUS at 21:36

## 2022-09-27 RX ADMIN — Medication 10 MILLILITER(S): at 15:48

## 2022-09-27 RX ADMIN — APIXABAN 5 MILLIGRAM(S): 2.5 TABLET, FILM COATED ORAL at 17:29

## 2022-09-27 RX ADMIN — Medication 10 MILLILITER(S): at 06:11

## 2022-09-27 RX ADMIN — Medication 650 MILLIGRAM(S): at 17:16

## 2022-09-27 RX ADMIN — Medication 10 MILLILITER(S): at 02:17

## 2022-09-27 RX ADMIN — PIPERACILLIN AND TAZOBACTAM 200 GRAM(S): 4; .5 INJECTION, POWDER, LYOPHILIZED, FOR SOLUTION INTRAVENOUS at 17:29

## 2022-09-27 NOTE — PHYSICAL THERAPY INITIAL EVALUATION ADULT - ADDITIONAL COMMENTS
Prior to admission pt reports being independent of all ADL's & functional mobility without AD. Pt with hx of CP, states he resides in house with parent, no steps to enter, on first floor. Pt has tub.

## 2022-09-27 NOTE — PHYSICAL THERAPY INITIAL EVALUATION ADULT - ACTIVE RANGE OF MOTION EXAMINATION, REHAB EVAL
LUE hand & L foot limited 2/2 CP/Right UE Active ROM was WFL (within functional limits)/bilateral  lower extremity Active ROM was WFL (within functional limits)

## 2022-09-27 NOTE — PHYSICAL THERAPY INITIAL EVALUATION ADULT - PERTINENT HX OF CURRENT PROBLEM, REHAB EVAL
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 Pt had tested positive for COVID infection 10-days ago for which he received monoclonal antibodies. He has been having persistent fevers, dry cough and dyspnea on exertion. He also had one episode of diarrhea a few days ago. Denied CP, palpitations, abdominal pain, leg swelling. CXR: Clear lungs. CT angio chest: No central or lobar PE. Limited evaluation of segmental and subsegmental branches due to motion artifact. Partially imaged nonspecific pericholecystic fluid and right upper quadrant ascites.

## 2022-09-27 NOTE — PROGRESS NOTE ADULT - SUBJECTIVE AND OBJECTIVE BOX
Follow Up:      Interval History:    REVIEW OF SYSTEMS  [  ] ROS unobtainable because:    [  ] All other systems negative except as noted below    Constitutional:  [ ] fever [ ] chills  [ ] weight loss  [ ] weakness  Skin:  [ ] rash [ ] phlebitis	  Eyes: [ ] icterus [ ] pain  [ ] discharge	  ENMT: [ ] sore throat  [ ] thrush [ ] ulcers [ ] exudates  Respiratory: [ ] dyspnea [ ] hemoptysis [ ] cough [ ] sputum	  Cardiovascular:  [ ] chest pain [ ] palpitations [ ] edema	  Gastrointestinal:  [ ] nausea [ ] vomiting [ ] diarrhea [ ] constipation [ ] pain	  Genitourinary:  [ ] dysuria [ ] frequency [ ] hematuria [ ] discharge [ ] flank pain  [ ] incontinence  Musculoskeletal:  [ ] myalgias [ ] arthralgias [ ] arthritis  [ ] back pain  Neurological:  [ ] headache [ ] seizures  [ ] confusion/altered mental status    Allergies  No Known Allergies        ANTIMICROBIALS:  piperacillin/tazobactam IVPB. 3.375 once  piperacillin/tazobactam IVPB.- 3.375 once      OTHER MEDS:  MEDICATIONS  (STANDING):  acetaminophen     Tablet .. 650 every 8 hours PRN  ALBUTerol    90 MICROgram(s) HFA Inhaler 2 every 4 hours PRN  apixaban 5 two times a day  benzonatate 100 three times a day PRN  influenza   Vaccine 0.5 once  melatonin 3 at bedtime PRN  ondansetron Injectable 4 every 8 hours PRN      Vital Signs Last 24 Hrs  T(C): 37.7 (27 Sep 2022 17:15), Max: 38.8 (26 Sep 2022 20:46)  T(F): 99.9 (27 Sep 2022 17:15), Max: 101.9 (26 Sep 2022 20:46)  HR: 117 (27 Sep 2022 17:15) (105 - 125)  BP: 103/50 (27 Sep 2022 17:15) (103/50 - 126/72)  BP(mean): --  RR: 18 (27 Sep 2022 17:15) (18 - 18)  SpO2: 100% (27 Sep 2022 17:15) (99% - 100%)    Parameters below as of 27 Sep 2022 17:15  Patient On (Oxygen Delivery Method): room air        PHYSICAL EXAMINATION:  General: Alert and Awake, NAD  HEENT: PERRL, EOMI  Neck: Supple  Cardiac: RRR, No M/R/G  Resp: CTAB, No Wh/Rh/Ra  Abdomen: NBS, NT/ND, No HSM, No rigidity or guarding  MSK: No LE edema. No Calf tenderness  : No chavez  Skin: No rashes or lesions. Skin is warm and dry to the touch.   Neuro: Alert and Awake. CN 2-12 Grossly intact. Moves all four extremities spontaneously.  Psych: Calm, Pleasant, Cooperative                          8.8    8.36  )-----------( 287      ( 27 Sep 2022 07:23 )             26.9           136  |  104  |  12  ----------------------------<  102<H>  3.5   |  25  |  0.70    Ca    8.0<L>      27 Sep 2022 07:24  Phos  2.4       Mg     2.0         TPro  6.9  /  Alb  2.1<L>  /  TBili  2.1<H>  /  DBili  x   /  AST  63<H>  /  ALT  21  /  AlkPhos  520<H>        Urinalysis Basic - ( 26 Sep 2022 22:46 )    Color: Dark Yellow / Appearance: Clear / S.040 / pH: x  Gluc: x / Ketone: Negative  / Bili: Moderate / Urobili: >8mg/dL   Blood: x / Protein: 30 mg/dL / Nitrite: Negative   Leuk Esterase: Negative / RBC: 70 /hpf / WBC 3 /HPF   Sq Epi: x / Non Sq Epi: 1 /hpf / Bacteria: Negative        MICROBIOLOGY:  v  .Blood Blood-Peripheral  22   No growth to date.  --  --      .Blood Blood-Peripheral  22   No growth to date.  --  --          Rapid RVP Result: NotDetec ( @ 19:47)        RADIOLOGY:    <The imaging below has been reviewed and visualized by me independently. Findings as detailed in report below> Follow Up: Fever     Interval History: continued fevers. no focal symptoms.     REVIEW OF SYSTEMS  [  ] ROS unobtainable because:    [x  ] All other systems negative except as noted below    Constitutional:  [ x] fever [ ] chills  [ ] weight loss  [ ] weakness  Skin:  [ ] rash [ ] phlebitis	  Eyes: [ ] icterus [ ] pain  [ ] discharge	  ENMT: [ ] sore throat  [ ] thrush [ ] ulcers [ ] exudates  Respiratory: [ ] dyspnea [ ] hemoptysis [x ] cough [ ] sputum	  Cardiovascular:  [ ] chest pain [ ] palpitations [ ] edema	  Gastrointestinal:  [ ] nausea [ ] vomiting [ ] diarrhea [ ] constipation [ ] pain	  Genitourinary:  [ ] dysuria [ ] frequency [ ] hematuria [ ] discharge [ ] flank pain  [ ] incontinence  Musculoskeletal:  [ ] myalgias [ ] arthralgias [ ] arthritis  [ ] back pain  Neurological:  [ ] headache [ ] seizures  [ ] confusion/altered mental status    Allergies  No Known Allergies        ANTIMICROBIALS:  piperacillin/tazobactam IVPB. 3.375 once  piperacillin/tazobactam IVPB.- 3.375 once      OTHER MEDS:  MEDICATIONS  (STANDING):  acetaminophen     Tablet .. 650 every 8 hours PRN  ALBUTerol    90 MICROgram(s) HFA Inhaler 2 every 4 hours PRN  apixaban 5 two times a day  benzonatate 100 three times a day PRN  influenza   Vaccine 0.5 once  melatonin 3 at bedtime PRN  ondansetron Injectable 4 every 8 hours PRN      Vital Signs Last 24 Hrs  T(C): 37.7 (27 Sep 2022 17:15), Max: 38.8 (26 Sep 2022 20:46)  T(F): 99.9 (27 Sep 2022 17:15), Max: 101.9 (26 Sep 2022 20:46)  HR: 117 (27 Sep 2022 17:15) (105 - 125)  BP: 103/50 (27 Sep 2022 17:15) (103/50 - 126/72)  BP(mean): --  RR: 18 (27 Sep 2022 17:15) (18 - 18)  SpO2: 100% (27 Sep 2022 17:15) (99% - 100%)    Parameters below as of 27 Sep 2022 17:15  Patient On (Oxygen Delivery Method): room air        PHYSICAL EXAMINATION:  General: Alert and Awake, NAD  HEENT: PERRL, EOMI  Neck: Supple  Cardiac: RRR, No M/R/G  Resp: CTAB, No Wh/Rh/Ra  Abdomen: NBS, NT/ND, No HSM, No rigidity or guarding  MSK: No LE edema. No Calf tenderness  : No chavez  Skin: No rashes or lesions. Skin is warm and dry to the touch.   Neuro: Alert and Awake. CN 2-12 Grossly intact. Moves all four extremities spontaneously.  Psych: Calm, Pleasant, Cooperative                          8.8    8.36  )-----------( 287      ( 27 Sep 2022 07:23 )             26.9           136  |  104  |  12  ----------------------------<  102<H>  3.5   |  25  |  0.70    Ca    8.0<L>      27 Sep 2022 07:24  Phos  2.4       Mg     2.0         TPro  6.9  /  Alb  2.1<L>  /  TBili  2.1<H>  /  DBili  x   /  AST  63<H>  /  ALT  21  /  AlkPhos  520<H>        Urinalysis Basic - ( 26 Sep 2022 22:46 )    Color: Dark Yellow / Appearance: Clear / S.040 / pH: x  Gluc: x / Ketone: Negative  / Bili: Moderate / Urobili: >8mg/dL   Blood: x / Protein: 30 mg/dL / Nitrite: Negative   Leuk Esterase: Negative / RBC: 70 /hpf / WBC 3 /HPF   Sq Epi: x / Non Sq Epi: 1 /hpf / Bacteria: Negative        MICROBIOLOGY:  v  .Blood Blood-Peripheral  22   No growth to date.  --  --      .Blood Blood-Peripheral  22   No growth to date.  --  --          Rapid RVP Result: NotDetec ( @ 19:47)        RADIOLOGY:    <The imaging below has been reviewed and visualized by me independently. Findings as detailed in report below>    < from: US Abdomen Doppler (22 @ 13:40) >  IMPRESSION: The main portal vein is diminutive in size with hepatofugal   flow. The posterior branch of the right portal vein and the left portal   vein were not visualized.    The middle and left hepatic veins appear patent. The right hepatic vein   was not visualized.    < end of copied text >  < from: CT Abdomen and Pelvis w/ IV Cont (22 @ 12:18) >  IMPRESSION:  Mild irregular intrahepatic biliary ductal dilatation, appears slightly   improved from prior MRI 2022, line for differences in technique. CBD   stents in place.    Gallbladder is distended with mild nonspecific wall thickening.    Trace ascites.    < end of copied text >  < from: [facility] Duplex Lower Ext Vein Scan, Bilat (22 @ 09:55) >  IMPRESSION:  No evidence of deep venous thrombosis in either lower extremity.    < end of copied text >

## 2022-09-27 NOTE — PROGRESS NOTE ADULT - ASSESSMENT
32 year old M with PMHx of Cerebral palsy, Primary Sclerosing Cholangitis, recent ERCP with stent change on 9/6, on liver transplant list p/w persistent fevers, cough and generalized fatigue.     COVID PCR Positive on 9/14  s/p monoclonal antibodies on 9/16    UA (9/24) 3 WBC.   BCx (9/24, 9/26) NGTD  COVID19 PCR (9/24)   CT Value for COVID19 of 29.6 (suggestive of low viral burden)  RVP (9/26) Negative    CTA Chest (9/24) without infiltrates or PE  CT A/P (9/27) No acute process but with distended Gallbladder with mild nonspecific wall thickening.  Abdominal Doppler (9/27) with no thrombus  RUQ US (9/27) Distended gallbladder without significant wall thickening, No stones or pericholecystic fluid.     #Fever, PSC, Pre-OLT  --ID clearance for OLT pending infectious workup  --Would consider MRCP to exclude evidence of intrahepatic cholangitis missed on CT A/P  --Start Zosyn pending MRCP and preliminary cultures  --Recommend TTE  --Continue to follow CBC with diff  --Continue to follow transaminases  --Continue to follow temperature curve  --Follow up on preliminary blood cultures    #COVID19 Positive  Low concern for contribution to clinical presentation  CT Value for COVID19 of 29.6 (suggestive of low viral burden)  Remdesivir discontinued    I will continue to follow. Please feel free to contact me with any further questions.    Roque Reed M.D.  [facility] Division of Infectious Disease  8AM-5PM Monday - Friday: Available on [employer] Teams  After Hours and Holidays (or if no response on [employer] Teams): Please contact the Infectious Diseases Office at [phone]    The above assessment and plan were discussed with Dr Osorio

## 2022-09-27 NOTE — PROGRESS NOTE ADULT - SUBJECTIVE AND OBJECTIVE BOX
Patient is a 32y old  Male who presents with a chief complaint of Persistent cough and fever (27 Sep 2022 17:27)        SUBJECTIVE / OVERNIGHT EVENTS: Patient denies abd pain. Still having fevers. Ate better today.       MEDICATIONS  (STANDING):  apixaban 5 milliGRAM(s) Oral two times a day  influenza   Vaccine 0.5 milliLiter(s) IntraMuscular once  piperacillin/tazobactam IVPB.- 3.375 Gram(s) IV Intermittent once    MEDICATIONS  (PRN):  acetaminophen     Tablet .. 650 milliGRAM(s) Oral every 8 hours PRN Temp greater or equal to 38.5C (101.3F)  ALBUTerol    90 MICROgram(s) HFA Inhaler 2 Puff(s) Inhalation every 4 hours PRN Shortness of Breath and/or Wheezing  benzonatate 100 milliGRAM(s) Oral three times a day PRN Cough  melatonin 3 milliGRAM(s) Oral at bedtime PRN Insomnia  ondansetron Injectable 4 milliGRAM(s) IV Push every 8 hours PRN Nausea and/or Vomiting      Vital Signs Last 24 Hrs  T(C): 37.7 (27 Sep 2022 17:15), Max: 38.8 (26 Sep 2022 20:46)  T(F): 99.9 (27 Sep 2022 17:15), Max: 101.9 (26 Sep 2022 20:46)  HR: 117 (27 Sep 2022 17:15) (105 - 125)  BP: 103/50 (27 Sep 2022 17:15) (103/50 - 126/72)  BP(mean): --  RR: 18 (27 Sep 2022 17:15) (18 - 18)  SpO2: 100% (27 Sep 2022 17:15) (99% - 100%)    Parameters below as of 27 Sep 2022 17:15  Patient On (Oxygen Delivery Method): room air      CAPILLARY BLOOD GLUCOSE        I&O's Summary    26 Sep 2022 07:01  -  27 Sep 2022 07:00  --------------------------------------------------------  IN: 840 mL / OUT: 0 mL / NET: 840 mL    27 Sep 2022 07:01  -  27 Sep 2022 20:16  --------------------------------------------------------  IN: 240 mL / OUT: 0 mL / NET: 240 mL          PHYSICAL EXAM:   GENERAL: NAD,    HEAD:  Atraumatic, Normocephalic  EYES:  conjunctiva and sclera mild icterus.   NECK: Supple  CHEST/LUNG: Clear to auscultation bilaterally; No wheeze  HEART: S1S2 normal. tachy rate and rhythm; No murmurs, rubs, or gallops  ABDOMEN: Soft, Nontender, Nondistended; Bowel sounds present  EXTREMITIES:   No clubbing, cyanosis, or edema  PSYCH/Neuro: AAOx3. Non-focal. contracture of left hand.  SKIN: No rashes or lesions      LABS:                        8.8    8.36  )-----------( 287      ( 27 Sep 2022 07:23 )             26.9         136  |  104  |  12  ----------------------------<  102<H>  3.5   |  25  |  0.70    Ca    8.0<L>      27 Sep 2022 07:24  Phos  2.4       Mg     2.0         TPro  6.9  /  Alb  2.1<L>  /  TBili  2.1<H>  /  DBili  x   /  AST  63<H>  /  ALT  21  /  AlkPhos  520<H>      PT/INR - ( 27 Sep 2022 07:24 )   PT: 23.0 sec;   INR: 1.97 ratio               Urinalysis Basic - ( 26 Sep 2022 22:46 )    Color: Dark Yellow / Appearance: Clear / S.040 / pH: x  Gluc: x / Ketone: Negative  / Bili: Moderate / Urobili: >8mg/dL   Blood: x / Protein: 30 mg/dL / Nitrite: Negative   Leuk Esterase: Negative / RBC: 70 /hpf / WBC 3 /HPF   Sq Epi: x / Non Sq Epi: 1 /hpf / Bacteria: Negative      < from: CT Abdomen and Pelvis w/ IV Cont (09.27.22 @ 12:18) >  IMPRESSION:  Mild irregular intrahepatic biliary ductal dilatation, appears slightly   improved from prior MRI 2022, line for differences in technique. CBD   stents in place.    Gallbladder is distended with mild nonspecific wall thickening.    Trace ascites.    < end of copied text >  < from: US Abdomen Doppler (22 @ 13:40) >  *** ADDENDUM***    Additional findings include    Liver: Mild heterogeneity of the liver parenchyma. No focal masses are   seen.  Bile ducts: There is mild central intrahepatic bile duct dilatation..   Common bile duct measures 6 mm.  Gallbladder: The gallbladder appears distended without significant wall   thickening. There is mild sludge. No stones were seen. There is no   pericholecystic fluid. There is no tenderness on examination.  Pancreas: Limited.  Right kidney: 10.6 cm. No hydronephrosis.  Ascites: None.    < end of copied text >      RADIOLOGY & ADDITIONAL TESTS:    Imaging Personally Reviewed: ct and us of abd  Consultant(s) Notes Reviewed:  ID  Care Discussed with Consultants/Other Providers: ID

## 2022-09-28 ENCOUNTER — APPOINTMENT (OUTPATIENT)
Dept: UROLOGY | Facility: CLINIC | Age: 32
End: 2022-09-28

## 2022-09-28 LAB
ALBUMIN SERPL ELPH-MCNC: 2.1 G/DL — LOW (ref 3.3–5)
ALP SERPL-CCNC: 550 U/L — HIGH (ref 40–120)
ALT FLD-CCNC: 22 U/L — SIGNIFICANT CHANGE UP (ref 10–45)
ANION GAP SERPL CALC-SCNC: 9 MMOL/L — SIGNIFICANT CHANGE UP (ref 5–17)
APTT BLD: 37.3 SEC — HIGH (ref 27.5–35.5)
AST SERPL-CCNC: 73 U/L — HIGH (ref 10–40)
BILIRUB SERPL-MCNC: 1.9 MG/DL — HIGH (ref 0.2–1.2)
BLD GP AB SCN SERPL QL: NEGATIVE — SIGNIFICANT CHANGE UP
BUN SERPL-MCNC: 11 MG/DL — SIGNIFICANT CHANGE UP (ref 7–23)
CALCIUM SERPL-MCNC: 7.8 MG/DL — LOW (ref 8.4–10.5)
CHLORIDE SERPL-SCNC: 105 MMOL/L — SIGNIFICANT CHANGE UP (ref 96–108)
CMV DNA CSF QL NAA+PROBE: SIGNIFICANT CHANGE UP
CMV DNA SPEC NAA+PROBE-LOG#: SIGNIFICANT CHANGE UP LOG10IU/ML
CO2 SERPL-SCNC: 24 MMOL/L — SIGNIFICANT CHANGE UP (ref 22–31)
CREAT SERPL-MCNC: 0.74 MG/DL — SIGNIFICANT CHANGE UP (ref 0.5–1.3)
CULTURE RESULTS: NO GROWTH — SIGNIFICANT CHANGE UP
EBV EA AB SER IA-ACNC: 10.9 U/ML — HIGH
EBV EA AB TITR SER IF: POSITIVE
EBV EA IGG SER-ACNC: ABNORMAL
EBV NA IGG SER IA-ACNC: 447 U/ML — HIGH
EBV PATRN SPEC IB-IMP: SIGNIFICANT CHANGE UP
EBV VCA IGG AVIDITY SER QL IA: POSITIVE
EBV VCA IGM SER IA-ACNC: 526 U/ML — HIGH
EBV VCA IGM SER IA-ACNC: <10 U/ML — SIGNIFICANT CHANGE UP
EBV VCA IGM TITR FLD: NEGATIVE — SIGNIFICANT CHANGE UP
EGFR: 125 ML/MIN/1.73M2 — SIGNIFICANT CHANGE UP
FERRITIN SERPL-MCNC: 722 NG/ML — HIGH (ref 30–400)
FOLATE SERPL-MCNC: 14.2 NG/ML — SIGNIFICANT CHANGE UP
GGT SERPL-CCNC: 194 U/L — HIGH (ref 9–50)
GLUCOSE BLDC GLUCOMTR-MCNC: 108 MG/DL — HIGH (ref 70–99)
GLUCOSE SERPL-MCNC: 93 MG/DL — SIGNIFICANT CHANGE UP (ref 70–99)
HAPTOGLOB SERPL-MCNC: 206 MG/DL — HIGH (ref 34–200)
HCT VFR BLD CALC: 23.7 % — LOW (ref 39–50)
HCT VFR BLD CALC: 28.8 % — LOW (ref 39–50)
HGB BLD-MCNC: 7.8 G/DL — LOW (ref 13–17)
HGB BLD-MCNC: 9.4 G/DL — LOW (ref 13–17)
INR BLD: 2.1 RATIO — HIGH (ref 0.88–1.16)
IRON SATN MFR SERPL: 18 % — SIGNIFICANT CHANGE UP (ref 16–55)
IRON SATN MFR SERPL: 28 UG/DL — LOW (ref 45–165)
LDH SERPL L TO P-CCNC: 172 U/L — SIGNIFICANT CHANGE UP (ref 50–242)
MCHC RBC-ENTMCNC: 32.6 GM/DL — SIGNIFICANT CHANGE UP (ref 32–36)
MCHC RBC-ENTMCNC: 32.9 GM/DL — SIGNIFICANT CHANGE UP (ref 32–36)
MCHC RBC-ENTMCNC: 33 PG — SIGNIFICANT CHANGE UP (ref 27–34)
MCHC RBC-ENTMCNC: 33.8 PG — SIGNIFICANT CHANGE UP (ref 27–34)
MCV RBC AUTO: 101.1 FL — HIGH (ref 80–100)
MCV RBC AUTO: 102.6 FL — HIGH (ref 80–100)
NRBC # BLD: 0 /100 WBCS — SIGNIFICANT CHANGE UP (ref 0–0)
NRBC # BLD: 0 /100 WBCS — SIGNIFICANT CHANGE UP (ref 0–0)
PLATELET # BLD AUTO: 282 K/UL — SIGNIFICANT CHANGE UP (ref 150–400)
PLATELET # BLD AUTO: 365 K/UL — SIGNIFICANT CHANGE UP (ref 150–400)
POTASSIUM SERPL-MCNC: 3.2 MMOL/L — LOW (ref 3.5–5.3)
POTASSIUM SERPL-SCNC: 3.2 MMOL/L — LOW (ref 3.5–5.3)
PROT SERPL-MCNC: 6.9 G/DL — SIGNIFICANT CHANGE UP (ref 6–8.3)
PROTHROM AB SERPL-ACNC: 24.5 SEC — HIGH (ref 10.5–13.4)
RBC # BLD: 2.31 M/UL — LOW (ref 4.2–5.8)
RBC # BLD: 2.85 M/UL — LOW (ref 4.2–5.8)
RBC # BLD: 2.85 M/UL — LOW (ref 4.2–5.8)
RBC # FLD: 14.6 % — HIGH (ref 10.3–14.5)
RBC # FLD: 14.7 % — HIGH (ref 10.3–14.5)
RETICS #: 102.3 K/UL — SIGNIFICANT CHANGE UP (ref 25–125)
RETICS/RBC NFR: 3.6 % — HIGH (ref 0.5–2.5)
RH IG SCN BLD-IMP: POSITIVE — SIGNIFICANT CHANGE UP
SODIUM SERPL-SCNC: 138 MMOL/L — SIGNIFICANT CHANGE UP (ref 135–145)
SPECIMEN SOURCE: SIGNIFICANT CHANGE UP
TIBC SERPL-MCNC: 157 UG/DL — LOW (ref 220–430)
UIBC SERPL-MCNC: 129 UG/DL — SIGNIFICANT CHANGE UP (ref 110–370)
VIT B12 SERPL-MCNC: 711 PG/ML — SIGNIFICANT CHANGE UP (ref 232–1245)
WBC # BLD: 7.27 K/UL — SIGNIFICANT CHANGE UP (ref 3.8–10.5)
WBC # BLD: 7.33 K/UL — SIGNIFICANT CHANGE UP (ref 3.8–10.5)
WBC # FLD AUTO: 7.27 K/UL — SIGNIFICANT CHANGE UP (ref 3.8–10.5)
WBC # FLD AUTO: 7.33 K/UL — SIGNIFICANT CHANGE UP (ref 3.8–10.5)

## 2022-09-28 PROCEDURE — 99222 1ST HOSP IP/OBS MODERATE 55: CPT | Mod: GC

## 2022-09-28 PROCEDURE — 99232 SBSQ HOSP IP/OBS MODERATE 35: CPT

## 2022-09-28 PROCEDURE — 99232 SBSQ HOSP IP/OBS MODERATE 35: CPT | Mod: GC

## 2022-09-28 PROCEDURE — 99233 SBSQ HOSP IP/OBS HIGH 50: CPT

## 2022-09-28 PROCEDURE — 74183 MRI ABD W/O CNTR FLWD CNTR: CPT | Mod: 26

## 2022-09-28 PROCEDURE — 93306 TTE W/DOPPLER COMPLETE: CPT | Mod: 26

## 2022-09-28 RX ORDER — BENZOCAINE AND MENTHOL 5; 1 G/100ML; G/100ML
1 LIQUID ORAL ONCE
Refills: 0 | Status: COMPLETED | OUTPATIENT
Start: 2022-09-28 | End: 2022-09-28

## 2022-09-28 RX ORDER — BENZOCAINE AND MENTHOL 5; 1 G/100ML; G/100ML
1 LIQUID ORAL EVERY 4 HOURS
Refills: 0 | Status: DISCONTINUED | OUTPATIENT
Start: 2022-09-28 | End: 2022-09-30

## 2022-09-28 RX ORDER — SODIUM CHLORIDE 9 MG/ML
500 INJECTION, SOLUTION INTRAVENOUS ONCE
Refills: 0 | Status: COMPLETED | OUTPATIENT
Start: 2022-09-28 | End: 2022-09-28

## 2022-09-28 RX ORDER — PANTOPRAZOLE SODIUM 20 MG/1
40 TABLET, DELAYED RELEASE ORAL DAILY
Refills: 0 | Status: DISCONTINUED | OUTPATIENT
Start: 2022-09-28 | End: 2022-09-30

## 2022-09-28 RX ORDER — PHYTONADIONE (VIT K1) 5 MG
5 TABLET ORAL DAILY
Refills: 0 | Status: DISCONTINUED | OUTPATIENT
Start: 2022-09-28 | End: 2022-09-28

## 2022-09-28 RX ORDER — PHYTONADIONE (VIT K1) 5 MG
10 TABLET ORAL ONCE
Refills: 0 | Status: COMPLETED | OUTPATIENT
Start: 2022-09-28 | End: 2022-09-28

## 2022-09-28 RX ORDER — GUAIFENESIN/DEXTROMETHORPHAN 600MG-30MG
10 TABLET, EXTENDED RELEASE 12 HR ORAL EVERY 6 HOURS
Refills: 0 | Status: DISCONTINUED | OUTPATIENT
Start: 2022-09-28 | End: 2022-09-30

## 2022-09-28 RX ORDER — POTASSIUM CHLORIDE 20 MEQ
40 PACKET (EA) ORAL ONCE
Refills: 0 | Status: COMPLETED | OUTPATIENT
Start: 2022-09-28 | End: 2022-09-28

## 2022-09-28 RX ORDER — POTASSIUM CHLORIDE 20 MEQ
40 PACKET (EA) ORAL ONCE
Refills: 0 | Status: DISCONTINUED | OUTPATIENT
Start: 2022-09-28 | End: 2022-09-28

## 2022-09-28 RX ADMIN — PIPERACILLIN AND TAZOBACTAM 25 GRAM(S): 4; .5 INJECTION, POWDER, LYOPHILIZED, FOR SOLUTION INTRAVENOUS at 06:07

## 2022-09-28 RX ADMIN — Medication 102 MILLIGRAM(S): at 22:05

## 2022-09-28 RX ADMIN — Medication 40 MILLIEQUIVALENT(S): at 12:21

## 2022-09-28 RX ADMIN — Medication 5 MILLIGRAM(S): at 12:21

## 2022-09-28 RX ADMIN — PIPERACILLIN AND TAZOBACTAM 25 GRAM(S): 4; .5 INJECTION, POWDER, LYOPHILIZED, FOR SOLUTION INTRAVENOUS at 15:35

## 2022-09-28 RX ADMIN — Medication 100 MILLIGRAM(S): at 15:36

## 2022-09-28 RX ADMIN — BENZOCAINE AND MENTHOL 1 LOZENGE: 5; 1 LIQUID ORAL at 17:40

## 2022-09-28 RX ADMIN — PIPERACILLIN AND TAZOBACTAM 25 GRAM(S): 4; .5 INJECTION, POWDER, LYOPHILIZED, FOR SOLUTION INTRAVENOUS at 23:41

## 2022-09-28 RX ADMIN — SODIUM CHLORIDE 1000 MILLILITER(S): 9 INJECTION, SOLUTION INTRAVENOUS at 15:36

## 2022-09-28 RX ADMIN — Medication 10 MILLILITER(S): at 15:36

## 2022-09-28 RX ADMIN — APIXABAN 5 MILLIGRAM(S): 2.5 TABLET, FILM COATED ORAL at 06:03

## 2022-09-28 RX ADMIN — Medication 650 MILLIGRAM(S): at 04:17

## 2022-09-28 RX ADMIN — Medication 650 MILLIGRAM(S): at 05:08

## 2022-09-28 RX ADMIN — PANTOPRAZOLE SODIUM 40 MILLIGRAM(S): 20 TABLET, DELAYED RELEASE ORAL at 12:20

## 2022-09-28 NOTE — PROGRESS NOTE ADULT - SUBJECTIVE AND OBJECTIVE BOX
Gastroenterology/Hepatology Progress Note    Interval Events: Patient with persistent fevers overnight. Hgb downtrending although no overt bleeding noted. INR uptrending. AC on hold.    Allergies:  No Known Allergies    Hospital Medications:  acetaminophen     Tablet .. 650 milliGRAM(s) Oral every 8 hours PRN  ALBUTerol    90 MICROgram(s) HFA Inhaler 2 Puff(s) Inhalation every 4 hours PRN  benzocaine 15 mG/menthol 3.6 mG Lozenge 1 Lozenge Oral once  benzonatate 100 milliGRAM(s) Oral three times a day PRN  guaifenesin/dextromethorphan Oral Liquid 10 milliLiter(s) Oral every 6 hours  influenza   Vaccine 0.5 milliLiter(s) IntraMuscular once  melatonin 3 milliGRAM(s) Oral at bedtime PRN  ondansetron Injectable 4 milliGRAM(s) IV Push every 8 hours PRN  pantoprazole  Injectable 40 milliGRAM(s) IV Push daily  phytonadione   Solution 5 milliGRAM(s) Oral daily  piperacillin/tazobactam IVPB.. 3.375 Gram(s) IV Intermittent every 8 hours      ROS: 14 point ROS negative unless otherwise state in subjective    PHYSICAL EXAM:   Vital Signs:  Vital Signs Last 24 Hrs  T(C): 36.7 (28 Sep 2022 12:11), Max: 38.5 (28 Sep 2022 04:13)  T(F): 98.1 (28 Sep 2022 12:11), Max: 101.3 (28 Sep 2022 04:13)  HR: 82 (28 Sep 2022 12:11) (82 - 117)  BP: 108/61 (28 Sep 2022 12:11) (108/61 - 114/69)  BP(mean): --  RR: 18 (28 Sep 2022 12:11) (18 - 18)  SpO2: 100% (28 Sep 2022 12:11) (100% - 100%)    Parameters below as of 28 Sep 2022 12:11  Patient On (Oxygen Delivery Method): room air      Daily     Daily     GENERAL:  No acute distress  HEENT:  NCAT, + scleral icterus  CHEST: no resp distress  HEART:  RRR  ABDOMEN:  Soft, non-tender, non-distended  EXTREMITIES:  No cyanosis, clubbing, or edema  SKIN:  No rash/erythema/ecchymoses/petechiae/wounds/abscess/warm/dry  NEURO:  Alert and oriented x 3    LABS:                        9.4    7.33  )-----------( 365      ( 28 Sep 2022 11:08 )             28.8     Mean Cell Volume: 101.1 fl (-22 @ 11:08)        138  |  105  |  11  ----------------------------<  93  3.2<L>   |  24  |  0.74    Ca    7.8<L>      28 Sep 2022 07:09    TPro  6.9  /  Alb  2.1<L>  /  TBili  1.9<H>  /  DBili  x   /  AST  73<H>  /  ALT  22  /  AlkPhos  550<H>      LIVER FUNCTIONS - ( 28 Sep 2022 07:09 )  Alb: 2.1 g/dL / Pro: 6.9 g/dL / ALK PHOS: 550 U/L / ALT: 22 U/L / AST: 73 U/L / GGT: 194 U/L       PT/INR - ( 28 Sep 2022 07:09 )   PT: 24.5 sec;   INR: 2.10 ratio         PTT - ( 28 Sep 2022 07:09 )  PTT:37.3 sec  Urinalysis Basic - ( 26 Sep 2022 22:46 )    Color: Dark Yellow / Appearance: Clear / S.040 / pH: x  Gluc: x / Ketone: Negative  / Bili: Moderate / Urobili: >8mg/dL   Blood: x / Protein: 30 mg/dL / Nitrite: Negative   Leuk Esterase: Negative / RBC: 70 /hpf / WBC 3 /HPF   Sq Epi: x / Non Sq Epi: 1 /hpf / Bacteria: Negative            Imaging:  reviewed

## 2022-09-28 NOTE — PROGRESS NOTE ADULT - ASSESSMENT
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 Pt had tested positive for COVID infection 10-days ago for which he received monoclonal antibodies. He has been having persistent fevers, dry cough and dyspnea on exertion.     #elevated alk phosp   Patient with alk phosp 400 (baseline 200-300)  Last stent exchange on 9/6. Currently continuing to uptrend. Tbili stable    #PSC on liver transplant list   MELD 16 (9/25)  MELD 18 (9/28)      #Persistent fevers: Initially thought 2/2 to covid, however fevers not improving despite being positive on 9/14. Per primary team, fevers reportedly started around time of last stent exchange.     #Hx PVT, not seen on recent RUQ US w/ doppler. AC on hold given falling Hgb    Recommendations:   - agree with obtaining MR w/ w/out IVC to evaluate stent given fevers, rising ALP  - agree w/ zosyn   - daily MELD scores (trend CMP, INR daily)   - can hold AC for now in setting of decreasing Hgb  - recommend 10 mg IV vitamin K x1     GI will continue to follow.     All recommendations are tentative until note is attested by attending.     Emmy Hu, PGY5  Gastroenterology/Hepatology Fellow  Available on [employer] Teams  [phone] ([employer] Short Range Pager)  [phone] (Long Range Pager)    After 5pm, please contact the on-call GI fellow. [phone]

## 2022-09-28 NOTE — PROGRESS NOTE ADULT - SUBJECTIVE AND OBJECTIVE BOX
Patient is a 32y old  Male who presents with a chief complaint of Persistent cough and fever (28 Sep 2022 17:28)        SUBJECTIVE / OVERNIGHT EVENTS: Patient says he's eating/drinking a little better. Had fever. Dry cough sometimes. No diarrhea. No dysuria. No abd pain.       MEDICATIONS  (STANDING):  guaifenesin/dextromethorphan Oral Liquid 10 milliLiter(s) Oral every 6 hours  influenza   Vaccine 0.5 milliLiter(s) IntraMuscular once  pantoprazole  Injectable 40 milliGRAM(s) IV Push daily  phytonadione  IVPB 10 milliGRAM(s) IV Intermittent once  piperacillin/tazobactam IVPB.. 3.375 Gram(s) IV Intermittent every 8 hours    MEDICATIONS  (PRN):  acetaminophen     Tablet .. 650 milliGRAM(s) Oral every 8 hours PRN Temp greater or equal to 38.5C (101.3F)  ALBUTerol    90 MICROgram(s) HFA Inhaler 2 Puff(s) Inhalation every 4 hours PRN Shortness of Breath and/or Wheezing  benzocaine 15 mG/menthol 3.6 mG Lozenge 1 Lozenge Oral every 4 hours PRN Sore Throat  benzonatate 100 milliGRAM(s) Oral three times a day PRN Cough  melatonin 3 milliGRAM(s) Oral at bedtime PRN Insomnia  ondansetron Injectable 4 milliGRAM(s) IV Push every 8 hours PRN Nausea and/or Vomiting      Vital Signs Last 24 Hrs  T(C): 36.7 (28 Sep 2022 12:11), Max: 38.5 (28 Sep 2022 04:13)  T(F): 98.1 (28 Sep 2022 12:11), Max: 101.3 (28 Sep 2022 04:13)  HR: 82 (28 Sep 2022 12:11) (82 - 117)  BP: 108/61 (28 Sep 2022 12:11) (108/61 - 113/63)  BP(mean): --  RR: 18 (28 Sep 2022 12:11) (18 - 18)  SpO2: 100% (28 Sep 2022 12:11) (100% - 100%)    Parameters below as of 28 Sep 2022 12:11  Patient On (Oxygen Delivery Method): room air      CAPILLARY BLOOD GLUCOSE        I&O's Summary    27 Sep 2022 07:01  -  28 Sep 2022 07:00  --------------------------------------------------------  IN: 720 mL / OUT: 0 mL / NET: 720 mL    28 Sep 2022 07:01  -  28 Sep 2022 21:23  --------------------------------------------------------  IN: 840 mL / OUT: 0 mL / NET: 840 mL          PHYSICAL EXAM:   GENERAL: NAD,    HEAD:  Atraumatic, Normocephalic  EYES: EOMI, PERRLA, conjunctiva and sclera icteric.   NECK: Supple   CHEST/LUNG: Clear to auscultation bilaterally; No wheeze  HEART: S1S2 normal. Regular rate and rhythm; No murmurs, rubs, or gallops  ABDOMEN: Soft, Nontender, Nondistended; Bowel sounds present  EXTREMITIES:  2+ Peripheral Pulses, No clubbing, cyanosis, or edema  PSYCH/Neuro: AAOx3. Non-focal. Left arm contracture.   SKIN: Penile skin without signs of infection.       LABS:                        9.4    7.33  )-----------( 365      ( 28 Sep 2022 11:08 )             28.8         138  |  105  |  11  ----------------------------<  93  3.2<L>   |  24  |  0.74    Ca    7.8<L>      28 Sep 2022 07:09    TPro  6.9  /  Alb  2.1<L>  /  TBili  1.9<H>  /  DBili  x   /  AST  73<H>  /  ALT  22  /  AlkPhos  550<H>      PT/INR - ( 28 Sep 2022 07:09 )   PT: 24.5 sec;   INR: 2.10 ratio         PTT - ( 28 Sep 2022 07:09 )  PTT:37.3 sec      Urinalysis Basic - ( 26 Sep 2022 22:46 )    Color: Dark Yellow / Appearance: Clear / S.040 / pH: x  Gluc: x / Ketone: Negative  / Bili: Moderate / Urobili: >8mg/dL   Blood: x / Protein: 30 mg/dL / Nitrite: Negative   Leuk Esterase: Negative / RBC: 70 /hpf / WBC 3 /HPF   Sq Epi: x / Non Sq Epi: 1 /hpf / Bacteria: Negative      t< from: Transthoracic Echocardiogram (22 @ 13:15) >  Conclusions:  1. Normal left ventricular internal dimensions and wall  thicknesses.  2. Normal left ventricular systolic function. No segmental  wall motion abnormalities.  3. Normal right ventricular size and function.  *** No previous Echo exam.  Unable to rule out endocarditis. Consider JOSE FRANCISCO if clinically  indicated.    < end of copied text >      RADIOLOGY & ADDITIONAL TESTS:    Imaging Personally Reviewed: echo report.   Consultant(s) Notes Reviewed:  GI, hepatology, ID  Care Discussed with Consultants/Other Providers: hepatology

## 2022-09-28 NOTE — PROGRESS NOTE ADULT - SUBJECTIVE AND OBJECTIVE BOX
Follow Up:      Interval History:    REVIEW OF SYSTEMS  [  ] ROS unobtainable because:    [  ] All other systems negative except as noted below    Constitutional:  [ ] fever [ ] chills  [ ] weight loss  [ ] weakness  Skin:  [ ] rash [ ] phlebitis	  Eyes: [ ] icterus [ ] pain  [ ] discharge	  ENMT: [ ] sore throat  [ ] thrush [ ] ulcers [ ] exudates  Respiratory: [ ] dyspnea [ ] hemoptysis [ ] cough [ ] sputum	  Cardiovascular:  [ ] chest pain [ ] palpitations [ ] edema	  Gastrointestinal:  [ ] nausea [ ] vomiting [ ] diarrhea [ ] constipation [ ] pain	  Genitourinary:  [ ] dysuria [ ] frequency [ ] hematuria [ ] discharge [ ] flank pain  [ ] incontinence  Musculoskeletal:  [ ] myalgias [ ] arthralgias [ ] arthritis  [ ] back pain  Neurological:  [ ] headache [ ] seizures  [ ] confusion/altered mental status    Allergies  No Known Allergies        ANTIMICROBIALS:  piperacillin/tazobactam IVPB.. 3.375 every 8 hours      OTHER MEDS:  MEDICATIONS  (STANDING):  acetaminophen     Tablet .. 650 every 8 hours PRN  ALBUTerol    90 MICROgram(s) HFA Inhaler 2 every 4 hours PRN  benzonatate 100 three times a day PRN  guaifenesin/dextromethorphan Oral Liquid 10 every 6 hours  influenza   Vaccine 0.5 once  melatonin 3 at bedtime PRN  ondansetron Injectable 4 every 8 hours PRN  pantoprazole  Injectable 40 daily      Vital Signs Last 24 Hrs  T(C): 36.7 (28 Sep 2022 12:11), Max: 38.5 (28 Sep 2022 04:13)  T(F): 98.1 (28 Sep 2022 12:11), Max: 101.3 (28 Sep 2022 04:13)  HR: 82 (28 Sep 2022 12:11) (82 - 117)  BP: 108/61 (28 Sep 2022 12:11) (103/50 - 114/69)  BP(mean): --  RR: 18 (28 Sep 2022 12:11) (18 - 18)  SpO2: 100% (28 Sep 2022 12:11) (100% - 100%)    Parameters below as of 28 Sep 2022 12:11  Patient On (Oxygen Delivery Method): room air        PHYSICAL EXAMINATION:  General: Alert and Awake, NAD  HEENT: PERRL, EOMI  Neck: Supple  Cardiac: RRR, No M/R/G  Resp: CTAB, No Wh/Rh/Ra  Abdomen: NBS, NT/ND, No HSM, No rigidity or guarding  MSK: No LE edema. No Calf tenderness  : No chavez  Skin: No rashes or lesions. Skin is warm and dry to the touch.   Neuro: Alert and Awake. CN 2-12 Grossly intact. Moves all four extremities spontaneously.  Psych: Calm, Pleasant, Cooperative                          9.4    7.33  )-----------( 365      ( 28 Sep 2022 11:08 )             28.8           138  |  105  |  11  ----------------------------<  93  3.2<L>   |  24  |  0.74    Ca    7.8<L>      28 Sep 2022 07:09    TPro  6.9  /  Alb  2.1<L>  /  TBili  1.9<H>  /  DBili  x   /  AST  73<H>  /  ALT  22  /  AlkPhos  550<H>        Urinalysis Basic - ( 26 Sep 2022 22:46 )    Color: Dark Yellow / Appearance: Clear / S.040 / pH: x  Gluc: x / Ketone: Negative  / Bili: Moderate / Urobili: >8mg/dL   Blood: x / Protein: 30 mg/dL / Nitrite: Negative   Leuk Esterase: Negative / RBC: 70 /hpf / WBC 3 /HPF   Sq Epi: x / Non Sq Epi: 1 /hpf / Bacteria: Negative        MICROBIOLOGY:  v  Clean Catch Clean Catch (Midstream)  22   No growth  --  --      .Blood Blood  22   No growth to date.  --  --      .Blood Blood  22   No growth to date.  --  --      .Blood Blood-Peripheral  22   No growth to date.  --  --      .Blood Blood-Peripheral  22   No growth to date.  --  --          CMVPCR Log: NotDetec Kns91UG/mL ( @ 07:09)  Rapid RVP Result: NotDetec ( @ 19:47)        RADIOLOGY:    <The imaging below has been reviewed and visualized by me independently. Findings as detailed in report below> Follow Up:  Fever    Interval History: continued fevers. no acute process.     REVIEW OF SYSTEMS  [  ] ROS unobtainable because:    [x  ] All other systems negative except as noted below    Constitutional:  [ x ] fever [ ] chills  [ ] weight loss  [ ] weakness  Skin:  [ ] rash [ ] phlebitis	  Eyes: [ ] icterus [ ] pain  [ ] discharge	  ENMT: [ ] sore throat  [ ] thrush [ ] ulcers [ ] exudates  Respiratory: [ ] dyspnea [ ] hemoptysis [ ] cough [ ] sputum	  Cardiovascular:  [ ] chest pain [ ] palpitations [ ] edema	  Gastrointestinal:  [ ] nausea [ ] vomiting [ ] diarrhea [ ] constipation [ ] pain	  Genitourinary:  [ ] dysuria [ ] frequency [ ] hematuria [ ] discharge [ ] flank pain  [ ] incontinence  Musculoskeletal:  [ ] myalgias [ ] arthralgias [ ] arthritis  [ ] back pain  Neurological:  [ ] headache [ ] seizures  [ ] confusion/altered mental status    Allergies  No Known Allergies        ANTIMICROBIALS:  piperacillin/tazobactam IVPB.. 3.375 every 8 hours      OTHER MEDS:  MEDICATIONS  (STANDING):  acetaminophen     Tablet .. 650 every 8 hours PRN  ALBUTerol    90 MICROgram(s) HFA Inhaler 2 every 4 hours PRN  benzonatate 100 three times a day PRN  guaifenesin/dextromethorphan Oral Liquid 10 every 6 hours  influenza   Vaccine 0.5 once  melatonin 3 at bedtime PRN  ondansetron Injectable 4 every 8 hours PRN  pantoprazole  Injectable 40 daily      Vital Signs Last 24 Hrs  T(C): 36.7 (28 Sep 2022 12:11), Max: 38.5 (28 Sep 2022 04:13)  T(F): 98.1 (28 Sep 2022 12:11), Max: 101.3 (28 Sep 2022 04:13)  HR: 82 (28 Sep 2022 12:11) (82 - 117)  BP: 108/61 (28 Sep 2022 12:11) (103/50 - 114/69)  BP(mean): --  RR: 18 (28 Sep 2022 12:11) (18 - 18)  SpO2: 100% (28 Sep 2022 12:11) (100% - 100%)    Parameters below as of 28 Sep 2022 12:11  Patient On (Oxygen Delivery Method): room air    PHYSICAL EXAMINATION:  General: Alert and Awake, NAD  HEENT: PERRL, EOMI  Neck: Supple  Cardiac: RRR, No M/R/G  Resp: CTAB, No Wh/Rh/Ra  Abdomen: NBS, NT/ND, No HSM, No rigidity or guarding  MSK: No LE edema. No Calf tenderness  : No chavez  Skin: No rashes or lesions. Skin is warm and dry to the touch.   Neuro: Alert and Awake. CN 2-12 Grossly intact. Moves all four extremities spontaneously.  Psych: Calm, Pleasant, Cooperative                          9.4    7.33  )-----------( 365      ( 28 Sep 2022 11:08 )             28.8           138  |  105  |  11  ----------------------------<  93  3.2<L>   |  24  |  0.74    Ca    7.8<L>      28 Sep 2022 07:09    TPro  6.9  /  Alb  2.1<L>  /  TBili  1.9<H>  /  DBili  x   /  AST  73<H>  /  ALT  22  /  AlkPhos  550<H>        Urinalysis Basic - ( 26 Sep 2022 22:46 )    Color: Dark Yellow / Appearance: Clear / S.040 / pH: x  Gluc: x / Ketone: Negative  / Bili: Moderate / Urobili: >8mg/dL   Blood: x / Protein: 30 mg/dL / Nitrite: Negative   Leuk Esterase: Negative / RBC: 70 /hpf / WBC 3 /HPF   Sq Epi: x / Non Sq Epi: 1 /hpf / Bacteria: Negative        MICROBIOLOGY:  v  Clean Catch Clean Catch (Midstream)  22   No growth  --  --      .Blood Blood  22   No growth to date.  --  --      .Blood Blood  22   No growth to date.  --  --      .Blood Blood-Peripheral  22   No growth to date.  --  --      .Blood Blood-Peripheral  22   No growth to date.  --  --          CMVPCR Log: Anne Uqt98QL/mL ( @ 07:09)  Rapid RVP Result: NotDetec ( @ 19:47)        RADIOLOGY:    <The imaging below has been reviewed and visualized by me independently. Findings as detailed in report below>    < from: CT Abdomen and Pelvis w/ IV Cont (22 @ 12:18) >  IMPRESSION:  Mild irregular intrahepatic biliary ductal dilatation, appears slightly   improved from prior MRI 2022, line for differences in technique. CBD   stents in place.    Gallbladder is distended with mild nonspecific wall thickening.    Trace ascites.    < end of copied text >

## 2022-09-28 NOTE — RAPID RESPONSE TEAM SUMMARY - NSOTHERINTERVENTIONSRRT_GEN_ALL_CORE
Vitamin K infusion likely unrelated to pt presentation, likely musculoskeletal back spasm after lengthy MRI exam today   Pt reports pain improving during assessment   Warm packs to back for pain, reassess

## 2022-09-28 NOTE — PROGRESS NOTE ADULT - ASSESSMENT
32 year old M with PMHx of Cerebral palsy, Primary Sclerosing Cholangitis, recent ERCP with stent change on 9/6, on liver transplant list p/w persistent fevers, cough and generalized fatigue.     COVID PCR Positive on 9/14  s/p monoclonal antibodies on 9/16    UA (9/24) 3 WBC.   BCx (9/24, 9/26) NGTD  COVID19 PCR (9/24)   CT Value for COVID19 of 29.6 (suggestive of low viral burden)  RVP (9/26) Negative    CTA Chest (9/24) without infiltrates or PE  CT A/P (9/27) No acute process but with distended Gallbladder with mild nonspecific wall thickening.  Abdominal Doppler (9/27) with no thrombus  RUQ US (9/27) Distended gallbladder without significant wall thickening, No stones or pericholecystic fluid.     #Fever, PSC, Pre-OLT  --ID clearance for OLT pending infectious workup  --Continue Zosyn  --Follow up on MRCP and TTE  --Continue to follow CBC with diff  --Continue to follow transaminases  --Continue to follow temperature curve  --Follow up on preliminary blood cultures    #COVID19 Positive  Low concern for contribution to clinical presentation  CT Value for COVID19 of 29.6 (suggestive of low viral burden)  Remdesivir discontinued    I will continue to follow. Please feel free to contact me with any further questions.    Roque Reed M.D.  [facility] Division of Infectious Disease  8AM-5PM Monday - Friday: Available on [employer] Teams  After Hours and Holidays (or if no response on [employer] Teams): Please contact the Infectious Diseases Office at [phone]

## 2022-09-28 NOTE — CONSULT NOTE ADULT - ATTENDING COMMENTS
As above    Impression:    #1.  Consulted for biliary stricture is setting of persistent fever in patient with primary sclerosing cholangitis,, s/p ERCP/bilateral biliary stents early Sept 2022.    #2.  Mildly abnormal LFTs, except for alk phos 500's.    #3.  Pt states poking type of discomfort of RUQ abd pain, but pleuritic chest pain/cough has recently resolved.    #4.  Review of imaging demonstrates biliary stents in place, small pneumobilia without dilated biliary tree.    Recommendations:    #1.  Fever is unlikely from cholangitis given lack of jaundice and no biliary dilation on imaging.    #2.  Follow CBC/LFTs    #3.  Await official MRCP report.    #4.  Continue antibiotics     #5.  No current plans for ERCP.
32-year-old male with history of cerebral palsy, primary sclerosing cholangitis currently on the [facility] transplant list initially presented with persistent fever, cough and generalized fatigue and was found to have COVID-19 infection.  Patient has been positive for COVID-19 PCR since about 10 days and did not receive monoclonal antibodies as an outpatient.  However due to persistent symptoms he was admitted.  Overall stable liver function tests.  Patient likely will need remdesivir.  Will discuss with ID.
Patient on liver transplant list for PSC  COVID19 PCR Positive on 9/14  s/p mAB  Continued fevers  CT Chest without infiltrates  CT Value for COVID19 of 29.6 (suggestive of low viral burden)  Doubt COVID19 as etiology of fevers  Would stop Remdesivir and discontinue isolation  Check RVP to exclude alternate viral etiology of presentation  If RVP negative would check CT A/P to exclude intraabdominal focus of infection  Liver transplant clearance pending infectious workup    I will continue to follow. Please feel free to contact me with any further questions.    Roque Reed M.D.  [facility] Division of Infectious Disease  8AM-5PM Monday - Friday: Available on [employer] Teams  After Hours and Holidays (or if no response on [employer] Teams): Please contact the Infectious Diseases Office at [phone]    The above assessment and plan were discussed with Dr Osorio

## 2022-09-28 NOTE — CONSULT NOTE ADULT - SUBJECTIVE AND OBJECTIVE BOX
HPI:  SAUMYA ZARATE is a 32 year old male with history of cerebral palsy, PSC c/b biliary strictures s/p repeat biliary stent placement who presents with fever, cough, and COVID infection.    Patient has history of PSC for which he is on the liver transplantation list.  He has history of biliary strictures and underwent repeat ERCP with biliary stent placement as recently as 9/6/2022 with plastic stents placed in bilateral hepatic ducts.  He developed fever, cough, and worsening dyspnea several days after ERCP and found to have COVID+.  He underwent monoclonal antibodies.  GI consulted for persistent fevers and assess for biliary source.  Currently, patient had fever overnight, intermittent abdominal pain this hospital stay however none at this time, but otherwise patient denies dysphagia, odynophagia, nausea, vomiting, diarrhea, melena, hematemesis, hematochezia.    ROS:   General:  No fevers, chills, night sweats, fatigue  Eyes:  Good vision, no reported pain  ENT:  No sore throat, pain, runny nose  CV:  No pain, palpitations  Pulm:  No dyspnea, cough  GI:  See HPI, otherwise negative  :  No  incontinence, nocturia  Muscle:  No pain, weakness  Neuro:  No memory problems  Psych:  No insomnia, mood problems, depression  Endocrine:  No polyuria, polydipsia, cold/heat intolerance  Heme:  No petechiae, ecchymosis, easy bruisability  Skin:  No rash    PMHX/PSHX:    Cerebral palsy    Cerebral palsy    PSC (primary sclerosing cholangitis)    Abnormal LFTs    Bile duct stricture    Dental caries    Impacted teeth    Phimosis    History of seizures    Anemia    No significant past surgical history    No significant past surgical history    History of ERCP    History of biliary stent insertion    History of biliary stent insertion    [place] teeth extracted      Allergies:  No Known Allergies      Home Medications: reviewed  Hospital Medications:  acetaminophen     Tablet .. 650 milliGRAM(s) Oral every 8 hours PRN  ALBUTerol    90 MICROgram(s) HFA Inhaler 2 Puff(s) Inhalation every 4 hours PRN  benzocaine 15 mG/menthol 3.6 mG Lozenge 1 Lozenge Oral once  benzonatate 100 milliGRAM(s) Oral three times a day PRN  guaifenesin/dextromethorphan Oral Liquid 10 milliLiter(s) Oral every 6 hours  influenza   Vaccine 0.5 milliLiter(s) IntraMuscular once  melatonin 3 milliGRAM(s) Oral at bedtime PRN  ondansetron Injectable 4 milliGRAM(s) IV Push every 8 hours PRN  pantoprazole  Injectable 40 milliGRAM(s) IV Push daily  phytonadione   Solution 5 milliGRAM(s) Oral daily  piperacillin/tazobactam IVPB.. 3.375 Gram(s) IV Intermittent every 8 hours      Social History:   Tobacco: denies  Alcohol: denies  Recreational drugs: denies    Family history:    No pertinent family history in first degree relatives      Denies family history of colon cancer/polyps, stomach cancer/polyps, pancreatic cancer/masses, liver cancer/disease, ovarian cancer and endometrial cancer.    PHYSICAL EXAM:   Vital Signs:  Vital Signs Last 24 Hrs  T(C): 36.7 (28 Sep 2022 12:11), Max: 38.5 (28 Sep 2022 04:13)  T(F): 98.1 (28 Sep 2022 12:11), Max: 101.3 (28 Sep 2022 04:13)  HR: 82 (28 Sep 2022 12:11) (82 - 117)  BP: 108/61 (28 Sep 2022 12:11) (103/50 - 114/69)  BP(mean): --  RR: 18 (28 Sep 2022 12:11) (18 - 18)  SpO2: 100% (28 Sep 2022 12:11) (100% - 100%)    Parameters below as of 28 Sep 2022 12:11  Patient On (Oxygen Delivery Method): room air      Daily     Daily     GENERAL: no acute distress  NEURO: alert  HEENT: NCAT, no conjunctival pallor appreciated  CHEST: no respiratory distress, no accessory muscle use  CARDIAC: regular rate, +S1/S2  ABDOMEN: soft, nontender, no rebound or guarding  EXTREMITIES: warm, well perfused  SKIN: no lesions noted    LABS: reviewed                        9.4    7.33  )-----------( 365      ( 28 Sep 2022 11:08 )             28.8     09-28    138  |  105  |  11  ----------------------------<  93  3.2<L>   |  24  |  0.74    Ca    7.8<L>      28 Sep 2022 07:09    TPro  6.9  /  Alb  2.1<L>  /  TBili  1.9<H>  /  DBili  x   /  AST  73<H>  /  ALT  22  /  AlkPhos  550<H>  09-28    LIVER FUNCTIONS - ( 28 Sep 2022 07:09 )  Alb: 2.1 g/dL / Pro: 6.9 g/dL / ALK PHOS: 550 U/L / ALT: 22 U/L / AST: 73 U/L / GGT: 194 U/L         Culture - Urine (collected 26 Sep 2022 22:46)  Source: Clean Catch Clean Catch (Midstream)  Final Report (28 Sep 2022 08:58):    No growth    Culture - Blood (collected 26 Sep 2022 14:18)  Source: .Blood Blood  Preliminary Report (27 Sep 2022 22:02):    No growth to date.    Culture - Blood (collected 26 Sep 2022 14:00)  Source: .Blood Blood  Preliminary Report (27 Sep 2022 22:02):    No growth to date.        Diagnostic Studies: see sunrise for full report

## 2022-09-28 NOTE — RAPID RESPONSE TEAM SUMMARY - NSMEDICATIONSRRT_GEN_ALL_CORE
0.9% Normal Saline 1000mL bolus over 30 minutes, RN to reassess HR after IVF  RN to restart Vitamin K infusion over slower rate, see if pt able to tolerate  If pt begins to have symptoms again when infusion restarted, RN to stop infusion and call primary team

## 2022-09-28 NOTE — CONSULT NOTE ADULT - ASSESSMENT
32 year old male with history of cerebral palsy, PSC c/b biliary strictures s/p repeat biliary stent placement who presents with fever, cough, and COVID infection.  Underwent repeat ERCP with biliary stent placement as recently as 9/6/2022 with plastic stents placed in bilateral hepatic ducts.  He developed fever, cough, and worsening dyspnea several days after ERCP and found to have COVID+.  He underwent monoclonal antibodies.  GI consulted for persistent fevers and assess for biliary source.      # Persistent fevers  # Recent COVID infection  # History of PSC c/b biliary strictures s/p biliary stent [most recently placed 9/6/2022]    Recommendations:  -trend clinical symptoms, exam findings, vital signs, CBC, CMP, INR  -recommend complete infectious workup [blood cultures, urine culture, fungal cultures, CXR]  -broad spectrum antibiotics  -appreciate input from ID  -low suspicion for cholangitis due to lack of abdominal pain at time of interview, downtrending liver chemistries, and improved intrahepatic biliary dilatation as compared to prior  -f/u MRI/MRCP ordered by primary team    Note incomplete until finalized by attending signature/attestation.    Juan R Castillo  GI/Hepatology Fellow    MONDAY-FRIDAY 8AM-5PM:  Pager# [phone] ([facility]) or [phone] ([facility])    NON-URGENT CONSULTS:  Please email [email or web address]  AT NIGHT AND ON WEEKENDS:  Contact on-call GI fellow via answering service ([phone]) from 5pm-8am and on weekends/holidays

## 2022-09-29 LAB
ALBUMIN SERPL ELPH-MCNC: 2.1 G/DL — LOW (ref 3.3–5)
ALP SERPL-CCNC: 549 U/L — HIGH (ref 40–120)
ALT FLD-CCNC: 29 U/L — SIGNIFICANT CHANGE UP (ref 10–45)
ANION GAP SERPL CALC-SCNC: 9 MMOL/L — SIGNIFICANT CHANGE UP (ref 5–17)
APTT BLD: 33 SEC — SIGNIFICANT CHANGE UP (ref 27.5–35.5)
AST SERPL-CCNC: 93 U/L — HIGH (ref 10–40)
BASOPHILS # BLD AUTO: 0 K/UL — SIGNIFICANT CHANGE UP (ref 0–0.2)
BASOPHILS NFR BLD AUTO: 0 % — SIGNIFICANT CHANGE UP (ref 0–2)
BILIRUB SERPL-MCNC: 1.9 MG/DL — HIGH (ref 0.2–1.2)
BUN SERPL-MCNC: 10 MG/DL — SIGNIFICANT CHANGE UP (ref 7–23)
CALCIUM SERPL-MCNC: 7.8 MG/DL — LOW (ref 8.4–10.5)
CHLORIDE SERPL-SCNC: 107 MMOL/L — SIGNIFICANT CHANGE UP (ref 96–108)
CO2 SERPL-SCNC: 23 MMOL/L — SIGNIFICANT CHANGE UP (ref 22–31)
CREAT SERPL-MCNC: 0.79 MG/DL — SIGNIFICANT CHANGE UP (ref 0.5–1.3)
EGFR: 123 ML/MIN/1.73M2 — SIGNIFICANT CHANGE UP
EOSINOPHIL # BLD AUTO: 0.23 K/UL — SIGNIFICANT CHANGE UP (ref 0–0.5)
EOSINOPHIL NFR BLD AUTO: 4.2 % — SIGNIFICANT CHANGE UP (ref 0–6)
GLUCOSE SERPL-MCNC: 85 MG/DL — SIGNIFICANT CHANGE UP (ref 70–99)
HCT VFR BLD CALC: 27.9 % — LOW (ref 39–50)
HGB BLD-MCNC: 8.8 G/DL — LOW (ref 13–17)
INR BLD: 1.37 RATIO — HIGH (ref 0.88–1.16)
LYMPHOCYTES # BLD AUTO: 1.07 K/UL — SIGNIFICANT CHANGE UP (ref 1–3.3)
LYMPHOCYTES # BLD AUTO: 19.5 % — SIGNIFICANT CHANGE UP (ref 13–44)
MCHC RBC-ENTMCNC: 31.5 GM/DL — LOW (ref 32–36)
MCHC RBC-ENTMCNC: 33.3 PG — SIGNIFICANT CHANGE UP (ref 27–34)
MCV RBC AUTO: 105.7 FL — HIGH (ref 80–100)
MONOCYTES # BLD AUTO: 0.28 K/UL — SIGNIFICANT CHANGE UP (ref 0–0.9)
MONOCYTES NFR BLD AUTO: 5.1 % — SIGNIFICANT CHANGE UP (ref 2–14)
NEUTROPHILS # BLD AUTO: 3.89 K/UL — SIGNIFICANT CHANGE UP (ref 1.8–7.4)
NEUTROPHILS NFR BLD AUTO: 71.2 % — SIGNIFICANT CHANGE UP (ref 43–77)
PLATELET # BLD AUTO: 346 K/UL — SIGNIFICANT CHANGE UP (ref 150–400)
POTASSIUM SERPL-MCNC: 3.7 MMOL/L — SIGNIFICANT CHANGE UP (ref 3.5–5.3)
POTASSIUM SERPL-SCNC: 3.7 MMOL/L — SIGNIFICANT CHANGE UP (ref 3.5–5.3)
PROT SERPL-MCNC: 6.8 G/DL — SIGNIFICANT CHANGE UP (ref 6–8.3)
PROTHROM AB SERPL-ACNC: 16 SEC — HIGH (ref 10.5–13.4)
RBC # BLD: 2.64 M/UL — LOW (ref 4.2–5.8)
RBC # FLD: 15.3 % — HIGH (ref 10.3–14.5)
SODIUM SERPL-SCNC: 139 MMOL/L — SIGNIFICANT CHANGE UP (ref 135–145)
WBC # BLD: 5.47 K/UL — SIGNIFICANT CHANGE UP (ref 3.8–10.5)
WBC # FLD AUTO: 5.47 K/UL — SIGNIFICANT CHANGE UP (ref 3.8–10.5)

## 2022-09-29 PROCEDURE — 99232 SBSQ HOSP IP/OBS MODERATE 35: CPT

## 2022-09-29 PROCEDURE — 99233 SBSQ HOSP IP/OBS HIGH 50: CPT

## 2022-09-29 PROCEDURE — 99232 SBSQ HOSP IP/OBS MODERATE 35: CPT | Mod: GC

## 2022-09-29 RX ORDER — SIMETHICONE 80 MG/1
80 TABLET, CHEWABLE ORAL THREE TIMES A DAY
Refills: 0 | Status: DISCONTINUED | OUTPATIENT
Start: 2022-09-29 | End: 2022-09-30

## 2022-09-29 RX ADMIN — SIMETHICONE 80 MILLIGRAM(S): 80 TABLET, CHEWABLE ORAL at 13:54

## 2022-09-29 RX ADMIN — PIPERACILLIN AND TAZOBACTAM 25 GRAM(S): 4; .5 INJECTION, POWDER, LYOPHILIZED, FOR SOLUTION INTRAVENOUS at 06:59

## 2022-09-29 RX ADMIN — PIPERACILLIN AND TAZOBACTAM 25 GRAM(S): 4; .5 INJECTION, POWDER, LYOPHILIZED, FOR SOLUTION INTRAVENOUS at 23:18

## 2022-09-29 RX ADMIN — PIPERACILLIN AND TAZOBACTAM 25 GRAM(S): 4; .5 INJECTION, POWDER, LYOPHILIZED, FOR SOLUTION INTRAVENOUS at 14:00

## 2022-09-29 RX ADMIN — BENZOCAINE AND MENTHOL 1 LOZENGE: 5; 1 LIQUID ORAL at 21:57

## 2022-09-29 RX ADMIN — SIMETHICONE 80 MILLIGRAM(S): 80 TABLET, CHEWABLE ORAL at 21:56

## 2022-09-29 RX ADMIN — BENZOCAINE AND MENTHOL 1 LOZENGE: 5; 1 LIQUID ORAL at 07:01

## 2022-09-29 RX ADMIN — BENZOCAINE AND MENTHOL 1 LOZENGE: 5; 1 LIQUID ORAL at 17:58

## 2022-09-29 RX ADMIN — PANTOPRAZOLE SODIUM 40 MILLIGRAM(S): 20 TABLET, DELAYED RELEASE ORAL at 11:47

## 2022-09-29 NOTE — PROGRESS NOTE ADULT - ASSESSMENT
32 year old male with history of cerebral palsy, PSC c/b biliary strictures s/p repeat biliary stent placement who presents with fever, cough, and COVID infection.  Underwent repeat ERCP with biliary stent placement as recently as 9/6/2022 with plastic stents placed in bilateral hepatic ducts.  He developed fever, cough, and worsening dyspnea several days after ERCP and found to have COVID+.  He underwent monoclonal antibodies.  GI consulted for persistent fevers and assess for biliary source.      # Persistent fevers  # Recent COVID infection  # History of PSC c/b biliary strictures s/p biliary stent [most recently placed 9/6/2022]  # RRT on evening of 9/28/2022 for vision changes and back pain    Recommendations:  -trend clinical symptoms, exam findings, vital signs, CBC, CMP, INR  -f/u complete infectious workup [blood cultures, urine culture, fungal cultures, CXR]  -broad spectrum antibiotics  -appreciate input from ID  -low suspicion for cholangitis due to lack of abdominal pain at time of interview, downtrending liver chemistries, and improved intrahepatic biliary dilatation as compared to prior  -fever is unlikely from cholangitis given lack of jaundice and no biliary dilation on imaging; MRI/MRCP negative for significant biliary dilatation  -no further plans for anticipated ERCP or inpatient interventions  -will sign off at this time, please call back with questions or if new issues arise  -remainder per primary team    Note incomplete until finalized by attending signature/attestation.    Juan R Castillo  GI/Hepatology Fellow    MONDAY-FRIDAY 8AM-5PM:  Pager# [phone] ([facility]) or [phone] ([facility])    NON-URGENT CONSULTS:  Please email [email or web address]  AT NIGHT AND ON WEEKENDS:  Contact on-call GI fellow via answering service ([phone]) from 5pm-8am and on weekends/holidays

## 2022-09-29 NOTE — PROGRESS NOTE ADULT - ASSESSMENT
32 year old M with PMHx of Cerebral palsy, Primary Sclerosing Cholangitis, recent ERCP with stent change on 9/6, on liver transplant list p/w persistent fevers, cough and generalized fatigue.     COVID PCR Positive on 9/14  s/p monoclonal antibodies on 9/16    UA (9/24) 3 WBC.   BCx (9/24, 9/26) NGTD  COVID19 PCR (9/24)   CT Value for COVID19 of 29.6 (suggestive of low viral burden)  RVP (9/26) Negative    CTA Chest (9/24) without infiltrates or PE  CT A/P (9/27) No acute process but with distended Gallbladder with mild nonspecific wall thickening.  Abdominal Doppler (9/27) with no thrombus  RUQ US (9/27) Distended gallbladder without significant wall thickening, No stones or pericholecystic fluid.   TTE without vegetations  MRCP without focal abnormalities    #Fever, PSC, Pre-OLT  --Continue Zosyn for now - if no more fevers can discharge tomorrow and favor completing 7 day empiric course of antibiotics. Can utilize Augmentin 875 mg PO Q12H. If fevers recur a bacterial etiology of fever is less likely and would stop antibiotics.    --Continue to follow CBC with diff  --Continue to follow transaminases  --Continue to follow temperature curve  --Follow up on preliminary blood cultures    #COVID19 Positive  Low concern for contribution to clinical presentation  CT Value for COVID19 of 29.6 (suggestive of low viral burden)  Remdesivir discontinued    I will continue to follow. Please feel free to contact me with any further questions.    Roque Reed M.D.  [facility] Division of Infectious Disease  8AM-5PM Monday - Friday: Available on [employer] Teams  After Hours and Holidays (or if no response on [employer] Teams): Please contact the Infectious Diseases Office at [phone]    The above assessment and plan were discussed with Dr Osorio

## 2022-09-29 NOTE — PROGRESS NOTE ADULT - SUBJECTIVE AND OBJECTIVE BOX
Patient is a 32y old  Male who presents with a chief complaint of Persistent cough and fever (29 Sep 2022 16:04)        SUBJECTIVE / OVERNIGHT EVENTS: Had RRT overnight due to muscle spasm in back and tachycardia during Vitamin K infusion. Improved with IVF's. Today says he feels better and is eating better now. Has some gas. No diarrhea. No fever since yesterday morning.       MEDICATIONS  (STANDING):  guaifenesin/dextromethorphan Oral Liquid 10 milliLiter(s) Oral every 6 hours  influenza   Vaccine 0.5 milliLiter(s) IntraMuscular once  pantoprazole  Injectable 40 milliGRAM(s) IV Push daily  piperacillin/tazobactam IVPB.. 3.375 Gram(s) IV Intermittent every 8 hours  simethicone 80 milliGRAM(s) Chew three times a day    MEDICATIONS  (PRN):  acetaminophen     Tablet .. 650 milliGRAM(s) Oral every 8 hours PRN Temp greater or equal to 38.5C (101.3F)  ALBUTerol    90 MICROgram(s) HFA Inhaler 2 Puff(s) Inhalation every 4 hours PRN Shortness of Breath and/or Wheezing  benzocaine 15 mG/menthol 3.6 mG Lozenge 1 Lozenge Oral every 4 hours PRN Sore Throat  benzonatate 100 milliGRAM(s) Oral three times a day PRN Cough  melatonin 3 milliGRAM(s) Oral at bedtime PRN Insomnia  ondansetron Injectable 4 milliGRAM(s) IV Push every 8 hours PRN Nausea and/or Vomiting      Vital Signs Last 24 Hrs  T(C): 36.4 (29 Sep 2022 12:24), Max: 36.7 (29 Sep 2022 04:22)  T(F): 97.6 (29 Sep 2022 12:24), Max: 98.1 (29 Sep 2022 04:22)  HR: 89 (29 Sep 2022 12:24) (83 - 89)  BP: 103/65 (29 Sep 2022 12:24) (103/65 - 113/71)  BP(mean): --  RR: 18 (29 Sep 2022 12:24) (18 - 18)  SpO2: 100% (29 Sep 2022 12:24) (99% - 100%)    Parameters below as of 29 Sep 2022 12:24  Patient On (Oxygen Delivery Method): room air      CAPILLARY BLOOD GLUCOSE      POCT Blood Glucose.: 108 mg/dL (28 Sep 2022 22:13)    I&O's Summary    28 Sep 2022 07:01  -  29 Sep 2022 07:00  --------------------------------------------------------  IN: 1320 mL / OUT: 0 mL / NET: 1320 mL    29 Sep 2022 07:01  -  29 Sep 2022 20:47  --------------------------------------------------------  IN: 720 mL / OUT: 0 mL / NET: 720 mL          PHYSICAL EXAM:   GENERAL: NAD, well-developed  HEAD:  Atraumatic, Normocephalic  EYES:   conjunctiva and sclera icteric.   NECK: Supple  CHEST/LUNG: Clear to auscultation bilaterally; No wheeze  HEART: S1S2 normal. Regular rate and rhythm; No murmurs, rubs, or gallops  ABDOMEN: Soft, Nontender, Nondistended; Bowel sounds present  EXTREMITIES:  2+ Peripheral Pulses, No clubbing, cyanosis, or edema  PSYCH/Neuro: AAOx3. Non-focal. Left arm contracture.   SKIN: No rashes or lesions      LABS:                        8.8    5.47  )-----------( 346      ( 29 Sep 2022 08:17 )             27.9     09-29    139  |  107  |  10  ----------------------------<  85  3.7   |  23  |  0.79    Ca    7.8<L>      29 Sep 2022 08:17    TPro  6.8  /  Alb  2.1<L>  /  TBili  1.9<H>  /  DBili  x   /  AST  93<H>  /  ALT  29  /  AlkPhos  549<H>  09-29    PT/INR - ( 29 Sep 2022 08:17 )   PT: 16.0 sec;   INR: 1.37 ratio         PTT - ( 29 Sep 2022 08:17 )  PTT:33.0 sec      < from: MR MRCP w/wo IV Cont (09.28.22 @ 20:40) >  IMPRESSION:    Primary sclerosing cholangitis with cirrhosis and sequela of portal   venous hypertension as described. No intrahepatic abscess or suspicious   lesion.    No significant biliary dilation    Mildly distended gallbladder with nonspecific mild diffuse wall thickening      --- End of Report ---    < end of copied text >        RADIOLOGY & ADDITIONAL TESTS:    Imaging Personally Reviewed: MRCP report.   Consultant(s) Notes Reviewed:  ID, GI, hepatology  Care Discussed with Consultants/Other Providers: ID

## 2022-09-29 NOTE — PROGRESS NOTE ADULT - SUBJECTIVE AND OBJECTIVE BOX
Follow Up:      Interval History:    REVIEW OF SYSTEMS  [  ] ROS unobtainable because:    [  ] All other systems negative except as noted below    Constitutional:  [ ] fever [ ] chills  [ ] weight loss  [ ] weakness  Skin:  [ ] rash [ ] phlebitis	  Eyes: [ ] icterus [ ] pain  [ ] discharge	  ENMT: [ ] sore throat  [ ] thrush [ ] ulcers [ ] exudates  Respiratory: [ ] dyspnea [ ] hemoptysis [ ] cough [ ] sputum	  Cardiovascular:  [ ] chest pain [ ] palpitations [ ] edema	  Gastrointestinal:  [ ] nausea [ ] vomiting [ ] diarrhea [ ] constipation [ ] pain	  Genitourinary:  [ ] dysuria [ ] frequency [ ] hematuria [ ] discharge [ ] flank pain  [ ] incontinence  Musculoskeletal:  [ ] myalgias [ ] arthralgias [ ] arthritis  [ ] back pain  Neurological:  [ ] headache [ ] seizures  [ ] confusion/altered mental status    Allergies  No Known Allergies        ANTIMICROBIALS:  piperacillin/tazobactam IVPB.. 3.375 every 8 hours      OTHER MEDS:  MEDICATIONS  (STANDING):  acetaminophen     Tablet .. 650 every 8 hours PRN  ALBUTerol    90 MICROgram(s) HFA Inhaler 2 every 4 hours PRN  benzonatate 100 three times a day PRN  guaifenesin/dextromethorphan Oral Liquid 10 every 6 hours  influenza   Vaccine 0.5 once  melatonin 3 at bedtime PRN  ondansetron Injectable 4 every 8 hours PRN  pantoprazole  Injectable 40 daily  simethicone 80 three times a day      Vital Signs Last 24 Hrs  T(C): 36.4 (29 Sep 2022 12:24), Max: 36.8 (28 Sep 2022 20:47)  T(F): 97.6 (29 Sep 2022 12:24), Max: 98.2 (28 Sep 2022 20:47)  HR: 89 (29 Sep 2022 12:24) (83 - 100)  BP: 103/65 (29 Sep 2022 12:24) (103/65 - 113/71)  BP(mean): --  RR: 18 (29 Sep 2022 12:24) (18 - 18)  SpO2: 100% (29 Sep 2022 12:24) (99% - 100%)    Parameters below as of 29 Sep 2022 12:24  Patient On (Oxygen Delivery Method): room air        PHYSICAL EXAMINATION:  General: Alert and Awake, NAD  HEENT: PERRL, EOMI  Neck: Supple  Cardiac: RRR, No M/R/G  Resp: CTAB, No Wh/Rh/Ra  Abdomen: NBS, NT/ND, No HSM, No rigidity or guarding  MSK: No LE edema. No Calf tenderness  : No chavez  Skin: No rashes or lesions. Skin is warm and dry to the touch.   Neuro: Alert and Awake. CN 2-12 Grossly intact. Moves all four extremities spontaneously.  Psych: Calm, Pleasant, Cooperative                          8.8    5.47  )-----------( 346      ( 29 Sep 2022 08:17 )             27.9       09-29    139  |  107  |  10  ----------------------------<  85  3.7   |  23  |  0.79    Ca    7.8<L>      29 Sep 2022 08:17    TPro  6.8  /  Alb  2.1<L>  /  TBili  1.9<H>  /  DBili  x   /  AST  93<H>  /  ALT  29  /  AlkPhos  549<H>  09-29          MICROBIOLOGY:  v  Clean Catch Clean Catch (Midstream)  09-26-22   No growth  --  --      .Blood Blood  09-26-22   No growth to date.  --  --      .Blood Blood  09-26-22   No growth to date.  --  --      .Blood Blood-Peripheral  09-24-22   No growth to date.  --  --      .Blood Blood-Peripheral  09-24-22   No growth to date.  --  --          CMVPCR Log: Paoloc [id number]/mL (09-28 @ 07:09)  Rapid RVP Result: NotDetec (09-26 @ 19:47)        RADIOLOGY:    <The imaging below has been reviewed and visualized by me independently. Findings as detailed in report below> Follow Up:  Fever    Interval History: afebrile overnight. RRT yesterday for blurred vision and back pain.     REVIEW OF SYSTEMS  [  ] ROS unobtainable because:    [ x ] All other systems negative except as noted below    Constitutional:  [ ] fever [ ] chills  [ ] weight loss  [ ] weakness  Skin:  [ ] rash [ ] phlebitis	  Eyes: [ ] icterus [ ] pain  [ ] discharge	  ENMT: [ ] sore throat  [ ] thrush [ ] ulcers [ ] exudates  Respiratory: [ ] dyspnea [ ] hemoptysis [ ] cough [ ] sputum	  Cardiovascular:  [ ] chest pain [ ] palpitations [ ] edema	  Gastrointestinal:  [ ] nausea [ ] vomiting [ ] diarrhea [ ] constipation [ ] pain	  Genitourinary:  [ ] dysuria [ ] frequency [ ] hematuria [ ] discharge [ ] flank pain  [ ] incontinence  Musculoskeletal:  [ ] myalgias [ ] arthralgias [ ] arthritis  [ ] back pain  Neurological:  [ ] headache [ ] seizures  [ ] confusion/altered mental status    Allergies  No Known Allergies        ANTIMICROBIALS:  piperacillin/tazobactam IVPB.. 3.375 every 8 hours      OTHER MEDS:  MEDICATIONS  (STANDING):  acetaminophen     Tablet .. 650 every 8 hours PRN  ALBUTerol    90 MICROgram(s) HFA Inhaler 2 every 4 hours PRN  benzonatate 100 three times a day PRN  guaifenesin/dextromethorphan Oral Liquid 10 every 6 hours  influenza   Vaccine 0.5 once  melatonin 3 at bedtime PRN  ondansetron Injectable 4 every 8 hours PRN  pantoprazole  Injectable 40 daily  simethicone 80 three times a day      Vital Signs Last 24 Hrs  T(C): 36.4 (29 Sep 2022 12:24), Max: 36.8 (28 Sep 2022 20:47)  T(F): 97.6 (29 Sep 2022 12:24), Max: 98.2 (28 Sep 2022 20:47)  HR: 89 (29 Sep 2022 12:24) (83 - 100)  BP: 103/65 (29 Sep 2022 12:24) (103/65 - 113/71)  BP(mean): --  RR: 18 (29 Sep 2022 12:24) (18 - 18)  SpO2: 100% (29 Sep 2022 12:24) (99% - 100%)    Parameters below as of 29 Sep 2022 12:24  Patient On (Oxygen Delivery Method): room air    PHYSICAL EXAMINATION:  General: Alert and Awake, NAD  HEENT: PERRL, EOMI  Cardiac: RRR, No M/R/G  Resp: CTAB, No Wh/Rh/Ra  Abdomen: NBS, NT/ND, No HSM, No rigidity or guarding  MSK: No LE edema. No Calf tenderness  : No chavez  Skin: No rashes or lesions. Skin is warm and dry to the touch.   Neuro: Alert and Awake. CN 2-12 Grossly intact. Moves all four extremities spontaneously.  Psych: Calm, Pleasant, Cooperative                            8.8    5.47  )-----------( 346      ( 29 Sep 2022 08:17 )             27.9       09-29    139  |  107  |  10  ----------------------------<  85  3.7   |  23  |  0.79    Ca    7.8<L>      29 Sep 2022 08:17    TPro  6.8  /  Alb  2.1<L>  /  TBili  1.9<H>  /  DBili  x   /  AST  93<H>  /  ALT  29  /  AlkPhos  549<H>  09-29          MICROBIOLOGY:  v  Clean Catch Clean Catch (Midstream)  09-26-22   No growth  --  --      .Blood Blood  09-26-22   No growth to date.  --  --      .Blood Blood  09-26-22   No growth to date.  --  --      .Blood Blood-Peripheral  09-24-22   No growth to date.  --  --      .Blood Blood-Peripheral  09-24-22   No growth to date.  --  --          CMVPCR Log: NotDetec [id number]/mL (09-28 @ 07:09)  Rapid RVP Result: NotDetec (09-26 @ 19:47)        RADIOLOGY:    <The imaging below has been reviewed and visualized by me independently. Findings as detailed in report below>    < from: MR MRCP w/wo IV Cont (09.28.22 @ 20:40) >  IMPRESSION:    Primary sclerosing cholangitis with cirrhosis and sequela of portal   venous hypertension as described. No intrahepatic abscess or suspicious   lesion.    No significant biliary dilation    Mildly distended gallbladder with nonspecific mild diffuse wall thickening    < end of copied text >

## 2022-09-29 NOTE — PROGRESS NOTE ADULT - SUBJECTIVE AND OBJECTIVE BOX
Chief Complaint:  Patient is a 32y old  Male who presents with a chief complaint of Persistent cough and fever (29 Sep 2022 08:43)      Interval Events: Reports feeling well. Denies any N/V/D/C, abd pain, melena or hematochezia.      Hospital Medications:  acetaminophen     Tablet .. 650 milliGRAM(s) Oral every 8 hours PRN  ALBUTerol    90 MICROgram(s) HFA Inhaler 2 Puff(s) Inhalation every 4 hours PRN  benzocaine 15 mG/menthol 3.6 mG Lozenge 1 Lozenge Oral every 4 hours PRN  benzonatate 100 milliGRAM(s) Oral three times a day PRN  guaifenesin/dextromethorphan Oral Liquid 10 milliLiter(s) Oral every 6 hours  influenza   Vaccine 0.5 milliLiter(s) IntraMuscular once  melatonin 3 milliGRAM(s) Oral at bedtime PRN  ondansetron Injectable 4 milliGRAM(s) IV Push every 8 hours PRN  pantoprazole  Injectable 40 milliGRAM(s) IV Push daily  piperacillin/tazobactam IVPB.. 3.375 Gram(s) IV Intermittent every 8 hours      PMHX/PSHX:  Cerebral palsy    Cerebral palsy    PSC (primary sclerosing cholangitis)    Abnormal LFTs    Bile duct stricture    Dental caries    Impacted teeth    Phimosis    History of seizures    Anemia    No significant past surgical history    No significant past surgical history    History of ERCP    History of biliary stent insertion    History of biliary stent insertion    [place] teeth extracted            ROS: 14 point ROS negative unless otherwise stated in subjective      PHYSICAL EXAM:     GENERAL:  Well developed, no distress  HEENT:  NC/AT,  conjunctivae clear, sclera anicteric  CHEST:  Full & symmetric excursion, no increased effort w/ respirations  HEART:  Regular rhythm & rate  ABDOMEN:  Soft, non-tender, non-distended  EXTREMITIES:  no LE  edema  SKIN:  No rash/erythema/ecchymoses/petechiae/wounds/jaundice  NEURO:  Alert, oriented    Vital Signs:  Vital Signs Last 24 Hrs  T(C): 36.7 (29 Sep 2022 04:22), Max: 36.8 (28 Sep 2022 20:47)  T(F): 98.1 (29 Sep 2022 04:22), Max: 98.2 (28 Sep 2022 20:47)  HR: 83 (29 Sep 2022 04:22) (82 - 100)  BP: 113/71 (29 Sep 2022 04:22) (108/61 - 113/71)  BP(mean): --  RR: 18 (29 Sep 2022 04:22) (18 - 18)  SpO2: 99% (29 Sep 2022 04:22) (99% - 100%)    Parameters below as of 29 Sep 2022 04:22  Patient On (Oxygen Delivery Method): room air      Daily     Daily     LABS:                        8.8    5.47  )-----------( 346      ( 29 Sep 2022 08:17 )             27.9     09-29    139  |  107  |  10  ----------------------------<  85  3.7   |  23  |  0.79    Ca    7.8<L>      29 Sep 2022 08:17    TPro  6.8  /  Alb  2.1<L>  /  TBili  1.9<H>  /  DBili  x   /  AST  93<H>  /  ALT  29  /  AlkPhos  549<H>  09-29    LIVER FUNCTIONS - ( 29 Sep 2022 08:17 )  Alb: 2.1 g/dL / Pro: 6.8 g/dL / ALK PHOS: 549 U/L / ALT: 29 U/L / AST: 93 U/L / GGT: x           PT/INR - ( 29 Sep 2022 08:17 )   PT: 16.0 sec;   INR: 1.37 ratio         PTT - ( 29 Sep 2022 08:17 )  PTT:33.0 sec        Imaging:    ACC: [id number] EXAM:  MR MRCP [facility]                          PROCEDURE DATE:  09/28/2022          INTERPRETATION:  CLINICAL INFORMATION: Primary sclerosing cholangitis   history of liver abscess with persistent fevers. Recent biliary stent   exchange    COMPARISON: CT abdomen 9/27/2022    CONTRAST/COMPLICATIONS:  IV Contrast: Gadavist  7.5 cc administered   0 cc discarded  Oral Contrast: NONE  Complications: None reported at time of study completion    PROCEDURE:  MRI of the abdomen was performed.  MRCP was performed.    FINDINGS: Motion artifacts degrade some of the imaging.  LOWER CHEST: Within normal limits.    LIVER: Cirrhosis. No intrahepatic abscess or suspicious abnormality.   Trace perihepatic ascites. Diminutive main portal vein and posterior   branch of the right portal vein. Patent hepatic vasculature  BILE DUCTS: Primary sclerosing cholangitis. No significant biliary   dilation. Indwelling small caliber dual plastic stents extend from the   right anterior hepatic and left hepatic ducts through duodenum. Trace   pneumobilia  GALLBLADDER: Mildly distended. Mild diffuse wall thickening. Contains   small volume tumefactive sludge. Trace fluid at gallbladder fossa likely   represents extension of perihepatic ascites.  SPLEEN:Splenomegaly approximately measures 14.6 cm. Dilated splenic   vein. Splenorenal varices with shunt  PANCREAS: Small intrapancreatic splenule at the tail  ADRENALS: Within normal limits.  KIDNEYS/URETERS: Within normal limits. Nonobstructing right renal   calculus better seen by CT    VISUALIZED PORTIONS:  BOWEL: Air-fluid level within the lower esophagus suggests   gastroesophageal reflux. Unobstructed bowel.  PERITONEUM: Trace abdominal ascites  VESSELS: Within normal limits.  RETROPERITONEUM/LYMPH NODES: Prominent periceliac and portacaval lymph   nodes often seen with underlying liver disease. Mild upper aortocaval and   periaortic adenopathy  ABDOMINAL WALL: Tiny fat-containing umbilical hernia. Mild dependent   subcutaneous edema  BONES: Within normal limits.    IMPRESSION:    Primary sclerosing cholangitis with cirrhosis and sequela of portal   venous hypertension as described. No intrahepatic abscess or suspicious   lesion.    No significant biliary dilation    Mildly distended gallbladder with nonspecific mild diffuse wall thickening             Chief Complaint:  Patient is a 32y old  Male who presents with a chief complaint of Persistent cough and fever (29 Sep 2022 08:43)      Interval Events: No fevers since 9/28 AM. Liver enzymes/Tbili elevated but relatively unchanged from yesterday.  s/p MRCP 9/28 showing PSC with cirrhosis and sequela of portal venous hypertension as described, no intrahepatic abscess or suspicious lesion, no significant biliary dilation, mildly distended gallbladder with nonspecific mild diffuse wall thickening.    Hospital Medications:  acetaminophen     Tablet .. 650 milliGRAM(s) Oral every 8 hours PRN  ALBUTerol    90 MICROgram(s) HFA Inhaler 2 Puff(s) Inhalation every 4 hours PRN  benzocaine 15 mG/menthol 3.6 mG Lozenge 1 Lozenge Oral every 4 hours PRN  benzonatate 100 milliGRAM(s) Oral three times a day PRN  guaifenesin/dextromethorphan Oral Liquid 10 milliLiter(s) Oral every 6 hours  influenza   Vaccine 0.5 milliLiter(s) IntraMuscular once  melatonin 3 milliGRAM(s) Oral at bedtime PRN  ondansetron Injectable 4 milliGRAM(s) IV Push every 8 hours PRN  pantoprazole  Injectable 40 milliGRAM(s) IV Push daily  piperacillin/tazobactam IVPB.. 3.375 Gram(s) IV Intermittent every 8 hours      PMHX/PSHX:  Cerebral palsy    Cerebral palsy    PSC (primary sclerosing cholangitis)    Abnormal LFTs    Bile duct stricture    Dental caries    Impacted teeth    Phimosis    History of seizures    Anemia    No significant past surgical history    No significant past surgical history    History of ERCP    History of biliary stent insertion    History of biliary stent insertion    [place] teeth extracted            ROS: 14 point ROS negative unless otherwise stated in subjective      PHYSICAL EXAM:     GENERAL:  No acute distress  HEENT:  NCAT, + scleral icterus  CHEST: no resp distress  HEART:  RRR  ABDOMEN:  Soft, non-tender, non-distended  EXTREMITIES:  No cyanosis, clubbing, or edema  SKIN:  No rash/erythema/ecchymoses/petechiae/wounds/abscess/warm/dry  NEURO:  Alert and oriented x 3    Vital Signs:  Vital Signs Last 24 Hrs  T(C): 36.7 (29 Sep 2022 04:22), Max: 36.8 (28 Sep 2022 20:47)  T(F): 98.1 (29 Sep 2022 04:22), Max: 98.2 (28 Sep 2022 20:47)  HR: 83 (29 Sep 2022 04:22) (82 - 100)  BP: 113/71 (29 Sep 2022 04:22) (108/61 - 113/71)  BP(mean): --  RR: 18 (29 Sep 2022 04:22) (18 - 18)  SpO2: 99% (29 Sep 2022 04:22) (99% - 100%)    Parameters below as of 29 Sep 2022 04:22  Patient On (Oxygen Delivery Method): room air      Daily     Daily     LABS:                        8.8    5.47  )-----------( 346      ( 29 Sep 2022 08:17 )             27.9     09-29    139  |  107  |  10  ----------------------------<  85  3.7   |  23  |  0.79    Ca    7.8<L>      29 Sep 2022 08:17    TPro  6.8  /  Alb  2.1<L>  /  TBili  1.9<H>  /  DBili  x   /  AST  93<H>  /  ALT  29  /  AlkPhos  549<H>  09-29    LIVER FUNCTIONS - ( 29 Sep 2022 08:17 )  Alb: 2.1 g/dL / Pro: 6.8 g/dL / ALK PHOS: 549 U/L / ALT: 29 U/L / AST: 93 U/L / GGT: x           PT/INR - ( 29 Sep 2022 08:17 )   PT: 16.0 sec;   INR: 1.37 ratio         PTT - ( 29 Sep 2022 08:17 )  PTT:33.0 sec        Imaging:    ACC: [id number] EXAM:  MR MRCP [facility]                          PROCEDURE DATE:  09/28/2022          INTERPRETATION:  CLINICAL INFORMATION: Primary sclerosing cholangitis   history of liver abscess with persistent fevers. Recent biliary stent   exchange    COMPARISON: CT abdomen 9/27/2022    CONTRAST/COMPLICATIONS:  IV Contrast: Gadavist  7.5 cc administered   0 cc discarded  Oral Contrast: NONE  Complications: None reported at time of study completion    PROCEDURE:  MRI of the abdomen was performed.  MRCP was performed.    FINDINGS: Motion artifacts degrade some of the imaging.  LOWER CHEST: Within normal limits.    LIVER: Cirrhosis. No intrahepatic abscess or suspicious abnormality.   Trace perihepatic ascites. Diminutive main portal vein and posterior   branch of the right portal vein. Patent hepatic vasculature  BILE DUCTS: Primary sclerosing cholangitis. No significant biliary   dilation. Indwelling small caliber dual plastic stents extend from the   right anterior hepatic and left hepatic ducts through duodenum. Trace   pneumobilia  GALLBLADDER: Mildly distended. Mild diffuse wall thickening. Contains   small volume tumefactive sludge. Trace fluid at gallbladder fossa likely   represents extension of perihepatic ascites.  SPLEEN:Splenomegaly approximately measures 14.6 cm. Dilated splenic   vein. Splenorenal varices with shunt  PANCREAS: Small intrapancreatic splenule at the tail  ADRENALS: Within normal limits.  KIDNEYS/URETERS: Within normal limits. Nonobstructing right renal   calculus better seen by CT    VISUALIZED PORTIONS:  BOWEL: Air-fluid level within the lower esophagus suggests   gastroesophageal reflux. Unobstructed bowel.  PERITONEUM: Trace abdominal ascites  VESSELS: Within normal limits.  RETROPERITONEUM/LYMPH NODES: Prominent periceliac and portacaval lymph   nodes often seen with underlying liver disease. Mild upper aortocaval and   periaortic adenopathy  ABDOMINAL WALL: Tiny fat-containing umbilical hernia. Mild dependent   subcutaneous edema  BONES: Within normal limits.    IMPRESSION:    Primary sclerosing cholangitis with cirrhosis and sequela of portal   venous hypertension as described. No intrahepatic abscess or suspicious   lesion.    No significant biliary dilation    Mildly distended gallbladder with nonspecific mild diffuse wall thickening

## 2022-09-29 NOTE — PROGRESS NOTE ADULT - ASSESSMENT
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 Pt had tested positive for COVID infection 10-days ago for which he received monoclonal antibodies. He has been having persistent fevers, dry cough and dyspnea on exertion.     #elevated alk phosp   Patient with alk phosp 400 (baseline 200-300)  Last stent exchange on 9/6. Currently continuing to uptrend. Tbili stable    #PSC on liver transplant list   MELD 16 (9/25)  MELD 18 (9/28)      #Persistent fevers: Initially thought 2/2 to covid, however fevers not improving despite being positive on 9/14. Per primary team, fevers reportedly started around time of last stent exchange.     #Hx PVT, not seen on recent RUQ US w/ doppler. AC on hold given falling Hgb    Recommendations:   - if afebrile by this afternoon ok to dc home  - OK to stop zosyn   - daily MELD scores (trend CMP, INR daily)   - can hold AC for now in setting of decreasing Hgb  - recommend 10 mg IV vitamin K x1     Follow up in Hepatology Clinic: [phone] (Faculty Practice at [facility] at [address] [place] [address])     **THIS NOTE IS NOT FINALIZED UNTIL SIGNED BY THE ATTENDING**    Dina Arteaga MD  GI Fellow, PGY-5  Available via Microsoft Teams    NON-URGENT CONSULTS:  Please email [email or web address] OR  [email or web address]  AT NIGHT AND ON WEEKENDS:  Contact on-call GI fellow via answering service ([phone]) from 5pm-8am and on weekends/holidays  MONDAY-FRIDAY 8AM-5PM:  Pager# [phone] ([facility]) or [phone] ([facility])  GI Phone# [phone] ([facility])       32M PMH Cerebral palsy, 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 Pt had tested positive for COVID infection 10-days ago for which he received monoclonal antibodies. He has been having persistent fevers, dry cough and dyspnea on exertion.     #elevated alk phosp   Patient with alk phosp 400 (baseline 200-300)  Last stent exchange on 9/6. Currently continuing to uptrend. Tbili stable. s/p MRCP 9/28 showing PSC with cirrhosis and sequela of portal venous hypertension as described, no intrahepatic abscess or suspicious lesion, no significant biliary dilation, mildly distended gallbladder with nonspecific mild diffuse wall thickening.    #PSC on liver transplant list -MELD 18 (9/28)    #Persistent fevers: Initially thought 2/2 to covid, however fevers not improving despite being positive on 9/14. Per primary team, fevers reportedly started around time of last stent exchange.   #Hx PVT, not seen on recent RUQ US w/ doppler. AC on hold given falling Hgb    Recommendations:   - if afebrile by this afternoon ok to dc home  - OK to stop zosyn   - daily MELD scores (trend CMP, INR daily)   - can resume AC today    Follow up in Hepatology Clinic with Dr. Juan David Tipton: [phone] (Faculty [facility] at [facility] at [address] [place] [address])     **THIS NOTE IS NOT FINALIZED UNTIL SIGNED BY THE ATTENDING**    Dina Arteaga MD  GI Fellow, PGY-5  Available via Microsoft Teams    NON-URGENT CONSULTS:  Please email [email or web address]  [email or web address]  AT NIGHT AND ON WEEKENDS:  Contact on-call GI fellow via answering service ([phone]) from 5pm-8am and on weekends/holidays  MONDAY-FRIDAY 8AM-5PM:  Pager# [phone] ([facility]) or [phone] ([facility])  GI Phone# [phone] ([facility])

## 2022-09-29 NOTE — CHART NOTE - NSCHARTNOTEFT_GEN_A_CORE
Medicine NP Post RRT Note    Pt. s/p RRT 2/2 sudden onset of generalized back pain and tachycardia ( bpm) while receiving Vit. K infusion.  Please refer to RRT sheet/document for full details.  Per RRT, likely musculoskeletal back spasm after lengthy MRI exam earlier in the evening.  Back pain resolved w/o any pharmacological intervention.  HR stabilized post NS IV bolus.  Pt. tolerated Vit. K infusion w/o any untoward effect.  Pt. stable.  Monitoring in progress.  Will endorse to primary team in am.  Attending to follow.    Stacie Olmos, PHILL-BC  [phone]

## 2022-09-29 NOTE — PROGRESS NOTE ADULT - ATTENDING COMMENTS
on remdesivir for COVID  continues to spike fevers  labs stable  PSC on transplant list, MELD in the teens
off COVID precautions but continues to spike fevers  labs stable  PSC on transplant list, MELD recertified 9/28/22 at 18.   checking MRCP  vitamin K 10 mg IV x 1 for rising INR  unclear source of fevers still. ID following
off COVID precautions but continues to spike fevers for past few weeks with cough  no etiology of fevers/cough found  labs stable  PSC on transplant list, MELD recertified 9/28/22 at 18.   MRCP without dilation, looks OK  would recommend stopping zosyn due to no source of infection  if febrile by afternoon, consider sending home

## 2022-09-29 NOTE — PROGRESS NOTE ADULT - SUBJECTIVE AND OBJECTIVE BOX
Interval Events:   RRT called overnight for what patient describes as "seeing stars" with blurry vision, his back "tightening up", and unable to walk.  Symptoms resolved after IVF and now just feels like his "back is tight".  No abdominal pain at this time.  No fevers overnight.    ROS:   12 point review of systems performed and negative except otherwise noted in HPI.    Hospital Medications:  acetaminophen     Tablet .. 650 milliGRAM(s) Oral every 8 hours PRN  ALBUTerol    90 MICROgram(s) HFA Inhaler 2 Puff(s) Inhalation every 4 hours PRN  benzocaine 15 mG/menthol 3.6 mG Lozenge 1 Lozenge Oral every 4 hours PRN  benzonatate 100 milliGRAM(s) Oral three times a day PRN  guaifenesin/dextromethorphan Oral Liquid 10 milliLiter(s) Oral every 6 hours  influenza   Vaccine 0.5 milliLiter(s) IntraMuscular once  melatonin 3 milliGRAM(s) Oral at bedtime PRN  ondansetron Injectable 4 milliGRAM(s) IV Push every 8 hours PRN  pantoprazole  Injectable 40 milliGRAM(s) IV Push daily  piperacillin/tazobactam IVPB.. 3.375 Gram(s) IV Intermittent every 8 hours      PHYSICAL EXAM:   Vital Signs:  Vital Signs Last 24 Hrs  T(C): 36.7 (29 Sep 2022 04:22), Max: 36.8 (28 Sep 2022 20:47)  T(F): 98.1 (29 Sep 2022 04:22), Max: 98.2 (28 Sep 2022 20:47)  HR: 83 (29 Sep 2022 04:22) (82 - 100)  BP: 113/71 (29 Sep 2022 04:22) (108/61 - 113/71)  BP(mean): --  RR: 18 (29 Sep 2022 04:22) (18 - 18)  SpO2: 99% (29 Sep 2022 04:22) (99% - 100%)    Parameters below as of 29 Sep 2022 04:22  Patient On (Oxygen Delivery Method): room air      Daily     Daily     GENERAL: no acute distress  NEURO: alert  HEENT: NCAT, no conjunctival pallor appreciated  CHEST: no respiratory distress, no accessory muscle use  CARDIAC: regular rate, +S1/S2  ABDOMEN: soft, nontender, no rebound or guarding  EXTREMITIES: warm, well perfused  SKIN: no lesions noted    LABS: reviewed                        8.8    5.47  )-----------( 346      ( 29 Sep 2022 08:17 )             27.9     09-28    138  |  105  |  11  ----------------------------<  93  3.2<L>   |  24  |  0.74    Ca    7.8<L>      28 Sep 2022 07:09    TPro  6.9  /  Alb  2.1<L>  /  TBili  1.9<H>  /  DBili  x   /  AST  73<H>  /  ALT  22  /  AlkPhos  550<H>  09-28    LIVER FUNCTIONS - ( 28 Sep 2022 07:09 )  Alb: 2.1 g/dL / Pro: 6.9 g/dL / ALK PHOS: 550 U/L / ALT: 22 U/L / AST: 73 U/L / GGT: 194 U/L         Interval Diagnostic Studies: see sunrise for full report

## 2022-09-30 ENCOUNTER — TRANSCRIPTION ENCOUNTER (OUTPATIENT)
Age: 32
End: 2022-09-30

## 2022-09-30 VITALS
OXYGEN SATURATION: 100 % | SYSTOLIC BLOOD PRESSURE: 101 MMHG | HEART RATE: 81 BPM | DIASTOLIC BLOOD PRESSURE: 61 MMHG | RESPIRATION RATE: 17 BRPM | TEMPERATURE: 98 F

## 2022-09-30 LAB
ALBUMIN SERPL ELPH-MCNC: 2.2 G/DL — LOW (ref 3.3–5)
ALP SERPL-CCNC: 600 U/L — HIGH (ref 40–120)
ALT FLD-CCNC: 36 U/L — SIGNIFICANT CHANGE UP (ref 10–45)
ANION GAP SERPL CALC-SCNC: 6 MMOL/L — SIGNIFICANT CHANGE UP (ref 5–17)
AST SERPL-CCNC: 119 U/L — HIGH (ref 10–40)
BILIRUB SERPL-MCNC: 1.4 MG/DL — HIGH (ref 0.2–1.2)
BUN SERPL-MCNC: 7 MG/DL — SIGNIFICANT CHANGE UP (ref 7–23)
CALCIUM SERPL-MCNC: 8.1 MG/DL — LOW (ref 8.4–10.5)
CHLORIDE SERPL-SCNC: 105 MMOL/L — SIGNIFICANT CHANGE UP (ref 96–108)
CO2 SERPL-SCNC: 27 MMOL/L — SIGNIFICANT CHANGE UP (ref 22–31)
CREAT SERPL-MCNC: 0.76 MG/DL — SIGNIFICANT CHANGE UP (ref 0.5–1.3)
CULTURE RESULTS: SIGNIFICANT CHANGE UP
CULTURE RESULTS: SIGNIFICANT CHANGE UP
EGFR: 124 ML/MIN/1.73M2 — SIGNIFICANT CHANGE UP
GLUCOSE SERPL-MCNC: 80 MG/DL — SIGNIFICANT CHANGE UP (ref 70–99)
HCT VFR BLD CALC: 27.8 % — LOW (ref 39–50)
HGB BLD-MCNC: 8.7 G/DL — LOW (ref 13–17)
INR BLD: 1.23 RATIO — HIGH (ref 0.88–1.16)
MCHC RBC-ENTMCNC: 31.3 GM/DL — LOW (ref 32–36)
MCHC RBC-ENTMCNC: 32.6 PG — SIGNIFICANT CHANGE UP (ref 27–34)
MCV RBC AUTO: 104.1 FL — HIGH (ref 80–100)
NRBC # BLD: 0 /100 WBCS — SIGNIFICANT CHANGE UP (ref 0–0)
PLATELET # BLD AUTO: 397 K/UL — SIGNIFICANT CHANGE UP (ref 150–400)
POTASSIUM SERPL-MCNC: 3.8 MMOL/L — SIGNIFICANT CHANGE UP (ref 3.5–5.3)
POTASSIUM SERPL-SCNC: 3.8 MMOL/L — SIGNIFICANT CHANGE UP (ref 3.5–5.3)
PROT SERPL-MCNC: 6.8 G/DL — SIGNIFICANT CHANGE UP (ref 6–8.3)
PROTHROM AB SERPL-ACNC: 14.3 SEC — HIGH (ref 10.5–13.4)
RBC # BLD: 2.67 M/UL — LOW (ref 4.2–5.8)
RBC # FLD: 15.3 % — HIGH (ref 10.3–14.5)
SODIUM SERPL-SCNC: 138 MMOL/L — SIGNIFICANT CHANGE UP (ref 135–145)
SPECIMEN SOURCE: SIGNIFICANT CHANGE UP
SPECIMEN SOURCE: SIGNIFICANT CHANGE UP
WBC # BLD: 4.97 K/UL — SIGNIFICANT CHANGE UP (ref 3.8–10.5)
WBC # FLD AUTO: 4.97 K/UL — SIGNIFICANT CHANGE UP (ref 3.8–10.5)

## 2022-09-30 PROCEDURE — 93306 TTE W/DOPPLER COMPLETE: CPT

## 2022-09-30 PROCEDURE — 82728 ASSAY OF FERRITIN: CPT

## 2022-09-30 PROCEDURE — 82746 ASSAY OF FOLIC ACID SERUM: CPT

## 2022-09-30 PROCEDURE — 87086 URINE CULTURE/COLONY COUNT: CPT

## 2022-09-30 PROCEDURE — 86682 HELMINTH ANTIBODY: CPT

## 2022-09-30 PROCEDURE — 86664 EPSTEIN-BARR NUCLEAR ANTIGEN: CPT

## 2022-09-30 PROCEDURE — 82435 ASSAY OF BLOOD CHLORIDE: CPT

## 2022-09-30 PROCEDURE — 82565 ASSAY OF CREATININE: CPT

## 2022-09-30 PROCEDURE — 83540 ASSAY OF IRON: CPT

## 2022-09-30 PROCEDURE — 84100 ASSAY OF PHOSPHORUS: CPT

## 2022-09-30 PROCEDURE — 99239 HOSP IP/OBS DSCHRG MGMT >30: CPT

## 2022-09-30 PROCEDURE — 82330 ASSAY OF CALCIUM: CPT

## 2022-09-30 PROCEDURE — 76705 ECHO EXAM OF ABDOMEN: CPT

## 2022-09-30 PROCEDURE — 84145 PROCALCITONIN (PCT): CPT

## 2022-09-30 PROCEDURE — 0225U NFCT DS DNA&RNA 21 SARSCOV2: CPT

## 2022-09-30 PROCEDURE — 83010 ASSAY OF HAPTOGLOBIN QUANT: CPT

## 2022-09-30 PROCEDURE — 86663 EPSTEIN-BARR ANTIBODY: CPT

## 2022-09-30 PROCEDURE — 84295 ASSAY OF SERUM SODIUM: CPT

## 2022-09-30 PROCEDURE — 86901 BLOOD TYPING SEROLOGIC RH(D): CPT

## 2022-09-30 PROCEDURE — 85018 HEMOGLOBIN: CPT

## 2022-09-30 PROCEDURE — 93975 VASCULAR STUDY: CPT

## 2022-09-30 PROCEDURE — 86850 RBC ANTIBODY SCREEN: CPT

## 2022-09-30 PROCEDURE — 87040 BLOOD CULTURE FOR BACTERIA: CPT

## 2022-09-30 PROCEDURE — 85014 HEMATOCRIT: CPT

## 2022-09-30 PROCEDURE — 85027 COMPLETE CBC AUTOMATED: CPT

## 2022-09-30 PROCEDURE — 83605 ASSAY OF LACTIC ACID: CPT

## 2022-09-30 PROCEDURE — 97530 THERAPEUTIC ACTIVITIES: CPT

## 2022-09-30 PROCEDURE — 97162 PT EVAL MOD COMPLEX 30 MIN: CPT

## 2022-09-30 PROCEDURE — 86140 C-REACTIVE PROTEIN: CPT

## 2022-09-30 PROCEDURE — U0005: CPT

## 2022-09-30 PROCEDURE — 93005 ELECTROCARDIOGRAM TRACING: CPT

## 2022-09-30 PROCEDURE — 86665 EPSTEIN-BARR CAPSID VCA: CPT

## 2022-09-30 PROCEDURE — 83735 ASSAY OF MAGNESIUM: CPT

## 2022-09-30 PROCEDURE — 82977 ASSAY OF GGT: CPT

## 2022-09-30 PROCEDURE — 71045 X-RAY EXAM CHEST 1 VIEW: CPT

## 2022-09-30 PROCEDURE — U0003: CPT

## 2022-09-30 PROCEDURE — 80053 COMPREHEN METABOLIC PANEL: CPT

## 2022-09-30 PROCEDURE — 97110 THERAPEUTIC EXERCISES: CPT

## 2022-09-30 PROCEDURE — 85025 COMPLETE CBC W/AUTO DIFF WBC: CPT

## 2022-09-30 PROCEDURE — 85610 PROTHROMBIN TIME: CPT

## 2022-09-30 PROCEDURE — 71275 CT ANGIOGRAPHY CHEST: CPT | Mod: MA

## 2022-09-30 PROCEDURE — 85379 FIBRIN DEGRADATION QUANT: CPT

## 2022-09-30 PROCEDURE — 84132 ASSAY OF SERUM POTASSIUM: CPT

## 2022-09-30 PROCEDURE — 82607 VITAMIN B-12: CPT

## 2022-09-30 PROCEDURE — 87449 NOS EACH ORGANISM AG IA: CPT

## 2022-09-30 PROCEDURE — A9585: CPT

## 2022-09-30 PROCEDURE — 99285 EMERGENCY DEPT VISIT HI MDM: CPT | Mod: 25

## 2022-09-30 PROCEDURE — 82247 BILIRUBIN TOTAL: CPT

## 2022-09-30 PROCEDURE — 36415 COLL VENOUS BLD VENIPUNCTURE: CPT

## 2022-09-30 PROCEDURE — 93970 EXTREMITY STUDY: CPT

## 2022-09-30 PROCEDURE — 82248 BILIRUBIN DIRECT: CPT

## 2022-09-30 PROCEDURE — 83615 LACTATE (LD) (LDH) ENZYME: CPT

## 2022-09-30 PROCEDURE — 85730 THROMBOPLASTIN TIME PARTIAL: CPT

## 2022-09-30 PROCEDURE — 82947 ASSAY GLUCOSE BLOOD QUANT: CPT

## 2022-09-30 PROCEDURE — 74183 MRI ABD W/O CNTR FLWD CNTR: CPT

## 2022-09-30 PROCEDURE — 97116 GAIT TRAINING THERAPY: CPT

## 2022-09-30 PROCEDURE — 74177 CT ABD & PELVIS W/CONTRAST: CPT

## 2022-09-30 PROCEDURE — 85045 AUTOMATED RETICULOCYTE COUNT: CPT

## 2022-09-30 PROCEDURE — 82962 GLUCOSE BLOOD TEST: CPT

## 2022-09-30 PROCEDURE — 86900 BLOOD TYPING SEROLOGIC ABO: CPT

## 2022-09-30 PROCEDURE — 83550 IRON BINDING TEST: CPT

## 2022-09-30 PROCEDURE — 81001 URINALYSIS AUTO W/SCOPE: CPT

## 2022-09-30 PROCEDURE — 82803 BLOOD GASES ANY COMBINATION: CPT

## 2022-09-30 RX ORDER — GUAIFENESIN/DEXTROMETHORPHAN 600MG-30MG
10 TABLET, EXTENDED RELEASE 12 HR ORAL
Qty: 0 | Refills: 0 | DISCHARGE
Start: 2022-09-30

## 2022-09-30 RX ORDER — BENZOCAINE AND MENTHOL 5; 1 G/100ML; G/100ML
1 LIQUID ORAL
Qty: 0 | Refills: 0 | DISCHARGE
Start: 2022-09-30

## 2022-09-30 RX ORDER — BENZOCAINE AND MENTHOL 5; 1 G/100ML; G/100ML
1 LIQUID ORAL
Qty: 20 | Refills: 0
Start: 2022-09-30 | End: 2022-10-04

## 2022-09-30 RX ORDER — SIMETHICONE 80 MG/1
1 TABLET, CHEWABLE ORAL
Qty: 0 | Refills: 0 | DISCHARGE
Start: 2022-09-30

## 2022-09-30 RX ADMIN — PIPERACILLIN AND TAZOBACTAM 25 GRAM(S): 4; .5 INJECTION, POWDER, LYOPHILIZED, FOR SOLUTION INTRAVENOUS at 06:59

## 2022-09-30 RX ADMIN — SIMETHICONE 80 MILLIGRAM(S): 80 TABLET, CHEWABLE ORAL at 06:58

## 2022-09-30 RX ADMIN — BENZOCAINE AND MENTHOL 1 LOZENGE: 5; 1 LIQUID ORAL at 06:57

## 2022-09-30 RX ADMIN — SIMETHICONE 80 MILLIGRAM(S): 80 TABLET, CHEWABLE ORAL at 13:42

## 2022-09-30 RX ADMIN — PANTOPRAZOLE SODIUM 40 MILLIGRAM(S): 20 TABLET, DELAYED RELEASE ORAL at 11:31

## 2022-09-30 RX ADMIN — BENZOCAINE AND MENTHOL 1 LOZENGE: 5; 1 LIQUID ORAL at 11:31

## 2022-09-30 NOTE — PROGRESS NOTE ADULT - PROBLEM SELECTOR PLAN 1
-pt with no hypoxia so no need for steroids at this time  -s/p monoclonal antibodies  -was started on 5-day course of remdesivir -- hepatology was in agreement. -per ID since covid positive on 9/14 originally, patient does not need treatment at this time - stopped. Fevers persistent concerning for other source/cause.   -tylenol prn for fever (use sparingly as needed)  -Antitussives prn for cough - made robitussin ATC per pt request  -CTA on admission: No central or lobar PE. Limited evaluation of segmental and subsegmental branches due to motion artifact. Partially imaged nonspecific pericholecystic fluid and right upper quadrant ascites.  -will get RUQ US with doppler, LE duplex, CT abd/pelvis given elevated D-dimer and persistent fevers.   -CT/US showed no PVT, biliary ductal dilation but better than prior, no cholecystitis, no DVT.   -monitor inflammatory markers. -VBG with normal lactate. -Trend LFT's and INR.  -Tachycardia, will get EKG - sinus.   -CXR clear, blood cultures, UA, UCx - NGTD given fevers.   -Consider diagnostic paracentesis to eval for SBP if has ascites and fevers persist. Only trace ascites on imaging.  -Has biliary stents changed on 9/6 at [facility] (with Yasmany Howard). Mom says after which symptoms started. -MRCP shows no biliary dilitation and hepatology/GI follow up appreciated. -Was still spiking fevers - last 9/28 am, so C/w zosyn. -Echo -no obvious vegetations. -If remains afebrile overnight, likely transition to oral empiric course of abx. -ID appreciated. - remained afebrile so total 7 days abx. 5 more days of Augmentin oral.
-pt with no hypoxia so no need for steroids at this time  -s/p monoclonal antibodies  -will start 5-day course of remdesivir -- hepatology in agreement. -per ID since covid positive on 9/14 originally, patient does not need treatment at this time. Fevers persistent concerning for other source/cause.   -tylenol prn for fever (use sparingly as needed)  -Antitussives prn for cough - made robitussin ATC per pt request  -CTA on admission: No central or lobar PE. Limited evaluation of segmental and subsegmental branches due to motion artifact. Partially imaged nonspecific pericholecystic fluid and right upper quadrant ascites.  -will get RUQ US, LE duplex given elevated D-dimer and persistent fevers.   -monitor inflammatory markers. -VBG with normal lactate.   -Tachycardia, will get EKG.   -f/u CXR, blood cultures, UA, UCx given fevers.   -May need CT abd/pelvis with contrast per ID. -F/u RVP.  -Consider diagnostic paracentesis to eval for SBP if has ascites and fevers persist.
-pt with no hypoxia so no need for steroids at this time  -s/p monoclonal antibodies  -was started on 5-day course of remdesivir -- hepatology was in agreement. -per ID since covid positive on 9/14 originally, patient does not need treatment at this time - stopped. Fevers persistent concerning for other source/cause.   -tylenol prn for fever (use sparingly as needed)  -Antitussives prn for cough - made robitussin ATC per pt request  -CTA on admission: No central or lobar PE. Limited evaluation of segmental and subsegmental branches due to motion artifact. Partially imaged nonspecific pericholecystic fluid and right upper quadrant ascites.  -will get RUQ US with doppler, LE duplex, CT abd/pelvis given elevated D-dimer and persistent fevers.   -CT/US showed no PVT, biliary ductal dilation but better than prior, no cholecystitis, no DVT.   -monitor inflammatory markers. -VBG with normal lactate. -Trend LFT's and INR.  -Tachycardia, will get EKG - sinus.   -CXR clear, blood cultures, UA, UCx - NGTD given fevers.   -Consider diagnostic paracentesis to eval for SBP if has ascites and fevers persist. Only trace ascites on imaging.  -Has biliary stents changed on 9/6 at [facility] (with Yasmany Howard). Mom says after which symptoms started. -MRCP shows no biliary dilitation and hepatology/GI follow up appreciated. -Was still spiking fevers - last 9/28 am, so C/w zosyn. -Echo -no obvious vegetations. -If remains afebrile overnight, likely transition to oral empiric course of abx. -ID appreciated.
-pt with no hypoxia so no need for steroids at this time  -s/p monoclonal antibodies  -was started on 5-day course of remdesivir -- hepatology was in agreement. -per ID since covid positive on 9/14 originally, patient does not need treatment at this time - stopped. Fevers persistent concerning for other source/cause.   -tylenol prn for fever (use sparingly as needed)  -Antitussives prn for cough - made robitussin ATC per pt request  -CTA on admission: No central or lobar PE. Limited evaluation of segmental and subsegmental branches due to motion artifact. Partially imaged nonspecific pericholecystic fluid and right upper quadrant ascites.  -will get RUQ US with doppler, LE duplex, CT abd/pelvis given elevated D-dimer and persistent fevers.   -CT/US showed no PVT, biliary ductal dilation but better than prior, no cholecystitis, no DVT.   -monitor inflammatory markers. -VBG with normal lactate. -Trend LFT's and INR.  -Tachycardia, will get EKG.   -CXR clear, blood cultures, UA, UCx - NGTD given fevers.   -Consider diagnostic paracentesis to eval for SBP if has ascites and fevers persist. Only trace ascites on imaging.  -Has biliary stents changed on 9/6 at [facility] (with Yasmany Howard). Mom says after which symptoms started. -MRCP pending and hepatology/GI follow up appreciated. -Still spiking fevers, so C/w zosyn. -Echo -no obvious vegetations.
-pt with no hypoxia so no need for steroids at this time  -s/p monoclonal antibodies  -was started on 5-day course of remdesivir -- hepatology was in agreement. -per ID since covid positive on 9/14 originally, patient does not need treatment at this time - stopped. Fevers persistent concerning for other source/cause.   -tylenol prn for fever (use sparingly as needed)  -Antitussives prn for cough - made robitussin ATC per pt request  -CTA on admission: No central or lobar PE. Limited evaluation of segmental and subsegmental branches due to motion artifact. Partially imaged nonspecific pericholecystic fluid and right upper quadrant ascites.  -will get RUQ US with doppler, LE duplex, CT abd/pelvis given elevated D-dimer and persistent fevers.   -CT/US showed no PVT, biliary ductal dilation but better than prior, no cholecystitis, no DVT.   -monitor inflammatory markers. -VBG with normal lactate. -Trend LFT's and INR.  -Tachycardia, will get EKG.   -CXR clear, blood cultures, UA, UCx - testing given fevers.   -Consider diagnostic paracentesis to eval for SBP if has ascites and fevers persist. Only trace ascites on imaging.  -Has biliary stents changed on 9/6 at [facility] (with Yasmany Howard). Mom says after which symptoms started. -MRCP and hepatology/GI follow up. -Still spiking fevers, so starting zosyn. -Echo.
-pt with no hypoxia so no need for steroids at this time  -s/p monoclonal antibodies  -will start 5-day course of remdesivir -- hepatology in agreement  -tylenol prn for fever (use sparingly as needed)  -Antitussives prn for cough - made robitussin ATC per pt request  -CTA on admission: No central or lobar PE. Limited evaluation of segmental and subsegmental branches due to motion artifact. Partially imaged nonspecific pericholecystic fluid and right upper quadrant ascites.  -monitor inflammatory markers

## 2022-09-30 NOTE — DISCHARGE NOTE PROVIDER - HOSPITAL COURSE
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        Problem/Plan - 1:  ·  Problem: 2019 novel coronavirus disease (COVID-19).   ·  Plan: -pt with no hypoxia so no need for steroids at this time  -s/p monoclonal antibodies  -was started on 5-day course of remdesivir -- hepatology was in agreement. -per ID since covid positive on 9/14 originally, patient does not need treatment at this time - stopped. Fevers persistent concerning for other source/cause.   -tylenol prn for fever (use sparingly as needed)  -Antitussives prn for cough - made robitussin ATC per pt request  -CTA on admission: No central or lobar PE. Limited evaluation of segmental and subsegmental branches due to motion artifact. Partially imaged nonspecific pericholecystic fluid and right upper quadrant ascites.  -will get RUQ US with doppler, LE duplex, CT abd/pelvis given elevated D-dimer and persistent fevers.   -CT/US showed no PVT, biliary ductal dilation but better than prior, no cholecystitis, no DVT.   -monitor inflammatory markers. -VBG with normal lactate. -Trend LFT's and INR.  -Tachycardia, will get EKG - sinus.   -CXR clear, blood cultures, UA, UCx - NGTD given fevers.   -Consider diagnostic paracentesis to eval for SBP if has ascites and fevers persist. Only trace ascites on imaging.  -Has biliary stents changed on 9/6 at [facility] (with Yasmany Howard). Mom says after which symptoms started. -MRCP shows no biliary dilitation and hepatology/GI follow up appreciated. -Was still spiking fevers - last 9/28 am, so C/w zosyn. -Echo -no obvious vegetations. -If remains afebrile overnight, likely transition to oral empiric course of abx. -ID appreciated. -per ID do empiric 7 days total of abx, will DC on augmentin since remained afebrile.      Problem/Plan - 2:  ·  Problem: PSC (primary sclerosing cholangitis).   ·  Plan: -management per hepatology; pt on liver transplant list  -was on eliquis for PVT PPx. -  holding now since no clots seen on imaging and also INR was increasing. -Will need to f/u with hepatology when/if AC should be resumed.   - RUQ US with doppler - no PVT seen.  -ALP and INR were increasing.   -Yesterday INR > 2. -Therefore gave oral vitamin K 5mg. Will also give 10mg IV x 1 per hepatology. -  INR normal now  -Dr. Tipton is his hepatologist.-can resume AC per hepatologist.      Problem/Plan - 3:  ·  Problem: Prophylactic measure.   ·  Plan: -Diet: regular.   -DVT ppx: holding home eliquis at this time. -SCD's.  -PPI.   -PT recs outpatient PT.   -s/p IVF's.      4. Discussed with Dr. Calvillo and patient's mom at bedside and ACP Elisa and RN.  Left chest LN near nipple vs cyst. trial of warm compresses and outpt f/u. may need US if doesn't resolve.   -stable/improved for dc with outpt f/u with hepatology and PMD.

## 2022-09-30 NOTE — PROGRESS NOTE ADULT - PROBLEM SELECTOR PLAN 3
-Diet: regular  -DVT ppx: Eliquis BID
-Diet: regular  -DVT ppx: Eliquis BID  -PT.    4. Discussed with Dr. Calvillo and patient's mom.
-Diet: regular.   -DVT ppx: holding home eliquis at this time. -SCD's.  -PPI.   -PT recs outpatient PT.   -s/p IVF's.      4. Discussed with Dr. Calvillo and patient's mom at bedside and ACP Elisa and DEON.
-Diet: regular  -DVT ppx: Eliquis BID  -PT.
-Diet: regular. -NPO at MN just in case MRCP abnormal.   -DVT ppx: holding home eliquis. -SCD's.  -PPI.   -PT recs outpatient PT.   -IVF bolus 500cc given.      4. Discussed with Dr. Hu and patient's mom at bedside and CHOCO Medeiros.
-Diet: regular.   -DVT ppx: were holding home eliquis at this time. -SCD's. -ok to resume eliquis.   -PPI can dc.   -PT recs outpatient PT.   -s/p IVF's.    -Small mobile and tender left nipple (superior side) nodule/?LN/?breast tissue. No discharge. No other rash or skin tears or other regional nodes. -For now advised warm compresses and outpatient follow up. Nothing noted on CT chest on admission except for gynecomastia. -Advised him and mom that if persists/worsens, he should have a dedicated US of the area and possibly a biopsy depending on what it looks like. My suspicion is that this is benign tissue.      4. Discussed with patient's mom at bedside and ACP Elisa and RN. -Stable for DC today with outpatient hepatology and PMD follow up. -40 minutes spent on the DC process.

## 2022-09-30 NOTE — DISCHARGE NOTE NURSING/CASE MANAGEMENT/SOCIAL WORK - PATIENT PORTAL LINK FT
You can access the FollowMyHealth Patient Portal offered by [facility] by registering at the following website: [email or web address]. By joining [facility]’s FollowMyHealth portal, you will also be able to view your health information using other applications (apps) compatible with our system.

## 2022-09-30 NOTE — DISCHARGE NOTE NURSING/CASE MANAGEMENT/SOCIAL WORK - NSDCFUADDAPPT_GEN_ALL_CORE_FT
APPTS ARE READY TO BE MADE: [ x] YES    Best Family or Patient Contact (if needed):  Mom   Additional Information about above appointments (if needed):    1:   2:   3:     Other comments or requests:

## 2022-09-30 NOTE — DISCHARGE NOTE PROVIDER - NSDCMRMEDTOKEN_GEN_ALL_CORE_FT
amoxicillin-clavulanate 875 mg-125 mg oral tablet: 1 tab(s) orally 2 times a day   Eliquis 5 mg oral tablet: 1 tab(s) orally 2 times a day  guaifenesin-dextromethorphan 100 mg-10 mg/5 mL oral liquid: 10 milliliter(s) orally every 6 hours  menthol-benzocaine 3.6 mg-15 mg mucous membrane lozenge: 1  mucous membrane every 6 hours, As Needed - for cough  simethicone 80 mg oral tablet, chewable: 1 tab(s) orally 3 times a day

## 2022-09-30 NOTE — DISCHARGE NOTE NURSING/CASE MANAGEMENT/SOCIAL WORK - NSDCPEFALRISK_GEN_ALL_CORE
For information on Fall & Injury Prevention, visit: [email or web address] OR  [email or web address] OR  https://www.cdc.gov/steadi/patient.html

## 2022-09-30 NOTE — PROGRESS NOTE ADULT - PROVIDER SPECIALTY LIST ADULT
Gastroenterology
Hepatology
Hospitalist
Transplant ID
Transplant ID
Hepatology
Transplant Hepatology
Transplant ID
Hospitalist

## 2022-09-30 NOTE — PROGRESS NOTE ADULT - SUBJECTIVE AND OBJECTIVE BOX
Patient is a 32y old  Male who presents with a chief complaint of Persistent cough and fever (30 Sep 2022 13:26)        SUBJECTIVE / OVERNIGHT EVENTS: Patient remained afebrile. eating well. feeling better.       MEDICATIONS  (STANDING):  guaifenesin/dextromethorphan Oral Liquid 10 milliLiter(s) Oral every 6 hours  influenza   Vaccine 0.5 milliLiter(s) IntraMuscular once  pantoprazole  Injectable 40 milliGRAM(s) IV Push daily  piperacillin/tazobactam IVPB.. 3.375 Gram(s) IV Intermittent every 8 hours  simethicone 80 milliGRAM(s) Chew three times a day    MEDICATIONS  (PRN):  acetaminophen     Tablet .. 650 milliGRAM(s) Oral every 8 hours PRN Temp greater or equal to 38.5C (101.3F)  ALBUTerol    90 MICROgram(s) HFA Inhaler 2 Puff(s) Inhalation every 4 hours PRN Shortness of Breath and/or Wheezing  benzocaine 15 mG/menthol 3.6 mG Lozenge 1 Lozenge Oral every 4 hours PRN Sore Throat  benzonatate 100 milliGRAM(s) Oral three times a day PRN Cough  melatonin 3 milliGRAM(s) Oral at bedtime PRN Insomnia  ondansetron Injectable 4 milliGRAM(s) IV Push every 8 hours PRN Nausea and/or Vomiting      Vital Signs Last 24 Hrs  T(C): 36.5 (30 Sep 2022 12:09), Max: 36.7 (30 Sep 2022 06:03)  T(F): 97.7 (30 Sep 2022 12:09), Max: 98.1 (30 Sep 2022 06:03)  HR: 81 (30 Sep 2022 12:09) (65 - 88)  BP: 101/61 (30 Sep 2022 12:09) (100/62 - 109/70)  BP(mean): --  RR: 17 (30 Sep 2022 12:09) (17 - 18)  SpO2: 100% (30 Sep 2022 12:09) (100% - 100%)    Parameters below as of 30 Sep 2022 12:09  Patient On (Oxygen Delivery Method): room air      CAPILLARY BLOOD GLUCOSE        I&O's Summary    29 Sep 2022 07:01  -  30 Sep 2022 07:00  --------------------------------------------------------  IN: 1520 mL / OUT: 0 mL / NET: 1520 mL    30 Sep 2022 07:01  -  30 Sep 2022 15:09  --------------------------------------------------------  IN: 480 mL / OUT: 0 mL / NET: 480 mL          PHYSICAL EXAM:   GENERAL: NAD,    HEAD:  Atraumatic, Normocephalic  EYES:   conjunctiva and sclera icteric  NECK: Supple  CHEST/LUNG: Clear to auscultation bilaterally; No wheeze; left nipple area small tender mobile nodule possible LN vs cyst.   HEART: S1S2 normal. Regular rate and rhythm; No murmurs, rubs, or gallops  ABDOMEN: Soft, Nontender, Nondistended; Bowel sounds present  EXTREMITIES:  2+ Peripheral Pulses, No clubbing, cyanosis, or edema  PSYCH/Neuro: AAOx3. Non-focal. left arm/hand contracture.   SKIN: No rashes or lesions      LABS:                        8.7    4.97  )-----------( 397      ( 30 Sep 2022 07:17 )             27.8     09-30    138  |  105  |  7   ----------------------------<  80  3.8   |  27  |  0.76    Ca    8.1<L>      30 Sep 2022 06:55    TPro  6.8  /  Alb  2.2<L>  /  TBili  1.4<H>  /  DBili  x   /  AST  119<H>  /  ALT  36  /  AlkPhos  600<H>  09-30    PT/INR - ( 30 Sep 2022 07:00 )   PT: 14.3 sec;   INR: 1.23 ratio         PTT - ( 29 Sep 2022 08:17 )  PTT:33.0 sec            RADIOLOGY & ADDITIONAL TESTS:    Imaging Personally Reviewed:  Consultant(s) Notes Reviewed:  ID and hepatology  Care Discussed with Consultants/Other Providers:

## 2022-09-30 NOTE — PROGRESS NOTE ADULT - PROBLEM SELECTOR PROBLEM 2
PSC (primary sclerosing cholangitis)

## 2022-09-30 NOTE — PROGRESS NOTE ADULT - REASON FOR ADMISSION
Persistent cough and fever

## 2022-09-30 NOTE — PROGRESS NOTE ADULT - PROBLEM SELECTOR PLAN 2
-management per hepatology; pt on liver transplant list  -c/w eliquis for clot prevention
-management per hepatology; pt on liver transplant list  -c/w eliquis for clot prevention? -?H/o PVT?   - RUQ US with doppler - no PVT seen.
-management per hepatology; pt on liver transplant list  -c/w eliquis for clot prevention? -H/o PVT?   -F/u RUQ US with doppler.
-management per hepatology; pt on liver transplant list  -was on eliquis for PVT PPx. -  holding now since no clots seen on imaging and also INR was increasing. -Will need to f/u with hepatology when/if AC should be resumed.   - RUQ US with doppler - no PVT seen.  -ALP and INR were increasing.   -Yesterday INR > 2. -Therefore gave oral vitamin K 5mg. Will also give 10mg IV x 1 per hepatology. - Today INR normal down to 1.37.  -Dr. Tipton is his hepatologist.
-management per hepatology; pt on liver transplant list  -was on eliquis for PVT PPx. -Will hold now since no clots seen on imaging and also INR increasing.   - RUQ US with doppler - no PVT seen.  -ALP and INR increasing.   -Today INR > 2. -Therefore gave oral vitamin K 5mg. Will also give 10mg IV x 1 per hepatology.
-management per hepatology; pt on liver transplant list.  -was on eliquis for PVT PPx. -  holding now since no clots seen on imaging and also INR was increasing. -Will need to f/u with hepatology when/if AC should be resumed. -they say ok to resume.   - RUQ US with doppler - no PVT seen.  -ALP and INR were increasing.   -The other day the INR > 2. -Therefore gave oral vitamin K 5mg. Also gave 10mg IV x 1 per hepatology. - Today INR normal .  -Dr. Tipton is his hepatologist.

## 2022-09-30 NOTE — DISCHARGE NOTE PROVIDER - CARE PROVIDER_API CALL
Juan David Tipton (MBVASQUEZ; MS)  Gastroenterology; Internal Medicine; Transplant Hepatology  [address] [place] [address]  Phone: [phone]  Fax: [phone]  Follow Up Time: 2 weeks

## 2022-09-30 NOTE — DISCHARGE NOTE PROVIDER - NSDCCPCAREPLAN_GEN_ALL_CORE_FT
PRINCIPAL DISCHARGE DIAGNOSIS  Diagnosis: Fever  Assessment and Plan of Treatment: -Fever resolved with antibiotics. Continue taking amoxicillin as prescribed to your pharmacy for 5 more days. Follow up with your primary doctor and Dr. Tipton in 1-2 weeks.      SECONDARY DISCHARGE DIAGNOSES  Diagnosis: PSC (primary sclerosing cholangitis)  Assessment and Plan of Treatment: Follow up with Dr. Tipton in 1-2 weeks. You can resume your eliquis.

## 2022-09-30 NOTE — DISCHARGE NOTE PROVIDER - NSDCFUADDAPPT_GEN_ALL_CORE_FT
APPTS ARE READY TO BE MADE: [ x] YES    Best Family or Patient Contact (if needed):  Mom   Additional Information about above appointments (if needed):    1:   2:   3:     Other comments or requests:    APPTS ARE READY TO BE MADE: [ x] YES    Best Family or Patient Contact (if needed):  Mom   Additional Information about above appointments (if needed):    1:   2:   3:     Other comments or requests:   Patient was provided with doctors name and information and was advised to call to schedule follow up within specified time frame. At this time patient declined scheduling assistance.

## 2022-10-01 LAB
CULTURE RESULTS: SIGNIFICANT CHANGE UP
CULTURE RESULTS: SIGNIFICANT CHANGE UP
FUNGITELL: 66 PG/ML — SIGNIFICANT CHANGE UP
SPECIMEN SOURCE: SIGNIFICANT CHANGE UP
SPECIMEN SOURCE: SIGNIFICANT CHANGE UP

## 2022-10-04 LAB — STRONGYLOIDES AB SER-ACNC: NEGATIVE — SIGNIFICANT CHANGE UP

## 2022-10-05 ENCOUNTER — LABORATORY RESULT (OUTPATIENT)
Age: 32
End: 2022-10-05

## 2022-10-05 ENCOUNTER — APPOINTMENT (OUTPATIENT)
Dept: HEPATOLOGY | Facility: CLINIC | Age: 32
End: 2022-10-05

## 2022-10-05 VITALS
BODY MASS INDEX: 22.2 KG/M2 | DIASTOLIC BLOOD PRESSURE: 68 MMHG | SYSTOLIC BLOOD PRESSURE: 108 MMHG | TEMPERATURE: 98 F | OXYGEN SATURATION: 100 % | RESPIRATION RATE: 16 BRPM | HEART RATE: 79 BPM | HEIGHT: 64 IN | WEIGHT: 130 LBS

## 2022-10-05 PROCEDURE — 99214 OFFICE O/P EST MOD 30 MIN: CPT

## 2022-10-08 NOTE — HISTORY OF PRESENT ILLNESS
[FreeTextEntry1] : Mr. JAVY BGIGS a 31 year Other race male  presents today for  a hepatology appointment. He has been  has been referred to us by Dr. Yasmany Howard. .\par \par History based on initial hepatology clinic visit:\par Mr. Javy Biggs is a 30-year-old man with a history of cerebral palsy presents for  evaluation for potential liver transplant.\par \par He has been symptomatic since early 2019. He started itching while he was in [place], along with some nausea and vomiting and abdominal pain. He was noted to have liver enzyme elevation. Then subsequently he presented to [facility] and underwent evaluation. His bilirubin started to rise further and then transferred to [facility] in February 2019. He underwent ERCP which showed the distal bile duct stricture and questionable right intrahepatic stricture. Small stent was placed in the distal bile duct. However his bilirubin did not improve initially. He underwent MRI with severe right intrahepatic stricture. He was discharged home. He was itching for many days but then it improved. \par \par Ca 19-9 level, IgG 4 level and bile duct brushings have been unremarkable. \par \par The patient had another ERCP with stent pull and cholangiogram in april 2019. Right intrahepatic stricture was noted. Then additional brushings were performed.Then a stent was attempted to be advanced into the right intrahepatic system. Although there was significant resistance. Then it was dilated using a 4 mm balloon. Then 7 French 9 cm bile duct stent was placed.The patient had a ER visit about a week or 2 after that. He was noted to have nephrolithiasis.\par \par His LFTs imroved with ERCP and stent. He is now referred to hepatology for further assessment for primary sclerosing cholangitis.\par \par ARMANDO is negative, Mitochondrial antibody is negative, Viral hepatitis serologies are negative, immunugloblin level are unimpressive.\par \par Interval hx: Since last seen in the hepatology clinic on 22-May-2019, he had a follow up ERCP by Dr. Howard, and the old stent was removed, and exchanged with a new one. Brushings were negative for malignant cell. Additionally he had a liver biopsy that showed overall appears cholestatic pattern of injury with ductular reaction. This was further discussed at the hepato-billiary meeting with pathology review. 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 Essentially, work up for underlying CLD were negative for viral serology, autoimmune markers, ceruloplasmin ( higher level not suggestive of Garcia's). His total bili is down to 2.4 mg/dL, , and , and . AMA is negative, but ASMA is low titer + ( 1:20). ARMANDO is also negative.\par Interestingly, his IgG level is high ( 1723 mg/dL). \par His viral serology suggest to protection towards hepatitis A, but he is immune to hepatitis B. I hav recommended vaccine series.\par Ca19-9 was within normal range.\par \par Interval hx ( 9/3/2019): Since last seen he has been apparently hospitalized with fever, and suspected infection. He was treated with IV antibiotics, and was discharged with Cipro which he is still taking ( will finish in 2 days).\par Today he feels well.\par LFTs significantly better since Prednisone was started. His mother reports skin rash in the face, chest, and upper back (like pimples) since the steroid was initiated.\par \par Interval hx-10-3-2019:\par \par Patient has been doing well since last seen. His acne lesions have almost disappeared. He remain on Azathioprine 100 mg daily (increased on 10-2-2019), and Prednisone 10 mg daily. His Azathioprine dose was increased due to poor response on 50 mg daily. He denies any cholestasis symptoms. \par \par Interval history January 6, 2020:\par Recent laboratory tests from 10 December 2019 was revealed. This revealed white cell count of 5.96, hemoglobin 14.8, platelet count 400,000. Liver function tests revealed total bilirubin 1.8, , , alkaline phosphatase 151. Serum creatine was 0.92 mg/dL. INR is 0.93. His liver tests appears to have significantly increased compared to 26 November 2019. At that time his total bilirubin was 1.1, AST 89, , alkaline phosphatase 203.\par \par Interval history February 13, 2020:\par \par Patient presents for a follow up visit today along with his parents. Since last seen that has a significant jump in his liver test particularly total bilirubin level. His total bilirubin is 21.9, AST 65, ALT 44, alkaline phosphatase 129. He recently had an ERCP done through Dr. Yasmany Howard, and his liver function tests has not improved from the spike yesterday. I had a personal discussion with Dr. Howard, and he mentioned that he placed the stent on the right side only and he intends now to repeat an ERCP with bilateral stenting. \par \par \par Internal history dated March 2, 2020\par \par Ms. Biggs returns for a followup visit in the hepatology clinic accompianed  by her mother. On his followup today, patient reports severe persistent itching that is bothersome. He has been taking hydroxyzine with some relief. He also certainly some excoriation marks. He denies any internal episodes of fever, chills, , nausea or vomiting.\par \par His liver function test from 2 of February 2020 revealed total bilirubin 21.9, AST 65, ALT 44, alkaline phosphatase 129. Serum creatinine was 0.65 mg/dL. \par \par He's still waiting for getting the ERCP done by Dr. Howard, and apparently scheduled for this week.  We will reduce his hasn't had reduced to 50 mg daily and prednisone dose to 10 mg daily. I have recommended him to hold off on switching to budesonide.\par \par \par Interval history dated June 8, 2020\par \james Palafox returns for a followup visit to hepatology clinic today accompanied by her mother. He reports fever for the last 2 days, and was preceded with some malaise. His fever was as high as her in 3 different it 2 days ago but the intensity has significantly improved as of yesterday. About 2 weeks in the he had an ERCP with stent exchange by Dr. Howard. He denies any chills. He also denies any symptoms of itching or any worsening jaundice or change in color of his urine or stool. Today the clinic she otherwise feels well and denies any new symptoms.\par \par On June 5, 2020 GI blood works that included CBC. This revealed a white cell count of 9.75, hemoglobin 14.2, platelet count 229,000. Recent CEA 19-9 level is within normal range. His INR is also normal and quantitative hemoglobin levels are now within normal range.\par \par He is currently taking azathioprine 50 mg daily.\par \par Interval Hx ( 11/18/2020)\par \par Patient presents for a follow up. He reports doing well- although reports occasional itching. No fever or jaundice.\par \par Recent MRCP- Stable appearance of the biliary tree since 7/3/2019 MRI. 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 Follow-up with endoscopic ultrasound may be considered.\par \par Labs from Sept 17, 2020- , , , and bili 0.9. \par \par Interval Hx (1/12/2021)\par \par Patient presents for evaluation for Liver Transplant. He is being accompanied by his mother. He remains asymptomatic, denies any cholestatic symptoms.\par \par His CBC from Nov 18, 2020- normal CBC, ,000. LFTs were elevated, with total bili 94, AST 94, , . INR was 1.03. His blood type is AB.\par \par He had an ERCP with stent exchange in Dec 02, 2020 though Dr. Yasmany Howard.\par \par Interval Hx (1/27/2021)\par \par Patient presents for a follow up. He has been doing well. No new complaints. He being evaluated for an OLT, and is pending an ECHO and stress test. His blood type is AB +. hIS ca 19-9 is normal. He remains immune to hepatitis A, and hepatitis B.  IgG4 level is within normal range.\par He had an ERCP with stent exchange in Dec 02, 2020 though Dr. Yasmany Howard. His next ERCP will be in March, 2021. \par Since last seen he has an EUS.   A mass was identified in the pancreatic tail. Appearance characteristic is               benign. 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 FNA was not performed due to benign appearance (stabillity based on prior imaging), and difficulty finding a safe path on color doppler (due to blood vessels on the path of the needle). The remainder of the pancreas was examined and was unremarkable. Few Discrete peripancretic, and periportal lymph nodes were also noted. The largest periportal LN measured about 1.8 cm. The appearance characteristic was benign. The endosonographic appearance of parenchyma and the upstream pancreatic duct indicated no duct dilation. Biliary stent was seen in situ.\par He reports somewhat pale stool, but denies any itching.\par \par His LFTs show total bili 1.5, , , .\par \par Interval history dated April 6, 2021\james Palafox presents for a hepatology follow-up appointment accompanied by her mother.  Since last seen he reports he has been having increasing pruritus that is bothering him from sleep.  He has been taking hydroxyzine which is able to control his pruritus to some extent.  He also reports that after taking a warm shower he is having significant itching feeling like burning sensation in his skin.\par \par Since last seen he also completed his evaluation and has been approved for listing for liver transplantation.  He is awaiting dental clearance and also an application for meld score exception.\par \par Since last seen he had an ERCP and a stent exchange performed on February 25, 2021.  No interval episodes of cholangitis.\par \par Recent follow-up labs from 26 March 2021 was reviewed.  This revealed a hemoglobin of 13.1 g/dL with platelet count 277,000.  His liver function test revealed total bilirubin 3.9, AST 75, ALT 31, alkaline phosphatase 241.  Serum creatinine is 0.83 mg/dL.  INR is 1.\par \par Interval history dated April 21, 2021.\james Palafox presents for hepatology follow-up appointment along with his mother.  Area recently had an emergency visit on April 19, 2021 with complaints of fever with a temperature maximum 102 °F associated with cough, runny nose and myalgia.  He denies any nausea, vomiting, abdominal pain or worsening jaundice or pruritus.  Covid 19 test was negative.\par \par He recently received his COVID-19 vaccine second dose Pfizer on April 11, 2021.  He reports he has having symptomatic malaise, fever since that time.  Labs from April 28 and April 21 during his hospital stay was reviewed which revealed leukocytosis with a white cell count of 12.27 on admission which improved to 7.97 at the time of discharge.  His hemoglobin was 9.6 and platelet count of 397,000.  Liver function tests revealed total bilirubin 1.4, normal liver enzymes with AST 26, ALT 15, alkaline phosphatase 205.  His INR was 1.37.\par \par He had a CT scan of the abdomen performed on April 20, 2021 which revealed the following findings-\par 1. Partial improvement of the intrahepatic biliary dilatation in this patient status post biliary stent placement.\par 2. New splenomegaly, of uncertain etiology. Correlate for developing portal venous hypertension.\par 3. Enhancing pancreatic tail lesion, better assessed on the MRI from November, 2020.\par \par Interval history dated May 12, 2021\par juan Palafox presents for hepatology follow-up appointment accompanied by his mother.  Today he feels well.  However he has been having intermittent low-grade fevers since April 20, 2021.\par \par MRI from May 10, 2021 revealed- Central biliary stricture as above. Differential includes both inflammatory and neoplastic etiologies, favor benign stricture given relative stability from 2019. Signal abnormality in segment 7 with several subcentimeter rim-enhancing foci concerning for microabscesses. Pancreatic tail lesion demonstrating interval growth however with enhancement characteristics consistent with a splenule. Of note, the spleen demonstrates continued interval growth along with this lesion over time.\par \par Patient had labs drawn yesterday on May 11, 2021 which revealed a white cell count of 6.1, hemoglobin 9.7, platelet count 2 69,000.  Serum sodium was 140, potassium 3.6, creatinine 0.67.  Liver function test revealed total bilirubin 0.8, AST 30, ALT 20, alkaline phosphatase 261.\par \par Interval history dated Giuliana 15, 2021juan Palafox presents for hepatology follow-up appointment accompanied by his mother.  He was last seen in the hepatology clinic on 12 May 2021.  He is also scheduled to see transplant surgeon Dr. Britton Solis today.  He currently remains waitlisted for liver transplantation on the [facility] list although continues to have a low model for end-stage liver disease score.\par \par He recently had an ERCP on Giuliana 3, 2021 which revealed recurrent intrahepatic bile duct stricture and there was also bile duct stone, status post balloon sweep with removal and subsequent dilatation of the right and left intrahepatic system and bilateral bile duct stent placement.\par \par He has lost significant amount of weight since his last clinic visit, approximately 16 pound weight loss.  He was treated with 4 weeks course of antibiotics including Flagyl and levofloxacin.  I suspect his weight loss may be related to taste change related to Flagyl.\par \par MRI from May 10, 2021 revealed: Right hepatic lobe atrophy with left hepatic lobe hypertrophy. Mild to moderate intrahepatic biliary ductal dilatation in the right and left lobes secondary to a central hilar stricture demonstrates delayed enhancement on postcontrast imaging. Extrahepatic biliary tree is nondilated. Findings are without significant change from prior imaging dating to 2019 favoring benign stricture in the setting of potential primary sclerosing cholangitis however malignancy is not completely excluded.. Posterior division right portal vein is however occluded unchanged from November 2020 however new from prior imaging and February 2019.\par \par Signal abnormality in segment 7 with several subcentimeter rim-enhancing foci concerning for microabscesses.\par \par Pancreatic tail lesion demonstrating interval growth however with enhancement characteristics consistent with a splenule. Of note, the spleen demonstrates continued interval growth along with this lesion over time.\par \par MRI from June 11, 2021 revealed: Right lobe atrophy with left lobe hypertrophy, as on prior. No significant steatosis. Mild to moderate intrahepatic biliary ductal dilatation secondary to a central hilar stricture with delayed enhancement on postcontrast imaging, essentially unchanged from 2019. Extra hepatic biliary tree is not dilated. Given stability, a benign stricture is favored. However, malignancy cannot be entirely excluded in the setting of primary sclerosing cholangitis. Redemonstrated occlusion of the posterior right portal venous branch. Near-complete resolution of the segment 7 signal abnormality/microabscesses with only mild T2 heterogeneity remaining, but no discrete ring enhancement.\par \par Laboratory test results from 19 May 2021 was reviewed.  This revealed hemoglobin 11.3 g/dL, platelet count 3 35,000.  White cell count was 4.95.  Serum sodium 138, potassium 5.0.  Liver function test revealed total bilirubin 0.8, , ALT 78, alkaline phosphatase 901.  Her alkaline phosphatase appears to be significantly elevated compared to prior labs.\par \par Clinically appears to be responding well to antibiotics and his imaging studies also now showing resolution of the microabscesses.\par \par Interval history dated July 27, 2021\par \par Patient presents for hepatology follow-up appointment accompanied by her mother.  Today Javy reports that he has been feeling well.  He has gained weight with megestrol.  His fever has resolved and he denies any nausea, vomiting, abdominal pain.  He is now off antibiotics.  He is also currently off azathioprine.  During his prior visits rifampin as well as also now has been discontinued as well.\par MRI from June 16, 2021 revealed stable central biliary stricture.  Near complete resolution of hepatic segment 7 signal abnormality/microabscesses were noted.  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  I recommended hydroxyzine 25 mg nightly as needed.\par Laboratory test results from 12 July 2021 was reviewed.  This revealed mild anemia with hemoglobin of 11.5, deciliter and platelet count was 183,000.  Liver chemistries revealed mild elevation with total bilirubin 2.2 mg/dL, AST 62, , alkaline phosphatase 520.  His alkaline phosphatase appears to have improved from 901 in 19 May 2021.\par \par Interval history dated November 17, 2021\par \james Palafox presents to the hepatology clinic today accompanied by her mother.  Javy recently had an ERCP by Dr. Howard due to worsening jaundice and her stents were exchanged with 2 plastic stents.  She continues to have persistent jaundice and also complains of significant pruritus symptoms today.  He has been taking hydroxyzine on and off but it is not helping her very well.  She also has recurrent MRSA skin infection.  No interval hospitalization otherwise.\par \par Recent labs from 15/2021 was reviewed.  This revealed hemoglobin 11.5 g/dL with a platelet count 321,000.  INR was 1.09.  Liver test revealed total bilirubin 6.1 mg/dL which is improved from 6.4 mg/dL following his ERCP.  AST was 76 ALT 36, alkaline phosphatase 336 units/L.  Serum creatinine was 0.76 mg/dL.\par \par \par Interval history dated February 16, 2022\par \james Palafox presents for a hepatology follow-up appointment.  He is accompanied to the clinic by his mother.  Today on his follow-up visit he reports intermittent fever especially at night time ranging between 99 through 101 degrees vomiting.  Per mother his temperature yesterday was 99.7 dignified and it.  His vitals are stable today.  He otherwise feeling well.  He recently had a ERCP with stent exchange on February 8, 2022 by Dr. Howard.  He reports his fever episode started few days before ERCP was done.  He also had an MRCP done on February 7, 2022 that revealed stable intrahepatic biliary ductal dilatation and central strictures. No evidence of residual hepatic abscess.\par \par Interval history dated March 01, 2022\par \james Palafox presents for a follow up visit. He is accompanied by her mother.   Today, he feels well, and his fever resolved.  He was given a 7-day course of Augmentin 875 mg twice a day which he completed.\par Laboratory test results from 23 February 2022 was reviewed.  This revealed a white cell count of 5.68, hemoglobin 11.6 g/dL, platelet count 282,000.  Serum sodium 140, potassium 3.6 mmol/L.  Serum creatinine 0.80 mg/dL.  Liver test revealed total bilirubin 4.4 mg/dL, , , alkaline phosphatase 622.  INR 1.12.  \par \par Interval history dated May 10, 2022\par \james Patient presents for hepatology follow-up appointment.  He is accompanied by his mother.  He denies any significant cholestatic symptoms although still appears to be jaundiced.  Mother thinks his eyes are more yellow recently.  He is scheduled for an ERCP by Dr. Howard in the upcoming weeks.  He also is losing weight and reports low appetite.\par Recent labs from 27 April 2022 was reviewed.  This revealed a hemoglobin of 11.5 g/dL with platelet count 231,000.  Liver test revealed total bilirubin 6 mg/dL, AST 71, ALT 35, alkaline phosphatase 392 units/L.  Serum creatinine was 0.78 mg/dL.  INR 1.14.\par \par \par Interval Hx dated June 21, 2022\par \james Patient presents for a follow up. He is being accompanied to the clinic by his mother.He reports feeling nausea but no vomiting, also had some chills without fever yesterday. He also reports new itching symptoms.  He remains on cholestyramine.  Last MRI was from Feb 2022. He had ERCP with stent exchange on May 20, 2022. \par \par Interval Hx dated July 26, 2022\par \par Patient presents for follow up. He feels more itching since last visit. Denied any fever, chills or rigor. He is currently temporarily inactive and waiting for insurance approval for transplant.\par MRI from July 15, 2022:\par Redemonstrated central biliary strictures involving the central left and right ducts as they converge on the minh hepatis with delayed enhancement. New since prior, there is mild interval increase in biliary ductal dilatation and irregularity of a couple segment 7 ducts. No focal masslike lesion. Differential for biliary stricture, however, includes both benign and malignant etiologies.\par Additionally, there is now thrombosis/occlusion of the anterior branch of the right portal vein. Redemonstrated chronic thrombosis of the posterior branch of the right portal vein.\par \par Interval history dated August 2022\par \par Patient presents for hepatology follow-up appointment.  He was last seen in the hepatology clinic on July 26, 2022.  Patient reports over the last few days he has been feeling tired and febrile.  Denies any chills or rigor.  Mild pruritus.  He is scheduled for an ERCP next week with Dr. Howard.  He is currently actively waitlisted on the [facility] waitlist.  Blood type AB.\par Labs were reviewed from 24 August 2022 that revealed white cell count of 6.36, hemoglobin 11.4 g/dL, platelet count 219,000.  Alpha-fetoprotein level was 2.1 ng/mL.  Liver test revealed total bilirubin 4.2 mg/dL, AST 80, ALT 39, alkaline phosphatase 349 units/L.  This is stable compared to prior labs.  Serum creatinine was 0.84 mg/dL.  He does have mild hypokalemia with a potassium of 3.4 mmol/L.  INR 1.37.\par \par \par Interval history dated October 5, 2022\par \par Patient presents for hepatology follow-up appointment.  He was last seen in hepatology clinic on 30 August 2022.  In the interval patient was admitted recently at [facility] in [facility] with persistent cough and fever related to COVID-19 infection.\par \par He was treated with monoclonal antibodies as an outpatient initially but continued to have persistent fevers and dry cough and dyspnea on exertion and was admitted to the hospital.  During the hospitalization his alkaline phosphatase level was around 400 and baseline between 200-300.  He recently had a stent exchange status post ERCP on September 6, 2022.  He had an MRCP done on September 28 which showed PSC with cirrhosis and sequela of portal hypertension without any intrahepatic abscesses or suspicious lesions and no significant biliary ductal dilatation.  Mildly distended gallbladder with nonspecific mild diffuse wall thickening was noted.  He is currently waitlisted for liver transplantation with a model for end-stage liver disease score of 18.  He was briefly treated with IV Zosyn during his hospitalization.  He remains on anticoagulation.  His recent abdominal Doppler from September 27, 2022 revealed no thrombosis.  Right upper quadrant ultrasound from September 27 revealed distended gallbladder without any significant wall thickening.  No stones or pericholecystic fluid was noted.  TTE without any vegetations.  Transplant infectious disease specialist saw the patient during his hospitalization.  Low concern for COVID-19 contributing to his ongoing symptoms per ID.  CT value for COVID-19 of 29.6, suggestive of low viral burden.  He was treated with remdesivir initially but was discontinued.  He presents for a follow-up visit following hospitalization and overall feels well.\par \par \par

## 2022-10-08 NOTE — REVIEW OF SYSTEMS
[Fever] : fever [Recent Weight Gain (___ Lbs)] : recent [unfilled] ~Ulb weight gain [As Noted in HPI] : as noted in HPI [Difficulty Walking] : difficulty walking [Negative] : Heme/Lymph [Chills] : no chills [Feeling Poorly] : not feeling poorly [Feeling Tired] : not feeling tired [Red Eyes] : eyes not red [de-identified] : Spastic gait fro cerebral palsy

## 2022-10-08 NOTE — ASSESSMENT
[FreeTextEntry1] : 32-year-old male with known history of primary sclerosing cholangitis.  Multiple stent exchanges for biliary strictures, most recent stent exchange was performed on May 20, 2022. Follow up MRI shows central biliary strictures involving the central left and right ducts as they converge on the minh hepatis with delayed enhancement. New since prior, there is mild interval increase in biliary ductal dilatation and irregularity of a couple segment 7 ducts. No focal masslike lesion. Additionally, there is thrombosis/occlusion of the anterior branch of the right portal vein. Redemonstrated chronic thrombosis of the posterior branch of the right portal vein.\par \par Patient is currently listed for liver transplantation on the [facility] list at our center.  Blood type AB. \par \par He has consented to accept HCV positive organ. \par \par PLAN\par 1.  Primary sclerosing cholangitis\par Unfortunately patient is continuing to require interval ERCP and stent exchanges.  He also developed some microabscesses in his right lobe which needed prolonged antibiotic treatment course in May 2021with 4 weeks course of antibiotics including levofloxacin and metronidazole with clinical and radiological resolution.\par He is currently waitlisted on the [facility] list. \par We applied for a nonstandardized meld exception but was declined by the national liver review board.\par \par I recommended him to continue with cholestyramine 4 g twice a day mixed with water/juice as needed for pruritus. In addition I recommended him to take hydroxyzine nightly 25 to 50 mg to help resolve his nighttime pruritus and possibly help him sleep well as needed if recurrent pruritus.  However he feels well and he is reusing these measures less often since the stent exchanges his pruritus improved significantly.\par \par He will also continue with ferrous sulfate 325 mg once daily for his chronic anemia which is responding with iron supplements.\par \par I recommended follow-up labs with CBC, CMP, PT/INR to be done today.\par \par s/p recent ERCP with stent exchange on September 6, 2022 by Dr. Howard.\par \par #2.  Portal vein thrombosis\par Continue with Eliquis 5 mg twice daily.\par \par #3.  Vitamin D deficiency\par Continue with ergocalciferol 50,000 international unit weekly.\par \par #4 Recent COVID-19 infection\par d/p Monoclonal antibody and Remedesvir (did not finish the course per recommendation of ID). Recovered and and doing well. Denied any cough/fever today.\par \par Return to hepatology clinic in 1 month.

## 2022-10-09 LAB
ALBUMIN SERPL ELPH-MCNC: 2.9 G/DL
ALP BLD-CCNC: 616 U/L
ALT SERPL-CCNC: 45 U/L
ANION GAP SERPL CALC-SCNC: 10 MMOL/L
AST SERPL-CCNC: 98 U/L
BASOPHILS # BLD AUTO: 0.08 K/UL
BASOPHILS NFR BLD AUTO: 0.9 %
BILIRUB SERPL-MCNC: 1.8 MG/DL
BUN SERPL-MCNC: 13 MG/DL
CALCIUM SERPL-MCNC: 9.4 MG/DL
CHLORIDE SERPL-SCNC: 104 MMOL/L
CO2 SERPL-SCNC: 25 MMOL/L
CREAT SERPL-MCNC: 0.71 MG/DL
EGFR: 127 ML/MIN/1.73M2
EOSINOPHIL # BLD AUTO: 0.27 K/UL
EOSINOPHIL NFR BLD AUTO: 3.1 %
GLUCOSE SERPL-MCNC: 86 MG/DL
HCT VFR BLD CALC: 35.4 %
HGB BLD-MCNC: 11.1 G/DL
IMM GRANULOCYTES NFR BLD AUTO: 2 %
INR PPP: 1.29 RATIO
LYMPHOCYTES # BLD AUTO: 1.89 K/UL
LYMPHOCYTES NFR BLD AUTO: 21.4 %
MAN DIFF?: NORMAL
MCHC RBC-ENTMCNC: 31.4 GM/DL
MCHC RBC-ENTMCNC: 33.7 PG
MCV RBC AUTO: 107.6 FL
MONOCYTES # BLD AUTO: 0.68 K/UL
MONOCYTES NFR BLD AUTO: 7.7 %
NEUTROPHILS # BLD AUTO: 5.73 K/UL
NEUTROPHILS NFR BLD AUTO: 64.9 %
PLATELET # BLD AUTO: 446 K/UL
POTASSIUM SERPL-SCNC: 4.4 MMOL/L
PROT SERPL-MCNC: 8.2 G/DL
PT BLD: 15.1 SEC
RBC # BLD: 3.29 M/UL
RBC # FLD: 17.3 %
SODIUM SERPL-SCNC: 139 MMOL/L
WBC # FLD AUTO: 8.83 K/UL

## 2022-10-17 ENCOUNTER — APPOINTMENT (OUTPATIENT)
Dept: MAMMOGRAPHY | Facility: CLINIC | Age: 32
End: 2022-10-17

## 2022-10-17 ENCOUNTER — RESULT REVIEW (OUTPATIENT)
Age: 32
End: 2022-10-17

## 2022-10-17 ENCOUNTER — APPOINTMENT (OUTPATIENT)
Dept: ULTRASOUND IMAGING | Facility: CLINIC | Age: 32
End: 2022-10-17

## 2022-10-17 PROCEDURE — 77066 DX MAMMO INCL CAD BI: CPT

## 2022-10-17 PROCEDURE — 77062 BREAST TOMOSYNTHESIS BI: CPT

## 2022-10-17 PROCEDURE — 76641 ULTRASOUND BREAST COMPLETE: CPT | Mod: 50

## 2022-10-29 NOTE — ED PROVIDER NOTE - INTERNATIONAL TRAVEL
No
CAD (coronary artery disease)
GERD (gastroesophageal reflux disease)
Anxiety
CAD (coronary artery disease)

## 2022-10-30 ENCOUNTER — NON-APPOINTMENT (OUTPATIENT)
Age: 32
End: 2022-10-30

## 2022-10-31 ENCOUNTER — RESULT REVIEW (OUTPATIENT)
Age: 32
End: 2022-10-31

## 2022-10-31 ENCOUNTER — INPATIENT (INPATIENT)
Facility: HOSPITAL | Age: 32
LOS: 21 days | Discharge: HOME HEALTH | DRG: 5 | End: 2022-11-22
Attending: TRANSPLANT SURGERY | Admitting: TRANSPLANT SURGERY
Payer: MEDICAID

## 2022-10-31 ENCOUNTER — APPOINTMENT (OUTPATIENT)
Dept: TRANSPLANT | Facility: HOSPITAL | Age: 32
End: 2022-10-31

## 2022-10-31 ENCOUNTER — TRANSCRIPTION ENCOUNTER (OUTPATIENT)
Age: 32
End: 2022-10-31

## 2022-10-31 VITALS
TEMPERATURE: 98 F | HEART RATE: 115 BPM | DIASTOLIC BLOOD PRESSURE: 76 MMHG | SYSTOLIC BLOOD PRESSURE: 137 MMHG | HEIGHT: 78 IN | OXYGEN SATURATION: 100 % | RESPIRATION RATE: 18 BRPM | WEIGHT: 128.75 LBS

## 2022-10-31 DIAGNOSIS — Z94.4 LIVER TRANSPLANT STATUS: ICD-10-CM

## 2022-10-31 DIAGNOSIS — K08.409 PARTIAL LOSS OF TEETH, UNSPECIFIED CAUSE, UNSPECIFIED CLASS: Chronic | ICD-10-CM

## 2022-10-31 DIAGNOSIS — Z98.890 OTHER SPECIFIED POSTPROCEDURAL STATES: Chronic | ICD-10-CM

## 2022-10-31 LAB
ALBUMIN SERPL ELPH-MCNC: 2.9 G/DL — LOW (ref 3.3–5)
ALP SERPL-CCNC: 413 U/L — HIGH (ref 40–120)
ALT FLD-CCNC: 31 U/L — SIGNIFICANT CHANGE UP (ref 10–45)
ANION GAP SERPL CALC-SCNC: 10 MMOL/L — SIGNIFICANT CHANGE UP (ref 5–17)
APTT BLD: 40.1 SEC — HIGH (ref 27.5–35.5)
AST SERPL-CCNC: 68 U/L — HIGH (ref 10–40)
BASOPHILS # BLD AUTO: 0.06 K/UL — SIGNIFICANT CHANGE UP (ref 0–0.2)
BASOPHILS NFR BLD AUTO: 1.2 % — SIGNIFICANT CHANGE UP (ref 0–2)
BILIRUB SERPL-MCNC: 2.1 MG/DL — HIGH (ref 0.2–1.2)
BLD GP AB SCN SERPL QL: NEGATIVE — SIGNIFICANT CHANGE UP
BUN SERPL-MCNC: 9 MG/DL — SIGNIFICANT CHANGE UP (ref 7–23)
CALCIUM SERPL-MCNC: 8.7 MG/DL — SIGNIFICANT CHANGE UP (ref 8.4–10.5)
CHLORIDE SERPL-SCNC: 106 MMOL/L — SIGNIFICANT CHANGE UP (ref 96–108)
CO2 SERPL-SCNC: 25 MMOL/L — SIGNIFICANT CHANGE UP (ref 22–31)
CREAT SERPL-MCNC: 0.78 MG/DL — SIGNIFICANT CHANGE UP (ref 0.5–1.3)
EGFR: 123 ML/MIN/1.73M2 — SIGNIFICANT CHANGE UP
EOSINOPHIL # BLD AUTO: 0.13 K/UL — SIGNIFICANT CHANGE UP (ref 0–0.5)
EOSINOPHIL NFR BLD AUTO: 2.7 % — SIGNIFICANT CHANGE UP (ref 0–6)
FIBRINOGEN PPP-MCNC: 346 MG/DL — SIGNIFICANT CHANGE UP (ref 330–520)
GAS PNL BLDA: SIGNIFICANT CHANGE UP
GLUCOSE SERPL-MCNC: 75 MG/DL — SIGNIFICANT CHANGE UP (ref 70–99)
HCT VFR BLD CALC: 33.9 % — LOW (ref 39–50)
HEPARINASE TEG R TIME: 5.3 MIN — SIGNIFICANT CHANGE UP (ref 4.3–8.3)
HEPARINASE TEG R TIME: 7.2 MIN — SIGNIFICANT CHANGE UP (ref 4.3–8.3)
HGB BLD-MCNC: 11 G/DL — LOW (ref 13–17)
IMM GRANULOCYTES NFR BLD AUTO: 0.4 % — SIGNIFICANT CHANGE UP (ref 0–0.9)
INR BLD: 1.2 RATIO — HIGH (ref 0.88–1.16)
LYMPHOCYTES # BLD AUTO: 1.35 K/UL — SIGNIFICANT CHANGE UP (ref 1–3.3)
LYMPHOCYTES # BLD AUTO: 28.1 % — SIGNIFICANT CHANGE UP (ref 13–44)
MAGNESIUM SERPL-MCNC: 2.1 MG/DL — SIGNIFICANT CHANGE UP (ref 1.6–2.6)
MCHC RBC-ENTMCNC: 32.4 GM/DL — SIGNIFICANT CHANGE UP (ref 32–36)
MCHC RBC-ENTMCNC: 34.1 PG — HIGH (ref 27–34)
MCV RBC AUTO: 105 FL — HIGH (ref 80–100)
MONOCYTES # BLD AUTO: 0.44 K/UL — SIGNIFICANT CHANGE UP (ref 0–0.9)
MONOCYTES NFR BLD AUTO: 9.1 % — SIGNIFICANT CHANGE UP (ref 2–14)
NEUTROPHILS # BLD AUTO: 2.81 K/UL — SIGNIFICANT CHANGE UP (ref 1.8–7.4)
NEUTROPHILS NFR BLD AUTO: 58.5 % — SIGNIFICANT CHANGE UP (ref 43–77)
NRBC # BLD: 0 /100 WBCS — SIGNIFICANT CHANGE UP (ref 0–0)
PHOSPHATE SERPL-MCNC: 2.1 MG/DL — LOW (ref 2.5–4.5)
PLATELET # BLD AUTO: 287 K/UL — SIGNIFICANT CHANGE UP (ref 150–400)
POTASSIUM SERPL-MCNC: 3.6 MMOL/L — SIGNIFICANT CHANGE UP (ref 3.5–5.3)
POTASSIUM SERPL-SCNC: 3.6 MMOL/L — SIGNIFICANT CHANGE UP (ref 3.5–5.3)
PROT SERPL-MCNC: 8.3 G/DL — SIGNIFICANT CHANGE UP (ref 6–8.3)
PROTHROM AB SERPL-ACNC: 14 SEC — HIGH (ref 10.5–13.4)
RAPIDTEG MAXIMUM AMPLITUDE: 66.6 MM — SIGNIFICANT CHANGE UP (ref 52–70)
RAPIDTEG MAXIMUM AMPLITUDE: 68.4 MM — SIGNIFICANT CHANGE UP (ref 52–70)
RBC # BLD: 3.23 M/UL — LOW (ref 4.2–5.8)
RBC # FLD: 14.5 % — SIGNIFICANT CHANGE UP (ref 10.3–14.5)
RH IG SCN BLD-IMP: POSITIVE — SIGNIFICANT CHANGE UP
SARS-COV-2 RNA SPEC QL NAA+PROBE: SIGNIFICANT CHANGE UP
SODIUM SERPL-SCNC: 141 MMOL/L — SIGNIFICANT CHANGE UP (ref 135–145)
TEG FUNCTIONAL FIBRINOGEN: 32.3 MM (ref 15–32)
TEG FUNCTIONAL FIBRINOGEN: 38.4 MM (ref 15–32)
TEG MAXIMUM AMPLITUDE: 65.5 MM — SIGNIFICANT CHANGE UP (ref 52–69)
TEG MAXIMUM AMPLITUDE: 66.3 MM — SIGNIFICANT CHANGE UP (ref 52–69)
TEG REACTION TIME: 5.4 MIN — SIGNIFICANT CHANGE UP (ref 4.6–9.1)
TEG REACTION TIME: 7 MIN — SIGNIFICANT CHANGE UP (ref 4.6–9.1)
WBC # BLD: 4.81 K/UL — SIGNIFICANT CHANGE UP (ref 3.8–10.5)
WBC # FLD AUTO: 4.81 K/UL — SIGNIFICANT CHANGE UP (ref 3.8–10.5)

## 2022-10-31 PROCEDURE — 88341 IMHCHEM/IMCYTCHM EA ADD ANTB: CPT | Mod: 26

## 2022-10-31 PROCEDURE — 47135 TRANSPLANTATION OF LIVER: CPT | Mod: 62,GC

## 2022-10-31 PROCEDURE — 47135 TRANSPLANTATION OF LIVER: CPT | Mod: 62

## 2022-10-31 PROCEDURE — 88342 IMHCHEM/IMCYTCHM 1ST ANTB: CPT | Mod: 26

## 2022-10-31 PROCEDURE — 88305 TISSUE EXAM BY PATHOLOGIST: CPT | Mod: 26

## 2022-10-31 PROCEDURE — 71045 X-RAY EXAM CHEST 1 VIEW: CPT | Mod: 26

## 2022-10-31 PROCEDURE — 88312 SPECIAL STAINS GROUP 1: CPT | Mod: 26

## 2022-10-31 PROCEDURE — 93010 ELECTROCARDIOGRAM REPORT: CPT

## 2022-10-31 PROCEDURE — 88304 TISSUE EXAM BY PATHOLOGIST: CPT | Mod: 26

## 2022-10-31 PROCEDURE — 88313 SPECIAL STAINS GROUP 2: CPT | Mod: 26

## 2022-10-31 PROCEDURE — 88309 TISSUE EXAM BY PATHOLOGIST: CPT | Mod: 26

## 2022-10-31 DEVICE — STAPLER COVIDIEN ENDO GIA 80-3.8MM BLUE RELOAD: Type: IMPLANTABLE DEVICE | Status: FUNCTIONAL

## 2022-10-31 DEVICE — STAPLER ETHICON GST ECHELON 45MM WHITE RELOAD: Type: IMPLANTABLE DEVICE | Status: FUNCTIONAL

## 2022-10-31 DEVICE — KIT CVC 2LUM MAC 9FR CHG: Type: IMPLANTABLE DEVICE | Status: FUNCTIONAL

## 2022-10-31 DEVICE — KIT A-LINE 1LUM 20G X 12CM SAFE KIT: Type: IMPLANTABLE DEVICE | Status: FUNCTIONAL

## 2022-10-31 DEVICE — STAPLER COVIDIEN ENDO GIA 80-3.8MM BLUE: Type: IMPLANTABLE DEVICE | Status: FUNCTIONAL

## 2022-10-31 DEVICE — STAPLER COVIDIEN TA 90 BLUE: Type: IMPLANTABLE DEVICE | Status: FUNCTIONAL

## 2022-10-31 RX ORDER — AMPICILLIN SODIUM AND SULBACTAM SODIUM 250; 125 MG/ML; MG/ML
3 INJECTION, POWDER, FOR SUSPENSION INTRAMUSCULAR; INTRAVENOUS ONCE
Refills: 0 | Status: DISCONTINUED | OUTPATIENT
Start: 2022-10-31 | End: 2022-11-01

## 2022-10-31 RX ORDER — INFLUENZA VIRUS VACCINE 15; 15; 15; 15 UG/.5ML; UG/.5ML; UG/.5ML; UG/.5ML
0.5 SUSPENSION INTRAMUSCULAR ONCE
Refills: 0 | Status: DISCONTINUED | OUTPATIENT
Start: 2022-10-31 | End: 2022-11-22

## 2022-10-31 NOTE — H&P ADULT - ASSESSMENT
32M PMH Cerebral palsy, Primary Sclerosing Cholangitis Cirrhosis (c/b Portovenous hypertension) with frequent ERCPs with biliary stent placements (last in 9/2022) on liver transplant list (ABO AB, recent MELD 18), h/o PVT (on Eliquis) recent COVID s/p MAB in 9/2022) admitted for liver transplant    potential OLT  -Medrol induction  -Unasyn per ID  -full labs, COVID check, EKG/CXR  -OR 6P 32M PMH Cerebral palsy, Primary Sclerosing Cholangitis Cirrhosis (c/b Portovenous hypertension) with frequent ERCPs with biliary stent placements (last in 9/2022) on liver transplant list (ABO AB, recent MELD 18), h/o PVT (on Eliquis) recent COVID s/p MAB in 9/2022) admitted for liver transplant    potential OLT  -Medrol induction  -Unasyn per ID  -full labs, COVID check, EKG/CXR  -NPO as of noon  -Last Eliquis dose of 5mg at ~10A  -OR 6P 32M PMH Cerebral palsy, Primary Sclerosing Cholangitis Cirrhosis (c/b Portovenous hypertension) with frequent ERCPs with biliary stent placements (last in 9/2022) on liver transplant list (ABO AB), h/o PVT (on Eliquis) recent COVID s/p MAB in 9/2022) admitted for liver transplant    potential OLT  -Medrol induction  -Unasyn per ID  -full labs, COVID check, EKG/CXR  -NPO as of noon  -Last Eliquis dose of 5mg at ~10A  -OR 6P

## 2022-10-31 NOTE — H&P ADULT - NS ATTEND AMEND GEN_ALL_CORE FT
agree with above  PSC listed for liver transplant  admitted for  donor liver transplant  risks, benefits , alternatives and possible complications discussed  consent signed in chart

## 2022-10-31 NOTE — H&P ADULT - HISTORY OF PRESENT ILLNESS
Transplant Surgery     32M PMH Cerebral palsy, Primary Sclerosing Cholangitis with frequent ERCPs with biliary stent placements for biliary strictures (last in 9/2022) on liver transplant list (ABO AB, recent MELD 18), recent COVID s/p MAB in 9/2022) admitted for liver transplant    No acute events/complaints at this time, feeling well  AAOx3.    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      Anemia      History of ERCP  multiple with stent insertions/replacement every 3 months ---last 5/20/2022      [place] teeth extracted  2021          Vital Signs Last 24 Hrs  T(C): 36.7 (31 Oct 2022 16:49), Max: 36.7 (31 Oct 2022 16:49)  T(F): 98.1 (31 Oct 2022 16:49), Max: 98.1 (31 Oct 2022 16:49)  HR: 115 (31 Oct 2022 16:49) (115 - 115)  BP: 137/76 (31 Oct 2022 16:49) (137/76 - 137/76)  BP(mean): --  RR: 18 (31 Oct 2022 16:49) (18 - 18)  SpO2: 100% (31 Oct 2022 16:49) (100% - 100%)    Parameters below as of 31 Oct 2022 16:49  Patient On (Oxygen Delivery Method): room air        I&O's Summary   Transplant Surgery     32M PMH Cerebral palsy, Primary Sclerosing Cholangitis Cirrhosis with frequent ERCPs with biliary stent placements for biliary strictures (last in 9/2022) on liver transplant list (ABO AB), recent COVID s/p MAB in 9/2022) admitted for liver transplant    No acute events/complaints at this time, feeling well  AAOx3.    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      Anemia      History of ERCP  multiple with stent insertions/replacement every 3 months ---last 5/20/2022      [place] teeth extracted  2021          Vital Signs Last 24 Hrs  T(C): 36.7 (31 Oct 2022 16:49), Max: 36.7 (31 Oct 2022 16:49)  T(F): 98.1 (31 Oct 2022 16:49), Max: 98.1 (31 Oct 2022 16:49)  HR: 115 (31 Oct 2022 16:49) (115 - 115)  BP: 137/76 (31 Oct 2022 16:49) (137/76 - 137/76)  BP(mean): --  RR: 18 (31 Oct 2022 16:49) (18 - 18)  SpO2: 100% (31 Oct 2022 16:49) (100% - 100%)    Parameters below as of 31 Oct 2022 16:49  Patient On (Oxygen Delivery Method): room air        I&O's Summary

## 2022-10-31 NOTE — H&P ADULT - NSHPPHYSICALEXAM_GEN_ALL_CORE
Constitutional: Well developed / well nourished  Eyes: anicteric, PERRLA  ENMT: nc/at, no thrush  Neck: supple  Respiratory: CTA B/L  Cardiovascular: RRR  Gastrointestinal: Soft abdomen, ND, NT  Genitourinary: Voiding spontaneously  Extremities: no edema  Vascular: Palpable dp pulses bilaterally.   Neurological: A&O x3  Skin: no rashes, ulcerations, lesions  Musculoskeletal: Moving all extremities  Psychiatric: Responsive Constitutional: Well developed / well nourished  Eyes: anicteric, PERRLA  ENMT: nc/at, no thrush  Neck: supple  Respiratory: CTA B/L  Cardiovascular: RRR  Gastrointestinal: Soft abdomen, ND, NT  Genitourinary: Voiding spontaneously  Extremities: no edema, contracted on L side  Vascular: Palpable dp pulses bilaterally.   Neurological: A&O x3  Skin: no rashes, ulcerations, lesions  Musculoskeletal: Moving all extremities  Psychiatric: Responsive

## 2022-11-01 LAB
ALBUMIN SERPL ELPH-MCNC: 2.3 G/DL — LOW (ref 3.3–5)
ALBUMIN SERPL ELPH-MCNC: 2.8 G/DL — LOW (ref 3.3–5)
ALP SERPL-CCNC: 197 U/L — HIGH (ref 40–120)
ALP SERPL-CCNC: 198 U/L — HIGH (ref 40–120)
ALP SERPL-CCNC: 203 U/L — HIGH (ref 40–120)
ALP SERPL-CCNC: 241 U/L — HIGH (ref 40–120)
ALT FLD-CCNC: 350 U/L — HIGH (ref 10–45)
ALT FLD-CCNC: 354 U/L — HIGH (ref 10–45)
ALT FLD-CCNC: 354 U/L — HIGH (ref 10–45)
ALT FLD-CCNC: 363 U/L — HIGH (ref 10–45)
ANION GAP SERPL CALC-SCNC: 12 MMOL/L — SIGNIFICANT CHANGE UP (ref 5–17)
ANION GAP SERPL CALC-SCNC: 8 MMOL/L — SIGNIFICANT CHANGE UP (ref 5–17)
ANION GAP SERPL CALC-SCNC: 9 MMOL/L — SIGNIFICANT CHANGE UP (ref 5–17)
ANION GAP SERPL CALC-SCNC: 9 MMOL/L — SIGNIFICANT CHANGE UP (ref 5–17)
APTT BLD: 27.1 SEC — LOW (ref 27.5–35.5)
APTT BLD: 30.1 SEC — SIGNIFICANT CHANGE UP (ref 27.5–35.5)
APTT BLD: 30.9 SEC — SIGNIFICANT CHANGE UP (ref 27.5–35.5)
APTT BLD: 34.4 SEC — SIGNIFICANT CHANGE UP (ref 27.5–35.5)
AST SERPL-CCNC: 359 U/L — HIGH (ref 10–40)
AST SERPL-CCNC: 419 U/L — HIGH (ref 10–40)
AST SERPL-CCNC: 483 U/L — HIGH (ref 10–40)
AST SERPL-CCNC: 692 U/L — HIGH (ref 10–40)
BILIRUB DIRECT SERPL-MCNC: 2.3 MG/DL — HIGH (ref 0–0.3)
BILIRUB DIRECT SERPL-MCNC: 3.4 MG/DL — HIGH (ref 0–0.3)
BILIRUB DIRECT SERPL-MCNC: 3.4 MG/DL — HIGH (ref 0–0.3)
BILIRUB DIRECT SERPL-MCNC: 4.2 MG/DL — HIGH (ref 0–0.3)
BILIRUB INDIRECT FLD-MCNC: 0.4 MG/DL — SIGNIFICANT CHANGE UP (ref 0.2–1)
BILIRUB INDIRECT FLD-MCNC: 0.4 MG/DL — SIGNIFICANT CHANGE UP (ref 0.2–1)
BILIRUB INDIRECT FLD-MCNC: 0.6 MG/DL — SIGNIFICANT CHANGE UP (ref 0.2–1)
BILIRUB INDIRECT FLD-MCNC: 0.7 MG/DL — SIGNIFICANT CHANGE UP (ref 0.2–1)
BILIRUB SERPL-MCNC: 2.9 MG/DL — HIGH (ref 0.2–1.2)
BILIRUB SERPL-MCNC: 3.8 MG/DL — HIGH (ref 0.2–1.2)
BILIRUB SERPL-MCNC: 4.1 MG/DL — HIGH (ref 0.2–1.2)
BILIRUB SERPL-MCNC: 4.6 MG/DL — HIGH (ref 0.2–1.2)
BUN SERPL-MCNC: 10 MG/DL — SIGNIFICANT CHANGE UP (ref 7–23)
BUN SERPL-MCNC: 13 MG/DL — SIGNIFICANT CHANGE UP (ref 7–23)
BUN SERPL-MCNC: 15 MG/DL — SIGNIFICANT CHANGE UP (ref 7–23)
BUN SERPL-MCNC: 19 MG/DL — SIGNIFICANT CHANGE UP (ref 7–23)
CALCIUM SERPL-MCNC: 8.2 MG/DL — LOW (ref 8.4–10.5)
CALCIUM SERPL-MCNC: 8.3 MG/DL — LOW (ref 8.4–10.5)
CALCIUM SERPL-MCNC: 8.3 MG/DL — LOW (ref 8.4–10.5)
CALCIUM SERPL-MCNC: 8.7 MG/DL — SIGNIFICANT CHANGE UP (ref 8.4–10.5)
CHLORIDE SERPL-SCNC: 109 MMOL/L — HIGH (ref 96–108)
CHLORIDE SERPL-SCNC: 109 MMOL/L — HIGH (ref 96–108)
CHLORIDE SERPL-SCNC: 110 MMOL/L — HIGH (ref 96–108)
CHLORIDE SERPL-SCNC: 111 MMOL/L — HIGH (ref 96–108)
CMV IGG FLD QL: <0.2 U/ML — SIGNIFICANT CHANGE UP
CMV IGG SERPL-IMP: NEGATIVE — SIGNIFICANT CHANGE UP
CO2 SERPL-SCNC: 21 MMOL/L — LOW (ref 22–31)
CO2 SERPL-SCNC: 22 MMOL/L — SIGNIFICANT CHANGE UP (ref 22–31)
CO2 SERPL-SCNC: 22 MMOL/L — SIGNIFICANT CHANGE UP (ref 22–31)
CO2 SERPL-SCNC: 23 MMOL/L — SIGNIFICANT CHANGE UP (ref 22–31)
CREAT SERPL-MCNC: 0.71 MG/DL — SIGNIFICANT CHANGE UP (ref 0.5–1.3)
CREAT SERPL-MCNC: 0.71 MG/DL — SIGNIFICANT CHANGE UP (ref 0.5–1.3)
CREAT SERPL-MCNC: 0.75 MG/DL — SIGNIFICANT CHANGE UP (ref 0.5–1.3)
CREAT SERPL-MCNC: 0.8 MG/DL — SIGNIFICANT CHANGE UP (ref 0.5–1.3)
EBV EA AB SER IA-ACNC: 18.1 U/ML — HIGH
EBV EA AB TITR SER IF: POSITIVE
EBV EA IGG SER-ACNC: POSITIVE
EBV NA IGG SER IA-ACNC: 599 U/ML — HIGH
EBV PATRN SPEC IB-IMP: SIGNIFICANT CHANGE UP
EBV VCA IGG AVIDITY SER QL IA: POSITIVE
EBV VCA IGM SER IA-ACNC: <10 U/ML — SIGNIFICANT CHANGE UP
EBV VCA IGM SER IA-ACNC: >750 U/ML — HIGH
EBV VCA IGM TITR FLD: NEGATIVE — SIGNIFICANT CHANGE UP
EGFR: 122 ML/MIN/1.73M2 — SIGNIFICANT CHANGE UP
EGFR: 124 ML/MIN/1.73M2 — SIGNIFICANT CHANGE UP
EGFR: 127 ML/MIN/1.73M2 — SIGNIFICANT CHANGE UP
EGFR: 127 ML/MIN/1.73M2 — SIGNIFICANT CHANGE UP
GAS PNL BLDA: SIGNIFICANT CHANGE UP
GGT SERPL-CCNC: 187 U/L — HIGH (ref 9–50)
GLUCOSE BLDC GLUCOMTR-MCNC: 130 MG/DL — HIGH (ref 70–99)
GLUCOSE BLDC GLUCOMTR-MCNC: 153 MG/DL — HIGH (ref 70–99)
GLUCOSE BLDC GLUCOMTR-MCNC: 167 MG/DL — HIGH (ref 70–99)
GLUCOSE BLDC GLUCOMTR-MCNC: 172 MG/DL — HIGH (ref 70–99)
GLUCOSE BLDC GLUCOMTR-MCNC: 175 MG/DL — HIGH (ref 70–99)
GLUCOSE BLDC GLUCOMTR-MCNC: 181 MG/DL — HIGH (ref 70–99)
GLUCOSE BLDC GLUCOMTR-MCNC: 196 MG/DL — HIGH (ref 70–99)
GLUCOSE BLDC GLUCOMTR-MCNC: 201 MG/DL — HIGH (ref 70–99)
GLUCOSE BLDC GLUCOMTR-MCNC: 201 MG/DL — HIGH (ref 70–99)
GLUCOSE BLDC GLUCOMTR-MCNC: 245 MG/DL — HIGH (ref 70–99)
GLUCOSE SERPL-MCNC: 160 MG/DL — HIGH (ref 70–99)
GLUCOSE SERPL-MCNC: 162 MG/DL — HIGH (ref 70–99)
GLUCOSE SERPL-MCNC: 175 MG/DL — HIGH (ref 70–99)
GLUCOSE SERPL-MCNC: 193 MG/DL — HIGH (ref 70–99)
HBV CORE AB SER-ACNC: SIGNIFICANT CHANGE UP
HBV SURFACE AB SER-ACNC: SIGNIFICANT CHANGE UP MIU/ML
HBV SURFACE AG SER-ACNC: SIGNIFICANT CHANGE UP
HCT VFR BLD CALC: 24.9 % — LOW (ref 39–50)
HCT VFR BLD CALC: 25.9 % — LOW (ref 39–50)
HCT VFR BLD CALC: 26.1 % — LOW (ref 39–50)
HCT VFR BLD CALC: 29.1 % — LOW (ref 39–50)
HCV AB S/CO SERPL IA: 0.15 S/CO — SIGNIFICANT CHANGE UP (ref 0–0.99)
HCV AB SERPL-IMP: SIGNIFICANT CHANGE UP
HCV RNA SPEC NAA+PROBE-LOG IU: SIGNIFICANT CHANGE UP
HCV RNA SPEC NAA+PROBE-LOG IU: SIGNIFICANT CHANGE UP LOGIU/ML
HGB BLD-MCNC: 8.5 G/DL — LOW (ref 13–17)
HGB BLD-MCNC: 8.6 G/DL — LOW (ref 13–17)
HGB BLD-MCNC: 8.7 G/DL — LOW (ref 13–17)
HGB BLD-MCNC: 9.7 G/DL — LOW (ref 13–17)
HIV 1+2 AB+HIV1 P24 AG SERPL QL IA: SIGNIFICANT CHANGE UP
INR BLD: 1.49 RATIO — HIGH (ref 0.88–1.16)
INR BLD: 1.55 RATIO — HIGH (ref 0.88–1.16)
INR BLD: 1.63 RATIO — HIGH (ref 0.88–1.16)
INR BLD: 1.78 RATIO — HIGH (ref 0.88–1.16)
MAGNESIUM SERPL-MCNC: 1.9 MG/DL — SIGNIFICANT CHANGE UP (ref 1.6–2.6)
MAGNESIUM SERPL-MCNC: 1.9 MG/DL — SIGNIFICANT CHANGE UP (ref 1.6–2.6)
MAGNESIUM SERPL-MCNC: 2 MG/DL — SIGNIFICANT CHANGE UP (ref 1.6–2.6)
MAGNESIUM SERPL-MCNC: 2.2 MG/DL — SIGNIFICANT CHANGE UP (ref 1.6–2.6)
MCHC RBC-ENTMCNC: 32.7 PG — SIGNIFICANT CHANGE UP (ref 27–34)
MCHC RBC-ENTMCNC: 32.8 PG — SIGNIFICANT CHANGE UP (ref 27–34)
MCHC RBC-ENTMCNC: 32.9 PG — SIGNIFICANT CHANGE UP (ref 27–34)
MCHC RBC-ENTMCNC: 33.1 PG — SIGNIFICANT CHANGE UP (ref 27–34)
MCHC RBC-ENTMCNC: 33.2 GM/DL — SIGNIFICANT CHANGE UP (ref 32–36)
MCHC RBC-ENTMCNC: 33.3 GM/DL — SIGNIFICANT CHANGE UP (ref 32–36)
MCHC RBC-ENTMCNC: 33.3 GM/DL — SIGNIFICANT CHANGE UP (ref 32–36)
MCHC RBC-ENTMCNC: 34.1 GM/DL — SIGNIFICANT CHANGE UP (ref 32–36)
MCV RBC AUTO: 96.9 FL — SIGNIFICANT CHANGE UP (ref 80–100)
MCV RBC AUTO: 98.5 FL — SIGNIFICANT CHANGE UP (ref 80–100)
MCV RBC AUTO: 98.5 FL — SIGNIFICANT CHANGE UP (ref 80–100)
MCV RBC AUTO: 98.6 FL — SIGNIFICANT CHANGE UP (ref 80–100)
NRBC # BLD: 0 /100 WBCS — SIGNIFICANT CHANGE UP (ref 0–0)
PHOSPHATE SERPL-MCNC: 2.4 MG/DL — LOW (ref 2.5–4.5)
PHOSPHATE SERPL-MCNC: 3.6 MG/DL — SIGNIFICANT CHANGE UP (ref 2.5–4.5)
PHOSPHATE SERPL-MCNC: 3.6 MG/DL — SIGNIFICANT CHANGE UP (ref 2.5–4.5)
PHOSPHATE SERPL-MCNC: 4.1 MG/DL — SIGNIFICANT CHANGE UP (ref 2.5–4.5)
PLATELET # BLD AUTO: 195 K/UL — SIGNIFICANT CHANGE UP (ref 150–400)
PLATELET # BLD AUTO: 224 K/UL — SIGNIFICANT CHANGE UP (ref 150–400)
PLATELET # BLD AUTO: 252 K/UL — SIGNIFICANT CHANGE UP (ref 150–400)
PLATELET # BLD AUTO: 450 K/UL — HIGH (ref 150–400)
POTASSIUM SERPL-MCNC: 4.1 MMOL/L — SIGNIFICANT CHANGE UP (ref 3.5–5.3)
POTASSIUM SERPL-MCNC: 4.2 MMOL/L — SIGNIFICANT CHANGE UP (ref 3.5–5.3)
POTASSIUM SERPL-MCNC: 4.3 MMOL/L — SIGNIFICANT CHANGE UP (ref 3.5–5.3)
POTASSIUM SERPL-MCNC: 4.3 MMOL/L — SIGNIFICANT CHANGE UP (ref 3.5–5.3)
POTASSIUM SERPL-SCNC: 4.1 MMOL/L — SIGNIFICANT CHANGE UP (ref 3.5–5.3)
POTASSIUM SERPL-SCNC: 4.2 MMOL/L — SIGNIFICANT CHANGE UP (ref 3.5–5.3)
POTASSIUM SERPL-SCNC: 4.3 MMOL/L — SIGNIFICANT CHANGE UP (ref 3.5–5.3)
POTASSIUM SERPL-SCNC: 4.3 MMOL/L — SIGNIFICANT CHANGE UP (ref 3.5–5.3)
PROT SERPL-MCNC: 5.2 G/DL — LOW (ref 6–8.3)
PROT SERPL-MCNC: 5.5 G/DL — LOW (ref 6–8.3)
PROT SERPL-MCNC: 5.5 G/DL — LOW (ref 6–8.3)
PROT SERPL-MCNC: 5.8 G/DL — LOW (ref 6–8.3)
PROTHROM AB SERPL-ACNC: 17.3 SEC — HIGH (ref 10.5–13.4)
PROTHROM AB SERPL-ACNC: 18.1 SEC — HIGH (ref 10.5–13.4)
PROTHROM AB SERPL-ACNC: 19 SEC — HIGH (ref 10.5–13.4)
PROTHROM AB SERPL-ACNC: 20.8 SEC — HIGH (ref 10.5–13.4)
RBC # BLD: 2.57 M/UL — LOW (ref 4.2–5.8)
RBC # BLD: 2.63 M/UL — LOW (ref 4.2–5.8)
RBC # BLD: 2.65 M/UL — LOW (ref 4.2–5.8)
RBC # BLD: 2.95 M/UL — LOW (ref 4.2–5.8)
RBC # FLD: 17.2 % — HIGH (ref 10.3–14.5)
RBC # FLD: 17.5 % — HIGH (ref 10.3–14.5)
RBC # FLD: 17.6 % — HIGH (ref 10.3–14.5)
RBC # FLD: 17.8 % — HIGH (ref 10.3–14.5)
SODIUM SERPL-SCNC: 140 MMOL/L — SIGNIFICANT CHANGE UP (ref 135–145)
SODIUM SERPL-SCNC: 140 MMOL/L — SIGNIFICANT CHANGE UP (ref 135–145)
SODIUM SERPL-SCNC: 142 MMOL/L — SIGNIFICANT CHANGE UP (ref 135–145)
SODIUM SERPL-SCNC: 143 MMOL/L — SIGNIFICANT CHANGE UP (ref 135–145)
VZV IGG FLD QL IA: 1921 INDEX — SIGNIFICANT CHANGE UP
VZV IGG FLD QL IA: POSITIVE — SIGNIFICANT CHANGE UP
WBC # BLD: 12.39 K/UL — HIGH (ref 3.8–10.5)
WBC # BLD: 12.83 K/UL — HIGH (ref 3.8–10.5)
WBC # BLD: 17.34 K/UL — HIGH (ref 3.8–10.5)
WBC # BLD: 20.51 K/UL — HIGH (ref 3.8–10.5)
WBC # FLD AUTO: 12.39 K/UL — HIGH (ref 3.8–10.5)
WBC # FLD AUTO: 12.83 K/UL — HIGH (ref 3.8–10.5)
WBC # FLD AUTO: 17.34 K/UL — HIGH (ref 3.8–10.5)
WBC # FLD AUTO: 20.51 K/UL — HIGH (ref 3.8–10.5)

## 2022-11-01 PROCEDURE — 71045 X-RAY EXAM CHEST 1 VIEW: CPT | Mod: 26

## 2022-11-01 PROCEDURE — 93975 VASCULAR STUDY: CPT | Mod: 26

## 2022-11-01 PROCEDURE — 99233 SBSQ HOSP IP/OBS HIGH 50: CPT

## 2022-11-01 RX ORDER — ALBUMIN HUMAN 25 %
500 VIAL (ML) INTRAVENOUS ONCE
Refills: 0 | Status: COMPLETED | OUTPATIENT
Start: 2022-11-01 | End: 2022-11-01

## 2022-11-01 RX ORDER — SODIUM CHLORIDE 9 MG/ML
1000 INJECTION INTRAMUSCULAR; INTRAVENOUS; SUBCUTANEOUS
Refills: 0 | Status: DISCONTINUED | OUTPATIENT
Start: 2022-11-01 | End: 2022-11-02

## 2022-11-01 RX ORDER — TACROLIMUS 5 MG/1
0.5 CAPSULE ORAL
Refills: 0 | Status: DISCONTINUED | OUTPATIENT
Start: 2022-11-01 | End: 2022-11-01

## 2022-11-01 RX ORDER — CHLORHEXIDINE GLUCONATE 213 G/1000ML
15 SOLUTION TOPICAL EVERY 12 HOURS
Refills: 0 | Status: DISCONTINUED | OUTPATIENT
Start: 2022-11-01 | End: 2022-11-01

## 2022-11-01 RX ORDER — MYCOPHENOLATE MOFETIL 250 MG/1
1000 CAPSULE ORAL
Refills: 0 | Status: DISCONTINUED | OUTPATIENT
Start: 2022-11-01 | End: 2022-11-02

## 2022-11-01 RX ORDER — DEXMEDETOMIDINE HYDROCHLORIDE IN 0.9% SODIUM CHLORIDE 4 UG/ML
0.35 INJECTION INTRAVENOUS
Qty: 200 | Refills: 0 | Status: DISCONTINUED | OUTPATIENT
Start: 2022-11-01 | End: 2022-11-01

## 2022-11-01 RX ORDER — HYDROMORPHONE HYDROCHLORIDE 2 MG/ML
0.5 INJECTION INTRAMUSCULAR; INTRAVENOUS; SUBCUTANEOUS
Refills: 0 | Status: DISCONTINUED | OUTPATIENT
Start: 2022-11-01 | End: 2022-11-06

## 2022-11-01 RX ORDER — CHLORHEXIDINE GLUCONATE 213 G/1000ML
1 SOLUTION TOPICAL
Refills: 0 | Status: DISCONTINUED | OUTPATIENT
Start: 2022-11-01 | End: 2022-11-07

## 2022-11-01 RX ORDER — PANTOPRAZOLE SODIUM 20 MG/1
40 TABLET, DELAYED RELEASE ORAL DAILY
Refills: 0 | Status: DISCONTINUED | OUTPATIENT
Start: 2022-11-01 | End: 2022-11-04

## 2022-11-01 RX ORDER — HYDROMORPHONE HYDROCHLORIDE 2 MG/ML
0.5 INJECTION INTRAMUSCULAR; INTRAVENOUS; SUBCUTANEOUS ONCE
Refills: 0 | Status: DISCONTINUED | OUTPATIENT
Start: 2022-11-01 | End: 2022-11-01

## 2022-11-01 RX ORDER — HYDROMORPHONE HYDROCHLORIDE 2 MG/ML
0.25 INJECTION INTRAMUSCULAR; INTRAVENOUS; SUBCUTANEOUS
Refills: 0 | Status: DISCONTINUED | OUTPATIENT
Start: 2022-11-01 | End: 2022-11-06

## 2022-11-01 RX ORDER — INSULIN LISPRO 100/ML
VIAL (ML) SUBCUTANEOUS EVERY 6 HOURS
Refills: 0 | Status: DISCONTINUED | OUTPATIENT
Start: 2022-11-01 | End: 2022-11-04

## 2022-11-01 RX ORDER — INSULIN HUMAN 100 [IU]/ML
1 INJECTION, SOLUTION SUBCUTANEOUS
Qty: 100 | Refills: 0 | Status: DISCONTINUED | OUTPATIENT
Start: 2022-11-01 | End: 2022-11-01

## 2022-11-01 RX ORDER — MYCOPHENOLATE MOFETIL 250 MG/1
1000 CAPSULE ORAL EVERY 12 HOURS
Refills: 0 | Status: DISCONTINUED | OUTPATIENT
Start: 2022-11-01 | End: 2022-11-01

## 2022-11-01 RX ORDER — AMPICILLIN SODIUM AND SULBACTAM SODIUM 250; 125 MG/ML; MG/ML
3 INJECTION, POWDER, FOR SUSPENSION INTRAMUSCULAR; INTRAVENOUS EVERY 6 HOURS
Refills: 0 | Status: DISCONTINUED | OUTPATIENT
Start: 2022-11-01 | End: 2022-11-02

## 2022-11-01 RX ORDER — TACROLIMUS 5 MG/1
1 CAPSULE ORAL
Refills: 0 | Status: DISCONTINUED | OUTPATIENT
Start: 2022-11-01 | End: 2022-11-01

## 2022-11-01 RX ORDER — FLUCONAZOLE 150 MG/1
400 TABLET ORAL DAILY
Refills: 0 | Status: DISCONTINUED | OUTPATIENT
Start: 2022-11-01 | End: 2022-11-07

## 2022-11-01 RX ORDER — DEXMEDETOMIDINE HYDROCHLORIDE IN 0.9% SODIUM CHLORIDE 4 UG/ML
0.35 INJECTION INTRAVENOUS
Qty: 400 | Refills: 0 | Status: DISCONTINUED | OUTPATIENT
Start: 2022-11-01 | End: 2022-11-01

## 2022-11-01 RX ORDER — ALBUMIN HUMAN 25 %
500 VIAL (ML) INTRAVENOUS
Refills: 0 | Status: DISCONTINUED | OUTPATIENT
Start: 2022-11-01 | End: 2022-11-01

## 2022-11-01 RX ORDER — NOREPINEPHRINE BITARTRATE/D5W 8 MG/250ML
0.12 PLASTIC BAG, INJECTION (ML) INTRAVENOUS
Qty: 8 | Refills: 0 | Status: DISCONTINUED | OUTPATIENT
Start: 2022-11-01 | End: 2022-11-02

## 2022-11-01 RX ORDER — SENNA PLUS 8.6 MG/1
2 TABLET ORAL
Refills: 0 | Status: DISCONTINUED | OUTPATIENT
Start: 2022-11-01 | End: 2022-11-02

## 2022-11-01 RX ORDER — TACROLIMUS 5 MG/1
1 CAPSULE ORAL
Refills: 0 | Status: DISCONTINUED | OUTPATIENT
Start: 2022-11-01 | End: 2022-11-03

## 2022-11-01 RX ORDER — DEXTROSE 50 % IN WATER 50 %
50 SYRINGE (ML) INTRAVENOUS
Refills: 0 | Status: DISCONTINUED | OUTPATIENT
Start: 2022-11-01 | End: 2022-11-01

## 2022-11-01 RX ORDER — VALGANCICLOVIR 450 MG/1
450 TABLET, FILM COATED ORAL DAILY
Refills: 0 | Status: DISCONTINUED | OUTPATIENT
Start: 2022-11-01 | End: 2022-11-02

## 2022-11-01 RX ADMIN — PANTOPRAZOLE SODIUM 40 MILLIGRAM(S): 20 TABLET, DELAYED RELEASE ORAL at 11:35

## 2022-11-01 RX ADMIN — HYDROMORPHONE HYDROCHLORIDE 0.25 MILLIGRAM(S): 2 INJECTION INTRAMUSCULAR; INTRAVENOUS; SUBCUTANEOUS at 17:00

## 2022-11-01 RX ADMIN — SODIUM CHLORIDE 75 MILLILITER(S): 9 INJECTION INTRAMUSCULAR; INTRAVENOUS; SUBCUTANEOUS at 19:50

## 2022-11-01 RX ADMIN — HYDROMORPHONE HYDROCHLORIDE 0.5 MILLIGRAM(S): 2 INJECTION INTRAMUSCULAR; INTRAVENOUS; SUBCUTANEOUS at 12:35

## 2022-11-01 RX ADMIN — HYDROMORPHONE HYDROCHLORIDE 0.25 MILLIGRAM(S): 2 INJECTION INTRAMUSCULAR; INTRAVENOUS; SUBCUTANEOUS at 07:30

## 2022-11-01 RX ADMIN — DEXMEDETOMIDINE HYDROCHLORIDE IN 0.9% SODIUM CHLORIDE 5.11 MICROGRAM(S)/KG/HR: 4 INJECTION INTRAVENOUS at 07:12

## 2022-11-01 RX ADMIN — HYDROMORPHONE HYDROCHLORIDE 0.5 MILLIGRAM(S): 2 INJECTION INTRAMUSCULAR; INTRAVENOUS; SUBCUTANEOUS at 20:00

## 2022-11-01 RX ADMIN — HYDROMORPHONE HYDROCHLORIDE 0.25 MILLIGRAM(S): 2 INJECTION INTRAMUSCULAR; INTRAVENOUS; SUBCUTANEOUS at 07:15

## 2022-11-01 RX ADMIN — HYDROMORPHONE HYDROCHLORIDE 0.5 MILLIGRAM(S): 2 INJECTION INTRAMUSCULAR; INTRAVENOUS; SUBCUTANEOUS at 04:06

## 2022-11-01 RX ADMIN — AMPICILLIN SODIUM AND SULBACTAM SODIUM 200 GRAM(S): 250; 125 INJECTION, POWDER, FOR SUSPENSION INTRAMUSCULAR; INTRAVENOUS at 05:15

## 2022-11-01 RX ADMIN — AMPICILLIN SODIUM AND SULBACTAM SODIUM 200 GRAM(S): 250; 125 INJECTION, POWDER, FOR SUSPENSION INTRAMUSCULAR; INTRAVENOUS at 11:49

## 2022-11-01 RX ADMIN — SENNA PLUS 2 TABLET(S): 8.6 TABLET ORAL at 17:27

## 2022-11-01 RX ADMIN — Medication 255 MILLIMOLE(S): at 05:16

## 2022-11-01 RX ADMIN — INSULIN HUMAN 1 UNIT(S)/HR: 100 INJECTION, SOLUTION SUBCUTANEOUS at 04:16

## 2022-11-01 RX ADMIN — CHLORHEXIDINE GLUCONATE 1 APPLICATION(S): 213 SOLUTION TOPICAL at 05:14

## 2022-11-01 RX ADMIN — Medication 13.7 MICROGRAM(S)/KG/MIN: at 07:12

## 2022-11-01 RX ADMIN — HYDROMORPHONE HYDROCHLORIDE 0.25 MILLIGRAM(S): 2 INJECTION INTRAMUSCULAR; INTRAVENOUS; SUBCUTANEOUS at 16:45

## 2022-11-01 RX ADMIN — Medication 2: at 21:49

## 2022-11-01 RX ADMIN — Medication 500 MILLILITER(S): at 03:37

## 2022-11-01 RX ADMIN — CHLORHEXIDINE GLUCONATE 15 MILLILITER(S): 213 SOLUTION TOPICAL at 06:13

## 2022-11-01 RX ADMIN — HYDROMORPHONE HYDROCHLORIDE 0.5 MILLIGRAM(S): 2 INJECTION INTRAMUSCULAR; INTRAVENOUS; SUBCUTANEOUS at 03:36

## 2022-11-01 RX ADMIN — TACROLIMUS 1 MILLIGRAM(S): 5 CAPSULE ORAL at 20:40

## 2022-11-01 RX ADMIN — HYDROMORPHONE HYDROCHLORIDE 0.5 MILLIGRAM(S): 2 INJECTION INTRAMUSCULAR; INTRAVENOUS; SUBCUTANEOUS at 19:21

## 2022-11-01 RX ADMIN — Medication 10 MILLILITER(S): at 11:34

## 2022-11-01 RX ADMIN — MYCOPHENOLATE MOFETIL 1000 MILLIGRAM(S): 250 CAPSULE ORAL at 07:13

## 2022-11-01 RX ADMIN — Medication 2: at 11:49

## 2022-11-01 RX ADMIN — FLUCONAZOLE 200 MILLIGRAM(S): 150 TABLET ORAL at 11:34

## 2022-11-01 RX ADMIN — Medication 13.7 MICROGRAM(S)/KG/MIN: at 03:36

## 2022-11-01 RX ADMIN — SENNA PLUS 2 TABLET(S): 8.6 TABLET ORAL at 06:13

## 2022-11-01 RX ADMIN — HYDROMORPHONE HYDROCHLORIDE 0.5 MILLIGRAM(S): 2 INJECTION INTRAMUSCULAR; INTRAVENOUS; SUBCUTANEOUS at 22:22

## 2022-11-01 RX ADMIN — TACROLIMUS 0.5 MILLIGRAM(S): 5 CAPSULE ORAL at 07:13

## 2022-11-01 RX ADMIN — HYDROMORPHONE HYDROCHLORIDE 0.5 MILLIGRAM(S): 2 INJECTION INTRAMUSCULAR; INTRAVENOUS; SUBCUTANEOUS at 12:19

## 2022-11-01 RX ADMIN — INSULIN HUMAN 1 UNIT(S)/HR: 100 INJECTION, SOLUTION SUBCUTANEOUS at 07:12

## 2022-11-01 RX ADMIN — DEXMEDETOMIDINE HYDROCHLORIDE IN 0.9% SODIUM CHLORIDE 5.11 MICROGRAM(S)/KG/HR: 4 INJECTION INTRAVENOUS at 03:36

## 2022-11-01 RX ADMIN — VALGANCICLOVIR 450 MILLIGRAM(S): 450 TABLET, FILM COATED ORAL at 11:36

## 2022-11-01 RX ADMIN — Medication 50 MILLIGRAM(S): at 09:55

## 2022-11-01 RX ADMIN — AMPICILLIN SODIUM AND SULBACTAM SODIUM 200 GRAM(S): 250; 125 INJECTION, POWDER, FOR SUSPENSION INTRAMUSCULAR; INTRAVENOUS at 17:26

## 2022-11-01 RX ADMIN — MYCOPHENOLATE MOFETIL 1000 MILLIGRAM(S): 250 CAPSULE ORAL at 20:25

## 2022-11-01 RX ADMIN — HYDROMORPHONE HYDROCHLORIDE 0.5 MILLIGRAM(S): 2 INJECTION INTRAMUSCULAR; INTRAVENOUS; SUBCUTANEOUS at 22:52

## 2022-11-01 RX ADMIN — Medication 2: at 15:58

## 2022-11-01 NOTE — CONSULT NOTE ADULT - SUBJECTIVE AND OBJECTIVE BOX
HISTORY OF PRESENT ILLNESS:  SAUMYA ZARATE is a 32y Male with PMHx cerebral palsy, Primary Sclerosing Cholangitis Cirrhosis with frequent ERCPs with biliary stent placements for biliary strictures (last in 2022) on liver transplant list (ABO AB), recent COVID s/p MAB in 2022) admitted for liver transplant. Patient s/p orthotopic liver transplantation from a  donor under steroid induction.    PAST MEDICAL HISTORY: Cerebral palsy    Cerebral palsy    PSC (primary sclerosing cholangitis)    Abnormal LFTs    Bile duct stricture    Dental caries    Impacted teeth    Phimosis    History of seizures    Anemia        PAST SURGICAL HISTORY: No significant past surgical history    No significant past surgical history    History of ERCP    History of biliary stent insertion    History of biliary stent insertion    [place] teeth extracted        FAMILY HISTORY: No pertinent family history in first degree relatives        SOCIAL HISTORY:  · Substance use	No     Tobacco Screening:  · Core Measure Site	No  · Has the patient used tobacco in the past 30 days?	No      CODE STATUS: Full    HOME MEDICATIONS:  * Patient Currently Takes Medications as of 30-Sep-2022 14:53 documented in Structured Notes  · 	menthol-benzocaine 3.6 mg-15 mg mucous membrane lozenge: 1 lozenge orally every 6 hours, As Needed -for cough   · 	amoxicillin-clavulanate 875 mg-125 mg oral tablet: 1 tab(s) orally 2 times a day   · 	guaifenesin-dextromethorphan 100 mg-10 mg/5 mL oral liquid: 10 milliliter(s) orally every 6 hours  · 	menthol-benzocaine 3.6 mg-15 mg mucous membrane lozenge: 1  mucous membrane every 6 hours, As Needed - for cough  · 	simethicone 80 mg oral tablet, chewable: 1 tab(s) orally 3 times a day  · 	Eliquis 5 mg oral tablet: 1 tab(s) orally 2 times a day    ALLERGIES: No Known Allergies      VITAL SIGNS:  ICU Vital Signs Last 24 Hrs  T(C): 36.7 (31 Oct 2022 17:16), Max: 36.7 (31 Oct 2022 16:49)  T(F): 98.1 (31 Oct 2022 16:49), Max: 98.1 (31 Oct 2022 16:49)  HR: 91 (2022 02:52) (91 - 115)  BP: 137/76 (31 Oct 2022 17:16) (137/76 - 137/76)  BP(mean): --  ABP: 115/40 (2022 02:52) (115/40 - 115/40)  ABP(mean): 63 (2022 02:52) (63 - 63)  RR: 16 (2022 02:52) (16 - 18)  SpO2: 100% (2022 02:52) (100% - 100%)    O2 Parameters below as of 2022 02:52  Patient On (Oxygen Delivery Method): ventilator    O2 Concentration (%): 100        NEURO  Exam: Intubated, sedated  Meds:dexMEDEtomidine Infusion 0.35 MICROgram(s)/kG/Hr IV Continuous <Continuous>      RESPIRATORY  Mechanical Ventilation: 16/400/+5/50%  ABG - ( 2022 01:36 )  pH: 7.45  /  pCO2: 29    /  pO2: 247   / HCO3: 20    / Base Excess: -3.2  /  SaO2: 100.0   Lactate: x        Exam: CTA b/l  Meds:    CARDIOVASCULAR    Exam: S1S2 normal. Increased rate, regular rhythm. No murmurs, rubs, gallops appreciated.   Cardiac Rhythm: Sinus tachycardia.  Meds:norepinephrine Infusion 0.125 MICROgram(s)/kG/Min IV Continuous <Continuous>      GI/NUTRITION  Exam: VIOLETA x 3 - serosanguinous. Soft, non-distended, non-tender  Diet: NPO  Meds:    GENITOURINARY/RENAL  Meds:albumin human  5% IVPB 500 milliLiter(s) IV Intermittent once      Weight (kg): 58.4 (10-31 @ 17:16)  10-31    141  |  106  |  9   ----------------------------<  75  3.6   |  25  |  0.78    Ca    8.7      31 Oct 2022 17:21  Phos  2.1     10-31  Mg     2.1     10-31    TPro  8.3  /  Alb  2.9<L>  /  TBili  2.1<H>  /  DBili  x   /  AST  68<H>  /  ALT  31  /  AlkPhos  413<H>  10-31    [ X ] Chapman catheter, indication: urine output monitoring in critically ill patient    HEMATOLOGIC  [ X ] VTE Prophylaxis: ICDs                          11.0   4.81  )-----------( 287      ( 31 Oct 2022 17:21 )             33.9     PT/INR - ( 31 Oct 2022 17:21 )   PT: 14.0 sec;   INR: 1.20 ratio         PTT - ( 31 Oct 2022 17:21 )  PTT:40.1 sec  Transfusion: [ ] PRBC	[ ] Platelets	[ ] FFP	[ ] Cryoprecipitate      INFECTIOUS DISEASES  Meds:ampicillin/sulbactam  IVPB 3 Gram(s) IV Intermittent once  influenza   Vaccine 0.5 milliLiter(s) IntraMuscular once    RECENT CULTURES:      ENDOCRINE  Meds:dextrose 50% Injectable 50 milliLiter(s) IV Push every 15 minutes  insulin regular Infusion 1 Unit(s)/Hr IV Continuous <Continuous>    CAPILLARY BLOOD GLUCOSE          PATIENT CARE ACCESS DEVICES:  [ X ] Peripheral IV  [ X ] Central Venous Line	[ X ] R	[ ] L	[ X ] IJ	[ ] Fem	[ ] SC	Placed: 10/31  [ X ] Arterial Line		[ X ] R	[ X ] L	[ ] Fem	[ X ] Rad	[ ] Ax	Placed: 10/31  [ ] PICC:					[ ] Mediport  [ X ] Urinary Catheter, Date Placed: 10/31  [x] Necessity of urinary, arterial, and venous catheters discussed    OTHER MEDICATIONS: chlorhexidine 0.12% Liquid 15 milliLiter(s) Oral Mucosa every 12 hours  chlorhexidine 2% Cloths 1 Application(s) Topical <User Schedule>      IMAGING STUDIES:

## 2022-11-01 NOTE — PROGRESS NOTE ADULT - SUBJECTIVE AND OBJECTIVE BOX
SAUMYA ZARATE is a 32y Male s/p liver transplant on 10/31/22. PMH is signifcant for PSC    NKDA  CMV +/-    Transplant Medications  Induction  -Methylprednisolone taper (switch to PO prednisone on POD 6)            POD 0: 1000 mg IV in OR            POD 1: 500 mg IV x 1            POD 2: 250 mg IV x 1            POD 3: 125 mg IV x 1            POD 4: 60 mg IV x 1            POD 5: 40 mg IV x 1        Maintenance Immunosuppression  -Tacrolimus 0.05 mg/kg/dose Q12H at 8AM and 8PM (Adjust for goal trough: 8-10) to be started on POD 1 per liver transplant team  -Mycophenolate 1,000 mg PO Q12H  -Prednisone             POD 6: 20 mg PO daily    Anti-infection   -Bactrim SS tablet (frequency based on renal function)  -Valganciclovir (dose based on CMV serostatus and frequency based on renal function)  -Fluconazole 400 mg daily    Surgical prophylaxis pre- and intra-operative dosing  -Ampicillin/sulbactam    Prophylaxis  -HAT ppx: aspirin 81 mg daily to start on POD3 per liver transplant team  -GI ppx: pantoprazole 40 mg IV daily (switch to PO when patient tolerates)  -Bowel ppx: senna  -DVT: sequential compression device  -Pain:            Mild: Acetaminophen 650 mg every 6 hours PRN           Moderate: Tramadol 25 mg every 4 hours PRN (adjust for renal function)           Severe: Tramadol 50 mg every 4 hours PRN (adjust for renal function)    Home Medications:  Eliquis 5 mg oral tablet: 1 tab(s) orally 2 times a day (24 Sep 2022 22:48)  guaifenesin-dextromethorphan 100 mg-10 mg/5 mL oral liquid: 10 milliliter(s) orally every 6 hours (30 Sep 2022 13:22)  menthol-benzocaine 3.6 mg-15 mg mucous membrane lozenge: 1  mucous membrane every 6 hours, As Needed - for cough (30 Sep 2022 13:22)  simethicone 80 mg oral tablet, chewable: 1 tab(s) orally 3 times a day (30 Sep 2022 13:22)    Outpatient medication reconciliation reviewed and will be re-started appropriately.  Plan discussed with multidisciplinary team.

## 2022-11-01 NOTE — PROGRESS NOTE ADULT - SUBJECTIVE AND OBJECTIVE BOX
Transplant Surgery - Multidisciplinary Rounds  --------------------------------------------------------------   Donor OLTx      Date:  2022      POD# 1    Present:   Patient seen and examined with multidisciplinary Transplant team including  Surgeon: Dr. Godinez. Hepatologist: Mercedes. Pharmacist: Dean,  NP: Demetris and RNs in AM rounds.   Disciplines not in attendance will be notified of the plan.     HPI: 32M PMH Cerebral palsy, Primary Sclerosing Cholangitis Cirrhosis with frequent ERCPs with biliary stent placements for biliary strictures (last in 2022) on liver transplant list (ABO AB), recent COVID s/p MAB in 2022) admitted for liver transplant    OR   S/P Orthotopic liver transplantation from a  donor. Steroid induction  Side-side Cavocaval anastomosis  End-end portoportal anastomosis  End-end single arterial anastomosis  Velasquez en-Y HJ with internal stenting & Side-side JJ      Interval Events:  - Albumin 500ml x1. Norepi weaned off  - Extubated this AM.  On 3l n/c  - LFTs downtrending  - US with patent vessels  - JPs with SS output VIOLETA #1 160ml, VIOLETA #2 90ml, VIOLETA #3 20ml    Immunosuppression:   Induction:    Methylpred                                           Maintenance immunosuppression: FK 0.5/1mg, MMF 100mg BID, SST  Ongoing monitoring for signs of rejection.      Potential Discharge date: pending clinical improvement    Education:  Medications    Plan of care:  See Below    MEDICATIONS  (STANDING):  ampicillin/sulbactam  IVPB 3 Gram(s) IV Intermittent every 6 hours  chlorhexidine 2% Cloths 1 Application(s) Topical <User Schedule>  fluconAZOLE IVPB 400 milliGRAM(s) IV Intermittent daily  influenza   Vaccine 0.5 milliLiter(s) IntraMuscular once  insulin lispro (ADMELOG) corrective regimen sliding scale   SubCutaneous every 6 hours  mycophenolate mofetil Suspension 1000 milliGRAM(s) Oral <User Schedule>  norepinephrine Infusion 0.125 MICROgram(s)/kG/Min (13.7 mL/Hr) IV Continuous <Continuous>  pantoprazole  Injectable 40 milliGRAM(s) IV Push daily  senna 2 Tablet(s) Oral two times a day  sodium chloride 0.9%. 1000 milliLiter(s) (75 mL/Hr) IV Continuous <Continuous>  tacrolimus    0.5 mG/mL Suspension 0.5 milliGRAM(s) Oral <User Schedule>  tacrolimus    0.5 mG/mL Suspension 1 milliGRAM(s) Oral <User Schedule>  trimethoprim  40 mG/sulfamethoxazole 200 mG Suspension 10 milliLiter(s) Oral daily  valGANciclovir 50 mG/mL Oral Solution 450 milliGRAM(s) Oral daily    MEDICATIONS  (PRN):  HYDROmorphone  Injectable 0.5 milliGRAM(s) IV Push every 3 hours PRN Severe Pain (7 - 10)  HYDROmorphone  Injectable 0.25 milliGRAM(s) IV Push every 3 hours PRN Moderate Pain (4 - 6)      PAST MEDICAL & SURGICAL HISTORY:  Cerebral palsy      PSC (primary sclerosing cholangitis)  On liver transplant list      Abnormal LFTs      Bile duct stricture      Dental caries      Impacted teeth  wisdom teeth      Phimosis      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      Anemia      History of ERCP  multiple with stent insertions/replacement every 3 months ---last 2022      [place] teeth extracted            Vital Signs Last 24 Hrs  T(C): 37.4 (2022 15:00), Max: 37.4 (2022 15:00)  T(F): 99.3 (2022 15:00), Max: 99.3 (2022 15:00)  HR: 94 (2022 16:30) (65 - 115)  BP: 124/60 (2022 03:00) (124/60 - 137/76)  BP(mean): 84 (2022 03:00) (84 - 84)  RR: 21 (2022 16:30) (16 - 34)  SpO2: 97% (2022 16:30) (95% - 100%)    Parameters below as of 2022 13:30  Patient On (Oxygen Delivery Method): nasal cannula  O2 Flow (L/min): 3      I&O's Summary    31 Oct 2022 07:01  -  2022 07:00  --------------------------------------------------------  IN: 943.3 mL / OUT: 630 mL / NET: 313.3 mL    2022 07:01  -  2022 16:38  --------------------------------------------------------  IN: 877.3 mL / OUT: 605 mL / NET: 272.3 mL                              8.5    12.83 )-----------( 195      ( 2022 15:34 )             24.9         140  |  110<H>  |  15  ----------------------------<  162<H>  4.3   |  22  |  0.75    Ca    8.2<L>      2022 15:34  Phos  4.1       Mg     1.9         TPro  5.5<L>  /  Alb  2.8<L>  /  TBili  3.8<H>  /  DBili  3.4<H>  /  AST  419<H>  /  ALT  354<H>  /  AlkPhos  198<H>          Review of systems  Gen: No weight changes, fatigue, fevers/chills, weakness  Skin: No rashes  Head/Eyes/Ears/Mouth: No headache; Normal hearing; Normal vision w/o blurriness; No sinus pain/discomfort, sore throat  Respiratory: No dyspnea, cough, wheezing, hemoptysis  CV: No chest pain, PND, orthopnea  GI: C/O mild abdominal pain at surgical site. no diarrhea, constipation, nausea, vomiting, melena, hematochezia  : No increased frequency, dysuria, hematuria, nocturia  MSK: No joint pain/swelling; no back pain; no edema  Neuro: No dizziness/lightheadedness, weakness, seizures, numbness, tingling  Heme: No easy bruising or bleeding  Endo: No heat/cold intolerance  Psych: No significant nervousness, anxiety, stress, depression  All other systems were reviewed and are negative, except as noted.      PHYSICAL EXAM:  Constitutional: Well developed / well nourished  Eyes: icteric, PERRLA  ENMT: nc/at, no thrush  Neck: supple, central line *****************  Respiratory: CTA B/L  Cardiovascular: RRR  Gastrointestinal: Soft abdomen, ND, appropriate incisional TTP. chevron incision c/d/i, staples in place. No signs of infection.   Genitourinary: Urinary catheter in place*****Voiding spontaneously  Extremities: SCD's in place and working bilaterally  Vascular: Palpable dp pulses bilaterally.   Neurological: A&O x3  Skin: no rashes, ulcerations, lesions  Musculoskeletal: Moving all extremities  Psychiatric: Responsive     Transplant Surgery - Multidisciplinary Rounds  --------------------------------------------------------------   Donor OLTx      Date:  2022      POD# 1    Present:   Patient seen and examined with multidisciplinary Transplant team including  Surgeon: Dr. Godinez. Hepatologist: Mercedes. Pharmacist: Dean,  NP: Demetris and RNs in AM rounds.   Disciplines not in attendance will be notified of the plan.     HPI: 32M PMH Cerebral palsy, Primary Sclerosing Cholangitis Cirrhosis with frequent ERCPs with biliary stent placements for biliary strictures (last in 2022) on liver transplant list (ABO AB), recent COVID s/p MAB in 2022) admitted for liver transplant    OR   S/P Orthotopic liver transplantation from a  donor. Steroid induction  Side-side Cavocaval anastomosis  End-end portoportal anastomosis  End-end single arterial anastomosis  Velasquez en-Y HJ with internal stenting & Side-side JJ      Interval Events:  - Albumin 500ml x1. Norepi weaned off  - Extubated this AM.  On 3l n/c  - LFTs downtrending  - US with patent vessels  - JPs with SS output VIOLETA #1 160ml, VIOLETA #2 90ml, VIOLETA #3 20ml    Immunosuppression:   Induction:    Methylpred                                           Maintenance immunosuppression: FK 0.5/1mg, MMF 100mg BID, SST  Ongoing monitoring for signs of rejection.      Potential Discharge date: pending clinical improvement    Education:  Medications    Plan of care:  See Below    MEDICATIONS  (STANDING):  ampicillin/sulbactam  IVPB 3 Gram(s) IV Intermittent every 6 hours  chlorhexidine 2% Cloths 1 Application(s) Topical <User Schedule>  fluconAZOLE IVPB 400 milliGRAM(s) IV Intermittent daily  influenza   Vaccine 0.5 milliLiter(s) IntraMuscular once  insulin lispro (ADMELOG) corrective regimen sliding scale   SubCutaneous every 6 hours  mycophenolate mofetil Suspension 1000 milliGRAM(s) Oral <User Schedule>  norepinephrine Infusion 0.125 MICROgram(s)/kG/Min (13.7 mL/Hr) IV Continuous <Continuous>  pantoprazole  Injectable 40 milliGRAM(s) IV Push daily  senna 2 Tablet(s) Oral two times a day  sodium chloride 0.9%. 1000 milliLiter(s) (75 mL/Hr) IV Continuous <Continuous>  tacrolimus    0.5 mG/mL Suspension 0.5 milliGRAM(s) Oral <User Schedule>  tacrolimus    0.5 mG/mL Suspension 1 milliGRAM(s) Oral <User Schedule>  trimethoprim  40 mG/sulfamethoxazole 200 mG Suspension 10 milliLiter(s) Oral daily  valGANciclovir 50 mG/mL Oral Solution 450 milliGRAM(s) Oral daily    MEDICATIONS  (PRN):  HYDROmorphone  Injectable 0.5 milliGRAM(s) IV Push every 3 hours PRN Severe Pain (7 - 10)  HYDROmorphone  Injectable 0.25 milliGRAM(s) IV Push every 3 hours PRN Moderate Pain (4 - 6)      PAST MEDICAL & SURGICAL HISTORY:  Cerebral palsy      PSC (primary sclerosing cholangitis)  On liver transplant list      Abnormal LFTs      Bile duct stricture      Dental caries      Impacted teeth  wisdom teeth      Phimosis      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      Anemia      History of ERCP  multiple with stent insertions/replacement every 3 months ---last 2022      [place] teeth extracted            Vital Signs Last 24 Hrs  T(C): 37.4 (2022 15:00), Max: 37.4 (2022 15:00)  T(F): 99.3 (2022 15:00), Max: 99.3 (2022 15:00)  HR: 94 (2022 16:30) (65 - 115)  BP: 124/60 (2022 03:00) (124/60 - 137/76)  BP(mean): 84 (2022 03:00) (84 - 84)  RR: 21 (2022 16:30) (16 - 34)  SpO2: 97% (2022 16:30) (95% - 100%)    Parameters below as of 2022 13:30  Patient On (Oxygen Delivery Method): nasal cannula  O2 Flow (L/min): 3      I&O's Summary    31 Oct 2022 07:01  -  2022 07:00  --------------------------------------------------------  IN: 943.3 mL / OUT: 630 mL / NET: 313.3 mL    2022 07:01  -  2022 16:38  --------------------------------------------------------  IN: 877.3 mL / OUT: 605 mL / NET: 272.3 mL                              8.5    12.83 )-----------( 195      ( 2022 15:34 )             24.9         140  |  110<H>  |  15  ----------------------------<  162<H>  4.3   |  22  |  0.75    Ca    8.2<L>      2022 15:34  Phos  4.1       Mg     1.9         TPro  5.5<L>  /  Alb  2.8<L>  /  TBili  3.8<H>  /  DBili  3.4<H>  /  AST  419<H>  /  ALT  354<H>  /  AlkPhos  198<H>          Review of systems  Gen: No weight changes, fatigue, fevers/chills, weakness  Skin: No rashes  Head/Eyes/Ears/Mouth: No headache; Normal hearing; Normal vision w/o blurriness; No sinus pain/discomfort, sore throat  Respiratory: No dyspnea, cough, wheezing, hemoptysis  CV: No chest pain, PND, orthopnea  GI: C/O mild abdominal pain at surgical site. no diarrhea, constipation, nausea, vomiting, melena, hematochezia  : No increased frequency, dysuria, hematuria, nocturia  MSK: No joint pain/swelling; no back pain; no edema  Neuro: No dizziness/lightheadedness, weakness, seizures, numbness, tingling  Heme: No easy bruising or bleeding  Endo: No heat/cold intolerance  Psych: No significant nervousness, anxiety, stress, depression  All other systems were reviewed and are negative, except as noted.      PHYSICAL EXAM:  Constitutional: Well developed / well nourished  Eyes: icteric, PERRLA  ENMT: nc/at, no thrush  Neck: supple, central line  Respiratory: CTA B/L  Cardiovascular: RRR  Gastrointestinal: Soft abdomen, ND, appropriate incisional TTP. chevron incision c/d/i, staples in place. No signs of infection. VIOLETA x3 with SS output  Genitourinary: Urinary catheter in place  Extremities: SCD's in place and working bilaterally  Vascular: Palpable dp pulses bilaterally.   Neurological: A&O x3  Skin: no rashes, ulcerations, lesions  Musculoskeletal: Moving all extremities  Psychiatric: Responsive

## 2022-11-01 NOTE — BRIEF OPERATIVE NOTE - OPERATION/FINDINGS
S/P Orthotopic liver transplantation from a  donor. Under Steroids induction  Side-side Cavocaval anastomosis  End-end portoportal anastomosis  End-end single arterial anastomosis  Velasquez en-Y HJ with internal stenting & Side-side JJ S/P Orthotopic liver transplantation from a  donor. Steroid induction  Side-side Cavocaval anastomosis  End-end portoportal anastomosis  End-end single arterial anastomosis  Velasquez en-Y HJ with internal stenting & Side-side JJ

## 2022-11-01 NOTE — CONSULT NOTE ADULT - ASSESSMENT
ASSESSMENT: 32y Male with PMHx cerebral palsy, Primary Sclerosing Cholangitis Cirrhosis s/p orthotopic liver transplantation () from a  donor under steroid induction.    PLAN:   Neurologic:  Sedated with Precedex, will titrate down as tolerated for potential weaning.  Dilaudid PRN    Respiratory:  PRVC 16/400/50%/+5  Will SBT when able    Cardiovascular:  On Levo gtt, will give Albumin 5% 500cc / 1 and attempt to wean pressors  B/l radial aterial lines      Gastrointestinal/Nutrition:  Monitor VIOLETA output x 3  F/u liver US with doppler  NPO, NGT in placed confirmed on CXR  Bactrim/ Diflucan per transplant  Cellcept/ Tacro per transplant  Protonix  Bowel regimen    Genitourinary/Renal:  Monitor UO  IV bolus as above  Maintain indwelling chavez  Supplement electrolytes PRN    Hematologic:  S/p 1PRBC, 2 FFP  q6 CBC    Infectious Disease:  Unasyn 3g q6hrs x 48 hours    Endocrine:  Pulse dose steroids per transplant  Insulin gtt @ 1  Will transition off as able    Disposition: SICU  [id number] ASSESSMENT: 32y Male with PMHx cerebral palsy, Primary Sclerosing Cholangitis Cirrhosis s/p orthotopic liver transplantation () from a  donor under steroid induction.    PLAN:   Neurologic:  Sedated with Precedex, will titrate down as tolerated for potential weaning.  Dilaudid PRN    Respiratory:  PRVC 16/400/50%/+5  Will SBT when able    Cardiovascular:  On Levo gtt, will give Albumin 5% 500cc / 1 and attempt to wean pressors  B/l radial aterial lines      Gastrointestinal/Nutrition:  Monitor VIOLETA output x 3  F/u liver US with doppler  NPO, NGT in placed confirmed on CXR  Bactrim/ Diflucan/ Valcyte per transplant  Cellcept/ Tacro per transplant  Protonix  Bowel regimen    Genitourinary/Renal:  Monitor UO  IV bolus as above  Maintain indwelling chavez  Supplement electrolytes PRN    Hematologic:  S/p 1PRBC, 2 FFP  q6 CBC    Infectious Disease:  Unasyn 3g q6hrs x 48 hours    Endocrine:  Pulse dose steroids per transplant  Insulin gtt @ 1  Will transition off as able    Disposition: SICU  [id number]

## 2022-11-01 NOTE — PROGRESS NOTE ADULT - NS ATTEND AMEND GEN_ALL_CORE FT
PO day 1  doing well  extubated  lfts slowly trending down  immunosuppression tacro, mmf and steroids  continue ICU monitoring

## 2022-11-01 NOTE — PROGRESS NOTE ADULT - ASSESSMENT
32M PMH Cerebral palsy, Primary Sclerosing Cholangitis Cirrhosis with frequent ERCPs with biliary stent placements for biliary strictures (last in 9/2022) on liver transplant list (ABO AB), recent COVID s/p MAB in 9/2022) admitted for liver transplant    OLT  LFTs downtrending  US with patent vessels  Strict I/Os. Monitor drain outputs/NGT/chavez  DC 1 Elvia  SCDs  IS q1hour WA  PT consult  Immunosuppression: Tacro 0.5mg/1mg, MMF 1000mg BID, Steroid taper  PPX: Flucaonazole, Bactrim, Valcyte  Q6 hour CBC, CMP, Mag, Pos, Tacro level

## 2022-11-01 NOTE — CONSULT NOTE ADULT - NS ATTEND AMEND GEN_ALL_CORE FT
32y Male with PMHx cerebral palsy, Primary Sclerosing Cholangitis Cirrhosis s/p orthotopic liver transplantation () from a  donor under steroid induction.    Improving liver function, hepatic US good flow  Arousable on Precedex  Met criteria for extubation s/o extubated, tolerating well.  CXR no acute finding  Hemodynamically stable  NPO  IVF NS, resolving hyponatremia  Abx  PPX Unasyn, Fluc, Bactrim, Valcyte  Anti rej meds Solumedrol, Mycophenolate, Tacro    Mechanical DVT ppx

## 2022-11-02 ENCOUNTER — APPOINTMENT (OUTPATIENT)
Dept: HEPATOLOGY | Facility: CLINIC | Age: 32
End: 2022-11-02

## 2022-11-02 LAB
ALBUMIN SERPL ELPH-MCNC: 2.6 G/DL — LOW (ref 3.3–5)
ALBUMIN SERPL ELPH-MCNC: 2.7 G/DL — LOW (ref 3.3–5)
ALBUMIN SERPL ELPH-MCNC: 3 G/DL — LOW (ref 3.3–5)
ALP SERPL-CCNC: 185 U/L — HIGH (ref 40–120)
ALP SERPL-CCNC: 189 U/L — HIGH (ref 40–120)
ALP SERPL-CCNC: 195 U/L — HIGH (ref 40–120)
ALT FLD-CCNC: 237 U/L — HIGH (ref 10–45)
ALT FLD-CCNC: 316 U/L — HIGH (ref 10–45)
ALT FLD-CCNC: 321 U/L — HIGH (ref 10–45)
ANION GAP SERPL CALC-SCNC: 10 MMOL/L — SIGNIFICANT CHANGE UP (ref 5–17)
ANION GAP SERPL CALC-SCNC: 8 MMOL/L — SIGNIFICANT CHANGE UP (ref 5–17)
ANION GAP SERPL CALC-SCNC: 8 MMOL/L — SIGNIFICANT CHANGE UP (ref 5–17)
APPEARANCE UR: CLEAR — SIGNIFICANT CHANGE UP
APTT BLD: 25.8 SEC — LOW (ref 27.5–35.5)
APTT BLD: 26.2 SEC — LOW (ref 27.5–35.5)
APTT BLD: 27.9 SEC — SIGNIFICANT CHANGE UP (ref 27.5–35.5)
AST SERPL-CCNC: 155 U/L — HIGH (ref 10–40)
AST SERPL-CCNC: 256 U/L — HIGH (ref 10–40)
AST SERPL-CCNC: 287 U/L — HIGH (ref 10–40)
BACTERIA # UR AUTO: NEGATIVE — SIGNIFICANT CHANGE UP
BASE EXCESS BLDV CALC-SCNC: 0 MMOL/L — SIGNIFICANT CHANGE UP (ref -2–3)
BASE EXCESS BLDV CALC-SCNC: 0.4 MMOL/L — SIGNIFICANT CHANGE UP (ref -2–3)
BILIRUB DIRECT SERPL-MCNC: 1.7 MG/DL — HIGH (ref 0–0.3)
BILIRUB DIRECT SERPL-MCNC: 1.8 MG/DL — HIGH (ref 0–0.3)
BILIRUB DIRECT SERPL-MCNC: 1.9 MG/DL — HIGH (ref 0–0.3)
BILIRUB INDIRECT FLD-MCNC: 0.5 MG/DL — SIGNIFICANT CHANGE UP (ref 0.2–1)
BILIRUB INDIRECT FLD-MCNC: 0.6 MG/DL — SIGNIFICANT CHANGE UP (ref 0.2–1)
BILIRUB INDIRECT FLD-MCNC: 0.7 MG/DL — SIGNIFICANT CHANGE UP (ref 0.2–1)
BILIRUB SERPL-MCNC: 2.3 MG/DL — HIGH (ref 0.2–1.2)
BILIRUB SERPL-MCNC: 2.4 MG/DL — HIGH (ref 0.2–1.2)
BILIRUB SERPL-MCNC: 2.5 MG/DL — HIGH (ref 0.2–1.2)
BILIRUB UR-MCNC: ABNORMAL
BUN SERPL-MCNC: 22 MG/DL — SIGNIFICANT CHANGE UP (ref 7–23)
BUN SERPL-MCNC: 24 MG/DL — HIGH (ref 7–23)
BUN SERPL-MCNC: 25 MG/DL — HIGH (ref 7–23)
CA-I SERPL-SCNC: 1.19 MMOL/L — SIGNIFICANT CHANGE UP (ref 1.15–1.33)
CA-I SERPL-SCNC: 1.19 MMOL/L — SIGNIFICANT CHANGE UP (ref 1.15–1.33)
CALCIUM SERPL-MCNC: 8 MG/DL — LOW (ref 8.4–10.5)
CALCIUM SERPL-MCNC: 8 MG/DL — LOW (ref 8.4–10.5)
CALCIUM SERPL-MCNC: 8.1 MG/DL — LOW (ref 8.4–10.5)
CHLORIDE BLDV-SCNC: 109 MMOL/L — HIGH (ref 96–108)
CHLORIDE BLDV-SCNC: 110 MMOL/L — HIGH (ref 96–108)
CHLORIDE SERPL-SCNC: 109 MMOL/L — HIGH (ref 96–108)
CHLORIDE SERPL-SCNC: 109 MMOL/L — HIGH (ref 96–108)
CHLORIDE SERPL-SCNC: 110 MMOL/L — HIGH (ref 96–108)
CO2 BLDV-SCNC: 26 MMOL/L — SIGNIFICANT CHANGE UP (ref 22–26)
CO2 BLDV-SCNC: 26 MMOL/L — SIGNIFICANT CHANGE UP (ref 22–26)
CO2 SERPL-SCNC: 23 MMOL/L — SIGNIFICANT CHANGE UP (ref 22–31)
CO2 SERPL-SCNC: 24 MMOL/L — SIGNIFICANT CHANGE UP (ref 22–31)
CO2 SERPL-SCNC: 24 MMOL/L — SIGNIFICANT CHANGE UP (ref 22–31)
COLOR SPEC: ABNORMAL
CREAT SERPL-MCNC: 0.81 MG/DL — SIGNIFICANT CHANGE UP (ref 0.5–1.3)
CREAT SERPL-MCNC: 0.83 MG/DL — SIGNIFICANT CHANGE UP (ref 0.5–1.3)
CREAT SERPL-MCNC: 0.94 MG/DL — SIGNIFICANT CHANGE UP (ref 0.5–1.3)
DIFF PNL FLD: ABNORMAL
EGFR: 112 ML/MIN/1.73M2 — SIGNIFICANT CHANGE UP
EGFR: 121 ML/MIN/1.73M2 — SIGNIFICANT CHANGE UP
EGFR: 122 ML/MIN/1.73M2 — SIGNIFICANT CHANGE UP
EPI CELLS # UR: 1 /HPF — SIGNIFICANT CHANGE UP
GAS PNL BLDV: 139 MMOL/L — SIGNIFICANT CHANGE UP (ref 136–145)
GAS PNL BLDV: 139 MMOL/L — SIGNIFICANT CHANGE UP (ref 136–145)
GAS PNL BLDV: SIGNIFICANT CHANGE UP
GLUCOSE BLDC GLUCOMTR-MCNC: 121 MG/DL — HIGH (ref 70–99)
GLUCOSE BLDC GLUCOMTR-MCNC: 138 MG/DL — HIGH (ref 70–99)
GLUCOSE BLDC GLUCOMTR-MCNC: 154 MG/DL — HIGH (ref 70–99)
GLUCOSE BLDV-MCNC: 149 MG/DL — HIGH (ref 70–99)
GLUCOSE BLDV-MCNC: 156 MG/DL — HIGH (ref 70–99)
GLUCOSE SERPL-MCNC: 129 MG/DL — HIGH (ref 70–99)
GLUCOSE SERPL-MCNC: 137 MG/DL — HIGH (ref 70–99)
GLUCOSE SERPL-MCNC: 151 MG/DL — HIGH (ref 70–99)
GLUCOSE UR QL: NEGATIVE — SIGNIFICANT CHANGE UP
GRAN CASTS # UR COMP ASSIST: 1 /LPF — SIGNIFICANT CHANGE UP
HCO3 BLDV-SCNC: 24 MMOL/L — SIGNIFICANT CHANGE UP (ref 22–29)
HCO3 BLDV-SCNC: 25 MMOL/L — SIGNIFICANT CHANGE UP (ref 22–29)
HCT VFR BLD CALC: 21.8 % — LOW (ref 39–50)
HCT VFR BLD CALC: 21.8 % — LOW (ref 39–50)
HCT VFR BLD CALC: 25.1 % — LOW (ref 39–50)
HCT VFR BLD CALC: 26.5 % — LOW (ref 39–50)
HCT VFR BLDA CALC: 22 % — LOW (ref 39–51)
HCT VFR BLDA CALC: 22 % — LOW (ref 39–51)
HGB BLD CALC-MCNC: 7.3 G/DL — LOW (ref 12.6–17.4)
HGB BLD CALC-MCNC: 7.4 G/DL — LOW (ref 12.6–17.4)
HGB BLD-MCNC: 7.1 G/DL — LOW (ref 13–17)
HGB BLD-MCNC: 7.2 G/DL — LOW (ref 13–17)
HGB BLD-MCNC: 8.2 G/DL — LOW (ref 13–17)
HGB BLD-MCNC: 8.6 G/DL — LOW (ref 13–17)
HOROWITZ INDEX BLDV+IHG-RTO: 21 — SIGNIFICANT CHANGE UP
HOROWITZ INDEX BLDV+IHG-RTO: 21 — SIGNIFICANT CHANGE UP
HYALINE CASTS # UR AUTO: 11 /LPF — HIGH (ref 0–2)
INR BLD: 1.44 RATIO — HIGH (ref 0.88–1.16)
INR BLD: 1.46 RATIO — HIGH (ref 0.88–1.16)
INR BLD: 1.56 RATIO — HIGH (ref 0.88–1.16)
KETONES UR-MCNC: NEGATIVE — SIGNIFICANT CHANGE UP
LACTATE BLDV-MCNC: 1.5 MMOL/L — SIGNIFICANT CHANGE UP (ref 0.5–2)
LACTATE BLDV-MCNC: 1.5 MMOL/L — SIGNIFICANT CHANGE UP (ref 0.5–2)
LEUKOCYTE ESTERASE UR-ACNC: NEGATIVE — SIGNIFICANT CHANGE UP
MAGNESIUM SERPL-MCNC: 1.9 MG/DL — SIGNIFICANT CHANGE UP (ref 1.6–2.6)
MAGNESIUM SERPL-MCNC: 2.4 MG/DL — SIGNIFICANT CHANGE UP (ref 1.6–2.6)
MAGNESIUM SERPL-MCNC: 2.5 MG/DL — SIGNIFICANT CHANGE UP (ref 1.6–2.6)
MCHC RBC-ENTMCNC: 32.4 PG — SIGNIFICANT CHANGE UP (ref 27–34)
MCHC RBC-ENTMCNC: 32.5 GM/DL — SIGNIFICANT CHANGE UP (ref 32–36)
MCHC RBC-ENTMCNC: 32.5 PG — SIGNIFICANT CHANGE UP (ref 27–34)
MCHC RBC-ENTMCNC: 32.6 GM/DL — SIGNIFICANT CHANGE UP (ref 32–36)
MCHC RBC-ENTMCNC: 32.6 PG — SIGNIFICANT CHANGE UP (ref 27–34)
MCHC RBC-ENTMCNC: 32.7 GM/DL — SIGNIFICANT CHANGE UP (ref 32–36)
MCHC RBC-ENTMCNC: 32.7 PG — SIGNIFICANT CHANGE UP (ref 27–34)
MCHC RBC-ENTMCNC: 33 GM/DL — SIGNIFICANT CHANGE UP (ref 32–36)
MCV RBC AUTO: 100.4 FL — HIGH (ref 80–100)
MCV RBC AUTO: 99.1 FL — SIGNIFICANT CHANGE UP (ref 80–100)
MCV RBC AUTO: 99.5 FL — SIGNIFICANT CHANGE UP (ref 80–100)
MCV RBC AUTO: 99.6 FL — SIGNIFICANT CHANGE UP (ref 80–100)
NITRITE UR-MCNC: NEGATIVE — SIGNIFICANT CHANGE UP
NRBC # BLD: 0 /100 WBCS — SIGNIFICANT CHANGE UP (ref 0–0)
PCO2 BLDV: 36 MMHG — LOW (ref 42–55)
PCO2 BLDV: 40 MMHG — LOW (ref 42–55)
PH BLDV: 7.4 — SIGNIFICANT CHANGE UP (ref 7.32–7.43)
PH BLDV: 7.44 — HIGH (ref 7.32–7.43)
PH UR: 6 — SIGNIFICANT CHANGE UP (ref 5–8)
PHOSPHATE SERPL-MCNC: 2.8 MG/DL — SIGNIFICANT CHANGE UP (ref 2.5–4.5)
PHOSPHATE SERPL-MCNC: 4.1 MG/DL — SIGNIFICANT CHANGE UP (ref 2.5–4.5)
PHOSPHATE SERPL-MCNC: 4.1 MG/DL — SIGNIFICANT CHANGE UP (ref 2.5–4.5)
PLATELET # BLD AUTO: 124 K/UL — LOW (ref 150–400)
PLATELET # BLD AUTO: 132 K/UL — LOW (ref 150–400)
PLATELET # BLD AUTO: 226 K/UL — SIGNIFICANT CHANGE UP (ref 150–400)
PLATELET # BLD AUTO: 231 K/UL — SIGNIFICANT CHANGE UP (ref 150–400)
PO2 BLDV: 51 MMHG — HIGH (ref 25–45)
PO2 BLDV: 65 MMHG — HIGH (ref 25–45)
POTASSIUM BLDV-SCNC: 4.2 MMOL/L — SIGNIFICANT CHANGE UP (ref 3.5–5.1)
POTASSIUM BLDV-SCNC: 4.2 MMOL/L — SIGNIFICANT CHANGE UP (ref 3.5–5.1)
POTASSIUM SERPL-MCNC: 4.1 MMOL/L — SIGNIFICANT CHANGE UP (ref 3.5–5.3)
POTASSIUM SERPL-MCNC: 4.1 MMOL/L — SIGNIFICANT CHANGE UP (ref 3.5–5.3)
POTASSIUM SERPL-MCNC: 4.4 MMOL/L — SIGNIFICANT CHANGE UP (ref 3.5–5.3)
POTASSIUM SERPL-SCNC: 4.1 MMOL/L — SIGNIFICANT CHANGE UP (ref 3.5–5.3)
POTASSIUM SERPL-SCNC: 4.1 MMOL/L — SIGNIFICANT CHANGE UP (ref 3.5–5.3)
POTASSIUM SERPL-SCNC: 4.4 MMOL/L — SIGNIFICANT CHANGE UP (ref 3.5–5.3)
PROT SERPL-MCNC: 5.5 G/DL — LOW (ref 6–8.3)
PROT SERPL-MCNC: 5.9 G/DL — LOW (ref 6–8.3)
PROT SERPL-MCNC: 6.1 G/DL — SIGNIFICANT CHANGE UP (ref 6–8.3)
PROT UR-MCNC: ABNORMAL
PROTHROM AB SERPL-ACNC: 16.8 SEC — HIGH (ref 10.5–13.4)
PROTHROM AB SERPL-ACNC: 16.8 SEC — HIGH (ref 10.5–13.4)
PROTHROM AB SERPL-ACNC: 18 SEC — HIGH (ref 10.5–13.4)
RAPID RVP RESULT: SIGNIFICANT CHANGE UP
RBC # BLD: 2.19 M/UL — LOW (ref 4.2–5.8)
RBC # BLD: 2.2 M/UL — LOW (ref 4.2–5.8)
RBC # BLD: 2.52 M/UL — LOW (ref 4.2–5.8)
RBC # BLD: 2.64 M/UL — LOW (ref 4.2–5.8)
RBC # FLD: 17.2 % — HIGH (ref 10.3–14.5)
RBC # FLD: 17.3 % — HIGH (ref 10.3–14.5)
RBC # FLD: 17.9 % — HIGH (ref 10.3–14.5)
RBC # FLD: 18 % — HIGH (ref 10.3–14.5)
RBC CASTS # UR COMP ASSIST: 30 /HPF — HIGH (ref 0–4)
SAO2 % BLDV: 83.5 % — SIGNIFICANT CHANGE UP (ref 67–88)
SAO2 % BLDV: 96.2 % — HIGH (ref 67–88)
SARS-COV-2 RNA SPEC QL NAA+PROBE: SIGNIFICANT CHANGE UP
SODIUM SERPL-SCNC: 141 MMOL/L — SIGNIFICANT CHANGE UP (ref 135–145)
SODIUM SERPL-SCNC: 142 MMOL/L — SIGNIFICANT CHANGE UP (ref 135–145)
SODIUM SERPL-SCNC: 142 MMOL/L — SIGNIFICANT CHANGE UP (ref 135–145)
SP GR SPEC: 1.04 — HIGH (ref 1.01–1.02)
TACROLIMUS SERPL-MCNC: 1.3 NG/ML — SIGNIFICANT CHANGE UP
UROBILINOGEN FLD QL: ABNORMAL
WBC # BLD: 18.49 K/UL — HIGH (ref 3.8–10.5)
WBC # BLD: 19.83 K/UL — HIGH (ref 3.8–10.5)
WBC # BLD: 9.51 K/UL — SIGNIFICANT CHANGE UP (ref 3.8–10.5)
WBC # BLD: 9.53 K/UL — SIGNIFICANT CHANGE UP (ref 3.8–10.5)
WBC # FLD AUTO: 18.49 K/UL — HIGH (ref 3.8–10.5)
WBC # FLD AUTO: 19.83 K/UL — HIGH (ref 3.8–10.5)
WBC # FLD AUTO: 9.51 K/UL — SIGNIFICANT CHANGE UP (ref 3.8–10.5)
WBC # FLD AUTO: 9.53 K/UL — SIGNIFICANT CHANGE UP (ref 3.8–10.5)
WBC UR QL: 9 /HPF — HIGH (ref 0–5)

## 2022-11-02 PROCEDURE — 93010 ELECTROCARDIOGRAM REPORT: CPT

## 2022-11-02 PROCEDURE — 99223 1ST HOSP IP/OBS HIGH 75: CPT

## 2022-11-02 PROCEDURE — 71045 X-RAY EXAM CHEST 1 VIEW: CPT | Mod: 26

## 2022-11-02 PROCEDURE — 99233 SBSQ HOSP IP/OBS HIGH 50: CPT

## 2022-11-02 RX ORDER — SENNA PLUS 8.6 MG/1
10 TABLET ORAL AT BEDTIME
Refills: 0 | Status: DISCONTINUED | OUTPATIENT
Start: 2022-11-02 | End: 2022-11-04

## 2022-11-02 RX ORDER — PIPERACILLIN AND TAZOBACTAM 4; .5 G/20ML; G/20ML
3.38 INJECTION, POWDER, LYOPHILIZED, FOR SOLUTION INTRAVENOUS ONCE
Refills: 0 | Status: DISCONTINUED | OUTPATIENT
Start: 2022-11-02 | End: 2022-11-02

## 2022-11-02 RX ORDER — POLYETHYLENE GLYCOL 3350 17 G/17G
17 POWDER, FOR SOLUTION ORAL DAILY
Refills: 0 | Status: DISCONTINUED | OUTPATIENT
Start: 2022-11-02 | End: 2022-11-04

## 2022-11-02 RX ORDER — ASPIRIN/CALCIUM CARB/MAGNESIUM 324 MG
81 TABLET ORAL DAILY
Refills: 0 | Status: DISCONTINUED | OUTPATIENT
Start: 2022-11-02 | End: 2022-11-07

## 2022-11-02 RX ORDER — VALGANCICLOVIR 450 MG/1
900 TABLET, FILM COATED ORAL
Refills: 0 | Status: DISCONTINUED | OUTPATIENT
Start: 2022-11-02 | End: 2022-11-03

## 2022-11-02 RX ORDER — HYDROMORPHONE HYDROCHLORIDE 2 MG/ML
0.25 INJECTION INTRAMUSCULAR; INTRAVENOUS; SUBCUTANEOUS ONCE
Refills: 0 | Status: DISCONTINUED | OUTPATIENT
Start: 2022-11-02 | End: 2022-11-02

## 2022-11-02 RX ORDER — SODIUM CHLORIDE 9 MG/ML
500 INJECTION INTRAMUSCULAR; INTRAVENOUS; SUBCUTANEOUS ONCE
Refills: 0 | Status: COMPLETED | OUTPATIENT
Start: 2022-11-02 | End: 2022-11-02

## 2022-11-02 RX ORDER — PIPERACILLIN AND TAZOBACTAM 4; .5 G/20ML; G/20ML
3.38 INJECTION, POWDER, LYOPHILIZED, FOR SOLUTION INTRAVENOUS EVERY 8 HOURS
Refills: 0 | Status: DISCONTINUED | OUTPATIENT
Start: 2022-11-03 | End: 2022-11-06

## 2022-11-02 RX ORDER — AMPICILLIN SODIUM AND SULBACTAM SODIUM 250; 125 MG/ML; MG/ML
3 INJECTION, POWDER, FOR SUSPENSION INTRAMUSCULAR; INTRAVENOUS EVERY 6 HOURS
Refills: 0 | Status: DISCONTINUED | OUTPATIENT
Start: 2022-11-02 | End: 2022-11-02

## 2022-11-02 RX ORDER — ALBUMIN HUMAN 25 %
100 VIAL (ML) INTRAVENOUS ONCE
Refills: 0 | Status: COMPLETED | OUTPATIENT
Start: 2022-11-02 | End: 2022-11-02

## 2022-11-02 RX ORDER — SODIUM CHLORIDE 9 MG/ML
1000 INJECTION, SOLUTION INTRAVENOUS
Refills: 0 | Status: DISCONTINUED | OUTPATIENT
Start: 2022-11-02 | End: 2022-11-05

## 2022-11-02 RX ORDER — PIPERACILLIN AND TAZOBACTAM 4; .5 G/20ML; G/20ML
3.38 INJECTION, POWDER, LYOPHILIZED, FOR SOLUTION INTRAVENOUS ONCE
Refills: 0 | Status: COMPLETED | OUTPATIENT
Start: 2022-11-02 | End: 2022-11-02

## 2022-11-02 RX ORDER — VALGANCICLOVIR 450 MG/1
450 TABLET, FILM COATED ORAL DAILY
Refills: 0 | Status: DISCONTINUED | OUTPATIENT
Start: 2022-11-02 | End: 2022-11-02

## 2022-11-02 RX ORDER — MAGNESIUM SULFATE 500 MG/ML
2 VIAL (ML) INJECTION ONCE
Refills: 0 | Status: COMPLETED | OUTPATIENT
Start: 2022-11-02 | End: 2022-11-02

## 2022-11-02 RX ORDER — MYCOPHENOLATE MOFETIL 250 MG/1
1000 CAPSULE ORAL
Refills: 0 | Status: DISCONTINUED | OUTPATIENT
Start: 2022-11-02 | End: 2022-11-03

## 2022-11-02 RX ADMIN — HYDROMORPHONE HYDROCHLORIDE 0.5 MILLIGRAM(S): 2 INJECTION INTRAMUSCULAR; INTRAVENOUS; SUBCUTANEOUS at 09:15

## 2022-11-02 RX ADMIN — HYDROMORPHONE HYDROCHLORIDE 0.25 MILLIGRAM(S): 2 INJECTION INTRAMUSCULAR; INTRAVENOUS; SUBCUTANEOUS at 05:36

## 2022-11-02 RX ADMIN — Medication 1 TABLET(S): at 17:14

## 2022-11-02 RX ADMIN — SODIUM CHLORIDE 75 MILLILITER(S): 9 INJECTION, SOLUTION INTRAVENOUS at 01:17

## 2022-11-02 RX ADMIN — POLYETHYLENE GLYCOL 3350 17 GRAM(S): 17 POWDER, FOR SOLUTION ORAL at 19:35

## 2022-11-02 RX ADMIN — HYDROMORPHONE HYDROCHLORIDE 0.25 MILLIGRAM(S): 2 INJECTION INTRAMUSCULAR; INTRAVENOUS; SUBCUTANEOUS at 20:15

## 2022-11-02 RX ADMIN — PANTOPRAZOLE SODIUM 40 MILLIGRAM(S): 20 TABLET, DELAYED RELEASE ORAL at 12:36

## 2022-11-02 RX ADMIN — FLUCONAZOLE 200 MILLIGRAM(S): 150 TABLET ORAL at 12:37

## 2022-11-02 RX ADMIN — HYDROMORPHONE HYDROCHLORIDE 0.5 MILLIGRAM(S): 2 INJECTION INTRAMUSCULAR; INTRAVENOUS; SUBCUTANEOUS at 09:30

## 2022-11-02 RX ADMIN — TACROLIMUS 1 MILLIGRAM(S): 5 CAPSULE ORAL at 19:37

## 2022-11-02 RX ADMIN — HYDROMORPHONE HYDROCHLORIDE 0.5 MILLIGRAM(S): 2 INJECTION INTRAMUSCULAR; INTRAVENOUS; SUBCUTANEOUS at 03:15

## 2022-11-02 RX ADMIN — HYDROMORPHONE HYDROCHLORIDE 0.25 MILLIGRAM(S): 2 INJECTION INTRAMUSCULAR; INTRAVENOUS; SUBCUTANEOUS at 05:06

## 2022-11-02 RX ADMIN — AMPICILLIN SODIUM AND SULBACTAM SODIUM 200 GRAM(S): 250; 125 INJECTION, POWDER, FOR SUSPENSION INTRAMUSCULAR; INTRAVENOUS at 00:05

## 2022-11-02 RX ADMIN — CHLORHEXIDINE GLUCONATE 1 APPLICATION(S): 213 SOLUTION TOPICAL at 05:06

## 2022-11-02 RX ADMIN — HYDROMORPHONE HYDROCHLORIDE 0.5 MILLIGRAM(S): 2 INJECTION INTRAMUSCULAR; INTRAVENOUS; SUBCUTANEOUS at 05:48

## 2022-11-02 RX ADMIN — PIPERACILLIN AND TAZOBACTAM 25 GRAM(S): 4; .5 INJECTION, POWDER, LYOPHILIZED, FOR SOLUTION INTRAVENOUS at 19:36

## 2022-11-02 RX ADMIN — HYDROMORPHONE HYDROCHLORIDE 0.25 MILLIGRAM(S): 2 INJECTION INTRAMUSCULAR; INTRAVENOUS; SUBCUTANEOUS at 11:35

## 2022-11-02 RX ADMIN — HYDROMORPHONE HYDROCHLORIDE 0.25 MILLIGRAM(S): 2 INJECTION INTRAMUSCULAR; INTRAVENOUS; SUBCUTANEOUS at 11:20

## 2022-11-02 RX ADMIN — HYDROMORPHONE HYDROCHLORIDE 0.5 MILLIGRAM(S): 2 INJECTION INTRAMUSCULAR; INTRAVENOUS; SUBCUTANEOUS at 02:37

## 2022-11-02 RX ADMIN — SODIUM CHLORIDE 75 MILLILITER(S): 9 INJECTION, SOLUTION INTRAVENOUS at 19:17

## 2022-11-02 RX ADMIN — TACROLIMUS 1 MILLIGRAM(S): 5 CAPSULE ORAL at 07:48

## 2022-11-02 RX ADMIN — VALGANCICLOVIR 900 MILLIGRAM(S): 450 TABLET, FILM COATED ORAL at 12:36

## 2022-11-02 RX ADMIN — MYCOPHENOLATE MOFETIL 1000 MILLIGRAM(S): 250 CAPSULE ORAL at 07:48

## 2022-11-02 RX ADMIN — Medication 50 MILLILITER(S): at 17:12

## 2022-11-02 RX ADMIN — HYDROMORPHONE HYDROCHLORIDE 0.5 MILLIGRAM(S): 2 INJECTION INTRAMUSCULAR; INTRAVENOUS; SUBCUTANEOUS at 17:14

## 2022-11-02 RX ADMIN — Medication 50 MILLIGRAM(S): at 09:57

## 2022-11-02 RX ADMIN — HYDROMORPHONE HYDROCHLORIDE 0.25 MILLIGRAM(S): 2 INJECTION INTRAMUSCULAR; INTRAVENOUS; SUBCUTANEOUS at 20:00

## 2022-11-02 RX ADMIN — Medication 81 MILLIGRAM(S): at 17:14

## 2022-11-02 RX ADMIN — Medication 2: at 15:39

## 2022-11-02 RX ADMIN — HYDROMORPHONE HYDROCHLORIDE 0.5 MILLIGRAM(S): 2 INJECTION INTRAMUSCULAR; INTRAVENOUS; SUBCUTANEOUS at 23:38

## 2022-11-02 RX ADMIN — HYDROMORPHONE HYDROCHLORIDE 0.5 MILLIGRAM(S): 2 INJECTION INTRAMUSCULAR; INTRAVENOUS; SUBCUTANEOUS at 06:20

## 2022-11-02 RX ADMIN — Medication 2: at 21:43

## 2022-11-02 RX ADMIN — AMPICILLIN SODIUM AND SULBACTAM SODIUM 200 GRAM(S): 250; 125 INJECTION, POWDER, FOR SUSPENSION INTRAMUSCULAR; INTRAVENOUS at 05:08

## 2022-11-02 RX ADMIN — SODIUM CHLORIDE 75 MILLILITER(S): 9 INJECTION, SOLUTION INTRAVENOUS at 07:48

## 2022-11-02 RX ADMIN — SENNA PLUS 2 TABLET(S): 8.6 TABLET ORAL at 05:04

## 2022-11-02 RX ADMIN — SODIUM CHLORIDE 250 MILLILITER(S): 9 INJECTION INTRAMUSCULAR; INTRAVENOUS; SUBCUTANEOUS at 11:30

## 2022-11-02 RX ADMIN — Medication 1 TABLET(S): at 05:07

## 2022-11-02 RX ADMIN — PIPERACILLIN AND TAZOBACTAM 200 GRAM(S): 4; .5 INJECTION, POWDER, LYOPHILIZED, FOR SOLUTION INTRAVENOUS at 15:20

## 2022-11-02 RX ADMIN — AMPICILLIN SODIUM AND SULBACTAM SODIUM 200 GRAM(S): 250; 125 INJECTION, POWDER, FOR SUSPENSION INTRAMUSCULAR; INTRAVENOUS at 12:37

## 2022-11-02 RX ADMIN — MYCOPHENOLATE MOFETIL 1000 MILLIGRAM(S): 250 CAPSULE ORAL at 19:56

## 2022-11-02 RX ADMIN — HYDROMORPHONE HYDROCHLORIDE 0.25 MILLIGRAM(S): 2 INJECTION INTRAMUSCULAR; INTRAVENOUS; SUBCUTANEOUS at 19:36

## 2022-11-02 RX ADMIN — SENNA PLUS 10 MILLILITER(S): 8.6 TABLET ORAL at 21:44

## 2022-11-02 RX ADMIN — HYDROMORPHONE HYDROCHLORIDE 0.25 MILLIGRAM(S): 2 INJECTION INTRAMUSCULAR; INTRAVENOUS; SUBCUTANEOUS at 20:45

## 2022-11-02 RX ADMIN — HYDROMORPHONE HYDROCHLORIDE 0.5 MILLIGRAM(S): 2 INJECTION INTRAMUSCULAR; INTRAVENOUS; SUBCUTANEOUS at 17:29

## 2022-11-02 RX ADMIN — Medication 25 GRAM(S): at 03:14

## 2022-11-02 RX ADMIN — Medication 10 MILLILITER(S): at 12:37

## 2022-11-02 NOTE — CONSULT NOTE ADULT - ASSESSMENT
32 year old male with PMHx of Cerebral palsy, Primary Sclerosing Cholangitis Cirrhosis with frequent ERCPs with biliary stent placements for biliary strictures (last in 9/2022) on liver transplant list, recent COVID s/p MAB in (9/2022) admitted for liver transplant s/p liver transplant on 10/31. Transplant ID consulted for fevers.     Spiking fevers today 102.4   Leukocytosis trending up  LFTs improving   Covid test neg   CXR: clear lungs  On Unasyn periop   On Valcyte, Bactrim and Fluconazole PPx     #Fevers  #s/p OLT on 10/31  #CMV +/- high risk    Recommendations:       PT TO BE SEEN. PRELIM NOTE  PENDING RECS. PLEASE WAIT FOR FINAL RECS AFTER DISCUSSION WITH ATTENDING#           32 year old male with PMHx of Cerebral palsy, Primary Sclerosing Cholangitis Cirrhosis with frequent ERCPs with biliary stent placements for biliary strictures (last in 9/2022) on liver transplant list, recent COVID s/p MAB in (9/2022) admitted for liver transplant s/p liver transplant on 10/31. Transplant ID consulted for fevers.     Spiking fevers today 102.4   Leukocytosis trending up  LFTs improving   Covid test neg   CXR: clear lungs  On Unasyn periop   On Valcyte, Bactrim and Fluconazole PPx     #Fevers - ?post op fevers, R/O other infectious etiologies post transplant   #s/p OLT on 10/31  #CMV +/- high risk    Recommendations:   Unasyn changed to Zosyn 3.375 g IV q 8hrs  Rapid RVP neg  F/up blood Cx X2  F/up sputum Cx  If continues to spike fevers, will likely need CT AP   Bactrim for PCP PPx, Valcyte for CMV PPx      Seen and discussed with Dr Brianna Hayes MD, PGY4  ID fellow  [employer] Teams Preferred  After 5pm/weekends call [phone]             32 year old male with PMHx of Cerebral palsy, Primary Sclerosing Cholangitis Cirrhosis with frequent ERCPs with biliary stent placements for biliary strictures (last in 9/2022) on liver transplant list, recent COVID s/p MAB in (9/2022) admitted for liver transplant s/p liver transplant on 10/31. Transplant ID consulted for fevers.     Spiking fevers today 102.4   Leukocytosis trending up  LFTs improving   Covid test neg   CXR: clear lungs  On Unasyn periop   On Valcyte, Bactrim and Fluconazole PPx     #Fevers - ?post op fevers, R/O other infectious etiologies post transplant   #s/p OLT on 10/31  #CMV +/- high risk    Recommendations:   Broaden [place]-operative Unasyn to Zosyn 3.375 g IV q 8hrs  Rapid RVP neg  F/up blood Cx X2  F/up sputum Cx  If continues to spike fevers, will likely need CT AP   Bactrim for PCP PPx, Valcyte for CMV PPx      Seen and discussed with Dr Brianna Hayes MD, PGY4  ID fellow  [employer] Teams Preferred  After 5pm/weekends call [phone]

## 2022-11-02 NOTE — PROGRESS NOTE ADULT - SUBJECTIVE AND OBJECTIVE BOX
Transplant Surgery - Multidisciplinary Rounds  --------------------------------------------------------------   Donor OLTx      Date:  2022      POD# 2    Present:   Patient seen and examined with multidisciplinary Transplant team including  Surgeon: Dr. Godinez. Hepatologist: Dr. Mehul Long Pharmacist: Dean  NP: Leila and RNs in AM rounds.   Disciplines not in attendance will be notified of the plan.     HPI: 32M PMH Cerebral palsy, Primary Sclerosing Cholangitis Cirrhosis with frequent ERCPs with biliary stent placements for biliary strictures (last in 2022) on liver transplant list (ABO AB), recent COVID s/p MAB in 2022) admitted for liver transplant    OR   S/P Orthotopic liver transplantation from a  donor. Steroid induction  Side-side Cavocaval anastomosis  End-end portoportal anastomosis  End-end single arterial anastomosis  Velasquez en-Y HJ with internal stenting & Side-side JJ      Interval Events:  - Weaned off levophed  -Patient oobc, with some pain at surgical site. He is using his incentive spirometer, pulling only 500 cc- 1L. He denies any bowel function. He denies fever, chest pain, SOB, nausea, vomiting. No acute events overnight.  - cc in 24 hrs      Immunosuppression:   Induction:    Methylpred                                           Maintenance immunosuppression: FK 0.5/1mg, MMF 100mg BID, SST  Ongoing monitoring for signs of rejection.      Potential Discharge date: pending clinical improvement    Education:  Medications    Plan of care:  See Below      PHYSICAL EXAM:  Constitutional: Well developed / well nourished  Eyes: icteric, normal eyelids  ENMT: nc/at, no thrush  Neck: supple, central line  Respiratory: CTA B/L  Cardiovascular: regular rhythm, tachycardic to 130s  Gastrointestinal: Soft abdomen, ND, appropriate incisional TTP. Chevron incision c/d/i, staples in place. No signs of infection. JPs on the right SS left VIOLETA serous  Genitourinary: Urinary catheter in place; normal urine color  Extremities: SCD's in place and working bilaterally  Vascular: Palpable dp pulses bilaterally.   Neurological: A&O x3  Skin: no rashes, no lesions  Musculoskeletal: Moving all extremities, no edema  Psychiatric: Responsive

## 2022-11-02 NOTE — OCCUPATIONAL THERAPY INITIAL EVALUATION ADULT - GENERAL OBSERVATIONS, REHAB EVAL
Patient received in bedside chair, +tele, BP cuff, pulse ox, +IV, VIOLETA drains, nasal gastric tube, sequentials donned

## 2022-11-02 NOTE — CONSULT NOTE ADULT - SUBJECTIVE AND OBJECTIVE BOX
HPI:    32 year old male with PMHx of Cerebral palsy, Primary Sclerosing Cholangitis Cirrhosis with frequent ERCPs with biliary stent placements for biliary strictures (last in 9/2022) on liver transplant list, recent COVID s/p MAB in (9/2022) admitted for liver transplant, s/p liver transplant on 10/31. Transplant ID consulted for fevers.     REVIEW OF SYSTEMS  [X] ROS unobtainable because: intubated   [  ] All other systems negative except as noted below    Constitutional:  [ ] fever [ ] chills  [ ] weight loss  [ ]night sweat  [ ]poor appetite/PO intake [ ]fatigue   Skin:  [ ] rash [ ] phlebitis	  Eyes: [ ] icterus [ ] pain  [ ] discharge	  ENMT: [ ] sore throat  [ ] thrush [ ] ulcers [ ] exudates [ ]anosmia  Respiratory: [ ] dyspnea [ ] hemoptysis [ ] cough [ ] sputum	  Cardiovascular:  [ ] chest pain [ ] palpitations [ ] edema	  Gastrointestinal:  [ ] nausea [ ] vomiting [ ] diarrhea [ ] constipation [ ] pain	  Genitourinary:  [ ] dysuria [ ] frequency [ ] hematuria [ ] discharge [ ] flank pain  [ ] incontinence  Musculoskeletal:  [ ] myalgias [ ] arthralgias [ ] arthritis  [ ] back pain  Neurological:  [ ] headache [ ] weakness [ ] seizures  [ ] confusion/altered mental status    prior hospital charts reviewed [V]  primary team notes reviewed [V]  other consultant notes reviewed [V]    PAST MEDICAL & SURGICAL HISTORY:  Cerebral palsy    PSC (primary sclerosing cholangitis)  On liver transplant list    Abnormal LFTs    Bile duct stricture    Dental caries    Impacted teeth  wisdom teeth    Phimosis    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    Anemia    History of ERCP  multiple with stent insertions/replacement every 3 months ---last 5/20/2022    [place] teeth extracted  2021    SOCIAL HISTORY:  - Denied smoking/vaping/alcohol/recreational drug use    FAMILY HISTORY:  No pertinent family history in first degree relatives    Allergies  No Known Allergies    ANTIMICROBIALS:  ampicillin/sulbactam  IVPB 3 every 6 hours  fluconAZOLE IVPB 400 daily  trimethoprim  40 mG/sulfamethoxazole 200 mG Suspension 10 daily  valGANciclovir 50 mG/mL Oral Solution 900 <User Schedule>    ANTIMICROBIALS (past 90 days):  MEDICATIONS  (STANDING):  ampicillin/sulbactam  IVPB   200 mL/Hr IV Intermittent (11-02-22 @ 05:08)   200 mL/Hr IV Intermittent (11-02-22 @ 00:05)   200 mL/Hr IV Intermittent (11-01-22 @ 17:26)   200 mL/Hr IV Intermittent (11-01-22 @ 11:49)   200 mL/Hr IV Intermittent (11-01-22 @ 05:15)    fluconAZOLE IVPB   200 mL/Hr IV Intermittent (11-01-22 @ 11:34)    trimethoprim  40 mG/sulfamethoxazole 200 mG Suspension   10 milliLiter(s) Oral (11-01-22 @ 11:34)    valGANciclovir 50 mG/mL Oral Solution   450 milliGRAM(s) Oral (11-01-22 @ 11:36)    OTHER MEDS:   MEDICATIONS  (STANDING):  aspirin enteric coated 81 daily  HYDROmorphone  Injectable 0.5 every 3 hours PRN  HYDROmorphone  Injectable 0.25 every 3 hours PRN  influenza   Vaccine 0.5 once  insulin lispro (ADMELOG) corrective regimen sliding scale  every 6 hours  mycophenolate mofetil Suspension 1000 <User Schedule>  pantoprazole  Injectable 40 daily  polyethylene glycol 3350 17 daily  senna Syrup 10 at bedtime  tacrolimus    0.5 mG/mL Suspension 1 <User Schedule>    VITALS:  Vital Signs Last 24 Hrs  T(F): 102.4 (11-02-22 @ 11:40), Max: 102.4 (11-02-22 @ 11:40)    Vital Signs Last 24 Hrs  HR: 129 (11-02-22 @ 12:00) (75 - 138)  BP: 128/66 (11-02-22 @ 12:00) (108/75 - 128/66)  RR: 24 (11-02-22 @ 12:00)  SpO2: 93% (11-02-22 @ 12:00) (93% - 100%)  Wt(kg): --    EXAM:  Physical Exam:  Constitutional:  well preserved, comfortable  Head/Eyes: no icterus, PERRL, EOMI  ENT:  supple; no thrush  LUNGS:  CTA  CVS:  normal S1, S2, no murmur  Abd:  soft, non-tender; non-distended  Ext:  no edema  Vascular:  IV site no erythema tenderness or discharge  MSK:  joints without swelling  Neuro: AAO X 3, non- focal    Labs:                        8.6    18.49 )-----------( 231      ( 02 Nov 2022 10:08 )             26.5     11-02    142  |  109<H>  |  25<H>  ----------------------------<  129<H>  4.1   |  23  |  0.94    Ca    8.1<L>      02 Nov 2022 10:08  Phos  4.1     11-02  Mg     2.5     11-02    TPro  5.5<L>  /  Alb  2.6<L>  /  TBili  2.3<H>  /  DBili  1.8<H>  /  AST  287<H>  /  ALT  316<H>  /  AlkPhos  185<H>  11-02    WBC Trend:  WBC Count: 18.49 (11-02-22 @ 10:08)  WBC Count: 19.83 (11-02-22 @ 03:56)  WBC Count: 20.51 (11-01-22 @ 21:53)  WBC Count: 12.83 (11-01-22 @ 15:34)    Auto Neutrophil #: 2.81 K/uL (10-31-22 @ 17:21)  Auto Neutrophil #: 3.89 K/uL (09-29-22 @ 08:17)  Auto Neutrophil #: 7.75 K/uL (09-26-22 @ 07:05)  Auto Neutrophil #: 4.89 K/uL (09-24-22 @ 15:48)  Auto Neutrophil #: 6.03 K/uL (07-22-22 @ 08:33)    Creatine Trend:  Creatinine, Serum: 0.94 (11-02)  Creatinine, Serum: 0.83 (11-02)  Creatinine, Serum: 0.80 (11-01)  Creatinine, Serum: 0.75 (11-01)    Liver Biochemical Testing Trend:  Alanine Aminotransferase (ALT/SGPT): 316 *H* (11-02)  Alanine Aminotransferase (ALT/SGPT): 354 *H* (11-01)  Alanine Aminotransferase (ALT/SGPT): 354 *H* (11-01)  Alanine Aminotransferase (ALT/SGPT): 350 *H* (11-01)  Alanine Aminotransferase (ALT/SGPT): 363 *H* (11-01)  Aspartate Aminotransferase (AST/SGOT): 287 (11-02-22 @ 03:56)  Aspartate Aminotransferase (AST/SGOT): 359 (11-01-22 @ 21:53)  Aspartate Aminotransferase (AST/SGOT): 419 (11-01-22 @ 15:34)  Aspartate Aminotransferase (AST/SGOT): 483 (11-01-22 @ 09:29)  Aspartate Aminotransferase (AST/SGOT): 692 (11-01-22 @ 03:25)  Bilirubin Direct, Serum: 1.8 (11-02)  Bilirubin Total, Serum: 2.3 (11-02)  Bilirubin Direct, Serum: 2.3 (11-01)  Bilirubin Total, Serum: 2.9 (11-01)  Bilirubin Direct, Serum: 3.4 (11-01)    Trend LDH  09-28-22 @ 11:08  172  09-25-22 @ 07:06  116  09-24-22 @ 15:48  111    Auto Eosinophil %: 2.7 % (10-31-22 @ 17:21)    MICROBIOLOGY:    Culture - Urine (collected 26 Sep 2022 22:46)  Source: Clean Catch Clean Catch (Midstream)  Final Report:    No growth    Culture - Blood (collected 26 Sep 2022 14:18)  Source: .Blood Blood  Final Report:    No Growth Final    Culture - Blood (collected 26 Sep 2022 14:00)  Source: .Blood Blood  Final Report:    No Growth Final    Culture - Blood (collected 24 Sep 2022 15:53)  Source: .Blood Blood-Peripheral  Final Report:    No Growth Final    Culture - Blood (collected 24 Sep 2022 15:42)  Source: .Blood Blood-Peripheral  Final Report:    No Growth Final    Culture - Urine (collected 22 Jul 2022 12:19)  Source: Clean Catch None  Final Report:    <10,000 CFU/mL Normal Urogenital Gisel    Culture - Urine (collected 21 Apr 2021 05:27)  Source: .Urine Clean Catch (Midstream)  Final Report:    No growth    Culture - Blood (collected 20 Apr 2021 22:52)  Source: .Blood Blood-Peripheral  Final Report:    No Growth Final    Culture - Blood (collected 20 Apr 2021 22:52)  Source: .Blood Blood-Peripheral  Final Report:    No Growth Final    HIV-1/2 Combo Result: Nonreact (10-31-22 @ 17:21)    CMV IgG Interpretation: Negative (10-31-22 @ 17:21)    COVID-19 PCR: NotDetec (10-31-22 @ 17:21)  COVID-19 PCR: Detected (09-24-22 @ 15:45)    Blood Gas Venous - Lactate: 1.6 (11-02 @ 09:50)  Blood Gas Venous - Lactate: 1.4 (11-02 @ 03:40)  Blood Gas Arterial, Lactate: 1.7 (11-01 @ 21:47)  Blood Gas Arterial, Lactate: 1.8 (11-01 @ 15:27)    RADIOLOGY:  imaging below personally reviewed    < from: Xray Chest 1 View- PORTABLE-Urgent (Xray Chest 1 View- PORTABLE-Urgent .) (10.31.22 @ 17:29) >  Chest radiograph 11/1/2022: Onthe final adjustment view taken at 3:28 AM   there is an endotracheal tube with tip above the aj, enteric tube   with tip in the stomach and right IJ approach [place]-Lawanda catheter with tip   in the right pulmonary artery. Surgical drainage catheterand surgical   staples overlie the midabdomen. Clear lungs. No pleural effusion or   pneumothorax.    < end of copied text >           HPI:    32 year old male with PMHx of Cerebral palsy, Primary Sclerosing Cholangitis Cirrhosis with frequent ERCPs with biliary stent placements for biliary strictures (last in 9/2022) on liver transplant list, recent COVID s/p MAB in (9/2022) admitted for liver transplant, s/p liver transplant on 10/31. Transplant ID consulted for fevers.     REVIEW OF SYSTEMS  [] ROS unobtainable because:   [X] All other systems negative except as noted below    Constitutional:  [X] fever [ ] chills  [ ] weight loss  [ ]night sweat  [ ]poor appetite/PO intake [ ]fatigue   Skin:  [ ] rash [ ] phlebitis	  Eyes: [ ] icterus [ ] pain  [ ] discharge	  ENMT: [ ] sore throat  [ ] thrush [ ] ulcers [ ] exudates [ ]anosmia  Respiratory: [ ] dyspnea [ ] hemoptysis [ ] cough [ ] sputum	  Cardiovascular:  [ ] chest pain [ ] palpitations [ ] edema	  Gastrointestinal:  [ ] nausea [ ] vomiting [ ] diarrhea [ ] constipation [X] pain	  Genitourinary:  [ ] dysuria [ ] frequency [ ] hematuria [ ] discharge [ ] flank pain  [ ] incontinence  Musculoskeletal:  [ ] myalgias [ ] arthralgias [ ] arthritis  [ ] back pain  Neurological:  [ ] headache [ ] weakness [ ] seizures  [ ] confusion/altered mental status    prior hospital charts reviewed [V]  primary team notes reviewed [V]  other consultant notes reviewed [V]    PAST MEDICAL & SURGICAL HISTORY:  Cerebral palsy    PSC (primary sclerosing cholangitis)  On liver transplant list    Abnormal LFTs    Bile duct stricture    Dental caries    Impacted teeth  wisdom teeth    Phimosis    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    Anemia    History of ERCP  multiple with stent insertions/replacement every 3 months ---last 5/20/2022    [place] teeth extracted  2021    SOCIAL HISTORY:  - Denied smoking/vaping/alcohol/recreational drug use    FAMILY HISTORY:  No pertinent family history in first degree relatives    Allergies  No Known Allergies    ANTIMICROBIALS:  ampicillin/sulbactam  IVPB 3 every 6 hours  fluconAZOLE IVPB 400 daily  trimethoprim  40 mG/sulfamethoxazole 200 mG Suspension 10 daily  valGANciclovir 50 mG/mL Oral Solution 900 <User Schedule>    ANTIMICROBIALS (past 90 days):  MEDICATIONS  (STANDING):  ampicillin/sulbactam  IVPB   200 mL/Hr IV Intermittent (11-02-22 @ 05:08)   200 mL/Hr IV Intermittent (11-02-22 @ 00:05)   200 mL/Hr IV Intermittent (11-01-22 @ 17:26)   200 mL/Hr IV Intermittent (11-01-22 @ 11:49)   200 mL/Hr IV Intermittent (11-01-22 @ 05:15)    fluconAZOLE IVPB   200 mL/Hr IV Intermittent (11-01-22 @ 11:34)    trimethoprim  40 mG/sulfamethoxazole 200 mG Suspension   10 milliLiter(s) Oral (11-01-22 @ 11:34)    valGANciclovir 50 mG/mL Oral Solution   450 milliGRAM(s) Oral (11-01-22 @ 11:36)    OTHER MEDS:   MEDICATIONS  (STANDING):  aspirin enteric coated 81 daily  HYDROmorphone  Injectable 0.5 every 3 hours PRN  HYDROmorphone  Injectable 0.25 every 3 hours PRN  influenza   Vaccine 0.5 once  insulin lispro (ADMELOG) corrective regimen sliding scale  every 6 hours  mycophenolate mofetil Suspension 1000 <User Schedule>  pantoprazole  Injectable 40 daily  polyethylene glycol 3350 17 daily  senna Syrup 10 at bedtime  tacrolimus    0.5 mG/mL Suspension 1 <User Schedule>    VITALS:  Vital Signs Last 24 Hrs  T(F): 102.4 (11-02-22 @ 11:40), Max: 102.4 (11-02-22 @ 11:40)    Vital Signs Last 24 Hrs  HR: 129 (11-02-22 @ 12:00) (75 - 138)  BP: 128/66 (11-02-22 @ 12:00) (108/75 - 128/66)  RR: 24 (11-02-22 @ 12:00)  SpO2: 93% (11-02-22 @ 12:00) (93% - 100%)  Wt(kg): --    EXAM:  Physical Exam:  Constitutional:  well preserved, comfortable  Head/Eyes: no icterus, PERRL, EOMI  ENT:  supple; no thrush  LUNGS:  CTA  CVS:  normal S1, S2, no murmur  Abd:  Tenderness around surgical incision sites with staples, no discharge  Ext:  no edema  Vascular:  IV site no erythema tenderness or discharge  MSK:  joints without swelling  Neuro: AAO X 3,     Labs:                        8.6    18.49 )-----------( 231      ( 02 Nov 2022 10:08 )             26.5     11-02    142  |  109<H>  |  25<H>  ----------------------------<  129<H>  4.1   |  23  |  0.94    Ca    8.1<L>      02 Nov 2022 10:08  Phos  4.1     11-02  Mg     2.5     11-02    TPro  5.5<L>  /  Alb  2.6<L>  /  TBili  2.3<H>  /  DBili  1.8<H>  /  AST  287<H>  /  ALT  316<H>  /  AlkPhos  185<H>  11-02    WBC Trend:  WBC Count: 18.49 (11-02-22 @ 10:08)  WBC Count: 19.83 (11-02-22 @ 03:56)  WBC Count: 20.51 (11-01-22 @ 21:53)  WBC Count: 12.83 (11-01-22 @ 15:34)    Auto Neutrophil #: 2.81 K/uL (10-31-22 @ 17:21)  Auto Neutrophil #: 3.89 K/uL (09-29-22 @ 08:17)  Auto Neutrophil #: 7.75 K/uL (09-26-22 @ 07:05)  Auto Neutrophil #: 4.89 K/uL (09-24-22 @ 15:48)  Auto Neutrophil #: 6.03 K/uL (07-22-22 @ 08:33)    Creatine Trend:  Creatinine, Serum: 0.94 (11-02)  Creatinine, Serum: 0.83 (11-02)  Creatinine, Serum: 0.80 (11-01)  Creatinine, Serum: 0.75 (11-01)    Liver Biochemical Testing Trend:  Alanine Aminotransferase (ALT/SGPT): 316 *H* (11-02)  Alanine Aminotransferase (ALT/SGPT): 354 *H* (11-01)  Alanine Aminotransferase (ALT/SGPT): 354 *H* (11-01)  Alanine Aminotransferase (ALT/SGPT): 350 *H* (11-01)  Alanine Aminotransferase (ALT/SGPT): 363 *H* (11-01)  Aspartate Aminotransferase (AST/SGOT): 287 (11-02-22 @ 03:56)  Aspartate Aminotransferase (AST/SGOT): 359 (11-01-22 @ 21:53)  Aspartate Aminotransferase (AST/SGOT): 419 (11-01-22 @ 15:34)  Aspartate Aminotransferase (AST/SGOT): 483 (11-01-22 @ 09:29)  Aspartate Aminotransferase (AST/SGOT): 692 (11-01-22 @ 03:25)  Bilirubin Direct, Serum: 1.8 (11-02)  Bilirubin Total, Serum: 2.3 (11-02)  Bilirubin Direct, Serum: 2.3 (11-01)  Bilirubin Total, Serum: 2.9 (11-01)  Bilirubin Direct, Serum: 3.4 (11-01)    Trend LDH  09-28-22 @ 11:08  172  09-25-22 @ 07:06  116  09-24-22 @ 15:48  111    Auto Eosinophil %: 2.7 % (10-31-22 @ 17:21)    MICROBIOLOGY:    Culture - Urine (collected 26 Sep 2022 22:46)  Source: Clean Catch Clean Catch (Midstream)  Final Report:    No growth    Culture - Blood (collected 26 Sep 2022 14:18)  Source: .Blood Blood  Final Report:    No Growth Final    Culture - Blood (collected 26 Sep 2022 14:00)  Source: .Blood Blood  Final Report:    No Growth Final    Culture - Blood (collected 24 Sep 2022 15:53)  Source: .Blood Blood-Peripheral  Final Report:    No Growth Final    Culture - Blood (collected 24 Sep 2022 15:42)  Source: .Blood Blood-Peripheral  Final Report:    No Growth Final    Culture - Urine (collected 22 Jul 2022 12:19)  Source: Clean Catch None  Final Report:    <10,000 CFU/mL Normal Urogenital Gisel    Culture - Urine (collected 21 Apr 2021 05:27)  Source: .Urine Clean Catch (Midstream)  Final Report:    No growth    Culture - Blood (collected 20 Apr 2021 22:52)  Source: .Blood Blood-Peripheral  Final Report:    No Growth Final    Culture - Blood (collected 20 Apr 2021 22:52)  Source: .Blood Blood-Peripheral  Final Report:    No Growth Final    HIV-1/2 Combo Result: Nonreact (10-31-22 @ 17:21)    CMV IgG Interpretation: Negative (10-31-22 @ 17:21)    COVID-19 PCR: NotDetec (10-31-22 @ 17:21)  COVID-19 PCR: Detected (09-24-22 @ 15:45)    Blood Gas Venous - Lactate: 1.6 (11-02 @ 09:50)  Blood Gas Venous - Lactate: 1.4 (11-02 @ 03:40)  Blood Gas Arterial, Lactate: 1.7 (11-01 @ 21:47)  Blood Gas Arterial, Lactate: 1.8 (11-01 @ 15:27)    RADIOLOGY:  imaging below personally reviewed    < from: Xray Chest 1 View- PORTABLE-Urgent (Xray Chest 1 View- PORTABLE-Urgent .) (10.31.22 @ 17:29) >  Chest radiograph 11/1/2022: Onthe final adjustment view taken at 3:28 AM   there is an endotracheal tube with tip above the aj, enteric tube   with tip in the stomach and right IJ approach [place]-Lawanda catheter with tip   in the right pulmonary artery. Surgical drainage catheterand surgical   staples overlie the midabdomen. Clear lungs. No pleural effusion or   pneumothorax.    < end of copied text >           HPI:    32 year old male with PMHx of Cerebral palsy, Primary Sclerosing Cholangitis Cirrhosis with frequent ERCPs with biliary stent placements for biliary strictures (last in 9/2022) on liver transplant list, recent COVID s/p MAB in (9/2022) admitted for liver transplant, s/p liver transplant on 10/31. Transplant ID consulted for fevers.     REVIEW OF SYSTEMS  [] ROS unobtainable because:   [X] All other systems negative except as noted below    Constitutional:  [X] fever [ ] chills  [ ] weight loss  [ ]night sweat  [ ]poor appetite/PO intake [ ]fatigue   Skin:  [ ] rash [ ] phlebitis	  Eyes: [ ] icterus [ ] pain  [ ] discharge	  ENMT: [ ] sore throat  [ ] thrush [ ] ulcers [ ] exudates [ ]anosmia  Respiratory: [ ] dyspnea [ ] hemoptysis [ ] cough [ ] sputum	  Cardiovascular:  [ ] chest pain [ ] palpitations [ ] edema	  Gastrointestinal:  [ ] nausea [ ] vomiting [ ] diarrhea [ ] constipation [X] pain	  Genitourinary:  [ ] dysuria [ ] frequency [ ] hematuria [ ] discharge [ ] flank pain  [ ] incontinence  Musculoskeletal:  [ ] myalgias [ ] arthralgias [ ] arthritis  [ ] back pain  Neurological:  [ ] headache [ ] weakness [ ] seizures  [ ] confusion/altered mental status    prior hospital charts reviewed [V]  primary team notes reviewed [V]  other consultant notes reviewed [V]    PAST MEDICAL & SURGICAL HISTORY:  Cerebral palsy    PSC (primary sclerosing cholangitis)  On liver transplant list    Abnormal LFTs    Bile duct stricture    Dental caries    Impacted teeth  wisdom teeth    Phimosis    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    Anemia    History of ERCP  multiple with stent insertions/replacement every 3 months ---last 5/20/2022    [place] teeth extracted  2021    SOCIAL HISTORY:  - Denied smoking/vaping/alcohol/recreational drug use    FAMILY HISTORY:  No pertinent family history in first degree relatives    Allergies  No Known Allergies    ANTIMICROBIALS:  ampicillin/sulbactam  IVPB 3 every 6 hours  fluconAZOLE IVPB 400 daily  trimethoprim  40 mG/sulfamethoxazole 200 mG Suspension 10 daily  valGANciclovir 50 mG/mL Oral Solution 900 <User Schedule>    ANTIMICROBIALS (past 90 days):  MEDICATIONS  (STANDING):  ampicillin/sulbactam  IVPB   200 mL/Hr IV Intermittent (11-02-22 @ 05:08)   200 mL/Hr IV Intermittent (11-02-22 @ 00:05)   200 mL/Hr IV Intermittent (11-01-22 @ 17:26)   200 mL/Hr IV Intermittent (11-01-22 @ 11:49)   200 mL/Hr IV Intermittent (11-01-22 @ 05:15)    fluconAZOLE IVPB   200 mL/Hr IV Intermittent (11-01-22 @ 11:34)    trimethoprim  40 mG/sulfamethoxazole 200 mG Suspension   10 milliLiter(s) Oral (11-01-22 @ 11:34)    valGANciclovir 50 mG/mL Oral Solution   450 milliGRAM(s) Oral (11-01-22 @ 11:36)    OTHER MEDS:   MEDICATIONS  (STANDING):  aspirin enteric coated 81 daily  HYDROmorphone  Injectable 0.5 every 3 hours PRN  HYDROmorphone  Injectable 0.25 every 3 hours PRN  influenza   Vaccine 0.5 once  insulin lispro (ADMELOG) corrective regimen sliding scale  every 6 hours  mycophenolate mofetil Suspension 1000 <User Schedule>  pantoprazole  Injectable 40 daily  polyethylene glycol 3350 17 daily  senna Syrup 10 at bedtime  tacrolimus    0.5 mG/mL Suspension 1 <User Schedule>    VITALS:  Vital Signs Last 24 Hrs  T(F): 102.4 (11-02-22 @ 11:40), Max: 102.4 (11-02-22 @ 11:40)    Vital Signs Last 24 Hrs  HR: 129 (11-02-22 @ 12:00) (75 - 138)  BP: 128/66 (11-02-22 @ 12:00) (108/75 - 128/66)  RR: 24 (11-02-22 @ 12:00)  SpO2: 93% (11-02-22 @ 12:00) (93% - 100%)  Wt(kg): --    EXAM:  Physical Exam:  Constitutional:  well preserved, comfortable  Head/Eyes: no icterus, PERRL, EOMI  ENT:  supple; no thrush  LUNGS:  CTA  CVS:  normal S1, S2, no murmur  Abd:  Tenderness around surgical incision sites with staples, no discharge  Ext:  no edema  Vascular:  IV site no erythema tenderness or discharge  MSK:  joints without swelling  Neuro: AAO X 3,     Labs:                        8.6    18.49 )-----------( 231      ( 02 Nov 2022 10:08 )             26.5     11-02    142  |  109<H>  |  25<H>  ----------------------------<  129<H>  4.1   |  23  |  0.94    Ca    8.1<L>      02 Nov 2022 10:08  Phos  4.1     11-02  Mg     2.5     11-02    TPro  5.5<L>  /  Alb  2.6<L>  /  TBili  2.3<H>  /  DBili  1.8<H>  /  AST  287<H>  /  ALT  316<H>  /  AlkPhos  185<H>  11-02    WBC Trend:  WBC Count: 18.49 (11-02-22 @ 10:08)  WBC Count: 19.83 (11-02-22 @ 03:56)  WBC Count: 20.51 (11-01-22 @ 21:53)  WBC Count: 12.83 (11-01-22 @ 15:34)    Auto Neutrophil #: 2.81 K/uL (10-31-22 @ 17:21)  Auto Neutrophil #: 3.89 K/uL (09-29-22 @ 08:17)  Auto Neutrophil #: 7.75 K/uL (09-26-22 @ 07:05)  Auto Neutrophil #: 4.89 K/uL (09-24-22 @ 15:48)  Auto Neutrophil #: 6.03 K/uL (07-22-22 @ 08:33)    Creatine Trend:  Creatinine, Serum: 0.94 (11-02)  Creatinine, Serum: 0.83 (11-02)  Creatinine, Serum: 0.80 (11-01)  Creatinine, Serum: 0.75 (11-01)    Liver Biochemical Testing Trend:  Alanine Aminotransferase (ALT/SGPT): 316 *H* (11-02)  Alanine Aminotransferase (ALT/SGPT): 354 *H* (11-01)  Alanine Aminotransferase (ALT/SGPT): 354 *H* (11-01)  Alanine Aminotransferase (ALT/SGPT): 350 *H* (11-01)  Alanine Aminotransferase (ALT/SGPT): 363 *H* (11-01)  Aspartate Aminotransferase (AST/SGOT): 287 (11-02-22 @ 03:56)  Aspartate Aminotransferase (AST/SGOT): 359 (11-01-22 @ 21:53)  Aspartate Aminotransferase (AST/SGOT): 419 (11-01-22 @ 15:34)  Aspartate Aminotransferase (AST/SGOT): 483 (11-01-22 @ 09:29)  Aspartate Aminotransferase (AST/SGOT): 692 (11-01-22 @ 03:25)  Bilirubin Direct, Serum: 1.8 (11-02)  Bilirubin Total, Serum: 2.3 (11-02)  Bilirubin Direct, Serum: 2.3 (11-01)  Bilirubin Total, Serum: 2.9 (11-01)  Bilirubin Direct, Serum: 3.4 (11-01)    Trend LDH  09-28-22 @ 11:08  172  09-25-22 @ 07:06  116  09-24-22 @ 15:48  111    Auto Eosinophil %: 2.7 % (10-31-22 @ 17:21)    MICROBIOLOGY:    Culture - Urine (collected 26 Sep 2022 22:46)  Source: Clean Catch Clean Catch (Midstream)  Final Report:    No growth    Culture - Blood (collected 26 Sep 2022 14:18)  Source: .Blood Blood  Final Report:    No Growth Final    Culture - Blood (collected 26 Sep 2022 14:00)  Source: .Blood Blood  Final Report:    No Growth Final    Culture - Blood (collected 24 Sep 2022 15:53)  Source: .Blood Blood-Peripheral  Final Report:    No Growth Final    Culture - Blood (collected 24 Sep 2022 15:42)  Source: .Blood Blood-Peripheral  Final Report:    No Growth Final    Culture - Urine (collected 22 Jul 2022 12:19)  Source: Clean Catch None  Final Report:    <10,000 CFU/mL Normal Urogenital Gisel    Culture - Urine (collected 21 Apr 2021 05:27)  Source: .Urine Clean Catch (Midstream)  Final Report:    No growth    Culture - Blood (collected 20 Apr 2021 22:52)  Source: .Blood Blood-Peripheral  Final Report:    No Growth Final    Culture - Blood (collected 20 Apr 2021 22:52)  Source: .Blood Blood-Peripheral  Final Report:    No Growth Final    HIV-1/2 Combo Result: Nonreact (10-31-22 @ 17:21)    CMV IgG Interpretation: Negative (10-31-22 @ 17:21)    COVID-19 PCR: NotDetec (10-31-22 @ 17:21)  COVID-19 PCR: Detected (09-24-22 @ 15:45)    Blood Gas Venous - Lactate: 1.6 (11-02 @ 09:50)  Blood Gas Venous - Lactate: 1.4 (11-02 @ 03:40)  Blood Gas Arterial, Lactate: 1.7 (11-01 @ 21:47)  Blood Gas Arterial, Lactate: 1.8 (11-01 @ 15:27)    RADIOLOGY:    <The imaging below has been reviewed and visualized by me independently. Findings as detailed in report below>    < from: Xray Chest 1 View- PORTABLE-Urgent (Xray Chest 1 View- PORTABLE-Urgent .) (10.31.22 @ 17:29) >  Chest radiograph 11/1/2022: Onthe final adjustment view taken at 3:28 AM   there is an endotracheal tube with tip above the aj, enteric tube   with tip in the stomach and right IJ approach Whiting-Lawanda catheter with tip   in the right pulmonary artery. Surgical drainage catheterand surgical   staples overlie the midabdomen. Clear lungs. No pleural effusion or   pneumothorax.    < end of copied text >

## 2022-11-02 NOTE — PROGRESS NOTE ADULT - ASSESSMENT
32M PMH Cerebral palsy, Primary Sclerosing Cholangitis Cirrhosis with frequent ERCPs with biliary stent placements for biliary strictures (last in 9/2022) on liver transplant list (ABO AB), recent COVID s/p MAB in 9/2022) admitted for liver transplant    OLT  Pain control PRN  Monitor tachycardia, could be due to pain  Monitor for fevers  LFTs downtrending  US with patent vessels  Strict I/Os. Monitor drain outputs/NGT  Dressing changed at bedside, keep incision open to air; VIOLETA sites covered with gauze and tape  DC 1 [place] and central lines  SCDs  Continue Miralax and Senna  IS q1hour WA  500 cc bolus of NS  OOBC/PT   Start ASA 81 mg daily  DC chavez, TOV  Start Unasyn  Immunosuppression: [place] 1mg/1mg, MMF 1000mg BID, Steroid taper  PPX: Fluconazole Bactrim, Valcyte  Q6 hour CBC, CMP, Mag, Pos, [place] level    Plan discussed with Dr. Godinez.

## 2022-11-02 NOTE — PROGRESS NOTE ADULT - SUBJECTIVE AND OBJECTIVE BOX
24 hr events:  - NS @ 75  - ISS  - Extubated       SUBJECTIVE:    Flatus: [ ] YES [ ] NO             Bowel Movement: [ ] YES [ ] NO  Pain (0-10):            Pain Control Adequate: [ ] YES [ ] NO  Nausea: [ ] YES [ ] NO            Vomiting: [ ] YES [ ] NO  Diarrhea: [ ] YES [ ] NO         Constipation: [ ] YES [ ] NO     Chest Pain: [ ] YES [ ] NO    SOB:  [ ] YES [ ] NO    MEDICATIONS  (STANDING):  ampicillin/sulbactam  IVPB 3 Gram(s) IV Intermittent every 6 hours  calcium carbonate 1250 mG  + Vitamin D (OsCal 500 + D) 1 Tablet(s) Oral two times a day  chlorhexidine 2% Cloths 1 Application(s) Topical <User Schedule>  fluconAZOLE IVPB 400 milliGRAM(s) IV Intermittent daily  influenza   Vaccine 0.5 milliLiter(s) IntraMuscular once  insulin lispro (ADMELOG) corrective regimen sliding scale   SubCutaneous every 6 hours  methylPREDNISolone sodium succinate IVPB 250 milliGRAM(s) IV Intermittent once  multiple electrolytes Injection Type 1 1000 milliLiter(s) (75 mL/Hr) IV Continuous <Continuous>  mycophenolate mofetil Suspension 1000 milliGRAM(s) Oral <User Schedule>  pantoprazole  Injectable 40 milliGRAM(s) IV Push daily  senna 2 Tablet(s) Oral two times a day  tacrolimus    0.5 mG/mL Suspension 1 milliGRAM(s) Oral <User Schedule>  trimethoprim  40 mG/sulfamethoxazole 200 mG Suspension 10 milliLiter(s) Oral daily  valGANciclovir 50 mG/mL Oral Solution 450 milliGRAM(s) Oral daily    MEDICATIONS  (PRN):  HYDROmorphone  Injectable 0.5 milliGRAM(s) IV Push every 3 hours PRN Severe Pain (7 - 10)  HYDROmorphone  Injectable 0.25 milliGRAM(s) IV Push every 3 hours PRN Moderate Pain (4 - 6)      Chapman:	  [ ] None	[ ] Daily Chapman Order Placed	   Indication:	  [ ] Strict I and O's    [ ] Obstruction     [ ] Incontinence + Stage 3 or 4 Decubitus  Central Line:  [ ] None	   [ ]  Medication / TPN Administration     [ ] No Peripheral IV     ICU Vital Signs Last 24 Hrs  T(C): 37.1 (01 Nov 2022 23:00), Max: 37.4 (01 Nov 2022 15:00)  T(F): 98.8 (01 Nov 2022 23:00), Max: 99.3 (01 Nov 2022 15:00)  HR: 90 (02 Nov 2022 02:00) (65 - 121)  BP: 124/60 (01 Nov 2022 03:00) (124/60 - 124/60)  BP(mean): 84 (01 Nov 2022 03:00) (84 - 84)  ABP: 140/87 (02 Nov 2022 02:00) (70/28 - 165/68)  ABP(mean): 104 (02 Nov 2022 02:00) (60 - 122)  RR: 19 (02 Nov 2022 02:00) (16 - 34)  SpO2: 96% (02 Nov 2022 02:00) (95% - 100%)    O2 Parameters below as of 01 Nov 2022 19:00  Patient On (Oxygen Delivery Method): room air            Physical Exam:  General: NAD  HEENT: NC/AT, EOMI, PERRLA, normal hearing, no oral lesions, neck supple w/o LAD  Pulmonary: Nonlabored breathing, no respiratory distress, CTA-B  Cardiovascular: NSR, no murmurs  Abdominal: soft, NT/ND, +BS, no organomegaly, VIOLETA x3 SS  Extremities: WWP, 5/5 strength x 4, no clubbing/cyanosis/edema  Neuro: A/O x3, CNs II-XII grossly intact, normal motor/sensation, no focal deficits  Pulses: palpable distal pulses    Lines/tubes/drains: Chapman    Vent settings:  Mode: CPAP with PS, FiO2: 50, PEEP: 5, PS: 5, MAP: 7    I&O's Summary    31 Oct 2022 07:01  -  01 Nov 2022 07:00  --------------------------------------------------------  IN: 943.3 mL / OUT: 630 mL / NET: 313.3 mL    01 Nov 2022 07:01  -  02 Nov 2022 02:08  --------------------------------------------------------  IN: 1857.3 mL / OUT: 1079 mL / NET: 778.3 mL        LABS:                        8.6    20.51 )-----------( 224      ( 01 Nov 2022 21:53 )             25.9     11-01    143  |  111<H>  |  19  ----------------------------<  160<H>  4.2   |  23  |  0.80    Ca    8.3<L>      01 Nov 2022 21:53  Phos  3.6     11-01  Mg     1.9     11-01    TPro  5.8<L>  /  Alb  2.8<L>  /  TBili  2.9<H>  /  DBili  2.3<H>  /  AST  359<H>  /  ALT  354<H>  /  AlkPhos  197<H>  11-01    PT/INR - ( 01 Nov 2022 21:53 )   PT: 17.3 sec;   INR: 1.49 ratio         PTT - ( 01 Nov 2022 21:53 )  PTT:27.1 sec    CAPILLARY BLOOD GLUCOSE      POCT Blood Glucose.: 153 mg/dL (01 Nov 2022 21:46)  POCT Blood Glucose.: 167 mg/dL (01 Nov 2022 11:39)  POCT Blood Glucose.: 130 mg/dL (01 Nov 2022 09:59)  POCT Blood Glucose.: 175 mg/dL (01 Nov 2022 09:07)  POCT Blood Glucose.: 181 mg/dL (01 Nov 2022 08:11)  POCT Blood Glucose.: 196 mg/dL (01 Nov 2022 07:05)  POCT Blood Glucose.: 245 mg/dL (01 Nov 2022 06:02)  POCT Blood Glucose.: 201 mg/dL (01 Nov 2022 05:02)  POCT Blood Glucose.: 201 mg/dL (01 Nov 2022 04:02)  POCT Blood Glucose.: 172 mg/dL (01 Nov 2022 03:17)    LIVER FUNCTIONS - ( 01 Nov 2022 21:53 )  Alb: 2.8 g/dL / Pro: 5.8 g/dL / ALK PHOS: 197 U/L / ALT: 354 U/L / AST: 359 U/L / GGT: x             Cultures:    Drips:    RADIOLOGY & ADDITIONAL STUDIES:     24 hr events:  - Extubated, Precedex dc'd  - Febrile to 102.4, urine and blood cx sent  - Chapman removed      SUBJECTIVE:    Flatus: [ ] YES [ ] NO             Bowel Movement: [ ] YES [ ] NO  Pain (0-10):            Pain Control Adequate: [ ] YES [ ] NO  Nausea: [ ] YES [ ] NO            Vomiting: [ ] YES [ ] NO  Diarrhea: [ ] YES [ ] NO         Constipation: [ ] YES [ ] NO     Chest Pain: [ ] YES [ ] NO    SOB:  [ ] YES [ ] NO    MEDICATIONS  (STANDING):  ampicillin/sulbactam  IVPB 3 Gram(s) IV Intermittent every 6 hours  calcium carbonate 1250 mG  + Vitamin D (OsCal 500 + D) 1 Tablet(s) Oral two times a day  chlorhexidine 2% Cloths 1 Application(s) Topical <User Schedule>  fluconAZOLE IVPB 400 milliGRAM(s) IV Intermittent daily  influenza   Vaccine 0.5 milliLiter(s) IntraMuscular once  insulin lispro (ADMELOG) corrective regimen sliding scale   SubCutaneous every 6 hours  methylPREDNISolone sodium succinate IVPB 250 milliGRAM(s) IV Intermittent once  multiple electrolytes Injection Type 1 1000 milliLiter(s) (75 mL/Hr) IV Continuous <Continuous>  mycophenolate mofetil Suspension 1000 milliGRAM(s) Oral <User Schedule>  pantoprazole  Injectable 40 milliGRAM(s) IV Push daily  senna 2 Tablet(s) Oral two times a day  tacrolimus    0.5 mG/mL Suspension 1 milliGRAM(s) Oral <User Schedule>  trimethoprim  40 mG/sulfamethoxazole 200 mG Suspension 10 milliLiter(s) Oral daily  valGANciclovir 50 mG/mL Oral Solution 450 milliGRAM(s) Oral daily    MEDICATIONS  (PRN):  HYDROmorphone  Injectable 0.5 milliGRAM(s) IV Push every 3 hours PRN Severe Pain (7 - 10)  HYDROmorphone  Injectable 0.25 milliGRAM(s) IV Push every 3 hours PRN Moderate Pain (4 - 6)      Chapman:	  [ ] None	[ ] Daily Chapman Order Placed	   Indication:	  [ ] Strict I and O's    [ ] Obstruction     [ ] Incontinence + Stage 3 or 4 Decubitus  Central Line:  [ ] None	   [ ]  Medication / TPN Administration     [ ] No Peripheral IV     ICU Vital Signs Last 24 Hrs  T(C): 37.1 (01 Nov 2022 23:00), Max: 37.4 (01 Nov 2022 15:00)  T(F): 98.8 (01 Nov 2022 23:00), Max: 99.3 (01 Nov 2022 15:00)  HR: 90 (02 Nov 2022 02:00) (65 - 121)  BP: 124/60 (01 Nov 2022 03:00) (124/60 - 124/60)  BP(mean): 84 (01 Nov 2022 03:00) (84 - 84)  ABP: 140/87 (02 Nov 2022 02:00) (70/28 - 165/68)  ABP(mean): 104 (02 Nov 2022 02:00) (60 - 122)  RR: 19 (02 Nov 2022 02:00) (16 - 34)  SpO2: 96% (02 Nov 2022 02:00) (95% - 100%)    O2 Parameters below as of 01 Nov 2022 19:00  Patient On (Oxygen Delivery Method): room air            Physical Exam:  General: NAD  HEENT: NC/AT, EOMI, PERRLA, normal hearing, no oral lesions, neck supple w/o LAD  Pulmonary: Nonlabored breathing, no respiratory distress, CTA-B  Cardiovascular: NSR, no murmurs  Abdominal: soft, NT/ND, +BS, no organomegaly, VIOLETA x3 SS  Extremities: WWP, 5/5 strength x 4, no clubbing/cyanosis/edema  Neuro: A/O x3, CNs II-XII grossly intact, normal motor/sensation, no focal deficits  Pulses: palpable distal pulses    Lines/tubes/drains: Chapman    Vent settings:  Mode: CPAP with PS, FiO2: 50, PEEP: 5, PS: 5, MAP: 7    I&O's Summary    31 Oct 2022 07:01  -  01 Nov 2022 07:00  --------------------------------------------------------  IN: 943.3 mL / OUT: 630 mL / NET: 313.3 mL    01 Nov 2022 07:01  -  02 Nov 2022 02:08  --------------------------------------------------------  IN: 1857.3 mL / OUT: 1079 mL / NET: 778.3 mL        LABS:                        8.6    20.51 )-----------( 224      ( 01 Nov 2022 21:53 )             25.9     11-01    143  |  111<H>  |  19  ----------------------------<  160<H>  4.2   |  23  |  0.80    Ca    8.3<L>      01 Nov 2022 21:53  Phos  3.6     11-01  Mg     1.9     11-01    TPro  5.8<L>  /  Alb  2.8<L>  /  TBili  2.9<H>  /  DBili  2.3<H>  /  AST  359<H>  /  ALT  354<H>  /  AlkPhos  197<H>  11-01    PT/INR - ( 01 Nov 2022 21:53 )   PT: 17.3 sec;   INR: 1.49 ratio         PTT - ( 01 Nov 2022 21:53 )  PTT:27.1 sec    CAPILLARY BLOOD GLUCOSE      POCT Blood Glucose.: 153 mg/dL (01 Nov 2022 21:46)  POCT Blood Glucose.: 167 mg/dL (01 Nov 2022 11:39)  POCT Blood Glucose.: 130 mg/dL (01 Nov 2022 09:59)  POCT Blood Glucose.: 175 mg/dL (01 Nov 2022 09:07)  POCT Blood Glucose.: 181 mg/dL (01 Nov 2022 08:11)  POCT Blood Glucose.: 196 mg/dL (01 Nov 2022 07:05)  POCT Blood Glucose.: 245 mg/dL (01 Nov 2022 06:02)  POCT Blood Glucose.: 201 mg/dL (01 Nov 2022 05:02)  POCT Blood Glucose.: 201 mg/dL (01 Nov 2022 04:02)  POCT Blood Glucose.: 172 mg/dL (01 Nov 2022 03:17)    LIVER FUNCTIONS - ( 01 Nov 2022 21:53 )  Alb: 2.8 g/dL / Pro: 5.8 g/dL / ALK PHOS: 197 U/L / ALT: 354 U/L / AST: 359 U/L / GGT: x             Cultures:    Drips:    RADIOLOGY & ADDITIONAL STUDIES:     24 hr events:  - Extubated, Precedex dc'd  - Febrile to 102.4, urine and blood cx sent  - Chapman removed  - 1 out of 2 A-lines removed  - RUE BRIAN removed      SUBJECTIVE:    Flatus: [ ] YES [ ] NO             Bowel Movement: [ ] YES [ ] NO  Pain (0-10):            Pain Control Adequate: [ ] YES [ ] NO  Nausea: [ ] YES [ ] NO            Vomiting: [ ] YES [ ] NO  Diarrhea: [ ] YES [ ] NO         Constipation: [ ] YES [ ] NO     Chest Pain: [ ] YES [ ] NO    SOB:  [ ] YES [ ] NO    MEDICATIONS  (STANDING):  ampicillin/sulbactam  IVPB 3 Gram(s) IV Intermittent every 6 hours  calcium carbonate 1250 mG  + Vitamin D (OsCal 500 + D) 1 Tablet(s) Oral two times a day  chlorhexidine 2% Cloths 1 Application(s) Topical <User Schedule>  fluconAZOLE IVPB 400 milliGRAM(s) IV Intermittent daily  influenza   Vaccine 0.5 milliLiter(s) IntraMuscular once  insulin lispro (ADMELOG) corrective regimen sliding scale   SubCutaneous every 6 hours  methylPREDNISolone sodium succinate IVPB 250 milliGRAM(s) IV Intermittent once  multiple electrolytes Injection Type 1 1000 milliLiter(s) (75 mL/Hr) IV Continuous <Continuous>  mycophenolate mofetil Suspension 1000 milliGRAM(s) Oral <User Schedule>  pantoprazole  Injectable 40 milliGRAM(s) IV Push daily  senna 2 Tablet(s) Oral two times a day  tacrolimus    0.5 mG/mL Suspension 1 milliGRAM(s) Oral <User Schedule>  trimethoprim  40 mG/sulfamethoxazole 200 mG Suspension 10 milliLiter(s) Oral daily  valGANciclovir 50 mG/mL Oral Solution 450 milliGRAM(s) Oral daily    MEDICATIONS  (PRN):  HYDROmorphone  Injectable 0.5 milliGRAM(s) IV Push every 3 hours PRN Severe Pain (7 - 10)  HYDROmorphone  Injectable 0.25 milliGRAM(s) IV Push every 3 hours PRN Moderate Pain (4 - 6)      Chapman:	  [ ] None	[ ] Daily Chapman Order Placed	   Indication:	  [ ] Strict I and O's    [ ] Obstruction     [ ] Incontinence + Stage 3 or 4 Decubitus  Central Line:  [ ] None	   [ ]  Medication / TPN Administration     [ ] No Peripheral IV     ICU Vital Signs Last 24 Hrs  T(C): 37.1 (01 Nov 2022 23:00), Max: 37.4 (01 Nov 2022 15:00)  T(F): 98.8 (01 Nov 2022 23:00), Max: 99.3 (01 Nov 2022 15:00)  HR: 90 (02 Nov 2022 02:00) (65 - 121)  BP: 124/60 (01 Nov 2022 03:00) (124/60 - 124/60)  BP(mean): 84 (01 Nov 2022 03:00) (84 - 84)  ABP: 140/87 (02 Nov 2022 02:00) (70/28 - 165/68)  ABP(mean): 104 (02 Nov 2022 02:00) (60 - 122)  RR: 19 (02 Nov 2022 02:00) (16 - 34)  SpO2: 96% (02 Nov 2022 02:00) (95% - 100%)    O2 Parameters below as of 01 Nov 2022 19:00  Patient On (Oxygen Delivery Method): room air            Physical Exam:  General: NAD  HEENT: NC/AT, EOMI, PERRLA, normal hearing, no oral lesions, neck supple w/o LAD  Pulmonary: Nonlabored breathing, no respiratory distress, CTA-B  Cardiovascular: NSR, no murmurs  Abdominal: soft, NT/ND, +BS, no organomegaly, VIOLETA x3 SS  Extremities: WWP, 5/5 strength x 4, no clubbing/cyanosis/edema  Neuro: A/O x3, CNs II-XII grossly intact, normal motor/sensation, no focal deficits  Pulses: palpable distal pulses    Lines/tubes/drains: Chapman    Vent settings:  Mode: CPAP with PS, FiO2: 50, PEEP: 5, PS: 5, MAP: 7    I&O's Summary    31 Oct 2022 07:01  -  01 Nov 2022 07:00  --------------------------------------------------------  IN: 943.3 mL / OUT: 630 mL / NET: 313.3 mL    01 Nov 2022 07:01  -  02 Nov 2022 02:08  --------------------------------------------------------  IN: 1857.3 mL / OUT: 1079 mL / NET: 778.3 mL        LABS:                        8.6    20.51 )-----------( 224      ( 01 Nov 2022 21:53 )             25.9     11-01    143  |  111<H>  |  19  ----------------------------<  160<H>  4.2   |  23  |  0.80    Ca    8.3<L>      01 Nov 2022 21:53  Phos  3.6     11-01  Mg     1.9     11-01    TPro  5.8<L>  /  Alb  2.8<L>  /  TBili  2.9<H>  /  DBili  2.3<H>  /  AST  359<H>  /  ALT  354<H>  /  AlkPhos  197<H>  11-01    PT/INR - ( 01 Nov 2022 21:53 )   PT: 17.3 sec;   INR: 1.49 ratio         PTT - ( 01 Nov 2022 21:53 )  PTT:27.1 sec    CAPILLARY BLOOD GLUCOSE      POCT Blood Glucose.: 153 mg/dL (01 Nov 2022 21:46)  POCT Blood Glucose.: 167 mg/dL (01 Nov 2022 11:39)  POCT Blood Glucose.: 130 mg/dL (01 Nov 2022 09:59)  POCT Blood Glucose.: 175 mg/dL (01 Nov 2022 09:07)  POCT Blood Glucose.: 181 mg/dL (01 Nov 2022 08:11)  POCT Blood Glucose.: 196 mg/dL (01 Nov 2022 07:05)  POCT Blood Glucose.: 245 mg/dL (01 Nov 2022 06:02)  POCT Blood Glucose.: 201 mg/dL (01 Nov 2022 05:02)  POCT Blood Glucose.: 201 mg/dL (01 Nov 2022 04:02)  POCT Blood Glucose.: 172 mg/dL (01 Nov 2022 03:17)    LIVER FUNCTIONS - ( 01 Nov 2022 21:53 )  Alb: 2.8 g/dL / Pro: 5.8 g/dL / ALK PHOS: 197 U/L / ALT: 354 U/L / AST: 359 U/L / GGT: x             Cultures:    Drips:    RADIOLOGY & ADDITIONAL STUDIES:     24 hr events:  - Extubated, Precedex dc'd  - Febrile to 102.4, urine and blood cx sent  - Both A-lines removed  - Chapman to be removed  - RUE BRIAN to be removed      SUBJECTIVE:    Flatus: [ ] YES [ ] NO             Bowel Movement: [ ] YES [ ] NO  Pain (0-10):            Pain Control Adequate: [ ] YES [ ] NO  Nausea: [ ] YES [ ] NO            Vomiting: [ ] YES [ ] NO  Diarrhea: [ ] YES [ ] NO         Constipation: [ ] YES [ ] NO     Chest Pain: [ ] YES [ ] NO    SOB:  [ ] YES [ ] NO    MEDICATIONS  (STANDING):  ampicillin/sulbactam  IVPB 3 Gram(s) IV Intermittent every 6 hours  calcium carbonate 1250 mG  + Vitamin D (OsCal 500 + D) 1 Tablet(s) Oral two times a day  chlorhexidine 2% Cloths 1 Application(s) Topical <User Schedule>  fluconAZOLE IVPB 400 milliGRAM(s) IV Intermittent daily  influenza   Vaccine 0.5 milliLiter(s) IntraMuscular once  insulin lispro (ADMELOG) corrective regimen sliding scale   SubCutaneous every 6 hours  methylPREDNISolone sodium succinate IVPB 250 milliGRAM(s) IV Intermittent once  multiple electrolytes Injection Type 1 1000 milliLiter(s) (75 mL/Hr) IV Continuous <Continuous>  mycophenolate mofetil Suspension 1000 milliGRAM(s) Oral <User Schedule>  pantoprazole  Injectable 40 milliGRAM(s) IV Push daily  senna 2 Tablet(s) Oral two times a day  tacrolimus    0.5 mG/mL Suspension 1 milliGRAM(s) Oral <User Schedule>  trimethoprim  40 mG/sulfamethoxazole 200 mG Suspension 10 milliLiter(s) Oral daily  valGANciclovir 50 mG/mL Oral Solution 450 milliGRAM(s) Oral daily    MEDICATIONS  (PRN):  HYDROmorphone  Injectable 0.5 milliGRAM(s) IV Push every 3 hours PRN Severe Pain (7 - 10)  HYDROmorphone  Injectable 0.25 milliGRAM(s) IV Push every 3 hours PRN Moderate Pain (4 - 6)      Chapman:	  [ ] None	[ ] Daily Chapman Order Placed	   Indication:	  [ ] Strict I and O's    [ ] Obstruction     [ ] Incontinence + Stage 3 or 4 Decubitus  Central Line:  [ ] None	   [ ]  Medication / TPN Administration     [ ] No Peripheral IV     ICU Vital Signs Last 24 Hrs  T(C): 37.1 (01 Nov 2022 23:00), Max: 37.4 (01 Nov 2022 15:00)  T(F): 98.8 (01 Nov 2022 23:00), Max: 99.3 (01 Nov 2022 15:00)  HR: 90 (02 Nov 2022 02:00) (65 - 121)  BP: 124/60 (01 Nov 2022 03:00) (124/60 - 124/60)  BP(mean): 84 (01 Nov 2022 03:00) (84 - 84)  ABP: 140/87 (02 Nov 2022 02:00) (70/28 - 165/68)  ABP(mean): 104 (02 Nov 2022 02:00) (60 - 122)  RR: 19 (02 Nov 2022 02:00) (16 - 34)  SpO2: 96% (02 Nov 2022 02:00) (95% - 100%)    O2 Parameters below as of 01 Nov 2022 19:00  Patient On (Oxygen Delivery Method): room air            Physical Exam:  General: NAD  HEENT: NC/AT, EOMI, PERRLA, normal hearing, no oral lesions, neck supple w/o LAD  Pulmonary: Nonlabored breathing, no respiratory distress, CTA-B  Cardiovascular: NSR, no murmurs  Abdominal: soft, NT/ND, +BS, no organomegaly, VIOLETA x3 SS  Extremities: WWP, 5/5 strength x 4, no clubbing/cyanosis/edema  Neuro: A/O x3, CNs II-XII grossly intact, normal motor/sensation, no focal deficits  Pulses: palpable distal pulses    Lines/tubes/drains: Chapman    Vent settings:  Mode: CPAP with PS, FiO2: 50, PEEP: 5, PS: 5, MAP: 7    I&O's Summary    31 Oct 2022 07:01  -  01 Nov 2022 07:00  --------------------------------------------------------  IN: 943.3 mL / OUT: 630 mL / NET: 313.3 mL    01 Nov 2022 07:01  -  02 Nov 2022 02:08  --------------------------------------------------------  IN: 1857.3 mL / OUT: 1079 mL / NET: 778.3 mL        LABS:                        8.6    20.51 )-----------( 224      ( 01 Nov 2022 21:53 )             25.9     11-01    143  |  111<H>  |  19  ----------------------------<  160<H>  4.2   |  23  |  0.80    Ca    8.3<L>      01 Nov 2022 21:53  Phos  3.6     11-01  Mg     1.9     11-01    TPro  5.8<L>  /  Alb  2.8<L>  /  TBili  2.9<H>  /  DBili  2.3<H>  /  AST  359<H>  /  ALT  354<H>  /  AlkPhos  197<H>  11-01    PT/INR - ( 01 Nov 2022 21:53 )   PT: 17.3 sec;   INR: 1.49 ratio         PTT - ( 01 Nov 2022 21:53 )  PTT:27.1 sec    CAPILLARY BLOOD GLUCOSE      POCT Blood Glucose.: 153 mg/dL (01 Nov 2022 21:46)  POCT Blood Glucose.: 167 mg/dL (01 Nov 2022 11:39)  POCT Blood Glucose.: 130 mg/dL (01 Nov 2022 09:59)  POCT Blood Glucose.: 175 mg/dL (01 Nov 2022 09:07)  POCT Blood Glucose.: 181 mg/dL (01 Nov 2022 08:11)  POCT Blood Glucose.: 196 mg/dL (01 Nov 2022 07:05)  POCT Blood Glucose.: 245 mg/dL (01 Nov 2022 06:02)  POCT Blood Glucose.: 201 mg/dL (01 Nov 2022 05:02)  POCT Blood Glucose.: 201 mg/dL (01 Nov 2022 04:02)  POCT Blood Glucose.: 172 mg/dL (01 Nov 2022 03:17)    LIVER FUNCTIONS - ( 01 Nov 2022 21:53 )  Alb: 2.8 g/dL / Pro: 5.8 g/dL / ALK PHOS: 197 U/L / ALT: 354 U/L / AST: 359 U/L / GGT: x             Cultures:    Drips:    RADIOLOGY & ADDITIONAL STUDIES:     24 hr events:  - Extubated, Precedex dc'd  - Febrile to 102.4, urine and blood cx sent  - Both A-lines removed  - Chapman to be removed  - RUE BRIAN removed      SUBJECTIVE:    Flatus: [ ] YES [ ] NO             Bowel Movement: [ ] YES [ ] NO  Pain (0-10):            Pain Control Adequate: [ ] YES [ ] NO  Nausea: [ ] YES [ ] NO            Vomiting: [ ] YES [ ] NO  Diarrhea: [ ] YES [ ] NO         Constipation: [ ] YES [ ] NO     Chest Pain: [ ] YES [ ] NO    SOB:  [ ] YES [ ] NO    MEDICATIONS  (STANDING):  ampicillin/sulbactam  IVPB 3 Gram(s) IV Intermittent every 6 hours  calcium carbonate 1250 mG  + Vitamin D (OsCal 500 + D) 1 Tablet(s) Oral two times a day  chlorhexidine 2% Cloths 1 Application(s) Topical <User Schedule>  fluconAZOLE IVPB 400 milliGRAM(s) IV Intermittent daily  influenza   Vaccine 0.5 milliLiter(s) IntraMuscular once  insulin lispro (ADMELOG) corrective regimen sliding scale   SubCutaneous every 6 hours  methylPREDNISolone sodium succinate IVPB 250 milliGRAM(s) IV Intermittent once  multiple electrolytes Injection Type 1 1000 milliLiter(s) (75 mL/Hr) IV Continuous <Continuous>  mycophenolate mofetil Suspension 1000 milliGRAM(s) Oral <User Schedule>  pantoprazole  Injectable 40 milliGRAM(s) IV Push daily  senna 2 Tablet(s) Oral two times a day  tacrolimus    0.5 mG/mL Suspension 1 milliGRAM(s) Oral <User Schedule>  trimethoprim  40 mG/sulfamethoxazole 200 mG Suspension 10 milliLiter(s) Oral daily  valGANciclovir 50 mG/mL Oral Solution 450 milliGRAM(s) Oral daily    MEDICATIONS  (PRN):  HYDROmorphone  Injectable 0.5 milliGRAM(s) IV Push every 3 hours PRN Severe Pain (7 - 10)  HYDROmorphone  Injectable 0.25 milliGRAM(s) IV Push every 3 hours PRN Moderate Pain (4 - 6)      Chapman:	  [ ] None	[ ] Daily Chapman Order Placed	   Indication:	  [ ] Strict I and O's    [ ] Obstruction     [ ] Incontinence + Stage 3 or 4 Decubitus  Central Line:  [ ] None	   [ ]  Medication / TPN Administration     [ ] No Peripheral IV     ICU Vital Signs Last 24 Hrs  T(C): 37.1 (01 Nov 2022 23:00), Max: 37.4 (01 Nov 2022 15:00)  T(F): 98.8 (01 Nov 2022 23:00), Max: 99.3 (01 Nov 2022 15:00)  HR: 90 (02 Nov 2022 02:00) (65 - 121)  BP: 124/60 (01 Nov 2022 03:00) (124/60 - 124/60)  BP(mean): 84 (01 Nov 2022 03:00) (84 - 84)  ABP: 140/87 (02 Nov 2022 02:00) (70/28 - 165/68)  ABP(mean): 104 (02 Nov 2022 02:00) (60 - 122)  RR: 19 (02 Nov 2022 02:00) (16 - 34)  SpO2: 96% (02 Nov 2022 02:00) (95% - 100%)    O2 Parameters below as of 01 Nov 2022 19:00  Patient On (Oxygen Delivery Method): room air            Physical Exam:  General: NAD  HEENT: NC/AT, EOMI, PERRLA, normal hearing, no oral lesions, neck supple w/o LAD  Pulmonary: Nonlabored breathing, no respiratory distress, CTA-B  Cardiovascular: NSR, no murmurs  Abdominal: soft, NT/ND, +BS, no organomegaly, VIOLETA x3 SS  Extremities: WWP, 5/5 strength x 4, no clubbing/cyanosis/edema  Neuro: A/O x3, CNs II-XII grossly intact, normal motor/sensation, no focal deficits  Pulses: palpable distal pulses    Lines/tubes/drains: Chapman    Vent settings:  Mode: CPAP with PS, FiO2: 50, PEEP: 5, PS: 5, MAP: 7    I&O's Summary    31 Oct 2022 07:01  -  01 Nov 2022 07:00  --------------------------------------------------------  IN: 943.3 mL / OUT: 630 mL / NET: 313.3 mL    01 Nov 2022 07:01  -  02 Nov 2022 02:08  --------------------------------------------------------  IN: 1857.3 mL / OUT: 1079 mL / NET: 778.3 mL        LABS:                        8.6    20.51 )-----------( 224      ( 01 Nov 2022 21:53 )             25.9     11-01    143  |  111<H>  |  19  ----------------------------<  160<H>  4.2   |  23  |  0.80    Ca    8.3<L>      01 Nov 2022 21:53  Phos  3.6     11-01  Mg     1.9     11-01    TPro  5.8<L>  /  Alb  2.8<L>  /  TBili  2.9<H>  /  DBili  2.3<H>  /  AST  359<H>  /  ALT  354<H>  /  AlkPhos  197<H>  11-01    PT/INR - ( 01 Nov 2022 21:53 )   PT: 17.3 sec;   INR: 1.49 ratio         PTT - ( 01 Nov 2022 21:53 )  PTT:27.1 sec    CAPILLARY BLOOD GLUCOSE      POCT Blood Glucose.: 153 mg/dL (01 Nov 2022 21:46)  POCT Blood Glucose.: 167 mg/dL (01 Nov 2022 11:39)  POCT Blood Glucose.: 130 mg/dL (01 Nov 2022 09:59)  POCT Blood Glucose.: 175 mg/dL (01 Nov 2022 09:07)  POCT Blood Glucose.: 181 mg/dL (01 Nov 2022 08:11)  POCT Blood Glucose.: 196 mg/dL (01 Nov 2022 07:05)  POCT Blood Glucose.: 245 mg/dL (01 Nov 2022 06:02)  POCT Blood Glucose.: 201 mg/dL (01 Nov 2022 05:02)  POCT Blood Glucose.: 201 mg/dL (01 Nov 2022 04:02)  POCT Blood Glucose.: 172 mg/dL (01 Nov 2022 03:17)    LIVER FUNCTIONS - ( 01 Nov 2022 21:53 )  Alb: 2.8 g/dL / Pro: 5.8 g/dL / ALK PHOS: 197 U/L / ALT: 354 U/L / AST: 359 U/L / GGT: x             Cultures:    Drips:    RADIOLOGY & ADDITIONAL STUDIES:

## 2022-11-03 LAB
ALBUMIN SERPL ELPH-MCNC: 2.9 G/DL — LOW (ref 3.3–5)
ALBUMIN SERPL ELPH-MCNC: 2.9 G/DL — LOW (ref 3.3–5)
ALP SERPL-CCNC: 199 U/L — HIGH (ref 40–120)
ALP SERPL-CCNC: 236 U/L — HIGH (ref 40–120)
ALT FLD-CCNC: 183 U/L — HIGH (ref 10–45)
ALT FLD-CCNC: 200 U/L — HIGH (ref 10–45)
ANION GAP SERPL CALC-SCNC: 10 MMOL/L — SIGNIFICANT CHANGE UP (ref 5–17)
ANION GAP SERPL CALC-SCNC: 7 MMOL/L — SIGNIFICANT CHANGE UP (ref 5–17)
APTT BLD: 24 SEC — LOW (ref 27.5–35.5)
AST SERPL-CCNC: 108 U/L — HIGH (ref 10–40)
AST SERPL-CCNC: 87 U/L — HIGH (ref 10–40)
BASE EXCESS BLDV CALC-SCNC: 1.4 MMOL/L — SIGNIFICANT CHANGE UP (ref -2–3)
BASE EXCESS BLDV CALC-SCNC: 1.9 MMOL/L — SIGNIFICANT CHANGE UP (ref -2–3)
BILIRUB DIRECT SERPL-MCNC: 1.6 MG/DL — HIGH (ref 0–0.3)
BILIRUB DIRECT SERPL-MCNC: 2 MG/DL — HIGH (ref 0–0.3)
BILIRUB INDIRECT FLD-MCNC: 0.7 MG/DL — SIGNIFICANT CHANGE UP (ref 0.2–1)
BILIRUB INDIRECT FLD-MCNC: 0.7 MG/DL — SIGNIFICANT CHANGE UP (ref 0.2–1)
BILIRUB SERPL-MCNC: 2.2 MG/DL — HIGH (ref 0.2–1.2)
BILIRUB SERPL-MCNC: 2.3 MG/DL — HIGH (ref 0.2–1.2)
BILIRUB SERPL-MCNC: 2.7 MG/DL — HIGH (ref 0.2–1.2)
BUN SERPL-MCNC: 22 MG/DL — SIGNIFICANT CHANGE UP (ref 7–23)
BUN SERPL-MCNC: 23 MG/DL — SIGNIFICANT CHANGE UP (ref 7–23)
CA-I SERPL-SCNC: 1.23 MMOL/L — SIGNIFICANT CHANGE UP (ref 1.15–1.33)
CA-I SERPL-SCNC: 1.25 MMOL/L — SIGNIFICANT CHANGE UP (ref 1.15–1.33)
CALCIUM SERPL-MCNC: 8.2 MG/DL — LOW (ref 8.4–10.5)
CALCIUM SERPL-MCNC: 8.3 MG/DL — LOW (ref 8.4–10.5)
CHLORIDE BLDV-SCNC: 106 MMOL/L — SIGNIFICANT CHANGE UP (ref 96–108)
CHLORIDE BLDV-SCNC: 110 MMOL/L — HIGH (ref 96–108)
CHLORIDE SERPL-SCNC: 107 MMOL/L — SIGNIFICANT CHANGE UP (ref 96–108)
CHLORIDE SERPL-SCNC: 107 MMOL/L — SIGNIFICANT CHANGE UP (ref 96–108)
CO2 BLDV-SCNC: 28 MMOL/L — HIGH (ref 22–26)
CO2 BLDV-SCNC: 29 MMOL/L — HIGH (ref 22–26)
CO2 SERPL-SCNC: 24 MMOL/L — SIGNIFICANT CHANGE UP (ref 22–31)
CO2 SERPL-SCNC: 26 MMOL/L — SIGNIFICANT CHANGE UP (ref 22–31)
CREAT SERPL-MCNC: 0.77 MG/DL — SIGNIFICANT CHANGE UP (ref 0.5–1.3)
CREAT SERPL-MCNC: 0.78 MG/DL — SIGNIFICANT CHANGE UP (ref 0.5–1.3)
CREAT SERPL-MCNC: 0.8 MG/DL — SIGNIFICANT CHANGE UP (ref 0.5–1.3)
EGFR: 122 ML/MIN/1.73M2 — SIGNIFICANT CHANGE UP
EGFR: 123 ML/MIN/1.73M2 — SIGNIFICANT CHANGE UP
EGFR: 124 ML/MIN/1.73M2 — SIGNIFICANT CHANGE UP
GAS PNL BLDV: 137 MMOL/L — SIGNIFICANT CHANGE UP (ref 136–145)
GAS PNL BLDV: 137 MMOL/L — SIGNIFICANT CHANGE UP (ref 136–145)
GAS PNL BLDV: SIGNIFICANT CHANGE UP
GLUCOSE BLDC GLUCOMTR-MCNC: 120 MG/DL — HIGH (ref 70–99)
GLUCOSE BLDC GLUCOMTR-MCNC: 122 MG/DL — HIGH (ref 70–99)
GLUCOSE BLDC GLUCOMTR-MCNC: 131 MG/DL — HIGH (ref 70–99)
GLUCOSE BLDC GLUCOMTR-MCNC: 151 MG/DL — HIGH (ref 70–99)
GLUCOSE BLDV-MCNC: 128 MG/DL — HIGH (ref 70–99)
GLUCOSE BLDV-MCNC: 133 MG/DL — HIGH (ref 70–99)
GLUCOSE SERPL-MCNC: 110 MG/DL — HIGH (ref 70–99)
GLUCOSE SERPL-MCNC: 128 MG/DL — HIGH (ref 70–99)
HCO3 BLDV-SCNC: 27 MMOL/L — SIGNIFICANT CHANGE UP (ref 22–29)
HCO3 BLDV-SCNC: 28 MMOL/L — SIGNIFICANT CHANGE UP (ref 22–29)
HCT VFR BLD CALC: 23.7 % — LOW (ref 39–50)
HCT VFR BLD CALC: 26.8 % — LOW (ref 39–50)
HCT VFR BLDA CALC: 24 % — LOW (ref 39–51)
HCT VFR BLDA CALC: 27 % — LOW (ref 39–51)
HGB BLD CALC-MCNC: 8.1 G/DL — LOW (ref 12.6–17.4)
HGB BLD CALC-MCNC: 8.9 G/DL — LOW (ref 12.6–17.4)
HGB BLD-MCNC: 7.9 G/DL — LOW (ref 13–17)
HGB BLD-MCNC: 8.7 G/DL — LOW (ref 13–17)
HOROWITZ INDEX BLDV+IHG-RTO: 21 — SIGNIFICANT CHANGE UP
HOROWITZ INDEX BLDV+IHG-RTO: 28 — SIGNIFICANT CHANGE UP
INR BLD: 1.54 RATIO — HIGH (ref 0.88–1.16)
INR BLD: 1.61 RATIO — HIGH (ref 0.88–1.16)
LACTATE BLDV-MCNC: 1 MMOL/L — SIGNIFICANT CHANGE UP (ref 0.5–2)
LACTATE BLDV-MCNC: 2 MMOL/L — SIGNIFICANT CHANGE UP (ref 0.5–2)
MAGNESIUM SERPL-MCNC: 2.2 MG/DL — SIGNIFICANT CHANGE UP (ref 1.6–2.6)
MAGNESIUM SERPL-MCNC: 2.4 MG/DL — SIGNIFICANT CHANGE UP (ref 1.6–2.6)
MCHC RBC-ENTMCNC: 31.6 PG — SIGNIFICANT CHANGE UP (ref 27–34)
MCHC RBC-ENTMCNC: 32.2 PG — SIGNIFICANT CHANGE UP (ref 27–34)
MCHC RBC-ENTMCNC: 32.5 GM/DL — SIGNIFICANT CHANGE UP (ref 32–36)
MCHC RBC-ENTMCNC: 33.3 GM/DL — SIGNIFICANT CHANGE UP (ref 32–36)
MCV RBC AUTO: 96.7 FL — SIGNIFICANT CHANGE UP (ref 80–100)
MCV RBC AUTO: 97.5 FL — SIGNIFICANT CHANGE UP (ref 80–100)
MELD SCORE WITH DIALYSIS: 27 POINTS — SIGNIFICANT CHANGE UP
MELD SCORE WITHOUT DIALYSIS: 14 POINTS — SIGNIFICANT CHANGE UP
NRBC # BLD: 0 /100 WBCS — SIGNIFICANT CHANGE UP (ref 0–0)
NRBC # BLD: 0 /100 WBCS — SIGNIFICANT CHANGE UP (ref 0–0)
PCO2 BLDV: 44 MMHG — SIGNIFICANT CHANGE UP (ref 42–55)
PCO2 BLDV: 49 MMHG — SIGNIFICANT CHANGE UP (ref 42–55)
PH BLDV: 7.36 — SIGNIFICANT CHANGE UP (ref 7.32–7.43)
PH BLDV: 7.39 — SIGNIFICANT CHANGE UP (ref 7.32–7.43)
PHOSPHATE SERPL-MCNC: 3.2 MG/DL — SIGNIFICANT CHANGE UP (ref 2.5–4.5)
PHOSPHATE SERPL-MCNC: 3.6 MG/DL — SIGNIFICANT CHANGE UP (ref 2.5–4.5)
PLATELET # BLD AUTO: 118 K/UL — LOW (ref 150–400)
PLATELET # BLD AUTO: 139 K/UL — LOW (ref 150–400)
PO2 BLDV: 32 MMHG — SIGNIFICANT CHANGE UP (ref 25–45)
PO2 BLDV: 46 MMHG — HIGH (ref 25–45)
POTASSIUM BLDV-SCNC: 4.4 MMOL/L — SIGNIFICANT CHANGE UP (ref 3.5–5.1)
POTASSIUM BLDV-SCNC: 4.4 MMOL/L — SIGNIFICANT CHANGE UP (ref 3.5–5.1)
POTASSIUM SERPL-MCNC: 4.3 MMOL/L — SIGNIFICANT CHANGE UP (ref 3.5–5.3)
POTASSIUM SERPL-MCNC: 4.5 MMOL/L — SIGNIFICANT CHANGE UP (ref 3.5–5.3)
POTASSIUM SERPL-SCNC: 4.3 MMOL/L — SIGNIFICANT CHANGE UP (ref 3.5–5.3)
POTASSIUM SERPL-SCNC: 4.5 MMOL/L — SIGNIFICANT CHANGE UP (ref 3.5–5.3)
PROT SERPL-MCNC: 5.8 G/DL — LOW (ref 6–8.3)
PROT SERPL-MCNC: 6 G/DL — SIGNIFICANT CHANGE UP (ref 6–8.3)
PROTHROM AB SERPL-ACNC: 17.8 SEC — HIGH (ref 10.5–13.4)
PROTHROM AB SERPL-ACNC: 18.8 SEC — HIGH (ref 10.5–13.4)
RBC # BLD: 2.45 M/UL — LOW (ref 4.2–5.8)
RBC # BLD: 2.75 M/UL — LOW (ref 4.2–5.8)
RBC # FLD: 17.3 % — HIGH (ref 10.3–14.5)
RBC # FLD: 17.9 % — HIGH (ref 10.3–14.5)
SAO2 % BLDV: 52.2 % — LOW (ref 67–88)
SAO2 % BLDV: 78.5 % — SIGNIFICANT CHANGE UP (ref 67–88)
SODIUM SERPL-SCNC: 140 MMOL/L — SIGNIFICANT CHANGE UP (ref 135–145)
SODIUM SERPL-SCNC: 140 MMOL/L — SIGNIFICANT CHANGE UP (ref 135–145)
SODIUM SERPL-SCNC: 141 MMOL/L — SIGNIFICANT CHANGE UP (ref 135–145)
TACROLIMUS SERPL-MCNC: 1.9 NG/ML — SIGNIFICANT CHANGE UP
WBC # BLD: 11.81 K/UL — HIGH (ref 3.8–10.5)
WBC # BLD: 9.49 K/UL — SIGNIFICANT CHANGE UP (ref 3.8–10.5)
WBC # FLD AUTO: 11.81 K/UL — HIGH (ref 3.8–10.5)
WBC # FLD AUTO: 9.49 K/UL — SIGNIFICANT CHANGE UP (ref 3.8–10.5)

## 2022-11-03 PROCEDURE — 71045 X-RAY EXAM CHEST 1 VIEW: CPT | Mod: 26

## 2022-11-03 PROCEDURE — 99232 SBSQ HOSP IP/OBS MODERATE 35: CPT

## 2022-11-03 RX ORDER — VALGANCICLOVIR 450 MG/1
900 TABLET, FILM COATED ORAL
Refills: 0 | Status: DISCONTINUED | OUTPATIENT
Start: 2022-11-03 | End: 2022-11-07

## 2022-11-03 RX ORDER — LIDOCAINE HCL 20 MG/ML
5 VIAL (ML) INJECTION ONCE
Refills: 0 | Status: COMPLETED | OUTPATIENT
Start: 2022-11-03 | End: 2022-11-03

## 2022-11-03 RX ORDER — TACROLIMUS 5 MG/1
1 CAPSULE ORAL ONCE
Refills: 0 | Status: COMPLETED | OUTPATIENT
Start: 2022-11-03 | End: 2022-11-03

## 2022-11-03 RX ORDER — MYCOPHENOLATE MOFETIL 250 MG/1
1000 CAPSULE ORAL
Refills: 0 | Status: DISCONTINUED | OUTPATIENT
Start: 2022-11-03 | End: 2022-11-07

## 2022-11-03 RX ORDER — ONDANSETRON 8 MG/1
4 TABLET, FILM COATED ORAL ONCE
Refills: 0 | Status: COMPLETED | OUTPATIENT
Start: 2022-11-03 | End: 2022-11-03

## 2022-11-03 RX ORDER — OXYCODONE HYDROCHLORIDE 5 MG/1
5 TABLET ORAL EVERY 4 HOURS
Refills: 0 | Status: DISCONTINUED | OUTPATIENT
Start: 2022-11-03 | End: 2022-11-07

## 2022-11-03 RX ORDER — TACROLIMUS 5 MG/1
2 CAPSULE ORAL
Refills: 0 | Status: DISCONTINUED | OUTPATIENT
Start: 2022-11-03 | End: 2022-11-04

## 2022-11-03 RX ORDER — GLYCERIN ADULT
1 SUPPOSITORY, RECTAL RECTAL ONCE
Refills: 0 | Status: COMPLETED | OUTPATIENT
Start: 2022-11-03 | End: 2022-11-03

## 2022-11-03 RX ADMIN — MYCOPHENOLATE MOFETIL 1000 MILLIGRAM(S): 250 CAPSULE ORAL at 20:40

## 2022-11-03 RX ADMIN — Medication 125 MILLIGRAM(S): at 10:39

## 2022-11-03 RX ADMIN — HYDROMORPHONE HYDROCHLORIDE 0.5 MILLIGRAM(S): 2 INJECTION INTRAMUSCULAR; INTRAVENOUS; SUBCUTANEOUS at 00:02

## 2022-11-03 RX ADMIN — HYDROMORPHONE HYDROCHLORIDE 0.5 MILLIGRAM(S): 2 INJECTION INTRAMUSCULAR; INTRAVENOUS; SUBCUTANEOUS at 03:10

## 2022-11-03 RX ADMIN — HYDROMORPHONE HYDROCHLORIDE 0.5 MILLIGRAM(S): 2 INJECTION INTRAMUSCULAR; INTRAVENOUS; SUBCUTANEOUS at 06:51

## 2022-11-03 RX ADMIN — PIPERACILLIN AND TAZOBACTAM 25 GRAM(S): 4; .5 INJECTION, POWDER, LYOPHILIZED, FOR SOLUTION INTRAVENOUS at 04:40

## 2022-11-03 RX ADMIN — Medication 1 TABLET(S): at 21:03

## 2022-11-03 RX ADMIN — TACROLIMUS 1 MILLIGRAM(S): 5 CAPSULE ORAL at 14:21

## 2022-11-03 RX ADMIN — Medication 1 TABLET(S): at 05:51

## 2022-11-03 RX ADMIN — Medication 2: at 04:53

## 2022-11-03 RX ADMIN — PIPERACILLIN AND TAZOBACTAM 25 GRAM(S): 4; .5 INJECTION, POWDER, LYOPHILIZED, FOR SOLUTION INTRAVENOUS at 11:23

## 2022-11-03 RX ADMIN — TACROLIMUS 1 MILLIGRAM(S): 5 CAPSULE ORAL at 08:01

## 2022-11-03 RX ADMIN — Medication 1 TABLET(S): at 11:24

## 2022-11-03 RX ADMIN — CHLORHEXIDINE GLUCONATE 1 APPLICATION(S): 213 SOLUTION TOPICAL at 05:51

## 2022-11-03 RX ADMIN — Medication 1 SUPPOSITORY(S): at 15:44

## 2022-11-03 RX ADMIN — SODIUM CHLORIDE 75 MILLILITER(S): 9 INJECTION, SOLUTION INTRAVENOUS at 20:39

## 2022-11-03 RX ADMIN — TACROLIMUS 2 MILLIGRAM(S): 5 CAPSULE ORAL at 20:40

## 2022-11-03 RX ADMIN — VALGANCICLOVIR 900 MILLIGRAM(S): 450 TABLET, FILM COATED ORAL at 11:24

## 2022-11-03 RX ADMIN — Medication 5 MILLILITER(S): at 12:37

## 2022-11-03 RX ADMIN — SODIUM CHLORIDE 75 MILLILITER(S): 9 INJECTION, SOLUTION INTRAVENOUS at 08:00

## 2022-11-03 RX ADMIN — OXYCODONE HYDROCHLORIDE 5 MILLIGRAM(S): 5 TABLET ORAL at 16:28

## 2022-11-03 RX ADMIN — HYDROMORPHONE HYDROCHLORIDE 0.5 MILLIGRAM(S): 2 INJECTION INTRAMUSCULAR; INTRAVENOUS; SUBCUTANEOUS at 06:34

## 2022-11-03 RX ADMIN — SODIUM CHLORIDE 75 MILLILITER(S): 9 INJECTION, SOLUTION INTRAVENOUS at 15:44

## 2022-11-03 RX ADMIN — OXYCODONE HYDROCHLORIDE 5 MILLIGRAM(S): 5 TABLET ORAL at 15:58

## 2022-11-03 RX ADMIN — PIPERACILLIN AND TAZOBACTAM 25 GRAM(S): 4; .5 INJECTION, POWDER, LYOPHILIZED, FOR SOLUTION INTRAVENOUS at 20:40

## 2022-11-03 RX ADMIN — SENNA PLUS 10 MILLILITER(S): 8.6 TABLET ORAL at 21:05

## 2022-11-03 RX ADMIN — MYCOPHENOLATE MOFETIL 1000 MILLIGRAM(S): 250 CAPSULE ORAL at 08:01

## 2022-11-03 RX ADMIN — ONDANSETRON 4 MILLIGRAM(S): 8 TABLET, FILM COATED ORAL at 11:40

## 2022-11-03 RX ADMIN — HYDROMORPHONE HYDROCHLORIDE 0.5 MILLIGRAM(S): 2 INJECTION INTRAMUSCULAR; INTRAVENOUS; SUBCUTANEOUS at 03:30

## 2022-11-03 NOTE — PROGRESS NOTE ADULT - ASSESSMENT
32M PMH Cerebral palsy, Primary Sclerosing Cholangitis Cirrhosis with frequent ERCPs with biliary stent placements for biliary strictures (last in 9/2022) on liver transplant list (ABO AB), recent COVID s/p MAB in 9/2022), on POD3 s/p liver transplant.    Neurologic: Off precedex 11/1  - Pain control w/ Dilaudid PRN    Respiratory: extubated 11/1  - Incentive spirometry  - CXR for pulm vasc congestion    Cardiovascular:  - Levo dc'd  - Sinus tachycardia, improved with pain control/ 1 unit PRBC/ ice packs    Gastrointestinal/Nutrition:  - Monitor VIOLETA output x 3  - liver US with doppler showing patent anastamoses and good flow  - NGT removed  - NPO w/ sips and chips  - Bactrim/ Diflucan/ Valcyte per transplant  - Cellcept/ Tacro per transplant  - Protonix  - Bowel regimen    Genitourinary/Renal:  - Monitor UO  - Chapman removed 11/2  - Supplement electrolytes PRN    Hematologic:  -q12 CBC  - S/p 1u pRBCs 11/3 for Hg 8.6 -> 7.1, responsive to 7.9  -Transfuse PRN    Infectious Disease:  - Febrile to 102.4, blood and urine cx sent  - Transplant ID following  - Unasyn broaded to Zosyn  - diflucan/bactrim/valgancyclovir for prophylaxis    Endocrine:  -ISS    Lines:  - RUE Arrow, PIV    Dispo: SICU 32M PMH Cerebral palsy, Primary Sclerosing Cholangitis Cirrhosis with frequent ERCPs with biliary stent placements for biliary strictures (last in 9/2022) on liver transplant list (ABO AB), recent COVID s/p MAB in 9/2022), on POD3 s/p liver transplant.    Neurologic: Off precedex 11/1  - Pain control w/ Dilaudid PRN  - PRN oxy added for extended pain control    Respiratory: extubated 11/1  - Incentive spirometry  - CXR showing R basilar atelectasis    Cardiovascular:  - Sinus tachycardia, resolved with pain control/ 1 unit PRBC/ ice packs    Gastrointestinal/Nutrition:  - Monitor VIOLETA output x 3  - liver US with doppler showing patent anastamoses and good flow  - NGT removed 11/2  - NPO w/ sips and chips  - Bactrim/ Diflucan/ Valcyte per transplant  - Cellcept/ Tacro per transplant  - Protonix  - Bowel regimen    Genitourinary/Renal:  - Monitor UO  - Chapman removed 11/2  - Supplement electrolytes PRN    Hematologic:  -q12 CBC  - S/p 1u pRBCs 11/3 for Hg 8.6 -> 7.1, responsive to 7.9  -Transfuse PRN    Infectious Disease:  - Febrile to 102.4, blood and urine cx sent, pending results  - Transplant ID following  - Unasyn broaded to Zosyn  - diflucan/bactrim/valgancyclovir for prophylaxis    Endocrine:  -ISS    Lines:  - DENNIS Arrow, PIV    Dispo: SICU 32M PMH Cerebral palsy, Primary Sclerosing Cholangitis Cirrhosis with frequent ERCPs with biliary stent placements for biliary strictures (last in 9/2022) on liver transplant list (ABO AB), recent COVID s/p MAB in 9/2022), on POD3 s/p liver transplant.    Neurologic: Off precedex 11/1  - Pain control w/ Dilaudid PRN  - PRN oxy added for extended pain control    Respiratory: extubated 11/1  - Incentive spirometry  - CXR showing R basilar atelectasis    Cardiovascular:  - Sinus tachycardia, resolved with pain control/ 1 unit PRBC/ ice packs    Gastrointestinal/Nutrition:  - Monitor VIOLETA output x 2  - L VIOLETA drain to be dc'd today 11/3  - liver US with doppler showing patent anastamoses and good flow  - NGT removed 11/2  - NPO w/ sips and chips  - Bactrim/ Diflucan/ Valcyte per transplant  - Cellcept/ Tacro per transplant  - Protonix  - Bowel regimen    Genitourinary/Renal:  - Monitor UO  - Chapman removed 11/2  - Supplement electrolytes PRN    Hematologic:  -q12 CBC  - S/p 1u pRBCs 11/3 for Hg 8.6 -> 7.1, responsive to 7.9  -Transfuse PRN    Infectious Disease:  - Febrile to 102.4, blood and urine cx sent, pending results  - Transplant ID following  - Unasyn broaded to Zosyn  - diflucan/bactrim/valgancyclovir for prophylaxis    Endocrine:  -ISS    Lines:  - RUE Arrow, PIV    Dispo: SICU 32M PMH Cerebral palsy, Primary Sclerosing Cholangitis Cirrhosis with frequent ERCPs with biliary stent placements for biliary strictures (last in 9/2022) on liver transplant list (ABO AB), recent COVID s/p MAB in 9/2022), on POD3 s/p liver transplant.    Neurologic: Off precedex 11/1  - Pain control w/ Dilaudid PRN  - PRN oxy added for extended pain control    Respiratory: extubated 11/1  - Incentive spirometry  - CXR showing R basilar atelectasis    Cardiovascular:  - Sinus tachycardia, resolved with pain control/ 1 unit PRBC/ ice packs    Gastrointestinal/Nutrition:  - Monitor VIOLETA output x 2  - L VIOLETA drain to be dc'd today 11/3  - liver US with doppler showing patent anastamoses and good flow  - NGT removed 11/2  - NPO w/ sips and chips  - Bactrim/ Diflucan/ Valcyte per transplant  - Cellcept/ Tacro per transplant  - Protonix  - Bowel regimen    Genitourinary/Renal:  - Monitor UO  - Chapman removed 11/2  - Supplement electrolytes PRN    Hematologic:  -q12 CBC  - S/p 1u pRBCs 11/3 for Hg 8.6 -> 7.1, responsive to 7.9  -Transfuse PRN    Infectious Disease:  - Febrile to 102.4 on 11/2, currently afebrile, blood and urine cx pending results  - Transplant ID following  - Unasyn broaded to Zosyn  - diflucan/bactrim/valgancyclovir for prophylaxis    Endocrine:  -ISS    Lines:  - RUE Arrow, PIV    Dispo: SICU

## 2022-11-03 NOTE — PROGRESS NOTE ADULT - SUBJECTIVE AND OBJECTIVE BOX
Follow Up:      Interval History/ROS: Patient is a 32y old  Male who presents with a chief complaint of OLT. ID consulted for fevers.    Patient seen at bedside, feeling better today, abdominal pain improving.    REVIEW OF SYSTEMS  [  ] ROS unobtainable because:    [ x ] All other systems negative except as noted below    Constitutional:  [ ] fever [ ] chills  [ ] weight loss  [ ]night sweat  [ ]poor appetite/PO intake [ ]fatigue   Skin:  [ ] rash [ ] phlebitis	  Eyes: [ ] icterus [ ] pain  [ ] discharge	  ENMT: [ ] sore throat  [ ] thrush [ ] ulcers [ ] exudates [ ]anosmia  Respiratory: [ ] dyspnea [ ] hemoptysis [ ] cough [ ] sputum	  Cardiovascular:  [ ] chest pain [ ] palpitations [ ] edema	  Gastrointestinal:  [ ] nausea [ ] vomiting [ ] diarrhea [ ] constipation [X] pain	  Genitourinary:  [ ] dysuria [ ] frequency [ ] hematuria [ ] discharge [ ] flank pain  [ ] incontinence  Musculoskeletal:  [ ] myalgias [ ] arthralgias [ ] arthritis  [ ] back pain  Neurological:  [ ] headache [X] weakness [ ] seizures  [ ] confusion/altered mental status    Allergies  No Known Allergies        ANTIMICROBIALS:    fluconAZOLE IVPB 400 daily  piperacillin/tazobactam IVPB.. 3.375 every 8 hours  trimethoprim   80 mG/sulfamethoxazole 400 mG 1 daily  valGANciclovir 900 <User Schedule>    OTHER MEDS: MEDICATIONS  (STANDING):  aspirin enteric coated 81 daily  HYDROmorphone  Injectable 0.5 every 3 hours PRN  HYDROmorphone  Injectable 0.25 every 3 hours PRN  influenza   Vaccine 0.5 once  insulin lispro (ADMELOG) corrective regimen sliding scale  every 6 hours  mycophenolate mofetil 1000 <User Schedule>  oxyCODONE    IR 5 every 4 hours PRN  pantoprazole  Injectable 40 daily  polyethylene glycol 3350 17 daily  senna Syrup 10 at bedtime  tacrolimus 2 <User Schedule>    Vital Signs Last 24 Hrs  T(F): 98.6 (22 @ 15:00), Max: 102.4 (22 @ 11:40)    Vital Signs Last 24 Hrs  HR: 63 (22 @ 18:00) (63 - 152)  BP: 130/63 (22 @ 18:00) (107/66 - 134/72)  RR: 21 (22 @ 18:00)  SpO2: 95% (22 @ 18:00) (86% - 100%)  Wt(kg): --    EXAM:    GA: NAD  HEENT: oral cavity no lesion  CV: nl S1/S2, no RMG  Lungs: CTAB, no distress  Abd: BS+, mildly distended, surgical incision sites with no discharge or surrounding erythema, drains in place   Ext: no edema  Neuro: No focal deficits  Skin: Intact  IV: no phlebitis    Labs:                        8.7    11.81 )-----------( 139      ( 2022 16:13 )             26.8         141  |  107  |  23  ----------------------------<  128<H>  4.5   |  24  |  0.78    Ca    8.3<L>      2022 16:13  Phos  3.6       Mg     2.2         TPro  6.0  /  Alb  2.9<L>  /  TBili  2.7<H>  /  DBili  2.0<H>  /  AST  87<H>  /  ALT  183<H>  /  AlkPhos  236<H>      WBC Trend:  WBC Count: 11.81 (22 @ 16:13)  WBC Count: 9.49 (22 @ 06:24)  WBC Count: 9.51 (22 @ 21:51)  WBC Count: 9.53 (22 @ 20:49)    Creatine Trend:  Creatinine, Serum: 0.78 ()  Creatinine, Serum: 0.77 ()  Creatinine, Serum: 0.80 ()  Creatinine, Serum: 0.81 ()    Liver Biochemical Testing Trend:  Alanine Aminotransferase (ALT/SGPT): 183 *H* ()  Alanine Aminotransferase (ALT/SGPT): 200 *H* ()  Alanine Aminotransferase (ALT/SGPT): 237 *H* ()  Alanine Aminotransferase (ALT/SGPT): 321 *H* ()  Alanine Aminotransferase (ALT/SGPT): 316 *H* ()  Aspartate Aminotransferase (AST/SGOT): 87 (22 @ 16:13)  Aspartate Aminotransferase (AST/SGOT): 108 (22 @ 08:36)  Aspartate Aminotransferase (AST/SGOT): 155 (22 @ 20:49)  Aspartate Aminotransferase (AST/SGOT): 256 (22 @ 10:08)  Aspartate Aminotransferase (AST/SGOT): 287 (22 @ 03:56)  Bilirubin Direct, Serum: 2.0 ()  Bilirubin Total, Serum: 2.7 ()  Bilirubin Direct, Serum: 1.6 ()  Bilirubin Total, Serum: 2.3 ()  Bilirubin Total, Serum: 2.2 ()    Trend LDH  22 @ 11:08  172  22 @ 07:06  116  22 @ 15:48  111    Urinalysis Basic - ( 2022 12:24 )    Color: Dark Yellow / Appearance: Clear / S.040 / pH: x  Gluc: x / Ketone: Negative  / Bili: Small / Urobili: 2 mg/dL   Blood: x / Protein: 30 mg/dL / Nitrite: Negative   Leuk Esterase: Negative / RBC: 30 /hpf / WBC 9 /HPF   Sq Epi: x / Non Sq Epi: 1 /hpf / Bacteria: Negative    MICROBIOLOGY:    Culture - Urine (collected 26 Sep 2022 22:46)  Source: Clean Catch Clean Catch (Midstream)  Final Report:    No growth    Culture - Blood (collected 26 Sep 2022 14:18)  Source: .Blood Blood  Final Report:    No Growth Final    Culture - Blood (collected 26 Sep 2022 14:00)  Source: .Blood Blood  Final Report:    No Growth Final    Culture - Blood (collected 24 Sep 2022 15:53)  Source: .Blood Blood-Peripheral  Final Report:    No Growth Final    Culture - Blood (collected 24 Sep 2022 15:42)  Source: .Blood Blood-Peripheral  Final Report:    No Growth Final    Culture - Urine (collected 2022 12:19)  Source: Clean Catch None  Final Report:    <10,000 CFU/mL Normal Urogenital Gisel    Culture - Urine (collected 2021 05:27)  Source: .Urine Clean Catch (Midstream)  Final Report:    No growth    Culture - Blood (collected 2021 22:52)  Source: .Blood Blood-Peripheral  Final Report:    No Growth Final    Culture - Blood (collected 2021 22:52)  Source: .Blood Blood-Peripheral  Final Report:    No Growth Final    HIV-1/2 Combo Result: Nonreact (10-31-22 @ 17:21)    CMV IgG Interpretation: Negative (10-31-22 @ 17:21)    Rapid RVP Result: NotDetec ( @ 15:23)    COVID-19 PCR: NotDetec (10-31-22 @ 17:21)  COVID-19 PCR: Detected (22 @ 15:45)    Blood Gas Venous - Lactate: 2.0 ( @ 16:11)  Blood Gas Venous - Lactate: 1.0 ( @ 05:50)  Blood Gas Venous - Lactate: 1.5 ( @ 21:30)  Blood Gas Venous - Lactate: 1.5 ( @ 20:39)    RADIOLOGY:  imaging below personally reviewed    Xray Chest 1 View- PORTABLE-Urgent:  (2022 03:48)    < from: Xray Chest 1 View- PORTABLE-Routine (Xray Chest 1 View- PORTABLE-Routine in AM.) (22 @ 08:04) >  Impression:  Bibasilar patchy airspace disease, likely atelectasis.  Interval removal of enteric tube and right IJ approach central venous   catheter.        --- End of Report ---    < end of copied text >

## 2022-11-03 NOTE — PROGRESS NOTE ADULT - ASSESSMENT
32M PMH Cerebral palsy, Primary Sclerosing Cholangitis Cirrhosis with frequent ERCPs with biliary stent placements for biliary strictures (last in 9/2022) on liver transplant list (ABO AB), recent COVID s/p MAB in 9/2022) admitted for liver transplant    OLT  Pain control PRN  Tachycardia improved, still has some periods of tachycardia to the lowo 100s but it appeared to response to PRBC; pain is also related to the tachycardia  Monitor for fevers  Sips of clears  LFTs downtrending  US with patent vessels  Strict I/Os. Monitor drain outputs/NGT  Dressing changed at bedside, keep incision open to air; VIOLETA sites covered with gauze and tape; will DC VIOLETA on the left  SCDs  Continue Miralax and Senna; give Glycerin suppositories  IS q1hour WA  OOBC/PT   Start ASA 81 mg daily  DC kathy TOAMBROCIO  Start Unasyn  Immunosuppression: Tacro 1mg/1mg, MMF 1000mg BID, Steroid taper  PPX: Fluconazole Bactrim, Valcyte  Q6 hour CBC, CMP, Mag, Pos, Tacro level    Plan discussed with Dr. Godinez.

## 2022-11-03 NOTE — PROGRESS NOTE ADULT - NS ATTEND AMEND GEN_ALL_CORE FT
making good progress  central line, chavez and NGT removed  lfts trending down  immunosuppression tacro, mmf and steroids

## 2022-11-03 NOTE — PROGRESS NOTE ADULT - SUBJECTIVE AND OBJECTIVE BOX
HISTORY  32y Male    24 HOUR EVENTS:  Sinus tachycardia in 130s, multifactorial - fever, anemia, pain.  1 unit PRBC ordered for Hgb 8.6 --> 7.1.  All lines taken out for Tmax 102.4      SUBJECTIVE/ROS:  [ X ] A ten-point review of systems was otherwise negative except as noted.  [ ] Due to altered mental status/intubation, subjective information were not able to be obtained from the patient. History was obtained, to the extent possible, from review of the chart and collateral sources of information.      NEURO  Exam: AOx3, follows all commands, moves all extremities.   Meds: HYDROmorphone  Injectable 0.5 milliGRAM(s) IV Push every 3 hours PRN Severe Pain (7 - 10)  HYDROmorphone  Injectable 0.25 milliGRAM(s) IV Push every 3 hours PRN Moderate Pain (4 - 6)    [x] Adequacy of sedation and pain control has been assessed and adjusted      RESPIRATORY  RR: 25 (11-03-22 @ 02:00) (17 - 35)  SpO2: 100% (11-03-22 @ 02:00) (86% - 100%)  Wt(kg): --  Exam: unlabored, clear to auscultation bilaterally  ABG - ( 01 Nov 2022 21:47 )  pH: 7.39  /  pCO2: 38    /  pO2: 89    / HCO3: 23    / Base Excess: -1.8  /  SaO2: 99.4    Lactate: x        Meds:       CARDIOVASCULAR  HR: 99 (11-03-22 @ 02:00) (79 - 152)  BP: 121/66 (11-03-22 @ 02:00) (108/75 - 134/72)  BP(mean): 86 (11-03-22 @ 02:00) (74 - 99)  ABP: --  ABP(mean): --  Wt(kg): --  CVP(cm H2O): --  VBG - ( 02 Nov 2022 21:30 )  pH: 7.44  /  pCO2: 36    /  pO2: 65    / HCO3: 24    / Base Excess: 0.4   /  SaO2: 96.2   Lactate: 1.5                Exam:  Cardiac Rhythm:  Perfusion     [ X ]Adequate   [ ]Inadequate  Mentation   [ X ]Normal       [ ]Reduced  Extremities  [ X ]Warm         [ ]Cool  Volume Status [ ]Hypervolemic [ X ]Euvolemic [ ]Hypovolemic  Meds:       GI/NUTRITION  Exam: Soft, non-distended, non-tender. VIOLETA x 3  Diet: NPO except meds  Meds: pantoprazole  Injectable 40 milliGRAM(s) IV Push daily  polyethylene glycol 3350 17 Gram(s) Oral daily  senna Syrup 10 milliLiter(s) Oral at bedtime      GENITOURINARY  I&O's Detail    11-01 @ 07:01  -  11-02 @ 07:00  --------------------------------------------------------  IN:    Dexmedetomidine: 24.7 mL    Enteral Tube Flush: 180 mL    Insulin: 14 mL    IV PiggyBack: 600 mL    IV PiggyBack: 100 mL    multiple electrolytes Injection Type 1.: 525 mL    Norepinephrine: 13.6 mL    sodium chloride 0.9%: 975 mL  Total IN: 2432.3 mL    OUT:    Bulb (mL): 40 mL    Bulb (mL): 212 mL    Bulb (mL): 82 mL    Indwelling Catheter - Urethral (mL): 830 mL    Nasogastric/Oral tube (mL): 210 mL  Total OUT: 1374 mL    Total NET: 1058.3 mL      11-02 @ 07:01 - 11-03 @ 03:10  --------------------------------------------------------  IN:    Albumin 25%  -  50 mL: 100 mL    IV PiggyBack: 100 mL    IV PiggyBack: 450 mL    multiple electrolytes Injection Type 1.: 1425 mL    Sodium Chloride 0.9% Bolus: 500 mL  Total IN: 2575 mL    OUT:    Bulb (mL): 40 mL    Bulb (mL): 30 mL    Bulb (mL): 0 mL    Indwelling Catheter - Urethral (mL): 180 mL    Nasogastric/Oral tube (mL): 75 mL    Voided (mL): 415 mL  Total OUT: 740 mL    Total NET: 1835 mL          11-02    141  |  109<H>  |  24<H>  ----------------------------<  151<H>  4.1   |  24  |  0.81    Ca    8.0<L>      02 Nov 2022 20:49  Phos  2.8     11-02  Mg     2.4     11-02    TPro  5.9<L>  /  Alb  3.0<L>  /  TBili  2.5<H>  /  DBili  1.9<H>  /  AST  155<H>  /  ALT  237<H>  /  AlkPhos  195<H>  11-02    [ ] Chapman catheter, indication: N/A  Meds: calcium carbonate 1250 mG  + Vitamin D (OsCal 500 + D) 1 Tablet(s) Oral two times a day  multiple electrolytes Injection Type 1 1000 milliLiter(s) IV Continuous <Continuous>        HEMATOLOGIC  Meds: aspirin enteric coated 81 milliGRAM(s) Oral daily    [x] VTE Prophylaxis: ICDs                        7.1    9.51  )-----------( 124      ( 02 Nov 2022 21:51 )             21.8     PT/INR - ( 02 Nov 2022 20:49 )   PT: 18.0 sec;   INR: 1.56 ratio         PTT - ( 02 Nov 2022 20:49 )  PTT:26.2 sec  Transfusion     [ ] PRBC   [ ] Platelets   [ ] FFP   [ ] Cryoprecipitate      INFECTIOUS DISEASES  T(C): 37.2 (11-03-22 @ 00:00), Max: 39.1 (11-02-22 @ 11:40)  Wt(kg): --  WBC Count: 9.51 K/uL (11-02 @ 21:51)  WBC Count: 9.53 K/uL (11-02 @ 20:49)  WBC Count: 18.49 K/uL (11-02 @ 10:08)  WBC Count: 19.83 K/uL (11-02 @ 03:56)    Recent Cultures:    Meds: fluconAZOLE IVPB 400 milliGRAM(s) IV Intermittent daily  influenza   Vaccine 0.5 milliLiter(s) IntraMuscular once  mycophenolate mofetil Suspension 1000 milliGRAM(s) Enteral Tube <User Schedule>  piperacillin/tazobactam IVPB.. 3.375 Gram(s) IV Intermittent every 8 hours  tacrolimus    0.5 mG/mL Suspension 1 milliGRAM(s) Oral <User Schedule>  trimethoprim  40 mG/sulfamethoxazole 200 mG Suspension 10 milliLiter(s) Enteral Tube daily  valGANciclovir 50 mG/mL Oral Solution 900 milliGRAM(s) Oral <User Schedule>        ENDOCRINE  Capillary Blood Glucose    Meds: insulin lispro (ADMELOG) corrective regimen sliding scale   SubCutaneous every 6 hours  methylPREDNISolone sodium succinate Injectable 125 milliGRAM(s) IV Push once        ACCESS DEVICES:  [ X ] Peripheral IV  [ ] Central Venous Line	[ ] R	[ ] L	[ ] IJ	[ ] Fem	[ ] SC	Placed:   [ ] Arterial Line		[ ] R	[ ] L	[ ] Fem	[ ] Rad	[ ] Ax	Placed:   [ ] PICC:					[ ] Mediport  [ ] Urinary Catheter, Date Placed:   [ ] Necessity of urinary, arterial, and venous catheters discussed    OTHER MEDICATIONS:  chlorhexidine 2% Cloths 1 Application(s) Topical <User Schedule>      CODE STATUS: Full HISTORY  32y Male    24 HOUR EVENTS:  Sinus tachycardia in 130s, multifactorial - fever, anemia, pain; now resolved  1 unit PRBC ordered for Hgb 8.6 --> 7.1, now 7.9  All lines taken out for Tmax 102.4 except RUE PIV and Arrow      SUBJECTIVE/ROS:  [ X ] A ten-point review of systems was otherwise negative except as noted.  [ ] Due to altered mental status/intubation, subjective information were not able to be obtained from the patient. History was obtained, to the extent possible, from review of the chart and collateral sources of information.      NEURO  Exam: AOx3, follows all commands, moves all extremities.   Meds: HYDROmorphone  Injectable 0.5 milliGRAM(s) IV Push every 3 hours PRN Severe Pain (7 - 10)  HYDROmorphone  Injectable 0.25 milliGRAM(s) IV Push every 3 hours PRN Moderate Pain (4 - 6)    [x] Adequacy of sedation and pain control has been assessed and adjusted      RESPIRATORY  RR: 25 (11-03-22 @ 02:00) (17 - 35)  SpO2: 100% (11-03-22 @ 02:00) (86% - 100%)  Wt(kg): --  Exam: unlabored, clear to auscultation bilaterally  ABG - ( 01 Nov 2022 21:47 )  pH: 7.39  /  pCO2: 38    /  pO2: 89    / HCO3: 23    / Base Excess: -1.8  /  SaO2: 99.4    Lactate: x        Meds:       CARDIOVASCULAR  HR: 99 (11-03-22 @ 02:00) (79 - 152)  BP: 121/66 (11-03-22 @ 02:00) (108/75 - 134/72)  BP(mean): 86 (11-03-22 @ 02:00) (74 - 99)  ABP: --  ABP(mean): --  Wt(kg): --  CVP(cm H2O): --  VBG - ( 02 Nov 2022 21:30 )  pH: 7.44  /  pCO2: 36    /  pO2: 65    / HCO3: 24    / Base Excess: 0.4   /  SaO2: 96.2   Lactate: 1.5                Exam:  Cardiac Rhythm:  Perfusion     [ X ]Adequate   [ ]Inadequate  Mentation   [ X ]Normal       [ ]Reduced  Extremities  [ X ]Warm         [ ]Cool  Volume Status [ ]Hypervolemic [ X ]Euvolemic [ ]Hypovolemic  Meds:       GI/NUTRITION  Exam: Soft, non-distended, non-tender. VIOLETA x 3  Diet: NPO except meds  Meds: pantoprazole  Injectable 40 milliGRAM(s) IV Push daily  polyethylene glycol 3350 17 Gram(s) Oral daily  senna Syrup 10 milliLiter(s) Oral at bedtime      GENITOURINARY  I&O's Detail    11-01 @ 07:01  -  11-02 @ 07:00  --------------------------------------------------------  IN:    Dexmedetomidine: 24.7 mL    Enteral Tube Flush: 180 mL    Insulin: 14 mL    IV PiggyBack: 600 mL    IV PiggyBack: 100 mL    multiple electrolytes Injection Type 1.: 525 mL    Norepinephrine: 13.6 mL    sodium chloride 0.9%: 975 mL  Total IN: 2432.3 mL    OUT:    Bulb (mL): 40 mL    Bulb (mL): 212 mL    Bulb (mL): 82 mL    Indwelling Catheter - Urethral (mL): 830 mL    Nasogastric/Oral tube (mL): 210 mL  Total OUT: 1374 mL    Total NET: 1058.3 mL      11-02 @ 07:01 - 11-03 @ 03:10  --------------------------------------------------------  IN:    Albumin 25%  -  50 mL: 100 mL    IV PiggyBack: 100 mL    IV PiggyBack: 450 mL    multiple electrolytes Injection Type 1.: 1425 mL    Sodium Chloride 0.9% Bolus: 500 mL  Total IN: 2575 mL    OUT:    Bulb (mL): 40 mL    Bulb (mL): 30 mL    Bulb (mL): 0 mL    Indwelling Catheter - Urethral (mL): 180 mL    Nasogastric/Oral tube (mL): 75 mL    Voided (mL): 415 mL  Total OUT: 740 mL    Total NET: 1835 mL          11-02    141  |  109<H>  |  24<H>  ----------------------------<  151<H>  4.1   |  24  |  0.81    Ca    8.0<L>      02 Nov 2022 20:49  Phos  2.8     11-02  Mg     2.4     11-02    TPro  5.9<L>  /  Alb  3.0<L>  /  TBili  2.5<H>  /  DBili  1.9<H>  /  AST  155<H>  /  ALT  237<H>  /  AlkPhos  195<H>  11-02    [ ] Chapman catheter, indication: N/A  Meds: calcium carbonate 1250 mG  + Vitamin D (OsCal 500 + D) 1 Tablet(s) Oral two times a day  multiple electrolytes Injection Type 1 1000 milliLiter(s) IV Continuous <Continuous>        HEMATOLOGIC  Meds: aspirin enteric coated 81 milliGRAM(s) Oral daily    [x] VTE Prophylaxis: ICDs                        7.1    9.51  )-----------( 124      ( 02 Nov 2022 21:51 )             21.8     PT/INR - ( 02 Nov 2022 20:49 )   PT: 18.0 sec;   INR: 1.56 ratio         PTT - ( 02 Nov 2022 20:49 )  PTT:26.2 sec  Transfusion     [ ] PRBC   [ ] Platelets   [ ] FFP   [ ] Cryoprecipitate      INFECTIOUS DISEASES  T(C): 37.2 (11-03-22 @ 00:00), Max: 39.1 (11-02-22 @ 11:40)  Wt(kg): --  WBC Count: 9.51 K/uL (11-02 @ 21:51)  WBC Count: 9.53 K/uL (11-02 @ 20:49)  WBC Count: 18.49 K/uL (11-02 @ 10:08)  WBC Count: 19.83 K/uL (11-02 @ 03:56)    Recent Cultures:    Meds: fluconAZOLE IVPB 400 milliGRAM(s) IV Intermittent daily  influenza   Vaccine 0.5 milliLiter(s) IntraMuscular once  mycophenolate mofetil Suspension 1000 milliGRAM(s) Enteral Tube <User Schedule>  piperacillin/tazobactam IVPB.. 3.375 Gram(s) IV Intermittent every 8 hours  tacrolimus    0.5 mG/mL Suspension 1 milliGRAM(s) Oral <User Schedule>  trimethoprim  40 mG/sulfamethoxazole 200 mG Suspension 10 milliLiter(s) Enteral Tube daily  valGANciclovir 50 mG/mL Oral Solution 900 milliGRAM(s) Oral <User Schedule>        ENDOCRINE  Capillary Blood Glucose    Meds: insulin lispro (ADMELOG) corrective regimen sliding scale   SubCutaneous every 6 hours  methylPREDNISolone sodium succinate Injectable 125 milliGRAM(s) IV Push once        ACCESS DEVICES:  [ X ] Peripheral IV  [ ] Central Venous Line	[ ] R	[ ] L	[ ] IJ	[ ] Fem	[ ] SC	Placed:   [ ] Arterial Line		[ ] R	[ ] L	[ ] Fem	[ ] Rad	[ ] Ax	Placed:   [ ] PICC:					[ ] Mediport  [ ] Urinary Catheter, Date Placed:   [ ] Necessity of urinary, arterial, and venous catheters discussed    OTHER MEDICATIONS:  chlorhexidine 2% Cloths 1 Application(s) Topical <User Schedule>      CODE STATUS: Full HISTORY  32y Male    24 HOUR EVENTS:  Sinus tachycardia in 130s, multifactorial - fever, anemia, pain; now resolved  1 unit PRBC ordered for Hgb 8.6 --> 7.1, now 7.9  All lines taken out for Tmax 102.4 except RUE PIV and Arrow  L VIOLETA drain to be taken out by transplant      SUBJECTIVE/ROS:  [ X ] A ten-point review of systems was otherwise negative except as noted.  [ ] Due to altered mental status/intubation, subjective information were not able to be obtained from the patient. History was obtained, to the extent possible, from review of the chart and collateral sources of information.      NEURO  Exam: AOx3, follows all commands, moves all extremities.   Meds: HYDROmorphone  Injectable 0.5 milliGRAM(s) IV Push every 3 hours PRN Severe Pain (7 - 10)  HYDROmorphone  Injectable 0.25 milliGRAM(s) IV Push every 3 hours PRN Moderate Pain (4 - 6)    [x] Adequacy of sedation and pain control has been assessed and adjusted      RESPIRATORY  RR: 25 (11-03-22 @ 02:00) (17 - 35)  SpO2: 100% (11-03-22 @ 02:00) (86% - 100%)  Wt(kg): --  Exam: unlabored, clear to auscultation bilaterally  ABG - ( 01 Nov 2022 21:47 )  pH: 7.39  /  pCO2: 38    /  pO2: 89    / HCO3: 23    / Base Excess: -1.8  /  SaO2: 99.4    Lactate: x        Meds:       CARDIOVASCULAR  HR: 99 (11-03-22 @ 02:00) (79 - 152)  BP: 121/66 (11-03-22 @ 02:00) (108/75 - 134/72)  BP(mean): 86 (11-03-22 @ 02:00) (74 - 99)  ABP: --  ABP(mean): --  Wt(kg): --  CVP(cm H2O): --  VBG - ( 02 Nov 2022 21:30 )  pH: 7.44  /  pCO2: 36    /  pO2: 65    / HCO3: 24    / Base Excess: 0.4   /  SaO2: 96.2   Lactate: 1.5                Exam:  Cardiac Rhythm:  Perfusion     [ X ]Adequate   [ ]Inadequate  Mentation   [ X ]Normal       [ ]Reduced  Extremities  [ X ]Warm         [ ]Cool  Volume Status [ ]Hypervolemic [ X ]Euvolemic [ ]Hypovolemic  Meds:       GI/NUTRITION  Exam: Soft, non-distended, non-tender. VIOLETA x 3  Diet: NPO except meds  Meds: pantoprazole  Injectable 40 milliGRAM(s) IV Push daily  polyethylene glycol 3350 17 Gram(s) Oral daily  senna Syrup 10 milliLiter(s) Oral at bedtime      GENITOURINARY  I&O's Detail    11-01 @ 07:01  -  11-02 @ 07:00  --------------------------------------------------------  IN:    Dexmedetomidine: 24.7 mL    Enteral Tube Flush: 180 mL    Insulin: 14 mL    IV PiggyBack: 600 mL    IV PiggyBack: 100 mL    multiple electrolytes Injection Type 1.: 525 mL    Norepinephrine: 13.6 mL    sodium chloride 0.9%: 975 mL  Total IN: 2432.3 mL    OUT:    Bulb (mL): 40 mL    Bulb (mL): 212 mL    Bulb (mL): 82 mL    Indwelling Catheter - Urethral (mL): 830 mL    Nasogastric/Oral tube (mL): 210 mL  Total OUT: 1374 mL    Total NET: 1058.3 mL      11-02 @ 07:01  -  11-03 @ 03:10  --------------------------------------------------------  IN:    Albumin 25%  -  50 mL: 100 mL    IV PiggyBack: 100 mL    IV PiggyBack: 450 mL    multiple electrolytes Injection Type 1.: 1425 mL    Sodium Chloride 0.9% Bolus: 500 mL  Total IN: 2575 mL    OUT:    Bulb (mL): 40 mL    Bulb (mL): 30 mL    Bulb (mL): 0 mL    Indwelling Catheter - Urethral (mL): 180 mL    Nasogastric/Oral tube (mL): 75 mL    Voided (mL): 415 mL  Total OUT: 740 mL    Total NET: 1835 mL          11-02    141  |  109<H>  |  24<H>  ----------------------------<  151<H>  4.1   |  24  |  0.81    Ca    8.0<L>      02 Nov 2022 20:49  Phos  2.8     11-02  Mg     2.4     11-02    TPro  5.9<L>  /  Alb  3.0<L>  /  TBili  2.5<H>  /  DBili  1.9<H>  /  AST  155<H>  /  ALT  237<H>  /  AlkPhos  195<H>  11-02    [ ] Chapman catheter, indication: N/A  Meds: calcium carbonate 1250 mG  + Vitamin D (OsCal 500 + D) 1 Tablet(s) Oral two times a day  multiple electrolytes Injection Type 1 1000 milliLiter(s) IV Continuous <Continuous>        HEMATOLOGIC  Meds: aspirin enteric coated 81 milliGRAM(s) Oral daily    [x] VTE Prophylaxis: ICDs                        7.1    9.51  )-----------( 124      ( 02 Nov 2022 21:51 )             21.8     PT/INR - ( 02 Nov 2022 20:49 )   PT: 18.0 sec;   INR: 1.56 ratio         PTT - ( 02 Nov 2022 20:49 )  PTT:26.2 sec  Transfusion     [ ] PRBC   [ ] Platelets   [ ] FFP   [ ] Cryoprecipitate      INFECTIOUS DISEASES  T(C): 37.2 (11-03-22 @ 00:00), Max: 39.1 (11-02-22 @ 11:40)  Wt(kg): --  WBC Count: 9.51 K/uL (11-02 @ 21:51)  WBC Count: 9.53 K/uL (11-02 @ 20:49)  WBC Count: 18.49 K/uL (11-02 @ 10:08)  WBC Count: 19.83 K/uL (11-02 @ 03:56)    Recent Cultures:    Meds: fluconAZOLE IVPB 400 milliGRAM(s) IV Intermittent daily  influenza   Vaccine 0.5 milliLiter(s) IntraMuscular once  mycophenolate mofetil Suspension 1000 milliGRAM(s) Enteral Tube <User Schedule>  piperacillin/tazobactam IVPB.. 3.375 Gram(s) IV Intermittent every 8 hours  tacrolimus    0.5 mG/mL Suspension 1 milliGRAM(s) Oral <User Schedule>  trimethoprim  40 mG/sulfamethoxazole 200 mG Suspension 10 milliLiter(s) Enteral Tube daily  valGANciclovir 50 mG/mL Oral Solution 900 milliGRAM(s) Oral <User Schedule>        ENDOCRINE  Capillary Blood Glucose    Meds: insulin lispro (ADMELOG) corrective regimen sliding scale   SubCutaneous every 6 hours  methylPREDNISolone sodium succinate Injectable 125 milliGRAM(s) IV Push once        ACCESS DEVICES:  [ X ] Peripheral IV  [ ] Central Venous Line	[ ] R	[ ] L	[ ] IJ	[ ] Fem	[ ] SC	Placed:   [ ] Arterial Line		[ ] R	[ ] L	[ ] Fem	[ ] Rad	[ ] Ax	Placed:   [ ] PICC:					[ ] Mediport  [ ] Urinary Catheter, Date Placed:   [ ] Necessity of urinary, arterial, and venous catheters discussed    OTHER MEDICATIONS:  chlorhexidine 2% Cloths 1 Application(s) Topical <User Schedule>      CODE STATUS: Full HISTORY  32y Male    24 HOUR EVENTS:  Sinus tachycardia in 130s, multifactorial - fever, anemia, pain; now resolved  1 unit PRBC ordered for Hgb 8.6 --> 7.1, now 7.9  All lines taken out for Tmax 102.4 except RUE PIV and midline  L VIOLETA drain dc'd      SUBJECTIVE/ROS:  [ X ] A ten-point review of systems was otherwise negative except as noted.  [ ] Due to altered mental status/intubation, subjective information were not able to be obtained from the patient. History was obtained, to the extent possible, from review of the chart and collateral sources of information.      NEURO  Exam: AOx3, follows all commands, moves all extremities.   Meds: HYDROmorphone  Injectable 0.5 milliGRAM(s) IV Push every 3 hours PRN Severe Pain (7 - 10)  HYDROmorphone  Injectable 0.25 milliGRAM(s) IV Push every 3 hours PRN Moderate Pain (4 - 6)    [x] Adequacy of sedation and pain control has been assessed and adjusted      RESPIRATORY  RR: 25 (11-03-22 @ 02:00) (17 - 35)  SpO2: 100% (11-03-22 @ 02:00) (86% - 100%)  Wt(kg): --  Exam: unlabored, clear to auscultation bilaterally  ABG - ( 01 Nov 2022 21:47 )  pH: 7.39  /  pCO2: 38    /  pO2: 89    / HCO3: 23    / Base Excess: -1.8  /  SaO2: 99.4    Lactate: x        Meds:       CARDIOVASCULAR  HR: 99 (11-03-22 @ 02:00) (79 - 152)  BP: 121/66 (11-03-22 @ 02:00) (108/75 - 134/72)  BP(mean): 86 (11-03-22 @ 02:00) (74 - 99)  ABP: --  ABP(mean): --  Wt(kg): --  CVP(cm H2O): --  VBG - ( 02 Nov 2022 21:30 )  pH: 7.44  /  pCO2: 36    /  pO2: 65    / HCO3: 24    / Base Excess: 0.4   /  SaO2: 96.2   Lactate: 1.5                Exam:  Cardiac Rhythm:  Perfusion     [ X ]Adequate   [ ]Inadequate  Mentation   [ X ]Normal       [ ]Reduced  Extremities  [ X ]Warm         [ ]Cool  Volume Status [ ]Hypervolemic [ X ]Euvolemic [ ]Hypovolemic  Meds:       GI/NUTRITION  Exam: Soft, non-distended, non-tender. VIOLETA x 3  Diet: NPO except meds  Meds: pantoprazole  Injectable 40 milliGRAM(s) IV Push daily  polyethylene glycol 3350 17 Gram(s) Oral daily  senna Syrup 10 milliLiter(s) Oral at bedtime      GENITOURINARY  I&O's Detail    11-01 @ 07:01  -  11-02 @ 07:00  --------------------------------------------------------  IN:    Dexmedetomidine: 24.7 mL    Enteral Tube Flush: 180 mL    Insulin: 14 mL    IV PiggyBack: 600 mL    IV PiggyBack: 100 mL    multiple electrolytes Injection Type 1.: 525 mL    Norepinephrine: 13.6 mL    sodium chloride 0.9%: 975 mL  Total IN: 2432.3 mL    OUT:    Bulb (mL): 40 mL    Bulb (mL): 212 mL    Bulb (mL): 82 mL    Indwelling Catheter - Urethral (mL): 830 mL    Nasogastric/Oral tube (mL): 210 mL  Total OUT: 1374 mL    Total NET: 1058.3 mL      11-02 @ 07:01  -  11-03 @ 03:10  --------------------------------------------------------  IN:    Albumin 25%  -  50 mL: 100 mL    IV PiggyBack: 100 mL    IV PiggyBack: 450 mL    multiple electrolytes Injection Type 1.: 1425 mL    Sodium Chloride 0.9% Bolus: 500 mL  Total IN: 2575 mL    OUT:    Bulb (mL): 40 mL    Bulb (mL): 30 mL    Bulb (mL): 0 mL    Indwelling Catheter - Urethral (mL): 180 mL    Nasogastric/Oral tube (mL): 75 mL    Voided (mL): 415 mL  Total OUT: 740 mL    Total NET: 1835 mL          11-02    141  |  109<H>  |  24<H>  ----------------------------<  151<H>  4.1   |  24  |  0.81    Ca    8.0<L>      02 Nov 2022 20:49  Phos  2.8     11-02  Mg     2.4     11-02    TPro  5.9<L>  /  Alb  3.0<L>  /  TBili  2.5<H>  /  DBili  1.9<H>  /  AST  155<H>  /  ALT  237<H>  /  AlkPhos  195<H>  11-02    [ ] Chapman catheter, indication: N/A  Meds: calcium carbonate 1250 mG  + Vitamin D (OsCal 500 + D) 1 Tablet(s) Oral two times a day  multiple electrolytes Injection Type 1 1000 milliLiter(s) IV Continuous <Continuous>        HEMATOLOGIC  Meds: aspirin enteric coated 81 milliGRAM(s) Oral daily    [x] VTE Prophylaxis: ICDs                        7.1    9.51  )-----------( 124      ( 02 Nov 2022 21:51 )             21.8     PT/INR - ( 02 Nov 2022 20:49 )   PT: 18.0 sec;   INR: 1.56 ratio         PTT - ( 02 Nov 2022 20:49 )  PTT:26.2 sec  Transfusion     [ ] PRBC   [ ] Platelets   [ ] FFP   [ ] Cryoprecipitate      INFECTIOUS DISEASES  T(C): 37.2 (11-03-22 @ 00:00), Max: 39.1 (11-02-22 @ 11:40)  Wt(kg): --  WBC Count: 9.51 K/uL (11-02 @ 21:51)  WBC Count: 9.53 K/uL (11-02 @ 20:49)  WBC Count: 18.49 K/uL (11-02 @ 10:08)  WBC Count: 19.83 K/uL (11-02 @ 03:56)    Recent Cultures:    Meds: fluconAZOLE IVPB 400 milliGRAM(s) IV Intermittent daily  influenza   Vaccine 0.5 milliLiter(s) IntraMuscular once  mycophenolate mofetil Suspension 1000 milliGRAM(s) Enteral Tube <User Schedule>  piperacillin/tazobactam IVPB.. 3.375 Gram(s) IV Intermittent every 8 hours  tacrolimus    0.5 mG/mL Suspension 1 milliGRAM(s) Oral <User Schedule>  trimethoprim  40 mG/sulfamethoxazole 200 mG Suspension 10 milliLiter(s) Enteral Tube daily  valGANciclovir 50 mG/mL Oral Solution 900 milliGRAM(s) Oral <User Schedule>        ENDOCRINE  Capillary Blood Glucose    Meds: insulin lispro (ADMELOG) corrective regimen sliding scale   SubCutaneous every 6 hours  methylPREDNISolone sodium succinate Injectable 125 milliGRAM(s) IV Push once        ACCESS DEVICES:  [ X ] Peripheral IV  [ ] Central Venous Line	[ ] R	[ ] L	[ ] IJ	[ ] Fem	[ ] SC	Placed:   [ ] Arterial Line		[ ] R	[ ] L	[ ] Fem	[ ] Rad	[ ] Ax	Placed:   [ ] PICC:					[ ] Mediport  [ ] Urinary Catheter, Date Placed:   [ ] Necessity of urinary, arterial, and venous catheters discussed    OTHER MEDICATIONS:  chlorhexidine 2% Cloths 1 Application(s) Topical <User Schedule>      CODE STATUS: Full

## 2022-11-03 NOTE — PROGRESS NOTE ADULT - NUTRITIONAL ASSESSMENT
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    Neurologic:  - Pain control w/ Dilaudid PRN    Respiratory:  - Extubated  - Incentive spirometry    Cardiovascular:  - Levo dc'd  - Sinus tachycardia, improved with pain control/ 1 unit PRBC/ ice packs    Gastrointestinal/Nutrition:  - Monitor VIOLETA output x 3  - liver US with doppler showing patent anastamoses and good flow  - NGT to be removed on POD3 due to pt's le en Y gastric bypass  - NPO w/ sips and chips  - Bactrim/ Diflucan/ Valcyte per transplant  - Cellcept/ Tacro per transplant  - Protonix  - Bowel regimen    Genitourinary/Renal:  - Monitor UO  - Chapman removed 11/2  - Supplement electrolytes PRN    Hematologic:  -q12 CBC  -Transfuse PRN    Infectious Disease:  - Febrile to 102.4, blood and urine cx sent  - Unasyn 3g q6hrs x 48 hours  -diflucan/bactrim/valgancyclovir for prophylaxis    Endocrine:  -ISS    Dispo: SICU NPO w/ sips/chips NPO w/ sips

## 2022-11-03 NOTE — PROGRESS NOTE ADULT - SUBJECTIVE AND OBJECTIVE BOX
Transplant Surgery - Multidisciplinary Rounds  --------------------------------------------------------------   Donor OLTx      Date:  2022      POD# 3    Present:   Patient seen and examined with multidisciplinary Transplant team including  Surgeon: Dr. Godinez. Hepatologist: Dr. Mehul Long Pharmacist: Dean,  Resident : Dr. Mcneil, PGY3. NP: Leila and RNs in AM rounds.   Disciplines not in attendance will be notified of the plan.     HPI: 32M PMH Cerebral palsy, Primary Sclerosing Cholangitis Cirrhosis with frequent ERCPs with biliary stent placements for biliary strictures (last in 2022) on liver transplant list (ABO AB), recent COVID s/p MAB in 2022) admitted for liver transplant    OR   S/P Orthotopic liver transplantation from a  donor. Steroid induction  Side-side Cavocaval anastomosis  End-end portoportal anastomosis  End-end single arterial anastomosis  Velasquez en-Y HJ with internal stenting & Side-side JJ      Interval Events:  -Central lines, rob and arterial lines remove  -Patient oobc, with some pain at surgical site. He is using his incentive spirometer, pulling only 500 cc- 1L. He denies any bowel function. He denies fever, chest pain, SOB, nausea, vomiting.  -Fevers yesterday, last one at 8 pm 100.7  -Chapman removed, voided 657 cc  -Received 1 PRBC       Immunosuppression:   Induction:    Methylpred                                           Maintenance immunosuppression: FK 0.5/1mg, MMF 100mg BID, SST  Ongoing monitoring for signs of rejection.      Potential Discharge date: pending clinical improvement    Education:  Medications    Plan of care:  See Below      MEDICATIONS  (STANDING):  aspirin enteric coated 81 milliGRAM(s) Oral daily  calcium carbonate 1250 mG  + Vitamin D (OsCal 500 + D) 1 Tablet(s) Oral two times a day  chlorhexidine 2% Cloths 1 Application(s) Topical <User Schedule>  fluconAZOLE IVPB 400 milliGRAM(s) IV Intermittent daily  influenza   Vaccine 0.5 milliLiter(s) IntraMuscular once  insulin lispro (ADMELOG) corrective regimen sliding scale   SubCutaneous every 6 hours  multiple electrolytes Injection Type 1 1000 milliLiter(s) (75 mL/Hr) IV Continuous <Continuous>  mycophenolate mofetil Suspension 1000 milliGRAM(s) Enteral Tube <User Schedule>  ondansetron Injectable 4 milliGRAM(s) IV Push once  pantoprazole  Injectable 40 milliGRAM(s) IV Push daily  piperacillin/tazobactam IVPB.. 3.375 Gram(s) IV Intermittent every 8 hours  polyethylene glycol 3350 17 Gram(s) Oral daily  senna Syrup 10 milliLiter(s) Oral at bedtime  tacrolimus    0.5 mG/mL Suspension 1 milliGRAM(s) Oral <User Schedule>  trimethoprim   80 mG/sulfamethoxazole 400 mG 1 Tablet(s) Oral daily  valGANciclovir 900 milliGRAM(s) Oral <User Schedule>    MEDICATIONS  (PRN):  HYDROmorphone  Injectable 0.5 milliGRAM(s) IV Push every 3 hours PRN Severe Pain (7 - 10)  HYDROmorphone  Injectable 0.25 milliGRAM(s) IV Push every 3 hours PRN Moderate Pain (4 - 6)  oxyCODONE    IR 5 milliGRAM(s) Oral every 4 hours PRN Moderate Pain (4 - 6)      PAST MEDICAL & SURGICAL HISTORY:  Cerebral palsy      PSC (primary sclerosing cholangitis)  On liver transplant list      Abnormal LFTs      Bile duct stricture      Dental caries      Impacted teeth  wisdom teeth      Phimosis      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 Chevron incision c/d/i, staples in place. No signs of infection. JPs on the right SS left VIOLETA serous  Genitourinary: Urinary catheter in place; normal urine color  Extremities: SCD's in place and working bilaterally, some edema  Vascular: Palpable dp pulses bilaterally.   Neurological: A&O x3  Skin: no rashes, no lesions  Musculoskeletal: Moving all extremities, no edema  Psychiatric: Responsive    Vital Signs Last 24 Hrs  T(C): 37 (2022 07:00), Max: 39.1 (2022 11:40)  T(F): 98.6 (2022 07:00), Max: 102.4 (2022 11:40)  HR: 84 (:00) (75 - 152)  BP: 120/62 (2022 11:00) (107/66 - 134/72)  BP(mean): 85 (:) (78 - 99)  RR: 17 (:) (13 - 35)  SpO2: 93% (:) (86% - 100%)    Parameters below as of 2022 11:00  Patient On (Oxygen Delivery Method): room air        I&O's Summary    2022 07:01  -  2022 07:00  --------------------------------------------------------  IN: 3350 mL / OUT: 1010 mL / NET: 2340 mL    2022 07:01  -  2022 11:37  --------------------------------------------------------  IN: 225 mL / OUT: 225 mL / NET: 0 mL                              7.9    9.49  )-----------( 118      ( 2022 06:24 )             23.7     11-03    140  |  107  |  22  ----------------------------<  110<H>  4.3   |  26  |  0.77    Ca    8.2<L>      2022 08:36  Phos  3.2     11-03  Mg     2.4     11-03    TPro  5.8<L>  /  Alb  2.9<L>  /  TBili  2.3<H>  /  DBili  1.6<H>  /  AST  108<H>  /  ALT  200<H>  /  AlkPhos  199<H>      Tacrolimus (), Serum: 1.9 ng/mL ( @ 06:08)

## 2022-11-03 NOTE — PROGRESS NOTE ADULT - ASSESSMENT
32 year old male with PMHx of Cerebral palsy, Primary Sclerosing Cholangitis Cirrhosis with frequent ERCPs with biliary stent placements for biliary strictures (last in 9/2022) on liver transplant list, recent COVID s/p MAB in (9/2022) admitted for liver transplant s/p liver transplant on 10/31. Transplant ID consulted for fevers.     Afebrile  Leukocytosis trending up  LFTs improving   Covid test neg   CXR: clear lungs  On Unasyn periop   On Valcyte, Bactrim and Fluconazole PPx     #Fevers - ?post op fevers, R/O other infectious etiologies post transplant   #s/p OLT on 10/31  #CMV +/- high risk    Recommendations:   Continue Zosyn 3.375 g IV q 8hrs  Rapid RVP neg  F/up blood Cx X2  F/up sputum Cx  If continues to spike fevers, will likely need CT AP   Bactrim for PCP PPx, Valcyte for CMV PPx    Seen and discussed with Dr Flor Hayes MD, PGY4  ID fellow  [employer] Teams Preferred  After 5pm/weekends call [phone]

## 2022-11-04 LAB
ALBUMIN SERPL ELPH-MCNC: 2.6 G/DL — LOW (ref 3.3–5)
ALBUMIN SERPL ELPH-MCNC: 2.7 G/DL — LOW (ref 3.3–5)
ALP SERPL-CCNC: 265 U/L — HIGH (ref 40–120)
ALP SERPL-CCNC: 277 U/L — HIGH (ref 40–120)
ALT FLD-CCNC: 128 U/L — HIGH (ref 10–45)
ALT FLD-CCNC: 142 U/L — HIGH (ref 10–45)
ANION GAP SERPL CALC-SCNC: 9 MMOL/L — SIGNIFICANT CHANGE UP (ref 5–17)
ANION GAP SERPL CALC-SCNC: 9 MMOL/L — SIGNIFICANT CHANGE UP (ref 5–17)
APTT BLD: 24 SEC — LOW (ref 27.5–35.5)
APTT BLD: 24.4 SEC — LOW (ref 27.5–35.5)
AST SERPL-CCNC: 52 U/L — HIGH (ref 10–40)
AST SERPL-CCNC: 58 U/L — HIGH (ref 10–40)
BASE EXCESS BLDV CALC-SCNC: -1.3 MMOL/L — SIGNIFICANT CHANGE UP (ref -2–3)
BASE EXCESS BLDV CALC-SCNC: -2 MMOL/L — SIGNIFICANT CHANGE UP (ref -2–3)
BASE EXCESS BLDV CALC-SCNC: 0.2 MMOL/L — SIGNIFICANT CHANGE UP (ref -2–3)
BASE EXCESS BLDV CALC-SCNC: 0.3 MMOL/L — SIGNIFICANT CHANGE UP (ref -2–3)
BILIRUB DIRECT SERPL-MCNC: 1.3 MG/DL — HIGH (ref 0–0.3)
BILIRUB DIRECT SERPL-MCNC: 1.6 MG/DL — HIGH (ref 0–0.3)
BILIRUB INDIRECT FLD-MCNC: 0.5 MG/DL — SIGNIFICANT CHANGE UP (ref 0.2–1)
BILIRUB INDIRECT FLD-MCNC: 0.5 MG/DL — SIGNIFICANT CHANGE UP (ref 0.2–1)
BILIRUB SERPL-MCNC: 1.8 MG/DL — HIGH (ref 0.2–1.2)
BILIRUB SERPL-MCNC: 2.1 MG/DL — HIGH (ref 0.2–1.2)
BLOOD GAS VENOUS - CREATININE: SIGNIFICANT CHANGE UP MG/DL (ref 0.5–1.3)
BUN SERPL-MCNC: 24 MG/DL — HIGH (ref 7–23)
BUN SERPL-MCNC: 25 MG/DL — HIGH (ref 7–23)
CA-I SERPL-SCNC: 1.16 MMOL/L — SIGNIFICANT CHANGE UP (ref 1.15–1.33)
CA-I SERPL-SCNC: 1.19 MMOL/L — SIGNIFICANT CHANGE UP (ref 1.15–1.33)
CA-I SERPL-SCNC: 1.2 MMOL/L — SIGNIFICANT CHANGE UP (ref 1.15–1.33)
CA-I SERPL-SCNC: 1.21 MMOL/L — SIGNIFICANT CHANGE UP (ref 1.15–1.33)
CALCIUM SERPL-MCNC: 7.8 MG/DL — LOW (ref 8.4–10.5)
CALCIUM SERPL-MCNC: 8 MG/DL — LOW (ref 8.4–10.5)
CHLORIDE BLDV-SCNC: 101 MMOL/L — SIGNIFICANT CHANGE UP (ref 96–108)
CHLORIDE BLDV-SCNC: 102 MMOL/L — SIGNIFICANT CHANGE UP (ref 96–108)
CHLORIDE BLDV-SCNC: 107 MMOL/L — SIGNIFICANT CHANGE UP (ref 96–108)
CHLORIDE BLDV-SCNC: 97 MMOL/L — SIGNIFICANT CHANGE UP (ref 96–108)
CHLORIDE SERPL-SCNC: 104 MMOL/L — SIGNIFICANT CHANGE UP (ref 96–108)
CHLORIDE SERPL-SCNC: 106 MMOL/L — SIGNIFICANT CHANGE UP (ref 96–108)
CO2 BLDV-SCNC: 24 MMOL/L — SIGNIFICANT CHANGE UP (ref 22–26)
CO2 BLDV-SCNC: 26 MMOL/L — SIGNIFICANT CHANGE UP (ref 22–26)
CO2 BLDV-SCNC: 27 MMOL/L — HIGH (ref 22–26)
CO2 BLDV-SCNC: 28 MMOL/L — HIGH (ref 22–26)
CO2 SERPL-SCNC: 23 MMOL/L — SIGNIFICANT CHANGE UP (ref 22–31)
CO2 SERPL-SCNC: 23 MMOL/L — SIGNIFICANT CHANGE UP (ref 22–31)
CREAT SERPL-MCNC: 0.7 MG/DL — SIGNIFICANT CHANGE UP (ref 0.5–1.3)
CREAT SERPL-MCNC: 0.81 MG/DL — SIGNIFICANT CHANGE UP (ref 0.5–1.3)
EGFR: 122 ML/MIN/1.73M2 — SIGNIFICANT CHANGE UP
EGFR: 127 ML/MIN/1.73M2 — SIGNIFICANT CHANGE UP
GAS PNL BLDV: 126 MMOL/L — LOW (ref 136–145)
GAS PNL BLDV: 132 MMOL/L — LOW (ref 136–145)
GAS PNL BLDV: 134 MMOL/L — LOW (ref 136–145)
GAS PNL BLDV: 135 MMOL/L — LOW (ref 136–145)
GAS PNL BLDV: SIGNIFICANT CHANGE UP
GLUCOSE BLDC GLUCOMTR-MCNC: 119 MG/DL — HIGH (ref 70–99)
GLUCOSE BLDC GLUCOMTR-MCNC: 123 MG/DL — HIGH (ref 70–99)
GLUCOSE BLDC GLUCOMTR-MCNC: 132 MG/DL — HIGH (ref 70–99)
GLUCOSE BLDV-MCNC: 116 MG/DL — HIGH (ref 70–99)
GLUCOSE BLDV-MCNC: 126 MG/DL — HIGH (ref 70–99)
GLUCOSE BLDV-MCNC: 131 MG/DL — HIGH (ref 70–99)
GLUCOSE BLDV-MCNC: 319 MG/DL — HIGH (ref 70–99)
GLUCOSE SERPL-MCNC: 117 MG/DL — HIGH (ref 70–99)
GLUCOSE SERPL-MCNC: 127 MG/DL — HIGH (ref 70–99)
HCO3 BLDV-SCNC: 23 MMOL/L — SIGNIFICANT CHANGE UP (ref 22–29)
HCO3 BLDV-SCNC: 24 MMOL/L — SIGNIFICANT CHANGE UP (ref 22–29)
HCO3 BLDV-SCNC: 26 MMOL/L — SIGNIFICANT CHANGE UP (ref 22–29)
HCO3 BLDV-SCNC: 27 MMOL/L — SIGNIFICANT CHANGE UP (ref 22–29)
HCT VFR BLD CALC: 25.5 % — LOW (ref 39–50)
HCT VFR BLD CALC: 27.2 % — LOW (ref 39–50)
HCT VFR BLDA CALC: 26 % — LOW (ref 39–51)
HCT VFR BLDA CALC: 27 % — LOW (ref 39–51)
HCT VFR BLDA CALC: 29 % — LOW (ref 39–51)
HCT VFR BLDA CALC: 30 % — LOW (ref 39–51)
HGB BLD CALC-MCNC: 10 G/DL — LOW (ref 12.6–17.4)
HGB BLD CALC-MCNC: 8.7 G/DL — LOW (ref 12.6–17.4)
HGB BLD CALC-MCNC: 9.1 G/DL — LOW (ref 12.6–17.4)
HGB BLD CALC-MCNC: 9.8 G/DL — LOW (ref 12.6–17.4)
HGB BLD-MCNC: 8.4 G/DL — LOW (ref 13–17)
HGB BLD-MCNC: 8.9 G/DL — LOW (ref 13–17)
HOROWITZ INDEX BLDV+IHG-RTO: 21 — SIGNIFICANT CHANGE UP
INR BLD: 1.66 RATIO — HIGH (ref 0.88–1.16)
INR BLD: 1.73 RATIO — HIGH (ref 0.88–1.16)
LACTATE BLDV-MCNC: 1 MMOL/L — SIGNIFICANT CHANGE UP (ref 0.5–2)
LACTATE BLDV-MCNC: 1.1 MMOL/L — SIGNIFICANT CHANGE UP (ref 0.5–2)
LACTATE BLDV-MCNC: 1.4 MMOL/L — SIGNIFICANT CHANGE UP (ref 0.5–2)
LACTATE BLDV-MCNC: 1.5 MMOL/L — SIGNIFICANT CHANGE UP (ref 0.5–2)
MAGNESIUM SERPL-MCNC: 1.9 MG/DL — SIGNIFICANT CHANGE UP (ref 1.6–2.6)
MAGNESIUM SERPL-MCNC: 2 MG/DL — SIGNIFICANT CHANGE UP (ref 1.6–2.6)
MCHC RBC-ENTMCNC: 31.6 PG — SIGNIFICANT CHANGE UP (ref 27–34)
MCHC RBC-ENTMCNC: 31.9 PG — SIGNIFICANT CHANGE UP (ref 27–34)
MCHC RBC-ENTMCNC: 32.7 GM/DL — SIGNIFICANT CHANGE UP (ref 32–36)
MCHC RBC-ENTMCNC: 32.9 GM/DL — SIGNIFICANT CHANGE UP (ref 32–36)
MCV RBC AUTO: 96.5 FL — SIGNIFICANT CHANGE UP (ref 80–100)
MCV RBC AUTO: 97 FL — SIGNIFICANT CHANGE UP (ref 80–100)
NRBC # BLD: 0 /100 WBCS — SIGNIFICANT CHANGE UP (ref 0–0)
NRBC # BLD: 0 /100 WBCS — SIGNIFICANT CHANGE UP (ref 0–0)
PCO2 BLDV: 40 MMHG — LOW (ref 42–55)
PCO2 BLDV: 44 MMHG — SIGNIFICANT CHANGE UP (ref 42–55)
PCO2 BLDV: 46 MMHG — SIGNIFICANT CHANGE UP (ref 42–55)
PCO2 BLDV: 52 MMHG — SIGNIFICANT CHANGE UP (ref 42–55)
PH BLDV: 7.32 — SIGNIFICANT CHANGE UP (ref 7.32–7.43)
PH BLDV: 7.35 — SIGNIFICANT CHANGE UP (ref 7.32–7.43)
PH BLDV: 7.36 — SIGNIFICANT CHANGE UP (ref 7.32–7.43)
PH BLDV: 7.37 — SIGNIFICANT CHANGE UP (ref 7.32–7.43)
PHOSPHATE SERPL-MCNC: 3.5 MG/DL — SIGNIFICANT CHANGE UP (ref 2.5–4.5)
PHOSPHATE SERPL-MCNC: 4.2 MG/DL — SIGNIFICANT CHANGE UP (ref 2.5–4.5)
PLATELET # BLD AUTO: 148 K/UL — LOW (ref 150–400)
PLATELET # BLD AUTO: 168 K/UL — SIGNIFICANT CHANGE UP (ref 150–400)
PO2 BLDV: 26 MMHG — SIGNIFICANT CHANGE UP (ref 25–45)
PO2 BLDV: 41 MMHG — SIGNIFICANT CHANGE UP (ref 25–45)
PO2 BLDV: 45 MMHG — SIGNIFICANT CHANGE UP (ref 25–45)
PO2 BLDV: 45 MMHG — SIGNIFICANT CHANGE UP (ref 25–45)
POTASSIUM BLDV-SCNC: 3.9 MMOL/L — SIGNIFICANT CHANGE UP (ref 3.5–5.1)
POTASSIUM BLDV-SCNC: 4.2 MMOL/L — SIGNIFICANT CHANGE UP (ref 3.5–5.1)
POTASSIUM BLDV-SCNC: 4.4 MMOL/L — SIGNIFICANT CHANGE UP (ref 3.5–5.1)
POTASSIUM BLDV-SCNC: 4.9 MMOL/L — SIGNIFICANT CHANGE UP (ref 3.5–5.1)
POTASSIUM SERPL-MCNC: 3.9 MMOL/L — SIGNIFICANT CHANGE UP (ref 3.5–5.3)
POTASSIUM SERPL-MCNC: 4.2 MMOL/L — SIGNIFICANT CHANGE UP (ref 3.5–5.3)
POTASSIUM SERPL-SCNC: 3.9 MMOL/L — SIGNIFICANT CHANGE UP (ref 3.5–5.3)
POTASSIUM SERPL-SCNC: 4.2 MMOL/L — SIGNIFICANT CHANGE UP (ref 3.5–5.3)
PROT SERPL-MCNC: 5.4 G/DL — LOW (ref 6–8.3)
PROT SERPL-MCNC: 5.6 G/DL — LOW (ref 6–8.3)
PROTHROM AB SERPL-ACNC: 19.4 SEC — HIGH (ref 10.5–13.4)
PROTHROM AB SERPL-ACNC: 20 SEC — HIGH (ref 10.5–13.4)
RBC # BLD: 2.63 M/UL — LOW (ref 4.2–5.8)
RBC # BLD: 2.82 M/UL — LOW (ref 4.2–5.8)
RBC # FLD: 16 % — HIGH (ref 10.3–14.5)
RBC # FLD: 17 % — HIGH (ref 10.3–14.5)
SAO2 % BLDV: 38.6 % — LOW (ref 67–88)
SAO2 % BLDV: 69.3 % — SIGNIFICANT CHANGE UP (ref 67–88)
SAO2 % BLDV: 74 % — SIGNIFICANT CHANGE UP (ref 67–88)
SAO2 % BLDV: 77.2 % — SIGNIFICANT CHANGE UP (ref 67–88)
SODIUM SERPL-SCNC: 136 MMOL/L — SIGNIFICANT CHANGE UP (ref 135–145)
SODIUM SERPL-SCNC: 138 MMOL/L — SIGNIFICANT CHANGE UP (ref 135–145)
TACROLIMUS SERPL-MCNC: 14.9 NG/ML — SIGNIFICANT CHANGE UP
WBC # BLD: 9.5 K/UL — SIGNIFICANT CHANGE UP (ref 3.8–10.5)
WBC # BLD: 9.91 K/UL — SIGNIFICANT CHANGE UP (ref 3.8–10.5)
WBC # FLD AUTO: 9.5 K/UL — SIGNIFICANT CHANGE UP (ref 3.8–10.5)
WBC # FLD AUTO: 9.91 K/UL — SIGNIFICANT CHANGE UP (ref 3.8–10.5)

## 2022-11-04 PROCEDURE — 99232 SBSQ HOSP IP/OBS MODERATE 35: CPT

## 2022-11-04 PROCEDURE — 71045 X-RAY EXAM CHEST 1 VIEW: CPT | Mod: 26

## 2022-11-04 PROCEDURE — 99232 SBSQ HOSP IP/OBS MODERATE 35: CPT | Mod: GC

## 2022-11-04 RX ORDER — SENNA PLUS 8.6 MG/1
2 TABLET ORAL AT BEDTIME
Refills: 0 | Status: DISCONTINUED | OUTPATIENT
Start: 2022-11-04 | End: 2022-11-07

## 2022-11-04 RX ORDER — SODIUM CHLORIDE 9 MG/ML
1000 INJECTION, SOLUTION INTRAVENOUS
Refills: 0 | Status: DISCONTINUED | OUTPATIENT
Start: 2022-11-04 | End: 2022-11-07

## 2022-11-04 RX ORDER — PANTOPRAZOLE SODIUM 20 MG/1
40 TABLET, DELAYED RELEASE ORAL
Refills: 0 | Status: DISCONTINUED | OUTPATIENT
Start: 2022-11-04 | End: 2022-11-07

## 2022-11-04 RX ORDER — DEXTROSE 50 % IN WATER 50 %
25 SYRINGE (ML) INTRAVENOUS ONCE
Refills: 0 | Status: DISCONTINUED | OUTPATIENT
Start: 2022-11-04 | End: 2022-11-07

## 2022-11-04 RX ORDER — INSULIN LISPRO 100/ML
VIAL (ML) SUBCUTANEOUS AT BEDTIME
Refills: 0 | Status: DISCONTINUED | OUTPATIENT
Start: 2022-11-04 | End: 2022-11-07

## 2022-11-04 RX ORDER — DEXTROSE 50 % IN WATER 50 %
12.5 SYRINGE (ML) INTRAVENOUS ONCE
Refills: 0 | Status: DISCONTINUED | OUTPATIENT
Start: 2022-11-04 | End: 2022-11-07

## 2022-11-04 RX ORDER — POLYETHYLENE GLYCOL 3350 17 G/17G
17 POWDER, FOR SOLUTION ORAL DAILY
Refills: 0 | Status: DISCONTINUED | OUTPATIENT
Start: 2022-11-04 | End: 2022-11-07

## 2022-11-04 RX ORDER — DEXTROSE 50 % IN WATER 50 %
15 SYRINGE (ML) INTRAVENOUS ONCE
Refills: 0 | Status: DISCONTINUED | OUTPATIENT
Start: 2022-11-04 | End: 2022-11-07

## 2022-11-04 RX ORDER — GLUCAGON INJECTION, SOLUTION 0.5 MG/.1ML
1 INJECTION, SOLUTION SUBCUTANEOUS ONCE
Refills: 0 | Status: DISCONTINUED | OUTPATIENT
Start: 2022-11-04 | End: 2022-11-07

## 2022-11-04 RX ORDER — MAGNESIUM SULFATE 500 MG/ML
2 VIAL (ML) INJECTION ONCE
Refills: 0 | Status: COMPLETED | OUTPATIENT
Start: 2022-11-04 | End: 2022-11-04

## 2022-11-04 RX ORDER — POTASSIUM CHLORIDE 20 MEQ
10 PACKET (EA) ORAL ONCE
Refills: 0 | Status: COMPLETED | OUTPATIENT
Start: 2022-11-04 | End: 2022-11-04

## 2022-11-04 RX ORDER — INSULIN LISPRO 100/ML
VIAL (ML) SUBCUTANEOUS
Refills: 0 | Status: DISCONTINUED | OUTPATIENT
Start: 2022-11-04 | End: 2022-11-07

## 2022-11-04 RX ADMIN — Medication 60 MILLIGRAM(S): at 08:09

## 2022-11-04 RX ADMIN — VALGANCICLOVIR 900 MILLIGRAM(S): 450 TABLET, FILM COATED ORAL at 12:24

## 2022-11-04 RX ADMIN — Medication 25 GRAM(S): at 18:09

## 2022-11-04 RX ADMIN — MYCOPHENOLATE MOFETIL 1000 MILLIGRAM(S): 250 CAPSULE ORAL at 22:09

## 2022-11-04 RX ADMIN — Medication 1 TABLET(S): at 12:22

## 2022-11-04 RX ADMIN — OXYCODONE HYDROCHLORIDE 5 MILLIGRAM(S): 5 TABLET ORAL at 16:14

## 2022-11-04 RX ADMIN — Medication 10 MILLIEQUIVALENT(S): at 18:09

## 2022-11-04 RX ADMIN — SENNA PLUS 2 TABLET(S): 8.6 TABLET ORAL at 22:09

## 2022-11-04 RX ADMIN — PANTOPRAZOLE SODIUM 40 MILLIGRAM(S): 20 TABLET, DELAYED RELEASE ORAL at 12:24

## 2022-11-04 RX ADMIN — CHLORHEXIDINE GLUCONATE 1 APPLICATION(S): 213 SOLUTION TOPICAL at 05:02

## 2022-11-04 RX ADMIN — PIPERACILLIN AND TAZOBACTAM 25 GRAM(S): 4; .5 INJECTION, POWDER, LYOPHILIZED, FOR SOLUTION INTRAVENOUS at 22:10

## 2022-11-04 RX ADMIN — PIPERACILLIN AND TAZOBACTAM 25 GRAM(S): 4; .5 INJECTION, POWDER, LYOPHILIZED, FOR SOLUTION INTRAVENOUS at 12:21

## 2022-11-04 RX ADMIN — FLUCONAZOLE 200 MILLIGRAM(S): 150 TABLET ORAL at 12:21

## 2022-11-04 RX ADMIN — OXYCODONE HYDROCHLORIDE 5 MILLIGRAM(S): 5 TABLET ORAL at 10:36

## 2022-11-04 RX ADMIN — OXYCODONE HYDROCHLORIDE 5 MILLIGRAM(S): 5 TABLET ORAL at 16:44

## 2022-11-04 RX ADMIN — Medication 1 TABLET(S): at 05:02

## 2022-11-04 RX ADMIN — TACROLIMUS 2 MILLIGRAM(S): 5 CAPSULE ORAL at 08:08

## 2022-11-04 RX ADMIN — PIPERACILLIN AND TAZOBACTAM 25 GRAM(S): 4; .5 INJECTION, POWDER, LYOPHILIZED, FOR SOLUTION INTRAVENOUS at 05:02

## 2022-11-04 RX ADMIN — SODIUM CHLORIDE 75 MILLILITER(S): 9 INJECTION, SOLUTION INTRAVENOUS at 08:08

## 2022-11-04 RX ADMIN — OXYCODONE HYDROCHLORIDE 5 MILLIGRAM(S): 5 TABLET ORAL at 11:06

## 2022-11-04 RX ADMIN — Medication 1 TABLET(S): at 17:29

## 2022-11-04 RX ADMIN — MYCOPHENOLATE MOFETIL 1000 MILLIGRAM(S): 250 CAPSULE ORAL at 08:08

## 2022-11-04 RX ADMIN — Medication 81 MILLIGRAM(S): at 12:22

## 2022-11-04 RX ADMIN — POLYETHYLENE GLYCOL 3350 17 GRAM(S): 17 POWDER, FOR SOLUTION ORAL at 12:21

## 2022-11-04 NOTE — DIETITIAN INITIAL EVALUATION ADULT - ENERGY INTAKE
NPO since admission 10/31 (4 days) NPO since admission 10/31; diet advanced to Clear Liquids today.  Pt is tolerating clears, endorses discomfort.

## 2022-11-04 NOTE — DIETITIAN INITIAL EVALUATION ADULT - NS FNS WEIGHT CHANGE REASON
Weight history per HIE:  68kg (1/6/20), 67.6kg (1/18/21), 58.5kg (11/17/21), 60.3kg (1/25/22), 60.8kg (8/17/22), 59kg (10/6/22)/unintentional Weight history per HIE:  68kg (1/6/20), 67.6kg (1/18/21), 58.5kg (11/17/21), 60.3kg (1/25/22), 60.8kg (8/17/22), 59kg (10/6/22)  Stable weight in 2022./unintentional

## 2022-11-04 NOTE — DIETITIAN INITIAL EVALUATION ADULT - PERTINENT MEDS FT
MEDICATIONS  (STANDING):  aspirin enteric coated 81 milliGRAM(s) Oral daily  calcium carbonate 1250 mG  + Vitamin D (OsCal 500 + D) 1 Tablet(s) Oral two times a day  chlorhexidine 2% Cloths 1 Application(s) Topical <User Schedule>  fluconAZOLE IVPB 400 milliGRAM(s) IV Intermittent daily  influenza   Vaccine 0.5 milliLiter(s) IntraMuscular once  insulin lispro (ADMELOG) corrective regimen sliding scale   SubCutaneous every 6 hours  multiple electrolytes Injection Type 1 1000 milliLiter(s) (75 mL/Hr) IV Continuous <Continuous>  mycophenolate mofetil 1000 milliGRAM(s) Oral <User Schedule>  pantoprazole  Injectable 40 milliGRAM(s) IV Push daily  piperacillin/tazobactam IVPB.. 3.375 Gram(s) IV Intermittent every 8 hours  polyethylene glycol 3350 17 Gram(s) Oral daily  senna Syrup 10 milliLiter(s) Oral at bedtime  tacrolimus 2 milliGRAM(s) Oral <User Schedule>  trimethoprim   80 mG/sulfamethoxazole 400 mG 1 Tablet(s) Oral daily  valGANciclovir 900 milliGRAM(s) Oral <User Schedule>    MEDICATIONS  (PRN):  HYDROmorphone  Injectable 0.5 milliGRAM(s) IV Push every 3 hours PRN Severe Pain (7 - 10)  HYDROmorphone  Injectable 0.25 milliGRAM(s) IV Push every 3 hours PRN Moderate Pain (4 - 6)  oxyCODONE    IR 5 milliGRAM(s) Oral every 4 hours PRN Moderate Pain (4 - 6)

## 2022-11-04 NOTE — DIETITIAN INITIAL EVALUATION ADULT - NSICDXPASTSURGICALHX_GEN_ALL_CORE_FT
PAST SURGICAL HISTORY:  History of ERCP multiple with stent insertions/replacement every 3 months ---last 5/20/2022    [place] teeth extracted 2021

## 2022-11-04 NOTE — DIETITIAN INITIAL EVALUATION ADULT - ORAL INTAKE PTA/DIET HISTORY
[patient interview pending]  Allergies: NKFA Pt is reportedly a "picky" eater, but reports no nutrition concerns or dietary restrictions PTA.  Allergies: NKFA

## 2022-11-04 NOTE — PROGRESS NOTE ADULT - SUBJECTIVE AND OBJECTIVE BOX
Follow Up:      Interval History/ROS: Patient is a 32y old  Male who presents with a chief complaint of Status post liver transplantation. Transplant ID following post liver transplant.     Patient was seen at bedside, ****    REVIEW OF SYSTEMS  [  ] ROS unobtainable because:    [ x ] All other systems negative except as noted below    Constitutional:  [ ] fever [ ] chills  [ ] weight loss  [ ]night sweat  [ ]poor appetite/PO intake [ ]fatigue   Skin:  [ ] rash [ ] phlebitis	  Eyes: [ ] icterus [ ] pain  [ ] discharge	  ENMT: [ ] sore throat  [ ] thrush [ ] ulcers [ ] exudates [ ]anosmia  Respiratory: [ ] dyspnea [ ] hemoptysis [ ] cough [ ] sputum	  Cardiovascular:  [ ] chest pain [ ] palpitations [ ] edema	  Gastrointestinal:  [ ] nausea [ ] vomiting [ ] diarrhea [ ] constipation [ ] pain	  Genitourinary:  [ ] dysuria [ ] frequency [ ] hematuria [ ] discharge [ ] flank pain  [ ] incontinence  Musculoskeletal:  [ ] myalgias [ ] arthralgias [ ] arthritis  [ ] back pain  Neurological:  [ ] headache [ ] weakness [ ] seizures  [ ] confusion/altered mental status    Allergies  No Known Allergies    ANTIMICROBIALS:    fluconAZOLE IVPB 400 daily  piperacillin/tazobactam IVPB.. 3.375 every 8 hours  trimethoprim   80 mG/sulfamethoxazole 400 mG 1 daily  valGANciclovir 900 <User Schedule>    OTHER MEDS: MEDICATIONS  (STANDING):  aspirin enteric coated 81 daily  HYDROmorphone  Injectable 0.5 every 3 hours PRN  HYDROmorphone  Injectable 0.25 every 3 hours PRN  influenza   Vaccine 0.5 once  insulin lispro (ADMELOG) corrective regimen sliding scale  every 6 hours  mycophenolate mofetil 1000 <User Schedule>  oxyCODONE    IR 5 every 4 hours PRN  pantoprazole    Tablet 40 before breakfast  polyethylene glycol 3350 17 daily  senna 2 at bedtime    Vital Signs Last 24 Hrs  T(F): 97.9 (22 @ 08:00), Max: 102.4 (22 @ 11:40)    Vital Signs Last 24 Hrs  HR: 55 (22 @ 10:00) (53 - 84)  BP: 120/58 (22 @ 10:00) (108/58 - 133/63)  RR: 14 (22 @ 10:00)  SpO2: 100% (22 @ 10:00) (92% - 100%)  Wt(kg): --    EXAM:    GA: NAD  HEENT: oral cavity no lesion  CV: nl S1/S2, no RMG  Lungs: CTAB, no distress  Abd: BS+, mildly distended, surgical incision sites with no discharge or surrounding erythema, drains in place   Ext: no edema  Neuro: No focal deficits  Skin: Intact  IV: no phlebitis    Labs:                        8.4    9.50  )-----------( 148      ( 2022 04:51 )             25.5         138  |  106  |  25<H>  ----------------------------<  117<H>  4.2   |  23  |  0.70    Ca    8.0<L>      2022 04:51  Phos  4.2       Mg     2.0         TPro  5.4<L>  /  Alb  2.6<L>  /  TBili  2.1<H>  /  DBili  1.6<H>  /  AST  58<H>  /  ALT  142<H>  /  AlkPhos  265<H>      WBC Trend:  WBC Count: 9.50 (22 @ 04:51)  WBC Count: 11.81 (22 @ 16:13)  WBC Count: 9.49 (22 @ 06:24)  WBC Count: 9.51 (22 @ 21:51)    Creatine Trend:  Creatinine, Serum: 0.70 ()  Creatinine, Serum: 0.78 ()  Creatinine, Serum: 0.77 ()  Creatinine, Serum: 0.80 ()    Liver Biochemical Testing Trend:  Alanine Aminotransferase (ALT/SGPT): 142 *H* ()  Alanine Aminotransferase (ALT/SGPT): 183 *H* ()  Alanine Aminotransferase (ALT/SGPT): 200 *H* ()  Alanine Aminotransferase (ALT/SGPT): 237 *H* ()  Alanine Aminotransferase (ALT/SGPT): 321 *H* ()  Aspartate Aminotransferase (AST/SGOT): 58 (22 @ 04:51)  Aspartate Aminotransferase (AST/SGOT): 87 (22 @ 16:13)  Aspartate Aminotransferase (AST/SGOT): 108 (22 @ 08:36)  Aspartate Aminotransferase (AST/SGOT): 155 (22 @ 20:49)  Aspartate Aminotransferase (AST/SGOT): 256 (22 @ 10:08)  Bilirubin Direct, Serum: 1.6 ()  Bilirubin Total, Serum: 2.1 ()  Bilirubin Direct, Serum: 2.0 ()  Bilirubin Total, Serum: 2.7 ()  Bilirubin Direct, Serum: 1.6 ()    Trend LDH  22 @ 11:08  172  22 @ 07:06  116  22 @ 15:48  111    Urinalysis Basic - ( 2022 12:24 )    Color: Dark Yellow / Appearance: Clear / S.040 / pH: x  Gluc: x / Ketone: Negative  / Bili: Small / Urobili: 2 mg/dL   Blood: x / Protein: 30 mg/dL / Nitrite: Negative   Leuk Esterase: Negative / RBC: 30 /hpf / WBC 9 /HPF   Sq Epi: x / Non Sq Epi: 1 /hpf / Bacteria: Negative    MICROBIOLOGY:    Culture - Blood (collected 2022 15:05)  Source: .Blood Blood  Preliminary Report:    No growth to date.    Culture - Blood (collected 2022 15:05)  Source: .Blood Blood  Preliminary Report:    No growth to date.    Culture - Urine (collected 26 Sep 2022 22:46)  Source: Clean Catch Clean Catch (Midstream)  Final Report:    No growth    Culture - Blood (collected 26 Sep 2022 14:18)  Source: .Blood Blood  Final Report:    No Growth Final    Culture - Blood (collected 26 Sep 2022 14:00)  Source: .Blood Blood  Final Report:    No Growth Final    Culture - Blood (collected 24 Sep 2022 15:53)  Source: .Blood Blood-Peripheral  Final Report:    No Growth Final    Culture - Blood (collected 24 Sep 2022 15:42)  Source: .Blood Blood-Peripheral  Final Report:    No Growth Final    Culture - Urine (collected 2022 12:19)  Source: Clean Catch None  Final Report:    <10,000 CFU/mL Normal Urogenital Gisel    Culture - Urine (collected 2021 05:27)  Source: .Urine Clean Catch (Midstream)  Final Report:    No growth    Culture - Blood (collected 2021 22:52)  Source: .Blood Blood-Peripheral  Final Report:    No Growth Final    HIV-1/2 Combo Result: Nonreact (10-31-22 @ 17:21)    CMV IgG Interpretation: Negative (10-31-22 @ 17:21)    Rapid RVP Result: NotDetec ( @ 15:23)    COVID-19 PCR: NotDetec (10-31-22 @ 17:21)  COVID-19 PCR: Detected (22 @ 15:45)    Blood Gas Venous - Lactate: 1.1 ( @ 04:44)  Blood Gas Venous - Lactate: 2.0 ( @ 16:11)  Blood Gas Venous - Lactate: 1.0 ( @ 05:50)  Blood Gas Venous - Lactate: 1.5 ( @ 21:30)    RADIOLOGY:  imaging below personally reviewed    < from: Xray Chest 1 View- PORTABLE-Routine (Xray Chest 1 View- PORTABLE-Routine in AM.) (22 @ 08:04) >    Impression:  Bibasilar patchy airspace disease, likely atelectasis.  Interval removal of enteric tube and right IJ approach central venous   catheter.    --- End of Report ---                         Follow Up:      Interval History/ROS: Patient is a 32y old  Male who presents with a chief complaint of Status post liver transplantation. Transplant ID following post liver transplant.     Patient was seen during am rounds, sitting up in chair comfortably, afebrile, abdominal pain improving.     REVIEW OF SYSTEMS  [  ] ROS unobtainable because:    [ x ] All other systems negative except as noted below    Constitutional:  [ ] fever [ ] chills  [ ] weight loss  [ ]night sweat  [ ]poor appetite/PO intake [X]fatigue   Skin:  [ ] rash [ ] phlebitis	  Eyes: [ ] icterus [ ] pain  [ ] discharge	  ENMT: [ ] sore throat  [ ] thrush [ ] ulcers [ ] exudates [ ]anosmia  Respiratory: [ ] dyspnea [ ] hemoptysis [ ] cough [ ] sputum	  Cardiovascular:  [ ] chest pain [ ] palpitations [ ] edema	  Gastrointestinal:  [ ] nausea [ ] vomiting [ ] diarrhea [ ] constipation [X] pain	  Genitourinary:  [ ] dysuria [ ] frequency [ ] hematuria [ ] discharge [ ] flank pain  [ ] incontinence  Musculoskeletal:  [ ] myalgias [ ] arthralgias [ ] arthritis  [ ] back pain  Neurological:  [ ] headache [X] weakness [ ] seizures  [ ] confusion/altered mental status    Allergies  No Known Allergies    ANTIMICROBIALS:    fluconAZOLE IVPB 400 daily  piperacillin/tazobactam IVPB.. 3.375 every 8 hours  trimethoprim   80 mG/sulfamethoxazole 400 mG 1 daily  valGANciclovir 900 <User Schedule>    OTHER MEDS: MEDICATIONS  (STANDING):  aspirin enteric coated 81 daily  HYDROmorphone  Injectable 0.5 every 3 hours PRN  HYDROmorphone  Injectable 0.25 every 3 hours PRN  influenza   Vaccine 0.5 once  insulin lispro (ADMELOG) corrective regimen sliding scale  every 6 hours  mycophenolate mofetil 1000 <User Schedule>  oxyCODONE    IR 5 every 4 hours PRN  pantoprazole    Tablet 40 before breakfast  polyethylene glycol 3350 17 daily  senna 2 at bedtime    Vital Signs Last 24 Hrs  T(F): 97.9 (22 @ 08:00), Max: 102.4 (22 @ 11:40)    Vital Signs Last 24 Hrs  HR: 55 (22 @ 10:00) (53 - 84)  BP: 120/58 (22 @ 10:00) (108/58 - 133/63)  RR: 14 (22 @ 10:00)  SpO2: 100% (22 @ 10:00) (92% - 100%)  Wt(kg): --    EXAM:    GA: NAD  HEENT: oral cavity no lesion  CV: nl S1/S2, no RMG  Lungs: CTAB, no distress  Abd: BS+, mildly distended, surgical incision sites with no discharge or surrounding erythema, VIOLETA drains in place   Ext: no edema  Neuro: No focal deficits  Skin: Intact  IV: no phlebitis    Labs:                        8.4    9.50  )-----------( 148      ( 2022 04:51 )             25.5         138  |  106  |  25<H>  ----------------------------<  117<H>  4.2   |  23  |  0.70    Ca    8.0<L>      2022 04:51  Phos  4.2       Mg     2.0         TPro  5.4<L>  /  Alb  2.6<L>  /  TBili  2.1<H>  /  DBili  1.6<H>  /  AST  58<H>  /  ALT  142<H>  /  AlkPhos  265<H>      WBC Trend:  WBC Count: 9.50 (22 @ 04:51)  WBC Count: 11.81 (22 @ 16:13)  WBC Count: 9.49 (22 @ 06:24)  WBC Count: 9.51 (22 @ 21:51)    Creatine Trend:  Creatinine, Serum: 0.70 ()  Creatinine, Serum: 0.78 ()  Creatinine, Serum: 0.77 ()  Creatinine, Serum: 0.80 ()    Liver Biochemical Testing Trend:  Alanine Aminotransferase (ALT/SGPT): 142 *H* ()  Alanine Aminotransferase (ALT/SGPT): 183 *H* ()  Alanine Aminotransferase (ALT/SGPT): 200 *H* ()  Alanine Aminotransferase (ALT/SGPT): 237 *H* ()  Alanine Aminotransferase (ALT/SGPT): 321 *H* ()  Aspartate Aminotransferase (AST/SGOT): 58 (22 @ 04:51)  Aspartate Aminotransferase (AST/SGOT): 87 (22 @ 16:13)  Aspartate Aminotransferase (AST/SGOT): 108 (22 @ 08:36)  Aspartate Aminotransferase (AST/SGOT): 155 (22 @ 20:49)  Aspartate Aminotransferase (AST/SGOT): 256 (22 @ 10:08)  Bilirubin Direct, Serum: 1.6 ()  Bilirubin Total, Serum: 2.1 ()  Bilirubin Direct, Serum: 2.0 ()  Bilirubin Total, Serum: 2.7 ()  Bilirubin Direct, Serum: 1.6 ()    Trend LDH  22 @ 11:08  172  22 @ 07:06  116  22 @ 15:48  111    Urinalysis Basic - ( 2022 12:24 )    Color: Dark Yellow / Appearance: Clear / S.040 / pH: x  Gluc: x / Ketone: Negative  / Bili: Small / Urobili: 2 mg/dL   Blood: x / Protein: 30 mg/dL / Nitrite: Negative   Leuk Esterase: Negative / RBC: 30 /hpf / WBC 9 /HPF   Sq Epi: x / Non Sq Epi: 1 /hpf / Bacteria: Negative    MICROBIOLOGY:    Culture - Blood (collected 2022 15:05)  Source: .Blood Blood  Preliminary Report:    No growth to date.    Culture - Blood (collected 2022 15:05)  Source: .Blood Blood  Preliminary Report:    No growth to date.    Culture - Urine (collected 26 Sep 2022 22:46)  Source: Clean Catch Clean Catch (Midstream)  Final Report:    No growth    Culture - Blood (collected 26 Sep 2022 14:18)  Source: .Blood Blood  Final Report:    No Growth Final    Culture - Blood (collected 26 Sep 2022 14:00)  Source: .Blood Blood  Final Report:    No Growth Final    Culture - Blood (collected 24 Sep 2022 15:53)  Source: .Blood Blood-Peripheral  Final Report:    No Growth Final    Culture - Blood (collected 24 Sep 2022 15:42)  Source: .Blood Blood-Peripheral  Final Report:    No Growth Final    Culture - Urine (collected 2022 12:19)  Source: Clean Catch None  Final Report:    <10,000 CFU/mL Normal Urogenital Gisel    Culture - Urine (collected 2021 05:27)  Source: .Urine Clean Catch (Midstream)  Final Report:    No growth    Culture - Blood (collected 2021 22:52)  Source: .Blood Blood-Peripheral  Final Report:    No Growth Final    HIV-1/2 Combo Result: Nonreact (10-31-22 @ 17:21)    CMV IgG Interpretation: Negative (10-31-22 @ 17:21)    Rapid RVP Result: NotDetec ( @ 15:23)    COVID-19 PCR: NotDetec (10-31-22 @ 17:21)  COVID- PCR: Detected (22 @ 15:45)    Blood Gas Venous - Lactate: 1.1 ( @ 04:44)  Blood Gas Venous - Lactate: 2.0 ( @ 16:11)  Blood Gas Venous - Lactate: 1.0 ( @ 05:50)  Blood Gas Venous - Lactate: 1.5 ( @ 21:30)    RADIOLOGY:  imaging below personally reviewed    < from: Xray Chest 1 View- PORTABLE-Routine (Xray Chest 1 View- PORTABLE-Routine in AM.) (22 @ 08:04) >    Impression:  Bibasilar patchy airspace disease, likely atelectasis.  Interval removal of enteric tube and right IJ approach central venous   catheter.    --- End of Report ---

## 2022-11-04 NOTE — PROGRESS NOTE ADULT - ASSESSMENT
32M PMH Cerebral palsy, Primary Sclerosing Cholangitis Cirrhosis with frequent ERCPs with biliary stent placements for biliary strictures (last in 9/2022) on liver transplant list (ABO AB), recent COVID s/p MAB in 9/2022), on POD3 s/p liver transplant.    Neurologic: Off Precedex 11/1  - Pain control w/ Dilaudid PRN  - PRN oxy added for extended pain control    Respiratory: extubated 11/1  - Incentive spirometry  - CXR showing R basilar atelectasis    Cardiovascular:  - Sinus tachycardia, resolved with pain control/ 1 unit PRBC/ ice packs    Gastrointestinal/Nutrition:  - Monitor VIOLETA output x 2  - L VIOLETA drain to be dc'd 11/3  - liver US with doppler showing patent anastamoses and good flow  - NGT removed 11/2  - NPO w/ sips and chips  - Bactrim/ Diflucan/ Valcyte per transplant  - Cellcept/ Tacro per transplant  - Protonix  - Bowel regimen    Genitourinary/Renal:  - Monitor UO  - Chapman removed 11/2  - Supplement electrolytes PRN    Hematologic:  -q12 CBC  - S/p 1u pRBCs 11/3 for Hg 8.6 -> 7.1, responsive to 7.9  -Transfuse PRN    Infectious Disease:  - Febrile to 102.4 on 11/2, currently afebrile, blood and urine cx pending results  - Transplant ID following  - Unasyn broaded to Zosyn  - diflucan/bactrim/valgancyclovir for prophylaxis    Endocrine:  -ISS    Lines:  - RUE Arrow, PIV    Dispo: SICU 32M PMH Cerebral palsy, Primary Sclerosing Cholangitis Cirrhosis with frequent ERCPs with biliary stent placements for biliary strictures (last in 9/2022) on liver transplant list (ABO AB), recent COVID s/p MAB in 9/2022), on POD4 s/p liver transplant.    Neurologic: Off Precedex 11/1  - Pain control w/ Dilaudid PRN  - PRN oxy added for extended pain control    Respiratory: extubated 11/1  - Incentive spirometry  - CXR showing R basilar atelectasis    Cardiovascular:  - Sinus tachycardia, resolved with pain control/ 1 unit PRBC/ ice packs    Gastrointestinal/Nutrition:  - Monitor VIOLETA output x 2  - L VIOLETA drain dc'd 11/3  - liver US with doppler showing patent anastamoses and good flow  - NGT removed 11/2  - NPO w/ sips and chips  - Bactrim/ Diflucan/ Valcyte per transplant  - Cellcept/ Tacro per transplant  - Protonix  - Bowel regimen    Genitourinary/Renal:  - Monitor UO  - Chapman removed 11/2  - Supplement electrolytes PRN    Hematologic:  -q12 CBC  - S/p 1u pRBCs 11/3 for Hg 8.6 -> 7.1, responsive to 7.9  -Transfuse PRN    Infectious Disease:  - Febrile to 102.4 on 11/2, currently afebrile, blood and urine cx pending results  - Transplant ID following  - Unasyn broaded to Zosyn  - diflucan/bactrim/valgancyclovir for prophylaxis    Endocrine:  -ISS    Lines:  - RUE Arrow, PIV    Dispo: SICU 32M PMH Cerebral palsy, Primary Sclerosing Cholangitis Cirrhosis with frequent ERCPs with biliary stent placements for biliary strictures (last in 9/2022) on liver transplant list (ABO AB), recent COVID s/p MAB in 9/2022), on POD4 s/p liver transplant.    Neurologic: Off Precedex 11/1  - Pain control w/ Dilaudid PRN  - PRN oxy added for extended pain control    Respiratory: extubated 11/1  - Incentive spirometry  - CXR showing R basilar atelectasis    Cardiovascular:  - Sinus tachycardia, resolved with pain control/ 1 unit PRBC/ ice packs    Gastrointestinal/Nutrition:  - Monitor VIOLETA output x 2  - L VIOLETA drain dc'd 11/3  - liver US with doppler showing patent anastamoses and good flow  - NGT removed 11/2  - NPO w/ sips and chips  - Bactrim/ Diflucan/ Valcyte per transplant  - Cellcept/ Tacro per transplant  - Protonix  - Bowel regimen    Genitourinary/Renal:  - Monitor UO  - Chapman removed 11/2  - Supplement electrolytes PRN    Hematologic:  -q12 CBC  - S/p 1u pRBCs 11/3 for Hg 8.6 -> 7.1, responsive to 7.9  -Transfuse PRN    Infectious Disease:  - Febrile to 102.4 on 11/2, currently afebrile, blood and urine cx NGTD 11/4  - Transplant ID following  - Unasyn broaded to Zosyn  - diflucan/bactrim/valgancyclovir for prophylaxis    Endocrine:  -ISS    Lines:  - RUE Arrow, PIV    Dispo: SICU 32M PMH Cerebral palsy, Primary Sclerosing Cholangitis Cirrhosis with frequent ERCPs with biliary stent placements for biliary strictures (last in 9/2022) on liver transplant list (ABO AB), recent COVID s/p MAB in 9/2022), on POD4 s/p liver transplant.    Neurologic: Off Precedex 11/1  - Pain control w/ Dilaudid PRN  - PRN oxy added for extended pain control    Respiratory: extubated 11/1  - Incentive spirometry  - CXR showing R basilar atelectasis    Cardiovascular:  - Sinus tachycardia, resolved with pain control/ 1 unit PRBC/ ice packs    Gastrointestinal/Nutrition:  - Monitor VIOLETA output x 2  - L VIOLETA drain dc'd 11/3  - liver US with doppler showing patent anastamoses and good flow  - NGT removed 11/2  - Clear liquid diet  - Bactrim/ Diflucan/ Valcyte per transplant  - Cellcept/ Tacro per transplant  - Protonix  - Bowel regimen    Genitourinary/Renal:  - Monitor UO  - Chapman removed 11/2  - Supplement electrolytes PRN    Hematologic:  -q12 CBC  - S/p 1u pRBCs 11/3 for Hg 8.6 -> 7.1, responsive to 7.9  -Transfuse PRN    Infectious Disease:  - Febrile to 102.4 on 11/2, currently afebrile, blood and urine cx NGTD 11/4  - Transplant ID following  - Unasyn broaded to Zosyn  - diflucan/bactrim/valgancyclovir for prophylaxis    Endocrine:  -ISS    Lines:  - RUE Arrow, PIV    Dispo: SICU

## 2022-11-04 NOTE — DIETITIAN INITIAL EVALUATION ADULT - ETIOLOGY
inability to need increased nutrition needs for surgical healing in setting of NPO status The patient is a 36y Male complaining of flank pain. limited prior education on post-transplant nutrition therapy and food safety guidelines

## 2022-11-04 NOTE — PROGRESS NOTE ADULT - ASSESSMENT
32 year old male with PMHx of Cerebral palsy, Primary Sclerosing Cholangitis Cirrhosis with frequent ERCPs with biliary stent placements for biliary strictures (last in 9/2022) on liver transplant list, recent COVID s/p MAB in (9/2022) admitted for liver transplant s/p liver transplant on 10/31. Transplant ID consulted for fevers.     Afebrile  Leukocytosis trending up  LFTs improving   Covid test neg   CXR: clear lungs  On Unasyn periop   On Valcyte, Bactrim and Fluconazole PPx     #Fevers - ?post op fevers, R/O other infectious etiologies post transplant   #s/p OLT on 10/31  #CMV +/- high risk    Recommendations:   Remains afebrile  Can continue Zosyn 3.375 g IV q 8hrs  F/up final blood Cx   If continues to spike fevers, will likely need CT AP   Bactrim for PCP PPx, Valcyte for CMV PPx         32 year old male with PMHx of Cerebral palsy, Primary Sclerosing Cholangitis Cirrhosis with frequent ERCPs with biliary stent placements for biliary strictures (last in 9/2022) on liver transplant list, recent COVID s/p MAB in (9/2022) admitted for liver transplant s/p liver transplant on 10/31. Transplant ID consulted for fevers.     Afebrile  Leukocytosis trending up  LFTs improving   Covid test neg   CXR: clear lungs  On Unasyn periop   On Valcyte, Bactrim and Fluconazole PPx     #Fevers - ?post op fevers, R/O other infectious etiologies post transplant   #s/p OLT on 10/31  #CMV +/- high risk    Recommendations:   Remains afebrile  Continue Zosyn 3.375 g IV q 8hrs - can complete 7 days Duration    F/up final blood Cx   If continues to spike fevers, will likely need CT AP   Bactrim for PCP PPx, Valcyte for CMV PPx    Seen and discussed with Dr Anant Hayes MD, PGY4  ID fellow  [employer] Teams Preferred  After 5pm/weekends call [phone]       32 year old male with PMHx of Cerebral palsy, Primary Sclerosing Cholangitis Cirrhosis with frequent ERCPs with biliary stent placements for biliary strictures (last in 9/2022) on liver transplant list, recent COVID s/p MAB in (9/2022) admitted for liver transplant s/p liver transplant on 10/31. Transplant ID consulted for fevers.     Afebrile  Leukocytosis trending up  LFTs improving   Covid test neg   CXR: clear lungs  Was on Unasyn periop   On Valcyte, Bactrim and Fluconazole PPx     #Fevers - ?post op fevers, R/O other infectious etiologies post transplant   #s/p OLT on 10/31  #CMV +/- high risk    Recommendations:   Remains afebrile  Continue Zosyn 3.375 g IV q 8hrs - can complete 7 days Duration    F/up final blood Cx   If continues to spike fevers, will likely need CT AP   Bactrim for PCP PPx, Valcyte for CMV PPx    Seen and discussed with Dr Anant Hayes MD, PGY4  ID fellow  [employer] Teams Preferred  After 5pm/weekends call [phone]

## 2022-11-04 NOTE — PROGRESS NOTE ADULT - ATTENDING COMMENTS
Seen with ID fellow, Dr. Jose ricardo.    Patient s/p OLT on 11/1/22   S/p high grade fevers 48H post-OLT  Continue zosyn   RVP was negative  If continued fevers would check CT A/P    Please call ID service over the weekend with any questions or change in status    Barbara Ny MD  [phone] (pager)  [phone] (office)
32y Male with PMHx cerebral palsy, Primary Sclerosing Cholangitis Cirrhosis s/p orthotopic liver transplantation () from a  donor under steroid induction.    LFT improving,   Awake and alert  Saturating well on RA  CXR no acute finding  Hemodynamically stable  On clears  IVF plasmalyte, renal function electrolytes wnl  Pt afebrile, tachycardia resolved  Pan cultured neg so far,   Abx Zosyn  PPX Fluc, Bactrim, Valcyte dose increased  Anti rej meds Solumedrol/Pred, Mycophenolate, Tacro dose adj for high level    Mechanical DVT ppx
32y Male with PMHx cerebral palsy, Primary Sclerosing Cholangitis Cirrhosis s/p orthotopic liver transplantation () from a  donor under steroid induction.    LFT improving, good hepatic blood flow on US  Awake and alert  Tolerating extubation  CXR no acute finding  Hemodynamically stable  NPO, NGT  IVF NS, resolving hyponatremia  Pt febrile, tachycardia did not respond to fluid bolus  Pan cultured  Abx Unasyn changed to Zosyn  PPX Fluc, Bactrim, Valcyte  Anti rej meds Solumedrol, Mycophenolate, Tacro    Mechanical DVT ppx
32y Male with PMHx cerebral palsy, Primary Sclerosing Cholangitis Cirrhosis s/p orthotopic liver transplantation () from a  donor under steroid induction.    LFT improving, good hepatic blood flow on US  Awake and alert  Saturating well on RA  CXR no acute finding  Hemodynamically stable  On sips, NGT discontinued  IVF NS, resolving hyponatremia  Pt afebrile, tachycardia resolved  Pan cultured neg so far,   Abx Zosyn  PPX Fluc, Bactrim, Valcyte dose increased  Anti rej meds Solumedrol/Pred, Mycophenolate, Tacro    Mechanical DVT ppx

## 2022-11-04 NOTE — DIETITIAN INITIAL EVALUATION ADULT - REASON INDICATOR FOR ASSESSMENT
Nutrition Assessment warranted for length of stay on SICU and s/p Liver Transplant 11/1/22.  Information obtained from: medical record, communication with team.  Nutrition Assessment warranted for length of stay on SICU and s/p Liver Transplant 11/1/22.  Information obtained from: patient, family at bedside, medical record, communication with team.

## 2022-11-04 NOTE — DIETITIAN INITIAL EVALUATION ADULT - REASON FOR ADMISSION
Status post liver transplantation    Per chart: "32M PMH Cerebral palsy, Primary Sclerosing Cholangitis Cirrhosis with frequent ERCPs with biliary stent placements for biliary strictures (last in 9/2022) on liver transplant list (ABO AB), recent COVID s/p MAB in 9/2022), on POD4 s/p liver transplant."    Interim Events: extubated 11/1; NGT removed 11/2; pt remains NPO with sips/chips

## 2022-11-04 NOTE — DIETITIAN INITIAL EVALUATION ADULT - PERTINENT LABORATORY DATA
11-04    138  |  106  |  25<H>  ----------------------------<  117<H>  4.2   |  23  |  0.70    Ca    8.0<L>      04 Nov 2022 04:51  Phos  4.2     11-04  Mg     2.0     11-04    TPro  5.4<L>  /  Alb  2.6<L>  /  TBili  2.1<H>  /  DBili  1.6<H>  /  AST  58<H>  /  ALT  142<H>  /  AlkPhos  265<H>  11-04  POCT Blood Glucose.: 119 mg/dL (11-04-22 @ 04:36)

## 2022-11-04 NOTE — PROGRESS NOTE ADULT - NS ATTEND AMEND GEN_ALL_CORE FT
making good progress  lfts trending down  started on  clear liquid diet  immunosuppression tacro, mmf and steroids

## 2022-11-04 NOTE — DIETITIAN INITIAL EVALUATION ADULT - EDUCATION DIETARY MODIFICATIONS
- Reviewed post transplant nutrition therapy and food safety guidelines for transplant recipients.   - Reviewed recommendations to avoid grapefruit, pomegranate and star fruit while taking immunosuppressant medication.   - Reviewed recommendations for moderate intake of sodium and carbohydrates with transplant medications.   - Pt was receptive and expressed understanding./teach back/(2) meets goals/outcomes/verbalization

## 2022-11-04 NOTE — DIETITIAN INITIAL EVALUATION ADULT - PATIENT MEETS CRITERIA FOR MALNUTRITION
Pt arrived via bls. Hx of multiple falls. Pt had a fall this morning, fell backwards, used a walker to move around. Denied hitting head. No neck pain. Pt has pain and abrasion on the R flank. Pt is on plavix.   Patient was oriented to the room. Call light in reach, instructed on use. Bed in lowest position and wheels are locked. Side rails are up for safety. Patient instructed not to get up without assistance.       yes no

## 2022-11-04 NOTE — PROGRESS NOTE ADULT - ASSESSMENT
32M PMH Cerebral palsy, Primary Sclerosing Cholangitis Cirrhosis with frequent ERCPs with biliary stent placements for biliary strictures (last in 9/2022) on liver transplant list (ABO AB), recent COVID s/p MAB in 9/2022) admitted for liver transplant    OLT  Pain control PRN  Tachycardia improved, response to PRBC; pain is also related to the tachycardia  Monitor for fevers, afebrile for now  Advance to CLD  LFTs downtrending  US with patent vessels  Strict I/Os. Monitor drain outputs  Dressing changed at bedside, keep incision open to air; VIOLETA sites covered with gauze and tape  SCDs  Continue Miralax and Senna; give Glycerin suppositories  IS q1hour WA  OOBC/PT   ASA 81 mg daily  Continue Zosyn  Immunosuppression: Tacro 1mg/1mg, MMF 1000mg BID, Steroid taper  PPX: Fluconazole Bactrim, Valcyte  Trend labs CBC, CMP, Mag, Pos, Tacro level    Plan discussed with Dr. Godinez.

## 2022-11-04 NOTE — DIETITIAN INITIAL EVALUATION ADULT - NS FNS DIET ORDER
Diet, NPO:   Except Medications  With Ice Chips/Sips of Water (11-02-22 @ 09:02) [Active]       Diet, Clear Liquid (11-04-22 @ 10:58) [Active]

## 2022-11-04 NOTE — PROGRESS NOTE ADULT - SUBJECTIVE AND OBJECTIVE BOX
Transplant Surgery - Multidisciplinary Rounds  --------------------------------------------------------------   Donor OLTx      Date:  2022      POD# 4    Present:   Patient seen and examined with multidisciplinary Transplant team including  Surgeon: Dr. Godinez. Hepatologist: Mercedes, Dr. Long Pharmacist: Dean,  Resident : Dr. Mcneil, PGY3. NP: Leila and RNs in AM rounds.   Disciplines not in attendance will be notified of the plan.     HPI: 32M PMH Cerebral palsy, Primary Sclerosing Cholangitis Cirrhosis with frequent ERCPs with biliary stent placements for biliary strictures (last in 2022) on liver transplant list (ABO AB), recent COVID s/p MAB in 2022) admitted for liver transplant    OR   S/P Orthotopic liver transplantation from a  donor. Steroid induction  Side-side Cavocaval anastomosis  End-end portoportal anastomosis  End-end single arterial anastomosis  Velasquez en-Y HJ with internal stenting & Side-side JJ      Interval Events:  - dced lat abdiel  -  tac level 1.9 -  incr fk 2/2 (from ), stat 1mg given        Immunosuppression:   Induction:    Methylpred                                           Maintenance immunosuppression: FK 1/1mg, MMF 100mg BID, SST  Ongoing monitoring for signs of rejection.      Potential Discharge date: pending clinical improvement    Education:  Medications    Plan of care:  See Below       MEDICATIONS  (STANDING):  aspirin enteric coated 81 milliGRAM(s) Oral daily  calcium carbonate 1250 mG  + Vitamin D (OsCal 500 + D) 1 Tablet(s) Oral two times a day  chlorhexidine 2% Cloths 1 Application(s) Topical <User Schedule>  fluconAZOLE IVPB 400 milliGRAM(s) IV Intermittent daily  influenza   Vaccine 0.5 milliLiter(s) IntraMuscular once  insulin lispro (ADMELOG) corrective regimen sliding scale   SubCutaneous every 6 hours  multiple electrolytes Injection Type 1 1000 milliLiter(s) (75 mL/Hr) IV Continuous <Continuous>  mycophenolate mofetil 1000 milliGRAM(s) Oral <User Schedule>  pantoprazole    Tablet 40 milliGRAM(s) Oral before breakfast  piperacillin/tazobactam IVPB.. 3.375 Gram(s) IV Intermittent every 8 hours  polyethylene glycol 3350 17 Gram(s) Oral daily  senna 2 Tablet(s) Oral at bedtime  trimethoprim   80 mG/sulfamethoxazole 400 mG 1 Tablet(s) Oral daily  valGANciclovir 900 milliGRAM(s) Oral <User Schedule>    MEDICATIONS  (PRN):  HYDROmorphone  Injectable 0.5 milliGRAM(s) IV Push every 3 hours PRN Severe Pain (7 - 10)  HYDROmorphone  Injectable 0.25 milliGRAM(s) IV Push every 3 hours PRN Moderate Pain (4 - 6)  oxyCODONE    IR 5 milliGRAM(s) Oral every 4 hours PRN Moderate Pain (4 - 6)      PAST MEDICAL & SURGICAL HISTORY:  Cerebral palsy      PSC (primary sclerosing cholangitis)  On liver transplant list      Abnormal LFTs      Bile duct stricture      Dental caries      Impacted teeth  wisdom teeth      Phimosis      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      Anemia      History of ERCP  multiple with stent insertions/replacement every 3 months ---last 2022      [place] teeth extracted            Vital Signs Last 24 Hrs  T(C): 36.4 (2022 12:00), Max: 37 (2022 15:00)  T(F): 97.6 (2022 12:00), Max: 98.6 (2022 15:00)  HR: 69 (2022 13:00) (53 - 84)  BP: 121/62 (2022 13:00) (108/58 - 133/63)  BP(mean): 87 (2022 13:00) (76 - 97)  RR: 19 (2022 13:00) (12 - 23)  SpO2: 99% (2022 13:00) (94% - 100%)    Parameters below as of 2022 12:00  Patient On (Oxygen Delivery Method): room air        I&O's Summary    2022 07:01  -  2022 07:00  --------------------------------------------------------  IN: 2355 mL / OUT: 1690 mL / NET: 665 mL    2022 07:01  -  2022 13:26  --------------------------------------------------------  IN: 1215 mL / OUT: 210 mL / NET: 1005 mL                              8.4    9.50  )-----------( 148      ( 2022 04:51 )             25.5         138  |  106  |  25<H>  ----------------------------<  117<H>  4.2   |  23  |  0.70    Ca    8.0<L>      2022 04:51  Phos  4.2       Mg     2.0         TPro  5.4<L>  /  Alb  2.6<L>  /  TBili  2.1<H>  /  DBili  1.6<H>  /  AST  58<H>  /  ALT  142<H>  /  AlkPhos  265<H>      Tacrolimus (), Serum: 14.9 ng/mL ( @ 06:58)        Culture - Blood (collected 22 @ 15:05)  Source: .Blood Blood  Preliminary Report (22 @ 23:01):    No growth to date.    Culture - Blood (collected 22 @ 15:05)  Source: .Blood Blood  Preliminary Report (22 @ 23:01):    No growth to date.    Review of systems  Gen: No weight changes, fatigue, fevers/chills, weakness  Skin: No rashes  Head/Eyes/Ears/Mouth: No headache; Normal hearing; Normal vision w/o blurriness; No sinus pain/discomfort, sore throat  Respiratory: No dyspnea, cough, wheezing, hemoptysis  CV: No chest pain, PND, orthopnea  GI: C/O mild abdominal pain at surgical site. no diarrhea, constipation, nausea, vomiting, melena, hematochezia  : No increased frequency, dysuria, hematuria, nocturia  MSK: No joint pain/swelling; no back pain; no edema  Neuro: No dizziness/lightheadedness, weakness, seizures, numbness, tingling  Heme: No easy bruising or bleeding  Endo: No heat/cold intolerance  Psych: No significant nervousness, anxiety, stress, depression  All other systems were reviewed and are negative, except as noted.      PHYSICAL EXAM:  Constitutional: Well developed / well nourished  Eyes: icteric, normal eyelids  ENMT: nc/at, no thrush  Neck: supple, central line  Respiratory: CTA B/L  Cardiovascular: regular rhythm, tachycardic to 130s  Gastrointestinal: Soft abdomen, ND, appropriate incisional TTP. Chevron incision c/d/i, staples in place. No signs of infection. JPs on the right SS left ABDIEL serous  Genitourinary: Urinary catheter in place; normal urine color  Extremities: SCD's in place and working bilaterally, some edema  Vascular: Palpable dp pulses bilaterally.   Neurological: A&O x3  Skin: no rashes, no lesions  Musculoskeletal: Moving all extremities, no edema  Psychiatric: Responsive

## 2022-11-04 NOTE — PROGRESS NOTE ADULT - SUBJECTIVE AND OBJECTIVE BOX
24 HOUR EVENTS:  PO oxycodone added to pain regimen   Remains sips and chips only    SUBJECTIVE/ROS:  [X ] A ten-point review of systems was otherwise negative except as noted.  [ ] Due to altered mental status/intubation, subjective information were not able to be obtained from the patient. History was obtained, to the extent possible, from review of the chart and collateral sources of information.      NEURO  Exam: AOx3. Follows commands. Moves all extremities.   Meds: HYDROmorphone  Injectable 0.5 milliGRAM(s) IV Push every 3 hours PRN Severe Pain (7 - 10)  HYDROmorphone  Injectable 0.25 milliGRAM(s) IV Push every 3 hours PRN Moderate Pain (4 - 6)  oxyCODONE    IR 5 milliGRAM(s) Oral every 4 hours PRN Moderate Pain (4 - 6)    [x] Adequacy of sedation and pain control has been assessed and adjusted      RESPIRATORY  RR: 20 (11-03-22 @ 23:00) (13 - 29)  SpO2: 98% (11-03-22 @ 23:00) (91% - 100%)  Wt(kg): --  Exam: unlabored, clear to auscultation bilaterally  Meds:       CARDIOVASCULAR  HR: 60 (11-03-22 @ 23:00) (54 - 106)  BP: 128/65 (11-03-22 @ 23:00) (107/66 - 130/63)  BP(mean): 91 (11-03-22 @ 23:00) (78 - 97)  ABP: --  ABP(mean): --  Wt(kg): --  CVP(cm H2O): --  VBG - ( 03 Nov 2022 16:11 )  pH: 7.39  /  pCO2: 44    /  pO2: 32    / HCO3: 27    / Base Excess: 1.4   /  SaO2: 52.2   Lactate: 2.0      Exam: S1S2. No murmurs, rubs, gallops appreciated.   Cardiac Rhythm:  Perfusion     [ X ]Adequate   [ ]Inadequate  Mentation   [ X ]Normal       [ ]Reduced  Extremities  [ X ]Warm         [ ]Cool  Volume Status [ ]Hypervolemic [ X ]Euvolemic [ ]Hypovolemic  Meds:       GI/NUTRITION  Exam: Soft, non-distended, non-tender. JPx2  Diet: Sips and chips  Meds: pantoprazole  Injectable 40 milliGRAM(s) IV Push daily  polyethylene glycol 3350 17 Gram(s) Oral daily  senna Syrup 10 milliLiter(s) Oral at bedtime      GENITOURINARY  I&O's Jeff    11-02 @ 07:01 - 11-03 @ 07:00  --------------------------------------------------------  IN:    Albumin 25%  -  50 mL: 100 mL    IV PiggyBack: 100 mL    IV PiggyBack: 550 mL    multiple electrolytes Injection Type 1.: 1800 mL    PRBCs (Packed Red Blood Cells): 300 mL    Sodium Chloride 0.9% Bolus: 500 mL  Total IN: 3350 mL    OUT:    Bulb (mL): 50 mL    Bulb (mL): 0 mL    Bulb (mL): 30 mL    Indwelling Catheter - Urethral (mL): 180 mL    Nasogastric/Oral tube (mL): 75 mL    Voided (mL): 675 mL  Total OUT: 1010 mL    Total NET: 2340 mL      11-03 @ 07:01 - 11-04 @ 01:50  --------------------------------------------------------  IN:    IV PiggyBack: 100 mL    IV PiggyBack: 300 mL    multiple electrolytes Injection Type 1.: 1275 mL    Oral Fluid: 80 mL  Total IN: 1755 mL    OUT:    Bulb (mL): 0 mL    Bulb (mL): 165 mL    Bulb (mL): 90 mL    Voided (mL): 775 mL  Total OUT: 1030 mL    Total NET: 725 mL          11-03    141  |  107  |  23  ----------------------------<  128<H>  4.5   |  24  |  0.78    Ca    8.3<L>      03 Nov 2022 16:13  Phos  3.6     11-03  Mg     2.2     11-03    TPro  6.0  /  Alb  2.9<L>  /  TBili  2.7<H>  /  DBili  2.0<H>  /  AST  87<H>  /  ALT  183<H>  /  AlkPhos  236<H>  11-03    [ ] Chapman catheter, indication: N/A  Meds: calcium carbonate 1250 mG  + Vitamin D (OsCal 500 + D) 1 Tablet(s) Oral two times a day  multiple electrolytes Injection Type 1 1000 milliLiter(s) IV Continuous <Continuous>        HEMATOLOGIC  Meds: aspirin enteric coated 81 milliGRAM(s) Oral daily    [x] VTE Prophylaxis                        8.7    11.81 )-----------( 139      ( 03 Nov 2022 16:13 )             26.8     PT/INR - ( 03 Nov 2022 16:13 )   PT: 18.8 sec;   INR: 1.61 ratio         PTT - ( 03 Nov 2022 16:13 )  PTT:24.0 sec  Transfusion     [ ] PRBC   [ ] Platelets   [ ] FFP   [ ] Cryoprecipitate      INFECTIOUS DISEASES  T(C): 36.8 (11-03-22 @ 23:00), Max: 37 (11-03-22 @ 07:00)  Wt(kg): --  WBC Count: 11.81 K/uL (11-03 @ 16:13)  WBC Count: 9.49 K/uL (11-03 @ 06:24)    Recent Cultures:  Specimen Source: .Blood Blood, 11-02 @ 15:05; Results   No growth to date.; Gram Stain: --; Organism: --    Meds: fluconAZOLE IVPB 400 milliGRAM(s) IV Intermittent daily  influenza   Vaccine 0.5 milliLiter(s) IntraMuscular once  mycophenolate mofetil 1000 milliGRAM(s) Oral <User Schedule>  piperacillin/tazobactam IVPB.. 3.375 Gram(s) IV Intermittent every 8 hours  tacrolimus 2 milliGRAM(s) Oral <User Schedule>  trimethoprim   80 mG/sulfamethoxazole 400 mG 1 Tablet(s) Oral daily  valGANciclovir 900 milliGRAM(s) Oral <User Schedule>        ENDOCRINE  Capillary Blood Glucose    Meds: insulin lispro (ADMELOG) corrective regimen sliding scale   SubCutaneous every 6 hours  methylPREDNISolone sodium succinate Injectable 60 milliGRAM(s) IV Push once        ACCESS DEVICES:  [ X ] Peripheral IV  [ ] Central Venous Line	[ ] R	[ ] L	[ ] IJ	[ ] Fem	[ ] SC	Placed:   [ ] Arterial Line		[ ] R	[ ] L	[ ] Fem	[ ] Rad	[ ] Ax	Placed:   [ ] PICC:					[ ] Mediport  [ ] Urinary Catheter, Date Placed:   [ ] Necessity of urinary, arterial, and venous catheters discussed    OTHER MEDICATIONS:  chlorhexidine 2% Cloths 1 Application(s) Topical <User Schedule>      CODE STATUS: Full   24 HOUR EVENTS:  PO oxycodone added to pain regimen   L VIOLETA drain dc'd  Remains sips and chips only  +BM, flatus overnight    SUBJECTIVE/ROS:  [X ] A ten-point review of systems was otherwise negative except as noted.  [ ] Due to altered mental status/intubation, subjective information were not able to be obtained from the patient. History was obtained, to the extent possible, from review of the chart and collateral sources of information.      NEURO  Exam: AOx3. Follows commands. Moves all extremities.   Meds: HYDROmorphone  Injectable 0.5 milliGRAM(s) IV Push every 3 hours PRN Severe Pain (7 - 10)  HYDROmorphone  Injectable 0.25 milliGRAM(s) IV Push every 3 hours PRN Moderate Pain (4 - 6)  oxyCODONE    IR 5 milliGRAM(s) Oral every 4 hours PRN Moderate Pain (4 - 6)    [x] Adequacy of sedation and pain control has been assessed and adjusted      RESPIRATORY  RR: 20 (11-03-22 @ 23:00) (13 - 29)  SpO2: 98% (11-03-22 @ 23:00) (91% - 100%)  Wt(kg): --  Exam: unlabored, clear to auscultation bilaterally  Meds:       CARDIOVASCULAR  HR: 60 (11-03-22 @ 23:00) (54 - 106)  BP: 128/65 (11-03-22 @ 23:00) (107/66 - 130/63)  BP(mean): 91 (11-03-22 @ 23:00) (78 - 97)  ABP: --  ABP(mean): --  Wt(kg): --  CVP(cm H2O): --  VBG - ( 03 Nov 2022 16:11 )  pH: 7.39  /  pCO2: 44    /  pO2: 32    / HCO3: 27    / Base Excess: 1.4   /  SaO2: 52.2   Lactate: 2.0      Exam: S1S2. No murmurs, rubs, gallops appreciated.   Cardiac Rhythm:  Perfusion     [ X ]Adequate   [ ]Inadequate  Mentation   [ X ]Normal       [ ]Reduced  Extremities  [ X ]Warm         [ ]Cool  Volume Status [ ]Hypervolemic [ X ]Euvolemic [ ]Hypovolemic  Meds:       GI/NUTRITION  Exam: Soft, non-distended, non-tender. JPx2  Diet: Sips and chips  Meds: pantoprazole  Injectable 40 milliGRAM(s) IV Push daily  polyethylene glycol 3350 17 Gram(s) Oral daily  senna Syrup 10 milliLiter(s) Oral at bedtime      GENITOURINARY  I&O's Detail    11-02 @ 07:01  -  11-03 @ 07:00  --------------------------------------------------------  IN:    Albumin 25%  -  50 mL: 100 mL    IV PiggyBack: 100 mL    IV PiggyBack: 550 mL    multiple electrolytes Injection Type 1.: 1800 mL    PRBCs (Packed Red Blood Cells): 300 mL    Sodium Chloride 0.9% Bolus: 500 mL  Total IN: 3350 mL    OUT:    Bulb (mL): 50 mL    Bulb (mL): 0 mL    Bulb (mL): 30 mL    Indwelling Catheter - Urethral (mL): 180 mL    Nasogastric/Oral tube (mL): 75 mL    Voided (mL): 675 mL  Total OUT: 1010 mL    Total NET: 2340 mL      11-03 @ 07:01  -  11-04 @ 01:50  --------------------------------------------------------  IN:    IV PiggyBack: 100 mL    IV PiggyBack: 300 mL    multiple electrolytes Injection Type 1.: 1275 mL    Oral Fluid: 80 mL  Total IN: 1755 mL    OUT:    Bulb (mL): 0 mL    Bulb (mL): 165 mL    Bulb (mL): 90 mL    Voided (mL): 775 mL  Total OUT: 1030 mL    Total NET: 725 mL          11-03    141  |  107  |  23  ----------------------------<  128<H>  4.5   |  24  |  0.78    Ca    8.3<L>      03 Nov 2022 16:13  Phos  3.6     11-03  Mg     2.2     11-03    TPro  6.0  /  Alb  2.9<L>  /  TBili  2.7<H>  /  DBili  2.0<H>  /  AST  87<H>  /  ALT  183<H>  /  AlkPhos  236<H>  11-03    [ ] Chapman catheter, indication: N/A  Meds: calcium carbonate 1250 mG  + Vitamin D (OsCal 500 + D) 1 Tablet(s) Oral two times a day  multiple electrolytes Injection Type 1 1000 milliLiter(s) IV Continuous <Continuous>        HEMATOLOGIC  Meds: aspirin enteric coated 81 milliGRAM(s) Oral daily    [x] VTE Prophylaxis                        8.7    11.81 )-----------( 139      ( 03 Nov 2022 16:13 )             26.8     PT/INR - ( 03 Nov 2022 16:13 )   PT: 18.8 sec;   INR: 1.61 ratio         PTT - ( 03 Nov 2022 16:13 )  PTT:24.0 sec  Transfusion     [ ] PRBC   [ ] Platelets   [ ] FFP   [ ] Cryoprecipitate      INFECTIOUS DISEASES  T(C): 36.8 (11-03-22 @ 23:00), Max: 37 (11-03-22 @ 07:00)  Wt(kg): --  WBC Count: 11.81 K/uL (11-03 @ 16:13)  WBC Count: 9.49 K/uL (11-03 @ 06:24)    Recent Cultures:  Specimen Source: .Blood Blood, 11-02 @ 15:05; Results   No growth to date.; Gram Stain: --; Organism: --    Meds: fluconAZOLE IVPB 400 milliGRAM(s) IV Intermittent daily  influenza   Vaccine 0.5 milliLiter(s) IntraMuscular once  mycophenolate mofetil 1000 milliGRAM(s) Oral <User Schedule>  piperacillin/tazobactam IVPB.. 3.375 Gram(s) IV Intermittent every 8 hours  tacrolimus 2 milliGRAM(s) Oral <User Schedule>  trimethoprim   80 mG/sulfamethoxazole 400 mG 1 Tablet(s) Oral daily  valGANciclovir 900 milliGRAM(s) Oral <User Schedule>        ENDOCRINE  Capillary Blood Glucose    Meds: insulin lispro (ADMELOG) corrective regimen sliding scale   SubCutaneous every 6 hours  methylPREDNISolone sodium succinate Injectable 60 milliGRAM(s) IV Push once        ACCESS DEVICES:  [ X ] Peripheral IV  [ ] Central Venous Line	[ ] R	[ ] L	[ ] IJ	[ ] Fem	[ ] SC	Placed:   [ ] Arterial Line		[ ] R	[ ] L	[ ] Fem	[ ] Rad	[ ] Ax	Placed:   [ ] PICC:					[ ] Mediport  [ ] Urinary Catheter, Date Placed:   [ ] Necessity of urinary, arterial, and venous catheters discussed    OTHER MEDICATIONS:  chlorhexidine 2% Cloths 1 Application(s) Topical <User Schedule>      CODE STATUS: Full   24 HOUR EVENTS:  PO oxycodone added to pain regimen   L VIOLETA drain dc'd  +BM, flatus overnight  Diet advanced to clears    SUBJECTIVE/ROS:  [X ] A ten-point review of systems was otherwise negative except as noted.  [ ] Due to altered mental status/intubation, subjective information were not able to be obtained from the patient. History was obtained, to the extent possible, from review of the chart and collateral sources of information.      NEURO  Exam: AOx3. Follows commands. Moves all extremities.   Meds: HYDROmorphone  Injectable 0.5 milliGRAM(s) IV Push every 3 hours PRN Severe Pain (7 - 10)  HYDROmorphone  Injectable 0.25 milliGRAM(s) IV Push every 3 hours PRN Moderate Pain (4 - 6)  oxyCODONE    IR 5 milliGRAM(s) Oral every 4 hours PRN Moderate Pain (4 - 6)    [x] Adequacy of sedation and pain control has been assessed and adjusted      RESPIRATORY  RR: 20 (11-03-22 @ 23:00) (13 - 29)  SpO2: 98% (11-03-22 @ 23:00) (91% - 100%)  Wt(kg): --  Exam: unlabored, clear to auscultation bilaterally  Meds:       CARDIOVASCULAR  HR: 60 (11-03-22 @ 23:00) (54 - 106)  BP: 128/65 (11-03-22 @ 23:00) (107/66 - 130/63)  BP(mean): 91 (11-03-22 @ 23:00) (78 - 97)  ABP: --  ABP(mean): --  Wt(kg): --  CVP(cm H2O): --  VBG - ( 03 Nov 2022 16:11 )  pH: 7.39  /  pCO2: 44    /  pO2: 32    / HCO3: 27    / Base Excess: 1.4   /  SaO2: 52.2   Lactate: 2.0      Exam: S1S2. No murmurs, rubs, gallops appreciated.   Cardiac Rhythm:  Perfusion     [ X ]Adequate   [ ]Inadequate  Mentation   [ X ]Normal       [ ]Reduced  Extremities  [ X ]Warm         [ ]Cool  Volume Status [ ]Hypervolemic [ X ]Euvolemic [ ]Hypovolemic  Meds:       GI/NUTRITION  Exam: Soft, non-distended, non-tender. JPx2  Diet: Sips and chips  Meds: pantoprazole  Injectable 40 milliGRAM(s) IV Push daily  polyethylene glycol 3350 17 Gram(s) Oral daily  senna Syrup 10 milliLiter(s) Oral at bedtime      GENITOURINARY  I&O's Detail    11-02 @ 07:01  -  11-03 @ 07:00  --------------------------------------------------------  IN:    Albumin 25%  -  50 mL: 100 mL    IV PiggyBack: 100 mL    IV PiggyBack: 550 mL    multiple electrolytes Injection Type 1.: 1800 mL    PRBCs (Packed Red Blood Cells): 300 mL    Sodium Chloride 0.9% Bolus: 500 mL  Total IN: 3350 mL    OUT:    Bulb (mL): 50 mL    Bulb (mL): 0 mL    Bulb (mL): 30 mL    Indwelling Catheter - Urethral (mL): 180 mL    Nasogastric/Oral tube (mL): 75 mL    Voided (mL): 675 mL  Total OUT: 1010 mL    Total NET: 2340 mL      11-03 @ 07:01 - 11-04 @ 01:50  --------------------------------------------------------  IN:    IV PiggyBack: 100 mL    IV PiggyBack: 300 mL    multiple electrolytes Injection Type 1.: 1275 mL    Oral Fluid: 80 mL  Total IN: 1755 mL    OUT:    Bulb (mL): 0 mL    Bulb (mL): 165 mL    Bulb (mL): 90 mL    Voided (mL): 775 mL  Total OUT: 1030 mL    Total NET: 725 mL          11-03    141  |  107  |  23  ----------------------------<  128<H>  4.5   |  24  |  0.78    Ca    8.3<L>      03 Nov 2022 16:13  Phos  3.6     11-03  Mg     2.2     11-03    TPro  6.0  /  Alb  2.9<L>  /  TBili  2.7<H>  /  DBili  2.0<H>  /  AST  87<H>  /  ALT  183<H>  /  AlkPhos  236<H>  11-03    [ ] Chapman catheter, indication: N/A  Meds: calcium carbonate 1250 mG  + Vitamin D (OsCal 500 + D) 1 Tablet(s) Oral two times a day  multiple electrolytes Injection Type 1 1000 milliLiter(s) IV Continuous <Continuous>        HEMATOLOGIC  Meds: aspirin enteric coated 81 milliGRAM(s) Oral daily    [x] VTE Prophylaxis                        8.7    11.81 )-----------( 139      ( 03 Nov 2022 16:13 )             26.8     PT/INR - ( 03 Nov 2022 16:13 )   PT: 18.8 sec;   INR: 1.61 ratio         PTT - ( 03 Nov 2022 16:13 )  PTT:24.0 sec  Transfusion     [ ] PRBC   [ ] Platelets   [ ] FFP   [ ] Cryoprecipitate      INFECTIOUS DISEASES  T(C): 36.8 (11-03-22 @ 23:00), Max: 37 (11-03-22 @ 07:00)  Wt(kg): --  WBC Count: 11.81 K/uL (11-03 @ 16:13)  WBC Count: 9.49 K/uL (11-03 @ 06:24)    Recent Cultures:  Specimen Source: .Blood Blood, 11-02 @ 15:05; Results   No growth to date.; Gram Stain: --; Organism: --    Meds: fluconAZOLE IVPB 400 milliGRAM(s) IV Intermittent daily  influenza   Vaccine 0.5 milliLiter(s) IntraMuscular once  mycophenolate mofetil 1000 milliGRAM(s) Oral <User Schedule>  piperacillin/tazobactam IVPB.. 3.375 Gram(s) IV Intermittent every 8 hours  tacrolimus 2 milliGRAM(s) Oral <User Schedule>  trimethoprim   80 mG/sulfamethoxazole 400 mG 1 Tablet(s) Oral daily  valGANciclovir 900 milliGRAM(s) Oral <User Schedule>        ENDOCRINE  Capillary Blood Glucose    Meds: insulin lispro (ADMELOG) corrective regimen sliding scale   SubCutaneous every 6 hours  methylPREDNISolone sodium succinate Injectable 60 milliGRAM(s) IV Push once        ACCESS DEVICES:  [ X ] Peripheral IV  [ ] Central Venous Line	[ ] R	[ ] L	[ ] IJ	[ ] Fem	[ ] SC	Placed:   [ ] Arterial Line		[ ] R	[ ] L	[ ] Fem	[ ] Rad	[ ] Ax	Placed:   [ ] PICC:					[ ] Mediport  [ ] Urinary Catheter, Date Placed:   [ ] Necessity of urinary, arterial, and venous catheters discussed    OTHER MEDICATIONS:  chlorhexidine 2% Cloths 1 Application(s) Topical <User Schedule>      CODE STATUS: Full

## 2022-11-04 NOTE — DIETITIAN INITIAL EVALUATION ADULT - OTHER INFO
Nutrition Status:  - s/p OLT 10/31-11/1 (overnight); LFTs improving  - IVF: Plasma-lyte A  - BG managed with ISS q6 hrs; no HbA1c available  - Supplementation: OsCal 500 +D

## 2022-11-05 LAB
A1C WITH ESTIMATED AVERAGE GLUCOSE RESULT: 4.7 % — SIGNIFICANT CHANGE UP (ref 4–5.6)
ALBUMIN SERPL ELPH-MCNC: 2.2 G/DL — LOW (ref 3.3–5)
ALP SERPL-CCNC: 240 U/L — HIGH (ref 40–120)
ALT FLD-CCNC: 98 U/L — HIGH (ref 10–45)
ANION GAP SERPL CALC-SCNC: 9 MMOL/L — SIGNIFICANT CHANGE UP (ref 5–17)
APTT BLD: 24 SEC — LOW (ref 27.5–35.5)
AST SERPL-CCNC: 37 U/L — SIGNIFICANT CHANGE UP (ref 10–40)
BILIRUB SERPL-MCNC: 1.7 MG/DL — HIGH (ref 0.2–1.2)
BUN SERPL-MCNC: 20 MG/DL — SIGNIFICANT CHANGE UP (ref 7–23)
CALCIUM SERPL-MCNC: 7.6 MG/DL — LOW (ref 8.4–10.5)
CHLORIDE SERPL-SCNC: 102 MMOL/L — SIGNIFICANT CHANGE UP (ref 96–108)
CO2 SERPL-SCNC: 24 MMOL/L — SIGNIFICANT CHANGE UP (ref 22–31)
CREAT SERPL-MCNC: 0.81 MG/DL — SIGNIFICANT CHANGE UP (ref 0.5–1.3)
EGFR: 122 ML/MIN/1.73M2 — SIGNIFICANT CHANGE UP
ESTIMATED AVERAGE GLUCOSE: 88 MG/DL — SIGNIFICANT CHANGE UP (ref 68–114)
GLUCOSE BLDC GLUCOMTR-MCNC: 111 MG/DL — HIGH (ref 70–99)
GLUCOSE BLDC GLUCOMTR-MCNC: 113 MG/DL — HIGH (ref 70–99)
GLUCOSE BLDC GLUCOMTR-MCNC: 132 MG/DL — HIGH (ref 70–99)
GLUCOSE BLDC GLUCOMTR-MCNC: 142 MG/DL — HIGH (ref 70–99)
GLUCOSE SERPL-MCNC: 98 MG/DL — SIGNIFICANT CHANGE UP (ref 70–99)
HCT VFR BLD CALC: 29.2 % — LOW (ref 39–50)
HGB BLD-MCNC: 9.6 G/DL — LOW (ref 13–17)
INR BLD: 1.49 RATIO — HIGH (ref 0.88–1.16)
MAGNESIUM SERPL-MCNC: 1.9 MG/DL — SIGNIFICANT CHANGE UP (ref 1.6–2.6)
MCHC RBC-ENTMCNC: 31.5 PG — SIGNIFICANT CHANGE UP (ref 27–34)
MCHC RBC-ENTMCNC: 32.9 GM/DL — SIGNIFICANT CHANGE UP (ref 32–36)
MCV RBC AUTO: 95.7 FL — SIGNIFICANT CHANGE UP (ref 80–100)
NRBC # BLD: 0 /100 WBCS — SIGNIFICANT CHANGE UP (ref 0–0)
PLATELET # BLD AUTO: 198 K/UL — SIGNIFICANT CHANGE UP (ref 150–400)
POTASSIUM SERPL-MCNC: 4.3 MMOL/L — SIGNIFICANT CHANGE UP (ref 3.5–5.3)
POTASSIUM SERPL-SCNC: 4.3 MMOL/L — SIGNIFICANT CHANGE UP (ref 3.5–5.3)
PROT SERPL-MCNC: 5.1 G/DL — LOW (ref 6–8.3)
PROTHROM AB SERPL-ACNC: 17.4 SEC — HIGH (ref 10.5–13.4)
RBC # BLD: 3.05 M/UL — LOW (ref 4.2–5.8)
RBC # FLD: 15.9 % — HIGH (ref 10.3–14.5)
SODIUM SERPL-SCNC: 135 MMOL/L — SIGNIFICANT CHANGE UP (ref 135–145)
TACROLIMUS SERPL-MCNC: 10.6 NG/ML — SIGNIFICANT CHANGE UP
WBC # BLD: 13.13 K/UL — HIGH (ref 3.8–10.5)
WBC # FLD AUTO: 13.13 K/UL — HIGH (ref 3.8–10.5)

## 2022-11-05 RX ORDER — LIDOCAINE HCL 20 MG/ML
5 VIAL (ML) INJECTION ONCE
Refills: 0 | Status: COMPLETED | OUTPATIENT
Start: 2022-11-05 | End: 2022-11-05

## 2022-11-05 RX ADMIN — HYDROMORPHONE HYDROCHLORIDE 0.5 MILLIGRAM(S): 2 INJECTION INTRAMUSCULAR; INTRAVENOUS; SUBCUTANEOUS at 12:57

## 2022-11-05 RX ADMIN — PANTOPRAZOLE SODIUM 40 MILLIGRAM(S): 20 TABLET, DELAYED RELEASE ORAL at 05:00

## 2022-11-05 RX ADMIN — HYDROMORPHONE HYDROCHLORIDE 0.5 MILLIGRAM(S): 2 INJECTION INTRAMUSCULAR; INTRAVENOUS; SUBCUTANEOUS at 05:15

## 2022-11-05 RX ADMIN — Medication 40 MILLIGRAM(S): at 08:40

## 2022-11-05 RX ADMIN — Medication 1 TABLET(S): at 05:00

## 2022-11-05 RX ADMIN — Medication 81 MILLIGRAM(S): at 12:41

## 2022-11-05 RX ADMIN — Medication 5 MILLILITER(S): at 18:54

## 2022-11-05 RX ADMIN — OXYCODONE HYDROCHLORIDE 5 MILLIGRAM(S): 5 TABLET ORAL at 18:07

## 2022-11-05 RX ADMIN — PIPERACILLIN AND TAZOBACTAM 25 GRAM(S): 4; .5 INJECTION, POWDER, LYOPHILIZED, FOR SOLUTION INTRAVENOUS at 12:40

## 2022-11-05 RX ADMIN — MYCOPHENOLATE MOFETIL 1000 MILLIGRAM(S): 250 CAPSULE ORAL at 19:26

## 2022-11-05 RX ADMIN — VALGANCICLOVIR 900 MILLIGRAM(S): 450 TABLET, FILM COATED ORAL at 12:41

## 2022-11-05 RX ADMIN — Medication 1 TABLET(S): at 12:41

## 2022-11-05 RX ADMIN — POLYETHYLENE GLYCOL 3350 17 GRAM(S): 17 POWDER, FOR SOLUTION ORAL at 12:41

## 2022-11-05 RX ADMIN — MYCOPHENOLATE MOFETIL 1000 MILLIGRAM(S): 250 CAPSULE ORAL at 08:40

## 2022-11-05 RX ADMIN — HYDROMORPHONE HYDROCHLORIDE 0.5 MILLIGRAM(S): 2 INJECTION INTRAMUSCULAR; INTRAVENOUS; SUBCUTANEOUS at 04:57

## 2022-11-05 RX ADMIN — CHLORHEXIDINE GLUCONATE 1 APPLICATION(S): 213 SOLUTION TOPICAL at 06:43

## 2022-11-05 RX ADMIN — HYDROMORPHONE HYDROCHLORIDE 0.5 MILLIGRAM(S): 2 INJECTION INTRAMUSCULAR; INTRAVENOUS; SUBCUTANEOUS at 08:56

## 2022-11-05 RX ADMIN — HYDROMORPHONE HYDROCHLORIDE 0.5 MILLIGRAM(S): 2 INJECTION INTRAMUSCULAR; INTRAVENOUS; SUBCUTANEOUS at 08:41

## 2022-11-05 RX ADMIN — OXYCODONE HYDROCHLORIDE 5 MILLIGRAM(S): 5 TABLET ORAL at 22:00

## 2022-11-05 RX ADMIN — OXYCODONE HYDROCHLORIDE 5 MILLIGRAM(S): 5 TABLET ORAL at 21:16

## 2022-11-05 RX ADMIN — PIPERACILLIN AND TAZOBACTAM 25 GRAM(S): 4; .5 INJECTION, POWDER, LYOPHILIZED, FOR SOLUTION INTRAVENOUS at 21:16

## 2022-11-05 RX ADMIN — Medication 1 TABLET(S): at 17:07

## 2022-11-05 RX ADMIN — OXYCODONE HYDROCHLORIDE 5 MILLIGRAM(S): 5 TABLET ORAL at 17:07

## 2022-11-05 RX ADMIN — FLUCONAZOLE 200 MILLIGRAM(S): 150 TABLET ORAL at 12:40

## 2022-11-05 RX ADMIN — HYDROMORPHONE HYDROCHLORIDE 0.5 MILLIGRAM(S): 2 INJECTION INTRAMUSCULAR; INTRAVENOUS; SUBCUTANEOUS at 12:42

## 2022-11-05 RX ADMIN — PIPERACILLIN AND TAZOBACTAM 25 GRAM(S): 4; .5 INJECTION, POWDER, LYOPHILIZED, FOR SOLUTION INTRAVENOUS at 05:00

## 2022-11-05 NOTE — PROGRESS NOTE ADULT - ASSESSMENT
32M PMH Cerebral palsy, Primary Sclerosing Cholangitis Cirrhosis with frequent ERCPs with biliary stent placements for biliary strictures (last in 9/2022) on liver transplant list (ABO AB), recent COVID s/p MAB in 9/2022) admitted for liver transplant    OLT  Pain control PRN  Advance diet as tolerated   DC IVF  LFTs downtrending  US with patent vessels  Strict I/Os. DC remaining VIOLETA today  SCDs  Continue Miralax and Senna  IS q1hour WA  OOB/PT   ASA 81 mg daily  Continue Zosyn  Immunosuppression: Tacro held pending AM level, MMF 1000mg BID, Steroid taper  PPX: Fluconazole Bactrim, Valcyte  DAily CBC, CMP, Mag, Pos, Tacro level

## 2022-11-05 NOTE — PROGRESS NOTE ADULT - SUBJECTIVE AND OBJECTIVE BOX
Transplant Surgery - Multidisciplinary Rounds  --------------------------------------------------------------   Donor OLTx      Date:  2022      POD# 5    Present:   Patient seen and examined with multidisciplinary Transplant team including  Surgeon: Dr. Godinez. Hepatologist: CHOCO Rodriguez and RNs in AM rounds.   Disciplines not in attendance will be notified of the plan.     HPI: 32M PMH Cerebral palsy, Primary Sclerosing Cholangitis Cirrhosis with frequent ERCPs with biliary stent placements for biliary strictures (last in 2022) on liver transplant list (ABO AB), recent COVID s/p MAB in 2022) admitted for liver transplant    OR   S/P Orthotopic liver transplantation from a  donor. Steroid induction  Side-side Cavocaval anastomosis  End-end portoportal anastomosis  End-end single arterial anastomosis  Velasquez en-Y HJ with internal stenting & Side-side JJ      Interval Events:  - Diet advanced to clears and tolerated  - LFTs downtrending  - FK held pending level      Immunosuppression:   Induction:    Methylpred                                           Maintenance immunosuppression: FK held pending level, MMF 1000mg BID, SST  Ongoing monitoring for signs of rejection.      Potential Discharge date: pending clinical improvement    Education:  Medications    Plan of care:  See Below      MEDICATIONS  (STANDING):  aspirin enteric coated 81 milliGRAM(s) Oral daily  calcium carbonate 1250 mG  + Vitamin D (OsCal 500 + D) 1 Tablet(s) Oral two times a day  chlorhexidine 2% Cloths 1 Application(s) Topical <User Schedule>  dextrose 5%. 1000 milliLiter(s) (50 mL/Hr) IV Continuous <Continuous>  dextrose 5%. 1000 milliLiter(s) (100 mL/Hr) IV Continuous <Continuous>  dextrose 50% Injectable 12.5 Gram(s) IV Push once  dextrose 50% Injectable 25 Gram(s) IV Push once  dextrose 50% Injectable 25 Gram(s) IV Push once  fluconAZOLE IVPB 400 milliGRAM(s) IV Intermittent daily  glucagon  Injectable 1 milliGRAM(s) IntraMuscular once  influenza   Vaccine 0.5 milliLiter(s) IntraMuscular once  insulin lispro (ADMELOG) corrective regimen sliding scale   SubCutaneous three times a day before meals  insulin lispro (ADMELOG) corrective regimen sliding scale   SubCutaneous at bedtime  mycophenolate mofetil 1000 milliGRAM(s) Oral <User Schedule>  pantoprazole    Tablet 40 milliGRAM(s) Oral before breakfast  piperacillin/tazobactam IVPB.. 3.375 Gram(s) IV Intermittent every 8 hours  polyethylene glycol 3350 17 Gram(s) Oral daily  senna 2 Tablet(s) Oral at bedtime  trimethoprim   80 mG/sulfamethoxazole 400 mG 1 Tablet(s) Oral daily  valGANciclovir 900 milliGRAM(s) Oral <User Schedule>    MEDICATIONS  (PRN):  dextrose Oral Gel 15 Gram(s) Oral once PRN Blood Glucose LESS THAN 70 milliGRAM(s)/deciliter  HYDROmorphone  Injectable 0.5 milliGRAM(s) IV Push every 3 hours PRN Severe Pain (7 - 10)  HYDROmorphone  Injectable 0.25 milliGRAM(s) IV Push every 3 hours PRN Moderate Pain (4 - 6)  oxyCODONE    IR 5 milliGRAM(s) Oral every 4 hours PRN Moderate Pain (4 - 6)      PAST MEDICAL & SURGICAL HISTORY:  Cerebral palsy      PSC (primary sclerosing cholangitis)  On liver transplant list      Abnormal LFTs      Bile duct stricture      Dental caries      Impacted teeth  wisdom teeth      Phimosis      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      Anemia      History of ERCP  multiple with stent insertions/replacement every 3 months ---last 2022      Micro teeth extracted            Vital Signs Last 24 Hrs  T(C): 37.1 (2022 13:00), Max: 37.1 (2022 13:00)  T(F): 98.8 (2022 13:00), Max: 98.8 (2022 13:00)  HR: 83 (2022 13:00) (61 - 86)  BP: 114/62 (2022 13:00) (112/62 - 131/63)  BP(mean): 84 (2022 05:00) (83 - 92)  RR: 18 (2022 13:00) (18 - 22)  SpO2: 97% (2022 13:00) (97% - 100%)    Parameters below as of 2022 13:00  Patient On (Oxygen Delivery Method): room air        I&O's Summary    2022 07:01  -  2022 07:00  --------------------------------------------------------  IN: 2395 mL / OUT: 1655 mL / NET: 740 mL    2022 08:01  -  2022 16:00  --------------------------------------------------------  IN: 1200 mL / OUT: 740 mL / NET: 460 mL                              9.6    13.13 )-----------( 198      ( 2022 06:46 )             29.2         135  |  102  |  20  ----------------------------<  98  4.3   |  24  |  0.81    Ca    7.6<L>      2022 06:43  Phos  3.5       Mg     1.9         TPro  5.1<L>  /  Alb  2.2<L>  /  TBili  1.7<H>  /  DBili  x   /  AST  37  /  ALT  98<H>  /  AlkPhos  240<H>      Tacrolimus (), Serum: 10.6 ng/mL ( @ 06:46)        Culture - Blood (collected 22 @ 15:05)  Source: .Blood Blood  Preliminary Report (22 @ 23:01):    No growth to date.    Culture - Blood (collected 22 @ 15:05)  Source: .Blood Blood  Preliminary Report (22 @ 23:01):    No growth to date.       Review of systems  Gen: No weight changes, fatigue, fevers/chills, weakness  Skin: No rashes  Head/Eyes/Ears/Mouth: No headache; Normal hearing; Normal vision w/o blurriness; No sinus pain/discomfort, sore throat  Respiratory: No dyspnea, cough, wheezing, hemoptysis  CV: No chest pain, PND, orthopnea  GI: C/O mild abdominal pain at surgical site. no diarrhea, constipation, nausea, vomiting, melena, hematochezia  : No increased frequency, dysuria, hematuria, nocturia  MSK: No joint pain/swelling; no back pain; no edema  Neuro: No dizziness/lightheadedness, weakness, seizures, numbness, tingling  Heme: No easy bruising or bleeding  Endo: No heat/cold intolerance  Psych: No significant nervousness, anxiety, stress, depression  All other systems were reviewed and are negative, except as noted.      PHYSICAL EXAM:  Constitutional: Well developed / well nourished  Eyes: icteric, normal eyelids  ENMT: nc/at, no thrush  Neck: supple, central line  Respiratory: CTA B/L  Cardiovascular: RRR  Gastrointestinal: voiding  Extremities: SCD's in place and working bilaterally, some edema  Vascular: Palpable dp pulses bilaterally.   Neurological: A&O x3  Skin: no rashes, no lesions  Musculoskeletal: Moving all extremities, no edema  Psychiatric: Responsive

## 2022-11-06 ENCOUNTER — TRANSCRIPTION ENCOUNTER (OUTPATIENT)
Age: 32
End: 2022-11-06

## 2022-11-06 LAB
ALBUMIN SERPL ELPH-MCNC: 2.3 G/DL — LOW (ref 3.3–5)
ALP SERPL-CCNC: 251 U/L — HIGH (ref 40–120)
ALT FLD-CCNC: 78 U/L — HIGH (ref 10–45)
ANION GAP SERPL CALC-SCNC: 11 MMOL/L — SIGNIFICANT CHANGE UP (ref 5–17)
AST SERPL-CCNC: 38 U/L — SIGNIFICANT CHANGE UP (ref 10–40)
BILIRUB SERPL-MCNC: 3.6 MG/DL — HIGH (ref 0.2–1.2)
BUN SERPL-MCNC: 14 MG/DL — SIGNIFICANT CHANGE UP (ref 7–23)
CALCIUM SERPL-MCNC: 7.7 MG/DL — LOW (ref 8.4–10.5)
CHLORIDE SERPL-SCNC: 99 MMOL/L — SIGNIFICANT CHANGE UP (ref 96–108)
CO2 SERPL-SCNC: 22 MMOL/L — SIGNIFICANT CHANGE UP (ref 22–31)
CREAT SERPL-MCNC: 0.79 MG/DL — SIGNIFICANT CHANGE UP (ref 0.5–1.3)
EGFR: 123 ML/MIN/1.73M2 — SIGNIFICANT CHANGE UP
GLUCOSE BLDC GLUCOMTR-MCNC: 111 MG/DL — HIGH (ref 70–99)
GLUCOSE BLDC GLUCOMTR-MCNC: 113 MG/DL — HIGH (ref 70–99)
GLUCOSE BLDC GLUCOMTR-MCNC: 123 MG/DL — HIGH (ref 70–99)
GLUCOSE BLDC GLUCOMTR-MCNC: 131 MG/DL — HIGH (ref 70–99)
GLUCOSE SERPL-MCNC: 58 MG/DL — LOW (ref 70–99)
HCT VFR BLD CALC: 29.9 % — LOW (ref 39–50)
HGB BLD-MCNC: 9.8 G/DL — LOW (ref 13–17)
INR BLD: 1.51 RATIO — HIGH (ref 0.88–1.16)
MAGNESIUM SERPL-MCNC: 1.6 MG/DL — SIGNIFICANT CHANGE UP (ref 1.6–2.6)
MCHC RBC-ENTMCNC: 31.3 PG — SIGNIFICANT CHANGE UP (ref 27–34)
MCHC RBC-ENTMCNC: 32.8 GM/DL — SIGNIFICANT CHANGE UP (ref 32–36)
MCV RBC AUTO: 95.5 FL — SIGNIFICANT CHANGE UP (ref 80–100)
NRBC # BLD: 0 /100 WBCS — SIGNIFICANT CHANGE UP (ref 0–0)
PLATELET # BLD AUTO: 252 K/UL — SIGNIFICANT CHANGE UP (ref 150–400)
POTASSIUM SERPL-MCNC: 3.8 MMOL/L — SIGNIFICANT CHANGE UP (ref 3.5–5.3)
POTASSIUM SERPL-SCNC: 3.8 MMOL/L — SIGNIFICANT CHANGE UP (ref 3.5–5.3)
PROT SERPL-MCNC: 5.2 G/DL — LOW (ref 6–8.3)
PROTHROM AB SERPL-ACNC: 17.6 SEC — HIGH (ref 10.5–13.4)
RBC # BLD: 3.13 M/UL — LOW (ref 4.2–5.8)
RBC # FLD: 15.8 % — HIGH (ref 10.3–14.5)
SODIUM SERPL-SCNC: 132 MMOL/L — LOW (ref 135–145)
TACROLIMUS SERPL-MCNC: 8.7 NG/ML — SIGNIFICANT CHANGE UP
WBC # BLD: 18.13 K/UL — HIGH (ref 3.8–10.5)
WBC # FLD AUTO: 18.13 K/UL — HIGH (ref 3.8–10.5)

## 2022-11-06 PROCEDURE — 74177 CT ABD & PELVIS W/CONTRAST: CPT | Mod: 26

## 2022-11-06 RX ORDER — TACROLIMUS 5 MG/1
0.5 CAPSULE ORAL
Refills: 0 | Status: DISCONTINUED | OUTPATIENT
Start: 2022-11-07 | End: 2022-11-07

## 2022-11-06 RX ORDER — MEROPENEM 1 G/30ML
1000 INJECTION INTRAVENOUS ONCE
Refills: 0 | Status: COMPLETED | OUTPATIENT
Start: 2022-11-06 | End: 2022-11-06

## 2022-11-06 RX ORDER — OXYCODONE HYDROCHLORIDE 5 MG/1
10 TABLET ORAL EVERY 4 HOURS
Refills: 0 | Status: DISCONTINUED | OUTPATIENT
Start: 2022-11-06 | End: 2022-11-07

## 2022-11-06 RX ORDER — MAGNESIUM SULFATE 500 MG/ML
2 VIAL (ML) INJECTION ONCE
Refills: 0 | Status: COMPLETED | OUTPATIENT
Start: 2022-11-06 | End: 2022-11-06

## 2022-11-06 RX ORDER — SODIUM CHLORIDE 9 MG/ML
1000 INJECTION INTRAMUSCULAR; INTRAVENOUS; SUBCUTANEOUS
Refills: 0 | Status: DISCONTINUED | OUTPATIENT
Start: 2022-11-06 | End: 2022-11-06

## 2022-11-06 RX ORDER — MEROPENEM 1 G/30ML
1000 INJECTION INTRAVENOUS EVERY 8 HOURS
Refills: 0 | Status: DISCONTINUED | OUTPATIENT
Start: 2022-11-06 | End: 2022-11-07

## 2022-11-06 RX ORDER — SODIUM CHLORIDE 9 MG/ML
500 INJECTION INTRAMUSCULAR; INTRAVENOUS; SUBCUTANEOUS ONCE
Refills: 0 | Status: COMPLETED | OUTPATIENT
Start: 2022-11-06 | End: 2022-11-06

## 2022-11-06 RX ORDER — MEROPENEM 1 G/30ML
INJECTION INTRAVENOUS
Refills: 0 | Status: DISCONTINUED | OUTPATIENT
Start: 2022-11-06 | End: 2022-11-07

## 2022-11-06 RX ADMIN — Medication 25 GRAM(S): at 15:33

## 2022-11-06 RX ADMIN — MYCOPHENOLATE MOFETIL 1000 MILLIGRAM(S): 250 CAPSULE ORAL at 08:57

## 2022-11-06 RX ADMIN — MYCOPHENOLATE MOFETIL 1000 MILLIGRAM(S): 250 CAPSULE ORAL at 19:28

## 2022-11-06 RX ADMIN — PIPERACILLIN AND TAZOBACTAM 25 GRAM(S): 4; .5 INJECTION, POWDER, LYOPHILIZED, FOR SOLUTION INTRAVENOUS at 05:32

## 2022-11-06 RX ADMIN — PANTOPRAZOLE SODIUM 40 MILLIGRAM(S): 20 TABLET, DELAYED RELEASE ORAL at 06:26

## 2022-11-06 RX ADMIN — OXYCODONE HYDROCHLORIDE 5 MILLIGRAM(S): 5 TABLET ORAL at 19:28

## 2022-11-06 RX ADMIN — HYDROMORPHONE HYDROCHLORIDE 0.25 MILLIGRAM(S): 2 INJECTION INTRAMUSCULAR; INTRAVENOUS; SUBCUTANEOUS at 16:31

## 2022-11-06 RX ADMIN — OXYCODONE HYDROCHLORIDE 5 MILLIGRAM(S): 5 TABLET ORAL at 03:47

## 2022-11-06 RX ADMIN — HYDROMORPHONE HYDROCHLORIDE 0.5 MILLIGRAM(S): 2 INJECTION INTRAMUSCULAR; INTRAVENOUS; SUBCUTANEOUS at 14:38

## 2022-11-06 RX ADMIN — OXYCODONE HYDROCHLORIDE 5 MILLIGRAM(S): 5 TABLET ORAL at 20:06

## 2022-11-06 RX ADMIN — OXYCODONE HYDROCHLORIDE 5 MILLIGRAM(S): 5 TABLET ORAL at 10:49

## 2022-11-06 RX ADMIN — SODIUM CHLORIDE 100 MILLILITER(S): 9 INJECTION INTRAMUSCULAR; INTRAVENOUS; SUBCUTANEOUS at 12:55

## 2022-11-06 RX ADMIN — Medication 1 TABLET(S): at 11:38

## 2022-11-06 RX ADMIN — MEROPENEM 100 MILLIGRAM(S): 1 INJECTION INTRAVENOUS at 12:54

## 2022-11-06 RX ADMIN — MEROPENEM 100 MILLIGRAM(S): 1 INJECTION INTRAVENOUS at 21:54

## 2022-11-06 RX ADMIN — Medication 1 TABLET(S): at 05:33

## 2022-11-06 RX ADMIN — VALGANCICLOVIR 900 MILLIGRAM(S): 450 TABLET, FILM COATED ORAL at 11:38

## 2022-11-06 RX ADMIN — HYDROMORPHONE HYDROCHLORIDE 0.5 MILLIGRAM(S): 2 INJECTION INTRAMUSCULAR; INTRAVENOUS; SUBCUTANEOUS at 11:38

## 2022-11-06 RX ADMIN — OXYCODONE HYDROCHLORIDE 5 MILLIGRAM(S): 5 TABLET ORAL at 02:51

## 2022-11-06 RX ADMIN — HYDROMORPHONE HYDROCHLORIDE 0.25 MILLIGRAM(S): 2 INJECTION INTRAMUSCULAR; INTRAVENOUS; SUBCUTANEOUS at 16:16

## 2022-11-06 RX ADMIN — SODIUM CHLORIDE 1000 MILLILITER(S): 9 INJECTION INTRAMUSCULAR; INTRAVENOUS; SUBCUTANEOUS at 06:29

## 2022-11-06 RX ADMIN — SENNA PLUS 2 TABLET(S): 8.6 TABLET ORAL at 21:54

## 2022-11-06 RX ADMIN — Medication 20 MILLIGRAM(S): at 05:33

## 2022-11-06 RX ADMIN — HYDROMORPHONE HYDROCHLORIDE 0.5 MILLIGRAM(S): 2 INJECTION INTRAMUSCULAR; INTRAVENOUS; SUBCUTANEOUS at 14:53

## 2022-11-06 RX ADMIN — Medication 1 TABLET(S): at 17:27

## 2022-11-06 RX ADMIN — FLUCONAZOLE 200 MILLIGRAM(S): 150 TABLET ORAL at 12:54

## 2022-11-06 RX ADMIN — HYDROMORPHONE HYDROCHLORIDE 0.5 MILLIGRAM(S): 2 INJECTION INTRAMUSCULAR; INTRAVENOUS; SUBCUTANEOUS at 11:53

## 2022-11-06 RX ADMIN — Medication 81 MILLIGRAM(S): at 11:38

## 2022-11-06 RX ADMIN — OXYCODONE HYDROCHLORIDE 5 MILLIGRAM(S): 5 TABLET ORAL at 09:49

## 2022-11-06 RX ADMIN — CHLORHEXIDINE GLUCONATE 1 APPLICATION(S): 213 SOLUTION TOPICAL at 06:33

## 2022-11-06 NOTE — PROGRESS NOTE ADULT - NS ATTEND AMEND GEN_ALL_CORE FT
febrile and wbc count rising  also TB and alk phos rising  will get CT scan abdomen  hold mmf, continue tacro according to level and prednisone

## 2022-11-06 NOTE — PROGRESS NOTE ADULT - ASSESSMENT
32M PMH Cerebral palsy, Primary Sclerosing Cholangitis Cirrhosis with frequent ERCPs with biliary stent placements for biliary strictures (last in 9/2022) on liver transplant list (ABO AB), recent COVID s/p MAB in 9/2022) admitted for liver transplant    OLT  Febrile with rising WBCs, TBili up  Few staples removed and wound opened at bedside/packed with dry gauze  FU BCX and wound culture  Pack wound daily  CT A/P with IV contrast  Broaden antibiotics from Zosyn to Meropenem  NS @ 100/hour  Pain control PRN  diet as tolerated  11/1 US with patent vessels  Strict I/Os  SCDs  Continue Miralax and Senna  IS q1hour WA  OOB/PT   ASA 81 mg daily  Immunosuppression: Tacro 0.5mg BID starting in AM, MMF 1000mg BID, Pred 20mg poqd  PPX: Fluconazole Bactrim, Valcyte  DAily CBC, CMP, Mag, Pos, Tacro level

## 2022-11-06 NOTE — PROGRESS NOTE ADULT - SUBJECTIVE AND OBJECTIVE BOX
Transplant Surgery - Multidisciplinary Rounds  --------------------------------------------------------------   Donor OLTx      Date:  2022      POD# 6    Present:   Patient seen and examined with multidisciplinary Transplant team including  Surgeon: Dr. Godinez. Hepatologist: CHOCO Rodriguez and RNs in AM rounds.   Disciplines not in attendance will be notified of the plan.     HPI: 32M PMH Cerebral palsy, Primary Sclerosing Cholangitis Cirrhosis with frequent ERCPs with biliary stent placements for biliary strictures (last in 2022) on liver transplant list (ABO AB), recent COVID s/p MAB in 2022) admitted for liver transplant    OR   S/P Orthotopic liver transplantation from a  donor. Steroid induction  Side-side Cavocaval anastomosis  End-end portoportal anastomosis  End-end single arterial anastomosis  Velasquez en-Y HJ with internal stenting & Side-side JJ      Interval Events:  - DCd VIOLETA #1  - Diet advanced to regular and tolerated  - Febrile 38.6/tachycardic 120s. Cultures sent   500NS bolus x1  - WBC rising 18.13  - R lateral pole of wound - opened at bedside this morning. Culture sent   - TBili rising 3.6 from 1.7     AST/ALT down      Immunosuppression:   Induction:    Methylpred                                           Maintenance immunosuppression: FK held pending level, MMF 1000mg BID, SST  Ongoing monitoring for signs of rejection.      Potential Discharge date: pending clinical improvement    Education:  Medications    Plan of care:  See Below        MEDICATIONS  (STANDING):  aspirin enteric coated 81 milliGRAM(s) Oral daily  calcium carbonate 1250 mG  + Vitamin D (OsCal 500 + D) 1 Tablet(s) Oral two times a day  chlorhexidine 2% Cloths 1 Application(s) Topical <User Schedule>  dextrose 5%. 1000 milliLiter(s) (50 mL/Hr) IV Continuous <Continuous>  dextrose 5%. 1000 milliLiter(s) (100 mL/Hr) IV Continuous <Continuous>  dextrose 50% Injectable 25 Gram(s) IV Push once  dextrose 50% Injectable 12.5 Gram(s) IV Push once  dextrose 50% Injectable 25 Gram(s) IV Push once  fluconAZOLE IVPB 400 milliGRAM(s) IV Intermittent daily  glucagon  Injectable 1 milliGRAM(s) IntraMuscular once  influenza   Vaccine 0.5 milliLiter(s) IntraMuscular once  insulin lispro (ADMELOG) corrective regimen sliding scale   SubCutaneous three times a day before meals  insulin lispro (ADMELOG) corrective regimen sliding scale   SubCutaneous at bedtime  meropenem  IVPB      meropenem  IVPB 1000 milliGRAM(s) IV Intermittent every 8 hours  mycophenolate mofetil 1000 milliGRAM(s) Oral <User Schedule>  pantoprazole    Tablet 40 milliGRAM(s) Oral before breakfast  polyethylene glycol 3350 17 Gram(s) Oral daily  predniSONE   Tablet 20 milliGRAM(s) Oral daily  senna 2 Tablet(s) Oral at bedtime  sodium chloride 0.9%. 1000 milliLiter(s) (100 mL/Hr) IV Continuous <Continuous>  trimethoprim   80 mG/sulfamethoxazole 400 mG 1 Tablet(s) Oral daily  valGANciclovir 900 milliGRAM(s) Oral <User Schedule>    MEDICATIONS  (PRN):  dextrose Oral Gel 15 Gram(s) Oral once PRN Blood Glucose LESS THAN 70 milliGRAM(s)/deciliter  HYDROmorphone  Injectable 0.5 milliGRAM(s) IV Push every 3 hours PRN Severe Pain (7 - 10)  HYDROmorphone  Injectable 0.25 milliGRAM(s) IV Push every 3 hours PRN Moderate Pain (4 - 6)  oxyCODONE    IR 5 milliGRAM(s) Oral every 4 hours PRN Moderate Pain (4 - 6)      PAST MEDICAL & SURGICAL HISTORY:  Cerebral palsy      PSC (primary sclerosing cholangitis)  On liver transplant list      Abnormal LFTs      Bile duct stricture      Dental caries      Impacted teeth  wisdom teeth      Phimosis      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      Anemia      History of ERCP  multiple with stent insertions/replacement every 3 months ---last 2022      [place] teeth extracted            Vital Signs Last 24 Hrs  T(C): 37.1 (2022 13:00), Max: 38.8 (2022 07:24)  T(F): 98.8 (2022 13:00), Max: 101.8 (2022 07:24)  HR: 105 (2022 13:00) (81 - 141)  BP: 130/71 (2022 13:00) (109/64 - 130/71)  BP(mean): 92 (2022 05:28) (83 - 92)  RR: 18 (2022 13:00) (17 - 18)  SpO2: 96% (2022 13:00) (92% - 99%)    Parameters below as of 2022 13:00  Patient On (Oxygen Delivery Method): room air        I&O's Summary    2022 08:01  -  2022 07:00  --------------------------------------------------------  IN: 2140 mL / OUT: 1510 mL / NET: 630 mL                              9.8    18.13 )-----------( 252      ( 2022 07:10 )             29.9     11-    132<L>  |  99  |  14  ----------------------------<  58<L>  3.8   |  22  |  0.79    Ca    7.7<L>      2022 07:10  Phos  3.5     -  Mg     1.6         TPro  5.2<L>  /  Alb  2.3<L>  /  TBili  3.6<H>  /  DBili  x   /  AST  38  /  ALT  78<H>  /  AlkPhos  251<H>      Tacrolimus (), Serum: 8.7 ng/mL ( @ 07:10)        Culture - Blood (collected 22 @ 15:05)  Source: .Blood Blood  Preliminary Report (22 @ 23:01):    No growth to date.    Culture - Blood (collected 22 @ 15:05)  Source: .Blood Blood  Preliminary Report (22 @ 23:01):    No growth to date.       Review of systems  Gen: No weight changes, fatigue, fevers/chills, weakness  Skin: No rashes  Head/Eyes/Ears/Mouth: No headache; Normal hearing; Normal vision w/o blurriness; No sinus pain/discomfort, sore throat  Respiratory: No dyspnea, cough, wheezing, hemoptysis  CV: No chest pain, PND, orthopnea  GI: C/O mild abdominal pain at surgical site. no diarrhea, constipation, nausea, vomiting, melena, hematochezia  : No increased frequency, dysuria, hematuria, nocturia  MSK: No joint pain/swelling; no back pain; no edema  Neuro: No dizziness/lightheadedness, weakness, seizures, numbness, tingling  Heme: No easy bruising or bleeding  Endo: No heat/cold intolerance  Psych: No significant nervousness, anxiety, stress, depression  All other systems were reviewed and are negative, except as noted.      PHYSICAL EXAM:  Constitutional: Well developed / well nourished  Eyes: icteric, normal eyelids  ENMT: nc/at, no thrush  Neck: supple, central line  Respiratory: CTA B/L  Cardiovascular: RRR  Gastrointestinal: Lateral pole of wound with purulent drainage - opened and packed.  VIOLETA #2 SS   : voiding  Extremities: SCD's in place and working bilaterally, no edema  Vascular: Palpable dp pulses bilaterally.   Neurological: A&O x3  Skin: no rashes, no lesions  Musculoskeletal: Moving all extremities, no edema  Psychiatric: Responsive

## 2022-11-07 ENCOUNTER — RESULT REVIEW (OUTPATIENT)
Age: 32
End: 2022-11-07

## 2022-11-07 ENCOUNTER — TRANSCRIPTION ENCOUNTER (OUTPATIENT)
Age: 32
End: 2022-11-07

## 2022-11-07 LAB
ALBUMIN SERPL ELPH-MCNC: 1.9 G/DL — LOW (ref 3.3–5)
ALBUMIN SERPL ELPH-MCNC: 2.2 G/DL — LOW (ref 3.3–5)
ALP SERPL-CCNC: 187 U/L — HIGH (ref 40–120)
ALP SERPL-CCNC: 243 U/L — HIGH (ref 40–120)
ALT FLD-CCNC: 69 U/L — HIGH (ref 10–45)
ALT FLD-CCNC: 78 U/L — HIGH (ref 10–45)
ANION GAP SERPL CALC-SCNC: 11 MMOL/L — SIGNIFICANT CHANGE UP (ref 5–17)
ANION GAP SERPL CALC-SCNC: 9 MMOL/L — SIGNIFICANT CHANGE UP (ref 5–17)
APPEARANCE UR: CLEAR — SIGNIFICANT CHANGE UP
APTT BLD: 25.3 SEC — LOW (ref 27.5–35.5)
APTT BLD: 26.9 SEC — LOW (ref 27.5–35.5)
AST SERPL-CCNC: 51 U/L — HIGH (ref 10–40)
AST SERPL-CCNC: 57 U/L — HIGH (ref 10–40)
BACTERIA # UR AUTO: NEGATIVE — SIGNIFICANT CHANGE UP
BILIRUB SERPL-MCNC: 6.1 MG/DL — HIGH (ref 0.2–1.2)
BILIRUB SERPL-MCNC: 6.8 MG/DL — HIGH (ref 0.2–1.2)
BILIRUB UR-MCNC: ABNORMAL
BLD GP AB SCN SERPL QL: NEGATIVE — SIGNIFICANT CHANGE UP
BUN SERPL-MCNC: 10 MG/DL — SIGNIFICANT CHANGE UP (ref 7–23)
BUN SERPL-MCNC: 10 MG/DL — SIGNIFICANT CHANGE UP (ref 7–23)
CALCIUM SERPL-MCNC: 7.3 MG/DL — LOW (ref 8.4–10.5)
CALCIUM SERPL-MCNC: 7.6 MG/DL — LOW (ref 8.4–10.5)
CHLORIDE SERPL-SCNC: 100 MMOL/L — SIGNIFICANT CHANGE UP (ref 96–108)
CHLORIDE SERPL-SCNC: 103 MMOL/L — SIGNIFICANT CHANGE UP (ref 96–108)
CO2 SERPL-SCNC: 22 MMOL/L — SIGNIFICANT CHANGE UP (ref 22–31)
CO2 SERPL-SCNC: 23 MMOL/L — SIGNIFICANT CHANGE UP (ref 22–31)
COLOR SPEC: ABNORMAL
CREAT SERPL-MCNC: 0.67 MG/DL — SIGNIFICANT CHANGE UP (ref 0.5–1.3)
CREAT SERPL-MCNC: 0.71 MG/DL — SIGNIFICANT CHANGE UP (ref 0.5–1.3)
CULTURE RESULTS: SIGNIFICANT CHANGE UP
CULTURE RESULTS: SIGNIFICANT CHANGE UP
DIFF PNL FLD: ABNORMAL
EGFR: 127 ML/MIN/1.73M2 — SIGNIFICANT CHANGE UP
EGFR: 129 ML/MIN/1.73M2 — SIGNIFICANT CHANGE UP
EPI CELLS # UR: 0 /HPF — SIGNIFICANT CHANGE UP
GAS PNL BLDV: SIGNIFICANT CHANGE UP
GLUCOSE BLDC GLUCOMTR-MCNC: 130 MG/DL — HIGH (ref 70–99)
GLUCOSE SERPL-MCNC: 118 MG/DL — HIGH (ref 70–99)
GLUCOSE SERPL-MCNC: 134 MG/DL — HIGH (ref 70–99)
GLUCOSE UR QL: NEGATIVE — SIGNIFICANT CHANGE UP
HCT VFR BLD CALC: 31.1 % — LOW (ref 39–50)
HCT VFR BLD CALC: 35.5 % — LOW (ref 39–50)
HGB BLD-MCNC: 10.4 G/DL — LOW (ref 13–17)
HGB BLD-MCNC: 11.9 G/DL — LOW (ref 13–17)
HYALINE CASTS # UR AUTO: 1 /LPF — SIGNIFICANT CHANGE UP (ref 0–2)
INR BLD: 1.7 RATIO — HIGH (ref 0.88–1.16)
INR BLD: 1.73 RATIO — HIGH (ref 0.88–1.16)
KETONES UR-MCNC: NEGATIVE — SIGNIFICANT CHANGE UP
LEUKOCYTE ESTERASE UR-ACNC: NEGATIVE — SIGNIFICANT CHANGE UP
MAGNESIUM SERPL-MCNC: 1.6 MG/DL — SIGNIFICANT CHANGE UP (ref 1.6–2.6)
MAGNESIUM SERPL-MCNC: 1.9 MG/DL — SIGNIFICANT CHANGE UP (ref 1.6–2.6)
MCHC RBC-ENTMCNC: 31 PG — SIGNIFICANT CHANGE UP (ref 27–34)
MCHC RBC-ENTMCNC: 31.1 PG — SIGNIFICANT CHANGE UP (ref 27–34)
MCHC RBC-ENTMCNC: 33.4 GM/DL — SIGNIFICANT CHANGE UP (ref 32–36)
MCHC RBC-ENTMCNC: 33.5 GM/DL — SIGNIFICANT CHANGE UP (ref 32–36)
MCV RBC AUTO: 92.4 FL — SIGNIFICANT CHANGE UP (ref 80–100)
MCV RBC AUTO: 93.1 FL — SIGNIFICANT CHANGE UP (ref 80–100)
NITRITE UR-MCNC: NEGATIVE — SIGNIFICANT CHANGE UP
NRBC # BLD: 0 /100 WBCS — SIGNIFICANT CHANGE UP (ref 0–0)
NRBC # BLD: 0 /100 WBCS — SIGNIFICANT CHANGE UP (ref 0–0)
PH UR: 6.5 — SIGNIFICANT CHANGE UP (ref 5–8)
PHOSPHATE SERPL-MCNC: 3 MG/DL — SIGNIFICANT CHANGE UP (ref 2.5–4.5)
PLATELET # BLD AUTO: 297 K/UL — SIGNIFICANT CHANGE UP (ref 150–400)
PLATELET # BLD AUTO: 428 K/UL — HIGH (ref 150–400)
POTASSIUM SERPL-MCNC: 3.5 MMOL/L — SIGNIFICANT CHANGE UP (ref 3.5–5.3)
POTASSIUM SERPL-MCNC: 4.6 MMOL/L — SIGNIFICANT CHANGE UP (ref 3.5–5.3)
POTASSIUM SERPL-SCNC: 3.5 MMOL/L — SIGNIFICANT CHANGE UP (ref 3.5–5.3)
POTASSIUM SERPL-SCNC: 4.6 MMOL/L — SIGNIFICANT CHANGE UP (ref 3.5–5.3)
PROT SERPL-MCNC: 4.4 G/DL — LOW (ref 6–8.3)
PROT SERPL-MCNC: 5.4 G/DL — LOW (ref 6–8.3)
PROT UR-MCNC: ABNORMAL
PROTHROM AB SERPL-ACNC: 19.8 SEC — HIGH (ref 10.5–13.4)
PROTHROM AB SERPL-ACNC: 20 SEC — HIGH (ref 10.5–13.4)
RBC # BLD: 3.34 M/UL — LOW (ref 4.2–5.8)
RBC # BLD: 3.84 M/UL — LOW (ref 4.2–5.8)
RBC # FLD: 15.3 % — HIGH (ref 10.3–14.5)
RBC # FLD: 15.6 % — HIGH (ref 10.3–14.5)
RBC CASTS # UR COMP ASSIST: 2 /HPF — SIGNIFICANT CHANGE UP (ref 0–4)
RH IG SCN BLD-IMP: POSITIVE — SIGNIFICANT CHANGE UP
SARS-COV-2 RNA SPEC QL NAA+PROBE: SIGNIFICANT CHANGE UP
SARS-COV-2 RNA SPEC QL NAA+PROBE: SIGNIFICANT CHANGE UP
SODIUM SERPL-SCNC: 134 MMOL/L — LOW (ref 135–145)
SODIUM SERPL-SCNC: 134 MMOL/L — LOW (ref 135–145)
SP GR SPEC: 1.03 — HIGH (ref 1.01–1.02)
SPECIMEN SOURCE: SIGNIFICANT CHANGE UP
SPECIMEN SOURCE: SIGNIFICANT CHANGE UP
TACROLIMUS SERPL-MCNC: 4.6 NG/ML — SIGNIFICANT CHANGE UP
UROBILINOGEN FLD QL: ABNORMAL
WBC # BLD: 21.84 K/UL — HIGH (ref 3.8–10.5)
WBC # BLD: 21.92 K/UL — HIGH (ref 3.8–10.5)
WBC # FLD AUTO: 21.84 K/UL — HIGH (ref 3.8–10.5)
WBC # FLD AUTO: 21.92 K/UL — HIGH (ref 3.8–10.5)
WBC UR QL: 1 /HPF — SIGNIFICANT CHANGE UP (ref 0–5)

## 2022-11-07 PROCEDURE — 88307 TISSUE EXAM BY PATHOLOGIST: CPT | Mod: 26

## 2022-11-07 PROCEDURE — 76705 ECHO EXAM OF ABDOMEN: CPT | Mod: 26,59

## 2022-11-07 PROCEDURE — 93975 VASCULAR STUDY: CPT | Mod: 26

## 2022-11-07 PROCEDURE — 99222 1ST HOSP IP/OBS MODERATE 55: CPT

## 2022-11-07 PROCEDURE — 47780 FUSE BILE DUCTS AND BOWEL: CPT

## 2022-11-07 PROCEDURE — 49568: CPT

## 2022-11-07 PROCEDURE — 71045 X-RAY EXAM CHEST 1 VIEW: CPT | Mod: 26

## 2022-11-07 PROCEDURE — 49560: CPT | Mod: GC,59

## 2022-11-07 DEVICE — STAPLER COVIDIEN ENDO GIA 60-3.8MM BLUE RELOAD: Type: IMPLANTABLE DEVICE | Status: FUNCTIONAL

## 2022-11-07 DEVICE — IMPLANTABLE DEVICE: Type: IMPLANTABLE DEVICE | Status: FUNCTIONAL

## 2022-11-07 DEVICE — STAPLER COVIDIEN ENDO GIA 60-3.8MM BLUE: Type: IMPLANTABLE DEVICE | Status: FUNCTIONAL

## 2022-11-07 RX ORDER — SODIUM CHLORIDE 9 MG/ML
1000 INJECTION, SOLUTION INTRAVENOUS
Refills: 0 | Status: DISCONTINUED | OUTPATIENT
Start: 2022-11-07 | End: 2022-11-08

## 2022-11-07 RX ORDER — SODIUM CHLORIDE 9 MG/ML
500 INJECTION INTRAMUSCULAR; INTRAVENOUS; SUBCUTANEOUS ONCE
Refills: 0 | Status: COMPLETED | OUTPATIENT
Start: 2022-11-07 | End: 2022-11-07

## 2022-11-07 RX ORDER — FLUCONAZOLE 150 MG/1
400 TABLET ORAL EVERY 24 HOURS
Refills: 0 | Status: DISCONTINUED | OUTPATIENT
Start: 2022-11-08 | End: 2022-11-08

## 2022-11-07 RX ORDER — TACROLIMUS 5 MG/1
0.5 CAPSULE ORAL
Refills: 0 | Status: DISCONTINUED | OUTPATIENT
Start: 2022-11-07 | End: 2022-11-08

## 2022-11-07 RX ORDER — OXYCODONE HYDROCHLORIDE 5 MG/1
10 TABLET ORAL EVERY 4 HOURS
Refills: 0 | Status: DISCONTINUED | OUTPATIENT
Start: 2022-11-07 | End: 2022-11-11

## 2022-11-07 RX ORDER — VALGANCICLOVIR 450 MG/1
900 TABLET, FILM COATED ORAL DAILY
Refills: 0 | Status: DISCONTINUED | OUTPATIENT
Start: 2022-11-08 | End: 2022-11-10

## 2022-11-07 RX ORDER — MEROPENEM 1 G/30ML
1000 INJECTION INTRAVENOUS ONCE
Refills: 0 | Status: COMPLETED | OUTPATIENT
Start: 2022-11-07 | End: 2022-11-07

## 2022-11-07 RX ORDER — ONDANSETRON 8 MG/1
4 TABLET, FILM COATED ORAL ONCE
Refills: 0 | Status: DISCONTINUED | OUTPATIENT
Start: 2022-11-07 | End: 2022-11-22

## 2022-11-07 RX ORDER — HYDROMORPHONE HYDROCHLORIDE 2 MG/ML
0.5 INJECTION INTRAMUSCULAR; INTRAVENOUS; SUBCUTANEOUS
Refills: 0 | Status: DISCONTINUED | OUTPATIENT
Start: 2022-11-07 | End: 2022-11-07

## 2022-11-07 RX ORDER — HYDROMORPHONE HYDROCHLORIDE 2 MG/ML
0.5 INJECTION INTRAMUSCULAR; INTRAVENOUS; SUBCUTANEOUS
Refills: 0 | Status: DISCONTINUED | OUTPATIENT
Start: 2022-11-07 | End: 2022-11-08

## 2022-11-07 RX ORDER — OXYCODONE HYDROCHLORIDE 5 MG/1
5 TABLET ORAL EVERY 4 HOURS
Refills: 0 | Status: DISCONTINUED | OUTPATIENT
Start: 2022-11-07 | End: 2022-11-14

## 2022-11-07 RX ORDER — FLUCONAZOLE 150 MG/1
400 TABLET ORAL ONCE
Refills: 0 | Status: COMPLETED | OUTPATIENT
Start: 2022-11-07 | End: 2022-11-07

## 2022-11-07 RX ORDER — HYDROMORPHONE HYDROCHLORIDE 2 MG/ML
0.25 INJECTION INTRAMUSCULAR; INTRAVENOUS; SUBCUTANEOUS ONCE
Refills: 0 | Status: DISCONTINUED | OUTPATIENT
Start: 2022-11-07 | End: 2022-11-07

## 2022-11-07 RX ORDER — FENTANYL CITRATE 50 UG/ML
25 INJECTION INTRAVENOUS
Refills: 0 | Status: DISCONTINUED | OUTPATIENT
Start: 2022-11-07 | End: 2022-11-07

## 2022-11-07 RX ORDER — SODIUM CHLORIDE 9 MG/ML
500 INJECTION, SOLUTION INTRAVENOUS ONCE
Refills: 0 | Status: COMPLETED | OUTPATIENT
Start: 2022-11-07 | End: 2022-11-07

## 2022-11-07 RX ORDER — VANCOMYCIN HCL 1 G
1000 VIAL (EA) INTRAVENOUS ONCE
Refills: 0 | Status: COMPLETED | OUTPATIENT
Start: 2022-11-07 | End: 2022-11-07

## 2022-11-07 RX ORDER — MAGNESIUM SULFATE 500 MG/ML
2 VIAL (ML) INJECTION ONCE
Refills: 0 | Status: COMPLETED | OUTPATIENT
Start: 2022-11-07 | End: 2022-11-07

## 2022-11-07 RX ORDER — MEROPENEM 1 G/30ML
1000 INJECTION INTRAVENOUS EVERY 8 HOURS
Refills: 0 | Status: DISCONTINUED | OUTPATIENT
Start: 2022-11-08 | End: 2022-11-12

## 2022-11-07 RX ORDER — CALCIUM GLUCONATE 100 MG/ML
2 VIAL (ML) INTRAVENOUS ONCE
Refills: 0 | Status: COMPLETED | OUTPATIENT
Start: 2022-11-07 | End: 2022-11-07

## 2022-11-07 RX ORDER — MEROPENEM 1 G/30ML
INJECTION INTRAVENOUS
Refills: 0 | Status: DISCONTINUED | OUTPATIENT
Start: 2022-11-07 | End: 2022-11-12

## 2022-11-07 RX ORDER — CHLORHEXIDINE GLUCONATE 213 G/1000ML
1 SOLUTION TOPICAL
Refills: 0 | Status: DISCONTINUED | OUTPATIENT
Start: 2022-11-07 | End: 2022-11-22

## 2022-11-07 RX ORDER — SODIUM CHLORIDE 9 MG/ML
1000 INJECTION INTRAMUSCULAR; INTRAVENOUS; SUBCUTANEOUS
Refills: 0 | Status: DISCONTINUED | OUTPATIENT
Start: 2022-11-07 | End: 2022-11-07

## 2022-11-07 RX ORDER — FLUCONAZOLE 150 MG/1
TABLET ORAL
Refills: 0 | Status: DISCONTINUED | OUTPATIENT
Start: 2022-11-07 | End: 2022-11-08

## 2022-11-07 RX ORDER — ACETAMINOPHEN 500 MG
650 TABLET ORAL ONCE
Refills: 0 | Status: COMPLETED | OUTPATIENT
Start: 2022-11-07 | End: 2022-11-07

## 2022-11-07 RX ORDER — PANTOPRAZOLE SODIUM 20 MG/1
40 TABLET, DELAYED RELEASE ORAL DAILY
Refills: 0 | Status: DISCONTINUED | OUTPATIENT
Start: 2022-11-08 | End: 2022-11-15

## 2022-11-07 RX ADMIN — HYDROMORPHONE HYDROCHLORIDE 0.5 MILLIGRAM(S): 2 INJECTION INTRAMUSCULAR; INTRAVENOUS; SUBCUTANEOUS at 20:49

## 2022-11-07 RX ADMIN — HYDROMORPHONE HYDROCHLORIDE 0.25 MILLIGRAM(S): 2 INJECTION INTRAMUSCULAR; INTRAVENOUS; SUBCUTANEOUS at 00:50

## 2022-11-07 RX ADMIN — Medication 20 MILLIGRAM(S): at 05:05

## 2022-11-07 RX ADMIN — SODIUM CHLORIDE 1000 MILLILITER(S): 9 INJECTION, SOLUTION INTRAVENOUS at 21:35

## 2022-11-07 RX ADMIN — OXYCODONE HYDROCHLORIDE 10 MILLIGRAM(S): 5 TABLET ORAL at 21:41

## 2022-11-07 RX ADMIN — SODIUM CHLORIDE 75 MILLILITER(S): 9 INJECTION, SOLUTION INTRAVENOUS at 21:26

## 2022-11-07 RX ADMIN — Medication 650 MILLIGRAM(S): at 03:16

## 2022-11-07 RX ADMIN — OXYCODONE HYDROCHLORIDE 5 MILLIGRAM(S): 5 TABLET ORAL at 23:52

## 2022-11-07 RX ADMIN — CHLORHEXIDINE GLUCONATE 1 APPLICATION(S): 213 SOLUTION TOPICAL at 05:09

## 2022-11-07 RX ADMIN — OXYCODONE HYDROCHLORIDE 5 MILLIGRAM(S): 5 TABLET ORAL at 23:22

## 2022-11-07 RX ADMIN — Medication 650 MILLIGRAM(S): at 01:08

## 2022-11-07 RX ADMIN — HYDROMORPHONE HYDROCHLORIDE 0.25 MILLIGRAM(S): 2 INJECTION INTRAMUSCULAR; INTRAVENOUS; SUBCUTANEOUS at 00:36

## 2022-11-07 RX ADMIN — SODIUM CHLORIDE 1000 MILLILITER(S): 9 INJECTION INTRAMUSCULAR; INTRAVENOUS; SUBCUTANEOUS at 22:26

## 2022-11-07 RX ADMIN — TACROLIMUS 0.5 MILLIGRAM(S): 5 CAPSULE ORAL at 08:33

## 2022-11-07 RX ADMIN — HYDROMORPHONE HYDROCHLORIDE 0.5 MILLIGRAM(S): 2 INJECTION INTRAMUSCULAR; INTRAVENOUS; SUBCUTANEOUS at 20:34

## 2022-11-07 RX ADMIN — SODIUM CHLORIDE 75 MILLILITER(S): 9 INJECTION INTRAMUSCULAR; INTRAVENOUS; SUBCUTANEOUS at 10:11

## 2022-11-07 RX ADMIN — HYDROMORPHONE HYDROCHLORIDE 0.5 MILLIGRAM(S): 2 INJECTION INTRAMUSCULAR; INTRAVENOUS; SUBCUTANEOUS at 23:52

## 2022-11-07 RX ADMIN — PANTOPRAZOLE SODIUM 40 MILLIGRAM(S): 20 TABLET, DELAYED RELEASE ORAL at 05:05

## 2022-11-07 RX ADMIN — SODIUM CHLORIDE 75 MILLILITER(S): 9 INJECTION, SOLUTION INTRAVENOUS at 20:28

## 2022-11-07 RX ADMIN — Medication 25 GRAM(S): at 08:19

## 2022-11-07 RX ADMIN — HYDROMORPHONE HYDROCHLORIDE 0.5 MILLIGRAM(S): 2 INJECTION INTRAMUSCULAR; INTRAVENOUS; SUBCUTANEOUS at 23:22

## 2022-11-07 RX ADMIN — SODIUM CHLORIDE 1000 MILLILITER(S): 9 INJECTION INTRAMUSCULAR; INTRAVENOUS; SUBCUTANEOUS at 09:33

## 2022-11-07 RX ADMIN — Medication 1 TABLET(S): at 05:09

## 2022-11-07 RX ADMIN — OXYCODONE HYDROCHLORIDE 10 MILLIGRAM(S): 5 TABLET ORAL at 22:11

## 2022-11-07 RX ADMIN — OXYCODONE HYDROCHLORIDE 10 MILLIGRAM(S): 5 TABLET ORAL at 09:34

## 2022-11-07 RX ADMIN — FLUCONAZOLE 100 MILLIGRAM(S): 150 TABLET ORAL at 18:59

## 2022-11-07 RX ADMIN — Medication 250 MILLIGRAM(S): at 10:22

## 2022-11-07 RX ADMIN — OXYCODONE HYDROCHLORIDE 10 MILLIGRAM(S): 5 TABLET ORAL at 04:00

## 2022-11-07 RX ADMIN — MEROPENEM 100 MILLIGRAM(S): 1 INJECTION INTRAVENOUS at 05:05

## 2022-11-07 RX ADMIN — OXYCODONE HYDROCHLORIDE 10 MILLIGRAM(S): 5 TABLET ORAL at 08:34

## 2022-11-07 RX ADMIN — MEROPENEM 100 MILLIGRAM(S): 1 INJECTION INTRAVENOUS at 18:59

## 2022-11-07 RX ADMIN — TACROLIMUS 0.5 MILLIGRAM(S): 5 CAPSULE ORAL at 21:19

## 2022-11-07 RX ADMIN — Medication 200 GRAM(S): at 20:28

## 2022-11-07 RX ADMIN — MYCOPHENOLATE MOFETIL 1000 MILLIGRAM(S): 250 CAPSULE ORAL at 08:19

## 2022-11-07 RX ADMIN — OXYCODONE HYDROCHLORIDE 10 MILLIGRAM(S): 5 TABLET ORAL at 03:19

## 2022-11-07 NOTE — PROGRESS NOTE ADULT - SUBJECTIVE AND OBJECTIVE BOX
HISTORY  32y Male with PMH of cerebral palsy, Primary Sclerosing Cholangitis Cirrhosis with frequent ERCPs with biliary stent placements for biliary strictures (last in 9/2022) now s/p liver transplant on 11/1/22. Post-op course complicated by fevers and elevated WBC, rising LFTs, and purulent drainage from midline wound. On 11/7, pt taken back to OR for washout. Biliary leak noted at hepaticojejunostomy and exposed mucosa noted at J-J anastomosis. Velasquez limb resected and recreated; end to side hepatico jejunostomy and side to side jejunojejunostomy (both hand-sewn). Biliary drain placed in addition to 2 more Vicente drains; original infrahepatic drain remained in place. Strattice bridging mesh placed at the apex of incision. EBL 50ml, 1000ml IVF given.      24 HOUR EVENTS: RTOR for purulent drainage from wound. See HPI for details    SUBJECTIVE/ROS:  [ x] A ten-point review of systems was otherwise negative except as noted.  [ ] Due to altered mental status/intubation, subjective information were not able to be obtained from the patient. History was obtained, to the extent possible, from review of the chart and collateral sources of information.      NEURO  Exam: awake, alert and oriented  Meds: HYDROmorphone  Injectable 0.5 milliGRAM(s) IV Push every 3 hours PRN Severe Pain (7 - 10)  ondansetron Injectable 4 milliGRAM(s) IV Push once PRN Nausea and/or Vomiting    [x] Adequacy of sedation and pain control has been assessed and adjusted      RESPIRATORY  RR: 23 (11-07-22 @ 18:30) (17 - 23)  SpO2: 98% (11-07-22 @ 18:30) (96% - 99%)  Exam: unlabored, clear to auscultation bilaterally  [n/a ] Extubation Readiness Assessed  Meds: x      CARDIOVASCULAR  HR: 124 (11-07-22 @ 18:30) (107 - 135)  BP: 123/64 (11-07-22 @ 18:30) (106/58 - 123/64)  BP(mean): 83 (11-07-22 @ 18:30) (76 - 91)    Exam: regular rate and rhythm  Cardiac Rhythm: sinus  Perfusion     [ x]Adequate   [ ]Inadequate  Mentation  [x ]Normal       [ ]Reduced  Extremities  [x ]Warm         [ ]Cool  Volume Status [ ]Hypervolemic [ ]Euvolemic [ ]Hypovolemic  Meds:       GI/NUTRITION  Exam: softly distended, tender to palpation, abdominal binder in place, gauze c/d/i, JPs serosanguinous, biliary drain bilious  Diet: NPO  Meds:  x    GENITOURINARY  I&O's Detail    11-06 @ 07:01  -  11-07 @ 07:00  --------------------------------------------------------  IN:    IV PiggyBack: 200 mL    IV PiggyBack: 100 mL    Oral Fluid: 1060 mL    sodium chloride 0.9%: 800 mL  Total IN: 2160 mL    OUT:    Bulb (mL): 205 mL    Voided (mL): 1750 mL  Total OUT: 1955 mL    Total NET: 205 mL      11-07 @ 07:01  -  11-07 @ 19:00  --------------------------------------------------------  IN:  Total IN: 0 mL    OUT:    Voided (mL): 100 mL  Total OUT: 100 mL    Total NET: -100 mL        Weight (kg): 58.4 (11-07 @ 12:36)  11-07    134<L>  |  100  |  10  ----------------------------<  118<H>  3.5   |  23  |  0.71    Ca    7.6<L>      07 Nov 2022 06:04  Mg     1.6     11-07    TPro  5.4<L>  /  Alb  2.2<L>  /  TBili  6.8<H>  /  DBili  x   /  AST  51<H>  /  ALT  78<H>  /  AlkPhos  243<H>  11-07    [x ] Chapman catheter, indication: critical illness  Meds: lactated ringers. 1000 milliLiter(s) IV Continuous <Continuous>        HEMATOLOGIC  Meds: x  [x] VTE Prophylaxis                        10.4   21.84 )-----------( 297      ( 07 Nov 2022 06:04 )             31.1     PT/INR - ( 07 Nov 2022 06:04 )   PT: 20.0 sec;   INR: 1.73 ratio         PTT - ( 07 Nov 2022 06:04 )  PTT:25.3 sec  Transfusion     [ ] PRBC   [ ] Platelets   [ ] FFP   [ ] Cryoprecipitate      INFECTIOUS DISEASES  T(C): 36.4 (11-07-22 @ 18:30), Max: 39.3 (11-07-22 @ 01:15)  WBC Count: 21.84 K/uL (11-07 @ 06:04)    Recent Cultures:  Specimen Source: Wound Wound, 11-06 @ 12:55; Results   Few Escherichia coli  Few Enterococcus faecium; Gram Stain: --; Organism: --  Specimen Source: .Blood Blood, 11-06 @ 07:22; Results   No growth to date.; Gram Stain: --; Organism: --  Specimen Source: .Blood Blood, 11-02 @ 15:05; Results   No growth to date.; Gram Stain: --; Organism: --    Meds: fluconAZOLE IVPB 400 milliGRAM(s) IV Intermittent once  fluconAZOLE IVPB      influenza   Vaccine 0.5 milliLiter(s) IntraMuscular once  meropenem  IVPB 1000 milliGRAM(s) IV Intermittent once  meropenem  IVPB      tacrolimus    0.5 mG/mL Suspension 0.5 milliGRAM(s) Oral <User Schedule>  trimethoprim   80 mG/sulfamethoxazole 400 mG 1 Tablet(s) Oral daily        ENDOCRINE  Capillary Blood Glucose    Meds:  x      ACCESS DEVICES:  [x] Peripheral IV  [ ] Central Venous Line	[ ] R	[ ] L	[ ] IJ	[ ] Fem	[ ] SC	Placed:   [ ] Arterial Line		[ ] R	[ ] L	[ ] Fem	[ ] Rad	[ ] Ax	Placed:   [ ] PICC:					[ ] Mediport  [ x] Urinary Catheter, Date Placed: 11/7  [ x] Necessity of urinary, arterial, and venous catheters discussed    OTHER MEDICATIONS:  chlorhexidine 2% Cloths 1 Application(s) Topical <User Schedule>      CODE STATUS: full    IMAGING: < from: CT Abdomen and Pelvis w/ IV Cont (11.06.22 @ 16:57) >  IMPRESSION:  Fluid and air are noted within the right upper quadrant, lateral to the   tip of the surgical drainage catheter.  Orthotopic liver transplant with patent vascular anastomoses.

## 2022-11-07 NOTE — BRIEF OPERATIVE NOTE - NSICDXBRIEFPREOP_GEN_ALL_CORE_FT
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  
PRE-OP DIAGNOSIS:  Biliary anastomotic leak 07-Nov-2022 18:34:21  Jarvis Mcneil

## 2022-11-07 NOTE — BRIEF OPERATIVE NOTE - OPERATION/FINDINGS
Biliary leak at hepaticojejunostomy s/p orthotopic liver transplant  Exposed mucosa noted at J-J anastomosis  Velasquez limb resected and recreated; end to side hepatico jejunostomy and side to side jejunojejunostomy (both hand-sewn)  Biliary drain placed in addition to 2 more Vicente drains; original infrahepatic drain remained in place  Strattice bridging mesh placed at the apex of incision

## 2022-11-07 NOTE — PROGRESS NOTE ADULT - SUBJECTIVE AND OBJECTIVE BOX
Transplant Surgery - Multidisciplinary Rounds  --------------------------------------------------------------   Donor OLTx      Date:  2022      POD# 7    Present:   Patient seen and examined with multidisciplinary Transplant team including  Surgeon: Dr. Dagher. Hepatologist: CHOCO Smith and RNs in AM rounds.   Disciplines not in attendance will be notified of the plan.     HPI: 32M PMH Cerebral palsy, Primary Sclerosing Cholangitis Cirrhosis with frequent ERCPs with biliary stent placements for biliary strictures (last in 2022) on liver transplant list (ABO AB), recent COVID s/p MAB in 2022) admitted for liver transplant    OR   S/P Orthotopic liver transplantation from a  donor. Steroid induction  Side-side Cavocaval anastomosis  End-end portoportal anastomosis  End-end single arterial anastomosis  Velasquez en-Y HJ with internal stenting & Side-side JJ      Interval Events:  - Febrile 39.3/tachycardic 120s. Cultures sent 500NS bolus x1/IVF  - WBC rising   - R lateral pole of wound - opened at bedside. Culture sent   - TBili rising 3.6 from 1.7     AST/ALT down      Immunosuppression:   Induction:    Methylpred                                           Maintenance immunosuppression: FK 0.5/0.5, MMF 1000mg BID, Pred 20mg qd  Ongoing monitoring for signs of rejection.      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      Anemia      History of ERCP  multiple with stent insertions/replacement every 3 months ---last 2022      [place] teeth extracted            Vital Signs Last 24 Hrs  T(C): 37.2 (2022 12:36), Max: 39.3 (2022 01:15)  T(F): 98.9 (2022 11:50), Max: 102.7 (2022 01:15)  HR: 107 (2022 12:36) (107 - 135)  BP: 110/62 (2022 12:36) (106/58 - 120/60)  BP(mean): 76 (2022 12:36) (76 - 91)  RR: 17 (2022 12:36) (17 - 20)  SpO2: 97% (2022 12:36) (96% - 99%)    Parameters below as of 2022 12:36    O2 Flow (L/min): 3  O2 Concentration (%): 40    I&O's Summary    2022 07:01  -  2022 07:00  --------------------------------------------------------  IN: 2160 mL / OUT: 1955 mL / NET: 205 mL    2022 07:01  -  2022 15:43  --------------------------------------------------------  IN: 0 mL / OUT: 100 mL / NET: -100 mL                              10.4   21.84 )-----------( 297      ( 2022 06:04 )             31.1         134<L>  |  100  |  10  ----------------------------<  118<H>  3.5   |  23  |  0.71    Ca    7.6<L>      2022 06:04  Mg     1.6         TPro  5.4<L>  /  Alb  2.2<L>  /  TBili  6.8<H>  /  DBili  x   /  AST  51<H>  /  ALT  78<H>  /  AlkPhos  243<H>      Tacrolimus (), Serum: 4.6 ng/mL ( @ 06:04)        Culture - Blood (collected 22 @ 07:22)  Source: .Blood Blood-Venous  Preliminary Report (22 @ 11:02):    No growth to date.    Culture - Blood (collected 22 @ 07:22)  Source: .Blood Blood  Preliminary Report (22 @ 11:02):    No growth to date.    Culture - Blood (collected 22 @ 15:05)  Source: .Blood Blood  Preliminary Report (22 @ 23:01):    No growth to date.    Culture - Blood (collected 22 @ 15:05)  Source: .Blood Blood  Preliminary Report (22 @ 23:01):    No growth to date.         22  ACC: [id number] EXAM:  CT ABDOMEN AND PELVIS IC                        PROCEDURE DATE:  2022    INTERPRETATION:  CLINICAL INFORMATION: Post open liver transplant 10/31   now with fever and purulent wound drainage  COMPARISON: CT abdomen and pelvis 2022  CONTRAST/COMPLICATIONS:  IV Contrast: Omnipaque 350  90 cc administered   10 cc discarded  Oral Contrast: NONE  Complications: None reported at time of study completion    PROCEDURE:  CT of the Abdomen and Pelvis was performed.  Sagittal and coronal reformats were performed.    FINDINGS:  LOWER CHEST: Small bilateral pleural effusions with associated partial   passive atelectasis.    LIVER: Within normal limits. Mild periportal edema. Patent vascular   anastomoses.  BILE DUCTS: Normal caliber. Hepaticojejunostomy.  GALLBLADDER: Cholecystectomy.  SPLEEN: Within normal limits.  PANCREAS: Within normal limits.  ADRENALS: Within normal limits.  KIDNEYS/URETERS: Within normal limits.    BLADDER: Within normal limits.  REPRODUCTIVE ORGANS: Prostate within normal limits.    BOWEL: Status post hepaticojejunostomy with additional jejunojejunal   anastomosis. No bowel obstruction. Appendix is not visualized. No   evidence of inflammation in the pericecal region.  PERITONEUM: A combination of fluid and air is noted within the right   upper quadrant. The tip of a right lower quadrant approach surgical drain   is medial to this area. Small volume pneumoperitoneum.  VESSELS: The hepatic veins and portal veins are patent.  RETROPERITONEUM/LYMPH NODES: No lymphadenopathy.  ABDOMINAL WALL: Postsurgical changes about the ventral wall with   subcutaneous emphysema noted about the right aspect extending laterally.  BONES: Within normal limits.    IMPRESSION: Fluid and air are noted within the right upper quadrant, lateral to the tip of the surgical drainage catheter.  Orthotopic liver transplant with patent vascular anastomoses.      22 Liver US:   Status post liver transplant with patent hepatic vasculature. Mildly elevated velocities within the hepatic artery, which may be related to vessel tortuosity. Continued attention on follow-up is recommended. Small amount of perihepatic fluid.      Review of systems  Gen: No weight changes, fatigue, fevers/chills, weakness  Skin: No rashes  Head/Eyes/Ears/Mouth: No headache; Normal hearing; Normal vision w/o blurriness; No sinus pain/discomfort, sore throat  Respiratory: No dyspnea, cough, wheezing, hemoptysis  CV: No chest pain, PND, orthopnea  GI: C/O mild abdominal pain at surgical site. no diarrhea, constipation, nausea, vomiting, melena, hematochezia  : No increased frequency, dysuria, hematuria, nocturia  MSK: No joint pain/swelling; no back pain; no edema  Neuro: No dizziness/lightheadedness, weakness, seizures, numbness, tingling  Heme: No easy bruising or bleeding  Endo: No heat/cold intolerance  Psych: No significant nervousness, anxiety, stress, depression  All other systems were reviewed and are negative, except as noted.      PHYSICAL EXAM:  Constitutional: Well developed / well nourished  Eyes: icteric, normal eyelids  ENMT: nc/at, no thrush  Neck: supple  Respiratory: CTA B/L  Cardiovascular: RRR  Gastrointestinal: Lateral pole of wound with purulent drainage - opened and packed.  VIOLETA #2 SS   : voiding  Extremities: SCD's in place and working bilaterally, no edema  Vascular: Palpable dp pulses bilaterally.   Neurological: A&O x3  Skin: no rashes, no lesions  Musculoskeletal: Moving all extremities, no edema  Psychiatric: Responsive                              Transplant Surgery - Multidisciplinary Rounds  --------------------------------------------------------------   Donor OLTx      Date:  2022      POD# 7    Present:   Patient seen and examined with multidisciplinary Transplant team including  Surgeon: Dr. Dagher. Hepatologist: CHOCO Smith and RNs in AM rounds.   Disciplines not in attendance will be notified of the plan.     HPI: 32M PMH Cerebral palsy, Primary Sclerosing Cholangitis Cirrhosis with frequent ERCPs with biliary stent placements for biliary strictures (last in 2022) on liver transplant list (ABO AB), recent COVID s/p MAB in 2022) admitted for liver transplant    OR   S/P Orthotopic liver transplantation from a  donor. Steroid induction  Side-side Cavocaval anastomosis  End-end portoportal anastomosis  End-end single arterial anastomosis  Velasquez en-Y HJ with internal stenting & Side-side JJ      Interval Events:  - Febrile 39.3/tachycardic 120s. Cultures sent 500NS bolus x1/IVF  - WBC rising   - R lateral pole of wound - opened at bedside. Culture sent   - LFts rising    Immunosuppression:   Induction:    Methylpred                                           Maintenance immunosuppression: FK 0.5/0.5, MMF 1000mg BID, Pred 20mg qd  Ongoing monitoring for signs of rejection.      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      Anemia      History of ERCP  multiple with stent insertions/replacement every 3 months ---last 2022      [place] teeth extracted            Vital Signs Last 24 Hrs  T(C): 37.2 (2022 12:36), Max: 39.3 (2022 01:15)  T(F): 98.9 (2022 11:50), Max: 102.7 (2022 01:15)  HR: 107 (2022 12:36) (107 - 135)  BP: 110/62 (2022 12:36) (106/58 - 120/60)  BP(mean): 76 (2022 12:36) (76 - 91)  RR: 17 (2022 12:36) (17 - 20)  SpO2: 97% (2022 12:36) (96% - 99%)    Parameters below as of 2022 12:36    O2 Flow (L/min): 3  O2 Concentration (%): 40    I&O's Summary    2022 07:01  -  2022 07:00  --------------------------------------------------------  IN: 2160 mL / OUT: 1955 mL / NET: 205 mL    2022 07:01  -  2022 15:43  --------------------------------------------------------  IN: 0 mL / OUT: 100 mL / NET: -100 mL                              10.4   21.84 )-----------( 297      ( 2022 06:04 )             31.1         134<L>  |  100  |  10  ----------------------------<  118<H>  3.5   |  23  |  0.71    Ca    7.6<L>      2022 06:04  Mg     1.6         TPro  5.4<L>  /  Alb  2.2<L>  /  TBili  6.8<H>  /  DBili  x   /  AST  51<H>  /  ALT  78<H>  /  AlkPhos  243<H>      Tacrolimus (), Serum: 4.6 ng/mL ( @ 06:04)        Culture - Blood (collected 22 @ 07:22)  Source: .Blood Blood-Venous  Preliminary Report (22 @ 11:02):    No growth to date.    Culture - Blood (collected 22 @ 07:22)  Source: .Blood Blood  Preliminary Report (22 @ 11:02):    No growth to date.    Culture - Blood (collected 22 @ 15:05)  Source: .Blood Blood  Preliminary Report (22 @ 23:01):    No growth to date.    Culture - Blood (collected 22 @ 15:05)  Source: .Blood Blood  Preliminary Report (22 @ 23:01):    No growth to date.         22  ACC: [id number] EXAM:  CT ABDOMEN AND PELVIS IC                        PROCEDURE DATE:  2022    INTERPRETATION:  CLINICAL INFORMATION: Post open liver transplant 10/31   now with fever and purulent wound drainage  COMPARISON: CT abdomen and pelvis 2022  CONTRAST/COMPLICATIONS:  IV Contrast: Omnipaque 350  90 cc administered   10 cc discarded  Oral Contrast: NONE  Complications: None reported at time of study completion    PROCEDURE:  CT of the Abdomen and Pelvis was performed.  Sagittal and coronal reformats were performed.    FINDINGS:  LOWER CHEST: Small bilateral pleural effusions with associated partial   passive atelectasis.    LIVER: Within normal limits. Mild periportal edema. Patent vascular   anastomoses.  BILE DUCTS: Normal caliber. Hepaticojejunostomy.  GALLBLADDER: Cholecystectomy.  SPLEEN: Within normal limits.  PANCREAS: Within normal limits.  ADRENALS: Within normal limits.  KIDNEYS/URETERS: Within normal limits.    BLADDER: Within normal limits.  REPRODUCTIVE ORGANS: Prostate within normal limits.    BOWEL: Status post hepaticojejunostomy with additional jejunojejunal   anastomosis. No bowel obstruction. Appendix is not visualized. No   evidence of inflammation in the pericecal region.  PERITONEUM: A combination of fluid and air is noted within the right   upper quadrant. The tip of a right lower quadrant approach surgical drain   is medial to this area. Small volume pneumoperitoneum.  VESSELS: The hepatic veins and portal veins are patent.  RETROPERITONEUM/LYMPH NODES: No lymphadenopathy.  ABDOMINAL WALL: Postsurgical changes about the ventral wall with   subcutaneous emphysema noted about the right aspect extending laterally.  BONES: Within normal limits.    IMPRESSION: Fluid and air are noted within the right upper quadrant, lateral to the tip of the surgical drainage catheter.  Orthotopic liver transplant with patent vascular anastomoses.      22 Liver US:   Status post liver transplant with patent hepatic vasculature. Mildly elevated velocities within the hepatic artery, which may be related to vessel tortuosity. Continued attention on follow-up is recommended. Small amount of perihepatic fluid.      Review of systems  Gen: No weight changes, fatigue, fevers/chills, weakness  Skin: No rashes  Head/Eyes/Ears/Mouth: No headache; Normal hearing; Normal vision w/o blurriness; No sinus pain/discomfort, sore throat  Respiratory: No dyspnea, cough, wheezing, hemoptysis  CV: No chest pain, PND, orthopnea  GI: C/O mild abdominal pain at surgical site. no diarrhea, constipation, nausea, vomiting, melena, hematochezia  : No increased frequency, dysuria, hematuria, nocturia  MSK: No joint pain/swelling; no back pain; no edema  Neuro: No dizziness/lightheadedness, weakness, seizures, numbness, tingling  Heme: No easy bruising or bleeding  Endo: No heat/cold intolerance  Psych: No significant nervousness, anxiety, stress, depression  All other systems were reviewed and are negative, except as noted.      PHYSICAL EXAM:  Constitutional: Well developed / well nourished  Eyes: icteric, normal eyelids  ENMT: nc/at, no thrush  Neck: supple  Respiratory: CTA B/L  Cardiovascular: RRR  Gastrointestinal: Lateral pole of wound with purulent drainage - opened and packed.  VIOLETA #2 SS   : voiding  Extremities: SCD's in place and working bilaterally, no edema  Vascular: Palpable dp pulses bilaterally.   Neurological: A&O x3  Skin: no rashes, no lesions  Musculoskeletal: Moving all extremities, no edema  Psychiatric: Responsive

## 2022-11-07 NOTE — CONSULT NOTE ADULT - SUBJECTIVE AND OBJECTIVE BOX
HISTORY OF PRESENT ILLNESS:    32y Male with PMH of cerebral palsy, Primary Sclerosing Cholangitis Cirrhosis with frequent ERCPs with biliary stent placements for biliary strictures (last in 2022) now s/p liver transplant on 22. Post-op course complicated by fevers and elevated WBC, rising LFTs, and purulent drainage from midline wound. On , pt taken back to OR for washout. Biliary leak noted at hepaticojejunostomy and exposed mucosa noted at J-J anastomosis. Velasquez limb resected and recreated; end to side hepatico jejunostomy and side to side jejunojejunostomy (both hand-sewn). Biliary drain placed in addition to 2 more Vicente drains; original infrahepatic drain remained in place. Strattice bridging mesh placed at the apex of incision. EBL 50ml, 1000ml IVF given.      --------------------------------------------------------------------------------------  PAST MEDICAL HISTORY:   Cerebral palsy    Cerebral palsy    PSC (primary sclerosing cholangitis)    Abnormal LFTs    Bile duct stricture    Dental caries    Impacted teeth    Phimosis    History of seizures    Anemia        PAST SURGICAL HISTORY:   No significant past surgical history    No significant past surgical history    History of ERCP    History of biliary stent insertion    History of biliary stent insertion    [place] teeth extracted        FAMILY HISTORY:   No pertinent family history in first degree relatives        HOME MEDICATIONS:    ALLERGIES:   No Known Allergies      --------------------------------------------------------------------------------------  PHYSICAL EXAM:    VITAL SIGNS:  ICU Vital Signs Last 24 Hrs  T(C): 36.4 (2022 18:30), Max: 39.3 (2022 01:15)  T(F): 97.5 (2022 18:30), Max: 102.7 (2022 01:15)  HR: 129 (2022 19:00) (107 - 135)  BP: 110/60 (2022 19:00) (106/58 - 123/64)  BP(mean): 80 (2022 19:00) (76 - 91)  ABP: --  ABP(mean): --  RR: 23 (2022 19:00) (17 - 23)  SpO2: 100% (2022 19:00) (96% - 100%)    O2 Parameters below as of 2022 18:30  Patient On (Oxygen Delivery Method): nasal cannula    O2 Concentration (%): 2        I/Os:  I&O's Summary    2022 07:01  -  2022 07:00  --------------------------------------------------------  IN: 2160 mL / OUT: 1955 mL / NET: 205 mL    2022 07:01  -  2022 20:19  --------------------------------------------------------  IN: 0 mL / OUT: 100 mL / NET: -100 mL        NEURO:   Exam: A&O    RESPIRATORY  Exam: room air         CARDIOVASCULAR  Exam: well perfused       GI/NUTRITION  Exam: incision c/d/i, 3 VIOLETA   Diet: NPO     GENITOURINARY/RENAL  Exam:   [x Chapman catheter, indication: urine output monitoring in critically ill patient    Weight:  Weight (kg): 58.4 ( @ 12:36)    HEMATOLOGIC  [x] VTE Prophylaxis:    Transfusion: [ ] PRBC	[ ] Platelets	[ ] FFP	[ ] Cryoprecipitate      INFECTIOUS DISEASES:  Recent Cultures:  RECENT CULTURES:  Specimen Source: Wound Wound  Date/Time:  @ 12:55  Culture Results:   Few Escherichia coli  Few Enterococcus faecium  Gram Stain: --  Organism: --  Specimen Source: .Blood Blood  Date/Time:  @ 07:22  Culture Results:   No growth to date.  Gram Stain: --  Organism: --        ENDOCRINE  Glucose:  CAPILLARY BLOOD GLUCOSE      POCT Blood Glucose.: 130 mg/dL (2022 08:29)  POCT Blood Glucose.: 123 mg/dL (2022 21:08)        PATIENT CARE ACCESS DEVICES:  [ ] Peripheral IV  [ ] Central Venous Line	[ ] R	[ ] L	[ ] IJ	[ ] Fem	[ ] SC	Placed:   [ ] Arterial Line		[ ] R	[ ] L	[ ] Fem	[ ] Rad	[ ] Ax	Placed:   [ ] PICC:					[ ] Mediport  [ ] Urinary Catheter, Date Placed:   [x] Necessity of urinary, arterial, and venous catheters discussed    --------------------------------------------------------------------------------------  MEDICATIONS:   Neurologic Medications  HYDROmorphone  Injectable 0.5 milliGRAM(s) IV Push every 3 hours PRN Severe Pain (7 - 10)  ondansetron Injectable 4 milliGRAM(s) IV Push once PRN Nausea and/or Vomiting    Respiratory Medications    Cardiovascular Medications    Gastrointestinal Medications  calcium gluconate IVPB 2 Gram(s) IV Intermittent once  lactated ringers. 1000 milliLiter(s) IV Continuous <Continuous>    Genitourinary Medications    Hematologic/Oncologic Medications  influenza   Vaccine 0.5 milliLiter(s) IntraMuscular once  tacrolimus    0.5 mG/mL Suspension 0.5 milliGRAM(s) Oral <User Schedule>    Antimicrobial/Immunologic Medications  fluconAZOLE IVPB      meropenem  IVPB      trimethoprim   80 mG/sulfamethoxazole 400 mG 1 Tablet(s) Oral daily    Endocrine/Metabolic Medications    Topical/Other Medications  chlorhexidine 2% Cloths 1 Application(s) Topical <User Schedule>      --------------------------------------------------------------------------------------  LABS:                         11.9   21.92 )-----------( 428      ( 2022 19:07 )             35.5       134<L>  |  103  |  10  ----------------------------<  134<H>  4.6   |  22  |  0.67    Ca    7.3<L>      2022 19:07  Phos  3.0       Mg     1.9         TPro  4.4<L>  /  Alb  1.9<L>  /  TBili  6.1<H>  /  DBili  x   /  AST  57<H>  /  ALT  69<H>  /  AlkPhos  187<H>    Urinalysis Basic - ( 2022 11:58 )    Color: Dark Yellow / Appearance: Clear / S.030 / pH: x  Gluc: x / Ketone: Negative  / Bili: Large >10 / Urobili: >8mg/dL   Blood: x / Protein: 30 mg/dL / Nitrite: Negative   Leuk Esterase: Negative / RBC: 2 /hpf / WBC 1 /HPF   Sq Epi: x / Non Sq Epi: 0 /hpf / Bacteria: Negative    PT/INR - ( 2022 19:19 )   PT: 19.8 sec;   INR: 1.70 ratio         PTT - ( 2022 19:19 )  PTT:26.9 sec  --------------------------------------------------------------------------------------    IMAGING :

## 2022-11-07 NOTE — BRIEF OPERATIVE NOTE - NSICDXBRIEFPROCEDURE_GEN_ALL_CORE_FT
PROCEDURES:  Open transplantation of liver from  donor 2022 03:00:18  Arturo Valdez preparation of arterial structures of liver graft prior to transplantation 2022 03:00:41  Arturo Valdez preparation of venous structures of liver graft prior to transplantation 2022 03:01:01  Arturo Valdez  
PROCEDURES:  Exploratory laparotomy 07-Nov-2022 18:33:06  Jarvis Mcneil  Abdominal washout 07-Nov-2022 18:38:22  Jarvis Mcneil

## 2022-11-07 NOTE — PROGRESS NOTE ADULT - ASSESSMENT
32M PMH Cerebral palsy, Primary Sclerosing Cholangitis Cirrhosis with frequent ERCPs with biliary stent placements for biliary strictures (last in 9/2022) on liver transplant list (ABO AB), recent COVID s/p MAB in 9/2022), on s/p liver transplant (11/1) s/p RTOR for washout, resection of Velasquez limb with end to side hepatico jejunostomy and side to side jejunojejunostomy, biliary drain placement and 2 new JPs (3 total) on 11/7.    PLAN:    Neurologic: acute pain  - Pain control w/ Dilaudid PRN    Respiratory: no acute issues  - Incentive spirometry, OOB to prevent atelectasis    Cardiovascular: no acute issues  - Required phenylephrine intra-op, off pressors now    Gastrointestinal/Nutrition:   - NPO, NGT to suction  - CXR to eval NGT position  - Trend LFTs  - Monitor VIOLETA and biliary drain output  - Protonix daily    Genitourinary/Renal: no acute issues  - Monitor UOP with Chapman, strict I&Os  - Supplement electrolytes PRN  - LR @ 75ml/hr    Hematologic: no acute issues  - Check CBC, coags  - Hold chemical VTE ppx  - Venodynes    Infectious Disease:  - Continue meropenem  - Diflucan/Bactrim/valganciclovir for prophylaxis  - Prednisone 20mg qd and tacrolimus for immunosuppression  - Follow up surgical cultures    Endocrine: no acute issues  - Monitor glucose on BMP    Dispo: SICU

## 2022-11-07 NOTE — CONSULT NOTE ADULT - ATTENDING COMMENTS
I have seen and evaluated this patient at the time of SICU admission. I have reviewed all new labs, imaging and reports. I have participated in formulating the plan for the day, and have read and agree with the history, ROS, exam, assessment and plan as stated above.     POD 0 from OR takeback with findings of biliary anastomotic leak with redo of anastomosis, washout and closure.   Arrives intubated, stable. No pressors.   Pain: dilaudid PRN, will add oxy for longer relief.   oliguria: POCUS suggests low intravascular volume --> NS bolus 500ml at a time, will trend UO and repeat US.     Total time spent in the care of this patient today (excluding procedures and teaching): 55 min                 Over 50% of the total time was spent in discussion and coordination of care with consulting services, dietary and rehab services.     Falguni Salcedo M.D., M.S.  Division of Acute Care Surgery
Patient s/p OLT on 11/1/22   Now with high grade fevers 48H post-OLT  Would broaden mariposa-operative Unasyn to Zosyn  Agree with urine, blood and sputum cultures  Would also check RVP  If continued fevers would check CT A/P    I will be away until 11/7/2022. Please contact the Infectious Diseases Office to contact the covering attending.     Roque Reed M.D.  [facility] Division of Infectious Disease  8AM-5PM Monday - Friday: Available on [employer] Teams  After Hours and Holidays (or if no response on [employer] Teams): Please contact the Infectious Diseases Office at [phone]     The above assessment and plan were discussed with transplant surgery team

## 2022-11-07 NOTE — PROGRESS NOTE ADULT - SUBJECTIVE AND OBJECTIVE BOX
Transplant Surgery Post-Op Note  --------------------------------------------------------------  DDRT / LDRT    Date:         POD#    HPI:      OR:       -----------------------------  Interval Events:Biliary leak at hepaticojejunostomy s/p orthotopic liver transplant  Exposed mucosa noted at J-J anastomosis  Velasquez limb resected and recreated; end to side hepatico jejunostomy and side to side jejunojejunostomy (both hand-sewn)  Biliary drain placed in addition to 2 more Vicente drains; original infrahepatic drain remained in place  Strattice bridging mesh placed at the apex of incision          ------------------------------  Vital Signs Last 24 Hrs  T(C): 36.1 (07 Nov 2022 19:00), Max: 39.3 (07 Nov 2022 01:15)  T(F): 97 (07 Nov 2022 19:00), Max: 102.7 (07 Nov 2022 01:15)  HR: 113 (07 Nov 2022 22:00) (107 - 135)  BP: 120/59 (07 Nov 2022 22:00) (106/58 - 123/64)  BP(mean): 85 (07 Nov 2022 22:00) (76 - 91)  RR: 15 (07 Nov 2022 22:00) (15 - 23)  SpO2: 100% (07 Nov 2022 22:00) (96% - 100%)    Parameters below as of 07 Nov 2022 19:00  Patient On (Oxygen Delivery Method): nasal cannula    O2 Concentration (%): 2    I&O's Summary    06 Nov 2022 07:01  -  07 Nov 2022 07:00  --------------------------------------------------------  IN: 2160 mL / OUT: 1955 mL / NET: 205 mL    07 Nov 2022 07:01  -  07 Nov 2022 22:24  --------------------------------------------------------  IN: 1100 mL / OUT: 255 mL / NET: 845 mL                              11.9   21.92 )-----------( 428      ( 07 Nov 2022 19:07 )             35.5     11-07    134<L>  |  103  |  10  ----------------------------<  134<H>  4.6   |  22  |  0.67    Ca    7.3<L>      07 Nov 2022 19:07  Phos  3.0     11-07  Mg     1.9     11-07    TPro  4.4<L>  /  Alb  1.9<L>  /  TBili  6.1<H>  /  DBili  x   /  AST  57<H>  /  ALT  69<H>  /  AlkPhos  187<H>  11-07    Tacrolimus (), Serum: 4.6 ng/mL (11-07 @ 06:04)      -------------------------------  PHYSICAL EXAM:  Constitutional: Well developed / well nourished  Eyes: Anicteric, PERRLA  ENMT: nc/at  Neck: central line *****************  Respiratory: CTA B/L  Cardiovascular: RRR  Gastrointestinal: Soft abdomen, mild tender to touch at surgical site, ND  Genitourinary: Urinary catheter in place*****Voiding spontaneously  Extremities: SCD's in place and working bilaterally  Vascular: Palpable dp pulses bilaterally  Neurological: A&O x3  Skin: Mild serosanguinous winifred on wound dressing*******Wound open to air no erythema and evidence of infection noted  Musculoskeletal: Moving all extremities  Psychiatric: Responsive      A/P:           Transplant Surgery Post-Op Note  32M PMH Cerebral palsy, Primary Sclerosing Cholangitis Cirrhosis with frequent ERCPs with biliary stent placements for biliary strictures (last in 9/2022) on liver transplant list (ABO AB), recent COVID s/p MAB in 9/2022) who is S/P OLT under medrol induction on 11/1/22.     Post operative course:   - Febrile 39.3/tachycardic 120s.  - WBC rising   - R lateral pole of wound - opened at bedside. Culture sent   - LFts rising  -Wound cx sent 11/6 +enterococcus and E.Coli    11/7/22: RTOR for washout. Biliary leak noted at hepaticojejunostomy and exposed mucosa noted at J-J anastomosis.  Interval Events:Biliary leak at hepaticojejunostomy s/p orthotopic liver transplant  Exposed mucosa noted at J-J anastomosis  Velasquez limb resected and recreated; end to side hepatico jejunostomy and side to side jejunojejunostomy (both hand-sewn)  Biliary drain placed in addition to 2 more Vicente drains; original infrahepatic drain remained in place  Strattice bridging mesh placed at the apex of incision        Interval events:   -Seen and examined in SICU  -Tachy 120's, hemodynamically stable   -Pain poorly controlled with IV Dilaudid       ------------------------------  Vital Signs Last 24 Hrs  T(C): 36.1 (07 Nov 2022 19:00), Max: 39.3 (07 Nov 2022 01:15)  T(F): 97 (07 Nov 2022 19:00), Max: 102.7 (07 Nov 2022 01:15)  HR: 113 (07 Nov 2022 22:00) (107 - 135)  BP: 120/59 (07 Nov 2022 22:00) (106/58 - 123/64)  BP(mean): 85 (07 Nov 2022 22:00) (76 - 91)  RR: 15 (07 Nov 2022 22:00) (15 - 23)  SpO2: 100% (07 Nov 2022 22:00) (96% - 100%)    Parameters below as of 07 Nov 2022 19:00  Patient On (Oxygen Delivery Method): nasal cannula    O2 Concentration (%): 2    I&O's Summary    06 Nov 2022 07:01  -  07 Nov 2022 07:00  --------------------------------------------------------  IN: 2160 mL / OUT: 1955 mL / NET: 205 mL    07 Nov 2022 07:01  -  07 Nov 2022 22:24  --------------------------------------------------------  IN: 1100 mL / OUT: 255 mL / NET: 845 mL                              11.9   21.92 )-----------( 428      ( 07 Nov 2022 19:07 )             35.5     11-07    134<L>  |  103  |  10  ----------------------------<  134<H>  4.6   |  22  |  0.67    Ca    7.3<L>      07 Nov 2022 19:07  Phos  3.0     11-07  Mg     1.9     11-07    TPro  4.4<L>  /  Alb  1.9<L>  /  TBili  6.1<H>  /  DBili  x   /  AST  57<H>  /  ALT  69<H>  /  AlkPhos  187<H>  11-07    Tacrolimus (), Serum: 4.6 ng/mL (11-07 @ 06:04)      -------------------------------    PHYSICAL EXAM:  Constitutional: well nourished  Eyes: Anicteric, PERRLA  ENMT: nc/at  Neck: supple  Respiratory: CTA B/L  Cardiovascular: RRR, tachy 120's  Gastrointestinal: Soft abdomen, tender to touch at surgical site, Bili drain and VIOLETA x 3 in place  Genitourinary: voiding   Extremities: SCD's in place and working bilaterally  Vascular: Palpable dp pulses bilaterally  Neurological: Alert, following commands  Skin: Warm,dry, intact throughout   Psychiatric: Responsive

## 2022-11-07 NOTE — PRE-ANESTHESIA EVALUATION ADULT - NSANTHPMHFT_GEN_ALL_CORE
S/p liver xplant  Now with suspected bile leak
32yoM w/ PMHx of Cerebral palsy, Primary Sclerosing Cholangitis on liver transplant.  TTE reviewed, wnl.  Labs reviewed.

## 2022-11-07 NOTE — PRE-ANESTHESIA EVALUATION ADULT - NSANTHPROCED_GEN_ALL_CORE
Arterial Catheter/Central Venous Catheter/Pulmonary Artery Catheter/Transesophageal Echocardiogram
Arterial Catheter

## 2022-11-07 NOTE — BRIEF OPERATIVE NOTE - NSICDXBRIEFPOSTOP_GEN_ALL_CORE_FT
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  
POST-OP DIAGNOSIS:  Biliary anastomotic leak 07-Nov-2022 18:34:50  Jarvis Mcneil

## 2022-11-07 NOTE — PROGRESS NOTE ADULT - NS ATTEND AMEND GEN_ALL_CORE FT
Agree with above.  Will proceed to OR for exploration today out of concern for bile leak. Informed consent obtained.

## 2022-11-07 NOTE — PROGRESS NOTE ADULT - ASSESSMENT
32M PMH Cerebral palsy, Primary Sclerosing Cholangitis Cirrhosis with frequent ERCPs with biliary stent placements for biliary strictures (last in 9/2022) on liver transplant list, s/p liver transplant (11/1) s/p RTOR (11/7) for washout, resection of Velasquez limb with end to side hepatico jejunostomy and side to side jejunojejunostomy, biliary drain placement and 2 new JPs (3 total).       Plan;  -Continue Meropenem.  Vancomycin 1gm now and then by level  -NS @ 75/hour  -Pain control PRN  -Strict I/Os  -SCDs  -Continue Miralax and Senna  -IS q1hour WA  -OOB/PT         Immunosuppression: Tacro 0.5mg BID, MMF held , Pred 20mg poqd  PPX: Fluconazole Bactrim, Valcyte  DAily CBC, CMP, Mag, Pos, Tacro level

## 2022-11-07 NOTE — CHART NOTE - NSCHARTNOTEFT_GEN_A_CORE
Nutrition Follow Up Note  Patient seen for nutrition and S/P OLT follow up    As per chart: "32M PMH Cerebral palsy, Primary Sclerosing Cholangitis Cirrhosis with frequent ERCPs with biliary stent placements for biliary strictures (last in 2022) on liver transplant list (ABO AB), recent COVID s/p MAB in 2022) admitted for liver transplant, S/P OLT (), Febrile with rising WBCs, TBili up."    Source: [x] Patient       [x] EMR        [] RN        [x] Family at bedside, mom      [] Other:    -If unable to interview patient: [] Trach/Vent/BiPAP  [] Disoriented/confused/inappropriate to interview as per chart     Pt reports fair appetite and PO intake due to food dislike. Mom states was "picking at food" due to not ordering preferences but states now will order what he likes. Pt agrees to Ensure. Denies difficulty chewing/swallowing. Pt denies nausea, vomiting, diarrhea, or constipation, last BM ().     Reviewed the importance of adequate kcal and protein intake with recommendations to optimize. Pt and mom received education on post transplant nutrition therapy and food safety guidelines for transplant recipients with nutrition package in previous RD visit - deny having questions/concerns about diet and nutrition education provided; mom able to teach back 3-4 points. Pt and mom made aware RD remains available.     Diet Order: regular    Weights:   Daily Weight in k.1 (), Weight in k.2 (), Weight in k.1 (), Weight in k.3 (), Weight in k.9 () -?accuracy of weights and fluctuations as pt with edema and S/P OLT.    Nutritionally Pertinent MEDICATIONS  (STANDING):  calcium carbonate 1250 mG  + Vitamin D (OsCal 500 + D)  dextrose 5%.  dextrose 5%.  dextrose 50% Injectable  dextrose 50% Injectable  dextrose 50% Injectable  fluconAZOLE IVPB  glucagon  Injectable  insulin lispro (ADMELOG) corrective regimen sliding scale  insulin lispro (ADMELOG) corrective regimen sliding scale  meropenem  IVPB  meropenem  IVPB  pantoprazole    Tablet  polyethylene glycol 3350  predniSONE   Tablet  senna  sodium chloride 0.9%.  trimethoprim   80 mG/sulfamethoxazole 400 mG  valGANciclovir    Pertinent Labs: ( @ 06:04): Na 134<L>, BUN 10, Cr 0.71, <H>, K+ 3.5, Phos --, Mg 1.6, Alk Phos 243<H>, ALT/SGPT 78<H>, AST/SGOT 51<H>    A1C with Estimated Average Glucose Result: 4.7 % (22 @ 06:49)    Finger Sticks:  POCT Blood Glucose.: 130 mg/dL ( @ 08:29)  POCT Blood Glucose.: 123 mg/dL ( @ 21:08)  POCT Blood Glucose.: 111 mg/dL ( @ 17:01)  POCT Blood Glucose.: 131 mg/dL ( @ 12:13)    Skin per nursing documentation: no pressure injures.   Edema as per flow sheets: +1 gideon. foot.     Estimated Needs:   [x] no change since previous assessment  [] recalculated:     Previous Nutrition Diagnoses:  [x] Inadequate Protein Energy Intake (pt was NPO in previous RD visit)   [x] Food & Nutrition Related Knowledge Deficit     Nutrition Diagnoses are: [x] ongoing - being addressed with PO intake encouragement, recommendations for nutritional supplements, and nutrition therapy education.    New Nutrition Diagnosis: [x] Not applicable    Nutrition Care Plan:  [x] In Progress     Nutrition Interventions:     Education Provided:       [x] Yes  [] No    Recommendations:      1. Continue regular diet upon discharge. Recommend follow up visit with Transplant MD and outpatient RD for dietary modifications as warranted.   2. Recommend Ensure Plus High Protein 2xday to optimize kcal and protein intake. Spoke to team.  3. Encourage PO intake and honor food preferences.   4. Recommend Multivitamin if medically feasible to optimize nutrient intake.   5. Reviewed the importance of adequate kcal and protein intake with recommendations to optimize.   6. Review education on post transplant nutrition therapy and food safety guidelines for transplant recipients as needed/requested before discharge.   7. Continue to obtain weights as able to identify changes if any.     Monitoring and Evaluation:   Continue to monitor nutritional intake, tolerance to diet prescription, weights, labs, skin integrity    RD remains available upon request and will follow up per protocol  Bibi Carter MS RDN CDN [place] CCTD #[phone]

## 2022-11-07 NOTE — PROGRESS NOTE ADULT - ASSESSMENT
32M PMH Cerebral palsy, Primary Sclerosing Cholangitis Cirrhosis with frequent ERCPs with biliary stent placements for biliary strictures (last in 9/2022) on liver transplant list (ABO AB), recent COVID s/p MAB in 9/2022) admitted for liver transplant    OLT  Febrile with rising WBCs. LFTS up.  Wound with purulent drainage. FU wound culture  NPO for OR today. Ex lap and washout  11/6 BCX ngtd  Continue Meropenem.  Vancomycin 1gm now and then by level  NS 500ml bolus and then @ 75/hour  Pain control PRN  11/1 US with patent vessels  Strict I/Os  SCDs  Continue Miralax and Senna  IS q1hour WA  OOB/PT   ASA 81 mg daily  Immunosuppression: Tacro 0.5mg BID starting in AM, MMF 1000mg BID, Pred 20mg poqd  PPX: Fluconazole Bactrim, Valcyte  DAily CBC, CMP, Mag, Pos, Tacro level       32M PMH Cerebral palsy, Primary Sclerosing Cholangitis Cirrhosis with frequent ERCPs with biliary stent placements for biliary strictures (last in 9/2022) on liver transplant list (ABO AB), recent COVID s/p MAB in 9/2022) admitted for liver transplant    OLT  Febrile with rising WBCs. LFTS up.  Wound with purulent drainage. FU wound culture  NPO for OR today. Ex lap and washout  11/6 BCX ngtd  Continue Meropenem.  Vancomycin 1gm now and then by level  NS 500ml bolus and then @ 75/hour  Pain control PRN  Strict I/Os  SCDs  Continue Miralax and Senna  IS q1hour WA  OOB/PT   ASA 81 mg daily  Immunosuppression: Tacro 0.5mg BID, MMF 1000mg BID, Pred 20mg poqd  PPX: Fluconazole Bactrim, Valcyte  DAily CBC, CMP, Mag, Pos, Tacro level

## 2022-11-08 LAB
-  AMIKACIN: SIGNIFICANT CHANGE UP
-  AMOXICILLIN/CLAVULANIC ACID: SIGNIFICANT CHANGE UP
-  AMPICILLIN/SULBACTAM: SIGNIFICANT CHANGE UP
-  AMPICILLIN: SIGNIFICANT CHANGE UP
-  AMPICILLIN: SIGNIFICANT CHANGE UP
-  AZTREONAM: SIGNIFICANT CHANGE UP
-  CEFAZOLIN: SIGNIFICANT CHANGE UP
-  CEFEPIME: SIGNIFICANT CHANGE UP
-  CEFOXITIN: SIGNIFICANT CHANGE UP
-  CEFTRIAXONE: SIGNIFICANT CHANGE UP
-  CIPROFLOXACIN: SIGNIFICANT CHANGE UP
-  DAPTOMYCIN: SIGNIFICANT CHANGE UP
-  ERTAPENEM: SIGNIFICANT CHANGE UP
-  GENTAMICIN: SIGNIFICANT CHANGE UP
-  IMIPENEM: SIGNIFICANT CHANGE UP
-  LEVOFLOXACIN: SIGNIFICANT CHANGE UP
-  LEVOFLOXACIN: SIGNIFICANT CHANGE UP
-  LINEZOLID: SIGNIFICANT CHANGE UP
-  MEROPENEM: SIGNIFICANT CHANGE UP
-  PIPERACILLIN/TAZOBACTAM: SIGNIFICANT CHANGE UP
-  TETRACYCLINE: SIGNIFICANT CHANGE UP
-  TOBRAMYCIN: SIGNIFICANT CHANGE UP
-  TRIMETHOPRIM/SULFAMETHOXAZOLE: SIGNIFICANT CHANGE UP
-  VANCOMYCIN: SIGNIFICANT CHANGE UP
ALBUMIN SERPL ELPH-MCNC: 1.7 G/DL — LOW (ref 3.3–5)
ALBUMIN SERPL ELPH-MCNC: 2 G/DL — LOW (ref 3.3–5)
ALP SERPL-CCNC: 115 U/L — SIGNIFICANT CHANGE UP (ref 40–120)
ALP SERPL-CCNC: 151 U/L — HIGH (ref 40–120)
ALT FLD-CCNC: 55 U/L — HIGH (ref 10–45)
ALT FLD-CCNC: 62 U/L — HIGH (ref 10–45)
ANION GAP SERPL CALC-SCNC: 5 MMOL/L — SIGNIFICANT CHANGE UP (ref 5–17)
ANION GAP SERPL CALC-SCNC: 9 MMOL/L — SIGNIFICANT CHANGE UP (ref 5–17)
APTT BLD: 27.1 SEC — LOW (ref 27.5–35.5)
APTT BLD: 28 SEC — SIGNIFICANT CHANGE UP (ref 27.5–35.5)
AST SERPL-CCNC: 62 U/L — HIGH (ref 10–40)
AST SERPL-CCNC: 68 U/L — HIGH (ref 10–40)
BASE EXCESS BLDV CALC-SCNC: -2.5 MMOL/L — LOW (ref -2–3)
BILIRUB SERPL-MCNC: 6.9 MG/DL — HIGH (ref 0.2–1.2)
BILIRUB SERPL-MCNC: 7.8 MG/DL — HIGH (ref 0.2–1.2)
BUN SERPL-MCNC: 13 MG/DL — SIGNIFICANT CHANGE UP (ref 7–23)
BUN SERPL-MCNC: 17 MG/DL — SIGNIFICANT CHANGE UP (ref 7–23)
CA-I SERPL-SCNC: 1.17 MMOL/L — SIGNIFICANT CHANGE UP (ref 1.15–1.33)
CALCIUM SERPL-MCNC: 7.2 MG/DL — LOW (ref 8.4–10.5)
CALCIUM SERPL-MCNC: 7.3 MG/DL — LOW (ref 8.4–10.5)
CHLORIDE BLDV-SCNC: 106 MMOL/L — SIGNIFICANT CHANGE UP (ref 96–108)
CHLORIDE SERPL-SCNC: 106 MMOL/L — SIGNIFICANT CHANGE UP (ref 96–108)
CHLORIDE SERPL-SCNC: 108 MMOL/L — SIGNIFICANT CHANGE UP (ref 96–108)
CO2 BLDV-SCNC: 24 MMOL/L — SIGNIFICANT CHANGE UP (ref 22–26)
CO2 SERPL-SCNC: 22 MMOL/L — SIGNIFICANT CHANGE UP (ref 22–31)
CO2 SERPL-SCNC: 23 MMOL/L — SIGNIFICANT CHANGE UP (ref 22–31)
CREAT SERPL-MCNC: 0.86 MG/DL — SIGNIFICANT CHANGE UP (ref 0.5–1.3)
CREAT SERPL-MCNC: 0.87 MG/DL — SIGNIFICANT CHANGE UP (ref 0.5–1.3)
CULTURE RESULTS: SIGNIFICANT CHANGE UP
EGFR: 119 ML/MIN/1.73M2 — SIGNIFICANT CHANGE UP
EGFR: 119 ML/MIN/1.73M2 — SIGNIFICANT CHANGE UP
GAS PNL BLDV: 133 MMOL/L — LOW (ref 136–145)
GAS PNL BLDV: SIGNIFICANT CHANGE UP
GAS PNL BLDV: SIGNIFICANT CHANGE UP
GLUCOSE BLDV-MCNC: 109 MG/DL — HIGH (ref 70–99)
GLUCOSE SERPL-MCNC: 108 MG/DL — HIGH (ref 70–99)
GLUCOSE SERPL-MCNC: 99 MG/DL — SIGNIFICANT CHANGE UP (ref 70–99)
GRAM STN FLD: SIGNIFICANT CHANGE UP
GRAM STN FLD: SIGNIFICANT CHANGE UP
HCO3 BLDV-SCNC: 23 MMOL/L — SIGNIFICANT CHANGE UP (ref 22–29)
HCT VFR BLD CALC: 25.7 % — LOW (ref 39–50)
HCT VFR BLD CALC: 32.2 % — LOW (ref 39–50)
HCT VFR BLDA CALC: 26 % — LOW (ref 39–51)
HGB BLD CALC-MCNC: 8.7 G/DL — LOW (ref 12.6–17.4)
HGB BLD-MCNC: 10.7 G/DL — LOW (ref 13–17)
HGB BLD-MCNC: 8.3 G/DL — LOW (ref 13–17)
HOROWITZ INDEX BLDV+IHG-RTO: 28 — SIGNIFICANT CHANGE UP
INR BLD: 1.59 RATIO — HIGH (ref 0.88–1.16)
INR BLD: 1.66 RATIO — HIGH (ref 0.88–1.16)
LACTATE BLDV-MCNC: 1.1 MMOL/L — SIGNIFICANT CHANGE UP (ref 0.5–2)
MAGNESIUM SERPL-MCNC: 1.6 MG/DL — SIGNIFICANT CHANGE UP (ref 1.6–2.6)
MAGNESIUM SERPL-MCNC: 1.8 MG/DL — SIGNIFICANT CHANGE UP (ref 1.6–2.6)
MCHC RBC-ENTMCNC: 31.2 PG — SIGNIFICANT CHANGE UP (ref 27–34)
MCHC RBC-ENTMCNC: 31.3 PG — SIGNIFICANT CHANGE UP (ref 27–34)
MCHC RBC-ENTMCNC: 32.3 GM/DL — SIGNIFICANT CHANGE UP (ref 32–36)
MCHC RBC-ENTMCNC: 33.2 GM/DL — SIGNIFICANT CHANGE UP (ref 32–36)
MCV RBC AUTO: 94.2 FL — SIGNIFICANT CHANGE UP (ref 80–100)
MCV RBC AUTO: 96.6 FL — SIGNIFICANT CHANGE UP (ref 80–100)
METHOD TYPE: SIGNIFICANT CHANGE UP
METHOD TYPE: SIGNIFICANT CHANGE UP
NRBC # BLD: 0 /100 WBCS — SIGNIFICANT CHANGE UP (ref 0–0)
NRBC # BLD: 0 /100 WBCS — SIGNIFICANT CHANGE UP (ref 0–0)
ORGANISM # SPEC MICROSCOPIC CNT: SIGNIFICANT CHANGE UP
PCO2 BLDV: 43 MMHG — SIGNIFICANT CHANGE UP (ref 42–55)
PH BLDV: 7.34 — SIGNIFICANT CHANGE UP (ref 7.32–7.43)
PLATELET # BLD AUTO: 179 K/UL — SIGNIFICANT CHANGE UP (ref 150–400)
PLATELET # BLD AUTO: 370 K/UL — SIGNIFICANT CHANGE UP (ref 150–400)
PO2 BLDV: 67 MMHG — HIGH (ref 25–45)
POTASSIUM BLDV-SCNC: 4.3 MMOL/L — SIGNIFICANT CHANGE UP (ref 3.5–5.1)
POTASSIUM SERPL-MCNC: 4.4 MMOL/L — SIGNIFICANT CHANGE UP (ref 3.5–5.3)
POTASSIUM SERPL-MCNC: 4.8 MMOL/L — SIGNIFICANT CHANGE UP (ref 3.5–5.3)
POTASSIUM SERPL-SCNC: 4.4 MMOL/L — SIGNIFICANT CHANGE UP (ref 3.5–5.3)
POTASSIUM SERPL-SCNC: 4.8 MMOL/L — SIGNIFICANT CHANGE UP (ref 3.5–5.3)
PROT SERPL-MCNC: 4.1 G/DL — LOW (ref 6–8.3)
PROT SERPL-MCNC: 4.3 G/DL — LOW (ref 6–8.3)
PROTHROM AB SERPL-ACNC: 18.5 SEC — HIGH (ref 10.5–13.4)
PROTHROM AB SERPL-ACNC: 19.2 SEC — HIGH (ref 10.5–13.4)
RBC # BLD: 2.66 M/UL — LOW (ref 4.2–5.8)
RBC # BLD: 3.42 M/UL — LOW (ref 4.2–5.8)
RBC # FLD: 15.9 % — HIGH (ref 10.3–14.5)
RBC # FLD: 16.1 % — HIGH (ref 10.3–14.5)
SAO2 % BLDV: 94.1 % — HIGH (ref 67–88)
SODIUM SERPL-SCNC: 136 MMOL/L — SIGNIFICANT CHANGE UP (ref 135–145)
SODIUM SERPL-SCNC: 137 MMOL/L — SIGNIFICANT CHANGE UP (ref 135–145)
SPECIMEN SOURCE: SIGNIFICANT CHANGE UP
TACROLIMUS SERPL-MCNC: 4.6 NG/ML — SIGNIFICANT CHANGE UP
VANCOMYCIN FLD-MCNC: <4 UG/ML — SIGNIFICANT CHANGE UP
WBC # BLD: 18.91 K/UL — HIGH (ref 3.8–10.5)
WBC # BLD: 8.87 K/UL — SIGNIFICANT CHANGE UP (ref 3.8–10.5)
WBC # FLD AUTO: 18.91 K/UL — HIGH (ref 3.8–10.5)
WBC # FLD AUTO: 8.87 K/UL — SIGNIFICANT CHANGE UP (ref 3.8–10.5)

## 2022-11-08 PROCEDURE — 99233 SBSQ HOSP IP/OBS HIGH 50: CPT

## 2022-11-08 PROCEDURE — 99233 SBSQ HOSP IP/OBS HIGH 50: CPT | Mod: GC

## 2022-11-08 PROCEDURE — 71045 X-RAY EXAM CHEST 1 VIEW: CPT | Mod: 26

## 2022-11-08 RX ORDER — TACROLIMUS 5 MG/1
0.5 CAPSULE ORAL
Refills: 0 | Status: DISCONTINUED | OUTPATIENT
Start: 2022-11-08 | End: 2022-11-09

## 2022-11-08 RX ORDER — VANCOMYCIN HCL 1 G
1000 VIAL (EA) INTRAVENOUS ONCE
Refills: 0 | Status: COMPLETED | OUTPATIENT
Start: 2022-11-08 | End: 2022-11-08

## 2022-11-08 RX ORDER — SODIUM CHLORIDE 9 MG/ML
1000 INJECTION INTRAMUSCULAR; INTRAVENOUS; SUBCUTANEOUS
Refills: 0 | Status: DISCONTINUED | OUTPATIENT
Start: 2022-11-08 | End: 2022-11-09

## 2022-11-08 RX ORDER — SODIUM CHLORIDE 9 MG/ML
500 INJECTION INTRAMUSCULAR; INTRAVENOUS; SUBCUTANEOUS ONCE
Refills: 0 | Status: COMPLETED | OUTPATIENT
Start: 2022-11-08 | End: 2022-11-08

## 2022-11-08 RX ORDER — ALBUMIN HUMAN 25 %
500 VIAL (ML) INTRAVENOUS ONCE
Refills: 0 | Status: COMPLETED | OUTPATIENT
Start: 2022-11-08 | End: 2022-11-08

## 2022-11-08 RX ORDER — HYDROMORPHONE HYDROCHLORIDE 2 MG/ML
0.5 INJECTION INTRAMUSCULAR; INTRAVENOUS; SUBCUTANEOUS ONCE
Refills: 0 | Status: DISCONTINUED | OUTPATIENT
Start: 2022-11-08 | End: 2022-11-08

## 2022-11-08 RX ORDER — DIPHENHYDRAMINE HCL 50 MG
25 CAPSULE ORAL ONCE
Refills: 0 | Status: COMPLETED | OUTPATIENT
Start: 2022-11-08 | End: 2022-11-08

## 2022-11-08 RX ORDER — MAGNESIUM SULFATE 500 MG/ML
2 VIAL (ML) INJECTION ONCE
Refills: 0 | Status: COMPLETED | OUTPATIENT
Start: 2022-11-08 | End: 2022-11-08

## 2022-11-08 RX ORDER — SODIUM CHLORIDE 9 MG/ML
1000 INJECTION INTRAMUSCULAR; INTRAVENOUS; SUBCUTANEOUS
Refills: 0 | Status: DISCONTINUED | OUTPATIENT
Start: 2022-11-08 | End: 2022-11-08

## 2022-11-08 RX ORDER — MYCOPHENOLATE MOFETIL 250 MG/1
500 CAPSULE ORAL EVERY 12 HOURS
Refills: 0 | Status: DISCONTINUED | OUTPATIENT
Start: 2022-11-08 | End: 2022-11-10

## 2022-11-08 RX ORDER — HEPARIN SODIUM 5000 [USP'U]/ML
5000 INJECTION INTRAVENOUS; SUBCUTANEOUS EVERY 12 HOURS
Refills: 0 | Status: DISCONTINUED | OUTPATIENT
Start: 2022-11-08 | End: 2022-11-22

## 2022-11-08 RX ORDER — BENZOCAINE 10 %
1 GEL (GRAM) MUCOUS MEMBRANE ONCE
Refills: 0 | Status: COMPLETED | OUTPATIENT
Start: 2022-11-08 | End: 2022-11-09

## 2022-11-08 RX ORDER — LINEZOLID 600 MG/300ML
600 INJECTION, SOLUTION INTRAVENOUS EVERY 12 HOURS
Refills: 0 | Status: DISCONTINUED | OUTPATIENT
Start: 2022-11-08 | End: 2022-11-17

## 2022-11-08 RX ORDER — HYDROMORPHONE HYDROCHLORIDE 2 MG/ML
1 INJECTION INTRAMUSCULAR; INTRAVENOUS; SUBCUTANEOUS
Refills: 0 | Status: DISCONTINUED | OUTPATIENT
Start: 2022-11-08 | End: 2022-11-09

## 2022-11-08 RX ORDER — KETOROLAC TROMETHAMINE 30 MG/ML
30 SYRINGE (ML) INJECTION ONCE
Refills: 0 | Status: DISCONTINUED | OUTPATIENT
Start: 2022-11-08 | End: 2022-11-08

## 2022-11-08 RX ORDER — FUROSEMIDE 40 MG
20 TABLET ORAL ONCE
Refills: 0 | Status: COMPLETED | OUTPATIENT
Start: 2022-11-08 | End: 2022-11-08

## 2022-11-08 RX ORDER — FLUCONAZOLE 150 MG/1
400 TABLET ORAL EVERY 24 HOURS
Refills: 0 | Status: DISCONTINUED | OUTPATIENT
Start: 2022-11-08 | End: 2022-11-15

## 2022-11-08 RX ADMIN — MEROPENEM 100 MILLIGRAM(S): 1 INJECTION INTRAVENOUS at 06:16

## 2022-11-08 RX ADMIN — Medication 30 MILLIGRAM(S): at 04:48

## 2022-11-08 RX ADMIN — LINEZOLID 300 MILLIGRAM(S): 600 INJECTION, SOLUTION INTRAVENOUS at 16:17

## 2022-11-08 RX ADMIN — MEROPENEM 100 MILLIGRAM(S): 1 INJECTION INTRAVENOUS at 21:29

## 2022-11-08 RX ADMIN — Medication 20 MILLIGRAM(S): at 18:43

## 2022-11-08 RX ADMIN — Medication 1000 MILLILITER(S): at 11:02

## 2022-11-08 RX ADMIN — TACROLIMUS 0.5 MILLIGRAM(S): 5 CAPSULE ORAL at 20:05

## 2022-11-08 RX ADMIN — HYDROMORPHONE HYDROCHLORIDE 1 MILLIGRAM(S): 2 INJECTION INTRAMUSCULAR; INTRAVENOUS; SUBCUTANEOUS at 18:58

## 2022-11-08 RX ADMIN — MYCOPHENOLATE MOFETIL 41.67 MILLIGRAM(S): 250 CAPSULE ORAL at 13:49

## 2022-11-08 RX ADMIN — HYDROMORPHONE HYDROCHLORIDE 1 MILLIGRAM(S): 2 INJECTION INTRAMUSCULAR; INTRAVENOUS; SUBCUTANEOUS at 14:43

## 2022-11-08 RX ADMIN — SODIUM CHLORIDE 1000 MILLILITER(S): 9 INJECTION INTRAMUSCULAR; INTRAVENOUS; SUBCUTANEOUS at 00:40

## 2022-11-08 RX ADMIN — HYDROMORPHONE HYDROCHLORIDE 1 MILLIGRAM(S): 2 INJECTION INTRAMUSCULAR; INTRAVENOUS; SUBCUTANEOUS at 06:30

## 2022-11-08 RX ADMIN — HYDROMORPHONE HYDROCHLORIDE 1 MILLIGRAM(S): 2 INJECTION INTRAMUSCULAR; INTRAVENOUS; SUBCUTANEOUS at 11:32

## 2022-11-08 RX ADMIN — HYDROMORPHONE HYDROCHLORIDE 1 MILLIGRAM(S): 2 INJECTION INTRAMUSCULAR; INTRAVENOUS; SUBCUTANEOUS at 06:15

## 2022-11-08 RX ADMIN — HYDROMORPHONE HYDROCHLORIDE 0.5 MILLIGRAM(S): 2 INJECTION INTRAMUSCULAR; INTRAVENOUS; SUBCUTANEOUS at 04:16

## 2022-11-08 RX ADMIN — HYDROMORPHONE HYDROCHLORIDE 0.5 MILLIGRAM(S): 2 INJECTION INTRAMUSCULAR; INTRAVENOUS; SUBCUTANEOUS at 00:39

## 2022-11-08 RX ADMIN — Medication 25 GRAM(S): at 17:16

## 2022-11-08 RX ADMIN — SODIUM CHLORIDE 125 MILLILITER(S): 9 INJECTION INTRAMUSCULAR; INTRAVENOUS; SUBCUTANEOUS at 02:24

## 2022-11-08 RX ADMIN — HYDROMORPHONE HYDROCHLORIDE 0.5 MILLIGRAM(S): 2 INJECTION INTRAMUSCULAR; INTRAVENOUS; SUBCUTANEOUS at 00:54

## 2022-11-08 RX ADMIN — Medication 25 MILLIGRAM(S): at 02:25

## 2022-11-08 RX ADMIN — HYDROMORPHONE HYDROCHLORIDE 1 MILLIGRAM(S): 2 INJECTION INTRAMUSCULAR; INTRAVENOUS; SUBCUTANEOUS at 14:28

## 2022-11-08 RX ADMIN — HYDROMORPHONE HYDROCHLORIDE 1 MILLIGRAM(S): 2 INJECTION INTRAMUSCULAR; INTRAVENOUS; SUBCUTANEOUS at 11:17

## 2022-11-08 RX ADMIN — OXYCODONE HYDROCHLORIDE 10 MILLIGRAM(S): 5 TABLET ORAL at 23:52

## 2022-11-08 RX ADMIN — SODIUM CHLORIDE 125 MILLILITER(S): 9 INJECTION INTRAMUSCULAR; INTRAVENOUS; SUBCUTANEOUS at 09:09

## 2022-11-08 RX ADMIN — HYDROMORPHONE HYDROCHLORIDE 0.5 MILLIGRAM(S): 2 INJECTION INTRAMUSCULAR; INTRAVENOUS; SUBCUTANEOUS at 02:50

## 2022-11-08 RX ADMIN — MYCOPHENOLATE MOFETIL 41.67 MILLIGRAM(S): 250 CAPSULE ORAL at 20:02

## 2022-11-08 RX ADMIN — HEPARIN SODIUM 5000 UNIT(S): 5000 INJECTION INTRAVENOUS; SUBCUTANEOUS at 17:16

## 2022-11-08 RX ADMIN — Medication 250 MILLIGRAM(S): at 11:55

## 2022-11-08 RX ADMIN — SODIUM CHLORIDE 50 MILLILITER(S): 9 INJECTION INTRAMUSCULAR; INTRAVENOUS; SUBCUTANEOUS at 21:29

## 2022-11-08 RX ADMIN — CHLORHEXIDINE GLUCONATE 1 APPLICATION(S): 213 SOLUTION TOPICAL at 06:15

## 2022-11-08 RX ADMIN — HYDROMORPHONE HYDROCHLORIDE 0.5 MILLIGRAM(S): 2 INJECTION INTRAMUSCULAR; INTRAVENOUS; SUBCUTANEOUS at 03:05

## 2022-11-08 RX ADMIN — TACROLIMUS 0.5 MILLIGRAM(S): 5 CAPSULE ORAL at 09:09

## 2022-11-08 RX ADMIN — HYDROMORPHONE HYDROCHLORIDE 1 MILLIGRAM(S): 2 INJECTION INTRAMUSCULAR; INTRAVENOUS; SUBCUTANEOUS at 21:50

## 2022-11-08 RX ADMIN — SODIUM CHLORIDE 1000 MILLILITER(S): 9 INJECTION INTRAMUSCULAR; INTRAVENOUS; SUBCUTANEOUS at 01:30

## 2022-11-08 RX ADMIN — FLUCONAZOLE 100 MILLIGRAM(S): 150 TABLET ORAL at 18:43

## 2022-11-08 RX ADMIN — HYDROMORPHONE HYDROCHLORIDE 0.5 MILLIGRAM(S): 2 INJECTION INTRAMUSCULAR; INTRAVENOUS; SUBCUTANEOUS at 04:31

## 2022-11-08 RX ADMIN — HYDROMORPHONE HYDROCHLORIDE 1 MILLIGRAM(S): 2 INJECTION INTRAMUSCULAR; INTRAVENOUS; SUBCUTANEOUS at 18:43

## 2022-11-08 RX ADMIN — Medication 30 MILLIGRAM(S): at 05:03

## 2022-11-08 RX ADMIN — HYDROMORPHONE HYDROCHLORIDE 1 MILLIGRAM(S): 2 INJECTION INTRAMUSCULAR; INTRAVENOUS; SUBCUTANEOUS at 22:05

## 2022-11-08 NOTE — PROGRESS NOTE ADULT - SUBJECTIVE AND OBJECTIVE BOX
Transplant Surgery Post-Op Note  32M PMH Cerebral palsy, Primary Sclerosing Cholangitis Cirrhosis with frequent ERCPs with biliary stent placements for biliary strictures (last in 9/2022) on liver transplant list (ABO AB), recent COVID s/p MAB in 9/2022) who is S/P OLT under medrol induction on 11/1/22.     Post operative course:   - Febrile 39.3/tachycardic 120s.  - WBC rising   - R lateral pole of wound - opened at bedside. Culture sent   - LFts rising  -Wound cx sent 11/6 +enterococcus and E.Coli    11/7/22: RTOR for washout. Biliary leak noted at hepaticojejunostomy and exposed mucosa noted at J-J anastomosis.  Interval Events:Biliary leak at hepaticojejunostomy s/p orthotopic liver transplant  Exposed mucosa noted at J-J anastomosis  Velasquez limb resected and recreated; end to side hepatico jejunostomy and side to side jejunojejunostomy (both hand-sewn)  Biliary drain placed in addition to 2 more Vicente drains; original infrahepatic drain remained in place  Strattice bridging mesh placed at the apex of incision        Interval events:   -Tachy 150s, better this morning, 110s, normotensive  -Pain poorly controlled with IV Dilaudid overnight, he states its better this morning  - Poor UOP just over 200 since OR  -No bowel function      MEDICATIONS  (STANDING):  chlorhexidine 2% Cloths 1 Application(s) Topical <User Schedule>  fluconAZOLE IVPB      fluconAZOLE IVPB 400 milliGRAM(s) IV Intermittent every 24 hours  heparin   Injectable 5000 Unit(s) SubCutaneous every 12 hours  influenza   Vaccine 0.5 milliLiter(s) IntraMuscular once  linezolid  IVPB 600 milliGRAM(s) IV Intermittent every 12 hours  meropenem  IVPB      meropenem  IVPB 1000 milliGRAM(s) IV Intermittent every 8 hours  methylPREDNISolone sodium succinate Injectable 16 milliGRAM(s) IV Push daily  mycophenolate mofetil IVPB 500 milliGRAM(s) IV Intermittent every 12 hours  pantoprazole  Injectable 40 milliGRAM(s) IV Push daily  sodium chloride 0.9%. 1000 milliLiter(s) (125 mL/Hr) IV Continuous <Continuous>  tacrolimus    0.5 mG/mL Suspension 0.5 milliGRAM(s) Oral <User Schedule>  trimethoprim   80 mG/sulfamethoxazole 400 mG 1 Tablet(s) Oral daily  valGANciclovir 50 mG/mL Oral Solution 900 milliGRAM(s) Oral daily    MEDICATIONS  (PRN):  HYDROmorphone  Injectable 1 milliGRAM(s) IV Push every 3 hours PRN Severe Pain (7 - 10)  ondansetron Injectable 4 milliGRAM(s) IV Push once PRN Nausea and/or Vomiting  oxyCODONE    IR 10 milliGRAM(s) Oral every 4 hours PRN Severe Pain (7 - 10)  oxyCODONE    IR 5 milliGRAM(s) Oral every 4 hours PRN Moderate Pain (4 - 6)      PAST MEDICAL & SURGICAL HISTORY:  Cerebral palsy      PSC (primary sclerosing cholangitis)  On liver transplant list      Abnormal LFTs      Bile duct stricture      Dental caries      Impacted teeth  wisdom teeth      Phimosis      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   Genitourinary: chavez in place, urine dark in color  Extremities: SCD's in place and working bilaterally  Vascular: Palpable dp pulses bilaterally  Neurological: Alert, following commands  Skin: Warm,dry, intact throughout   Psychiatric: Responsive  Vital Signs Last 24 Hrs  T(C): 36.5 (08 Nov 2022 07:00), Max: 37.9 (08 Nov 2022 05:00)  T(F): 97.7 (08 Nov 2022 07:00), Max: 100.2 (08 Nov 2022 05:00)  HR: 108 (08 Nov 2022 13:00) (107 - 133)  BP: 103/51 (08 Nov 2022 13:00) (86/52 - 159/69)  BP(mean): 71 (08 Nov 2022 13:00) (66 - 88)  RR: 15 (08 Nov 2022 13:00) (12 - 23)  SpO2: 100% (08 Nov 2022 13:00) (98% - 100%)    Parameters below as of 08 Nov 2022 11:00  Patient On (Oxygen Delivery Method): nasal cannula  O2 Flow (L/min): 2      I&O's Summary    07 Nov 2022 07:01  -  08 Nov 2022 07:00  --------------------------------------------------------  IN: 3125 mL / OUT: 715 mL / NET: 2410 mL    08 Nov 2022 07:01  -  08 Nov 2022 14:08  --------------------------------------------------------  IN: 1525 mL / OUT: 145 mL / NET: 1380 mL                 10.7   18.91 )-----------( 370      ( 08 Nov 2022 06:05 )             32.2     11-08    137  |  106  |  13  ----------------------------<  99  4.8   |  22  |  0.87    Ca    7.2<L>      08 Nov 2022 06:05  Phos  3.0     11-07  Mg     1.6     11-08    TPro  4.1<L>  /  Alb  1.7<L>  /  TBili  6.9<H>  /  DBili  x   /  AST  62<H>  /  ALT  62<H>  /  AlkPhos  151<H>  11-08    Tacrolimus (), Serum: 4.6 ng/mL (11-08 @ 06:02)        Culture - Body Fluid with Gram Stain (collected 11-07-22 @ 22:06)  Source: Abdominal Fl Abdominal Fluid  Gram Stain (11-08-22 @ 03:21):    Numerous polymorphonuclear leukocytes seen per low power field    Few Gram Negative Rods seen per oil power field    Rare Gram positive cocci in pairs seen per oil power field    Culture - Body Fluid with Gram Stain (collected 11-07-22 @ 21:48)  Source: Abdominal Fl Abdominal Fluid  Gram Stain (11-08-22 @ 05:01):    polymorphonuclear leukocytes seen    Gram positive cocci in pairs seen    by cytocentrifuge    Culture - Blood (collected 11-07-22 @ 06:10)  Source: .Blood Blood  Preliminary Report (11-08-22 @ 09:01):    No growth to date.    Culture - Blood (collected 11-07-22 @ 06:10)  Source: .Blood Blood  Preliminary Report (11-08-22 @ 09:01):    No growth to date.    Culture - Other (collected 11-06-22 @ 12:55)  Source: Wound Wound  Preliminary Report (11-07-22 @ 18:06):    Few Escherichia coli    Few Enterococcus faecium    Culture - Blood (collected 11-06-22 @ 07:22)  Source: .Blood Blood-Venous  Preliminary Report (11-07-22 @ 11:02):    No growth to date.    Culture - Blood (collected 11-06-22 @ 07:22)  Source: .Blood Blood  Preliminary Report (11-07-22 @ 11:02):    No growth to date.    Culture - Blood (collected 11-02-22 @ 15:05)  Source: .Blood Blood  Final Report (11-07-22 @ 23:00):    No Growth Final    Culture - Blood (collected 11-02-22 @ 15:05)  Source: .Blood Blood  Final Report (11-07-22 @ 23:00):    No Growth Final

## 2022-11-08 NOTE — PROGRESS NOTE ADULT - SUBJECTIVE AND OBJECTIVE BOX
Follow Up:      Interval History:    REVIEW OF SYSTEMS  [  ] ROS unobtainable because:    [  ] All other systems negative except as noted below    Constitutional:  [ ] fever [ ] chills  [ ] weight loss  [ ] weakness  Skin:  [ ] rash [ ] phlebitis	  Eyes: [ ] icterus [ ] pain  [ ] discharge	  ENMT: [ ] sore throat  [ ] thrush [ ] ulcers [ ] exudates  Respiratory: [ ] dyspnea [ ] hemoptysis [ ] cough [ ] sputum	  Cardiovascular:  [ ] chest pain [ ] palpitations [ ] edema	  Gastrointestinal:  [ ] nausea [ ] vomiting [ ] diarrhea [ ] constipation [ ] pain	  Genitourinary:  [ ] dysuria [ ] frequency [ ] hematuria [ ] discharge [ ] flank pain  [ ] incontinence  Musculoskeletal:  [ ] myalgias [ ] arthralgias [ ] arthritis  [ ] back pain  Neurological:  [ ] headache [ ] seizures  [ ] confusion/altered mental status    Allergies  No Known Allergies        ANTIMICROBIALS:  fluconAZOLE IVPB 400 every 24 hours  fluconAZOLE IVPB    linezolid  IVPB 600 every 12 hours  meropenem  IVPB    meropenem  IVPB 1000 every 8 hours  trimethoprim   80 mG/sulfamethoxazole 400 mG 1 daily  valGANciclovir 50 mG/mL Oral Solution 900 daily      OTHER MEDS:  MEDICATIONS  (STANDING):  heparin   Injectable 5000 every 12 hours  HYDROmorphone  Injectable 1 every 3 hours PRN  influenza   Vaccine 0.5 once  methylPREDNISolone sodium succinate Injectable 16 daily  mycophenolate mofetil IVPB 500 every 12 hours  ondansetron Injectable 4 once PRN  oxyCODONE    IR 10 every 4 hours PRN  oxyCODONE    IR 5 every 4 hours PRN  pantoprazole  Injectable 40 daily  tacrolimus    0.5 mG/mL Suspension 0.5 <User Schedule>      Vital Signs Last 24 Hrs  T(C): 36.6 (2022 15:00), Max: 37.9 (2022 05:00)  T(F): 97.8 (2022 15:00), Max: 100.2 (2022 05:00)  HR: 116 (2022 17:00) (107 - 133)  BP: 109/55 (2022 17:00) (86/52 - 159/69)  BP(mean): 78 (2022 17:00) (66 - 88)  RR: 20 (2022 17:00) (12 - 23)  SpO2: 100% (2022 17:00) (98% - 100%)    Parameters below as of 2022 16:00  Patient On (Oxygen Delivery Method): nasal cannula  O2 Flow (L/min): 1      PHYSICAL EXAMINATION:  General: Alert and Awake, NAD  HEENT: PERRL, EOMI  Neck: Supple  Cardiac: RRR, No M/R/G  Resp: CTAB, No Wh/Rh/Ra  Abdomen: NBS, NT/ND, No HSM, No rigidity or guarding  MSK: No LE edema. No Calf tenderness  : No chavez  Skin: No rashes or lesions. Skin is warm and dry to the touch.   Neuro: Alert and Awake. CN 2-12 Grossly intact. Moves all four extremities spontaneously.  Psych: Calm, Pleasant, Cooperative                          8.3    8.87  )-----------( 179      ( 2022 15:09 )             25.7       11-    136  |  108  |  17  ----------------------------<  108<H>  4.4   |  23  |  0.86    Ca    7.3<L>      2022 15:09  Phos  3.0     11  Mg     1.8         TPro  4.3<L>  /  Alb  2.0<L>  /  TBili  7.8<H>  /  DBili  x   /  AST  68<H>  /  ALT  55<H>  /  AlkPhos  115  1108      Urinalysis Basic - ( 2022 11:58 )    Color: Dark Yellow / Appearance: Clear / S.030 / pH: x  Gluc: x / Ketone: Negative  / Bili: Large >10 / Urobili: >8mg/dL   Blood: x / Protein: 30 mg/dL / Nitrite: Negative   Leuk Esterase: Negative / RBC: 2 /hpf / WBC 1 /HPF   Sq Epi: x / Non Sq Epi: 0 /hpf / Bacteria: Negative        MICROBIOLOGY:  Vancomycin Level, Random: <4.0 ug/mL (22 @ 06:05)  v  Abdominal Fl Abdominal Fluid  22 --  --    Numerous polymorphonuclear leukocytes seen per low power field  Few Gram Negative Rods seen per oil power field  Rare Gram positive cocci in pairs seen per oil power field      Abdominal Fl Abdominal Fluid  22 --  --    polymorphonuclear leukocytes seen  Gram positive cocci in pairs seen  by cytocentrifuge      .Blood Blood  22   No growth to date.  --  --      Wound Wound  22   Few Escherichia coli  Few Enterococcus faecium (vancomycin resistant)  --  Escherichia coli  Enterococcus faecium (vancomycin resistant)      .Blood Blood  22   No growth to date.  --  --      .Blood Blood  22   No Growth Final  --  --        CMV IgG Antibody: <0.20 U/mL (10-31-22 @ 17:21)    Rapid RVP Result: NotDetec ( @ 15:23)        RADIOLOGY:    <The imaging below has been reviewed and visualized by me independently. Findings as detailed in report below> Follow Up:  Fever    Interval History: Tmax to 100.2 this AM. noting continued abdominal pain.     REVIEW OF SYSTEMS  [  ] ROS unobtainable because:    [ x ] All other systems negative except as noted below    Constitutional:  [ ] fever [ x chills  [ ] weight loss  [ ] weakness  Skin:  [ ] rash [ ] phlebitis	  Eyes: [ ] icterus [ ] pain  [ ] discharge	  ENMT: [ ] sore throat  [ ] thrush [ ] ulcers [ ] exudates  Respiratory: [ ] dyspnea [ ] hemoptysis [ ] cough [ ] sputum	  Cardiovascular:  [ ] chest pain [ ] palpitations [ ] edema	  Gastrointestinal:  [ ] nausea [ ] vomiting [ ] diarrhea [ ] constipation [x ] pain	  Genitourinary:  [ ] dysuria [ ] frequency [ ] hematuria [ ] discharge [ ] flank pain  [ ] incontinence  Musculoskeletal:  [ ] myalgias [ ] arthralgias [ ] arthritis  [ ] back pain  Neurological:  [ ] headache [ ] seizures  [ ] confusion/altered mental status    Allergies  No Known Allergies        ANTIMICROBIALS:  fluconAZOLE IVPB 400 every 24 hours  fluconAZOLE IVPB    linezolid  IVPB 600 every 12 hours  meropenem  IVPB    meropenem  IVPB 1000 every 8 hours  trimethoprim   80 mG/sulfamethoxazole 400 mG 1 daily  valGANciclovir 50 mG/mL Oral Solution 900 daily      OTHER MEDS:  MEDICATIONS  (STANDING):  heparin   Injectable 5000 every 12 hours  HYDROmorphone  Injectable 1 every 3 hours PRN  influenza   Vaccine 0.5 once  methylPREDNISolone sodium succinate Injectable 16 daily  mycophenolate mofetil IVPB 500 every 12 hours  ondansetron Injectable 4 once PRN  oxyCODONE    IR 10 every 4 hours PRN  oxyCODONE    IR 5 every 4 hours PRN  pantoprazole  Injectable 40 daily  tacrolimus    0.5 mG/mL Suspension 0.5 <User Schedule>      Vital Signs Last 24 Hrs  T(C): 36.6 (2022 15:00), Max: 37.9 (2022 05:00)  T(F): 97.8 (2022 15:00), Max: 100.2 (2022 05:00)  HR: 116 (2022 17:00) (107 - 133)  BP: 109/55 (2022 17:00) (86/52 - 159/69)  BP(mean): 78 (2022 17:00) (66 - 88)  RR: 20 (2022 17:00) (12 - 23)  SpO2: 100% (2022 17:00) (98% - 100%)    Parameters below as of 2022 16:00  Patient On (Oxygen Delivery Method): nasal cannula  O2 Flow (L/min): 1      PHYSICAL EXAMINATION:  General: Alert and Awake, NAD  Cardiac: RRR, No M/R/G  Resp: CTAB, No Wh/Rh/Ra  Abdomen: Dressing over abdomen. Moderate abdominal distension. diffuse tenderness to palpation.  No HSM, No rigidity or guarding  MSK: No LE edema. No Calf tenderness  Skin: No rashes or lesions. Skin is warm and dry to the touch.   Neuro: Alert and Awake. CN 2-12 Grossly intact. Moves all four extremities spontaneously.  Psych: Calm, Pleasant, Cooperative                          8.3    8.87  )-----------( 179      ( 2022 15:09 )             25.7       11-08    136  |  108  |  17  ----------------------------<  108<H>  4.4   |  23  |  0.86    Ca    7.3<L>      2022 15:09  Phos  3.0       Mg     1.8         TPro  4.3<L>  /  Alb  2.0<L>  /  TBili  7.8<H>  /  DBili  x   /  AST  68<H>  /  ALT  55<H>  /  AlkPhos  115  1108      Urinalysis Basic - ( 2022 11:58 )    Color: Dark Yellow / Appearance: Clear / S.030 / pH: x  Gluc: x / Ketone: Negative  / Bili: Large >10 / Urobili: >8mg/dL   Blood: x / Protein: 30 mg/dL / Nitrite: Negative   Leuk Esterase: Negative / RBC: 2 /hpf / WBC 1 /HPF   Sq Epi: x / Non Sq Epi: 0 /hpf / Bacteria: Negative        MICROBIOLOGY:  Vancomycin Level, Random: <4.0 ug/mL (22 @ 06:05)  v  Abdominal Fl Abdominal Fluid  22 --  --    Numerous polymorphonuclear leukocytes seen per low power field  Few Gram Negative Rods seen per oil power field  Rare Gram positive cocci in pairs seen per oil power field      Abdominal Fl Abdominal Fluid  22 --  --    polymorphonuclear leukocytes seen  Gram positive cocci in pairs seen  by cytocentrifuge      .Blood Blood  22   No growth to date.  --  --      Wound Wound  22   Few Escherichia coli  Few Enterococcus faecium (vancomycin resistant)  --  Escherichia coli  Enterococcus faecium (vancomycin resistant)      .Blood Blood  22   No growth to date.  --  --      .Blood Blood  22   No Growth Final  --  --        CMV IgG Antibody: <0.20 U/mL (10-31-22 @ 17:21)    Rapid RVP Result: NotDetec ( @ 15:23)        RADIOLOGY:    <The imaging below has been reviewed and visualized by me independently. Findings as detailed in report below>    < from: US Trans Liver W/ Doppler (22 @ 00:09) >  IMPRESSION:  Status post liver transplant with patenthepatic vasculature. Mildly   elevated velocities within the hepatic artery, which may be related to   vessel tortuosity. Continued attention on follow-up is recommended.    Small amount of perihepatic fluid.    Right pleural effusion.    < end of copied text >

## 2022-11-08 NOTE — PROGRESS NOTE ADULT - ASSESSMENT
32M PMH Cerebral palsy, Primary Sclerosing Cholangitis Cirrhosis with frequent ERCPs with biliary stent placements for biliary strictures (last in 9/2022) on liver transplant list, s/p liver transplant (11/1) s/p RTOR (11/7) for washout, resection of Velasquez limb with end to side hepatico jejunostomy and side to side jejunojejunostomy, biliary drain placement and 2 new JPs (3 total).       Plan;  -Continue Meropenem.  Vancomycin 1gm now and then by level  -NS @ 125/hour  -Pain control PRN  -Strict I/Os  -SCDs  - Bladder scan to rule out urinary obstruction; Albumin to improve UOP; continue to monitor  - Continue BID dressing changes; changed already at bedside, good granulation tissue noted, no fibrinous exudate,   -Continue Miralax and Senna  - Start 500 mg of Cellcept iV BID  - Follow TAC level  - H5K  -IS q1hour WA  -OOB/PT         Immunosuppression: Tacro 0.5mg BID, MMF held , Methylprednisolone 16 mh daily  PPX: Fluconazole Bactrim, Valcyte  DAily CBC, CMP, Mag, Pos, Tacro level   32M PMH Cerebral palsy, Primary Sclerosing Cholangitis Cirrhosis with frequent ERCPs with biliary stent placements for biliary strictures (last in 9/2022) on liver transplant list, s/p liver transplant (11/1) and 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 WBC trending down, HH down slightly, TB up, liver enzymes around the same value from yesterday.      Plan;  -Continue Meropenem.  Vancomycin 1gm now and then by level  -NS @ 125/hour  -Pain control PRN  -Strict I/Os  -SCDs  - Follow OR cultures  - Bladder scan to rule out urinary obstruction; Albumin to improve UOP; continue to monitor  - Continue BID dressing changes; changed already at bedside, good granulation tissue noted, no fibrinous exudate,   -Continue Miralax and Senna  - Start 500 mg of Cellcept iV BID  - Follow TAC level  - H5K  -IS q1hour WA  -OOB/PT         Immunosuppression: Tacro 0.5mg BID, MMF held , Methylprednisolone 16 mg daily  PPX: Fluconazole Bactrim, Valcyte  DAily CBC, CMP, Mag, Pos, Tacro level

## 2022-11-08 NOTE — PROGRESS NOTE ADULT - NS ATTEND AMEND GEN_ALL_CORE FT
ON: revision of biliary anastomosis s/p leak    pain well controlled  aaox3  CP with contractures but otherwise neurologically intact  no AM CXR  on RA  tachycardic, sinus  normotensive  NPO for now  NG in place  VIOLETA drain x2 and biliary drain  LFTs improving post op  albumin 1.9, transplant consider giving albumin bolus  chavez in place, improved urine output in recent hours  s/p IV bolus  not on chem VTE PPX  leukocytosis  zosyn, vanc , merop post op  transplant prophylaxis medications   solumedrol   gnr from abd cultures  good glycemic control  PT OT when able

## 2022-11-08 NOTE — PROGRESS NOTE ADULT - ASSESSMENT
32M PMH Cerebral palsy, Primary Sclerosing Cholangitis Cirrhosis with frequent ERCPs with biliary stent placements for biliary strictures (last in 9/2022) on liver transplant list, s/p liver transplant (11/1) s/p RTOR (11/7) for washout, resection of Velasquez limb with end to side hepatico jejunostomy and side to side jejunojejunostomy, biliary drain placement and 2 new JPs (3 total).     PLAN:    Neurologic: acute pain  - Pain control w/ Dilaudid PRN  - Added PO oxycodone for pain control    Respiratory: no acute issues  - Incentive spirometry, OOB to prevent atelectasis    Cardiovascular: no acute issues  - Required phenylephrine intra-op, off pressors now    Gastrointestinal/Nutrition:   - NPO except medications  - CXR to eval NGT position  - Trend LFTs  - Monitor VIOLETA x 3 and biliary drain output  - Protonix daily    Genitourinary/Renal: no acute issues  - Monitor UOP with Chapman, strict I&Os  - S/p ~2L bolus for decreased UO, appears dry on POCUS  - Supplement electrolytes PRN  - LR increased to 125ml/hr  - Continue PPI    Hematologic: no acute issues  - Check CBC, coags  - Hold chemical VTE ppx  - ICDs    Infectious Disease:  - Continue Merrem  - Diflucan/Bactrim/valganciclovir for prophylaxis  - Prednisone 20mg qd and tacrolimus for immunosuppression  - Follow up surgical cultures    Endocrine: no acute issues  - Monitor glucose on BMP    Dispo: SICU

## 2022-11-08 NOTE — PROGRESS NOTE ADULT - ASSESSMENT
32 year old male with PMHx of Cerebral palsy, Primary Sclerosing Cholangitis Cirrhosis with frequent ERCPs with biliary stent placements for biliary strictures (last in 9/2022) on liver transplant list, recent COVID s/p MAB in (9/2022) admitted for liver transplant s/p liver transplant on 10/31.    Developed fevers post-operatively on 11/2  Noted to have drainage on from operative wound 11/5 > 11/6  CT A/P (11/6) Fluid and air are noted within the right upper quadrant, lateral to the tip of the surgical drainage catheter.    On 11/7 s/p Ex-lap with finding of biliary leak. s/p Velasquez limb resection and recreation; end to side hepatico jejunostomy and side to side jejunojejunostomy (both hand-sewn). s/p 2 biliary drain placement.     Wound Culture (11/6) Few E coli and Few Enterococcus faecium  Operative Culture (11/7) Gram stain with GNR and GPCPC    #Fever, Infected Biloma, Leukocytosis, Positive Culture Finding  --Recommend addition of Linezolid 600 mg IV/PO Q12H pending susceptibility of Enterococcus  --Continue Meropenem pending preliminary operative cultures and E coli susceptibilities  --Continue to follow CBC with diff  --Continue to follow transaminases  --Continue to follow temperature curve  --Follow up on preliminary blood cultures    #Liver Transplant Recipient (CMV +/-, EBV +/-), Prophylactic Antibiotic  --Continue Valcyte 900 mg PO Q24H for CMV PPx  --Continue Bactrim SS PO Q24H for PCP PPx     I will continue to follow. Please feel free to contact me with any further questions.    Roque Reed M.D.  [facility] Division of Infectious Disease  8AM-5PM Monday - Friday: Available on [employer] Teams  After Hours and Holidays (or if no response on [employer] Teams): Please contact the Infectious Diseases Office at [phone]    The above assessment and plan were discussed with Transplant surgery team

## 2022-11-08 NOTE — PROGRESS NOTE ADULT - SUBJECTIVE AND OBJECTIVE BOX
24 HOUR:  RTOR for washout. Biliary leak noted at hepaticojejunostomy and exposed mucosa noted at J-J anastomosis. Velasquez limb resected and recreated; end to side hepatico jejunostomy and side to side jejunojejunostomy (both hand-sewn).   Biliary drain placed in addition to 2 more Vicente drains; original infrahepatic drain remained in place.   Pain poorly controlled with IV Dilaudid - PO oxycodone added to regimen.   Required ~2L IVF bolus for diminished UO    --------------------------------------------------------------------------------------  PAST MEDICAL HISTORY:   Cerebral palsy    Cerebral palsy    PSC (primary sclerosing cholangitis)    Abnormal LFTs    Bile duct stricture    Dental caries    Impacted teeth    Phimosis    History of seizures    Anemia        PAST SURGICAL HISTORY:   No significant past surgical history    No significant past surgical history    History of ERCP    History of biliary stent insertion    History of biliary stent insertion    [place] teeth extracted        FAMILY HISTORY:   No pertinent family history in first degree relatives        HOME MEDICATIONS:    ALLERGIES:   No Known Allergies      --------------------------------------------------------------------------------------  PHYSICAL EXAM:    VITAL SIGNS:  ICU Vital Signs Last 24 Hrs  T(C): 36.4 (2022 18:30), Max: 39.3 (2022 01:15)  T(F): 97.5 (2022 18:30), Max: 102.7 (2022 01:15)  HR: 129 (2022 19:00) (107 - 135)  BP: 110/60 (2022 19:00) (106/58 - 123/64)  BP(mean): 80 (2022 19:00) (76 - 91)  ABP: --  ABP(mean): --  RR: 23 (2022 19:00) (17 - 23)  SpO2: 100% (2022 19:00) (96% - 100%)    O2 Parameters below as of 2022 18:30  Patient On (Oxygen Delivery Method): nasal cannula    O2 Concentration (%): 2        I/Os:  I&O's Summary    2022 07:01  -  2022 07:00  --------------------------------------------------------  IN: 2160 mL / OUT: 1955 mL / NET: 205 mL    2022 07:01  -  2022 20:19  --------------------------------------------------------  IN: 0 mL / OUT: 100 mL / NET: -100 mL        NEURO:   Exam: A&O    RESPIRATORY  Exam: room air         CARDIOVASCULAR  Exam: well perfused       GI/NUTRITION  Exam: incision c/d/i, 3 VIOLETA   Diet: NPO     GENITOURINARY/RENAL  Exam:   [x Chapman catheter, indication: urine output monitoring in critically ill patient    Weight:  Weight (kg): 58.4 ( @ 12:36)    HEMATOLOGIC  [x] VTE Prophylaxis:    Transfusion: [ ] PRBC	[ ] Platelets	[ ] FFP	[ ] Cryoprecipitate      INFECTIOUS DISEASES:  Recent Cultures:  RECENT CULTURES:  Specimen Source: Wound Wound  Date/Time:  @ 12:55  Culture Results:   Few Escherichia coli  Few Enterococcus faecium  Gram Stain: --  Organism: --  Specimen Source: .Blood Blood  Date/Time:  @ 07:22  Culture Results:   No growth to date.  Gram Stain: --  Organism: --        ENDOCRINE  Glucose:  CAPILLARY BLOOD GLUCOSE      POCT Blood Glucose.: 130 mg/dL (2022 08:29)  POCT Blood Glucose.: 123 mg/dL (2022 21:08)        PATIENT CARE ACCESS DEVICES:  [ ] Peripheral IV  [ ] Central Venous Line	[ ] R	[ ] L	[ ] IJ	[ ] Fem	[ ] SC	Placed:   [ ] Arterial Line		[ ] R	[ ] L	[ ] Fem	[ ] Rad	[ ] Ax	Placed:   [ ] PICC:					[ ] Mediport  [ ] Urinary Catheter, Date Placed:   [x] Necessity of urinary, arterial, and venous catheters discussed    --------------------------------------------------------------------------------------  MEDICATIONS:   Neurologic Medications  HYDROmorphone  Injectable 0.5 milliGRAM(s) IV Push every 3 hours PRN Severe Pain (7 - 10)  ondansetron Injectable 4 milliGRAM(s) IV Push once PRN Nausea and/or Vomiting    Respiratory Medications    Cardiovascular Medications    Gastrointestinal Medications  calcium gluconate IVPB 2 Gram(s) IV Intermittent once  lactated ringers. 1000 milliLiter(s) IV Continuous <Continuous>    Genitourinary Medications    Hematologic/Oncologic Medications  influenza   Vaccine 0.5 milliLiter(s) IntraMuscular once  tacrolimus    0.5 mG/mL Suspension 0.5 milliGRAM(s) Oral <User Schedule>    Antimicrobial/Immunologic Medications  fluconAZOLE IVPB      meropenem  IVPB      trimethoprim   80 mG/sulfamethoxazole 400 mG 1 Tablet(s) Oral daily    Endocrine/Metabolic Medications    Topical/Other Medications  chlorhexidine 2% Cloths 1 Application(s) Topical <User Schedule>      --------------------------------------------------------------------------------------  LABS:                         11.9   21.92 )-----------( 428       19:07 )             35.5   11-07    134<L>  |  103  |  10  ----------------------------<  134<H>  4.6   |  22  |  0.67    Ca    7.3<L>      2022 19:07  Phos  3.0       Mg     1.9         TPro  4.4<L>  /  Alb  1.9<L>  /  TBili  6.1<H>  /  DBili  x   /  AST  57<H>  /  ALT  69<H>  /  AlkPhos  187<H>    Urinalysis Basic - ( 2022 11:58 )    Color: Dark Yellow / Appearance: Clear / S.030 / pH: x  Gluc: x / Ketone: Negative  / Bili: Large >10 / Urobili: >8mg/dL   Blood: x / Protein: 30 mg/dL / Nitrite: Negative   Leuk Esterase: Negative / RBC: 2 /hpf / WBC 1 /HPF   Sq Epi: x / Non Sq Epi: 0 /hpf / Bacteria: Negative    PT/INR - ( 2022 19:19 )   PT: 19.8 sec;   INR: 1.70 ratio         PTT - ( 2022 19:19 )  PTT:26.9 sec  --------------------------------------------------------------------------------------    IMAGING :         24 HOUR:  RTOR for washout. Biliary leak noted at hepaticojejunostomy and exposed mucosa noted at J-J anastomosis. Velasquez limb resected and recreated; end to side hepatico jejunostomy and side to side jejunojejunostomy (both hand-sewn).   Biliary drain placed in addition to 2 more Vicente drains; original infrahepatic drain remained in place.   Pain poorly controlled with IV Dilaudid - PO oxycodone added to regimen.   Required ~2L IVF bolus for diminished UO    SUBJECTIVE/ROS:  [ X ] A ten-point review of systems was otherwise negative except as noted.  [ ] Due to altered mental status/intubation, subjective information were not able to be obtained from the patient. History was obtained, to the extent possible, from review of the chart and collateral sources of information.      NEURO  Exam: AOx4, abdominal pain persists. No appreciated facial droop. PERRL. Able to move all extremities.   Meds: HYDROmorphone  Injectable 0.5 milliGRAM(s) IV Push every 3 hours PRN Breakthrough Pain  ondansetron Injectable 4 milliGRAM(s) IV Push once PRN Nausea and/or Vomiting  oxyCODONE    IR 10 milliGRAM(s) Oral every 4 hours PRN Severe Pain (7 - 10)  oxyCODONE    IR 5 milliGRAM(s) Oral every 4 hours PRN Moderate Pain (4 - 6)    [x] Adequacy of sedation and pain control has been assessed and adjusted      RESPIRATORY  RR: 17 (11-08-22 @ 02:00) (15 - 23)  SpO2: 100% (11-08-22 @ 02:00) (96% - 100%)  Wt(kg): --  Exam: unlabored, clear to auscultation bilaterally  Mechanical Ventilation:   Meds: diphenhydrAMINE Injectable 25 milliGRAM(s) IV Push once        CARDIOVASCULAR  HR: 122 (11-08-22 @ 02:00) (107 - 135)  BP: 108/73 (11-08-22 @ 02:00) (106/58 - 123/64)  BP(mean): 86 (11-08-22 @ 02:00) (76 - 86)  ABP: --  ABP(mean): --  Wt(kg): --  CVP(cm H2O): --  VBG - ( 07 Nov 2022 18:48 )  pH: 7.36  /  pCO2: 42    /  pO2: 54    / HCO3: 24    / Base Excess: -1.7  /  SaO2: 86.8   Lactate: 1.8                Exam:  Cardiac Rhythm:  Perfusion     [ X ]Adequate   [ ]Inadequate  Mentation   [ X ]Normal       [ ]Reduced  Extremities  [ X ]Warm         [ ]Cool  Volume Status [ ]Hypervolemic [ X ]Euvolemic [ ]Hypovolemic  Meds:       GI/NUTRITION  Exam: Soft, mildly distended, +tender  Diet: NPO except meds  Meds: pantoprazole  Injectable 40 milliGRAM(s) IV Push daily      GENITOURINARY  I&O's Detail    11-06 @ 07:01  -  11-07 @ 07:00  --------------------------------------------------------  IN:    IV PiggyBack: 200 mL    IV PiggyBack: 100 mL    Oral Fluid: 1060 mL    sodium chloride 0.9%: 800 mL  Total IN: 2160 mL    OUT:    Bulb (mL): 205 mL    Voided (mL): 1750 mL  Total OUT: 1955 mL    Total NET: 205 mL      11-07 @ 07:01  -  11-08 @ 02:07  --------------------------------------------------------  IN:    IV PiggyBack: 100 mL    IV PiggyBack: 200 mL    Lactated Ringers: 525 mL    Lactated Ringers Bolus: 500 mL    Sodium Chloride 0.9% Bolus: 500 mL  Total IN: 1825 mL    OUT:    Bulb (mL): 35 mL    Bulb (mL): 30 mL    Bulb (mL): 150 mL    Indwelling Catheter - Urethral (mL): 145 mL    Voided (mL): 100 mL  Total OUT: 460 mL    Total NET: 1365 mL        Weight (kg): 58.4 (11-07 @ 12:36)  11-07    134<L>  |  103  |  10  ----------------------------<  134<H>  4.6   |  22  |  0.67    Ca    7.3<L>      07 Nov 2022 19:07  Phos  3.0     11-07  Mg     1.9     11-07    TPro  4.4<L>  /  Alb  1.9<L>  /  TBili  6.1<H>  /  DBili  x   /  AST  57<H>  /  ALT  69<H>  /  AlkPhos  187<H>  11-07    [ ] Chapman catheter, indication: N/A  Meds: sodium chloride 0.9% Bolus 500 milliLiter(s) IV Bolus once  sodium chloride 0.9%. 1000 milliLiter(s) IV Continuous <Continuous>        HEMATOLOGIC  Meds:   [x] VTE Prophylaxis ICDs                        11.9   21.92 )-----------( 428      ( 07 Nov 2022 19:07 )             35.5     PT/INR - ( 07 Nov 2022 19:19 )   PT: 19.8 sec;   INR: 1.70 ratio         PTT - ( 07 Nov 2022 19:19 )  PTT:26.9 sec  Transfusion     [ ] PRBC   [ ] Platelets   [ ] FFP   [ ] Cryoprecipitate      INFECTIOUS DISEASES  T(C): 36.5 (11-07-22 @ 23:00), Max: 37.7 (11-07-22 @ 02:19)  Wt(kg): --  WBC Count: 21.92 K/uL (11-07 @ 19:07)  WBC Count: 21.84 K/uL (11-07 @ 06:04)    Recent Cultures:  Specimen Source: Wound Wound, 11-06 @ 12:55; Results   Few Escherichia coli  Few Enterococcus faecium; Gram Stain: --; Organism: --  Specimen Source: .Blood Blood, 11-06 @ 07:22; Results   No growth to date.; Gram Stain: --; Organism: --  Specimen Source: .Blood Blood, 11-02 @ 15:05; Results   No Growth Final; Gram Stain: --; Organism: --    Meds: fluconAZOLE IVPB 400 milliGRAM(s) IV Intermittent every 24 hours  fluconAZOLE IVPB      influenza   Vaccine 0.5 milliLiter(s) IntraMuscular once  meropenem  IVPB 1000 milliGRAM(s) IV Intermittent every 8 hours  meropenem  IVPB      tacrolimus    0.5 mG/mL Suspension 0.5 milliGRAM(s) Oral <User Schedule>  trimethoprim   80 mG/sulfamethoxazole 400 mG 1 Tablet(s) Oral daily  valGANciclovir 50 mG/mL Oral Solution 900 milliGRAM(s) Oral daily        ENDOCRINE  Capillary Blood Glucose    Meds: predniSONE   Tablet 20 milliGRAM(s) Oral daily        ACCESS DEVICES:  [ X ] Peripheral IV  [ ] Central Venous Line	[ ] R	[ ] L	[ ] IJ	[ ] Fem	[ ] SC	Placed:   [ ] Arterial Line		[ ] R	[ ] L	[ ] Fem	[ ] Rad	[ ] Ax	Placed:   [ ] PICC:					[ ] Mediport  [ X ] Urinary Catheter, Date Placed: 11/7  [ X ] Necessity of urinary, arterial, and venous catheters discussed    OTHER MEDICATIONS:  chlorhexidine 2% Cloths 1 Application(s) Topical <User Schedule>      CODE STATUS:     IMAGING:   Excisional Biopsy Additional Text (Leave Blank If You Do Not Want): The margin was drawn around the clinically apparent lesion. An elliptical shape was then drawn on the skin incorporating the lesion and margins.  Incisions were then made along these lines to the appropriate tissue plane and the lesion was extirpated.

## 2022-11-09 LAB
-  AMIKACIN: SIGNIFICANT CHANGE UP
-  AMIKACIN: SIGNIFICANT CHANGE UP
-  AMOXICILLIN/CLAVULANIC ACID: SIGNIFICANT CHANGE UP
-  AMOXICILLIN/CLAVULANIC ACID: SIGNIFICANT CHANGE UP
-  AMPICILLIN/SULBACTAM: SIGNIFICANT CHANGE UP
-  AMPICILLIN/SULBACTAM: SIGNIFICANT CHANGE UP
-  AMPICILLIN: SIGNIFICANT CHANGE UP
-  AZTREONAM: SIGNIFICANT CHANGE UP
-  AZTREONAM: SIGNIFICANT CHANGE UP
-  CEFAZOLIN: SIGNIFICANT CHANGE UP
-  CEFAZOLIN: SIGNIFICANT CHANGE UP
-  CEFEPIME: SIGNIFICANT CHANGE UP
-  CEFEPIME: SIGNIFICANT CHANGE UP
-  CEFOXITIN: SIGNIFICANT CHANGE UP
-  CEFOXITIN: SIGNIFICANT CHANGE UP
-  CEFTRIAXONE: SIGNIFICANT CHANGE UP
-  CEFTRIAXONE: SIGNIFICANT CHANGE UP
-  CIPROFLOXACIN: SIGNIFICANT CHANGE UP
-  DAPTOMYCIN: SIGNIFICANT CHANGE UP
-  ERTAPENEM: SIGNIFICANT CHANGE UP
-  ERTAPENEM: SIGNIFICANT CHANGE UP
-  ERYTHROMYCIN: SIGNIFICANT CHANGE UP
-  GENTAMICIN: SIGNIFICANT CHANGE UP
-  GENTAMICIN: SIGNIFICANT CHANGE UP
-  IMIPENEM: SIGNIFICANT CHANGE UP
-  IMIPENEM: SIGNIFICANT CHANGE UP
-  LEVOFLOXACIN: SIGNIFICANT CHANGE UP
-  LINEZOLID: SIGNIFICANT CHANGE UP
-  MEROPENEM: SIGNIFICANT CHANGE UP
-  MEROPENEM: SIGNIFICANT CHANGE UP
-  PENICILLIN: SIGNIFICANT CHANGE UP
-  PIPERACILLIN/TAZOBACTAM: SIGNIFICANT CHANGE UP
-  PIPERACILLIN/TAZOBACTAM: SIGNIFICANT CHANGE UP
-  RIFAMPIN: SIGNIFICANT CHANGE UP
-  TETRACYCLINE: SIGNIFICANT CHANGE UP
-  TOBRAMYCIN: SIGNIFICANT CHANGE UP
-  TOBRAMYCIN: SIGNIFICANT CHANGE UP
-  TRIMETHOPRIM/SULFAMETHOXAZOLE: SIGNIFICANT CHANGE UP
-  TRIMETHOPRIM/SULFAMETHOXAZOLE: SIGNIFICANT CHANGE UP
-  VANCOMYCIN: SIGNIFICANT CHANGE UP
ALBUMIN SERPL ELPH-MCNC: 1.9 G/DL — LOW (ref 3.3–5)
ALBUMIN SERPL ELPH-MCNC: 2.3 G/DL — LOW (ref 3.3–5)
ALP SERPL-CCNC: 123 U/L — HIGH (ref 40–120)
ALP SERPL-CCNC: 144 U/L — HIGH (ref 40–120)
ALT FLD-CCNC: 66 U/L — HIGH (ref 10–45)
ALT FLD-CCNC: 78 U/L — HIGH (ref 10–45)
ANION GAP SERPL CALC-SCNC: 7 MMOL/L — SIGNIFICANT CHANGE UP (ref 5–17)
ANION GAP SERPL CALC-SCNC: 8 MMOL/L — SIGNIFICANT CHANGE UP (ref 5–17)
APTT BLD: 28.5 SEC — SIGNIFICANT CHANGE UP (ref 27.5–35.5)
APTT BLD: 28.9 SEC — SIGNIFICANT CHANGE UP (ref 27.5–35.5)
AST SERPL-CCNC: 106 U/L — HIGH (ref 10–40)
AST SERPL-CCNC: 92 U/L — HIGH (ref 10–40)
BASOPHILS # BLD AUTO: 0.04 K/UL — SIGNIFICANT CHANGE UP (ref 0–0.2)
BASOPHILS NFR BLD AUTO: 0.3 % — SIGNIFICANT CHANGE UP (ref 0–2)
BILIRUB DIRECT SERPL-MCNC: 6.2 MG/DL — HIGH (ref 0–0.3)
BILIRUB DIRECT SERPL-MCNC: 7.3 MG/DL — HIGH (ref 0–0.3)
BILIRUB INDIRECT FLD-MCNC: 2 MG/DL — HIGH (ref 0.2–1)
BILIRUB SERPL-MCNC: 7.7 MG/DL — HIGH (ref 0.2–1.2)
BILIRUB SERPL-MCNC: 9.3 MG/DL — HIGH (ref 0.2–1.2)
BILIRUB SERPL-MCNC: 9.5 MG/DL — HIGH (ref 0.2–1.2)
BUN SERPL-MCNC: 14 MG/DL — SIGNIFICANT CHANGE UP (ref 7–23)
BUN SERPL-MCNC: 14 MG/DL — SIGNIFICANT CHANGE UP (ref 7–23)
CALCIUM SERPL-MCNC: 7.1 MG/DL — LOW (ref 8.4–10.5)
CALCIUM SERPL-MCNC: 7.9 MG/DL — LOW (ref 8.4–10.5)
CHLORIDE SERPL-SCNC: 102 MMOL/L — SIGNIFICANT CHANGE UP (ref 96–108)
CHLORIDE SERPL-SCNC: 106 MMOL/L — SIGNIFICANT CHANGE UP (ref 96–108)
CO2 SERPL-SCNC: 23 MMOL/L — SIGNIFICANT CHANGE UP (ref 22–31)
CO2 SERPL-SCNC: 26 MMOL/L — SIGNIFICANT CHANGE UP (ref 22–31)
CREAT SERPL-MCNC: 0.67 MG/DL — SIGNIFICANT CHANGE UP (ref 0.5–1.3)
CREAT SERPL-MCNC: 0.75 MG/DL — SIGNIFICANT CHANGE UP (ref 0.5–1.3)
EGFR: 124 ML/MIN/1.73M2 — SIGNIFICANT CHANGE UP
EGFR: 129 ML/MIN/1.73M2 — SIGNIFICANT CHANGE UP
EOSINOPHIL # BLD AUTO: 0.1 K/UL — SIGNIFICANT CHANGE UP (ref 0–0.5)
EOSINOPHIL NFR BLD AUTO: 0.7 % — SIGNIFICANT CHANGE UP (ref 0–6)
GLUCOSE SERPL-MCNC: 102 MG/DL — HIGH (ref 70–99)
GLUCOSE SERPL-MCNC: 86 MG/DL — SIGNIFICANT CHANGE UP (ref 70–99)
HCT VFR BLD CALC: 24.5 % — LOW (ref 39–50)
HCT VFR BLD CALC: 25.2 % — LOW (ref 39–50)
HGB BLD-MCNC: 8.2 G/DL — LOW (ref 13–17)
HGB BLD-MCNC: 8.4 G/DL — LOW (ref 13–17)
IMM GRANULOCYTES NFR BLD AUTO: 1.1 % — HIGH (ref 0–0.9)
INR BLD: 1.52 RATIO — HIGH (ref 0.88–1.16)
INR BLD: 1.65 RATIO — HIGH (ref 0.88–1.16)
LYMPHOCYTES # BLD AUTO: 0.98 K/UL — LOW (ref 1–3.3)
LYMPHOCYTES # BLD AUTO: 6.5 % — LOW (ref 13–44)
MAGNESIUM SERPL-MCNC: 1.7 MG/DL — SIGNIFICANT CHANGE UP (ref 1.6–2.6)
MCHC RBC-ENTMCNC: 30.9 PG — SIGNIFICANT CHANGE UP (ref 27–34)
MCHC RBC-ENTMCNC: 31 PG — SIGNIFICANT CHANGE UP (ref 27–34)
MCHC RBC-ENTMCNC: 33.3 GM/DL — SIGNIFICANT CHANGE UP (ref 32–36)
MCHC RBC-ENTMCNC: 33.5 GM/DL — SIGNIFICANT CHANGE UP (ref 32–36)
MCV RBC AUTO: 92.5 FL — SIGNIFICANT CHANGE UP (ref 80–100)
MCV RBC AUTO: 93 FL — SIGNIFICANT CHANGE UP (ref 80–100)
METHOD TYPE: SIGNIFICANT CHANGE UP
MONOCYTES # BLD AUTO: 0.88 K/UL — SIGNIFICANT CHANGE UP (ref 0–0.9)
MONOCYTES NFR BLD AUTO: 5.8 % — SIGNIFICANT CHANGE UP (ref 2–14)
NEUTROPHILS # BLD AUTO: 12.91 K/UL — HIGH (ref 1.8–7.4)
NEUTROPHILS NFR BLD AUTO: 85.6 % — HIGH (ref 43–77)
NRBC # BLD: 0 /100 WBCS — SIGNIFICANT CHANGE UP (ref 0–0)
NRBC # BLD: 0 /100 WBCS — SIGNIFICANT CHANGE UP (ref 0–0)
PLATELET # BLD AUTO: 206 K/UL — SIGNIFICANT CHANGE UP (ref 150–400)
PLATELET # BLD AUTO: 259 K/UL — SIGNIFICANT CHANGE UP (ref 150–400)
POTASSIUM SERPL-MCNC: 3.8 MMOL/L — SIGNIFICANT CHANGE UP (ref 3.5–5.3)
POTASSIUM SERPL-MCNC: 4.1 MMOL/L — SIGNIFICANT CHANGE UP (ref 3.5–5.3)
POTASSIUM SERPL-SCNC: 3.8 MMOL/L — SIGNIFICANT CHANGE UP (ref 3.5–5.3)
POTASSIUM SERPL-SCNC: 4.1 MMOL/L — SIGNIFICANT CHANGE UP (ref 3.5–5.3)
PROT SERPL-MCNC: 4 G/DL — LOW (ref 6–8.3)
PROT SERPL-MCNC: 5 G/DL — LOW (ref 6–8.3)
PROTHROM AB SERPL-ACNC: 17.7 SEC — HIGH (ref 10.5–13.4)
PROTHROM AB SERPL-ACNC: 19.1 SEC — HIGH (ref 10.5–13.4)
RBC # BLD: 2.65 M/UL — LOW (ref 4.2–5.8)
RBC # BLD: 2.71 M/UL — LOW (ref 4.2–5.8)
RBC # FLD: 15.6 % — HIGH (ref 10.3–14.5)
RBC # FLD: 15.7 % — HIGH (ref 10.3–14.5)
SODIUM SERPL-SCNC: 136 MMOL/L — SIGNIFICANT CHANGE UP (ref 135–145)
SODIUM SERPL-SCNC: 136 MMOL/L — SIGNIFICANT CHANGE UP (ref 135–145)
TACROLIMUS SERPL-MCNC: 2.2 NG/ML — SIGNIFICANT CHANGE UP
WBC # BLD: 10.88 K/UL — HIGH (ref 3.8–10.5)
WBC # BLD: 15.07 K/UL — HIGH (ref 3.8–10.5)
WBC # FLD AUTO: 10.88 K/UL — HIGH (ref 3.8–10.5)
WBC # FLD AUTO: 15.07 K/UL — HIGH (ref 3.8–10.5)

## 2022-11-09 PROCEDURE — 71045 X-RAY EXAM CHEST 1 VIEW: CPT | Mod: 26

## 2022-11-09 PROCEDURE — 99232 SBSQ HOSP IP/OBS MODERATE 35: CPT

## 2022-11-09 RX ORDER — TACROLIMUS 5 MG/1
1 CAPSULE ORAL
Refills: 0 | Status: DISCONTINUED | OUTPATIENT
Start: 2022-11-10 | End: 2022-11-10

## 2022-11-09 RX ORDER — POTASSIUM CHLORIDE 20 MEQ
10 PACKET (EA) ORAL
Refills: 0 | Status: COMPLETED | OUTPATIENT
Start: 2022-11-09 | End: 2022-11-09

## 2022-11-09 RX ORDER — MAGNESIUM SULFATE 500 MG/ML
4 VIAL (ML) INJECTION ONCE
Refills: 0 | Status: COMPLETED | OUTPATIENT
Start: 2022-11-09 | End: 2022-11-09

## 2022-11-09 RX ORDER — FUROSEMIDE 40 MG
20 TABLET ORAL ONCE
Refills: 0 | Status: COMPLETED | OUTPATIENT
Start: 2022-11-09 | End: 2022-11-09

## 2022-11-09 RX ORDER — TACROLIMUS 5 MG/1
0.5 CAPSULE ORAL ONCE
Refills: 0 | Status: COMPLETED | OUTPATIENT
Start: 2022-11-09 | End: 2022-11-09

## 2022-11-09 RX ORDER — TACROLIMUS 5 MG/1
0.5 CAPSULE ORAL
Refills: 0 | Status: DISCONTINUED | OUTPATIENT
Start: 2022-11-09 | End: 2022-11-10

## 2022-11-09 RX ADMIN — MEROPENEM 100 MILLIGRAM(S): 1 INJECTION INTRAVENOUS at 05:17

## 2022-11-09 RX ADMIN — FLUCONAZOLE 100 MILLIGRAM(S): 150 TABLET ORAL at 17:52

## 2022-11-09 RX ADMIN — Medication 25 GRAM(S): at 04:13

## 2022-11-09 RX ADMIN — Medication 100 MILLIEQUIVALENT(S): at 05:24

## 2022-11-09 RX ADMIN — HYDROMORPHONE HYDROCHLORIDE 1 MILLIGRAM(S): 2 INJECTION INTRAMUSCULAR; INTRAVENOUS; SUBCUTANEOUS at 09:46

## 2022-11-09 RX ADMIN — TACROLIMUS 0.5 MILLIGRAM(S): 5 CAPSULE ORAL at 14:17

## 2022-11-09 RX ADMIN — Medication 20 MILLIGRAM(S): at 12:37

## 2022-11-09 RX ADMIN — HEPARIN SODIUM 5000 UNIT(S): 5000 INJECTION INTRAVENOUS; SUBCUTANEOUS at 17:27

## 2022-11-09 RX ADMIN — MEROPENEM 100 MILLIGRAM(S): 1 INJECTION INTRAVENOUS at 14:08

## 2022-11-09 RX ADMIN — Medication 1 SPRAY(S): at 00:23

## 2022-11-09 RX ADMIN — Medication 100 MILLIEQUIVALENT(S): at 03:03

## 2022-11-09 RX ADMIN — MYCOPHENOLATE MOFETIL 41.67 MILLIGRAM(S): 250 CAPSULE ORAL at 08:29

## 2022-11-09 RX ADMIN — OXYCODONE HYDROCHLORIDE 10 MILLIGRAM(S): 5 TABLET ORAL at 00:22

## 2022-11-09 RX ADMIN — VALGANCICLOVIR 900 MILLIGRAM(S): 450 TABLET, FILM COATED ORAL at 11:53

## 2022-11-09 RX ADMIN — HYDROMORPHONE HYDROCHLORIDE 1 MILLIGRAM(S): 2 INJECTION INTRAMUSCULAR; INTRAVENOUS; SUBCUTANEOUS at 14:30

## 2022-11-09 RX ADMIN — MEROPENEM 100 MILLIGRAM(S): 1 INJECTION INTRAVENOUS at 21:22

## 2022-11-09 RX ADMIN — Medication 1 TABLET(S): at 11:54

## 2022-11-09 RX ADMIN — LINEZOLID 300 MILLIGRAM(S): 600 INJECTION, SOLUTION INTRAVENOUS at 04:13

## 2022-11-09 RX ADMIN — HYDROMORPHONE HYDROCHLORIDE 1 MILLIGRAM(S): 2 INJECTION INTRAMUSCULAR; INTRAVENOUS; SUBCUTANEOUS at 08:22

## 2022-11-09 RX ADMIN — HEPARIN SODIUM 5000 UNIT(S): 5000 INJECTION INTRAVENOUS; SUBCUTANEOUS at 05:16

## 2022-11-09 RX ADMIN — CHLORHEXIDINE GLUCONATE 1 APPLICATION(S): 213 SOLUTION TOPICAL at 05:15

## 2022-11-09 RX ADMIN — HYDROMORPHONE HYDROCHLORIDE 1 MILLIGRAM(S): 2 INJECTION INTRAMUSCULAR; INTRAVENOUS; SUBCUTANEOUS at 01:17

## 2022-11-09 RX ADMIN — HYDROMORPHONE HYDROCHLORIDE 1 MILLIGRAM(S): 2 INJECTION INTRAMUSCULAR; INTRAVENOUS; SUBCUTANEOUS at 01:22

## 2022-11-09 RX ADMIN — HYDROMORPHONE HYDROCHLORIDE 1 MILLIGRAM(S): 2 INJECTION INTRAMUSCULAR; INTRAVENOUS; SUBCUTANEOUS at 04:53

## 2022-11-09 RX ADMIN — Medication 100 MILLIEQUIVALENT(S): at 04:14

## 2022-11-09 RX ADMIN — HYDROMORPHONE HYDROCHLORIDE 1 MILLIGRAM(S): 2 INJECTION INTRAMUSCULAR; INTRAVENOUS; SUBCUTANEOUS at 14:09

## 2022-11-09 RX ADMIN — MYCOPHENOLATE MOFETIL 41.67 MILLIGRAM(S): 250 CAPSULE ORAL at 20:52

## 2022-11-09 RX ADMIN — Medication 16 MILLIGRAM(S): at 05:16

## 2022-11-09 RX ADMIN — TACROLIMUS 0.5 MILLIGRAM(S): 5 CAPSULE ORAL at 19:59

## 2022-11-09 RX ADMIN — PANTOPRAZOLE SODIUM 40 MILLIGRAM(S): 20 TABLET, DELAYED RELEASE ORAL at 11:53

## 2022-11-09 RX ADMIN — HYDROMORPHONE HYDROCHLORIDE 1 MILLIGRAM(S): 2 INJECTION INTRAMUSCULAR; INTRAVENOUS; SUBCUTANEOUS at 04:38

## 2022-11-09 RX ADMIN — TACROLIMUS 0.5 MILLIGRAM(S): 5 CAPSULE ORAL at 08:28

## 2022-11-09 RX ADMIN — Medication 100 MILLIGRAM(S): at 16:28

## 2022-11-09 NOTE — PHYSICAL THERAPY INITIAL EVALUATION ADULT - ADDITIONAL COMMENTS
Pt lives in a private house with parents with no steps to negotiate. Pt was Ind with all ADLs and amb without AD.
Pt lives in a private house with parents with no steps to negotiate. Pt was Ind with all ADLs and amb without AD.

## 2022-11-09 NOTE — OCCUPATIONAL THERAPY INITIAL EVALUATION ADULT - LIVES WITH, PROFILE
Pvt home, 1 steps to enter without handrail. 1st floor set-up. (I) ADLs and functional transfers, without AD/DME. -/parents
Pvt home, 1 steps to enter without handrail. 1st floor set-up. (I) ADLs and functional transfers, without AD/DME. -/parents

## 2022-11-09 NOTE — PROGRESS NOTE ADULT - NS ATTEND AMEND GEN_ALL_CORE FT
Agree, gradually improving. Pain better controlled.     Will pulse with steroids for rising LFTs.     Begin gentle diuresis.     Keep NGT for now.

## 2022-11-09 NOTE — OCCUPATIONAL THERAPY INITIAL EVALUATION ADULT - GENERAL OBSERVATIONS, REHAB EVAL
Patient received in bedside chair, +tele, +BP cuff, +pulse ox, +IV, +2L cont O2 via NC, +VIOLETA drains x3, +biliary drain, +nasal gastric tube. Patient received in bedside chair, +tele, +BP cuff, +pulse ox, +IV, +2L cont O2 via NC, +chavez, +VIOLETA drains x3, +biliary drain, +nasal gastric tube.

## 2022-11-09 NOTE — PHYSICAL THERAPY INITIAL EVALUATION ADULT - DID THE PATIENT HAVE SURGERY?
s/p OLT (11/1), RTOR for washout, resection of Velasquez limb with end to side hepatico jejunostomy and side to side jejunojejunostomy (11/7)/yes
s/p OLT/yes

## 2022-11-09 NOTE — OCCUPATIONAL THERAPY INITIAL EVALUATION ADULT - RANGE OF MOTION EXAMINATION, UPPER EXTREMITY
Left UE Active Assistive ROM was WFL  (within functional limits)/Right UE Active ROM was WNL (within normal limits)
Left UE Active Assistive ROM was WFL  (within functional limits)/Right UE Active ROM was WNL (within normal limits)

## 2022-11-09 NOTE — PHYSICAL THERAPY INITIAL EVALUATION ADULT - GAIT TRAINING, PT EVAL
GOAL: Pt will amb 300ft independently with use of appropriate AD in 2 weeks.
GOAL: Pt will amb 300ft independently with use of appropriate AD in 2 weeks.

## 2022-11-09 NOTE — PROGRESS NOTE ADULT - ASSESSMENT
32 year old male with PMHx of Cerebral palsy, Primary Sclerosing Cholangitis Cirrhosis with frequent ERCPs with biliary stent placements for biliary strictures (last in 9/2022) on liver transplant list, recent COVID s/p MAB in (9/2022) admitted for liver transplant s/p liver transplant on 10/31.    Developed fevers post-operatively on 11/2  Noted to have drainage on from operative wound 11/5 > 11/6  CT A/P (11/6) Fluid and air are noted within the right upper quadrant, lateral to the tip of the surgical drainage catheter.    On 11/7 s/p Ex-lap with finding of biliary leak. s/p Velasquez limb resection and recreation; end to side hepatico jejunostomy and side to side jejunojejunostomy (both hand-sewn). s/p 2 biliary drain placement.     Wound Culture (11/6) Few E coli and Few VRE  Operative Culture (11/7) Gram stain with E coli and Enterococcus faecium    #Fever, Infected Biloma, Leukocytosis, Positive Culture Finding  --Continue Linezolid 600 mg IV/PO Q12H   --Continue Meropenem pending preliminary operative cultures and E coli susceptibilities  --Continue to follow CBC with diff  --Continue to follow transaminases  --Continue to follow temperature curve  --Follow up on preliminary blood cultures    #Liver Transplant Recipient (CMV +/-, EBV +/-), Prophylactic Antibiotic  --Continue Valcyte 900 mg PO Q24H for CMV PPx  --Continue Bactrim SS PO Q24H for PCP PPx     I will continue to follow. Please feel free to contact me with any further questions.    Roque Reed M.D.  [facility] Division of Infectious Disease  8AM-5PM Monday - Friday: Available on [employer] Teams  After Hours and Holidays (or if no response on [employer] Teams): Please contact the Infectious Diseases Office at [phone]    The above assessment and plan were discussed with transplant surgery PA

## 2022-11-09 NOTE — OCCUPATIONAL THERAPY INITIAL EVALUATION ADULT - DIAGNOSIS, OT EVAL
Patient with deficits in ADL status and functional mobility due to pain, impaired balance, strength, ROM, coordination
Pt p/w impaired balance, strength, endurance, ROM impacting independence with ADLs and functional mobility.

## 2022-11-09 NOTE — PROGRESS NOTE ADULT - SUBJECTIVE AND OBJECTIVE BOX
Follow Up:      Interval History:    REVIEW OF SYSTEMS  [  ] ROS unobtainable because:    [  ] All other systems negative except as noted below    Constitutional:  [ ] fever [ ] chills  [ ] weight loss  [ ] weakness  Skin:  [ ] rash [ ] phlebitis	  Eyes: [ ] icterus [ ] pain  [ ] discharge	  ENMT: [ ] sore throat  [ ] thrush [ ] ulcers [ ] exudates  Respiratory: [ ] dyspnea [ ] hemoptysis [ ] cough [ ] sputum	  Cardiovascular:  [ ] chest pain [ ] palpitations [ ] edema	  Gastrointestinal:  [ ] nausea [ ] vomiting [ ] diarrhea [ ] constipation [ ] pain	  Genitourinary:  [ ] dysuria [ ] frequency [ ] hematuria [ ] discharge [ ] flank pain  [ ] incontinence  Musculoskeletal:  [ ] myalgias [ ] arthralgias [ ] arthritis  [ ] back pain  Neurological:  [ ] headache [ ] seizures  [ ] confusion/altered mental status    Allergies  No Known Allergies        ANTIMICROBIALS:  fluconAZOLE IVPB 400 every 24 hours  linezolid  IVPB 600 every 12 hours  meropenem  IVPB    meropenem  IVPB 1000 every 8 hours  trimethoprim   80 mG/sulfamethoxazole 400 mG 1 daily  valGANciclovir 50 mG/mL Oral Solution 900 daily      OTHER MEDS:  MEDICATIONS  (STANDING):  heparin   Injectable 5000 every 12 hours  HYDROmorphone  Injectable 1 every 3 hours PRN  influenza   Vaccine 0.5 once  methylPREDNISolone sodium succinate Injectable 16 daily  mycophenolate mofetil IVPB 500 every 12 hours  ondansetron Injectable 4 once PRN  oxyCODONE    IR 10 every 4 hours PRN  oxyCODONE    IR 5 every 4 hours PRN  pantoprazole  Injectable 40 daily  tacrolimus    0.5 mG/mL Suspension 0.5 <User Schedule>      Vital Signs Last 24 Hrs  T(C): 37.7 (09 Nov 2022 17:00), Max: 38.3 (08 Nov 2022 22:45)  T(F): 99.9 (09 Nov 2022 17:00), Max: 100.9 (08 Nov 2022 22:45)  HR: 102 (09 Nov 2022 17:00) (98 - 131)  BP: 130/80 (09 Nov 2022 17:00) (104/66 - 161/73)  BP(mean): 100 (09 Nov 2022 17:00) (78 - 111)  RR: 18 (09 Nov 2022 17:00) (17 - 41)  SpO2: 100% (09 Nov 2022 17:00) (93% - 100%)    Parameters below as of 09 Nov 2022 10:50  Patient On (Oxygen Delivery Method): nasal cannula  O2 Flow (L/min): 2      PHYSICAL EXAMINATION:  General: Alert and Awake, NAD  HEENT: PERRL, EOMI  Neck: Supple  Cardiac: RRR, No M/R/G  Resp: CTAB, No Wh/Rh/Ra  Abdomen: NBS, NT/ND, No HSM, No rigidity or guarding  MSK: No LE edema. No Calf tenderness  : No chavez  Skin: No rashes or lesions. Skin is warm and dry to the touch.   Neuro: Alert and Awake. CN 2-12 Grossly intact. Moves all four extremities spontaneously.  Psych: Calm, Pleasant, Cooperative                          8.2    10.88 )-----------( 206      ( 09 Nov 2022 01:12 )             24.5       11-09    136  |  106  |  14  ----------------------------<  86  3.8   |  23  |  0.75    Ca    7.1<L>      09 Nov 2022 01:12  Phos  3.0     11-07  Mg     1.7     11-09    TPro  4.0<L>  /  Alb  1.9<L>  /  TBili  7.7<H>  /  DBili  x   /  AST  92<H>  /  ALT  66<H>  /  AlkPhos  123<H>  11-09          MICROBIOLOGY:  v  Abdominal Fl Abdominal Fluid  11-07-22   Few Escherichia coli "Susceptibilities not performed"  --    Numerous polymorphonuclear leukocytes seen per low power field  Few Gram Negative Rods seen per oil power field  Rare Gram positive cocci in pairs seen per oil power field      .Surgical Swab wound  11-07-22   Moderate Escherichia coli  Moderate Enterococcus faecium  --    polymorphonuclear leukocytes seen  Gram positive cocci in pairs seen  by cytocentrifuge      .Blood Blood  11-07-22   No growth to date.  --  --      Wound Wound  11-06-22   Few Escherichia coli  Few Enterococcus faecium (vancomycin resistant)  --  Escherichia coli  Enterococcus faecium (vancomycin resistant)      .Blood Blood  11-06-22   No growth to date.  --  --      .Blood Blood  11-02-22   No Growth Final  --  --        CMV IgG Antibody: <0.20 U/mL (10-31-22 @ 17:21)          RADIOLOGY:    <The imaging below has been reviewed and visualized by me independently. Findings as detailed in report below> Follow Up:  Fever    Interval History: febrile overnight. abdominal pain improving.     REVIEW OF SYSTEMS  [  ] ROS unobtainable because:    [ x ] All other systems negative except as noted below    Constitutional:  [ ] fever [ x chills  [ ] weight loss  [ ] weakness  Skin:  [ ] rash [ ] phlebitis	  Eyes: [ ] icterus [ ] pain  [ ] discharge	  ENMT: [ ] sore throat  [ ] thrush [ ] ulcers [ ] exudates  Respiratory: [ ] dyspnea [ ] hemoptysis [ ] cough [ ] sputum	  Cardiovascular:  [ ] chest pain [ ] palpitations [ ] edema	  Gastrointestinal:  [ ] nausea [ ] vomiting [ ] diarrhea [ ] constipation [x ] pain	  Genitourinary:  [ ] dysuria [ ] frequency [ ] hematuria [ ] discharge [ ] flank pain  [ ] incontinence  Musculoskeletal:  [ ] myalgias [ ] arthralgias [ ] arthritis  [ ] back pain  Neurological:  [ ] headache [ ] seizures  [ ] confusion/altered mental status      Allergies  No Known Allergies        ANTIMICROBIALS:  fluconAZOLE IVPB 400 every 24 hours  linezolid  IVPB 600 every 12 hours  meropenem  IVPB    meropenem  IVPB 1000 every 8 hours  trimethoprim   80 mG/sulfamethoxazole 400 mG 1 daily  valGANciclovir 50 mG/mL Oral Solution 900 daily      OTHER MEDS:  MEDICATIONS  (STANDING):  heparin   Injectable 5000 every 12 hours  HYDROmorphone  Injectable 1 every 3 hours PRN  influenza   Vaccine 0.5 once  methylPREDNISolone sodium succinate Injectable 16 daily  mycophenolate mofetil IVPB 500 every 12 hours  ondansetron Injectable 4 once PRN  oxyCODONE    IR 10 every 4 hours PRN  oxyCODONE    IR 5 every 4 hours PRN  pantoprazole  Injectable 40 daily  tacrolimus    0.5 mG/mL Suspension 0.5 <User Schedule>      Vital Signs Last 24 Hrs  T(C): 37.7 (09 Nov 2022 17:00), Max: 38.3 (08 Nov 2022 22:45)  T(F): 99.9 (09 Nov 2022 17:00), Max: 100.9 (08 Nov 2022 22:45)  HR: 102 (09 Nov 2022 17:00) (98 - 131)  BP: 130/80 (09 Nov 2022 17:00) (104/66 - 161/73)  BP(mean): 100 (09 Nov 2022 17:00) (78 - 111)  RR: 18 (09 Nov 2022 17:00) (17 - 41)  SpO2: 100% (09 Nov 2022 17:00) (93% - 100%)    Parameters below as of 09 Nov 2022 10:50  Patient On (Oxygen Delivery Method): nasal cannula  O2 Flow (L/min): 2      PHYSICAL EXAMINATION:  General: Alert and Awake, NAD  Cardiac: RRR, No M/R/G  Resp: CTAB, No Wh/Rh/Ra  Abdomen: Dressing over abdomen. Moderate abdominal distension. diffuse tenderness to palpation.  No HSM, No rigidity or guarding  MSK: No LE edema. No Calf tenderness  Skin: No rashes or lesions. Skin is warm and dry to the touch.   Neuro: Alert and Awake. CN 2-12 Grossly intact. Moves all four extremities spontaneously.  Psych: Calm, Pleasant, Cooperative                        8.2    10.88 )-----------( 206      ( 09 Nov 2022 01:12 )             24.5       11-09    136  |  106  |  14  ----------------------------<  86  3.8   |  23  |  0.75    Ca    7.1<L>      09 Nov 2022 01:12  Phos  3.0     11-07  Mg     1.7     11-09    TPro  4.0<L>  /  Alb  1.9<L>  /  TBili  7.7<H>  /  DBili  x   /  AST  92<H>  /  ALT  66<H>  /  AlkPhos  123<H>  11-09          MICROBIOLOGY:  v  Abdominal Fl Abdominal Fluid  11-07-22   Few Escherichia coli "Susceptibilities not performed"  --    Numerous polymorphonuclear leukocytes seen per low power field  Few Gram Negative Rods seen per oil power field  Rare Gram positive cocci in pairs seen per oil power field      .Surgical Swab wound  11-07-22   Moderate Escherichia coli  Moderate Enterococcus faecium  --    polymorphonuclear leukocytes seen  Gram positive cocci in pairs seen  by cytocentrifuge      .Blood Blood  11-07-22   No growth to date.  --  --      Wound Wound  11-06-22   Few Escherichia coli  Few Enterococcus faecium (vancomycin resistant)  --  Escherichia coli  Enterococcus faecium (vancomycin resistant)      .Blood Blood  11-06-22   No growth to date.  --  --      .Blood Blood  11-02-22   No Growth Final  --  --        CMV IgG Antibody: <0.20 U/mL (10-31-22 @ 17:21)          RADIOLOGY:    <The imaging below has been reviewed and visualized by me independently. Findings as detailed in report below>    < from: Xray Chest 1 View- PORTABLE-Routine (Xray Chest 1 View- PORTABLE-Routine in AM.) (11.09.22 @ 07:27) >  IMPRESSION:  NG tube positioning readjusted, tip continues to be in the stomach.  No significant interval change in the lung exam.    < end of copied text >

## 2022-11-09 NOTE — OCCUPATIONAL THERAPY INITIAL EVALUATION ADULT - BED MOBILITY TRAINING, PT EVAL
Pt will transfer sit <->sup (I) in 4 weeks
Pt will perform supine to sit with independence within 4 weeks.

## 2022-11-09 NOTE — OCCUPATIONAL THERAPY INITIAL EVALUATION ADULT - BALANCE DISTURBANCE, IDENTIFIED IMPAIRMENT CONTRIBUTE, REHAB EVAL
impaired coordination/abnormal muscle tone/pain/decreased ROM/decreased strength
impaired coordination/abnormal muscle tone/pain/decreased ROM/decreased strength

## 2022-11-09 NOTE — OCCUPATIONAL THERAPY INITIAL EVALUATION ADULT - BALANCE TRAINING, PT EVAL
Pt will improve dynamic standing balance by 1/2 grade to improve functional performance with ADLs within 4 weeks.
Pt will increase dynamic sitting balance 1/2 grade to increase safety/independence with seated ADLs within 4 weeks

## 2022-11-09 NOTE — PHYSICAL THERAPY INITIAL EVALUATION ADULT - TRANSFER TRAINING, PT EVAL
GOAL: Pt will perform all transfers independently with use of appropriate AD in 2 weeks.
GOAL: Pt will perform all transfers independently with use of appropriate AD in 2 weeks. .

## 2022-11-09 NOTE — PHYSICAL THERAPY INITIAL EVALUATION ADULT - PERTINENT HX OF CURRENT PROBLEM, REHAB EVAL
Pt is a  33 y/o PMH Cerebral palsy, Primary Sclerosing Cholangitis Cirrhosis with frequent ERCPs with biliary stent placements for biliary strictures (last in 9/2022) on liver transplant list (ABO AB), recent COVID s/p MAB in 9/2022). Pt is now s/p OLT on 11/1/22, extubated on 11/1/22. Pt is now s/p RTOR for washout, resection of Velasquez limb with end to side hepatico jejunostomy and side to side jejunojejunostomy, biliary drain placement and 2 new JPs.
Pt is a  31 y/o PMH Cerebral palsy, Primary Sclerosing Cholangitis Cirrhosis with frequent ERCPs with biliary stent placements for biliary strictures (last in 9/2022) on liver transplant list (ABO AB), recent COVID s/p MAB in 9/2022). Pt is now s/p OLT on 11/1/22, extubated on 11/1/22.

## 2022-11-09 NOTE — OCCUPATIONAL THERAPY INITIAL EVALUATION ADULT - ADL RETRAINING, OT EVAL
Patient will dress lower body (I), AE as needed in 4 weeks. Patient will dress upper body (I) in 4 weeks
Pt will complete lower body dressing with independence and AD as needed within 4 weeks.

## 2022-11-09 NOTE — PROGRESS NOTE ADULT - SUBJECTIVE AND OBJECTIVE BOX
HISTORY  32y Male    24 HOUR EVENTS:  Started on Linezolid  Albumin 5% 500 x 1, Lasix 20mg IVP x 1   Heparin SQ started for DVT ppx    SUBJECTIVE/ROS:  [ X ] A ten-point review of systems was otherwise negative except as noted.  [ ] Due to altered mental status/intubation, subjective information were not able to be obtained from the patient. History was obtained, to the extent possible, from review of the chart and collateral sources of information.      NEURO  Exam:   Meds: HYDROmorphone  Injectable 1 milliGRAM(s) IV Push every 3 hours PRN Severe Pain (7 - 10)  ondansetron Injectable 4 milliGRAM(s) IV Push once PRN Nausea and/or Vomiting  oxyCODONE    IR 10 milliGRAM(s) Oral every 4 hours PRN Severe Pain (7 - 10)  oxyCODONE    IR 5 milliGRAM(s) Oral every 4 hours PRN Moderate Pain (4 - 6)    [x] Adequacy of sedation and pain control has been assessed and adjusted      RESPIRATORY  RR: 22 (11-09-22 @ 03:00) (12 - 26)  SpO2: 99% (11-09-22 @ 03:00) (96% - 100%)  Wt(kg): --  Exam: unlabored, clear to auscultation bilaterally  Mechanical Ventilation:     [ ] Extubation Readiness Assessed  Meds:       CARDIOVASCULAR  HR: 121 (11-09-22 @ 03:00) (107 - 131)  BP: 114/62 (11-09-22 @ 03:00) (86/52 - 161/73)  BP(mean): 83 (11-09-22 @ 03:00) (66 - 98)  ABP: --  ABP(mean): --  Wt(kg): --  CVP(cm H2O): --  VBG - ( 08 Nov 2022 14:35 )  pH: 7.34  /  pCO2: 43    /  pO2: 67    / HCO3: 23    / Base Excess: -2.5  /  SaO2: 94.1   Lactate: 1.1                Exam: S1S2. No murmurs, rubs, gallops appreciated.   Cardiac Rhythm: Sinus tachycardia  Perfusion     [ X ]Adequate   [ ]Inadequate  Mentation   [ X ]Normal       [ ]Reduced  Extremities  [ X ]Warm         [ ]Cool  Volume Status [ ]Hypervolemic [ X ]Euvolemic [ ]Hypovolemic  Meds:       GI/NUTRITION  Exam: Soft, non-distended, non-tender. VIOLETA x 3  Diet: NPO except medications  Meds: pantoprazole  Injectable 40 milliGRAM(s) IV Push daily      GENITOURINARY  I&O's Detail    11-07 @ 07:01  -  11-08 @ 07:00  --------------------------------------------------------  IN:    IV PiggyBack: 250 mL    IV PiggyBack: 100 mL    Lactated Ringers: 525 mL    Lactated Ringers Bolus: 500 mL    sodium chloride 0.9%: 750 mL    Sodium Chloride 0.9% Bolus: 1000 mL  Total IN: 3125 mL    OUT:    Bulb (mL): 230 mL    Bulb (mL): 65 mL    Bulb (mL): 60 mL    Indwelling Catheter - Urethral (mL): 230 mL    T-Tube (mL): 30 mL    Voided (mL): 100 mL  Total OUT: 715 mL    Total NET: 2410 mL      11-08 @ 07:01 - 11-09 @ 03:49  --------------------------------------------------------  IN:    Albumin 5%  - 500 mL: 500 mL    IV PiggyBack: 666.7 mL    IV PiggyBack: 50 mL    sodium chloride 0.9%: 1750 mL    sodium chloride 0.9%: 250 mL  Total IN: 3216.7 mL    OUT:    Bulb (mL): 230 mL    Bulb (mL): 95 mL    Bulb (mL): 30 mL    Indwelling Catheter - Urethral (mL): 1570 mL    Nasogastric/Oral tube (mL): 100 mL    T-Tube (mL): 25 mL  Total OUT: 2050 mL    Total NET: 1166.7 mL          11-09    136  |  106  |  14  ----------------------------<  86  3.8   |  23  |  0.75    Ca    7.1<L>      09 Nov 2022 01:12  Phos  3.0     11-07  Mg     1.7     11-09    TPro  4.0<L>  /  Alb  1.9<L>  /  TBili  7.7<H>  /  DBili  x   /  AST  92<H>  /  ALT  66<H>  /  AlkPhos  123<H>  11-09    [ ] Chapman catheter, indication: N/A  Meds: magnesium sulfate  IVPB 4 Gram(s) IV Intermittent once  potassium chloride  10 mEq/100 mL IVPB 10 milliEquivalent(s) IV Intermittent every 1 hour  sodium chloride 0.9%. 1000 milliLiter(s) IV Continuous <Continuous>        HEMATOLOGIC  Meds: heparin   Injectable 5000 Unit(s) SubCutaneous every 12 hours    [x] VTE Prophylaxis                        8.2    10.88 )-----------( 206      ( 09 Nov 2022 01:12 )             24.5     PT/INR - ( 09 Nov 2022 01:53 )   PT: 19.1 sec;   INR: 1.65 ratio         PTT - ( 09 Nov 2022 01:53 )  PTT:28.9 sec  Transfusion     [ ] PRBC   [ ] Platelets   [ ] FFP   [ ] Cryoprecipitate      INFECTIOUS DISEASES  T(C): 37.1 (11-09-22 @ 00:00), Max: 38.3 (11-08-22 @ 22:45)  Wt(kg): --  WBC Count: 10.88 K/uL (11-09 @ 01:12)  WBC Count: 8.87 K/uL (11-08 @ 15:09)  WBC Count: 18.91 K/uL (11-08 @ 06:05)    Recent Cultures:  Specimen Source: Abdominal Fl Abdominal Fluid, 11-07 @ 22:06; Results   Few Escherichia coli "Susceptibilities not performed"; Gram Stain:   Numerous polymorphonuclear leukocytes seen per low power field  Few Gram Negative Rods seen per oil power field  Rare Gram positive cocci in pairs seen per oil power field; Organism: --  Specimen Source: Abdominal Fl Abdominal Fluid, 11-07 @ 21:48; Results   Rare Escherichia coli  Few Enterococcus faecium; Gram Stain:   polymorphonuclear leukocytes seen  Gram positive cocci in pairs seen  by cytocentrifuge; Organism: --  Specimen Source: .Blood Blood, 11-07 @ 06:10; Results   No growth to date.; Gram Stain: --; Organism: --  Specimen Source: Wound Wound, 11-06 @ 12:55; Results   Few Escherichia coli  Few Enterococcus faecium (vancomycin resistant); Gram Stain: --; Organism: Escherichia coli  Enterococcus faecium (vancomycin resistant)  Specimen Source: .Blood Blood, 11-06 @ 07:22; Results   No growth to date.; Gram Stain: --; Organism: --  Specimen Source: .Blood Blood, 11-02 @ 15:05; Results   No Growth Final; Gram Stain: --; Organism: --    Meds: fluconAZOLE IVPB 400 milliGRAM(s) IV Intermittent every 24 hours  influenza   Vaccine 0.5 milliLiter(s) IntraMuscular once  linezolid  IVPB 600 milliGRAM(s) IV Intermittent every 12 hours  meropenem  IVPB 1000 milliGRAM(s) IV Intermittent every 8 hours  meropenem  IVPB      mycophenolate mofetil IVPB 500 milliGRAM(s) IV Intermittent every 12 hours  tacrolimus    0.5 mG/mL Suspension 0.5 milliGRAM(s) Oral <User Schedule>  trimethoprim   80 mG/sulfamethoxazole 400 mG 1 Tablet(s) Oral daily  valGANciclovir 50 mG/mL Oral Solution 900 milliGRAM(s) Oral daily        ENDOCRINE  Capillary Blood Glucose    Meds: methylPREDNISolone sodium succinate Injectable 16 milliGRAM(s) IV Push daily        ACCESS DEVICES:  [ X ] Peripheral IV  [ ] Central Venous Line	[ ] R	[ ] L	[ ] IJ	[ ] Fem	[ ] SC	Placed:   [ ] Arterial Line		[ ] R	[ ] L	[ ] Fem	[ ] Rad	[ ] Ax	Placed:   [ ] PICC:					[ ] Mediport  [ ] Urinary Catheter, Date Placed:   [ ] Necessity of urinary, arterial, and venous catheters discussed    OTHER MEDICATIONS:  chlorhexidine 2% Cloths 1 Application(s) Topical <User Schedule>      CODE STATUS:     IMAGING:

## 2022-11-09 NOTE — PHYSICAL THERAPY INITIAL EVALUATION ADULT - IMPAIRED TRANSFERS: SIT/STAND, REHAB EVAL
impaired balance/pain/impaired postural control/decreased strength
impaired balance/narrow base of support/impaired postural control/decreased strength

## 2022-11-09 NOTE — OCCUPATIONAL THERAPY INITIAL EVALUATION ADULT - PERTINENT HX OF CURRENT PROBLEM, REHAB EVAL
32M PMH Cerebral palsy, Primary Sclerosing Cholangitis Cirrhosis with frequent ERCPs with biliary stent placements for biliary strictures (last in 9/2022) on liver transplant list (ABO AB), recent COVID s/p MAB in 9/2022) admitted for liver transplant   liver 11/1-Status post liver transplant
32M PMH Cerebral palsy, Primary Sclerosing Cholangitis Cirrhosis with frequent ERCPs with biliary stent placements for biliary strictures (last in 9/2022) on liver transplant list (ABO AB), recent COVID s/p MAB in 9/2022) s/p liver transplant on 11/1. RTOR 11/7: Biliary leak at hepaticojejunostomy s/p orthotopic liver transplant, Exposed mucosa noted at J-J anastomosis, Velasquez limb resected and recreated; end to side hepatico jejunostomy and side to side jejunojejunostomy (both hand-sewn), Biliary drain placed in addition to 2 more Vicente drains; original infrahepatic drain remained in place, Strattice bridging mesh placed at the apex of incision.

## 2022-11-09 NOTE — PHYSICAL THERAPY INITIAL EVALUATION ADULT - GAIT DEVIATIONS NOTED, PT EVAL
decreased young/decreased velocity of limb motion/decreased step length/decreased weight-shifting ability
decreased young/decreased velocity of limb motion/decreased step length/decreased weight-shifting ability

## 2022-11-09 NOTE — PROGRESS NOTE ADULT - ASSESSMENT
32M PMH Cerebral palsy, Primary Sclerosing Cholangitis Cirrhosis with frequent ERCPs with biliary stent placements for biliary strictures (last in 9/2022) on liver transplant list, s/p liver transplant (11/1) s/p RTOR (11/7) for washout, resection of Velasquez limb with end to side hepatico jejunostomy and side to side jejunojejunostomy, biliary drain placement and 2 new JPs (3 total).     PLAN:    Neurologic: acute pain  - Pain control w/ Dilaudid PRN  - Added PO oxycodone for pain control    Respiratory: no acute issues  - Incentive spirometry, OOB to prevent atelectasis    Cardiovascular: no acute issues  - Required phenylephrine intra-op, off pressors now    Gastrointestinal/Nutrition:   - NPO except +/- medications    - Trend LFTs  - Monitor VIOLETA x 3 and biliary drain output  - Protonix daily    Genitourinary/Renal: no acute issues  - Monitor UOP with Chapman, strict I&Os  - S/p Albumin 5% 500 x 1  - Given Lasix 20mg IVP x 1, initially with 600/400 cc urine output in first two hours, now slowing down.  - Supplement electrolytes PRN  - LR increased to 125ml/hr  - Continue PPI    Hematologic:  - Check CBC, coags  - Heparin started 11/8 for DVT ppx  - ICDs    Infectious Disease:  - Continue Merrem  - Linezolid added   - Diflucan/Bactrim/valganciclovir for prophylaxis  - Prednisone 20mg qd and tacrolimus for immunosuppression  - Follow up surgical cultures    Endocrine: no acute issues  - Monitor glucose on BMP    Dispo: SICU

## 2022-11-09 NOTE — PHYSICAL THERAPY INITIAL EVALUATION ADULT - RANGE OF MOTION EXAMINATION, REHAB EVAL
L hand flexion contracture/no ROM deficits were identified/deficits as listed below
L hand flexion contracture/no ROM deficits were identified/deficits as listed below

## 2022-11-09 NOTE — PROGRESS NOTE ADULT - ASSESSMENT
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 WBC improving, HH down slightly, TB around the same, liver enzymes up. CX GNR, GPC      Plan;  -Continue Meropenem and Linezolid  -Pain control PRN  -DC fluids  20 of lasix  pulse steroids  - PM labs  -Strict I/Os  -SCDs  - Follow final OR cultures: e coli enterococcus prelim  - Monitor UOP with chavez  - Continue BID dressing changes; changed already at bedside, good granulation tissue noted, no fibrinous exudate,   -Continue Miralax and Senna  - 500 mg of Cellcept iV BID  - TAC 4.6  - H5K  -IS q1hour WA  -OOB/PT         Immunosuppression: Tacro 0.5mg BID, MMF held , Methylprednisolone 16 mg daily  PPX: Fluconazole Bactrim, Valcyte  DAily CBC, CMP, Mag, Pos, Tacro level

## 2022-11-09 NOTE — OCCUPATIONAL THERAPY INITIAL EVALUATION ADULT - LUE MMT, REHAB EVAL
3-/5 shoulder and elbow, 2-/5 forearm, 1/5 wrist, grasp 3+/5, 1/5 digit extension
3-/5 shoulder and elbow, 2-/5 forearm, 1/5 wrist, grasp 3+/5, 1/5 digit extension

## 2022-11-09 NOTE — PHYSICAL THERAPY INITIAL EVALUATION ADULT - ACTIVE RANGE OF MOTION EXAMINATION, REHAB EVAL
L hand flexion contracture/no Active ROM deficits were identified/deficits as listed below
L hand flexion contracture/no Active ROM deficits were identified/deficits as listed below

## 2022-11-09 NOTE — PHYSICAL THERAPY INITIAL EVALUATION ADULT - MANUAL MUSCLE TESTING RESULTS, REHAB EVAL
L hand flexion contracture, BLE 3/5, BUE 3/5/grossly assessed due to
L hand flexion contracture, BLE 3/5, RUE 3+/5/grossly assessed due to

## 2022-11-09 NOTE — OCCUPATIONAL THERAPY INITIAL EVALUATION ADULT - MD ORDER
OT evaluate and treat Was The Patient On Physician Recommended Anticoagulation Therapy?: Please Select the Appropriate Response

## 2022-11-09 NOTE — OCCUPATIONAL THERAPY INITIAL EVALUATION ADULT - IMPAIRED TRANSFERS: SIT/STAND, REHAB EVAL
impaired balance/impaired coordination/abnormal muscle tone/pain/decreased ROM/decreased strength
impaired balance/impaired coordination/abnormal muscle tone/pain/decreased ROM/decreased strength

## 2022-11-09 NOTE — PHYSICAL THERAPY INITIAL EVALUATION ADULT - GENERAL OBSERVATIONS, REHAB EVAL
Pt received in bedside chair, + PIV, VIOLETA x3, ICU monitoring,
Pt received in bedside chair, + PIV, NGT, NC, katyh, VIOLETA x3, biliary drain, ICU monitiroing

## 2022-11-09 NOTE — PROGRESS NOTE ADULT - NS ATTEND AMEND GEN_ALL_CORE FT
ON: no major events    aaox3  CP with contractures but otherwise neurologically intact, pain controlled well   no AM CXR  on RA  tachycardic, sinus  normotensive  NPO for now  NG in place, discuss removal given minimal output  VIOLETA drain x2 and biliary drain  LFTs improving  chavez in place, improved urine output in recent hours  s/p IV bolus  not on chem VTE PPX  leukocytosis  meropenem , linezolid no end date   VRE from wound cultures   transplant prophylaxis medications   solumedrol   good glycemic control  PT OT is seeing him

## 2022-11-09 NOTE — OCCUPATIONAL THERAPY INITIAL EVALUATION ADULT - TRANSFER TRAINING, PT EVAL
Patient will transfer to toilet with DME as needed (I) in 4 weeks. Pt will perform SPT (I) in 4 weeks.
Pt will complete functional toilet transfer with independence and appropriate AD within 4 weeks.

## 2022-11-09 NOTE — PROGRESS NOTE ADULT - SUBJECTIVE AND OBJECTIVE BOX
Transplant Surgery Daily progress noted  32M PMH Cerebral palsy, Primary Sclerosing Cholangitis Cirrhosis with frequent ERCPs with biliary stent placements for biliary strictures (last in 9/2022) on liver transplant list (ABO AB), recent COVID s/p MAB in 9/2022) who is S/P OLT under medrol induction on 11/1/22.     Post operative course:   - Febrile 39.3/tachycardic 120s.  - WBC rising   - R lateral pole of wound - opened at bedside. Culture sent   - LFts rising  -Wound cx sent 11/6 +enterococcus and E.Coli    11/7/22: RTOR for washout. Biliary leak noted at hepaticojejunostomy and exposed mucosa noted at J-J anastomosis.  Interval Events:Biliary leak at hepaticojejunostomy s/p orthotopic liver transplant  Exposed mucosa noted at J-J anastomosis  Velasquez limb resected and recreated; end to side hepatico jejunostomy and side to side jejunojejunostomy (both hand-sewn)  Biliary drain placed in addition to 2 more Vicente drains; original infrahepatic drain remained in place  Strattice bridging mesh placed at the apex of incision    Interval events:   -Tachy 110s, better this morning, 100-110, normotensive  -Pain poorly controlled with IV Dilaudid overnight, he states its better this morning. He denies nausea, vomiting, chest pain or SOB  - UOP improved after 500 of 5% albumin yesterday  -No bowel function      MEDICATIONS  (STANDING):  chlorhexidine 2% Cloths 1 Application(s) Topical <User Schedule>  fluconAZOLE IVPB      fluconAZOLE IVPB 400 milliGRAM(s) IV Intermittent every 24 hours  heparin   Injectable 5000 Unit(s) SubCutaneous every 12 hours  influenza   Vaccine 0.5 milliLiter(s) IntraMuscular once  linezolid  IVPB 600 milliGRAM(s) IV Intermittent every 12 hours  meropenem  IVPB      meropenem  IVPB 1000 milliGRAM(s) IV Intermittent every 8 hours  methylPREDNISolone sodium succinate Injectable 16 milliGRAM(s) IV Push daily  mycophenolate mofetil IVPB 500 milliGRAM(s) IV Intermittent every 12 hours  pantoprazole  Injectable 40 milliGRAM(s) IV Push daily  sodium chloride 0.9%. 1000 milliLiter(s) (125 mL/Hr) IV Continuous <Continuous>  tacrolimus    0.5 mG/mL Suspension 0.5 milliGRAM(s) Oral <User Schedule>  trimethoprim   80 mG/sulfamethoxazole 400 mG 1 Tablet(s) Oral daily  valGANciclovir 50 mG/mL Oral Solution 900 milliGRAM(s) Oral daily    MEDICATIONS  (PRN):  HYDROmorphone  Injectable 1 milliGRAM(s) IV Push every 3 hours PRN Severe Pain (7 - 10)  ondansetron Injectable 4 milliGRAM(s) IV Push once PRN Nausea and/or Vomiting  oxyCODONE    IR 10 milliGRAM(s) Oral every 4 hours PRN Severe Pain (7 - 10)  oxyCODONE    IR 5 milliGRAM(s) Oral every 4 hours PRN Moderate Pain (4 - 6)      PAST MEDICAL & SURGICAL HISTORY:  Cerebral palsy      PSC (primary sclerosing cholangitis)  On liver transplant list      Abnormal LFTs      Bile duct stricture      Dental caries      Impacted teeth  wisdom teeth      Phimosis      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   Genitourinary: chavez in place, urine concentrated  Extremities: SCD's in place and working bilaterally  Vascular: Palpable dp pulses bilaterally  Neurological: Alert, following commands  Skin: Warm,dry, intact throughout   Psychiatric: Responsive      MEDICATIONS  (STANDING):  chlorhexidine 2% Cloths 1 Application(s) Topical <User Schedule>  fluconAZOLE IVPB 400 milliGRAM(s) IV Intermittent every 24 hours  heparin   Injectable 5000 Unit(s) SubCutaneous every 12 hours  influenza   Vaccine 0.5 milliLiter(s) IntraMuscular once  linezolid  IVPB 600 milliGRAM(s) IV Intermittent every 12 hours  meropenem  IVPB      meropenem  IVPB 1000 milliGRAM(s) IV Intermittent every 8 hours  methylPREDNISolone sodium succinate Injectable 16 milliGRAM(s) IV Push daily  mycophenolate mofetil IVPB 500 milliGRAM(s) IV Intermittent every 12 hours  pantoprazole  Injectable 40 milliGRAM(s) IV Push daily  sodium chloride 0.9%. 1000 milliLiter(s) (50 mL/Hr) IV Continuous <Continuous>  tacrolimus    0.5 mG/mL Suspension 0.5 milliGRAM(s) Oral <User Schedule>  trimethoprim   80 mG/sulfamethoxazole 400 mG 1 Tablet(s) Oral daily  valGANciclovir 50 mG/mL Oral Solution 900 milliGRAM(s) Oral daily    MEDICATIONS  (PRN):  HYDROmorphone  Injectable 1 milliGRAM(s) IV Push every 3 hours PRN Severe Pain (7 - 10)  ondansetron Injectable 4 milliGRAM(s) IV Push once PRN Nausea and/or Vomiting  oxyCODONE    IR 5 milliGRAM(s) Oral every 4 hours PRN Moderate Pain (4 - 6)  oxyCODONE    IR 10 milliGRAM(s) Oral every 4 hours PRN Severe Pain (7 - 10)      PAST MEDICAL & SURGICAL HISTORY:  Cerebral palsy      PSC (primary sclerosing cholangitis)  On liver transplant list      Abnormal LFTs      Bile duct stricture      Dental caries      Impacted teeth  wisdom teeth      Phimosis      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      Anemia      History of ERCP  multiple with stent insertions/replacement every 3 months ---last 5/20/2022      [place] teeth extracted  2021          Vital Signs Last 24 Hrs  T(C): 37.8 (09 Nov 2022 07:00), Max: 38.3 (08 Nov 2022 22:45)  T(F): 100 (09 Nov 2022 07:00), Max: 100.9 (08 Nov 2022 22:45)  HR: 124 (09 Nov 2022 10:50) (108 - 131)  BP: 117/75 (09 Nov 2022 10:50) (95/54 - 161/73)  BP(mean): 79 (09 Nov 2022 09:00) (71 - 111)  RR: 25 (09 Nov 2022 09:00) (13 - 26)  SpO2: 95% (09 Nov 2022 10:50) (93% - 100%)    Parameters below as of 09 Nov 2022 10:50  Patient On (Oxygen Delivery Method): nasal cannula  O2 Flow (L/min): 2      I&O's Summary    08 Nov 2022 07:01  -  09 Nov 2022 07:00  --------------------------------------------------------  IN: 4216.7 mL / OUT: 2460 mL / NET: 1756.7 mL    09 Nov 2022 07:01  -  09 Nov 2022 11:50  --------------------------------------------------------  IN: 100 mL / OUT: 150 mL / NET: -50 mL                              8.2    10.88 )-----------( 206      ( 09 Nov 2022 01:12 )             24.5     11-09    136  |  106  |  14  ----------------------------<  86  3.8   |  23  |  0.75    Ca    7.1<L>      09 Nov 2022 01:12  Phos  3.0     11-07  Mg     1.7     11-09    TPro  4.0<L>  /  Alb  1.9<L>  /  TBili  7.7<H>  /  DBili  x   /  AST  92<H>  /  ALT  66<H>  /  AlkPhos  123<H>  11-09    Tacrolimus (), Serum: 2.2 ng/mL (11-09 @ 05:54)        Culture - Body Fluid with Gram Stain (collected 11-07-22 @ 22:06)  Source: Abdominal Fl Abdominal Fluid  Gram Stain (11-08-22 @ 03:21):    Numerous polymorphonuclear leukocytes seen per low power field    Few Gram Negative Rods seen per oil power field    Rare Gram positive cocci in pairs seen per oil power field  Preliminary Report (11-08-22 @ 21:20):    Few Escherichia coli "Susceptibilities not performed"    Culture - Body Fluid with Gram Stain (collected 11-07-22 @ 21:48)  Source: Abdominal Fl Abdominal Fluid  Gram Stain (11-08-22 @ 05:01):    polymorphonuclear leukocytes seen    Gram positive cocci in pairs seen    by cytocentrifuge  Preliminary Report (11-08-22 @ 21:22):    Rare Escherichia coli    Few Enterococcus faecium    Culture - Surgical Swab (collected 11-07-22 @ 21:48)  Source: .Surgical Swab wound  Preliminary Report (11-09-22 @ 11:11):    Moderate Escherichia coli    Moderate Enterococcus faecium    Culture - Blood (collected 11-07-22 @ 06:10)  Source: .Blood Blood  Preliminary Report (11-08-22 @ 09:01):    No growth to date.    Culture - Blood (collected 11-07-22 @ 06:10)  Source: .Blood Blood  Preliminary Report (11-08-22 @ 09:01):    No growth to date.    Culture - Other (collected 11-06-22 @ 12:55)  Source: Wound Wound  Final Report (11-08-22 @ 15:26):    Few Escherichia coli    Few Enterococcus faecium (vancomycin resistant)  Organism: Escherichia coli  Enterococcus faecium (vancomycin resistant) (11-08-22 @ 15:26)  Organism: Enterococcus faecium (vancomycin resistant) (11-08-22 @ 15:26)  Organism: Escherichia coli (11-08-22 @ 15:26)    Culture - Blood (collected 11-06-22 @ 07:22)  Source: .Blood Blood-Venous  Preliminary Report (11-07-22 @ 11:02):    No growth to date.    Culture - Blood (collected 11-06-22 @ 07:22)  Source: .Blood Blood  Preliminary Report (11-07-22 @ 11:02):    No growth to date.    Culture - Blood (collected 11-02-22 @ 15:05)  Source: .Blood Blood  Final Report (11-07-22 @ 23:00):    No Growth Final    Culture - Blood (collected 11-02-22 @ 15:05)  Source: .Blood Blood  Final Report (11-07-22 @ 23:00):    No Growth Final

## 2022-11-10 LAB
-  AMIKACIN: SIGNIFICANT CHANGE UP
-  AMOXICILLIN/CLAVULANIC ACID: SIGNIFICANT CHANGE UP
-  AMPICILLIN/SULBACTAM: SIGNIFICANT CHANGE UP
-  AMPICILLIN: SIGNIFICANT CHANGE UP
-  AZTREONAM: SIGNIFICANT CHANGE UP
-  CEFAZOLIN: SIGNIFICANT CHANGE UP
-  CEFEPIME: SIGNIFICANT CHANGE UP
-  CEFOXITIN: SIGNIFICANT CHANGE UP
-  CEFTRIAXONE: SIGNIFICANT CHANGE UP
-  CIPROFLOXACIN: SIGNIFICANT CHANGE UP
-  DAPTOMYCIN: SIGNIFICANT CHANGE UP
-  DAPTOMYCIN: SIGNIFICANT CHANGE UP
-  ERTAPENEM: SIGNIFICANT CHANGE UP
-  GENTAMICIN: SIGNIFICANT CHANGE UP
-  IMIPENEM: SIGNIFICANT CHANGE UP
-  LEVOFLOXACIN: SIGNIFICANT CHANGE UP
-  LINEZOLID: SIGNIFICANT CHANGE UP
-  LINEZOLID: SIGNIFICANT CHANGE UP
-  MEROPENEM: SIGNIFICANT CHANGE UP
-  PIPERACILLIN/TAZOBACTAM: SIGNIFICANT CHANGE UP
-  TETRACYCLINE: SIGNIFICANT CHANGE UP
-  TETRACYCLINE: SIGNIFICANT CHANGE UP
-  TOBRAMYCIN: SIGNIFICANT CHANGE UP
-  TRIMETHOPRIM/SULFAMETHOXAZOLE: SIGNIFICANT CHANGE UP
-  VANCOMYCIN: SIGNIFICANT CHANGE UP
-  VANCOMYCIN: SIGNIFICANT CHANGE UP
ALBUMIN SERPL ELPH-MCNC: 2.2 G/DL — LOW (ref 3.3–5)
ALP SERPL-CCNC: 127 U/L — HIGH (ref 40–120)
ALT FLD-CCNC: 62 U/L — HIGH (ref 10–45)
ANION GAP SERPL CALC-SCNC: 7 MMOL/L — SIGNIFICANT CHANGE UP (ref 5–17)
AST SERPL-CCNC: 59 U/L — HIGH (ref 10–40)
BILIRUB DIRECT SERPL-MCNC: 5.6 MG/DL — HIGH (ref 0–0.3)
BILIRUB INDIRECT FLD-MCNC: 1.5 MG/DL — HIGH (ref 0.2–1)
BILIRUB SERPL-MCNC: 7.1 MG/DL — HIGH (ref 0.2–1.2)
BUN SERPL-MCNC: 22 MG/DL — SIGNIFICANT CHANGE UP (ref 7–23)
CALCIUM SERPL-MCNC: 7.8 MG/DL — LOW (ref 8.4–10.5)
CHLORIDE SERPL-SCNC: 102 MMOL/L — SIGNIFICANT CHANGE UP (ref 96–108)
CO2 SERPL-SCNC: 26 MMOL/L — SIGNIFICANT CHANGE UP (ref 22–31)
CREAT SERPL-MCNC: 0.62 MG/DL — SIGNIFICANT CHANGE UP (ref 0.5–1.3)
EGFR: 132 ML/MIN/1.73M2 — SIGNIFICANT CHANGE UP
GLUCOSE BLDC GLUCOMTR-MCNC: 148 MG/DL — HIGH (ref 70–99)
GLUCOSE SERPL-MCNC: 137 MG/DL — HIGH (ref 70–99)
MAGNESIUM SERPL-MCNC: 1.9 MG/DL — SIGNIFICANT CHANGE UP (ref 1.6–2.6)
METHOD TYPE: SIGNIFICANT CHANGE UP
PHOSPHATE SERPL-MCNC: 2.9 MG/DL — SIGNIFICANT CHANGE UP (ref 2.5–4.5)
POTASSIUM SERPL-MCNC: 4.3 MMOL/L — SIGNIFICANT CHANGE UP (ref 3.5–5.3)
POTASSIUM SERPL-SCNC: 4.3 MMOL/L — SIGNIFICANT CHANGE UP (ref 3.5–5.3)
PROT SERPL-MCNC: 4.7 G/DL — LOW (ref 6–8.3)
SODIUM SERPL-SCNC: 135 MMOL/L — SIGNIFICANT CHANGE UP (ref 135–145)
TACROLIMUS SERPL-MCNC: 2.8 NG/ML — SIGNIFICANT CHANGE UP

## 2022-11-10 PROCEDURE — 71045 X-RAY EXAM CHEST 1 VIEW: CPT | Mod: 26

## 2022-11-10 PROCEDURE — 99232 SBSQ HOSP IP/OBS MODERATE 35: CPT

## 2022-11-10 PROCEDURE — 99233 SBSQ HOSP IP/OBS HIGH 50: CPT

## 2022-11-10 RX ORDER — VALGANCICLOVIR 450 MG/1
900 TABLET, FILM COATED ORAL DAILY
Refills: 0 | Status: DISCONTINUED | OUTPATIENT
Start: 2022-11-11 | End: 2022-11-22

## 2022-11-10 RX ORDER — FUROSEMIDE 40 MG
20 TABLET ORAL ONCE
Refills: 0 | Status: COMPLETED | OUTPATIENT
Start: 2022-11-10 | End: 2022-11-10

## 2022-11-10 RX ORDER — TACROLIMUS 5 MG/1
1 CAPSULE ORAL
Refills: 0 | Status: DISCONTINUED | OUTPATIENT
Start: 2022-11-10 | End: 2022-11-10

## 2022-11-10 RX ORDER — MAGNESIUM SULFATE 500 MG/ML
1 VIAL (ML) INJECTION ONCE
Refills: 0 | Status: COMPLETED | OUTPATIENT
Start: 2022-11-10 | End: 2022-11-10

## 2022-11-10 RX ORDER — MYCOPHENOLATE MOFETIL 250 MG/1
500 CAPSULE ORAL
Refills: 0 | Status: DISCONTINUED | OUTPATIENT
Start: 2022-11-10 | End: 2022-11-14

## 2022-11-10 RX ORDER — TACROLIMUS 5 MG/1
1 CAPSULE ORAL
Refills: 0 | Status: DISCONTINUED | OUTPATIENT
Start: 2022-11-10 | End: 2022-11-11

## 2022-11-10 RX ADMIN — OXYCODONE HYDROCHLORIDE 10 MILLIGRAM(S): 5 TABLET ORAL at 12:43

## 2022-11-10 RX ADMIN — Medication 16 MILLIGRAM(S): at 06:49

## 2022-11-10 RX ADMIN — TACROLIMUS 1 MILLIGRAM(S): 5 CAPSULE ORAL at 20:22

## 2022-11-10 RX ADMIN — HEPARIN SODIUM 5000 UNIT(S): 5000 INJECTION INTRAVENOUS; SUBCUTANEOUS at 06:42

## 2022-11-10 RX ADMIN — OXYCODONE HYDROCHLORIDE 10 MILLIGRAM(S): 5 TABLET ORAL at 13:15

## 2022-11-10 RX ADMIN — MEROPENEM 100 MILLIGRAM(S): 1 INJECTION INTRAVENOUS at 21:44

## 2022-11-10 RX ADMIN — FLUCONAZOLE 100 MILLIGRAM(S): 150 TABLET ORAL at 18:12

## 2022-11-10 RX ADMIN — MEROPENEM 100 MILLIGRAM(S): 1 INJECTION INTRAVENOUS at 13:51

## 2022-11-10 RX ADMIN — OXYCODONE HYDROCHLORIDE 10 MILLIGRAM(S): 5 TABLET ORAL at 01:00

## 2022-11-10 RX ADMIN — LINEZOLID 300 MILLIGRAM(S): 600 INJECTION, SOLUTION INTRAVENOUS at 13:51

## 2022-11-10 RX ADMIN — LINEZOLID 300 MILLIGRAM(S): 600 INJECTION, SOLUTION INTRAVENOUS at 01:11

## 2022-11-10 RX ADMIN — MEROPENEM 100 MILLIGRAM(S): 1 INJECTION INTRAVENOUS at 06:47

## 2022-11-10 RX ADMIN — HEPARIN SODIUM 5000 UNIT(S): 5000 INJECTION INTRAVENOUS; SUBCUTANEOUS at 18:12

## 2022-11-10 RX ADMIN — Medication 100 MILLIGRAM(S): at 13:51

## 2022-11-10 RX ADMIN — Medication 1 TABLET(S): at 11:07

## 2022-11-10 RX ADMIN — Medication 100 GRAM(S): at 09:25

## 2022-11-10 RX ADMIN — MYCOPHENOLATE MOFETIL 500 MILLIGRAM(S): 250 CAPSULE ORAL at 20:23

## 2022-11-10 RX ADMIN — MYCOPHENOLATE MOFETIL 41.67 MILLIGRAM(S): 250 CAPSULE ORAL at 08:17

## 2022-11-10 RX ADMIN — PANTOPRAZOLE SODIUM 40 MILLIGRAM(S): 20 TABLET, DELAYED RELEASE ORAL at 11:06

## 2022-11-10 RX ADMIN — OXYCODONE HYDROCHLORIDE 10 MILLIGRAM(S): 5 TABLET ORAL at 00:30

## 2022-11-10 RX ADMIN — Medication 63.75 MILLIMOLE(S): at 11:06

## 2022-11-10 RX ADMIN — CHLORHEXIDINE GLUCONATE 1 APPLICATION(S): 213 SOLUTION TOPICAL at 06:35

## 2022-11-10 RX ADMIN — TACROLIMUS 1 MILLIGRAM(S): 5 CAPSULE ORAL at 08:39

## 2022-11-10 RX ADMIN — Medication 20 MILLIGRAM(S): at 13:51

## 2022-11-10 RX ADMIN — VALGANCICLOVIR 900 MILLIGRAM(S): 450 TABLET, FILM COATED ORAL at 12:03

## 2022-11-10 NOTE — PROGRESS NOTE ADULT - ASSESSMENT
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 WBC improving, HH down slightly, TB around the same, liver enzymes up. CX GNR, GPC             32M PMH Cerebral palsy, Primary Sclerosing Cholangitis Cirrhosis with frequent ERCPs with biliary stent placements for biliary strictures (last in 9/2022) on liver transplant list, s/p liver transplant (11/1) and 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     Plan:   - Rising LFTs: Solumedrol 500mg (11/9) and 11/10  - NGT removed, continue NPO with sips  - Remove Chapman cath   - Lasix 20mg IVP x 1 dose  - Strict I&O's  - Immuno: FK level 2.8, incr fk 1/1, MMF 500mg IV BID, Pred 20  - PPx: valcyte/bactrim/fluc  - ID following: cont aztreonam/vanc  - 6M

## 2022-11-10 NOTE — PROGRESS NOTE ADULT - ASSESSMENT
Patient is a 32y Male with PMHx cerebral palsy, Primary Sclerosing Cholangitis Cirrhosis with frequent ERCPs with biliary stent placements for biliary strictures (last in 2022) on liver transplant list (ABO AB), recent COVID s/p MAB in 2022) admitted for liver transplant. Patient s/p orthotopic liver transplantation from a  donor under steroid induction on , was in SICU, transferred to the floor. While on the floor, c/b bile leak, taken back to OR on  s/p J-J anastomosis Velasquez limb sected and recreated and end to side hepatico jejunostomy and side to side jejunojunostomy and billiary drain placed, then transferred to SICU for further hemodynamic monitoring.       Neuro:  -awake and alert x3  - Multimodal pain control w/ dilaudid iv and oxy prn    Resp:  - Out of bed to chair, ambulate as tolerated, and incentive spirometry to prevent atelectasis  -HOB    CVS: hemodynamically stable  - off pressors  -lactate cleared  -monitor BP/HR    GI: s/p OLT c/b bile leak, RTOR for Side-side Cavocaval anastomosis  End-end portoportal anastomosis, End-end single arterial anastomosis, Velasquez en-Y HJ with internal stenting & Side-side JJ  - npo except meds for now  - MMF and tacro  - Protonix for stress ulcer prophylaxis     Renal:  -IVL  -s/p lasix today, responded  - Monitor I&Os and electrolytes  - replete electrolytes as needed     Heme:  - Monitor CBC and coags  -  VTE prophylaxis: heparin sc q12h  -SCD's    ID: Ecoli/enteroccocus in abdomen fluid  - Monitor for clinical evidence of active infection  - Monitor WBC, temperature, and procalcitonin  - Antibiotcis: flucon, linezolid, meropenem, valcyte, and bactrim  -Cultures: Ecoli/ enterocc in wound, bld cx : neg    Endo:   - Monitor glucose of bmp    GOC:     full code

## 2022-11-10 NOTE — PROGRESS NOTE ADULT - NS ATTEND AMEND GEN_ALL_CORE FT
Agree, doing well.      NGT out today, sips for now.     Continue steroid pulse.      PT/OT, OOB.  6 Emery today.

## 2022-11-10 NOTE — PROGRESS NOTE ADULT - SUBJECTIVE AND OBJECTIVE BOX
HISTORY  32y Male    24 HOUR EVENTS:    SUBJECTIVE/ROS:  [ ] A ten-point review of systems was otherwise negative except as noted.  [ ] Due to altered mental status/intubation, subjective information were not able to be obtained from the patient. History was obtained, to the extent possible, from review of the chart and collateral sources of information.      NEURO  RASS:     GCS:     [facility] ICU:  Exam: awake, alert, oriented  Meds: HYDROmorphone  Injectable 1 milliGRAM(s) IV Push every 3 hours PRN Severe Pain (7 - 10)  ondansetron Injectable 4 milliGRAM(s) IV Push once PRN Nausea and/or Vomiting  oxyCODONE    IR 10 milliGRAM(s) Oral every 4 hours PRN Severe Pain (7 - 10)  oxyCODONE    IR 5 milliGRAM(s) Oral every 4 hours PRN Moderate Pain (4 - 6)    [x] Adequacy of sedation and pain control has been assessed and adjusted      RESPIRATORY  RR: 24 (11-10-22 @ 00:00) (17 - 41)  SpO2: 98% (11-10-22 @ 00:00) (93% - 100%)  Wt(kg): --  Exam: unlabored, clear to auscultation bilaterally  Mechanical Ventilation:     [N/A] Extubation Readiness Assessed  Meds:       CARDIOVASCULAR  HR: 83 (11-10-22 @ 00:00) (78 - 129)  BP: 122/61 (11-10-22 @ 00:00) (108/70 - 161/73)  BP(mean): 84 (11-10-22 @ 00:00) (79 - 111)  ABP: --  ABP(mean): --  Wt(kg): --  CVP(cm H2O): --  VBG - ( 08 Nov 2022 14:35 )  pH: 7.34  /  pCO2: 43    /  pO2: 67    / HCO3: 23    / Base Excess: -2.5  /  SaO2: 94.1   Lactate: 1.1                Exam: regular rate and rhythm  Cardiac Rhythm: sinus  Perfusion     [x]Adequate   [ ]Inadequate  Mentation   [x]Normal       [ ]Reduced  Extremities  [x]Warm         [ ]Cool  Volume Status [ ]Hypervolemic [x]Euvolemic [ ]Hypovolemic  Meds:       GI/NUTRITION  Exam: soft, nontender, nondistended, incision C/D/I  Diet:  Meds: pantoprazole  Injectable 40 milliGRAM(s) IV Push daily      GENITOURINARY  I&O's Detail    11-08 @ 07:01 - 11-09 @ 07:00  --------------------------------------------------------  IN:    Albumin 5%  - 500 mL: 500 mL    IV PiggyBack: 350 mL    IV PiggyBack: 966.7 mL    IV PiggyBack: 150 mL    sodium chloride 0.9%: 1750 mL    sodium chloride 0.9%: 500 mL  Total IN: 4216.7 mL    OUT:    Bulb (mL): 370 mL    Bulb (mL): 65 mL    Bulb (mL): 110 mL    Indwelling Catheter - Urethral (mL): 1755 mL    Nasogastric/Oral tube (mL): 100 mL    T-Tube (mL): 60 mL  Total OUT: 2460 mL    Total NET: 1756.7 mL      11-09 @ 07:01  -  11-10 @ 00:56  --------------------------------------------------------  IN:    IV PiggyBack: 100 mL    IV PiggyBack: 333.4 mL    sodium chloride 0.9%: 200 mL  Total IN: 633.4 mL    OUT:    Bulb (mL): 60 mL    Bulb (mL): 310 mL    Bulb (mL): 50 mL    Indwelling Catheter - Urethral (mL): 1905 mL    T-Tube (mL): 120 mL  Total OUT: 2445 mL    Total NET: -1811.6 mL          11-09    136  |  102  |  14  ----------------------------<  102<H>  4.1   |  26  |  0.67    Ca    7.9<L>      09 Nov 2022 17:30  Mg     1.7     11-09    TPro  5.0<L>  /  Alb  2.3<L>  /  TBili  9.5<H>  /  DBili  7.3<H>  /  AST  106<H>  /  ALT  78<H>  /  AlkPhos  144<H>  11-09    [ ] Chapman catheter, indication: N/A  Meds:       HEMATOLOGIC  Meds: heparin   Injectable 5000 Unit(s) SubCutaneous every 12 hours    [x] VTE Prophylaxis                        8.4    15.07 )-----------( 259      ( 09 Nov 2022 17:30 )             25.2     PT/INR - ( 09 Nov 2022 17:30 )   PT: 17.7 sec;   INR: 1.52 ratio         PTT - ( 09 Nov 2022 17:30 )  PTT:28.5 sec  Transfusion     [ ] PRBC   [ ] Platelets   [ ] FFP   [ ] Cryoprecipitate      INFECTIOUS DISEASES  WBC Count: 15.07 K/uL (11-09 @ 17:30)  WBC Count: 10.88 K/uL (11-09 @ 01:12)    RECENT CULTURES:  Specimen Source: Abdominal Fl Abdominal Fluid  Date/Time: 11-07 @ 22:06  Culture Results:   **Please Note**: This is a Corrected Report**  Few Escherichia coli  Rare Enterococcus faecium Susceptibility to follow.  Previously reported as:  Rare Alpha hemolytic strep  Gram Stain:   Numerous polymorphonuclear leukocytes seen per low power field  Few Gram Negative Rods seen per oil power field  Rare Gram positive cocci in pairs seen per oil power field  Organism: Escherichia coli  Specimen Source: .Surgical Swab wound  Date/Time: 11-07 @ 21:48  Culture Results:   Moderate Escherichia coli  Moderate Enterococcus faecium  Gram Stain:   polymorphonuclear leukocytes seen  Gram positive cocci in pairs seen  by cytocentrifuge  Organism: Escherichia coli  Enterococcus faecium  Specimen Source: .Blood Blood  Date/Time: 11-07 @ 06:10  Culture Results:   No growth to date.  Gram Stain: --  Organism: --  Specimen Source: Wound Wound  Date/Time: 11-06 @ 12:55  Culture Results:   Few Escherichia coli  Few Enterococcus faecium (vancomycin resistant)  Gram Stain: --  Organism: Escherichia coli  Enterococcus faecium (vancomycin resistant)  Specimen Source: .Blood Blood  Date/Time: 11-06 @ 07:22  Culture Results:   No growth to date.  Gram Stain: --  Organism: --    Meds: fluconAZOLE IVPB 400 milliGRAM(s) IV Intermittent every 24 hours  influenza   Vaccine 0.5 milliLiter(s) IntraMuscular once  linezolid  IVPB 600 milliGRAM(s) IV Intermittent every 12 hours  meropenem  IVPB 1000 milliGRAM(s) IV Intermittent every 8 hours  meropenem  IVPB      mycophenolate mofetil IVPB 500 milliGRAM(s) IV Intermittent every 12 hours  tacrolimus    0.5 mG/mL Suspension 1 milliGRAM(s) Oral <User Schedule>  tacrolimus    0.5 mG/mL Suspension 0.5 milliGRAM(s) Oral <User Schedule>  trimethoprim   80 mG/sulfamethoxazole 400 mG 1 Tablet(s) Oral daily  valGANciclovir 50 mG/mL Oral Solution 900 milliGRAM(s) Oral daily        ENDOCRINE  CAPILLARY BLOOD GLUCOSE        Meds: methylPREDNISolone sodium succinate Injectable 16 milliGRAM(s) IV Push daily        ACCESS DEVICES:  [ ] Peripheral IV  [ ] Central Venous Line	[ ] R	[ ] L	[ ] IJ	[ ] Fem	[ ] SC	Placed:   [ ] Arterial Line		[ ] R	[ ] L	[ ] Fem	[ ] Rad	[ ] Ax	Placed:   [ ] PICC:					[ ] Mediport  [ ] Urinary Catheter, Date Placed:   [x] Necessity of urinary, arterial, and venous catheters discussed    OTHER MEDICATIONS:  chlorhexidine 2% Cloths 1 Application(s) Topical <User Schedule>      CODE STATUS:  Yes        IMAGING:   24 HOUR EVENTS:  - given 500mg of solumedrol today  -IVL  -given 20mg of lasix, responded well        HISTORY:  32M PMH Cerebral palsy, Primary Sclerosing Cholangitis Cirrhosis with frequent ERCPs with biliary stent placements for biliary strictures (last in 9/2022) on liver transplant list, s/p liver transplant (11/1) s/p RTOR (11/7) for washout, resection of Velasquez limb with end to side hepatico jejunostomy and side to side jejunojejunostomy, biliary drain placement and 2 new JPs (3 total).     SUBJECTIVE/ROS:  [x ] A ten-point review of systems was otherwise negative except as noted.  [ ] Due to altered mental status/intubation, subjective information were not able to be obtained from the patient. History was obtained, to the extent possible, from review of the chart and collateral sources of information.      NEURO  Exam: awake, alert, oriented  Meds: HYDROmorphone  Injectable 1 milliGRAM(s) IV Push every 3 hours PRN Severe Pain (7 - 10)  ondansetron Injectable 4 milliGRAM(s) IV Push once PRN Nausea and/or Vomiting  oxyCODONE    IR 10 milliGRAM(s) Oral every 4 hours PRN Severe Pain (7 - 10)  oxyCODONE    IR 5 milliGRAM(s) Oral every 4 hours PRN Moderate Pain (4 - 6)    [x] Adequacy of sedation and pain control has been assessed and adjusted      RESPIRATORY  RR: 24 (11-10-22 @ 00:00) (17 - 41)  SpO2: 98% (11-10-22 @ 00:00) (93% - 100%)  Exam: unlabored, clear to auscultation bilaterally    [N/A] Extubation Readiness Assessed      CARDIOVASCULAR  HR: 83 (11-10-22 @ 00:00) (78 - 129)  BP: 122/61 (11-10-22 @ 00:00) (108/70 - 161/73)  BP(mean): 84 (11-10-22 @ 00:00) (79 - 111)  VBG - ( 08 Nov 2022 14:35 )  pH: 7.34  /  pCO2: 43    /  pO2: 67    / HCO3: 23    / Base Excess: -2.5  /  SaO2: 94.1   Lactate: 1.1        Exam: regular rate and rhythm  Cardiac Rhythm: sinus  Perfusion     [x]Adequate   [ ]Inadequate  Mentation   [x]Normal       [ ]Reduced  Extremities  [x]Warm         [ ]Cool  Volume Status [ ]Hypervolemic [x]Euvolemic [ ]Hypovolemic        GI/NUTRITION  Exam: soft, nontender, nondistended, incision C/D/I  Diet: npo except meds  Meds: pantoprazole  Injectable 40 milliGRAM(s) IV Push daily      GENITOURINARY  I&O's Detail    11-08 @ 07:01  -  11-09 @ 07:00  --------------------------------------------------------  IN:    Albumin 5%  - 500 mL: 500 mL    IV PiggyBack: 350 mL    IV PiggyBack: 966.7 mL    IV PiggyBack: 150 mL    sodium chloride 0.9%: 1750 mL    sodium chloride 0.9%: 500 mL  Total IN: 4216.7 mL    OUT:    Bulb (mL): 370 mL    Bulb (mL): 65 mL    Bulb (mL): 110 mL    Indwelling Catheter - Urethral (mL): 1755 mL    Nasogastric/Oral tube (mL): 100 mL    T-Tube (mL): 60 mL  Total OUT: 2460 mL    Total NET: 1756.7 mL      11-09 @ 07:01  -  11-10 @ 00:56  --------------------------------------------------------  IN:    IV PiggyBack: 100 mL    IV PiggyBack: 333.4 mL    sodium chloride 0.9%: 200 mL  Total IN: 633.4 mL    OUT:    Bulb (mL): 60 mL    Bulb (mL): 310 mL    Bulb (mL): 50 mL    Indwelling Catheter - Urethral (mL): 1905 mL    T-Tube (mL): 120 mL  Total OUT: 2445 mL    Total NET: -1811.6 mL          11-09    136  |  102  |  14  ----------------------------<  102<H>  4.1   |  26  |  0.67    Ca    7.9<L>      09 Nov 2022 17:30  Mg     1.7     11-09    TPro  5.0<L>  /  Alb  2.3<L>  /  TBili  9.5<H>  /  DBili  7.3<H>  /  AST  106<H>  /  ALT  78<H>  /  AlkPhos  144<H>  11-09    [ ] Chapman catheter, indication: N/A  Meds:       HEMATOLOGIC  Meds: heparin   Injectable 5000 Unit(s) SubCutaneous every 12 hours    [x] VTE Prophylaxis                        8.4    15.07 )-----------( 259      ( 09 Nov 2022 17:30 )             25.2     PT/INR - ( 09 Nov 2022 17:30 )   PT: 17.7 sec;   INR: 1.52 ratio         PTT - ( 09 Nov 2022 17:30 )  PTT:28.5 sec  Transfusion     [ ] PRBC   [ ] Platelets   [ ] FFP   [ ] Cryoprecipitate      INFECTIOUS DISEASES  WBC Count: 15.07 K/uL (11-09 @ 17:30)  WBC Count: 10.88 K/uL (11-09 @ 01:12)    RECENT CULTURES:  Specimen Source: Abdominal Fl Abdominal Fluid  Date/Time: 11-07 @ 22:06  Culture Results:   **Please Note**: This is a Corrected Report**  Few Escherichia coli  Rare Enterococcus faecium Susceptibility to follow.  Previously reported as:  Rare Alpha hemolytic strep  Gram Stain:   Numerous polymorphonuclear leukocytes seen per low power field  Few Gram Negative Rods seen per oil power field  Rare Gram positive cocci in pairs seen per oil power field  Organism: Escherichia coli  Specimen Source: .Surgical Swab wound  Date/Time: 11-07 @ 21:48  Culture Results:   Moderate Escherichia coli  Moderate Enterococcus faecium  Gram Stain:   polymorphonuclear leukocytes seen  Gram positive cocci in pairs seen  by cytocentrifuge  Organism: Escherichia coli  Enterococcus faecium  Specimen Source: .Blood Blood  Date/Time: 11-07 @ 06:10  Culture Results:   No growth to date.  Gram Stain: --  Organism: --  Specimen Source: Wound Wound  Date/Time: 11-06 @ 12:55  Culture Results:   Few Escherichia coli  Few Enterococcus faecium (vancomycin resistant)  Gram Stain: --  Organism: Escherichia coli  Enterococcus faecium (vancomycin resistant)  Specimen Source: .Blood Blood  Date/Time: 11-06 @ 07:22  Culture Results:   No growth to date.  Gram Stain: --  Organism: --    Meds: fluconAZOLE IVPB 400 milliGRAM(s) IV Intermittent every 24 hours  influenza   Vaccine 0.5 milliLiter(s) IntraMuscular once  linezolid  IVPB 600 milliGRAM(s) IV Intermittent every 12 hours  meropenem  IVPB 1000 milliGRAM(s) IV Intermittent every 8 hours  meropenem  IVPB      mycophenolate mofetil IVPB 500 milliGRAM(s) IV Intermittent every 12 hours  tacrolimus    0.5 mG/mL Suspension 1 milliGRAM(s) Oral <User Schedule>  tacrolimus    0.5 mG/mL Suspension 0.5 milliGRAM(s) Oral <User Schedule>  trimethoprim   80 mG/sulfamethoxazole 400 mG 1 Tablet(s) Oral daily  valGANciclovir 50 mG/mL Oral Solution 900 milliGRAM(s) Oral daily        ENDOCRINE  CAPILLARY BLOOD GLUCOSE        Meds: methylPREDNISolone sodium succinate Injectable 16 milliGRAM(s) IV Push daily        ACCESS DEVICES:  [ x] Peripheral IV  [ ] Central Venous Line	[ ] R	[ ] L	[ ] IJ	[ ] Fem	[ ] SC	Placed:   [ ] Arterial Line		[ ] R	[ ] L	[ ] Fem	[ ] Rad	[ ] Ax	Placed:   [ ] PICC:					[ ] Mediport  [ ] Urinary Catheter, Date Placed:   [x] Necessity of urinary, arterial, and venous catheters discussed    OTHER MEDICATIONS:  chlorhexidine 2% Cloths 1 Application(s) Topical <User Schedule>      CODE STATUS:

## 2022-11-10 NOTE — PROGRESS NOTE ADULT - SUBJECTIVE AND OBJECTIVE BOX
Follow Up:      Interval History:    REVIEW OF SYSTEMS  [  ] ROS unobtainable because:    [  ] All other systems negative except as noted below    Constitutional:  [ ] fever [ ] chills  [ ] weight loss  [ ] weakness  Skin:  [ ] rash [ ] phlebitis	  Eyes: [ ] icterus [ ] pain  [ ] discharge	  ENMT: [ ] sore throat  [ ] thrush [ ] ulcers [ ] exudates  Respiratory: [ ] dyspnea [ ] hemoptysis [ ] cough [ ] sputum	  Cardiovascular:  [ ] chest pain [ ] palpitations [ ] edema	  Gastrointestinal:  [ ] nausea [ ] vomiting [ ] diarrhea [ ] constipation [ ] pain	  Genitourinary:  [ ] dysuria [ ] frequency [ ] hematuria [ ] discharge [ ] flank pain  [ ] incontinence  Musculoskeletal:  [ ] myalgias [ ] arthralgias [ ] arthritis  [ ] back pain  Neurological:  [ ] headache [ ] seizures  [ ] confusion/altered mental status    Allergies  No Known Allergies        ANTIMICROBIALS:  fluconAZOLE IVPB 400 every 24 hours  linezolid  IVPB 600 every 12 hours  meropenem  IVPB    meropenem  IVPB 1000 every 8 hours  trimethoprim   80 mG/sulfamethoxazole 400 mG 1 daily  valGANciclovir 50 mG/mL Oral Solution 900 daily      OTHER MEDS:  MEDICATIONS  (STANDING):  heparin   Injectable 5000 every 12 hours  HYDROmorphone  Injectable 1 every 3 hours PRN  influenza   Vaccine 0.5 once  methylPREDNISolone sodium succinate Injectable 16 daily  mycophenolate mofetil IVPB 500 every 12 hours  ondansetron Injectable 4 once PRN  oxyCODONE    IR 10 every 4 hours PRN  oxyCODONE    IR 5 every 4 hours PRN  pantoprazole  Injectable 40 daily  tacrolimus    0.5 mG/mL Suspension 1 <User Schedule>      Vital Signs Last 24 Hrs  T(C): 36.4 (10 Nov 2022 11:00), Max: 37.7 (09 Nov 2022 17:00)  T(F): 97.5 (10 Nov 2022 11:00), Max: 99.9 (09 Nov 2022 17:00)  HR: 88 (10 Nov 2022 14:00) (61 - 112)  BP: 145/75 (10 Nov 2022 14:00) (108/70 - 145/75)  BP(mean): 105 (10 Nov 2022 14:00) (79 - 105)  RR: 24 (10 Nov 2022 14:00) (17 - 41)  SpO2: 99% (10 Nov 2022 14:00) (93% - 100%)    Parameters below as of 10 Nov 2022 07:00  Patient On (Oxygen Delivery Method): room air        PHYSICAL EXAMINATION:  General: Alert and Awake, NAD  HEENT: PERRL, EOMI  Neck: Supple  Cardiac: RRR, No M/R/G  Resp: CTAB, No Wh/Rh/Ra  Abdomen: NBS, NT/ND, No HSM, No rigidity or guarding  MSK: No LE edema. No Calf tenderness  : No chavez  Skin: No rashes or lesions. Skin is warm and dry to the touch.   Neuro: Alert and Awake. CN 2-12 Grossly intact. Moves all four extremities spontaneously.  Psych: Calm, Pleasant, Cooperative                          8.4    15.07 )-----------( 259      ( 09 Nov 2022 17:30 )             25.2       11-10    135  |  102  |  22  ----------------------------<  137<H>  4.3   |  26  |  0.62    Ca    7.8<L>      10 Nov 2022 04:38  Phos  2.9     11-10  Mg     1.9     11-10    TPro  4.7<L>  /  Alb  2.2<L>  /  TBili  7.1<H>  /  DBili  5.6<H>  /  AST  59<H>  /  ALT  62<H>  /  AlkPhos  127<H>  11-10          MICROBIOLOGY:  v  .Blood Blood-Peripheral  11-09-22   No growth to date.  --  --      Abdominal Fl Abdominal Fluid  11-07-22   **Please Note**: This is a Corrected Report**  Few Escherichia coli  Rare Enterococcus faecium Susceptibility to follow.  Previously reported as:  Rare Alpha hemolytic strep  --  Escherichia coli      .Surgical Swab wound  11-07-22   Moderate Escherichia coli  Moderate Enterococcus faecium (vancomycin resistant)  --  Escherichia coli  Enterococcus faecium (vancomycin resistant)      .Blood Blood  11-07-22   No growth to date.  --  --      Wound Wound  11-06-22   Few Escherichia coli  Few Enterococcus faecium (vancomycin resistant)  --  Escherichia coli  Enterococcus faecium (vancomycin resistant)      .Blood Blood  11-06-22   No growth to date.  --  --      .Blood Blood  11-02-22   No Growth Final  --  --        CMV IgG Antibody: <0.20 U/mL (10-31-22 @ 17:21)          RADIOLOGY:    <The imaging below has been reviewed and visualized by me independently. Findings as detailed in report below> Follow Up:  Infected Biloma    Interval History: afebrile. abdominal pain improving.     REVIEW OF SYSTEMS  [  ] ROS unobtainable because:    [ x ] All other systems negative except as noted below    Constitutional:  [ ] fever [ x chills  [ ] weight loss  [ ] weakness  Skin:  [ ] rash [ ] phlebitis	  Eyes: [ ] icterus [ ] pain  [ ] discharge	  ENMT: [ ] sore throat  [ ] thrush [ ] ulcers [ ] exudates  Respiratory: [ ] dyspnea [ ] hemoptysis [ ] cough [ ] sputum	  Cardiovascular:  [ ] chest pain [ ] palpitations [ ] edema	  Gastrointestinal:  [ ] nausea [ ] vomiting [ ] diarrhea [ ] constipation [x ] pain	  Genitourinary:  [ ] dysuria [ ] frequency [ ] hematuria [ ] discharge [ ] flank pain  [ ] incontinence  Musculoskeletal:  [ ] myalgias [ ] arthralgias [ ] arthritis  [ ] back pain  Neurological:  [ ] headache [ ] seizures  [ ] confusion/altered mental status    Allergies  No Known Allergies        ANTIMICROBIALS:  fluconAZOLE IVPB 400 every 24 hours  linezolid  IVPB 600 every 12 hours  meropenem  IVPB    meropenem  IVPB 1000 every 8 hours  trimethoprim   80 mG/sulfamethoxazole 400 mG 1 daily  valGANciclovir 50 mG/mL Oral Solution 900 daily      OTHER MEDS:  MEDICATIONS  (STANDING):  heparin   Injectable 5000 every 12 hours  HYDROmorphone  Injectable 1 every 3 hours PRN  influenza   Vaccine 0.5 once  methylPREDNISolone sodium succinate Injectable 16 daily  mycophenolate mofetil IVPB 500 every 12 hours  ondansetron Injectable 4 once PRN  oxyCODONE    IR 10 every 4 hours PRN  oxyCODONE    IR 5 every 4 hours PRN  pantoprazole  Injectable 40 daily  tacrolimus    0.5 mG/mL Suspension 1 <User Schedule>      Vital Signs Last 24 Hrs  T(C): 36.4 (10 Nov 2022 11:00), Max: 37.7 (09 Nov 2022 17:00)  T(F): 97.5 (10 Nov 2022 11:00), Max: 99.9 (09 Nov 2022 17:00)  HR: 88 (10 Nov 2022 14:00) (61 - 112)  BP: 145/75 (10 Nov 2022 14:00) (108/70 - 145/75)  BP(mean): 105 (10 Nov 2022 14:00) (79 - 105)  RR: 24 (10 Nov 2022 14:00) (17 - 41)  SpO2: 99% (10 Nov 2022 14:00) (93% - 100%)    Parameters below as of 10 Nov 2022 07:00  Patient On (Oxygen Delivery Method): room air    PHYSICAL EXAMINATION:  General: Alert and Awake, NAD  Cardiac: RRR, No M/R/G  Resp: CTAB, No Wh/Rh/Ra  Abdomen: Dressing over abdomen. Moderate abdominal distension. diffuse tenderness to palpation.  No HSM, No rigidity or guarding  MSK: No LE edema. No Calf tenderness  Skin: No rashes or lesions. Skin is warm and dry to the touch.   Neuro: Alert and Awake. CN 2-12 Grossly intact. Moves all four extremities spontaneously.  Psych: Calm, Pleasant, Cooperative                          8.4    15.07 )-----------( 259      ( 09 Nov 2022 17:30 )             25.2       11-10    135  |  102  |  22  ----------------------------<  137<H>  4.3   |  26  |  0.62    Ca    7.8<L>      10 Nov 2022 04:38  Phos  2.9     11-10  Mg     1.9     11-10    TPro  4.7<L>  /  Alb  2.2<L>  /  TBili  7.1<H>  /  DBili  5.6<H>  /  AST  59<H>  /  ALT  62<H>  /  AlkPhos  127<H>  11-10          MICROBIOLOGY:  v  .Blood Blood-Peripheral  11-09-22   No growth to date.  --  --      Abdominal Fl Abdominal Fluid  11-07-22   **Please Note**: This is a Corrected Report**  Few Escherichia coli  Rare Enterococcus faecium Susceptibility to follow.  Previously reported as:  Rare Alpha hemolytic strep  --  Escherichia coli      .Surgical Swab wound  11-07-22   Moderate Escherichia coli  Moderate Enterococcus faecium (vancomycin resistant)  --  Escherichia coli  Enterococcus faecium (vancomycin resistant)      .Blood Blood  11-07-22   No growth to date.  --  --      Wound Wound  11-06-22   Few Escherichia coli  Few Enterococcus faecium (vancomycin resistant)  --  Escherichia coli  Enterococcus faecium (vancomycin resistant)      .Blood Blood  11-06-22   No growth to date.  --  --      .Blood Blood  11-02-22   No Growth Final  --  --        CMV IgG Antibody: <0.20 U/mL (10-31-22 @ 17:21)          RADIOLOGY:    <The imaging below has been reviewed and visualized by me independently. Findings as detailed in report below>    < from: Xray Chest 1 View- PORTABLE-Routine (Xray Chest 1 View- PORTABLE-Routine in AM.) (11.09.22 @ 07:27) >  IMPRESSION:  NG tube positioning readjusted, tip continues to be in the stomach.  No significant interval change in the lung exam.    < end of copied text >

## 2022-11-10 NOTE — PROGRESS NOTE ADULT - NS ATTEND AMEND GEN_ALL_CORE FT
ON: no major events    aaox3  CP with contractures but otherwise neurologically intact, pain controlled well   AM CXR dilated bowel loops  on RA  tachycardic, sinus, improved  normotensive  NPO   NG in place, discuss removal given minimal output  VIOLETA drain x2 and biliary drain  hypoactive BS, mild distention abd exam, no BM  has binder over abd  t bili peaked at 9, now downtrending  chavez in place, neg balance with diuresis  not on chem VTE PPX  leukocytosis  meropenem , linezolid no end date   VRE from wound cultures   transplant prophylaxis medications , solumedrol   good glycemic control  PT OT is seeing him, OOB in chair ON: no major events    aaox3  CP with contractures but otherwise neurologically intact, pain controlled well   AM CXR dilated bowel loops  on RA  tachycardic, sinus, improved  normotensive  NPO   NG in place, discuss removal given minimal output  VIOLETA drain x2 and biliary drain  hypoactive BS, mild distention abd exam, no BM  has binder over abd  t bili peaked at 9, now downtrending  chavez in place, neg balance with diuresis  not on chem VTE PPX  leukocytosis  meropenem , linezolid no end date   wound culture e feca, e coli  VRE from wound cultures   transplant prophylaxis medications , solumedrol   good glycemic control  PT OT is seeing him, OOB in chair

## 2022-11-10 NOTE — PROGRESS NOTE ADULT - ASSESSMENT
32 year old male with PMHx of Cerebral palsy, Primary Sclerosing Cholangitis Cirrhosis with frequent ERCPs with biliary stent placements for biliary strictures (last in 9/2022) on liver transplant list, recent COVID s/p MAB in (9/2022) admitted for liver transplant s/p liver transplant on 10/31.    Developed fevers post-operatively on 11/2  Noted to have drainage on from operative wound 11/5 > 11/6  CT A/P (11/6) Fluid and air are noted within the right upper quadrant, lateral to the tip of the surgical drainage catheter.    On 11/7 s/p Ex-lap with finding of biliary leak. s/p Velasquez limb resection and recreation; end to side hepatico jejunostomy and side to side jejunojejunostomy (both hand-sewn). s/p 2 biliary drain placement.     Wound Culture (11/6) Few E coli and Few VRE  Operative Culture (11/7) Gram stain with E coli and Enterococcus faecium    #Fever, Infected Biloma, Leukocytosis, Positive Culture Finding  --If no growth of further organisms by tomorrow would switch Meropenem to Ceftriaxone 2g IV Q24H  --Continue Linezolid 600 mg IV/PO Q12H   --Continue to follow CBC with diff  --Continue to follow transaminases  --Continue to follow temperature curve  --Follow up on preliminary blood cultures  --Follow up on preliminary operative cultures    #Liver Transplant Recipient (CMV +/-, EBV +/-), Prophylactic Antibiotic  --Continue Valcyte 900 mg PO Q24H for CMV PPx  --Continue Bactrim SS PO Q24H for PCP PPx     I will continue to follow. Please feel free to contact me with any further questions.    Roque Reed M.D.  [facility] Division of Infectious Disease  8AM-5PM Monday - Friday: Available on [employer] Teams  After Hours and Holidays (or if no response on [employer] Teams): Please contact the Infectious Diseases Office at [phone]

## 2022-11-10 NOTE — PROGRESS NOTE ADULT - SUBJECTIVE AND OBJECTIVE BOX
Transplant Surgery     32M PMH Cerebral palsy, Primary Sclerosing Cholangitis Cirrhosis with frequent ERCPs with biliary stent placements for biliary strictures (last in 9/2022) on liver transplant list (ABO AB), recent COVID s/p MAB in 9/2022) who is S/P OLT under medrol induction on 11/1/22.     Post operative course:   - Febrile 39.3/tachycardic 120s.  - WBC rising   - R lateral pole of wound - opened at bedside. Culture sent   - LFts rising  -Wound cx sent 11/6 +enterococcus and E.Coli    11/7/22: RTOR for washout. Biliary leak noted at hepaticojejunostomy and exposed mucosa noted at J-J anastomosis.  Interval Events:Biliary leak at hepaticojejunostomy s/p orthotopic liver transplant  Exposed mucosa noted at J-J anastomosis  Velasquez limb resected and recreated; end to side hepatico jejunostomy and side to side jejunojejunostomy (both hand-sewn)  Biliary drain placed in addition to 2 more Vicente drains; original infrahepatic drain remained in place  Strattice bridging mesh placed at the apex of incision    Interval events:     -------------------------------        PHYSICAL EXAM:  Constitutional: well nourished  Eyes: Anicteric, EOMI  ENMT: nc/at  Neck: supple  Respiratory: normal saturation on 2 L nasal cannula, normal effort, equal chest rise b/l  Cardiovascular: RR, tachy 110s  Gastrointestinal: Soft abdomen, nondistended, incision with no erythema or induration, biliary drain with 60 cc of bile, VIOLETA x 3 in places in place, serous to serosanguineous.   Genitourinary: chavez in place, urine concentrated  Extremities: SCD's in place and working bilaterally  Vascular: Palpable dp pulses bilaterally  Neurological: Alert, following commands  Skin: Warm,dry, intact throughout   Psychiatric: Responsive                               Transplant Surgery Multidisciplinary Progress Note     Present: Patient seen and examined with Dr. Dagher, Dr. Mederos, Dr. Carrillo, Dr. Carver, JESSA Carbajal and bedside RN during am rounds.     HPI: 32M PMH Cerebral palsy, Primary Sclerosing Cholangitis Cirrhosis with frequent ERCPs with biliary stent placements for biliary strictures (last in 9/2022), recent COVID s/p MAB in 9/2022) who is S/P OLT with Solumedrol induction on 11/1/22. Post ob course c/b    Post operative course:   - Febrile 39.3/tachycardic 120s.  - WBC rising   - R lateral pole of wound - opened at bedside. Culture sent   - LFts rising  -Wound cx sent 11/6 +enterococcus and E.Coli    11/7/22: RTOR for washout. Biliary leak noted at hepaticojejunostomy and exposed mucosa noted at J-J anastomosis.  Interval Events:Biliary leak at hepaticojejunostomy s/p orthotopic liver transplant  Exposed mucosa noted at J-J anastomosis  Velasquez limb resected and recreated; end to side hepatico jejunostomy and side to side jejunojejunostomy (both hand-sewn)  Biliary drain placed in addition to 2 more Vicente drains; original infrahepatic drain remained in place  Strattice bridging mesh placed at the apex of incision    Interval events:     -------------------------------        PHYSICAL EXAM:  Constitutional: well nourished  Eyes: Anicteric, EOMI  ENMT: nc/at  Neck: supple  Respiratory: normal saturation on 2 L nasal cannula, normal effort, equal chest rise b/l  Cardiovascular: RR, tachy 110s  Gastrointestinal: Soft abdomen, nondistended, incision with no erythema or induration, biliary drain with 60 cc of bile, VIOLETA x 3 in places in place, serous to serosanguineous.   Genitourinary: chavez in place, urine concentrated  Extremities: SCD's in place and working bilaterally  Vascular: Palpable dp pulses bilaterally  Neurological: Alert, following commands  Skin: Warm,dry, intact throughout   Psychiatric: Responsive                               Transplant Surgery Multidisciplinary Progress Note     Present: Patient seen and examined with Dr. Dagher, Dr. Mederos, Dr. Carrillo, Dr. Carver, JESSA Carbajal and bedside RN during am rounds.     HPI: 32M PMH Cerebral palsy, Primary Sclerosing Cholangitis Cirrhosis with frequent ERCPs with biliary stent placements for biliary strictures (last in 9/2022), recent COVID s/p MAB in 9/2022).    Underwent OLT with Solumedrol induction on 11/1/22. Post ob course c/b   ·	Fevers   ·	Wound infection (VRE and E Coli)   ·	Bile leak at hepaticojejunostomy requiring re-op on 11/7.     Interval events:   - POD 10 s/p OLT and POD 3 s/p take back  - No overnight events  - afebrile, stable VSS  - Received Solumedrol 500mg yest for rising LFTs  - NGT with minimal output, dced this am  - No bowel function yet; + BS on exam  - Remains NPO     MEDICATIONS  (STANDING):  chlorhexidine 2% Cloths 1 Application(s) Topical <User Schedule>  fluconAZOLE IVPB 400 milliGRAM(s) IV Intermittent every 24 hours  heparin   Injectable 5000 Unit(s) SubCutaneous every 12 hours  influenza   Vaccine 0.5 milliLiter(s) IntraMuscular once  linezolid  IVPB 600 milliGRAM(s) IV Intermittent every 12 hours  meropenem  IVPB 1000 milliGRAM(s) IV Intermittent every 8 hours  meropenem  IVPB      methylPREDNISolone sodium succinate Injectable 16 milliGRAM(s) IV Push daily  mycophenolate mofetil IVPB 500 milliGRAM(s) IV Intermittent every 12 hours  pantoprazole  Injectable 40 milliGRAM(s) IV Push daily  tacrolimus    0.5 mG/mL Suspension 1 milliGRAM(s) Oral <User Schedule>  trimethoprim   80 mG/sulfamethoxazole 400 mG 1 Tablet(s) Oral daily  valGANciclovir 50 mG/mL Oral Solution 900 milliGRAM(s) Oral daily    MEDICATIONS  (PRN):  HYDROmorphone  Injectable 1 milliGRAM(s) IV Push every 3 hours PRN Severe Pain (7 - 10)  ondansetron Injectable 4 milliGRAM(s) IV Push once PRN Nausea and/or Vomiting  oxyCODONE    IR 10 milliGRAM(s) Oral every 4 hours PRN Severe Pain (7 - 10)  oxyCODONE    IR 5 milliGRAM(s) Oral every 4 hours PRN Moderate Pain (4 - 6)      PAST MEDICAL & SURGICAL HISTORY:  Cerebral palsy  PSC (primary sclerosing cholangitis)   Abnormal LFTs  Bile duct stricture  Dental caries  Phimosis  History of seizures at birth  Anemia  History of ERCP  multiple with stent insertions/replacement every 3 months ---last 5/20/2022  [place] teeth extracted 2021    Vital Signs Last 24 Hrs  T(C): 36.6 (10 Nov 2022 15:00), Max: 37.7 (09 Nov 2022 17:00)  T(F): 97.8 (10 Nov 2022 15:00), Max: 99.9 (09 Nov 2022 17:00)  HR: 79 (10 Nov 2022 15:00) (61 - 102)  BP: 155/99 (10 Nov 2022 15:00) (108/70 - 155/99)  BP(mean): 122 (10 Nov 2022 15:00) (79 - 122)  RR: 16 (10 Nov 2022 15:00) (16 - 31)  SpO2: 96% (10 Nov 2022 15:00) (93% - 100%)    Parameters below as of 10 Nov 2022 07:00  Patient On (Oxygen Delivery Method): room air    I&O's Summary    09 Nov 2022 07:01  -  10 Nov 2022 07:00  --------------------------------------------------------  IN: 983.4 mL / OUT: 2865 mL / NET: -1881.6 mL    10 Nov 2022 07:01  -  10 Nov 2022 16:34  --------------------------------------------------------  IN: 783.4 mL / OUT: 590 mL / NET: 193.4 mL                          8.4    15.07 )-----------( 259      ( 09 Nov 2022 17:30 )             25.2     11-10    135  |  102  |  22  ----------------------------<  137<H>  4.3   |  26  |  0.62    Ca    7.8<L>      10 Nov 2022 04:38  Phos  2.9     11-10  Mg     1.9     11-10    TPro  4.7<L>  /  Alb  2.2<L>  /  TBili  7.1<H>  /  DBili  5.6<H>  /  AST  59<H>  /  ALT  62<H>  /  AlkPhos  127<H>  11-10    Tacrolimus (), Serum: 2.8 ng/mL (11-10 @ 04:41)        Culture - Blood (collected 11-09-22 @ 06:28)  Source: .Blood Blood-Peripheral  Preliminary Report (11-10-22 @ 11:02):    No growth to date.    Culture - Body Fluid with Gram Stain (collected 11-07-22 @ 22:06)  Source: Abdominal Fl Abdominal Fluid  Gram Stain (11-08-22 @ 03:21):    Numerous polymorphonuclear leukocytes seen per low power field    Few Gram Negative Rods seen per oil power field    Rare Gram positive cocci in pairs seen per oil power field  Preliminary Report (11-09-22 @ 20:34):    **Please Note**: This is a Corrected Report**    Few Escherichia coli    Rare Enterococcus faecium Susceptibility to follow.    Previously reported as:    Rare Alpha hemolytic strep  Organism: Escherichia coli (11-09-22 @ 18:00)  Organism: Escherichia coli (11-09-22 @ 18:00)    Culture - Body Fluid with Gram Stain (collected 11-07-22 @ 21:48)  Source: Abdominal Fl Abdominal Fluid  Gram Stain (11-08-22 @ 05:01):    polymorphonuclear leukocytes seen    Gram positive cocci in pairs seen    by cytocentrifuge  Preliminary Report (11-09-22 @ 17:56):    Rare Escherichia coli    Few Enterococcus faecium Susceptibility to follow.  Organism: Escherichia coli  Enterococcus faecium (11-09-22 @ 17:56)  Organism: Escherichia coli (11-09-22 @ 17:56)  Organism: Enterococcus faecium (11-09-22 @ 17:48)    Culture - Surgical Swab (collected 11-07-22 @ 21:48)  Source: .Surgical Swab wound  Preliminary Report (11-10-22 @ 09:39):    Moderate Escherichia coli    Moderate Enterococcus faecium (vancomycin resistant)  Organism: Escherichia coli  Enterococcus faecium (vancomycin resistant) (11-10-22 @ 09:39)  Organism: Enterococcus faecium (vancomycin resistant) (11-10-22 @ 09:39)  Organism: Escherichia coli (11-10-22 @ 09:37)    Culture - Blood (collected 11-07-22 @ 06:10)  Source: .Blood Blood  Preliminary Report (11-08-22 @ 09:01):    No growth to date.    Culture - Blood (collected 11-07-22 @ 06:10)  Source: .Blood Blood  Preliminary Report (11-08-22 @ 09:01):    No growth to date.    Culture - Other (collected 11-06-22 @ 12:55)  Source: Wound Wound  Final Report (11-08-22 @ 15:26):    Few Escherichia coli    Few Enterococcus faecium (vancomycin resistant)  Organism: Escherichia coli  Enterococcus faecium (vancomycin resistant) (11-08-22 @ 15:26)  Organism: Enterococcus faecium (vancomycin resistant) (11-08-22 @ 15:26)  Organism: Escherichia coli (11-08-22 @ 15:26)    Culture - Blood (collected 11-06-22 @ 07:22)  Source: .Blood Blood-Venous  Preliminary Report (11-07-22 @ 11:02):    No growth to date.    Culture - Blood (collected 11-06-22 @ 07:22)  Source: .Blood Blood  Preliminary Report (11-07-22 @ 11:02):    No growth to date.    PHYSICAL EXAM:  Constitutional: well nourished  Eyes: Anicteric, EOMI  ENMT: nc/at  Neck: supple  Respiratory: CTA b/l   Cardiovascular: RR  Gastrointestinal: soft, non distended, incisional tenderness to palpation, T tube with bilious output, JPx 3 with SS drainage   Genitourinary: chavez in place,   Extremities: SCD's in place and working bilaterally  Vascular: Palpable dp pulses bilaterally  Neurological: Alert, following commands  Skin: Warm,dry, intact throughout   Psychiatric: Responsive

## 2022-11-11 LAB
-  DAPTOMYCIN: SIGNIFICANT CHANGE UP
ALBUMIN SERPL ELPH-MCNC: 2 G/DL — LOW (ref 3.3–5)
ALP SERPL-CCNC: 190 U/L — HIGH (ref 40–120)
ALT FLD-CCNC: 53 U/L — HIGH (ref 10–45)
ANION GAP SERPL CALC-SCNC: 11 MMOL/L — SIGNIFICANT CHANGE UP (ref 5–17)
AST SERPL-CCNC: 49 U/L — HIGH (ref 10–40)
BASOPHILS # BLD AUTO: 0.02 K/UL — SIGNIFICANT CHANGE UP (ref 0–0.2)
BASOPHILS NFR BLD AUTO: 0.2 % — SIGNIFICANT CHANGE UP (ref 0–2)
BILIRUB SERPL-MCNC: 6 MG/DL — HIGH (ref 0.2–1.2)
BUN SERPL-MCNC: 30 MG/DL — HIGH (ref 7–23)
CALCIUM SERPL-MCNC: 7.9 MG/DL — LOW (ref 8.4–10.5)
CHLORIDE SERPL-SCNC: 100 MMOL/L — SIGNIFICANT CHANGE UP (ref 96–108)
CO2 SERPL-SCNC: 20 MMOL/L — LOW (ref 22–31)
CREAT SERPL-MCNC: 0.71 MG/DL — SIGNIFICANT CHANGE UP (ref 0.5–1.3)
CULTURE RESULTS: SIGNIFICANT CHANGE UP
CULTURE RESULTS: SIGNIFICANT CHANGE UP
EGFR: 127 ML/MIN/1.73M2 — SIGNIFICANT CHANGE UP
EOSINOPHIL # BLD AUTO: 0 K/UL — SIGNIFICANT CHANGE UP (ref 0–0.5)
EOSINOPHIL NFR BLD AUTO: 0 % — SIGNIFICANT CHANGE UP (ref 0–6)
GLUCOSE BLDC GLUCOMTR-MCNC: 134 MG/DL — HIGH (ref 70–99)
GLUCOSE BLDC GLUCOMTR-MCNC: 136 MG/DL — HIGH (ref 70–99)
GLUCOSE BLDC GLUCOMTR-MCNC: 139 MG/DL — HIGH (ref 70–99)
GLUCOSE BLDC GLUCOMTR-MCNC: 158 MG/DL — HIGH (ref 70–99)
GLUCOSE SERPL-MCNC: 136 MG/DL — HIGH (ref 70–99)
HCT VFR BLD CALC: 31.7 % — LOW (ref 39–50)
HGB BLD-MCNC: 9.6 G/DL — LOW (ref 13–17)
IMM GRANULOCYTES NFR BLD AUTO: 1.2 % — HIGH (ref 0–0.9)
LYMPHOCYTES # BLD AUTO: 0.98 K/UL — LOW (ref 1–3.3)
LYMPHOCYTES # BLD AUTO: 10.4 % — LOW (ref 13–44)
MAGNESIUM SERPL-MCNC: 1.8 MG/DL — SIGNIFICANT CHANGE UP (ref 1.6–2.6)
MCHC RBC-ENTMCNC: 30.3 GM/DL — LOW (ref 32–36)
MCHC RBC-ENTMCNC: 30.6 PG — SIGNIFICANT CHANGE UP (ref 27–34)
MCV RBC AUTO: 101 FL — HIGH (ref 80–100)
METHOD TYPE: SIGNIFICANT CHANGE UP
MONOCYTES # BLD AUTO: 0.19 K/UL — SIGNIFICANT CHANGE UP (ref 0–0.9)
MONOCYTES NFR BLD AUTO: 2 % — SIGNIFICANT CHANGE UP (ref 2–14)
NEUTROPHILS # BLD AUTO: 8.14 K/UL — HIGH (ref 1.8–7.4)
NEUTROPHILS NFR BLD AUTO: 86.2 % — HIGH (ref 43–77)
NRBC # BLD: 0 /100 WBCS — SIGNIFICANT CHANGE UP (ref 0–0)
PHOSPHATE SERPL-MCNC: 3.8 MG/DL — SIGNIFICANT CHANGE UP (ref 2.5–4.5)
PLATELET # BLD AUTO: 206 K/UL — SIGNIFICANT CHANGE UP (ref 150–400)
POTASSIUM SERPL-MCNC: 4.4 MMOL/L — SIGNIFICANT CHANGE UP (ref 3.5–5.3)
POTASSIUM SERPL-SCNC: 4.4 MMOL/L — SIGNIFICANT CHANGE UP (ref 3.5–5.3)
PROT SERPL-MCNC: 4.8 G/DL — LOW (ref 6–8.3)
PTH RELATED PROT SERPL-MCNC: <2 PMOL/L — SIGNIFICANT CHANGE UP
RBC # BLD: 3.14 M/UL — LOW (ref 4.2–5.8)
RBC # FLD: 15.6 % — HIGH (ref 10.3–14.5)
SODIUM SERPL-SCNC: 131 MMOL/L — LOW (ref 135–145)
SPECIMEN SOURCE: SIGNIFICANT CHANGE UP
SPECIMEN SOURCE: SIGNIFICANT CHANGE UP
TACROLIMUS SERPL-MCNC: 7.9 NG/ML — SIGNIFICANT CHANGE UP
WBC # BLD: 9.44 K/UL — SIGNIFICANT CHANGE UP (ref 3.8–10.5)
WBC # FLD AUTO: 9.44 K/UL — SIGNIFICANT CHANGE UP (ref 3.8–10.5)

## 2022-11-11 PROCEDURE — 71045 X-RAY EXAM CHEST 1 VIEW: CPT | Mod: 26

## 2022-11-11 PROCEDURE — 99232 SBSQ HOSP IP/OBS MODERATE 35: CPT

## 2022-11-11 RX ORDER — TACROLIMUS 5 MG/1
1 CAPSULE ORAL
Refills: 0 | Status: DISCONTINUED | OUTPATIENT
Start: 2022-11-12 | End: 2022-11-14

## 2022-11-11 RX ORDER — TACROLIMUS 5 MG/1
0.5 CAPSULE ORAL
Refills: 0 | Status: DISCONTINUED | OUTPATIENT
Start: 2022-11-11 | End: 2022-11-14

## 2022-11-11 RX ADMIN — HEPARIN SODIUM 5000 UNIT(S): 5000 INJECTION INTRAVENOUS; SUBCUTANEOUS at 17:15

## 2022-11-11 RX ADMIN — FLUCONAZOLE 100 MILLIGRAM(S): 150 TABLET ORAL at 18:15

## 2022-11-11 RX ADMIN — TACROLIMUS 1 MILLIGRAM(S): 5 CAPSULE ORAL at 08:40

## 2022-11-11 RX ADMIN — OXYCODONE HYDROCHLORIDE 10 MILLIGRAM(S): 5 TABLET ORAL at 11:38

## 2022-11-11 RX ADMIN — HEPARIN SODIUM 5000 UNIT(S): 5000 INJECTION INTRAVENOUS; SUBCUTANEOUS at 06:28

## 2022-11-11 RX ADMIN — LINEZOLID 300 MILLIGRAM(S): 600 INJECTION, SOLUTION INTRAVENOUS at 14:32

## 2022-11-11 RX ADMIN — VALGANCICLOVIR 900 MILLIGRAM(S): 450 TABLET, FILM COATED ORAL at 11:40

## 2022-11-11 RX ADMIN — CHLORHEXIDINE GLUCONATE 1 APPLICATION(S): 213 SOLUTION TOPICAL at 06:28

## 2022-11-11 RX ADMIN — Medication 1 TABLET(S): at 11:40

## 2022-11-11 RX ADMIN — MEROPENEM 100 MILLIGRAM(S): 1 INJECTION INTRAVENOUS at 06:28

## 2022-11-11 RX ADMIN — Medication 100 MILLIGRAM(S): at 12:19

## 2022-11-11 RX ADMIN — PANTOPRAZOLE SODIUM 40 MILLIGRAM(S): 20 TABLET, DELAYED RELEASE ORAL at 11:40

## 2022-11-11 RX ADMIN — TACROLIMUS 0.5 MILLIGRAM(S): 5 CAPSULE ORAL at 21:00

## 2022-11-11 RX ADMIN — MEROPENEM 100 MILLIGRAM(S): 1 INJECTION INTRAVENOUS at 21:37

## 2022-11-11 RX ADMIN — Medication 20 MILLIGRAM(S): at 06:28

## 2022-11-11 RX ADMIN — MEROPENEM 100 MILLIGRAM(S): 1 INJECTION INTRAVENOUS at 13:08

## 2022-11-11 RX ADMIN — LINEZOLID 300 MILLIGRAM(S): 600 INJECTION, SOLUTION INTRAVENOUS at 02:02

## 2022-11-11 RX ADMIN — MYCOPHENOLATE MOFETIL 500 MILLIGRAM(S): 250 CAPSULE ORAL at 08:40

## 2022-11-11 RX ADMIN — OXYCODONE HYDROCHLORIDE 10 MILLIGRAM(S): 5 TABLET ORAL at 12:25

## 2022-11-11 RX ADMIN — MYCOPHENOLATE MOFETIL 500 MILLIGRAM(S): 250 CAPSULE ORAL at 21:01

## 2022-11-11 NOTE — PROGRESS NOTE ADULT - NS ATTEND AMEND GEN_ALL_CORE FT
Agree. doing well.  Now on 6 fartun.  NGT out, advanced to clears and tolerating.      Bili improving.  continue steroid pulse.      PT/OT.

## 2022-11-11 NOTE — PROGRESS NOTE ADULT - SUBJECTIVE AND OBJECTIVE BOX
mlTransplant Surgery Multidisciplinary Progress Note     Present: Patient seen and examined with Transplant Surgeon Dr. Dagher, Transplant Hepatologist Dr. Carrillo, NP Demetris and bedside RN during am rounds.     HPI: 32M PMH Cerebral palsy, Primary Sclerosing Cholangitis Cirrhosis with frequent ERCPs with biliary stent placements for biliary strictures (last in 9/2022), recent COVID s/p MAB in 9/2022).    Underwent OLT with Solumedrol induction on 11/1/22. Post ob course c/b   ·	Fevers   ·	Wound infection (VRE and E Coli)   ·	Bile leak at hepaticojejunostomy requiring re-op on 11/7.     Interval events:   - POD 1 s/p OLT and POD 4 s/p take back  - Transferred out of SICU to   - NGT removed. Denies nausea. + flatus.  NPO  - Chavez removed.  SCr stbale bhupinder Solumedrol 500mg yest for rising LFTs   UOP 795ml   Lasix 20mg IV x1  - TTube 40    VIOLETA 1 27.5, VIOLETA 2 27.5, VIOLETA 3 340ml  - Solumedrol 500mg x1   from 127.  AST/ALT/TTbili downtrending        MEDICATIONS  (STANDING):  chlorhexidine 2% Cloths 1 Application(s) Topical <User Schedule>  fluconAZOLE IVPB 400 milliGRAM(s) IV Intermittent every 24 hours  heparin   Injectable 5000 Unit(s) SubCutaneous every 12 hours  influenza   Vaccine 0.5 milliLiter(s) IntraMuscular once  linezolid  IVPB 600 milliGRAM(s) IV Intermittent every 12 hours  meropenem  IVPB      meropenem  IVPB 1000 milliGRAM(s) IV Intermittent every 8 hours  methylPREDNISolone sodium succinate IVPB 500 milliGRAM(s) IV Intermittent once  mycophenolate mofetil 500 milliGRAM(s) Oral <User Schedule>  pantoprazole  Injectable 40 milliGRAM(s) IV Push daily  predniSONE   Tablet 20 milliGRAM(s) Oral daily  tacrolimus 1 milliGRAM(s) Oral <User Schedule>  trimethoprim   80 mG/sulfamethoxazole 400 mG 1 Tablet(s) Oral daily  valGANciclovir 900 milliGRAM(s) Oral daily    MEDICATIONS  (PRN):  HYDROmorphone  Injectable 1 milliGRAM(s) IV Push every 3 hours PRN Severe Pain (7 - 10)  ondansetron Injectable 4 milliGRAM(s) IV Push once PRN Nausea and/or Vomiting  oxyCODONE    IR 10 milliGRAM(s) Oral every 4 hours PRN Severe Pain (7 - 10)  oxyCODONE    IR 5 milliGRAM(s) Oral every 4 hours PRN Moderate Pain (4 - 6)      PAST MEDICAL & SURGICAL HISTORY:  Cerebral palsy      PSC (primary sclerosing cholangitis)  On liver transplant list      Abnormal LFTs      Bile duct stricture      Dental caries      Impacted teeth  wisdom teeth      Phimosis      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      Anemia      History of ERCP  multiple with stent insertions/replacement every 3 months ---last 5/20/2022      [place] teeth extracted  2021          Vital Signs Last 24 Hrs  T(C): 36.8 (11 Nov 2022 09:18), Max: 37.1 (10 Nov 2022 20:00)  T(F): 98.2 (11 Nov 2022 09:18), Max: 98.7 (10 Nov 2022 20:00)  HR: 58 (11 Nov 2022 09:18) (55 - 98)  BP: 120/68 (11 Nov 2022 09:18) (111/59 - 155/99)  BP(mean): 83 (10 Nov 2022 17:00) (82 - 122)  RR: 20 (11 Nov 2022 09:18) (16 - 24)  SpO2: 94% (11 Nov 2022 09:18) (94% - 99%)    Parameters below as of 11 Nov 2022 09:18  Patient On (Oxygen Delivery Method): room air        I&O's Summary    10 Nov 2022 07:01  -  11 Nov 2022 07:00  --------------------------------------------------------  IN: 1233.4 mL / OUT: 1230 mL / NET: 3.4 mL    11 Nov 2022 07:01  -  11 Nov 2022 12:07  --------------------------------------------------------  IN: 60 mL / OUT: 245 mL / NET: -185 mL                              9.6    9.44  )-----------( 206      ( 11 Nov 2022 06:33 )             31.7     11-11    131<L>  |  100  |  30<H>  ----------------------------<  136<H>  4.4   |  20<L>  |  0.71    Ca    7.9<L>      11 Nov 2022 06:31  Phos  3.8     11-11  Mg     1.8     11-11    TPro  4.8<L>  /  Alb  2.0<L>  /  TBili  6.0<H>  /  DBili  x   /  AST  49<H>  /  ALT  53<H>  /  AlkPhos  190<H>  11-11    Tacrolimus (), Serum: 7.9 ng/mL (11-11 @ 06:35)        Culture - Blood (collected 11-09-22 @ 06:28)  Source: .Blood Blood-Peripheral  Preliminary Report (11-10-22 @ 11:02):    No growth to date.    Culture - Body Fluid with Gram Stain (collected 11-07-22 @ 22:06)  Source: Abdominal Fl Abdominal Fluid  Gram Stain (11-08-22 @ 03:21):    Numerous polymorphonuclear leukocytes seen per low power field    Few Gram Negative Rods seen per oil power field    Rare Gram positive cocci in pairs seen per oil power field  Preliminary Report (11-10-22 @ 19:36):    **Please Note**: This is a Corrected Report**    Few Escherichia coli    Rare Enterococcus faecium (vancomycin resistant)    Previously reported as:    Rare Alpha hemolytic strep  Organism: Escherichia coli  Enterococcus faecium (vancomycin resistant) (11-10-22 @ 19:35)  Organism: Enterococcus faecium (vancomycin resistant) (11-10-22 @ 19:35)  Organism: Escherichia coli (11-09-22 @ 18:00)    Culture - Body Fluid with Gram Stain (collected 11-07-22 @ 21:48)  Source: Abdominal Fl Abdominal Fluid  Gram Stain (11-08-22 @ 05:01):    polymorphonuclear leukocytes seen    Gram positive cocci in pairs seen    by cytocentrifuge  Preliminary Report (11-10-22 @ 19:44):    Rare Escherichia coli    Few Enterococcus faecium (vancomycin resistant)  Organism: Escherichia coli  Enterococcus faecium (vancomycin resistant) (11-10-22 @ 19:44)  Organism: Enterococcus faecium (vancomycin resistant) (11-10-22 @ 19:44)  Organism: Escherichia coli (11-09-22 @ 17:56)    Culture - Surgical Swab (collected 11-07-22 @ 21:48)  Source: .Surgical Swab wound  Preliminary Report (11-10-22 @ 09:39):    Moderate Escherichia coli    Moderate Enterococcus faecium (vancomycin resistant)  Organism: Escherichia coli  Enterococcus faecium (vancomycin resistant)  Enterococcus faecium (vancomycin resistant) (11-11-22 @ 09:42)  Organism: Enterococcus faecium (vancomycin resistant) (11-11-22 @ 09:42)  Organism: Enterococcus faecium (vancomycin resistant) (11-10-22 @ 09:39)  Organism: Escherichia coli (11-10-22 @ 09:37)    Culture - Blood (collected 11-07-22 @ 06:10)  Source: .Blood Blood  Preliminary Report (11-08-22 @ 09:01):    No growth to date.    Culture - Blood (collected 11-07-22 @ 06:10)  Source: .Blood Blood  Preliminary Report (11-08-22 @ 09:01):    No growth to date.    Culture - Other (collected 11-06-22 @ 12:55)  Source: Wound Wound  Final Report (11-08-22 @ 15:26):    Few Escherichia coli    Few Enterococcus faecium (vancomycin resistant)  Organism: Escherichia coli  Enterococcus faecium (vancomycin resistant) (11-08-22 @ 15:26)  Organism: Enterococcus faecium (vancomycin resistant) (11-08-22 @ 15:26)  Organism: Escherichia coli (11-08-22 @ 15:26)    Culture - Blood (collected 11-06-22 @ 07:22)  Source: .Blood Blood-Venous  Final Report (11-11-22 @ 11:00):    No Growth Final    Culture - Blood (collected 11-06-22 @ 07:22)  Source: .Blood Blood  Final Report (11-11-22 @ 11:00):    No Growth Final          PHYSICAL EXAM:  Constitutional: well nourished  Eyes: Anicteric, EOMI  ENMT: nc/at  Neck: supple  Respiratory: CTA b/l   Cardiovascular: RR  Gastrointestinal: soft, non distended, incisional tenderness to palpation, T tube with bilious output, JPx 3 with serous drainage   Genitourinary: chavez in place,   Extremities: SCD's in place and working bilaterally  Vascular: Palpable dp pulses bilaterally  Neurological: Alert, following commands  Skin: Warm,dry, intact throughout   Psychiatric: Responsive

## 2022-11-11 NOTE — CHART NOTE - NSCHARTNOTEFT_GEN_A_CORE
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 Pt has been NPO x ~5 days. Mom denies pt with nausea, vomiting, diarrhea, or constipation, last BM () likely due to NPO status.     Pt and mom received education on post transplant nutrition therapy and food safety guidelines for transplant recipients with nutrition package in previous RD visit - mom denies having questions/concerns about diet and nutrition education provided; able to teach back 3-4 points in previous RD visit. Mom aware RD remains available.     Diet Order:   Diet, NPO:   Except Medications  With Ice Chips/Sips of Water (11-10-22)  Previously: regular     Weights:   Daily Weight in k.9 (), Weight in k.6 (), Weight in k.1 (), Weight in k.2 ()  -?accuracy of weights and fluctuations as pt with edema and S/P OLT.    Nutritionally Pertinent MEDICATIONS  (STANDING):  fluconAZOLE IVPB  linezolid  IVPB  meropenem  IVPB  meropenem  IVPB  pantoprazole  Injectable  predniSONE   Tablet  trimethoprim   80 mG/sulfamethoxazole 400 mG  valGANciclovir    Pertinent Labs: ( @ 06:31): Na 131<L>, BUN 30<H>, Cr 0.71, <H>, K+ 4.4, Phos 3.8, Mg 1.8, Alk Phos 190<H>, ALT/SGPT 53<H>, AST/SGOT 49<H>    A1C with Estimated Average Glucose Result: 4.7 % (22 @ 06:49)    Finger Sticks:  POCT Blood Glucose.: 136 mg/dL ( @ 09:05)  POCT Blood Glucose.: 148 mg/dL (11-10 @ 21:31)    Skin per nursing documentation: no pressure injures.   Edema as per flow sheets: +1 generalized and +2 gideon. foot, ankle, and leg.      Estimated Needs:   [x] no change since previous assessment  [] recalculated:     Previous Nutrition Diagnoses:  [x] Inadequate Protein Energy Intake (pt was NPO in previous RD visit) - advanced to malnutrition.   [x] Food & Nutrition Related Knowledge Deficit -      New Nutrition Diagnosis: [x] Not applicable    Nutrition Care Plan:  [x] In Progress     Nutrition Interventions:     Education Provided:       [x] Yes  [] No    Recommendations:      1. Continue regular diet upon discharge. Recommend follow up visit with Transplant MD and outpatient RD for dietary modifications as warranted.   2. Recommend Ensure Plus High Protein 2xday to optimize kcal and protein intake. Spoke to team.  3. Encourage PO intake and honor food preferences.   4. Recommend Multivitamin if medically feasible to optimize nutrient intake.   5. Reviewed the importance of adequate kcal and protein intake with recommendations to optimize.   6. Review education on post transplant nutrition therapy and food safety guidelines for transplant recipients as needed/requested before discharge.   7. Continue to obtain weights as able to identify changes if any.     Monitoring and Evaluation:   Continue to monitor nutritional intake, tolerance to diet prescription, weights, labs, skin integrity    RD remains available upon request and will follow up per protocol  Bibi Carter MS RDN CDN [facility] CCT[facility] #[phone]. 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 Pt has been NPO x ~5 days. Mom denies pt with nausea, vomiting, diarrhea, or constipation, last BM () likely due to NPO status.     Pt and mom received education on post transplant nutrition therapy and food safety guidelines for transplant recipients with nutrition package in previous RD visit - mom denies having questions/concerns about diet and nutrition education provided; able to teach back 3-4 points in previous RD visit. Mom aware RD remains available.     Diet Order:   Diet, NPO:   Except Medications  With Ice Chips/Sips of Water (11-10-22)  Previously: regular     Weights:   Daily Weight in k.9 (), Weight in k.6 (), Weight in k.1 (), Weight in k.2 ()  -?accuracy of weights and fluctuations as pt with edema and S/P OLT.    Nutritionally Pertinent MEDICATIONS  (STANDING):  fluconAZOLE IVPB  linezolid  IVPB  meropenem  IVPB  meropenem  IVPB  pantoprazole  Injectable  predniSONE   Tablet  trimethoprim   80 mG/sulfamethoxazole 400 mG  valGANciclovir    Pertinent Labs: ( @ 06:31): Na 131<L>, BUN 30<H>, Cr 0.71, <H>, K+ 4.4, Phos 3.8, Mg 1.8, Alk Phos 190<H>, ALT/SGPT 53<H>, AST/SGOT 49<H>    A1C with Estimated Average Glucose Result: 4.7 % (22 @ 06:49)    Finger Sticks:  POCT Blood Glucose.: 136 mg/dL ( @ 09:05)  POCT Blood Glucose.: 148 mg/dL (11-10 @ 21:31)    Skin per nursing documentation: no pressure injures.   Edema as per flow sheets: +1 generalized and +2 gideon. foot, ankle, and leg.      Estimated Needs:   [x] no change since previous assessment  [] recalculated:     Previous Nutrition Diagnoses:  [x] Inadequate Protein Energy Intake (pt was NPO in previous RD visit) - advanced to malnutrition.   [x] Food & Nutrition Related Knowledge Deficit - addressed with nutrition therapy education.     New Nutrition Diagnosis: [x] Malnutrition (severe; acute) related to bile leak as evidenced by NPO x ~5 days, fluid accumulation   Goal: pt to meet >75% of estimated nutritional needs during hospital stay.     Nutrition Care Plan:  [x] In Progress     Nutrition Interventions:     Education Provided:       [] Yes  [x] No    Recommendations:      1. Defer diet advancement to medical team. Once medically feasible, consider resuming regular diet upon discharge. Recommend follow up visit with Transplant MD and outpatient RD for dietary modifications as warranted.   2. Consider Ensure Plus High Protein 2xday to optimize kcal and protein intake when diet is resumed.   3. Once applicable, gently encourage PO intake and honor food preferences.   4. Recommend Multivitamin if medically feasible to optimize nutrient intake.   5. Review education on post transplant nutrition therapy and food safety guidelines for transplant recipients as needed/requested before discharge.   6. Malnutrition notification placed in chart.   7. Continue to obtain weights as able to identify changes if any.     Monitoring and Evaluation:   Continue to monitor nutritional intake, tolerance to diet prescription, weights, labs, skin integrity    RD remains available upon request and will follow up per protocol  Bibi Carter MS RDN CDN [facility] CCTD #[phone]. 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 Pt has been NPO x ~5 days. Mom denies pt with nausea, vomiting, diarrhea, or constipation, last BM () likely due to NPO status.     Pt and mom received education on post transplant nutrition therapy and food safety guidelines for transplant recipients with nutrition package in previous RD visit - mom denies having questions/concerns about diet and nutrition education provided; able to teach back 3-4 points in previous RD visit. Mom aware RD remains available.     Diet Order:   Diet, NPO:   Except Medications  With Ice Chips/Sips of Water (11-10-22)  Previously: regular     Weights:   Daily Weight in k.9 (), Weight in k.6 (), Weight in k.1 (), Weight in k.2 ()  -?accuracy of weights and fluctuations as pt with edema and S/P OLT.    Nutritionally Pertinent MEDICATIONS  (STANDING):  fluconAZOLE IVPB  linezolid  IVPB  meropenem  IVPB  meropenem  IVPB  pantoprazole  Injectable  predniSONE   Tablet  trimethoprim   80 mG/sulfamethoxazole 400 mG  valGANciclovir    Pertinent Labs: ( @ 06:31): Na 131<L>, BUN 30<H>, Cr 0.71, <H>, K+ 4.4, Phos 3.8, Mg 1.8, Alk Phos 190<H>, ALT/SGPT 53<H>, AST/SGOT 49<H>    A1C with Estimated Average Glucose Result: 4.7 % (22 @ 06:49)    Finger Sticks:  POCT Blood Glucose.: 136 mg/dL ( @ 09:05)  POCT Blood Glucose.: 148 mg/dL (11-10 @ 21:31)    Skin per nursing documentation: no pressure injures.   Edema as per flow sheets: +1 generalized and +2 gideon. foot, ankle, and leg.      Estimated Needs:   [x] no change since previous assessment  [] recalculated:     Previous Nutrition Diagnoses:  [x] Inadequate Protein Energy Intake (pt was NPO in previous RD visit) - advanced to malnutrition.   [x] Food & Nutrition Related Knowledge Deficit - addressed with nutrition therapy education.     New Nutrition Diagnosis: [x] Malnutrition (severe; acute) related to bile leak as evidenced by NPO x ~5 days, fluid accumulation   Goal: pt to meet >75% of estimated nutritional needs during hospital stay.     Nutrition Care Plan:  [x] In Progress     Nutrition Interventions:     Education Provided:       [] Yes  [x] No    Recommendations:      1. Defer diet advancement to medical team. Once medically feasible, consider low fat diet. Recommend follow up visit with Transplant MD and outpatient RD for dietary modifications as warranted.   2. Recommend Ensure Clear 3xday to optimize kcal and protein intake when diet is resumed.   3. Once applicable, gently encourage PO intake and honor food preferences.   4. Recommend Multivitamin if medically feasible to optimize nutrient intake.   5. Review education on post transplant nutrition therapy and food safety guidelines for transplant recipients as needed/requested before discharge.   6. Malnutrition notification placed in chart.   7. Continue to obtain weights as able to identify changes if any.     Monitoring and Evaluation:   Continue to monitor nutritional intake, tolerance to diet prescription, weights, labs, skin integrity    RD remains available upon request and will follow up per protocol  Bibi Carter MS RDN CDN [facility] CCTD #[phone].

## 2022-11-11 NOTE — PROGRESS NOTE ADULT - ASSESSMENT
32 year old male with PMHx of Cerebral palsy, Primary Sclerosing Cholangitis Cirrhosis with frequent ERCPs with biliary stent placements for biliary strictures (last in 9/2022) on liver transplant list, recent COVID s/p MAB in (9/2022) admitted for liver transplant s/p liver transplant on 10/31.    Developed fevers post-operatively on 11/2  Noted to have drainage on from operative wound 11/5 > 11/6  CT A/P (11/6) Fluid and air are noted within the right upper quadrant, lateral to the tip of the surgical drainage catheter.    On 11/7 s/p Ex-lap with finding of biliary leak. s/p Velasquez limb resection and recreation; end to side hepatico jejunostomy and side to side jejunojejunostomy (both hand-sewn). s/p 2 biliary drain placement.     Wound Culture (11/6) Few E coli and Few VRE  Operative Culture (11/7) Gram stain with E coli and Enterococcus faecium    #Fever, Infected Biloma, Leukocytosis, Positive Culture Finding  --Stop Meropenem and switch to Ceftriaxone 2g IV Q24H  --Continue Linezolid 600 mg IV/PO Q12H   --Continue to follow CBC with diff  --Continue to follow transaminases  --Continue to follow temperature curve  --Follow up on preliminary blood cultures  --Follow up on preliminary operative cultures    #Liver Transplant Recipient (CMV +/-, EBV +/-), Prophylactic Antibiotic  --Continue Valcyte 900 mg PO Q24H for CMV PPx  --Continue Bactrim SS PO Q24H for PCP PPx     I will be away over this upcoming weekend. Please contact the Infectious Diseases Office with any further questions or concerns.     Roque Reed M.D.  [facility] Division of Infectious Disease  8AM-5PM Monday - Friday: Available on [employer] Teams  After Hours and Holidays (or if no response on [employer] Teams): Please contact the Infectious Diseases Office at [phone]     The above assessment and plan were discussed with Transplant Surgery Team

## 2022-11-11 NOTE — PROGRESS NOTE ADULT - SUBJECTIVE AND OBJECTIVE BOX
Follow Up:      Interval History:    REVIEW OF SYSTEMS  [  ] ROS unobtainable because:    [  ] All other systems negative except as noted below    Constitutional:  [ ] fever [ ] chills  [ ] weight loss  [ ] weakness  Skin:  [ ] rash [ ] phlebitis	  Eyes: [ ] icterus [ ] pain  [ ] discharge	  ENMT: [ ] sore throat  [ ] thrush [ ] ulcers [ ] exudates  Respiratory: [ ] dyspnea [ ] hemoptysis [ ] cough [ ] sputum	  Cardiovascular:  [ ] chest pain [ ] palpitations [ ] edema	  Gastrointestinal:  [ ] nausea [ ] vomiting [ ] diarrhea [ ] constipation [ ] pain	  Genitourinary:  [ ] dysuria [ ] frequency [ ] hematuria [ ] discharge [ ] flank pain  [ ] incontinence  Musculoskeletal:  [ ] myalgias [ ] arthralgias [ ] arthritis  [ ] back pain  Neurological:  [ ] headache [ ] seizures  [ ] confusion/altered mental status    Allergies  No Known Allergies        ANTIMICROBIALS:  fluconAZOLE IVPB 400 every 24 hours  linezolid  IVPB 600 every 12 hours  meropenem  IVPB    meropenem  IVPB 1000 every 8 hours  trimethoprim   80 mG/sulfamethoxazole 400 mG 1 daily  valGANciclovir 900 daily      OTHER MEDS:  MEDICATIONS  (STANDING):  heparin   Injectable 5000 every 12 hours  HYDROmorphone  Injectable 1 every 3 hours PRN  influenza   Vaccine 0.5 once  mycophenolate mofetil 500 <User Schedule>  ondansetron Injectable 4 once PRN  oxyCODONE    IR 10 every 4 hours PRN  oxyCODONE    IR 5 every 4 hours PRN  pantoprazole  Injectable 40 daily  predniSONE   Tablet 20 daily  tacrolimus 0.5 <User Schedule>      Vital Signs Last 24 Hrs  T(C): 36.7 (11 Nov 2022 13:04), Max: 37.1 (10 Nov 2022 20:00)  T(F): 98 (11 Nov 2022 13:04), Max: 98.7 (10 Nov 2022 20:00)  HR: 75 (11 Nov 2022 13:04) (55 - 75)  BP: 119/67 (11 Nov 2022 13:04) (111/59 - 120/68)  BP(mean): --  RR: 20 (11 Nov 2022 13:04) (20 - 20)  SpO2: 100% (11 Nov 2022 13:04) (94% - 100%)    Parameters below as of 11 Nov 2022 13:04  Patient On (Oxygen Delivery Method): room air        PHYSICAL EXAMINATION:  General: Alert and Awake, NAD  HEENT: PERRL, EOMI  Neck: Supple  Cardiac: RRR, No M/R/G  Resp: CTAB, No Wh/Rh/Ra  Abdomen: NBS, NT/ND, No HSM, No rigidity or guarding  MSK: No LE edema. No Calf tenderness  : No chavez  Skin: No rashes or lesions. Skin is warm and dry to the touch.   Neuro: Alert and Awake. CN 2-12 Grossly intact. Moves all four extremities spontaneously.  Psych: Calm, Pleasant, Cooperative                          9.6    9.44  )-----------( 206      ( 11 Nov 2022 06:33 )             31.7       11-11    131<L>  |  100  |  30<H>  ----------------------------<  136<H>  4.4   |  20<L>  |  0.71    Ca    7.9<L>      11 Nov 2022 06:31  Phos  3.8     11-11  Mg     1.8     11-11    TPro  4.8<L>  /  Alb  2.0<L>  /  TBili  6.0<H>  /  DBili  x   /  AST  49<H>  /  ALT  53<H>  /  AlkPhos  190<H>  11-11          MICROBIOLOGY:  v  .Blood Blood-Peripheral  11-09-22   No growth to date.  --  --      Abdominal Fl Abdominal Fluid  11-07-22   **Please Note**: This is a Corrected Report**  Few Escherichia coli  Rare Enterococcus faecium (vancomycin resistant)  Previously reported as:  Rare Alpha hemolytic strep  --  Escherichia coli  Enterococcus faecium (vancomycin resistant)      .Surgical Swab wound  11-07-22   Moderate Escherichia coli  Moderate Enterococcus faecium (vancomycin resistant)  --  Escherichia coli  Enterococcus faecium (vancomycin resistant)  Enterococcus faecium (vancomycin resistant)      .Blood Blood  11-07-22   No growth to date.  --  --      Wound Wound  11-06-22   Few Escherichia coli  Few Enterococcus faecium (vancomycin resistant)  --  Escherichia coli  Enterococcus faecium (vancomycin resistant)      .Blood Blood  11-06-22   No Growth Final  --  --      .Blood Blood  11-02-22   No Growth Final  --  --        CMV IgG Antibody: <0.20 U/mL (10-31-22 @ 17:21)          RADIOLOGY:    <The imaging below has been reviewed and visualized by me independently. Findings as detailed in report below> Follow Up:  Infected Biloma    Interval History: afebrile. Noting some continued stinging pain at the surgical site.     REVIEW OF SYSTEMS  [  ] ROS unobtainable because:    [ x ] All other systems negative except as noted below    Constitutional:  [ ] fever [ x chills  [ ] weight loss  [ ] weakness  Skin:  [ ] rash [ ] phlebitis	  Eyes: [ ] icterus [ ] pain  [ ] discharge	  ENMT: [ ] sore throat  [ ] thrush [ ] ulcers [ ] exudates  Respiratory: [ ] dyspnea [ ] hemoptysis [ ] cough [ ] sputum	  Cardiovascular:  [ ] chest pain [ ] palpitations [ ] edema	  Gastrointestinal:  [ ] nausea [ ] vomiting [ ] diarrhea [ ] constipation [x ] pain	  Genitourinary:  [ ] dysuria [ ] frequency [ ] hematuria [ ] discharge [ ] flank pain  [ ] incontinence  Musculoskeletal:  [ ] myalgias [ ] arthralgias [ ] arthritis  [ ] back pain  Neurological:  [ ] headache [ ] seizures  [ ] confusion/altered mental status    Allergies  No Known Allergies        ANTIMICROBIALS:  fluconAZOLE IVPB 400 every 24 hours  linezolid  IVPB 600 every 12 hours  meropenem  IVPB    meropenem  IVPB 1000 every 8 hours  trimethoprim   80 mG/sulfamethoxazole 400 mG 1 daily  valGANciclovir 900 daily      OTHER MEDS:  MEDICATIONS  (STANDING):  heparin   Injectable 5000 every 12 hours  HYDROmorphone  Injectable 1 every 3 hours PRN  influenza   Vaccine 0.5 once  mycophenolate mofetil 500 <User Schedule>  ondansetron Injectable 4 once PRN  oxyCODONE    IR 10 every 4 hours PRN  oxyCODONE    IR 5 every 4 hours PRN  pantoprazole  Injectable 40 daily  predniSONE   Tablet 20 daily  tacrolimus 0.5 <User Schedule>      Vital Signs Last 24 Hrs  T(C): 36.7 (11 Nov 2022 13:04), Max: 37.1 (10 Nov 2022 20:00)  T(F): 98 (11 Nov 2022 13:04), Max: 98.7 (10 Nov 2022 20:00)  HR: 75 (11 Nov 2022 13:04) (55 - 75)  BP: 119/67 (11 Nov 2022 13:04) (111/59 - 120/68)  BP(mean): --  RR: 20 (11 Nov 2022 13:04) (20 - 20)  SpO2: 100% (11 Nov 2022 13:04) (94% - 100%)    Parameters below as of 11 Nov 2022 13:04  Patient On (Oxygen Delivery Method): room air    PHYSICAL EXAMINATION:  General: Alert and Awake, NAD  Cardiac: RRR, No M/R/G  Resp: CTAB, No Wh/Rh/Ra  Abdomen: RUQ surgical site with no drainage or erythema. Packing at interspersed segments. Biliary drain with billous output.  Mild-Moderate per-transplant tenderness to palpation.   MSK: No LE edema. No Calf tenderness  Skin: No rashes or lesions. Skin is warm and dry to the touch.   Neuro: Alert and Awake. CN 2-12 Grossly intact. Moves all four extremities spontaneously.  Psych: Calm, Pleasant, Cooperative                          9.6    9.44  )-----------( 206      ( 11 Nov 2022 06:33 )             31.7       11-11    131<L>  |  100  |  30<H>  ----------------------------<  136<H>  4.4   |  20<L>  |  0.71    Ca    7.9<L>      11 Nov 2022 06:31  Phos  3.8     11-11  Mg     1.8     11-11    TPro  4.8<L>  /  Alb  2.0<L>  /  TBili  6.0<H>  /  DBili  x   /  AST  49<H>  /  ALT  53<H>  /  AlkPhos  190<H>  11-11          MICROBIOLOGY:  v  .Blood Blood-Peripheral  11-09-22   No growth to date.  --  --      Abdominal Fl Abdominal Fluid  11-07-22   **Please Note**: This is a Corrected Report**  Few Escherichia coli  Rare Enterococcus faecium (vancomycin resistant)  Previously reported as:  Rare Alpha hemolytic strep  --  Escherichia coli  Enterococcus faecium (vancomycin resistant)      .Surgical Swab wound  11-07-22   Moderate Escherichia coli  Moderate Enterococcus faecium (vancomycin resistant)  --  Escherichia coli  Enterococcus faecium (vancomycin resistant)  Enterococcus faecium (vancomycin resistant)      .Blood Blood  11-07-22   No growth to date.  --  --      Wound Wound  11-06-22   Few Escherichia coli  Few Enterococcus faecium (vancomycin resistant)  --  Escherichia coli  Enterococcus faecium (vancomycin resistant)      .Blood Blood  11-06-22   No Growth Final  --  --      .Blood Blood  11-02-22   No Growth Final  --  --        CMV IgG Antibody: <0.20 U/mL (10-31-22 @ 17:21)          RADIOLOGY:    <The imaging below has been reviewed and visualized by me independently. Findings as detailed in report below>    < from: Xray Chest 1 View- PORTABLE-Routine (Xray Chest 1 View- PORTABLE-Routine in AM.) (11.11.22 @ 09:19) >  IMPRESSION:  Interval removal of enteric tube.  Clear lungs.    < end of copied text >

## 2022-11-11 NOTE — PROGRESS NOTE ADULT - ASSESSMENT
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     Plan:   - Rising LFTs: Solumedrol 500mg (11/9, 11/10. Solumedrol 500mg IV today the start Prednisone taper  - ALP up, but AST/ALT/TBili downtrending  - Advance diet to clear liquids  - Strict I&O's  - Immuno: FK level 7.9.  Decrease FK to 1/0.5, MMF 500mg PO BID, Pred 20  - PPx: valcyte/bactrim/fluc  - ID following: cont aztreonam/vanc.  11/6 Wound culture growing EColi and VRE.  11/7 OR Ascitic fluid growing EColi and VRE  - PT/mobilize OOB  - IS q1 hour WA  - Daily CBC, CMP, Mag, Phos, INR, [place] level

## 2022-11-11 NOTE — PROGRESS NOTE ADULT - NUTRITIONAL ASSESSMENT
This patient has been assessed with a concern for Malnutrition and has been determined to have a diagnosis/diagnoses of Severe protein-calorie malnutrition.    This patient is being managed with:   Diet NPO-  Except Medications  With Ice Chips/Sips of Water  Entered: Nov 10 2022  6:03PM

## 2022-11-12 LAB
-  AMPICILLIN: SIGNIFICANT CHANGE UP
-  DAPTOMYCIN: SIGNIFICANT CHANGE UP
-  LEVOFLOXACIN: SIGNIFICANT CHANGE UP
-  LINEZOLID: SIGNIFICANT CHANGE UP
-  TETRACYCLINE: SIGNIFICANT CHANGE UP
-  VANCOMYCIN: SIGNIFICANT CHANGE UP
ALBUMIN SERPL ELPH-MCNC: 2 G/DL — LOW (ref 3.3–5)
ALP SERPL-CCNC: 229 U/L — HIGH (ref 40–120)
ALT FLD-CCNC: 55 U/L — HIGH (ref 10–45)
ANION GAP SERPL CALC-SCNC: 9 MMOL/L — SIGNIFICANT CHANGE UP (ref 5–17)
AST SERPL-CCNC: 48 U/L — HIGH (ref 10–40)
BILIRUB SERPL-MCNC: 3.9 MG/DL — HIGH (ref 0.2–1.2)
BUN SERPL-MCNC: 29 MG/DL — HIGH (ref 7–23)
CALCIUM SERPL-MCNC: 7.6 MG/DL — LOW (ref 8.4–10.5)
CHLORIDE SERPL-SCNC: 100 MMOL/L — SIGNIFICANT CHANGE UP (ref 96–108)
CO2 SERPL-SCNC: 25 MMOL/L — SIGNIFICANT CHANGE UP (ref 22–31)
CREAT SERPL-MCNC: 0.69 MG/DL — SIGNIFICANT CHANGE UP (ref 0.5–1.3)
CULTURE RESULTS: SIGNIFICANT CHANGE UP
EGFR: 128 ML/MIN/1.73M2 — SIGNIFICANT CHANGE UP
GLUCOSE SERPL-MCNC: 143 MG/DL — HIGH (ref 70–99)
HCT VFR BLD CALC: 25.2 % — LOW (ref 39–50)
HGB BLD-MCNC: 8.5 G/DL — LOW (ref 13–17)
INR BLD: 1.38 RATIO — HIGH (ref 0.88–1.16)
MAGNESIUM SERPL-MCNC: 1.8 MG/DL — SIGNIFICANT CHANGE UP (ref 1.6–2.6)
MCHC RBC-ENTMCNC: 31.5 PG — SIGNIFICANT CHANGE UP (ref 27–34)
MCHC RBC-ENTMCNC: 33.7 GM/DL — SIGNIFICANT CHANGE UP (ref 32–36)
MCV RBC AUTO: 93.3 FL — SIGNIFICANT CHANGE UP (ref 80–100)
METHOD TYPE: SIGNIFICANT CHANGE UP
NRBC # BLD: 0 /100 WBCS — SIGNIFICANT CHANGE UP (ref 0–0)
ORGANISM # SPEC MICROSCOPIC CNT: SIGNIFICANT CHANGE UP
PHOSPHATE SERPL-MCNC: 3 MG/DL — SIGNIFICANT CHANGE UP (ref 2.5–4.5)
PLATELET # BLD AUTO: 206 K/UL — SIGNIFICANT CHANGE UP (ref 150–400)
POTASSIUM SERPL-MCNC: 4.4 MMOL/L — SIGNIFICANT CHANGE UP (ref 3.5–5.3)
POTASSIUM SERPL-SCNC: 4.4 MMOL/L — SIGNIFICANT CHANGE UP (ref 3.5–5.3)
PROT SERPL-MCNC: 4.4 G/DL — LOW (ref 6–8.3)
PROTHROM AB SERPL-ACNC: 15.9 SEC — HIGH (ref 10.5–13.4)
PTH RELATED PROT SERPL-MCNC: <2 PMOL/L — SIGNIFICANT CHANGE UP
PTH RELATED PROT SERPL-MCNC: <2 PMOL/L — SIGNIFICANT CHANGE UP
RBC # BLD: 2.7 M/UL — LOW (ref 4.2–5.8)
RBC # FLD: 15.4 % — HIGH (ref 10.3–14.5)
SODIUM SERPL-SCNC: 134 MMOL/L — LOW (ref 135–145)
SPECIMEN SOURCE: SIGNIFICANT CHANGE UP
TACROLIMUS SERPL-MCNC: 7.3 NG/ML — SIGNIFICANT CHANGE UP
WBC # BLD: 5.69 K/UL — SIGNIFICANT CHANGE UP (ref 3.8–10.5)
WBC # FLD AUTO: 5.69 K/UL — SIGNIFICANT CHANGE UP (ref 3.8–10.5)

## 2022-11-12 RX ORDER — CEFTRIAXONE 500 MG/1
2000 INJECTION, POWDER, FOR SOLUTION INTRAMUSCULAR; INTRAVENOUS EVERY 24 HOURS
Refills: 0 | Status: DISCONTINUED | OUTPATIENT
Start: 2022-11-12 | End: 2022-11-17

## 2022-11-12 RX ORDER — FUROSEMIDE 40 MG
20 TABLET ORAL ONCE
Refills: 0 | Status: COMPLETED | OUTPATIENT
Start: 2022-11-12 | End: 2022-11-12

## 2022-11-12 RX ADMIN — PANTOPRAZOLE SODIUM 40 MILLIGRAM(S): 20 TABLET, DELAYED RELEASE ORAL at 11:19

## 2022-11-12 RX ADMIN — Medication 80 MILLIGRAM(S): at 05:18

## 2022-11-12 RX ADMIN — TACROLIMUS 0.5 MILLIGRAM(S): 5 CAPSULE ORAL at 20:09

## 2022-11-12 RX ADMIN — HEPARIN SODIUM 5000 UNIT(S): 5000 INJECTION INTRAVENOUS; SUBCUTANEOUS at 17:07

## 2022-11-12 RX ADMIN — MEROPENEM 100 MILLIGRAM(S): 1 INJECTION INTRAVENOUS at 05:19

## 2022-11-12 RX ADMIN — OXYCODONE HYDROCHLORIDE 5 MILLIGRAM(S): 5 TABLET ORAL at 14:07

## 2022-11-12 RX ADMIN — Medication 20 MILLIGRAM(S): at 09:38

## 2022-11-12 RX ADMIN — LINEZOLID 300 MILLIGRAM(S): 600 INJECTION, SOLUTION INTRAVENOUS at 02:56

## 2022-11-12 RX ADMIN — CHLORHEXIDINE GLUCONATE 1 APPLICATION(S): 213 SOLUTION TOPICAL at 07:38

## 2022-11-12 RX ADMIN — MYCOPHENOLATE MOFETIL 500 MILLIGRAM(S): 250 CAPSULE ORAL at 20:09

## 2022-11-12 RX ADMIN — VALGANCICLOVIR 900 MILLIGRAM(S): 450 TABLET, FILM COATED ORAL at 11:19

## 2022-11-12 RX ADMIN — LINEZOLID 300 MILLIGRAM(S): 600 INJECTION, SOLUTION INTRAVENOUS at 13:15

## 2022-11-12 RX ADMIN — TACROLIMUS 1 MILLIGRAM(S): 5 CAPSULE ORAL at 07:37

## 2022-11-12 RX ADMIN — CEFTRIAXONE 100 MILLIGRAM(S): 500 INJECTION, POWDER, FOR SOLUTION INTRAMUSCULAR; INTRAVENOUS at 11:49

## 2022-11-12 RX ADMIN — FLUCONAZOLE 100 MILLIGRAM(S): 150 TABLET ORAL at 17:08

## 2022-11-12 RX ADMIN — HEPARIN SODIUM 5000 UNIT(S): 5000 INJECTION INTRAVENOUS; SUBCUTANEOUS at 05:19

## 2022-11-12 RX ADMIN — OXYCODONE HYDROCHLORIDE 5 MILLIGRAM(S): 5 TABLET ORAL at 15:10

## 2022-11-12 RX ADMIN — MYCOPHENOLATE MOFETIL 500 MILLIGRAM(S): 250 CAPSULE ORAL at 07:37

## 2022-11-12 RX ADMIN — Medication 1 TABLET(S): at 11:19

## 2022-11-12 NOTE — PROGRESS NOTE ADULT - NS ATTEND AMEND GEN_ALL_CORE FT
Doing well.  tolerating regular diet.  LFTs improving.  Will cap biliary drain monday.  Diurese gently.  PT/OT.  Wound care.

## 2022-11-12 NOTE — PROGRESS NOTE ADULT - NUTRITIONAL ASSESSMENT
This patient has been assessed with a concern for Malnutrition and has been determined to have a diagnosis/diagnoses of Severe protein-calorie malnutrition.    This patient is being managed with:   Diet Regular-  Entered: Nov 12 2022  7:59AM

## 2022-11-12 NOTE — PROGRESS NOTE ADULT - ASSESSMENT
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     [] s/p OLT c/b bile leak and RTOR  - Rising LFTS post op; tx with pulse steroids, LFTs improving  - Wound infection (VRE/E coli) - ID following: changed deric to CTX, cont linezolid m  - Diet: adv to Regular  - Pain management   - Strict I&O's  - PT/mobilize OOB  - Daily CBC, CMP, Mag, Phos, INR, [place] level    [] Immuno:   - FK level 7.3 - cont FK 1/0.5mg, MMF 500mg bid, Pred taper   - PPx: valcyte/bactrim/fluc

## 2022-11-12 NOTE — PROGRESS NOTE ADULT - SUBJECTIVE AND OBJECTIVE BOX
lTransplant Surgery Multidisciplinary Progress Note     Present: Patient seen and examined with Transplant Surgeon Dr. Dagher, Transplant Hepatologist JESSA Flores and bedside RN during am rounds.     HPI: 32M PMH Cerebral palsy, Primary Sclerosing Cholangitis Cirrhosis with frequent ERCPs with biliary stent placements for biliary strictures (last in 9/2022), recent COVID s/p MAB in 9/2022).    Underwent OLT with Solumedrol induction on 11/1/22. Post ob course c/b   ·	Fevers   ·	Wound infection (VRE and E Coli)   ·	Bile leak at hepaticojejunostomy requiring re-op on 11/7.     Interval events:   - POD 12 s/p OLT and POD 5 s/p take back  - advanced to clears yest, tolerating, + bowel fucntion.    - Last dose of Solumedrol yest; LFTs trending down  - T tube with ~600c bilious output     Immuno:   Maint immuno: FK by levle, MMF, Pred taperr  ongoing monitoring for signs of rejection      MEDICATIONS  (STANDING):  cefTRIAXone   IVPB 2000 milliGRAM(s) IV Intermittent every 24 hours  chlorhexidine 2% Cloths 1 Application(s) Topical <User Schedule>  fluconAZOLE IVPB 400 milliGRAM(s) IV Intermittent every 24 hours  heparin   Injectable 5000 Unit(s) SubCutaneous every 12 hours  influenza   Vaccine 0.5 milliLiter(s) IntraMuscular once  linezolid  IVPB 600 milliGRAM(s) IV Intermittent every 12 hours  mycophenolate mofetil 500 milliGRAM(s) Oral <User Schedule>  pantoprazole  Injectable 40 milliGRAM(s) IV Push daily  predniSONE   Tablet 20 milliGRAM(s) Oral daily  tacrolimus 1 milliGRAM(s) Oral <User Schedule>  tacrolimus 0.5 milliGRAM(s) Oral <User Schedule>  trimethoprim   80 mG/sulfamethoxazole 400 mG 1 Tablet(s) Oral daily  valGANciclovir 900 milliGRAM(s) Oral daily    MEDICATIONS  (PRN):  HYDROmorphone  Injectable 1 milliGRAM(s) IV Push every 3 hours PRN Severe Pain (7 - 10)  ondansetron Injectable 4 milliGRAM(s) IV Push once PRN Nausea and/or Vomiting  oxyCODONE    IR 10 milliGRAM(s) Oral every 4 hours PRN Severe Pain (7 - 10)  oxyCODONE    IR 5 milliGRAM(s) Oral every 4 hours PRN Moderate Pain (4 - 6)      PAST MEDICAL & SURGICAL HISTORY:  Cerebral palsy  PSC (primary sclerosing cholangitis)  Abnormal LFTs  Bile duct stricture  Dental caries  Impacted teeth  Phimosis  History of seizures at birth   Anemia  History of ERCP multiple with stent insertions/replacement every 3 months ---last 5/20/2022  [place] teeth extracted 2021    Vital Signs Last 24 Hrs  T(C): 36.6 (12 Nov 2022 12:27), Max: 36.8 (11 Nov 2022 21:42)  T(F): 97.8 (12 Nov 2022 12:27), Max: 98.2 (11 Nov 2022 21:42)  HR: 90 (12 Nov 2022 12:27) (56 - 90)  BP: 107/59 (12 Nov 2022 12:27) (107/59 - 144/77)  BP(mean): --  RR: 20 (12 Nov 2022 12:27) (18 - 20)  SpO2: 94% (12 Nov 2022 12:27) (94% - 100%)    Parameters below as of 12 Nov 2022 12:27  Patient On (Oxygen Delivery Method): room air    I&O's Summary    11 Nov 2022 07:01  -  12 Nov 2022 07:00  --------------------------------------------------------  IN: 360 mL / OUT: 1520 mL / NET: -1160 mL    12 Nov 2022 07:01  -  12 Nov 2022 12:48  --------------------------------------------------------  IN: 0 mL / OUT: 700 mL / NET: -700 mL                        8.5    5.69  )-----------( 206      ( 12 Nov 2022 06:58 )             25.2     11-12    134<L>  |  100  |  29<H>  ----------------------------<  143<H>  4.4   |  25  |  0.69    Ca    7.6<L>      12 Nov 2022 06:58  Phos  3.0     11-12  Mg     1.8     11-12    TPro  4.4<L>  /  Alb  2.0<L>  /  TBili  3.9<H>  /  DBili  x   /  AST  48<H>  /  ALT  55<H>  /  AlkPhos  229<H>  11-12    Tacrolimus (), Serum: 7.3 ng/mL (11-12 @ 06:58)    Culture - Blood (collected 11-09-22 @ 06:28)  Source: .Blood Blood-Peripheral  Preliminary Report (11-10-22 @ 11:02):    No growth to date.    Culture - Body Fluid with Gram Stain (collected 11-07-22 @ 22:06)  Source: Abdominal Fl Abdominal Fluid  Gram Stain (11-08-22 @ 03:21):    Numerous polymorphonuclear leukocytes seen per low power field    Few Gram Negative Rods seen per oil power field    Rare Gram positive cocci in pairs seen per oil power field  Preliminary Report (11-10-22 @ 19:36):    **Please Note**: This is a Corrected Report**    Few Escherichia coli    Rare Enterococcus faecium (vancomycin resistant)    Previously reported as:    Rare Alpha hemolytic strep  Organism: Escherichia coli  Enterococcus faecium (vancomycin resistant) (11-10-22 @ 19:35)  Organism: Enterococcus faecium (vancomycin resistant) (11-10-22 @ 19:35)  Organism: Escherichia coli (11-09-22 @ 18:00)    Culture - Body Fluid with Gram Stain (collected 11-07-22 @ 21:48)  Source: Abdominal Fl Abdominal Fluid  Gram Stain (11-08-22 @ 05:01):    polymorphonuclear leukocytes seen    Gram positive cocci in pairs seen    by cytocentrifuge  Preliminary Report (11-10-22 @ 19:44):    Rare Escherichia coli    Few Enterococcus faecium (vancomycin resistant)  Organism: Escherichia coli  Enterococcus faecium (vancomycin resistant) (11-10-22 @ 19:44)  Organism: Enterococcus faecium (vancomycin resistant) (11-10-22 @ 19:44)  Organism: Escherichia coli (11-09-22 @ 17:56)    Culture - Surgical Swab (collected 11-07-22 @ 21:48)  Source: .Surgical Swab wound  Preliminary Report (11-10-22 @ 09:39):    Moderate Escherichia coli    Moderate Enterococcus faecium (vancomycin resistant)  Organism: Escherichia coli  Enterococcus faecium (vancomycin resistant)  Enterococcus faecium (vancomycin resistant) (11-11-22 @ 09:42)  Organism: Enterococcus faecium (vancomycin resistant) (11-11-22 @ 09:42)  Organism: Enterococcus faecium (vancomycin resistant) (11-10-22 @ 09:39)  Organism: Escherichia coli (11-10-22 @ 09:37)    Culture - Blood (collected 11-07-22 @ 06:10)  Source: .Blood Blood  Final Report (11-12-22 @ 09:00):    No Growth Final    Culture - Blood (collected 11-07-22 @ 06:10)  Source: .Blood Blood  Final Report (11-12-22 @ 09:00):    No Growth Final    Culture - Other (collected 11-06-22 @ 12:55)  Source: Wound Wound  Final Report (11-08-22 @ 15:26):    Few Escherichia coli    Few Enterococcus faecium (vancomycin resistant)  Organism: Escherichia coli  Enterococcus faecium (vancomycin resistant) (11-08-22 @ 15:26)  Organism: Enterococcus faecium (vancomycin resistant) (11-08-22 @ 15:26)  Organism: Escherichia coli (11-08-22 @ 15:26)    Culture - Blood (collected 11-06-22 @ 07:22)  Source: .Blood Blood-Venous  Final Report (11-11-22 @ 11:00):    No Growth Final    Culture - Blood (collected 11-06-22 @ 07:22)  Source: .Blood Blood  Final Report (11-11-22 @ 11:00):    No Growth Final    Review of systems  Gen: No fevers/chills, weakness  Skin: No rashes  Head/Eyes/Ears/Mouth: No headache; Normal hearing; Normal vision w/o blurriness; No sinus pain/discomfort, sore throat  Respiratory: No dyspnea, cough, wheezing, hemoptysis  CV: No chest pain, PND, orthopnea  GI: C/O mild abdominal pain at surgical site, No diarrhea, constipation, nausea, vomiting, melena, hematochezia  : No increased frequency, dysuria, hematuria, nocturia  MSK: No joint pain/swelling; no back pain; no edema  Neuro: No dizziness/lightheadedness, weakness, seizures, numbness, tingling  Heme: No easy bruising or bleeding  Endo: No heat/cold intolerance  Psych: No significant nervousness, anxiety, stress, depression  All other systems were reviewed and are negative, except as noted.    PHYSICAL EXAM:  Constitutional: well nourished  Eyes: Anicteric, EOMI  ENMT: nc/at  Neck: supple  Respiratory: CTA b/l   Cardiovascular: RR  Gastrointestinal: soft, non distended, incisional tenderness to palpation, T tube with bilious output, JPx 3 with serous drainage   Genitourinary: voiding   Extremities: SCD's in place and working bilaterally  Vascular: Palpable dp pulses bilaterally  Neurological: Alert, following commands  Skin: Warm,dry, intact throughout   Psychiatric: Responsive

## 2022-11-13 LAB
ALBUMIN SERPL ELPH-MCNC: 2.1 G/DL — LOW (ref 3.3–5)
ALP SERPL-CCNC: 232 U/L — HIGH (ref 40–120)
ALT FLD-CCNC: 59 U/L — HIGH (ref 10–45)
ANION GAP SERPL CALC-SCNC: 9 MMOL/L — SIGNIFICANT CHANGE UP (ref 5–17)
AST SERPL-CCNC: 45 U/L — HIGH (ref 10–40)
BASOPHILS # BLD AUTO: 0 K/UL — SIGNIFICANT CHANGE UP (ref 0–0.2)
BASOPHILS NFR BLD AUTO: 0 % — SIGNIFICANT CHANGE UP (ref 0–2)
BILIRUB SERPL-MCNC: 3.1 MG/DL — HIGH (ref 0.2–1.2)
BUN SERPL-MCNC: 24 MG/DL — HIGH (ref 7–23)
CALCIUM SERPL-MCNC: 7.7 MG/DL — LOW (ref 8.4–10.5)
CHLORIDE SERPL-SCNC: 98 MMOL/L — SIGNIFICANT CHANGE UP (ref 96–108)
CO2 SERPL-SCNC: 25 MMOL/L — SIGNIFICANT CHANGE UP (ref 22–31)
CREAT SERPL-MCNC: 0.71 MG/DL — SIGNIFICANT CHANGE UP (ref 0.5–1.3)
CULTURE RESULTS: SIGNIFICANT CHANGE UP
EGFR: 127 ML/MIN/1.73M2 — SIGNIFICANT CHANGE UP
EOSINOPHIL # BLD AUTO: 0 K/UL — SIGNIFICANT CHANGE UP (ref 0–0.5)
EOSINOPHIL NFR BLD AUTO: 0 % — SIGNIFICANT CHANGE UP (ref 0–6)
GLUCOSE SERPL-MCNC: 117 MG/DL — HIGH (ref 70–99)
HCT VFR BLD CALC: 27.1 % — LOW (ref 39–50)
HGB BLD-MCNC: 8.8 G/DL — LOW (ref 13–17)
IMM GRANULOCYTES NFR BLD AUTO: 1.4 % — HIGH (ref 0–0.9)
INR BLD: 1.27 RATIO — HIGH (ref 0.88–1.16)
LYMPHOCYTES # BLD AUTO: 1.19 K/UL — SIGNIFICANT CHANGE UP (ref 1–3.3)
LYMPHOCYTES # BLD AUTO: 18.5 % — SIGNIFICANT CHANGE UP (ref 13–44)
MAGNESIUM SERPL-MCNC: 1.7 MG/DL — SIGNIFICANT CHANGE UP (ref 1.6–2.6)
MCHC RBC-ENTMCNC: 30.4 PG — SIGNIFICANT CHANGE UP (ref 27–34)
MCHC RBC-ENTMCNC: 32.5 GM/DL — SIGNIFICANT CHANGE UP (ref 32–36)
MCV RBC AUTO: 93.8 FL — SIGNIFICANT CHANGE UP (ref 80–100)
MONOCYTES # BLD AUTO: 0.41 K/UL — SIGNIFICANT CHANGE UP (ref 0–0.9)
MONOCYTES NFR BLD AUTO: 6.4 % — SIGNIFICANT CHANGE UP (ref 2–14)
NEUTROPHILS # BLD AUTO: 4.74 K/UL — SIGNIFICANT CHANGE UP (ref 1.8–7.4)
NEUTROPHILS NFR BLD AUTO: 73.7 % — SIGNIFICANT CHANGE UP (ref 43–77)
NRBC # BLD: 0 /100 WBCS — SIGNIFICANT CHANGE UP (ref 0–0)
ORGANISM # SPEC MICROSCOPIC CNT: SIGNIFICANT CHANGE UP
PHOSPHATE SERPL-MCNC: 2.8 MG/DL — SIGNIFICANT CHANGE UP (ref 2.5–4.5)
PLATELET # BLD AUTO: 159 K/UL — SIGNIFICANT CHANGE UP (ref 150–400)
POTASSIUM SERPL-MCNC: 4.4 MMOL/L — SIGNIFICANT CHANGE UP (ref 3.5–5.3)
POTASSIUM SERPL-SCNC: 4.4 MMOL/L — SIGNIFICANT CHANGE UP (ref 3.5–5.3)
PROT SERPL-MCNC: 4.8 G/DL — LOW (ref 6–8.3)
PROTHROM AB SERPL-ACNC: 14.6 SEC — HIGH (ref 10.5–13.4)
RBC # BLD: 2.89 M/UL — LOW (ref 4.2–5.8)
RBC # FLD: 15 % — HIGH (ref 10.3–14.5)
SARS-COV-2 RNA SPEC QL NAA+PROBE: SIGNIFICANT CHANGE UP
SODIUM SERPL-SCNC: 132 MMOL/L — LOW (ref 135–145)
SPECIMEN SOURCE: SIGNIFICANT CHANGE UP
TACROLIMUS SERPL-MCNC: 7.2 NG/ML — SIGNIFICANT CHANGE UP
WBC # BLD: 6.43 K/UL — SIGNIFICANT CHANGE UP (ref 3.8–10.5)
WBC # FLD AUTO: 6.43 K/UL — SIGNIFICANT CHANGE UP (ref 3.8–10.5)

## 2022-11-13 PROCEDURE — 99232 SBSQ HOSP IP/OBS MODERATE 35: CPT

## 2022-11-13 RX ORDER — FUROSEMIDE 40 MG
20 TABLET ORAL ONCE
Refills: 0 | Status: COMPLETED | OUTPATIENT
Start: 2022-11-13 | End: 2022-11-13

## 2022-11-13 RX ADMIN — CHLORHEXIDINE GLUCONATE 1 APPLICATION(S): 213 SOLUTION TOPICAL at 08:05

## 2022-11-13 RX ADMIN — HEPARIN SODIUM 5000 UNIT(S): 5000 INJECTION INTRAVENOUS; SUBCUTANEOUS at 17:09

## 2022-11-13 RX ADMIN — Medication 20 MILLIGRAM(S): at 11:16

## 2022-11-13 RX ADMIN — PANTOPRAZOLE SODIUM 40 MILLIGRAM(S): 20 TABLET, DELAYED RELEASE ORAL at 11:07

## 2022-11-13 RX ADMIN — VALGANCICLOVIR 900 MILLIGRAM(S): 450 TABLET, FILM COATED ORAL at 11:07

## 2022-11-13 RX ADMIN — MYCOPHENOLATE MOFETIL 500 MILLIGRAM(S): 250 CAPSULE ORAL at 20:53

## 2022-11-13 RX ADMIN — LINEZOLID 300 MILLIGRAM(S): 600 INJECTION, SOLUTION INTRAVENOUS at 02:41

## 2022-11-13 RX ADMIN — FLUCONAZOLE 100 MILLIGRAM(S): 150 TABLET ORAL at 17:07

## 2022-11-13 RX ADMIN — Medication 1 TABLET(S): at 11:08

## 2022-11-13 RX ADMIN — TACROLIMUS 1 MILLIGRAM(S): 5 CAPSULE ORAL at 08:05

## 2022-11-13 RX ADMIN — Medication 70 MILLIGRAM(S): at 05:35

## 2022-11-13 RX ADMIN — HEPARIN SODIUM 5000 UNIT(S): 5000 INJECTION INTRAVENOUS; SUBCUTANEOUS at 05:36

## 2022-11-13 RX ADMIN — MYCOPHENOLATE MOFETIL 500 MILLIGRAM(S): 250 CAPSULE ORAL at 08:04

## 2022-11-13 RX ADMIN — CEFTRIAXONE 100 MILLIGRAM(S): 500 INJECTION, POWDER, FOR SOLUTION INTRAMUSCULAR; INTRAVENOUS at 11:07

## 2022-11-13 RX ADMIN — LINEZOLID 300 MILLIGRAM(S): 600 INJECTION, SOLUTION INTRAVENOUS at 13:13

## 2022-11-13 RX ADMIN — TACROLIMUS 0.5 MILLIGRAM(S): 5 CAPSULE ORAL at 20:53

## 2022-11-13 NOTE — PROGRESS NOTE ADULT - NS ATTEND AMEND GEN_ALL_CORE FT
Doing well. LFTs improving.  Tolerating regular diet.  Renal function is good, gentle diuresis.     fk, cellcept 500 bid, steroid taper.     cap billiary drain tomorrow.

## 2022-11-13 NOTE — PROGRESS NOTE ADULT - ASSESSMENT
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     [] s/p OLT c/b bile leak and RTOR  - Rising LFTS post op; tx with pulse steroids, LFTs improving  - Wound infection (VRE/E coli) - ID following: changed deric to CTX, cont linezolid  - Diet: Regular  - Pain management   - LAsix 20mg IV x1 today  - Strict I&O's  - PT/mobilize OOB  - Daily CBC, CMP, Mag, Phos, INR, [place] level    [] Immuno:   - FK level 7.2 - cont FK 1/0.5mg, MMF 500mg bid, Pred taper   - PPx: valcyte/bactrim/fluc

## 2022-11-13 NOTE — PROGRESS NOTE ADULT - SUBJECTIVE AND OBJECTIVE BOX
infectious diseases progress note:    Patient is a 32y old  Male who presents with a chief complaint of OLT (12 Nov 2022 12:45)        Status post liver transplantation          ROS:  CONSTITUTIONAL:  Negative fever or chills, feels well, good appetite  EYES:  Negative  blurry vision or double vision  CARDIOVASCULAR:  Negative for chest pain or palpitations  RESPIRATORY:  Negative for cough, wheezing, or SOB   GASTROINTESTINAL:  Negative for nausea, vomiting, diarrhea, constipation, or abdominal pain  GENITOURINARY:  Negative frequency, urgency or dysuria  NEUROLOGIC:  No headache, confusion, dizziness, lightheadedness    Allergies    No Known Allergies    Intolerances        ANTIBIOTICS/RELEVANT:  antimicrobials  cefTRIAXone   IVPB 2000 milliGRAM(s) IV Intermittent every 24 hours  fluconAZOLE IVPB 400 milliGRAM(s) IV Intermittent every 24 hours  linezolid  IVPB 600 milliGRAM(s) IV Intermittent every 12 hours  trimethoprim   80 mG/sulfamethoxazole 400 mG 1 Tablet(s) Oral daily  valGANciclovir 900 milliGRAM(s) Oral daily    immunologic:  influenza   Vaccine 0.5 milliLiter(s) IntraMuscular once  mycophenolate mofetil 500 milliGRAM(s) Oral <User Schedule>  tacrolimus 1 milliGRAM(s) Oral <User Schedule>  tacrolimus 0.5 milliGRAM(s) Oral <User Schedule>    OTHER:  chlorhexidine 2% Cloths 1 Application(s) Topical <User Schedule>  heparin   Injectable 5000 Unit(s) SubCutaneous every 12 hours  HYDROmorphone  Injectable 1 milliGRAM(s) IV Push every 3 hours PRN  ondansetron Injectable 4 milliGRAM(s) IV Push once PRN  oxyCODONE    IR 10 milliGRAM(s) Oral every 4 hours PRN  oxyCODONE    IR 5 milliGRAM(s) Oral every 4 hours PRN  pantoprazole  Injectable 40 milliGRAM(s) IV Push daily      Objective:  Vital Signs Last 24 Hrs  T(C): 36.5 (13 Nov 2022 05:35), Max: 36.8 (12 Nov 2022 17:00)  T(F): 97.7 (13 Nov 2022 05:35), Max: 98.2 (12 Nov 2022 17:00)  HR: 61 (13 Nov 2022 05:39) (56 - 90)  BP: 138/66 (13 Nov 2022 05:35) (106/66 - 138/66)  BP(mean): 95 (13 Nov 2022 05:35) (95 - 95)  RR: 18 (13 Nov 2022 05:35) (18 - 20)  SpO2: 100% (13 Nov 2022 05:35) (94% - 100%)    Parameters below as of 13 Nov 2022 05:35  Patient On (Oxygen Delivery Method): room air         Eyes:MIKE, EOMI  Ear/Nose/Throat: no oral lesion, no sinus tenderness on percussion	  Neck:no JVD, no lymphadenopathy, supple  Respiratory: CTA gideon  Cardiovascular: S1S2 RRR, no murmurs  Gastrointestinal:soft, (+) BS, no HSM  Extremities:no e/e/c        LABS:                        8.8    6.43  )-----------( 159      ( 13 Nov 2022 06:34 )             27.1     11-13    132<L>  |  98  |  24<H>  ----------------------------<  117<H>  4.4   |  25  |  0.71    Ca    7.7<L>      13 Nov 2022 06:35  Phos  2.8     11-13  Mg     1.7     11-13    TPro  4.8<L>  /  Alb  2.1<L>  /  TBili  3.1<H>  /  DBili  x   /  AST  45<H>  /  ALT  59<H>  /  AlkPhos  232<H>  11-13    PT/INR - ( 13 Nov 2022 06:35 )   PT: 14.6 sec;   INR: 1.27 ratio                 MICROBIOLOGY:    RECENT CULTURES:  11-09 @ 06:28 .Blood Blood-Peripheral                No growth to date.    11-07 @ 22:06 Abdominal Fl Abdominal Fluid   BRIAN    Numerous polymorphonuclear leukocytes seen per low power field  Few Gram Negative Rods seen per oil power field  Rare Gram positive cocci in pairs seen per oil power field    Escherichia coli  Enterococcus faecium (vancomycin resistant)  Escherichia coli     **Please Note**: This is a Corrected Report**  Few Escherichia coli  Rare Enterococcus faecium (vancomycin resistant)  Previously reported as:  Rare Alpha hemolytic strep    11-07 @ 21:48 .Surgical Swab wound   BRIAN    polymorphonuclear leukocytes seen  Gram positive cocci in pairs seen  by cytocentrifuge    Escherichia coli  Enterococcus faecium (vancomycin resistant)  Enterococcus faecium (vancomycin resistant)  Escherichia coli     Moderate Escherichia coli  Moderate Enterococcus faecium (vancomycin resistant)    11-07 @ 06:10 .Blood Blood                No Growth Final    11-06 @ 12:55 Wound Wound   BRIAN      Escherichia coli  Enterococcus faecium (vancomycin resistant)  Escherichia coli     Few Escherichia coli  Few Enterococcus faecium (vancomycin resistant)          RESPIRATORY CULTURES:              RADIOLOGY & ADDITIONAL STUDIES:        Pager [phone]  After 5 pm/weekends or if no response :[phone]

## 2022-11-13 NOTE — PROGRESS NOTE ADULT - SUBJECTIVE AND OBJECTIVE BOX
lTransplant Surgery Multidisciplinary Progress Note     Present: Patient seen and examined with Transplant Surgeon Dr. Dagher, Transplant Hepatologist Dr. Carrillo, [facility] and bedside RN during am rounds.     HPI: 32M PMH Cerebral palsy, Primary Sclerosing Cholangitis Cirrhosis with frequent ERCPs with biliary stent placements for biliary strictures (last in 9/2022), recent COVID s/p MAB in 9/2022).    Underwent OLT with Solumedrol induction on 11/1/22. Post ob course c/b   ·	Fevers   ·	Wound infection (VRE and E Coli)   ·	Bile leak at hepaticojejunostomy requiring re-op on 11/7.     Interval events:   - POD 13 s/p OLT and POD 6 s/p take back  - Tolerating regular diet, + bowel function    - LFTs trending down  - T tube with ~290c bile output   - Ambulating in hallway    Immuno:   Maint immuno: FK by level, MMF 500mg BID, Pred taperr  ongoing monitoring for signs of rejection      MEDICATIONS  (STANDING):  cefTRIAXone   IVPB 2000 milliGRAM(s) IV Intermittent every 24 hours  chlorhexidine 2% Cloths 1 Application(s) Topical <User Schedule>  fluconAZOLE IVPB 400 milliGRAM(s) IV Intermittent every 24 hours  heparin   Injectable 5000 Unit(s) SubCutaneous every 12 hours  influenza   Vaccine 0.5 milliLiter(s) IntraMuscular once  linezolid  IVPB 600 milliGRAM(s) IV Intermittent every 12 hours  mycophenolate mofetil 500 milliGRAM(s) Oral <User Schedule>  pantoprazole  Injectable 40 milliGRAM(s) IV Push daily  tacrolimus 1 milliGRAM(s) Oral <User Schedule>  tacrolimus 0.5 milliGRAM(s) Oral <User Schedule>  trimethoprim   80 mG/sulfamethoxazole 400 mG 1 Tablet(s) Oral daily  valGANciclovir 900 milliGRAM(s) Oral daily    MEDICATIONS  (PRN):  HYDROmorphone  Injectable 1 milliGRAM(s) IV Push every 3 hours PRN Severe Pain (7 - 10)  ondansetron Injectable 4 milliGRAM(s) IV Push once PRN Nausea and/or Vomiting  oxyCODONE    IR 10 milliGRAM(s) Oral every 4 hours PRN Severe Pain (7 - 10)  oxyCODONE    IR 5 milliGRAM(s) Oral every 4 hours PRN Moderate Pain (4 - 6)      PAST MEDICAL & SURGICAL HISTORY:  Cerebral palsy      PSC (primary sclerosing cholangitis)  On liver transplant list      Abnormal LFTs      Bile duct stricture      Dental caries      Impacted teeth  wisdom teeth      Phimosis      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      Anemia      History of ERCP  multiple with stent insertions/replacement every 3 months ---last 5/20/2022      [place] teeth extracted  2021          Vital Signs Last 24 Hrs  T(C): 36.5 (13 Nov 2022 09:25), Max: 36.8 (12 Nov 2022 17:00)  T(F): 97.7 (13 Nov 2022 09:25), Max: 98.2 (12 Nov 2022 17:00)  HR: 86 (13 Nov 2022 09:25) (58 - 86)  BP: 114/71 (13 Nov 2022 09:25) (106/66 - 138/66)  BP(mean): 95 (13 Nov 2022 05:35) (95 - 95)  RR: 20 (13 Nov 2022 09:25) (18 - 20)  SpO2: 96% (13 Nov 2022 09:25) (96% - 100%)    Parameters below as of 13 Nov 2022 09:25  Patient On (Oxygen Delivery Method): room air        I&O's Summary    12 Nov 2022 07:01  -  13 Nov 2022 07:00  --------------------------------------------------------  IN: 0 mL / OUT: 2005 mL / NET: -2005 mL    13 Nov 2022 07:01  -  13 Nov 2022 13:57  --------------------------------------------------------  IN: 0 mL / OUT: 750 mL / NET: -750 mL                              8.8    6.43  )-----------( 159      ( 13 Nov 2022 06:34 )             27.1     11-13    132<L>  |  98  |  24<H>  ----------------------------<  117<H>  4.4   |  25  |  0.71    Ca    7.7<L>      13 Nov 2022 06:35  Phos  2.8     11-13  Mg     1.7     11-13    TPro  4.8<L>  /  Alb  2.1<L>  /  TBili  3.1<H>  /  DBili  x   /  AST  45<H>  /  ALT  59<H>  /  AlkPhos  232<H>  11-13    Tacrolimus (), Serum: 7.2 ng/mL (11-13 @ 06:34)        Culture - Blood (collected 11-09-22 @ 06:28)  Source: .Blood Blood-Peripheral  Preliminary Report (11-10-22 @ 11:02):    No growth to date.    Culture - Body Fluid with Gram Stain (collected 11-07-22 @ 22:06)  Source: Abdominal Fl Abdominal Fluid  Gram Stain (11-08-22 @ 03:21):    Numerous polymorphonuclear leukocytes seen per low power field    Few Gram Negative Rods seen per oil power field    Rare Gram positive cocci in pairs seen per oil power field  Final Report (11-12-22 @ 16:38):    **Please Note**: This is a Corrected Report**    Few Escherichia coli    Rare Enterococcus faecium (vancomycin resistant)    Previously reported as:    Rare Alpha hemolytic strep  Organism: Escherichia coli  Enterococcus faecium (vancomycin resistant) (11-12-22 @ 16:38)  Organism: Enterococcus faecium (vancomycin resistant) (11-12-22 @ 16:38)  Organism: Escherichia coli (11-12-22 @ 16:38)    Culture - Body Fluid with Gram Stain (collected 11-07-22 @ 21:48)  Source: Abdominal Fl Abdominal Fluid  Gram Stain (11-08-22 @ 05:01):    polymorphonuclear leukocytes seen    Gram positive cocci in pairs seen    by cytocentrifuge  Preliminary Report (11-10-22 @ 19:44):    Rare Escherichia coli    Few Enterococcus faecium (vancomycin resistant)  Organism: Escherichia coli  Enterococcus faecium (vancomycin resistant)  Enterococcus faecium (vancomycin resistant) (11-12-22 @ 16:01)  Organism: Enterococcus faecium (vancomycin resistant) (11-12-22 @ 16:01)  Organism: Enterococcus faecium (vancomycin resistant) (11-10-22 @ 19:44)  Organism: Escherichia coli (11-09-22 @ 17:56)    Culture - Surgical Swab (collected 11-07-22 @ 21:48)  Source: .Surgical Swab wound  Final Report (11-13-22 @ 09:46):    Moderate Escherichia coli    Moderate Enterococcus faecium (vancomycin resistant)  Organism: Escherichia coli  Enterococcus faecium (vancomycin resistant)  Enterococcus faecium (vancomycin resistant) (11-13-22 @ 09:46)  Organism: Enterococcus faecium (vancomycin resistant) (11-13-22 @ 09:46)  Organism: Enterococcus faecium (vancomycin resistant) (11-13-22 @ 09:46)  Organism: Escherichia coli (11-13-22 @ 09:46)    Culture - Blood (collected 11-07-22 @ 06:10)  Source: .Blood Blood  Final Report (11-12-22 @ 09:00):    No Growth Final    Culture - Blood (collected 11-07-22 @ 06:10)  Source: .Blood Blood  Final Report (11-12-22 @ 09:00):    No Growth Final          Review of systems  Gen: No fevers/chills, weakness  Skin: No rashes  Head/Eyes/Ears/Mouth: No headache; Normal hearing; Normal vision w/o blurriness; No sinus pain/discomfort, sore throat  Respiratory: No dyspnea, cough, wheezing, hemoptysis  CV: No chest pain, PND, orthopnea  GI: C/O mild abdominal pain at surgical site, No diarrhea, constipation, nausea, vomiting, melena, hematochezia  : No increased frequency, dysuria, hematuria, nocturia  MSK: No joint pain/swelling; no back pain; no edema  Neuro: No dizziness/lightheadedness, weakness, seizures, numbness, tingling  Heme: No easy bruising or bleeding  Endo: No heat/cold intolerance  Psych: No significant nervousness, anxiety, stress, depression  All other systems were reviewed and are negative, except as noted.    PHYSICAL EXAM:  Constitutional: well nourished  Eyes: Anicteric, EOMI  ENMT: nc/at  Neck: supple  Respiratory: CTA b/l   Cardiovascular: RR  Gastrointestinal: soft, non distended, incisional tenderness to palpation, T tube with bile output, JPx 3 with serous drainage   Genitourinary: voiding   Extremities: SCD's in place and working bilaterally  Vascular: Palpable dp pulses bilaterally  Neurological: Alert, following commands  Skin: Warm,dry, intact throughout   Psychiatric: Responsive

## 2022-11-13 NOTE — PROGRESS NOTE ADULT - ASSESSMENT
32 year old male with PMHx of Cerebral palsy, Primary Sclerosing Cholangitis Cirrhosis with frequent ERCPs with biliary stent placements for biliary strictures (last in 9/2022) on liver transplant list, recent COVID s/p MAB in (9/2022) admitted for liver transplant s/p liver transplant on 10/31.    Developed fevers post-operatively on 11/2  Noted to have drainage on from operative wound 11/5 > 11/6  CT A/P (11/6) Fluid and air are noted within the right upper quadrant, lateral to the tip of the surgical drainage catheter.    On 11/7 s/p Ex-lap with finding of biliary leak. s/p Velasquez limb resection and recreation; end to side hepatico jejunostomy and side to side jejunojejunostomy (both hand-sewn). s/p 2 biliary drain placement.     Wound Culture (11/6) Few E coli and Few VRE  Operative Culture (11/7) Gram stain with E coli and Enterococcus faecium    #Fever, Infected Biloma, Leukocytosis, Positive Culture Finding    Ceftriaxone 2g IV Q24H  --Continue Linezolid 600 mg IV/PO Q12H      Looks well out of bed no complaints  no change in antibiotics at present        #Liver Transplant Recipient (CMV +/-, EBV +/-), Prophylactic Antibiotic  --Continue Valcyte 900 mg PO Q24H for CMV PPx  --Continue Bactrim SS PO Q24H for PCP PPx

## 2022-11-14 ENCOUNTER — TRANSCRIPTION ENCOUNTER (OUTPATIENT)
Age: 32
End: 2022-11-14

## 2022-11-14 LAB
ALBUMIN SERPL ELPH-MCNC: 2.2 G/DL — LOW (ref 3.3–5)
ALP SERPL-CCNC: 235 U/L — HIGH (ref 40–120)
ALT FLD-CCNC: 63 U/L — HIGH (ref 10–45)
ANION GAP SERPL CALC-SCNC: 8 MMOL/L — SIGNIFICANT CHANGE UP (ref 5–17)
AST SERPL-CCNC: 41 U/L — HIGH (ref 10–40)
BASOPHILS # BLD AUTO: 0 K/UL — SIGNIFICANT CHANGE UP (ref 0–0.2)
BASOPHILS NFR BLD AUTO: 0 % — SIGNIFICANT CHANGE UP (ref 0–2)
BILIRUB SERPL-MCNC: 2.8 MG/DL — HIGH (ref 0.2–1.2)
BUN SERPL-MCNC: 18 MG/DL — SIGNIFICANT CHANGE UP (ref 7–23)
CALCIUM SERPL-MCNC: 7.7 MG/DL — LOW (ref 8.4–10.5)
CHLORIDE SERPL-SCNC: 103 MMOL/L — SIGNIFICANT CHANGE UP (ref 96–108)
CO2 SERPL-SCNC: 24 MMOL/L — SIGNIFICANT CHANGE UP (ref 22–31)
CREAT SERPL-MCNC: 0.71 MG/DL — SIGNIFICANT CHANGE UP (ref 0.5–1.3)
CULTURE RESULTS: SIGNIFICANT CHANGE UP
CULTURE RESULTS: SIGNIFICANT CHANGE UP
EGFR: 127 ML/MIN/1.73M2 — SIGNIFICANT CHANGE UP
EOSINOPHIL # BLD AUTO: 0.02 K/UL — SIGNIFICANT CHANGE UP (ref 0–0.5)
EOSINOPHIL NFR BLD AUTO: 0.3 % — SIGNIFICANT CHANGE UP (ref 0–6)
GLUCOSE SERPL-MCNC: 104 MG/DL — HIGH (ref 70–99)
HCT VFR BLD CALC: 26.6 % — LOW (ref 39–50)
HGB BLD-MCNC: 8.8 G/DL — LOW (ref 13–17)
IMM GRANULOCYTES NFR BLD AUTO: 1.1 % — HIGH (ref 0–0.9)
INR BLD: 1.24 RATIO — HIGH (ref 0.88–1.16)
LYMPHOCYTES # BLD AUTO: 1.75 K/UL — SIGNIFICANT CHANGE UP (ref 1–3.3)
LYMPHOCYTES # BLD AUTO: 26.8 % — SIGNIFICANT CHANGE UP (ref 13–44)
MAGNESIUM SERPL-MCNC: 1.8 MG/DL — SIGNIFICANT CHANGE UP (ref 1.6–2.6)
MCHC RBC-ENTMCNC: 31.1 PG — SIGNIFICANT CHANGE UP (ref 27–34)
MCHC RBC-ENTMCNC: 33.1 GM/DL — SIGNIFICANT CHANGE UP (ref 32–36)
MCV RBC AUTO: 94 FL — SIGNIFICANT CHANGE UP (ref 80–100)
MONOCYTES # BLD AUTO: 0.37 K/UL — SIGNIFICANT CHANGE UP (ref 0–0.9)
MONOCYTES NFR BLD AUTO: 5.7 % — SIGNIFICANT CHANGE UP (ref 2–14)
NEUTROPHILS # BLD AUTO: 4.32 K/UL — SIGNIFICANT CHANGE UP (ref 1.8–7.4)
NEUTROPHILS NFR BLD AUTO: 66.1 % — SIGNIFICANT CHANGE UP (ref 43–77)
NRBC # BLD: 0 /100 WBCS — SIGNIFICANT CHANGE UP (ref 0–0)
ORGANISM # SPEC MICROSCOPIC CNT: SIGNIFICANT CHANGE UP
PHOSPHATE SERPL-MCNC: 2.5 MG/DL — SIGNIFICANT CHANGE UP (ref 2.5–4.5)
PLATELET # BLD AUTO: 140 K/UL — LOW (ref 150–400)
POTASSIUM SERPL-MCNC: 4.1 MMOL/L — SIGNIFICANT CHANGE UP (ref 3.5–5.3)
POTASSIUM SERPL-SCNC: 4.1 MMOL/L — SIGNIFICANT CHANGE UP (ref 3.5–5.3)
PROT SERPL-MCNC: 4.6 G/DL — LOW (ref 6–8.3)
PROTHROM AB SERPL-ACNC: 14.4 SEC — HIGH (ref 10.5–13.4)
RBC # BLD: 2.83 M/UL — LOW (ref 4.2–5.8)
RBC # FLD: 15 % — HIGH (ref 10.3–14.5)
SODIUM SERPL-SCNC: 135 MMOL/L — SIGNIFICANT CHANGE UP (ref 135–145)
SPECIMEN SOURCE: SIGNIFICANT CHANGE UP
SPECIMEN SOURCE: SIGNIFICANT CHANGE UP
SURGICAL PATHOLOGY STUDY: SIGNIFICANT CHANGE UP
TACROLIMUS SERPL-MCNC: 5.6 NG/ML — SIGNIFICANT CHANGE UP
WBC # BLD: 6.53 K/UL — SIGNIFICANT CHANGE UP (ref 3.8–10.5)
WBC # FLD AUTO: 6.53 K/UL — SIGNIFICANT CHANGE UP (ref 3.8–10.5)

## 2022-11-14 RX ORDER — CYCLOBENZAPRINE HYDROCHLORIDE 10 MG/1
10 TABLET, FILM COATED ORAL THREE TIMES A DAY
Refills: 0 | Status: DISCONTINUED | OUTPATIENT
Start: 2022-11-14 | End: 2022-11-22

## 2022-11-14 RX ORDER — MAGNESIUM SULFATE 500 MG/ML
2 VIAL (ML) INJECTION ONCE
Refills: 0 | Status: COMPLETED | OUTPATIENT
Start: 2022-11-14 | End: 2022-11-14

## 2022-11-14 RX ORDER — TACROLIMUS 5 MG/1
1 CAPSULE ORAL
Refills: 0 | Status: DISCONTINUED | OUTPATIENT
Start: 2022-11-14 | End: 2022-11-16

## 2022-11-14 RX ORDER — MYCOPHENOLATE MOFETIL 250 MG/1
1000 CAPSULE ORAL
Refills: 0 | Status: DISCONTINUED | OUTPATIENT
Start: 2022-11-14 | End: 2022-11-22

## 2022-11-14 RX ORDER — ASPIRIN/CALCIUM CARB/MAGNESIUM 324 MG
81 TABLET ORAL DAILY
Refills: 0 | Status: DISCONTINUED | OUTPATIENT
Start: 2022-11-14 | End: 2022-11-22

## 2022-11-14 RX ADMIN — PANTOPRAZOLE SODIUM 40 MILLIGRAM(S): 20 TABLET, DELAYED RELEASE ORAL at 11:45

## 2022-11-14 RX ADMIN — MYCOPHENOLATE MOFETIL 1000 MILLIGRAM(S): 250 CAPSULE ORAL at 17:55

## 2022-11-14 RX ADMIN — LINEZOLID 300 MILLIGRAM(S): 600 INJECTION, SOLUTION INTRAVENOUS at 13:22

## 2022-11-14 RX ADMIN — FLUCONAZOLE 100 MILLIGRAM(S): 150 TABLET ORAL at 17:55

## 2022-11-14 RX ADMIN — MYCOPHENOLATE MOFETIL 500 MILLIGRAM(S): 250 CAPSULE ORAL at 09:35

## 2022-11-14 RX ADMIN — Medication 81 MILLIGRAM(S): at 13:22

## 2022-11-14 RX ADMIN — CEFTRIAXONE 100 MILLIGRAM(S): 500 INJECTION, POWDER, FOR SOLUTION INTRAMUSCULAR; INTRAVENOUS at 11:45

## 2022-11-14 RX ADMIN — VALGANCICLOVIR 900 MILLIGRAM(S): 450 TABLET, FILM COATED ORAL at 11:45

## 2022-11-14 RX ADMIN — LINEZOLID 300 MILLIGRAM(S): 600 INJECTION, SOLUTION INTRAVENOUS at 01:21

## 2022-11-14 RX ADMIN — TACROLIMUS 1 MILLIGRAM(S): 5 CAPSULE ORAL at 09:35

## 2022-11-14 RX ADMIN — HEPARIN SODIUM 5000 UNIT(S): 5000 INJECTION INTRAVENOUS; SUBCUTANEOUS at 05:43

## 2022-11-14 RX ADMIN — HEPARIN SODIUM 5000 UNIT(S): 5000 INJECTION INTRAVENOUS; SUBCUTANEOUS at 17:55

## 2022-11-14 RX ADMIN — Medication 60 MILLIGRAM(S): at 05:39

## 2022-11-14 RX ADMIN — OXYCODONE HYDROCHLORIDE 5 MILLIGRAM(S): 5 TABLET ORAL at 01:55

## 2022-11-14 RX ADMIN — OXYCODONE HYDROCHLORIDE 5 MILLIGRAM(S): 5 TABLET ORAL at 01:25

## 2022-11-14 RX ADMIN — TACROLIMUS 1 MILLIGRAM(S): 5 CAPSULE ORAL at 20:12

## 2022-11-14 RX ADMIN — Medication 1 TABLET(S): at 11:45

## 2022-11-14 RX ADMIN — Medication 25 GRAM(S): at 13:22

## 2022-11-14 NOTE — DISCHARGE NOTE PROVIDER - NSDCCPCAREPLAN_GEN_ALL_CORE_FT
PRINCIPAL DISCHARGE DIAGNOSIS  Diagnosis: Liver transplant recipient  Assessment and Plan of Treatment: No heavy lifting more than 5 pounds.  You may shower, but keep T-tube site secure, clean and dry as much as possible. No soaking or scrubbing wound. No swimming pools, or baths.  You may walk, but always with help, especially in the beginning after major surgery.  Contact our Transplant clinic at [phone], return to our Emergency Department or NOTIFY YOUR SURGEON IF:  You have any bleeding that does not stop, any pus draining from your wound, any fever (over 100.4 F) or chills, persistent nausea/vomiting with inability to tolerate food or liquids, persistent diarrhea, and/or if your pain is not controlled on your discharge pain medications, jaundice, confusion, any signs of bleeding, any urinary changes.        SECONDARY DISCHARGE DIAGNOSES  Diagnosis: Immunosuppression  Assessment and Plan of Treatment: - Transplant recipients are at risk for developing infection because immune system is lowered due to transplant medications.   - Avoid contact with sick people; practice good hand hygiene;   - Take medications as directed by your translant team. Never stop taking medications unless instructed by your transplant physician.  - Do NOT double up medication dose if you missed your dose; call the transplant office for instructions at [phone]      Diagnosis: Primary sclerosing cholangitis  Assessment and Plan of Treatment:     Diagnosis: Cerebral palsy  Assessment and Plan of Treatment:     Diagnosis: Portal vein thrombosis  Assessment and Plan of Treatment:

## 2022-11-14 NOTE — DISCHARGE NOTE PROVIDER - CARE PROVIDER_API CALL
Dagher, Nabil N (MD)  Surgery  [address] [place] [address]  Phone: [phone]  Fax: [phone]  Follow Up Time:    Dagher, Nabil N (MD)  Surgery  [address] [place] [address]  Phone: [phone]  Fax: [phone]  Scheduled Appointment: 11/28/2022

## 2022-11-14 NOTE — DISCHARGE NOTE PROVIDER - NSDCMRMEDTOKEN_GEN_ALL_CORE_FT
amoxicillin-clavulanate 875 mg-125 mg oral tablet: 1 tab(s) orally 2 times a day   Eliquis 5 mg oral tablet: 1 tab(s) orally 2 times a day  guaifenesin-dextromethorphan 100 mg-10 mg/5 mL oral liquid: 10 milliliter(s) orally every 6 hours  menthol-benzocaine 3.6 mg-15 mg mucous membrane lozenge: 1  mucous membrane every 6 hours, As Needed - for cough  menthol-benzocaine 3.6 mg-15 mg mucous membrane lozenge: 1 lozenge orally every 6 hours, As Needed -for cough   simethicone 80 mg oral tablet, chewable: 1 tab(s) orally 3 times a day   3-in-1 commode: 1 each use as directed, Z94.4  aspirin 81 mg oral delayed release tablet: 1 tab(s) orally once a day  calcium carbonate 1250 mg (500 mg elemental calcium) oral tablet: 1 tab(s) orally 2 times a day  CellCept 500 mg oral tablet: 2 tab(s) orally 2 times a day  fluconazole 200 mg oral tablet: 2 tab(s) orally once a day  pantoprazole 40 mg oral delayed release tablet: 1 tab(s) orally once a day (before a meal)  predniSONE 10 mg oral tablet: 1.5 tab(s) orally once a day  rolling walker: 1 each use as directed, Z94.4  Standard Wheelchair: s/p LIver transplant   sulfamethoxazole-trimethoprim 400 mg-80 mg oral tablet: 1 tab(s) orally once a day  tacrolimus 0.5 mg oral capsule: 1 cap(s) orally 2 times a day  tacrolimus 1 mg oral capsule: 2 cap(s) orally every 12 hours  transport wheelchair: 1 each, use as directed Z94.4  ursodiol 300 mg oral capsule: 1 cap(s) orally 2 times a day  valGANciclovir 450 mg oral tablet: 2 tab(s) orally once a day

## 2022-11-14 NOTE — PROGRESS NOTE ADULT - ASSESSMENT
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     [] s/p OLT c/b bile leak and RTOR  - Rising LFTS post op; tx with pulse steroids, LFTs improving  - Cap TTube today  - Start Aspirin 81mg po qd  - SQH BID  - Wound infection (VRE/E coli) - ID following: changed deric to CTX, cont linezolid  - Diet: Regular  - Pain management   - Strict I&O's  - PT/mobilize OOB  - Daily CBC, CMP, Mag, Phos, INR, [place]ro level    [] Immuno:   - FK level 5.6. Increase FK to 1/1. Increase MMF to 1000mg bid, Pred taper   - PPx: valcyte/bactrim/fluc

## 2022-11-14 NOTE — DISCHARGE NOTE PROVIDER - DETAILS OF MALNUTRITION DIAGNOSIS/DIAGNOSES
This patient has been assessed with a concern for Malnutrition and was treated during this hospitalization for the following Nutrition diagnosis/diagnoses:     -  11/11/2022: Severe protein-calorie malnutrition

## 2022-11-14 NOTE — PROGRESS NOTE ADULT - NS ATTEND AMEND GEN_ALL_CORE FT
doing well.    biliary tube capped. TBili downtrending  Wound CDI  Immuno: tac BID, cellcept inc to 1gm BID, pred taper

## 2022-11-14 NOTE — PROGRESS NOTE ADULT - SUBJECTIVE AND OBJECTIVE BOX
lTransplant Surgery Multidisciplinary Progress Note     Present: Patient seen and examined with Transplant Surgeon Dr. Cartwright, Transplant Hepatologist Dr. Ceja, [facility] and bedside RN during am rounds.     HPI: 32M PMH Cerebral palsy, Primary Sclerosing Cholangitis Cirrhosis with frequent ERCPs with biliary stent placements for biliary strictures (last in 9/2022), recent COVID s/p MAB in 9/2022).    Underwent OLT with Solumedrol induction on 11/1/22. Post ob course c/b   ·	Fevers   ·	Wound infection (VRE and E Coli)   ·	Bile leak at hepaticojejunostomy requiring re-op on 11/7.     Interval events:   - POD 14 s/p OLT and POD 7 s/p take back  - Tolerating regular diet, + bowel function    - LFTs trending down on Pred taper  - T tube with ~190c bile output   - Ambulating in hallway    Immuno:   Maint immuno: FK by level, MMF 500mg BID, Pred taperr  ongoing monitoring for signs of rejection      MEDICATIONS  (STANDING):  aspirin enteric coated 81 milliGRAM(s) Oral daily  cefTRIAXone   IVPB 2000 milliGRAM(s) IV Intermittent every 24 hours  chlorhexidine 2% Cloths 1 Application(s) Topical <User Schedule>  fluconAZOLE IVPB 400 milliGRAM(s) IV Intermittent every 24 hours  heparin   Injectable 5000 Unit(s) SubCutaneous every 12 hours  influenza   Vaccine 0.5 milliLiter(s) IntraMuscular once  linezolid  IVPB 600 milliGRAM(s) IV Intermittent every 12 hours  mycophenolate mofetil 1000 milliGRAM(s) Oral two times a day  pantoprazole  Injectable 40 milliGRAM(s) IV Push daily  tacrolimus 1 milliGRAM(s) Oral <User Schedule>  trimethoprim   80 mG/sulfamethoxazole 400 mG 1 Tablet(s) Oral daily  valGANciclovir 900 milliGRAM(s) Oral daily    MEDICATIONS  (PRN):  HYDROmorphone  Injectable 1 milliGRAM(s) IV Push every 3 hours PRN Severe Pain (7 - 10)  ondansetron Injectable 4 milliGRAM(s) IV Push once PRN Nausea and/or Vomiting  oxyCODONE    IR 10 milliGRAM(s) Oral every 4 hours PRN Severe Pain (7 - 10)  oxyCODONE    IR 5 milliGRAM(s) Oral every 4 hours PRN Moderate Pain (4 - 6)      PAST MEDICAL & SURGICAL HISTORY:  Cerebral palsy      PSC (primary sclerosing cholangitis)  On liver transplant list      Abnormal LFTs      Bile duct stricture      Dental caries      Impacted teeth  wisdom teeth      Phimosis      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      Anemia      History of ERCP  multiple with stent insertions/replacement every 3 months ---last 5/20/2022      [place] teeth extracted  2021          Vital Signs Last 24 Hrs  T(C): 36.8 (14 Nov 2022 13:00), Max: 36.8 (14 Nov 2022 13:00)  T(F): 98.2 (14 Nov 2022 13:00), Max: 98.2 (14 Nov 2022 13:00)  HR: 88 (14 Nov 2022 14:43) (58 - 88)  BP: 117/65 (14 Nov 2022 14:43) (110/69 - 119/71)  BP(mean): --  RR: 18 (14 Nov 2022 13:00) (17 - 18)  SpO2: 100% (14 Nov 2022 13:00) (99% - 100%)    Parameters below as of 14 Nov 2022 13:00  Patient On (Oxygen Delivery Method): room air        I&O's Summary    13 Nov 2022 07:01  -  14 Nov 2022 07:00  --------------------------------------------------------  IN: 0 mL / OUT: 1865 mL / NET: -1865 mL    14 Nov 2022 07:01  -  14 Nov 2022 15:54  --------------------------------------------------------  IN: 650 mL / OUT: 1040 mL / NET: -390 mL                              8.8    6.53  )-----------( 140      ( 14 Nov 2022 06:28 )             26.6     11-14    135  |  103  |  18  ----------------------------<  104<H>  4.1   |  24  |  0.71    Ca    7.7<L>      14 Nov 2022 06:28  Phos  2.5     11-14  Mg     1.8     11-14    TPro  4.6<L>  /  Alb  2.2<L>  /  TBili  2.8<H>  /  DBili  x   /  AST  41<H>  /  ALT  63<H>  /  AlkPhos  235<H>  11-14    Tacrolimus (), Serum: 5.6 ng/mL (11-14 @ 06:28)        Culture - Blood (collected 11-09-22 @ 06:28)  Source: .Blood Blood-Peripheral  Final Report (11-14-22 @ 11:00):    No Growth Final    Culture - Body Fluid with Gram Stain (collected 11-07-22 @ 22:06)  Source: Abdominal Fl Abdominal Fluid  Gram Stain (11-08-22 @ 03:21):    Numerous polymorphonuclear leukocytes seen per low power field    Few Gram Negative Rods seen per oil power field    Rare Gram positive cocci in pairs seen per oil power field  Final Report (11-12-22 @ 16:38):    **Please Note**: This is a Corrected Report**    Few Escherichia coli    Rare Enterococcus faecium (vancomycin resistant)    Previously reported as:    Rare Alpha hemolytic strep  Organism: Escherichia coli  Enterococcus faecium (vancomycin resistant) (11-12-22 @ 16:38)  Organism: Enterococcus faecium (vancomycin resistant) (11-12-22 @ 16:38)  Organism: Escherichia coli (11-12-22 @ 16:38)    Culture - Body Fluid with Gram Stain (collected 11-07-22 @ 21:48)  Source: Abdominal Fl Abdominal Fluid  Gram Stain (11-08-22 @ 05:01):    polymorphonuclear leukocytes seen    Gram positive cocci in pairs seen    by cytocentrifuge  Preliminary Report (11-10-22 @ 19:44):    Rare Escherichia coli    Few Enterococcus faecium (vancomycin resistant)  Organism: Escherichia coli  Enterococcus faecium (vancomycin resistant)  Enterococcus faecium (vancomycin resistant) (11-12-22 @ 16:01)  Organism: Enterococcus faecium (vancomycin resistant) (11-12-22 @ 16:01)  Organism: Enterococcus faecium (vancomycin resistant) (11-10-22 @ 19:44)  Organism: Escherichia coli (11-09-22 @ 17:56)    Culture - Surgical Swab (collected 11-07-22 @ 21:48)  Source: .Surgical Swab wound  Final Report (11-13-22 @ 09:46):    Moderate Escherichia coli    Moderate Enterococcus faecium (vancomycin resistant)  Organism: Escherichia coli  Enterococcus faecium (vancomycin resistant)  Enterococcus faecium (vancomycin resistant) (11-13-22 @ 09:46)  Organism: Enterococcus faecium (vancomycin resistant) (11-13-22 @ 09:46)  Organism: Enterococcus faecium (vancomycin resistant) (11-13-22 @ 09:46)  Organism: Escherichia coli (11-13-22 @ 09:46)      Review of systems  Gen: No fevers/chills, weakness  Skin: No rashes  Head/Eyes/Ears/Mouth: No headache; Normal hearing; Normal vision w/o blurriness; No sinus pain/discomfort, sore throat  Respiratory: No dyspnea, cough, wheezing, hemoptysis  CV: No chest pain, PND, orthopnea  GI: C/O mild abdominal pain at surgical site, No diarrhea, constipation, nausea, vomiting, melena, hematochezia  : No increased frequency, dysuria, hematuria, nocturia  MSK: No joint pain/swelling; no back pain; no edema  Neuro: No dizziness/lightheadedness, weakness, seizures, numbness, tingling  Heme: No easy bruising or bleeding  Endo: No heat/cold intolerance  Psych: No significant nervousness, anxiety, stress, depression  All other systems were reviewed and are negative, except as noted.    PHYSICAL EXAM:  Constitutional: well nourished  Eyes: Anicteric, EOMI  ENMT: nc/at  Neck: supple  Respiratory: CTA b/l   Cardiovascular: RR  Gastrointestinal: soft, non distended, incisional tenderness to palpation, T tube with bile output, JPx 3 with serous drainage   Genitourinary: voiding   Extremities: SCD's in place and working bilaterally  Vascular: Palpable dp pulses bilaterally  Neurological: Alert, following commands  Skin: Warm,dry, intact throughout   Psychiatric: Responsive

## 2022-11-14 NOTE — PROGRESS NOTE ADULT - NUTRITIONAL ASSESSMENT
This patient has been assessed with a concern for Malnutrition and has been determined to have a diagnosis/diagnoses of Severe protein-calorie malnutrition.    This patient is being managed with:   Diet Regular-  Supplement Feeding Modality:  Oral  Ensure Plus High Protein Cans or Servings Per Day:  2       Frequency:  Daily  Entered: Nov 14 2022 12:30PM

## 2022-11-14 NOTE — DISCHARGE NOTE PROVIDER - HOSPITAL COURSE
32M PMH Cerebral palsy with L sided contractures, Primary Sclerosing Cholangitis Cirrhosis with frequent ERCPs with biliary stent placements for biliary strictures (last in 9/2022), recent COVID s/p MAB in 9/2022).    Underwent OLT with Solumedrol induction on 11/1/22, RY HJ for bile duct anastomosis. Post ob course c/b:  Fevers   Wound infection (VRE and E Coli)   Bile leak at hepaticojejunostomy requiring revision of biliary anastomosis/Velasquez-en-y over T-tube on 11/7.     Ambulated well with minimal assistance. Tolerated PO without issues. Passed TOV  after chavez removal  JPs all removed  T-tube capped  Good graft function with good flow on dopplers    Evaluated daily by our multidisciplinary transplant team and deemed safe for d/c home with the following plan:    -FK-------, MMF-----, Pred------  -T-Tube capped  - 32M PMH Cerebral palsy with L sided contractures, Primary Sclerosing Cholangitis Cirrhosis with frequent ERCPs with biliary stent placements for biliary strictures (last in 9/2022), recent COVID s/p MAB in 9/2022).    Underwent OLT with Solumedrol induction on 11/1/22, RY HJ for bile duct anastomosis. Post ob course c/b:  Fevers   Wound infection (VRE and E Coli)   Bile leak at hepaticojejunostomy requiring revision of biliary anastomosis/Velasquez-en-y over T-tube on 11/7.     Ambulated well with minimal assistance. Tolerated PO without issues. Passed TOV  after chavez removal  JPs all removed  T-tube capped  Good graft function with good flow on dopplers    Evaluated daily by our multidisciplinary transplant team and deemed safe for d/c home with the following plan:    -FK-------, MMF-----, Pred------  -T-Tube capped  -wound care with packing BID 32M PMH Cerebral palsy with L sided contractures, Primary Sclerosing Cholangitis Cirrhosis with frequent ERCPs with biliary stent placements for biliary strictures (last in 9/2022), recent COVID s/p MAB in 9/2022).    Underwent OLT with Solumedrol induction on 11/1/22, RY HJ for bile duct anastomosis. Post ob course c/b:  Fevers   Wound infection (VRE and E Coli) - Completed course of Zyvox and Vantin on 11/22/22  Bile leak at hepaticojejunostomy requiring revision of biliary anastomosis/Velasquez-en-y over T-tube on 11/7.     Ambulated well with minimal assistance. Tolerated PO without issues. Passed TOV  after chavez removal  JPs all removed  T-tube capped  Good graft function with good flow on dopplers    Evaluated daily by our multidisciplinary transplant team and deemed safe for d/c home on 11/22/22 with the following plan:  -FK 2.5mg BID, MMF 1g BID, Pred 15  -T-Tube capped  -Wound vac (last changed on 11/22) with home VNS

## 2022-11-14 NOTE — DISCHARGE NOTE PROVIDER - PROVIDER TOKENS
PROVIDER:[TOKEN:[[id number]]] PROVIDER:[TOKEN:[[id number]],SCHEDULEDAPPT:[11/28/2022]]

## 2022-11-14 NOTE — CHART NOTE - NSCHARTNOTEFT_GEN_A_CORE
Nutrition Follow Up Note  Patient seen for malnutrition and S/P OLT follow up     As per chart: "32M PMH Cerebral palsy, Primary Sclerosing Cholangitis Cirrhosis with frequent ERCPs with biliary stent placements for biliary strictures (last in 2022) on liver transplant list (ABO AB), recent COVID s/p MAB in 2022) admitted for liver transplant, S/P OLT (), c/b bile leak, and Velasquez-en-y HJ s/p RTOR () for washout, resection of Velasquez limb with end to side hepatico jejunostomy and side to side jejunojejunostomy, biliary drain placement and 2 new JPs (3 total); NGT removed, NPO with sips - diet resumed (); LFTs trending down."    Source: [x] Patient       [x] EMR        [] RN        [x] Family at bedside, mom       [] Other:    -If unable to interview patient: [] Trach/Vent/BiPAP  [] Disoriented/confused/inappropriate to interview as per chart     Pt reports fair-good appetite and PO intake, tolerating well. Mom states pt consuming at least 50% of his meals. Pt and mom agree to Ensure. Pt denies difficulty chewing/swallowing. Pt denies nausea, vomiting, diarrhea, or constipation, last BM ().     Reviewed the importance of adequate kcal and protein intake with recommendations to optimize as tolerated. Pt and mom received education on post transplant nutrition therapy and food safety guidelines for transplant recipients with nutrition package in previous RD visit - deny having questions/concerns about diet and nutrition education provided; mom able to teach back 3-4 points in previous RD visit. Pt and mom aware RD remains available.     Diet Order:   Diet, Regular (-)    Weights:   Daily Weight in k.8 (-14), Weight in k.6 (-13), Weight in k.6 (-12), Weight in k.5 (-11), Weight in k.9 (-), Weight in k.6 ()  -?accuracy of weights and fluctuations as pt with edema and S/P OLT.    Nutritionally Pertinent MEDICATIONS  (STANDING):  cefTRIAXone   IVPB  fluconAZOLE IVPB  linezolid  IVPB  pantoprazole  Injectable  trimethoprim   80 mG/sulfamethoxazole 400 mG  valGANciclovir    Pertinent Labs: ( @ 06:28): Na 135, BUN 18, Cr 0.71, <H>, K+ 4.1, Phos 2.5, Mg 1.8, Alk Phos 235<H>, ALT/SGPT 63<H>, AST/SGOT 41<H>    A1C with Estimated Average Glucose Result: 4.7 % (22 @ 06:49)    Finger Sticks: last () 134 - 158     Skin per nursing documentation: no pressure injures.   Edema as per flow sheets: +1 generalized (previously +2 gideon. foot, ankle, and leg edema).     Estimated Needs:   [x] no change since previous assessment  [] recalculated:     Previous Nutrition Diagnoses:  [x] Inadequate Protein Energy Intake  [x] Food & Nutrition Related Knowledge Deficit  [x] Malnutrition; severe     Nutrition diagnoses are: [x] being addressed with PO intake encouragement, recommendations for nutritional supplements, and nutrition therapy education.     New Nutrition Diagnosis: [x] not applicable    Nutrition Care Plan:  [x] In Progress     Nutrition Interventions:     Education Provided:       [x] Yes  [] No    Recommendations:      1. Continue regular diet upon discharge. Recommend follow up visit with Transplant MD and outpatient RD for dietary modifications as warranted.   2. Recommend Ensure Plus High Protein 2xday to optimize kcal and protein intake. Spoke to team.  3. Gently encourage PO intake and honor food preferences.   4. Recommend Multivitamin if medically feasible to optimize nutrient intake.   5. Review education on post transplant nutrition therapy and food safety guidelines for transplant recipients as needed/requested before discharge.   6. Reviewed the importance of adequate kcal and protein intake with recommendations to optimize as tolerated.   7. Continue to obtain weights as able to identify changes if any.     Monitoring and Evaluation:   Continue to monitor nutritional intake, tolerance to diet prescription, weights, labs, skin integrity    RD remains available upon request and will follow up per protocol  Bibi Carter MS RDN CDN [facility] CCTD #[phone].

## 2022-11-15 LAB
ALBUMIN SERPL ELPH-MCNC: 2.2 G/DL — LOW (ref 3.3–5)
ALBUMIN SERPL ELPH-MCNC: 2.7 G/DL — LOW (ref 3.3–5)
ALP SERPL-CCNC: 281 U/L — HIGH (ref 40–120)
ALP SERPL-CCNC: 332 U/L — HIGH (ref 40–120)
ALT FLD-CCNC: 80 U/L — HIGH (ref 10–45)
ALT FLD-CCNC: 98 U/L — HIGH (ref 10–45)
ANION GAP SERPL CALC-SCNC: 11 MMOL/L — SIGNIFICANT CHANGE UP (ref 5–17)
ANION GAP SERPL CALC-SCNC: 8 MMOL/L — SIGNIFICANT CHANGE UP (ref 5–17)
AST SERPL-CCNC: 55 U/L — HIGH (ref 10–40)
AST SERPL-CCNC: 60 U/L — HIGH (ref 10–40)
BASOPHILS # BLD AUTO: 0 K/UL — SIGNIFICANT CHANGE UP (ref 0–0.2)
BASOPHILS NFR BLD AUTO: 0 % — SIGNIFICANT CHANGE UP (ref 0–2)
BILIRUB SERPL-MCNC: 2.6 MG/DL — HIGH (ref 0.2–1.2)
BILIRUB SERPL-MCNC: 2.9 MG/DL — HIGH (ref 0.2–1.2)
BUN SERPL-MCNC: 15 MG/DL — SIGNIFICANT CHANGE UP (ref 7–23)
BUN SERPL-MCNC: 16 MG/DL — SIGNIFICANT CHANGE UP (ref 7–23)
CALCIUM SERPL-MCNC: 7.6 MG/DL — LOW (ref 8.4–10.5)
CALCIUM SERPL-MCNC: 8.2 MG/DL — LOW (ref 8.4–10.5)
CHLORIDE SERPL-SCNC: 104 MMOL/L — SIGNIFICANT CHANGE UP (ref 96–108)
CHLORIDE SERPL-SCNC: 104 MMOL/L — SIGNIFICANT CHANGE UP (ref 96–108)
CO2 SERPL-SCNC: 21 MMOL/L — LOW (ref 22–31)
CO2 SERPL-SCNC: 24 MMOL/L — SIGNIFICANT CHANGE UP (ref 22–31)
CREAT SERPL-MCNC: 0.56 MG/DL — SIGNIFICANT CHANGE UP (ref 0.5–1.3)
CREAT SERPL-MCNC: 0.61 MG/DL — SIGNIFICANT CHANGE UP (ref 0.5–1.3)
EGFR: 133 ML/MIN/1.73M2 — SIGNIFICANT CHANGE UP
EGFR: 136 ML/MIN/1.73M2 — SIGNIFICANT CHANGE UP
EOSINOPHIL # BLD AUTO: 0.08 K/UL — SIGNIFICANT CHANGE UP (ref 0–0.5)
EOSINOPHIL NFR BLD AUTO: 1.3 % — SIGNIFICANT CHANGE UP (ref 0–6)
GI PCR PANEL: SIGNIFICANT CHANGE UP
GLUCOSE SERPL-MCNC: 144 MG/DL — HIGH (ref 70–99)
GLUCOSE SERPL-MCNC: 91 MG/DL — SIGNIFICANT CHANGE UP (ref 70–99)
HCT VFR BLD CALC: 26.5 % — LOW (ref 39–50)
HGB BLD-MCNC: 8.6 G/DL — LOW (ref 13–17)
IMM GRANULOCYTES NFR BLD AUTO: 1.3 % — HIGH (ref 0–0.9)
INR BLD: 1.21 RATIO — HIGH (ref 0.88–1.16)
LYMPHOCYTES # BLD AUTO: 1.64 K/UL — SIGNIFICANT CHANGE UP (ref 1–3.3)
LYMPHOCYTES # BLD AUTO: 27.6 % — SIGNIFICANT CHANGE UP (ref 13–44)
MAGNESIUM SERPL-MCNC: 1.8 MG/DL — SIGNIFICANT CHANGE UP (ref 1.6–2.6)
MCHC RBC-ENTMCNC: 31.2 PG — SIGNIFICANT CHANGE UP (ref 27–34)
MCHC RBC-ENTMCNC: 32.5 GM/DL — SIGNIFICANT CHANGE UP (ref 32–36)
MCV RBC AUTO: 96 FL — SIGNIFICANT CHANGE UP (ref 80–100)
MONOCYTES # BLD AUTO: 0.27 K/UL — SIGNIFICANT CHANGE UP (ref 0–0.9)
MONOCYTES NFR BLD AUTO: 4.5 % — SIGNIFICANT CHANGE UP (ref 2–14)
NEUTROPHILS # BLD AUTO: 3.87 K/UL — SIGNIFICANT CHANGE UP (ref 1.8–7.4)
NEUTROPHILS NFR BLD AUTO: 65.3 % — SIGNIFICANT CHANGE UP (ref 43–77)
NRBC # BLD: 0 /100 WBCS — SIGNIFICANT CHANGE UP (ref 0–0)
PHOSPHATE SERPL-MCNC: 2.4 MG/DL — LOW (ref 2.5–4.5)
PLATELET # BLD AUTO: 122 K/UL — LOW (ref 150–400)
POTASSIUM SERPL-MCNC: 3.8 MMOL/L — SIGNIFICANT CHANGE UP (ref 3.5–5.3)
POTASSIUM SERPL-MCNC: 4.4 MMOL/L — SIGNIFICANT CHANGE UP (ref 3.5–5.3)
POTASSIUM SERPL-SCNC: 3.8 MMOL/L — SIGNIFICANT CHANGE UP (ref 3.5–5.3)
POTASSIUM SERPL-SCNC: 4.4 MMOL/L — SIGNIFICANT CHANGE UP (ref 3.5–5.3)
PROT SERPL-MCNC: 4.8 G/DL — LOW (ref 6–8.3)
PROT SERPL-MCNC: 5.7 G/DL — LOW (ref 6–8.3)
PROTHROM AB SERPL-ACNC: 14 SEC — HIGH (ref 10.5–13.4)
RBC # BLD: 2.76 M/UL — LOW (ref 4.2–5.8)
RBC # FLD: 15 % — HIGH (ref 10.3–14.5)
SODIUM SERPL-SCNC: 136 MMOL/L — SIGNIFICANT CHANGE UP (ref 135–145)
SODIUM SERPL-SCNC: 136 MMOL/L — SIGNIFICANT CHANGE UP (ref 135–145)
TACROLIMUS SERPL-MCNC: 5.2 NG/ML — SIGNIFICANT CHANGE UP
WBC # BLD: 5.94 K/UL — SIGNIFICANT CHANGE UP (ref 3.8–10.5)
WBC # FLD AUTO: 5.94 K/UL — SIGNIFICANT CHANGE UP (ref 3.8–10.5)

## 2022-11-15 PROCEDURE — 99232 SBSQ HOSP IP/OBS MODERATE 35: CPT

## 2022-11-15 RX ORDER — CHOLESTYRAMINE 4 G/9G
4 POWDER, FOR SUSPENSION ORAL TWICE DAILY
Qty: 60 | Refills: 3 | Status: DISCONTINUED | COMMUNITY
Start: 2021-11-18 | End: 2022-11-15

## 2022-11-15 RX ORDER — FLUCONAZOLE 150 MG/1
400 TABLET ORAL
Refills: 0 | Status: DISCONTINUED | OUTPATIENT
Start: 2022-11-15 | End: 2022-11-22

## 2022-11-15 RX ORDER — HYDROXYZINE HYDROCHLORIDE 25 MG/1
25 TABLET ORAL
Qty: 30 | Refills: 1 | Status: DISCONTINUED | COMMUNITY
Start: 2021-07-27 | End: 2022-11-15

## 2022-11-15 RX ORDER — HYDROCORTISONE 25 MG/G
2.5 CREAM TOPICAL TWICE DAILY
Qty: 1 | Refills: 2 | Status: DISCONTINUED | COMMUNITY
Start: 2021-11-17 | End: 2022-11-15

## 2022-11-15 RX ORDER — CALCIUM CARBONATE 500(1250)
1 TABLET ORAL
Refills: 0 | Status: DISCONTINUED | OUTPATIENT
Start: 2022-11-15 | End: 2022-11-22

## 2022-11-15 RX ORDER — LIDOCAINE HCL 20 MG/ML
10 VIAL (ML) INJECTION ONCE
Refills: 0 | Status: COMPLETED | OUTPATIENT
Start: 2022-11-15 | End: 2022-11-15

## 2022-11-15 RX ORDER — SODIUM,POTASSIUM PHOSPHATES 278-250MG
1 POWDER IN PACKET (EA) ORAL ONCE
Refills: 0 | Status: COMPLETED | OUTPATIENT
Start: 2022-11-15 | End: 2022-11-15

## 2022-11-15 RX ORDER — ERGOCALCIFEROL 1.25 MG/1
1.25 MG CAPSULE, LIQUID FILLED ORAL
Qty: 12 | Refills: 1 | Status: DISCONTINUED | COMMUNITY
Start: 2021-11-28 | End: 2022-11-15

## 2022-11-15 RX ORDER — ERGOCALCIFEROL 1.25 MG/1
1.25 MG CAPSULE ORAL
Qty: 12 | Refills: 0 | Status: DISCONTINUED | COMMUNITY
Start: 2022-07-26 | End: 2022-11-15

## 2022-11-15 RX ORDER — CIPROFLOXACIN HYDROCHLORIDE 500 MG/1
500 TABLET, FILM COATED ORAL
Qty: 14 | Refills: 0 | Status: DISCONTINUED | COMMUNITY
Start: 2022-08-30 | End: 2022-11-15

## 2022-11-15 RX ORDER — URSODIOL 250 MG/1
300 TABLET, FILM COATED ORAL
Refills: 0 | Status: DISCONTINUED | OUTPATIENT
Start: 2022-11-15 | End: 2022-11-22

## 2022-11-15 RX ORDER — PANTOPRAZOLE SODIUM 20 MG/1
40 TABLET, DELAYED RELEASE ORAL
Refills: 0 | Status: DISCONTINUED | OUTPATIENT
Start: 2022-11-15 | End: 2022-11-22

## 2022-11-15 RX ADMIN — Medication 1 PACKET(S): at 13:07

## 2022-11-15 RX ADMIN — Medication 81 MILLIGRAM(S): at 13:08

## 2022-11-15 RX ADMIN — MYCOPHENOLATE MOFETIL 1000 MILLIGRAM(S): 250 CAPSULE ORAL at 05:37

## 2022-11-15 RX ADMIN — MYCOPHENOLATE MOFETIL 1000 MILLIGRAM(S): 250 CAPSULE ORAL at 17:48

## 2022-11-15 RX ADMIN — HEPARIN SODIUM 5000 UNIT(S): 5000 INJECTION INTRAVENOUS; SUBCUTANEOUS at 05:38

## 2022-11-15 RX ADMIN — TACROLIMUS 1 MILLIGRAM(S): 5 CAPSULE ORAL at 09:11

## 2022-11-15 RX ADMIN — Medication 40 MILLIGRAM(S): at 05:37

## 2022-11-15 RX ADMIN — FLUCONAZOLE 400 MILLIGRAM(S): 150 TABLET ORAL at 13:07

## 2022-11-15 RX ADMIN — LINEZOLID 300 MILLIGRAM(S): 600 INJECTION, SOLUTION INTRAVENOUS at 13:07

## 2022-11-15 RX ADMIN — LINEZOLID 300 MILLIGRAM(S): 600 INJECTION, SOLUTION INTRAVENOUS at 01:27

## 2022-11-15 RX ADMIN — VALGANCICLOVIR 900 MILLIGRAM(S): 450 TABLET, FILM COATED ORAL at 13:08

## 2022-11-15 RX ADMIN — TACROLIMUS 1 MILLIGRAM(S): 5 CAPSULE ORAL at 20:53

## 2022-11-15 RX ADMIN — URSODIOL 300 MILLIGRAM(S): 250 TABLET, FILM COATED ORAL at 17:48

## 2022-11-15 RX ADMIN — Medication 10 MILLILITER(S): at 22:00

## 2022-11-15 RX ADMIN — CEFTRIAXONE 100 MILLIGRAM(S): 500 INJECTION, POWDER, FOR SOLUTION INTRAMUSCULAR; INTRAVENOUS at 13:07

## 2022-11-15 RX ADMIN — Medication 1 TABLET(S): at 13:08

## 2022-11-15 RX ADMIN — CHLORHEXIDINE GLUCONATE 1 APPLICATION(S): 213 SOLUTION TOPICAL at 05:39

## 2022-11-15 RX ADMIN — HEPARIN SODIUM 5000 UNIT(S): 5000 INJECTION INTRAVENOUS; SUBCUTANEOUS at 17:48

## 2022-11-15 RX ADMIN — Medication 1 TABLET(S): at 17:47

## 2022-11-15 NOTE — PROVIDER CONTACT NOTE (OTHER) - SITUATION
Noted increased drainage from RLQ incision site after pt returning from .S.  Gauze  and packing saturated. Drainage appears Tan and slightly purulent
Patient receiving PRBC for hemoglobin of 7.2. Transfusion initiated at 22:30.
Pt has unrelenting right-sided abdominal pain and persistent tachycardia.
patient complaining of incontinence overnight
Pt febrile and tachycardic throughout the night. Blood culture x1 sent. Patients mother refusing anymore blood draws at this point d/t failed attempts.
patient VIOLETA/incision site leaking
pt febrile this morning temp of 100.8*F VS per flowsheet. pt asymptomatic . covered with fluc and zozyn. Tmax 102.4 in SICU on Nov 2nd. last bcx - negative

## 2022-11-15 NOTE — PROGRESS NOTE ADULT - ASSESSMENT
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     [] s/p OLT c/b bile leak and RTOR  - Rising LFTS post op; tx with pulse steroids, LFTs improved with mild rise this am  - T Tube capped yest   - Removed VIOLETA #3  - PT/mobilize, please keep pt OOB for all meals    Ambulate with pt four times a day (VRE/E coli) - ID following: on CTX, cont linezolid  - ASA/SQH  - Diet: Regular  - Pain management   - Strict I&O's  - PT/mobilize OOB  - Start Vandana 300mg bid   - Send GI PCR    [] Immuno:   - FK level 5.2, cont FK 1/1, MMF 1/1, Pred 20  - PPx: valcyte/bactrim/fluc

## 2022-11-15 NOTE — PROVIDER CONTACT NOTE (OTHER) - NAME OF MD/NP/PA/DO NOTIFIED:
JESSA Casper
Leila GERMAIN
Emmy Willson/Lakeisha Arriola NP
Dr. jimenez
JESSA Cardenas
CHOCO Jimenez
Ector Ng NP

## 2022-11-15 NOTE — PROVIDER CONTACT NOTE (OTHER) - REASON
Pateients mother is refusing blood draws at this time
Adverse Reaction to PRBC
patient complaining of incontinence
Noted increased drainage from RLQ incision site after pt returning from .S
Unresolved pain
patient VIOLETA/incision site leaking
pt febrile this morning temp of 100.8*F VS per flowsheet. pt asymptomatic

## 2022-11-15 NOTE — PROVIDER CONTACT NOTE (OTHER) - ASSESSMENT
A/O x 4. Denies respiratory / cardiac distress. S.tachy on tele 120-140's. BP stable
Pt is A/O X 4. Denies respiratory / cardiac distress. c/o pain @ site. abd slightly distended and tender. VS as charted
patient VIOLETA site leaking. Initially began small leak after ambulating with PT and then afterwards patient's dressing/gown was saturated with serosanguinous drainage. Patient was complaining of pain in RLQ managed with PRN medications as ordered. Changed site dressing x2, at actual site staples were leaking serosanguinous/purulent like drainage.
Pt is A&Ox4. Tachycardic on tele. Pt denies pain/discomfort.
Pt is tachycardic (120s) with one sustained instance to the 150s for 15 min., borderline febrile (100.2) and diaphoretic, with unresolved 10/10 abdominal pain despite multiple pain modalities.
patient complaining of incontinence overnight. States he had two episodes of soft/loose diarrhea overnight, attempted to pass gas and ended up having accident. Not on stool softeners, had increase of cellcept yesterday, on 3 different antibiotics.
Patient tachycardic in 150's, and reporting chills 10 minutes after initiation of products.  22:40-Lung sounds clear B/L  VS at 22:25; temp-98.9, pulse-121, resp-22, and BP-116/71.  VS at 22:45; temp-98.4, pulse-111, resp-18, and BP-131/78.

## 2022-11-15 NOTE — PROVIDER CONTACT NOTE (OTHER) - DATE AND TIME:
09-Nov-2022 04:04
06-Nov-2022 05:00
05-Nov-2022 17:30
15-Nov-2022 09:00
02-Nov-2022 22:40
07-Nov-2022 00:30
07-Nov-2022 20:00

## 2022-11-15 NOTE — PROVIDER CONTACT NOTE (OTHER) - ACTION/TREATMENT ORDERED:
Barney NP at bedside to assess. Dagher MD called. PCA pump recommended.
will likely order for stool studies
coming to assess site, plan to remove one VIOLETA and suture other if needed for leak
d/t LFT's, will hold off on tylenol and will cont to monitor temp and will culture if rises above 102*F
Provider at bedside, ordered blood transfusion over 3 hours.
Provider notified and aware. Blood culture x1 drawn and sent to lab
will medicate pt for pain, maintain pt on abx and will treat pt for fever. pt has call bell within reach and will continue to monitor

## 2022-11-15 NOTE — PROGRESS NOTE ADULT - ASSESSMENT
32 year old male with PMHx of Cerebral palsy, Primary Sclerosing Cholangitis Cirrhosis with frequent ERCPs with biliary stent placements for biliary strictures (last in 9/2022) on liver transplant list, recent COVID s/p MAB in (9/2022) admitted for liver transplant s/p liver transplant on 10/31.    Developed fevers post-operatively on 11/2  Noted to have drainage on from operative wound 11/5 > 11/6  CT A/P (11/6) Fluid and air are noted within the right upper quadrant, lateral to the tip of the surgical drainage catheter.    On 11/7 s/p Ex-lap with finding of biliary leak. s/p Velasquez limb resection and recreation; end to side hepatico jejunostomy and side to side jejunojejunostomy (both hand-sewn). s/p 2 biliary drain placement.     Wound Culture (11/6) Few E coli and Few VRE  Operative Culture (11/7) Gram stain with E coli and Enterococcus faecium    Anticipate completing minimum 14 day duration of antibiotics: 11/8 --> 11/22    #Fever, Infected Biloma, Leukocytosis, Positive Culture Finding  --Continue Ceftriaxone 2g IV Q24H (anticipate Cefpodoxime on discharge)  --Continue Linezolid 600 mg IV/PO Q12H   --Continue to follow CBC with diff  --Continue to follow transaminases  --Continue to follow temperature curve    #Liver Transplant Recipient (CMV +/-, EBV +/-), Prophylactic Antibiotic  --Continue Valcyte 900 mg PO Q24H for CMV PPx  --Continue Bactrim SS PO Q24H for PCP PPx     I will continue to follow. Please feel free to contact me with any further questions.    Roque Reed M.D.  [facility] Division of Infectious Disease  8AM-5PM Monday - Friday: Available on [employer] Teams  After Hours and Holidays (or if no response on [employer] Teams): Please contact the Infectious Diseases Office at [phone] 32 year old male with PMHx of Cerebral palsy, Primary Sclerosing Cholangitis Cirrhosis with frequent ERCPs with biliary stent placements for biliary strictures (last in 9/2022) on liver transplant list, recent COVID s/p MAB in (9/2022) admitted for liver transplant s/p liver transplant on 10/31.    Developed fevers post-operatively on 11/2  Noted to have drainage on from operative wound 11/5 > 11/6  CT A/P (11/6) Fluid and air are noted within the right upper quadrant, lateral to the tip of the surgical drainage catheter.    On 11/7 s/p Ex-lap with finding of biliary leak. s/p Velasquez limb resection and recreation; end to side hepatico jejunostomy and side to side jejunojejunostomy (both hand-sewn). s/p 2 biliary drain placement.     Wound Culture (11/6) Few E coli and Few VRE  Operative Culture (11/7) Gram stain with E coli and Enterococcus faecium    Anticipate completing minimum 14 day duration of antibiotics: 11/8 --> 11/22    #Fever, Infected Biloma, Leukocytosis, Positive Culture Finding  --Continue Ceftriaxone 2g IV Q24H (anticipate Cefpodoxime on discharge)  --Continue Linezolid 600 mg IV/PO Q12H   --Continue to follow CBC with diff  --Continue to follow transaminases  --Continue to follow temperature curve    #Liver Transplant Recipient (CMV +/-, EBV +/-), Prophylactic Antibiotic  --Continue Valcyte 900 mg PO Q24H for CMV PPx  --Continue Bactrim SS PO Q24H for PCP PPx     I will follow the patient as needed. Please feel free to contact me with any further questions or concerns.    Roque Reed M.D.  [facility] Division of Infectious Disease  8AM-5PM Monday - Friday: Available on [employer] Teams  After Hours and Holidays (or if no response on Microsoft Teams): Please contact the Infectious Diseases Office at [phone]

## 2022-11-15 NOTE — PROVIDER CONTACT NOTE (OTHER) - BACKGROUND
patient admitted for liver transplant post op day 5 with 2 RLQ VIOLETA drains
s/p OLT POD 6. hx of cerebral palsy. Weakness on L. side
s/o OLT POD 7. transitioned from zozyn -> Lalita on 11/6
Patient s/p liver transplant on 10/31.
patient admitted for OLT
32M PMH Cerebral Palsy, Primary Sclerosing Cholangitis Cirrhosis s/p Liver transplant
Pt is s/p liver transplant with RTOR for ex-lap and biliary drain placement 2/2 bile leak.

## 2022-11-15 NOTE — PROVIDER CONTACT NOTE (OTHER) - RECOMMENDATIONS
Assess pt at bedside, CT scan?, pain management consult?
Draw blood cultures later in the day.
Provider to bedside. D/C PRBC, and send to lab for analysis.
cultures / tylenol
does team want to come assess site?
any recommendations at this time?

## 2022-11-15 NOTE — PROGRESS NOTE ADULT - SUBJECTIVE AND OBJECTIVE BOX
Transplant Surgery Multidisciplinary Progress Note     Present: Patient seen and examined with Transplant Surgeon Dr. Cartwright, Transplant Hepatologist Dr. Ceja, JESSA Carbajal, Pharmacist Iris and bedside RN during am rounds.   Disciplines not in attendance will be notified of plan    HPI: 32M PMH Cerebral palsy, Primary Sclerosing Cholangitis Cirrhosis with frequent ERCPs with biliary stent placements for biliary strictures (last in 9/2022), recent COVID s/p MAB in 9/2022).    Underwent OLT with Solumedrol induction on 11/1/22. Post ob course c/b   ·	Fevers   ·	Wound infection (VRE and E Coli)   ·	Bile leak at hepaticojejunostomy requiring re-op on 11/7.     Interval events:   - POD 15 s/p OLT and POD 8 s/p take back  - T tube caped yesterday   - Mild rise in LFTs:  (from 235), AST 55 (41), ALT 80 (63)    Immuno:   Maint immuno: FK by level, MMF 1/1, Pred 20  ongoing monitoring for signs of rejection    MEDICATIONS  (STANDING):  aspirin enteric coated 81 milliGRAM(s) Oral daily  calcium carbonate   1250 mG (OsCal) 1 Tablet(s) Oral two times a day  cefTRIAXone   IVPB 2000 milliGRAM(s) IV Intermittent every 24 hours  chlorhexidine 2% Cloths 1 Application(s) Topical <User Schedule>  fluconAZOLE   Tablet 400 milliGRAM(s) Oral <User Schedule>  heparin   Injectable 5000 Unit(s) SubCutaneous every 12 hours  influenza   Vaccine 0.5 milliLiter(s) IntraMuscular once  linezolid  IVPB 600 milliGRAM(s) IV Intermittent every 12 hours  mycophenolate mofetil 1000 milliGRAM(s) Oral two times a day  pantoprazole    Tablet 40 milliGRAM(s) Oral before breakfast  tacrolimus 1 milliGRAM(s) Oral <User Schedule>  trimethoprim   80 mG/sulfamethoxazole 400 mG 1 Tablet(s) Oral daily  ursodiol Capsule 300 milliGRAM(s) Oral two times a day  valGANciclovir 900 milliGRAM(s) Oral daily    MEDICATIONS  (PRN):  cyclobenzaprine 10 milliGRAM(s) Oral three times a day PRN Muscle Spasm  HYDROmorphone  Injectable 1 milliGRAM(s) IV Push every 3 hours PRN Severe Pain (7 - 10)  ondansetron Injectable 4 milliGRAM(s) IV Push once PRN Nausea and/or Vomiting    PAST MEDICAL & SURGICAL HISTORY:  Cerebral palsy  PSC (primary sclerosing cholangitis)  Abnormal LFTs  Bile duct stricture  Dental caries  Impacted teeth wisdom teeth  Phimosis  History of seizures at birth  Anemia  History of ERCP multiple with stent insertions/replacement every 3 months ---last 5/20/2022  [place] teeth extracted 2021    Vital Signs Last 24 Hrs  T(C): 36.5 (15 Nov 2022 13:00), Max: 36.9 (15 Nov 2022 01:00)  T(F): 97.7 (15 Nov 2022 13:00), Max: 98.4 (15 Nov 2022 01:00)  HR: 94 (15 Nov 2022 13:00) (61 - 94)  BP: 120/74 (15 Nov 2022 13:00) (115/64 - 139/68)  BP(mean): 84 (14 Nov 2022 21:00) (84 - 84)  RR: 18 (15 Nov 2022 13:00) (17 - 18)  SpO2: 100% (15 Nov 2022 13:00) (100% - 100%)    Parameters below as of 15 Nov 2022 13:00  Patient On (Oxygen Delivery Method): room air    I&O's Summary    14 Nov 2022 07:01  -  15 Nov 2022 07:00  --------------------------------------------------------  IN: 2350 mL / OUT: 2710 mL / NET: -360 mL    15 Nov 2022 07:01  -  15 Nov 2022 16:45  --------------------------------------------------------  IN: 850 mL / OUT: 840 mL / NET: 10 mL                          8.6    5.94  )-----------( 122      ( 15 Nov 2022 06:38 )             26.5     11-15    136  |  104  |  15  ----------------------------<  91  3.8   |  24  |  0.61    Ca    7.6<L>      15 Nov 2022 06:36  Phos  2.4     11-15  Mg     1.8     11-15    TPro  4.8<L>  /  Alb  2.2<L>  /  TBili  2.6<H>  /  DBili  x   /  AST  55<H>  /  ALT  80<H>  /  AlkPhos  281<H>  11-15    Tacrolimus (), Serum: 5.2 ng/mL (11-15 @ 06:36)    Culture - Blood (collected 11-09-22 @ 06:28)  Source: .Blood Blood-Peripheral  Final Report (11-14-22 @ 11:00):    No Growth Final      Review of systems  Gen: No fevers/chills, weakness  Skin: No rashes  Head/Eyes/Ears/Mouth: No headache; Normal hearing; Normal vision w/o blurriness; No sinus pain/discomfort, sore throat  Respiratory: No dyspnea, cough, wheezing, hemoptysis  CV: No chest pain, PND, orthopnea  GI: C/O mild abdominal pain at surgical site, No diarrhea, constipation, nausea, vomiting, melena, hematochezia  : No increased frequency, dysuria, hematuria, nocturia  MSK: No joint pain/swelling; no back pain; no edema  Neuro: No dizziness/lightheadedness, weakness, seizures, numbness, tingling  Heme: No easy bruising or bleeding  Endo: No heat/cold intolerance  Psych: No significant nervousness, anxiety, stress, depression  All other systems were reviewed and are negative, except as noted.    PHYSICAL EXAM:  Constitutional: well nourished  Eyes: Anicteric  ENMT: nc/at  Neck: supple  Respiratory: CTA b/l   Cardiovascular: RR  Gastrointestinal: soft, non distended, incisional tenderness to palpation, T tube capped,  JPx 3 with serous drainage   Genitourinary: voiding   Extremities: SCD's in place and working bilaterally  Vascular: Palpable dp pulses bilaterally  Neurological: Alert, following commands  Skin: Warm,dry, intact throughout   Psychiatric: Responsive

## 2022-11-15 NOTE — PROGRESS NOTE ADULT - SUBJECTIVE AND OBJECTIVE BOX
Follow Up:  biloma infection    Interval History: afebrile. abdominal soreness improving.     REVIEW OF SYSTEMS  [  ] ROS unobtainable because:    [ x ] All other systems negative except as noted below    Constitutional:  [ ] fever [ ] chills  [ ] weight loss  [ ] weakness  Skin:  [ ] rash [ ] phlebitis	  Eyes: [ ] icterus [ ] pain  [ ] discharge	  ENMT: [ ] sore throat  [ ] thrush [ ] ulcers [ ] exudates  Respiratory: [ ] dyspnea [ ] hemoptysis [ ] cough [ ] sputum	  Cardiovascular:  [ ] chest pain [ ] palpitations [ ] edema	  Gastrointestinal:  [ ] nausea [ ] vomiting [ ] diarrhea [ ] constipation [ x ] pain	  Genitourinary:  [ ] dysuria [ ] frequency [ ] hematuria [ ] discharge [ ] flank pain  [ ] incontinence  Musculoskeletal:  [ ] myalgias [ ] arthralgias [ ] arthritis  [ ] back pain  Neurological:  [ ] headache [ ] seizures  [ ] confusion/altered mental status    Allergies  No Known Allergies        ANTIMICROBIALS:  cefTRIAXone   IVPB 2000 every 24 hours  fluconAZOLE   Tablet 400 <User Schedule>  linezolid  IVPB 600 every 12 hours  trimethoprim   80 mG/sulfamethoxazole 400 mG 1 daily  valGANciclovir 900 daily      OTHER MEDS:  MEDICATIONS  (STANDING):  aspirin enteric coated 81 daily  cyclobenzaprine 10 three times a day PRN  heparin   Injectable 5000 every 12 hours  HYDROmorphone  Injectable 1 every 3 hours PRN  influenza   Vaccine 0.5 once  mycophenolate mofetil 1000 two times a day  ondansetron Injectable 4 once PRN  pantoprazole    Tablet 40 before breakfast  tacrolimus 1 <User Schedule>  ursodiol Capsule 300 two times a day      Vital Signs Last 24 Hrs  T(C): 37.2 (15 Nov 2022 17:01), Max: 37.2 (15 Nov 2022 17:01)  T(F): 98.9 (15 Nov 2022 17:01), Max: 98.9 (15 Nov 2022 17:01)  HR: 99 (15 Nov 2022 17:01) (61 - 99)  BP: 119/62 (15 Nov 2022 17:01) (115/64 - 139/68)  BP(mean): 84 (14 Nov 2022 21:00) (84 - 84)  RR: 18 (15 Nov 2022 17:01) (17 - 18)  SpO2: 100% (15 Nov 2022 17:01) (100% - 100%)    Parameters below as of 15 Nov 2022 17:01  Patient On (Oxygen Delivery Method): room air        PHYSICAL EXAMINATION:  General: Alert and Awake, NAD  Cardiac: RRR, No M/R/G  Resp: CTAB, No Wh/Rh/Ra  Abdomen: Dressing over abdomen. mild diffuse tenderness to palpation, No HSM, No rigidity or guarding  MSK: No LE edema. No Calf tenderness  Skin: No rashes or lesions. Skin is warm and dry to the touch.   Neuro: Alert and Awake. CN 2-12 Grossly intact. Moves all four extremities spontaneously.  Psych: Calm, Pleasant, Cooperative                          8.6    5.94  )-----------( 122      ( 15 Nov 2022 06:38 )             26.5       11-15    136  |  104  |  16  ----------------------------<  144<H>  4.4   |  21<L>  |  0.56    Ca    8.2<L>      15 Nov 2022 17:05  Phos  2.4     11-15  Mg     1.8     11-15    TPro  5.7<L>  /  Alb  2.7<L>  /  TBili  2.9<H>  /  DBili  x   /  AST  60<H>  /  ALT  98<H>  /  AlkPhos  332<H>  11-15          MICROBIOLOGY:  v  .Blood Blood-Peripheral  11-09-22   No Growth Final  --  --      Abdominal Fl Abdominal Fluid  11-07-22   **Please Note**: This is a Corrected Report**  Few Escherichia coli  Rare Enterococcus faecium (vancomycin resistant)  Previously reported as:  Rare Alpha hemolytic strep  --  Escherichia coli  Enterococcus faecium (vancomycin resistant)      .Surgical Swab wound  11-07-22   Moderate Escherichia coli  Moderate Enterococcus faecium (vancomycin resistant)  --  Escherichia coli  Enterococcus faecium (vancomycin resistant)  Enterococcus faecium (vancomycin resistant)      .Blood Blood  11-07-22   No Growth Final  --  --      Wound Wound  11-06-22   Few Escherichia coli  Few Enterococcus faecium (vancomycin resistant)  --  Escherichia coli  Enterococcus faecium (vancomycin resistant)      .Blood Blood  11-06-22   No Growth Final  --  --      .Blood Blood  11-02-22   No Growth Final  --  --        CMV IgG Antibody: <0.20 U/mL (10-31-22 @ 17:21)          RADIOLOGY:    <The imaging below has been reviewed and visualized by me independently. Findings as detailed in report below>    < from: Xray Chest 1 View- PORTABLE-Routine (Xray Chest 1 View- PORTABLE-Routine in AM.) (11.11.22 @ 09:19) >  IMPRESSION:  Interval removal of enteric tube.  Clear lungs.    < end of copied text >

## 2022-11-16 LAB
ALBUMIN SERPL ELPH-MCNC: 2.3 G/DL — LOW (ref 3.3–5)
ALP SERPL-CCNC: 315 U/L — HIGH (ref 40–120)
ALT FLD-CCNC: 87 U/L — HIGH (ref 10–45)
ANION GAP SERPL CALC-SCNC: 10 MMOL/L — SIGNIFICANT CHANGE UP (ref 5–17)
AST SERPL-CCNC: 55 U/L — HIGH (ref 10–40)
BASOPHILS # BLD AUTO: 0.01 K/UL — SIGNIFICANT CHANGE UP (ref 0–0.2)
BASOPHILS NFR BLD AUTO: 0.1 % — SIGNIFICANT CHANGE UP (ref 0–2)
BILIRUB SERPL-MCNC: 2.4 MG/DL — HIGH (ref 0.2–1.2)
BUN SERPL-MCNC: 14 MG/DL — SIGNIFICANT CHANGE UP (ref 7–23)
CALCIUM SERPL-MCNC: 7.8 MG/DL — LOW (ref 8.4–10.5)
CHLORIDE SERPL-SCNC: 103 MMOL/L — SIGNIFICANT CHANGE UP (ref 96–108)
CO2 SERPL-SCNC: 23 MMOL/L — SIGNIFICANT CHANGE UP (ref 22–31)
CREAT SERPL-MCNC: 0.64 MG/DL — SIGNIFICANT CHANGE UP (ref 0.5–1.3)
EGFR: 131 ML/MIN/1.73M2 — SIGNIFICANT CHANGE UP
EOSINOPHIL # BLD AUTO: 0.15 K/UL — SIGNIFICANT CHANGE UP (ref 0–0.5)
EOSINOPHIL NFR BLD AUTO: 1.9 % — SIGNIFICANT CHANGE UP (ref 0–6)
GLUCOSE SERPL-MCNC: 93 MG/DL — SIGNIFICANT CHANGE UP (ref 70–99)
HCT VFR BLD CALC: 28.6 % — LOW (ref 39–50)
HGB BLD-MCNC: 9.3 G/DL — LOW (ref 13–17)
IMM GRANULOCYTES NFR BLD AUTO: 0.9 % — SIGNIFICANT CHANGE UP (ref 0–0.9)
INR BLD: 1.1 RATIO — SIGNIFICANT CHANGE UP (ref 0.88–1.16)
LYMPHOCYTES # BLD AUTO: 2.28 K/UL — SIGNIFICANT CHANGE UP (ref 1–3.3)
LYMPHOCYTES # BLD AUTO: 29.5 % — SIGNIFICANT CHANGE UP (ref 13–44)
MAGNESIUM SERPL-MCNC: 1.7 MG/DL — SIGNIFICANT CHANGE UP (ref 1.6–2.6)
MCHC RBC-ENTMCNC: 31.3 PG — SIGNIFICANT CHANGE UP (ref 27–34)
MCHC RBC-ENTMCNC: 32.5 GM/DL — SIGNIFICANT CHANGE UP (ref 32–36)
MCV RBC AUTO: 96.3 FL — SIGNIFICANT CHANGE UP (ref 80–100)
MONOCYTES # BLD AUTO: 0.27 K/UL — SIGNIFICANT CHANGE UP (ref 0–0.9)
MONOCYTES NFR BLD AUTO: 3.5 % — SIGNIFICANT CHANGE UP (ref 2–14)
NEUTROPHILS # BLD AUTO: 4.96 K/UL — SIGNIFICANT CHANGE UP (ref 1.8–7.4)
NEUTROPHILS NFR BLD AUTO: 64.1 % — SIGNIFICANT CHANGE UP (ref 43–77)
NRBC # BLD: 0 /100 WBCS — SIGNIFICANT CHANGE UP (ref 0–0)
PHOSPHATE SERPL-MCNC: 3.1 MG/DL — SIGNIFICANT CHANGE UP (ref 2.5–4.5)
PLATELET # BLD AUTO: 132 K/UL — LOW (ref 150–400)
POTASSIUM SERPL-MCNC: 4.2 MMOL/L — SIGNIFICANT CHANGE UP (ref 3.5–5.3)
POTASSIUM SERPL-SCNC: 4.2 MMOL/L — SIGNIFICANT CHANGE UP (ref 3.5–5.3)
PROT SERPL-MCNC: 4.9 G/DL — LOW (ref 6–8.3)
PROTHROM AB SERPL-ACNC: 12.8 SEC — SIGNIFICANT CHANGE UP (ref 10.5–13.4)
PTH RELATED PROT SERPL-MCNC: <2 PMOL/L — SIGNIFICANT CHANGE UP
PTH RELATED PROT SERPL-MCNC: <2 PMOL/L — SIGNIFICANT CHANGE UP
RBC # BLD: 2.97 M/UL — LOW (ref 4.2–5.8)
RBC # FLD: 15.4 % — HIGH (ref 10.3–14.5)
SODIUM SERPL-SCNC: 136 MMOL/L — SIGNIFICANT CHANGE UP (ref 135–145)
TACROLIMUS SERPL-MCNC: 6.5 NG/ML — SIGNIFICANT CHANGE UP
WBC # BLD: 7.74 K/UL — SIGNIFICANT CHANGE UP (ref 3.8–10.5)
WBC # FLD AUTO: 7.74 K/UL — SIGNIFICANT CHANGE UP (ref 3.8–10.5)

## 2022-11-16 PROCEDURE — 93010 ELECTROCARDIOGRAM REPORT: CPT

## 2022-11-16 RX ORDER — TACROLIMUS 5 MG/1
0.5 CAPSULE ORAL ONCE
Refills: 0 | Status: COMPLETED | OUTPATIENT
Start: 2022-11-16 | End: 2022-11-16

## 2022-11-16 RX ORDER — OXYCODONE HYDROCHLORIDE 5 MG/1
5 TABLET ORAL ONCE
Refills: 0 | Status: DISCONTINUED | OUTPATIENT
Start: 2022-11-16 | End: 2022-11-16

## 2022-11-16 RX ORDER — TACROLIMUS 5 MG/1
1.5 CAPSULE ORAL
Refills: 0 | Status: DISCONTINUED | OUTPATIENT
Start: 2022-11-16 | End: 2022-11-17

## 2022-11-16 RX ORDER — LIDOCAINE HCL 20 MG/ML
10 VIAL (ML) INJECTION ONCE
Refills: 0 | Status: COMPLETED | OUTPATIENT
Start: 2022-11-16 | End: 2022-11-16

## 2022-11-16 RX ADMIN — HEPARIN SODIUM 5000 UNIT(S): 5000 INJECTION INTRAVENOUS; SUBCUTANEOUS at 06:28

## 2022-11-16 RX ADMIN — MYCOPHENOLATE MOFETIL 1000 MILLIGRAM(S): 250 CAPSULE ORAL at 06:24

## 2022-11-16 RX ADMIN — URSODIOL 300 MILLIGRAM(S): 250 TABLET, FILM COATED ORAL at 17:37

## 2022-11-16 RX ADMIN — LINEZOLID 300 MILLIGRAM(S): 600 INJECTION, SOLUTION INTRAVENOUS at 01:35

## 2022-11-16 RX ADMIN — TACROLIMUS 1 MILLIGRAM(S): 5 CAPSULE ORAL at 07:56

## 2022-11-16 RX ADMIN — MYCOPHENOLATE MOFETIL 1000 MILLIGRAM(S): 250 CAPSULE ORAL at 17:37

## 2022-11-16 RX ADMIN — VALGANCICLOVIR 900 MILLIGRAM(S): 450 TABLET, FILM COATED ORAL at 13:23

## 2022-11-16 RX ADMIN — OXYCODONE HYDROCHLORIDE 5 MILLIGRAM(S): 5 TABLET ORAL at 02:40

## 2022-11-16 RX ADMIN — TACROLIMUS 1.5 MILLIGRAM(S): 5 CAPSULE ORAL at 19:46

## 2022-11-16 RX ADMIN — CEFTRIAXONE 100 MILLIGRAM(S): 500 INJECTION, POWDER, FOR SOLUTION INTRAMUSCULAR; INTRAVENOUS at 13:22

## 2022-11-16 RX ADMIN — Medication 81 MILLIGRAM(S): at 13:23

## 2022-11-16 RX ADMIN — Medication 1 TABLET(S): at 06:24

## 2022-11-16 RX ADMIN — LINEZOLID 300 MILLIGRAM(S): 600 INJECTION, SOLUTION INTRAVENOUS at 13:22

## 2022-11-16 RX ADMIN — URSODIOL 300 MILLIGRAM(S): 250 TABLET, FILM COATED ORAL at 06:24

## 2022-11-16 RX ADMIN — HEPARIN SODIUM 5000 UNIT(S): 5000 INJECTION INTRAVENOUS; SUBCUTANEOUS at 17:36

## 2022-11-16 RX ADMIN — TACROLIMUS 0.5 MILLIGRAM(S): 5 CAPSULE ORAL at 17:36

## 2022-11-16 RX ADMIN — Medication 20 MILLIGRAM(S): at 06:24

## 2022-11-16 RX ADMIN — PANTOPRAZOLE SODIUM 40 MILLIGRAM(S): 20 TABLET, DELAYED RELEASE ORAL at 06:24

## 2022-11-16 RX ADMIN — OXYCODONE HYDROCHLORIDE 5 MILLIGRAM(S): 5 TABLET ORAL at 02:06

## 2022-11-16 RX ADMIN — FLUCONAZOLE 400 MILLIGRAM(S): 150 TABLET ORAL at 13:24

## 2022-11-16 RX ADMIN — Medication 1 TABLET(S): at 13:23

## 2022-11-16 NOTE — PROGRESS NOTE ADULT - ASSESSMENT
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     [] s/p OLT c/b bile leak and RTOR  - Rising LFTS post op; tx with pulse steroids, LFTs improving  - T Tube capped  - d/c VIOLETA#1  - PT/mobilize, please keep pt OOB for all meals  - Ambulate with pt four times a day (VRE/E coli) - ID following: on CTX, cont linezolid and CTX, will discuss LOT  - dBID wound packing with iodoform or as saturated  - ASA/SQH  - Diet: Regular  - Pain management   - Strict I&O's  - Actigal 300mg bid     [] Immuno:   - FK per level, MMF 1/1, Pred 20  - PPx: valcyte/bactrim/fluc/ppi/oscal 32M PMH Cerebral palsy, Primary Sclerosing Cholangitis Cirrhosis with frequent ERCPs with biliary stent placements for biliary strictures (last in 9/2022) on liver transplant list, s/p liver transplant (11/1) and 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     [] s/p OLT c/b bile leak and RTOR  - Rising LFTS post op; tx with pulse steroids, LFTs improving  - T Tube capped  - d/c VIOLETA#1  - PT/mobilize, please keep pt OOB for all meals  - Ambulate with pt four times a day (VRE/E coli) - ID following: on CTX, cont linezolid and CTX, likely 14D course per Transplant ID  - dBID wound packing with iodoform or as saturated  - ASA/SQH  - Diet: Regular  - Pain management   - Strict I&O's  - Actigal 300mg bid     [] Immuno:   - FK per level, MMF 1/1, Pred 20  - PPx: valcyte/bactrim/fluc/ppi/oscal

## 2022-11-16 NOTE — PROGRESS NOTE ADULT - SUBJECTIVE AND OBJECTIVE BOX
Transplant Surgery Multidisciplinary Progress Note     Present: Patient seen and examined with Transplant Surgeon Dr. Cartwright, Transplant Hepatologist Dr. Ceja, JESSA Terry, Pharmacist Iris and bedside RN during am rounds.   Disciplines not in attendance will be notified of plan    HPI: 32M PMH Cerebral palsy, Primary Sclerosing Cholangitis Cirrhosis with frequent ERCPs with biliary stent placements for biliary strictures (last in 9/2022), recent COVID s/p MAB in 9/2022).    Underwent OLT with Solumedrol induction on 11/1/22. Post ob course c/b   ·	Fevers   ·	Wound infection (VRE and E Coli)   ·	Bile leak at hepaticojejunostomy requiring re-op on 11/7.     Interval events:   - POD 16 s/p OLT and POD 9 s/p take back  - T tube remains capped, tolerating PO, no acute events  - diarrhea slowly improving  - good UO, JPs serous    Immuno:   Maint immuno: FK by level, MMF 1/1, Pred 20  ongoing monitoring for signs of rejection      MEDICATIONS  (STANDING):  aspirin enteric coated 81 milliGRAM(s) Oral daily  calcium carbonate   1250 mG (OsCal) 1 Tablet(s) Oral two times a day  cefTRIAXone   IVPB 2000 milliGRAM(s) IV Intermittent every 24 hours  chlorhexidine 2% Cloths 1 Application(s) Topical <User Schedule>  fluconAZOLE   Tablet 400 milliGRAM(s) Oral <User Schedule>  heparin   Injectable 5000 Unit(s) SubCutaneous every 12 hours  influenza   Vaccine 0.5 milliLiter(s) IntraMuscular once  lidocaine 1% Injectable 10 milliLiter(s) Local Injection once  linezolid  IVPB 600 milliGRAM(s) IV Intermittent every 12 hours  mycophenolate mofetil 1000 milliGRAM(s) Oral two times a day  pantoprazole    Tablet 40 milliGRAM(s) Oral before breakfast  predniSONE   Tablet 20 milliGRAM(s) Oral daily  tacrolimus 1 milliGRAM(s) Oral <User Schedule>  trimethoprim   80 mG/sulfamethoxazole 400 mG 1 Tablet(s) Oral daily  ursodiol Capsule 300 milliGRAM(s) Oral two times a day  valGANciclovir 900 milliGRAM(s) Oral daily    MEDICATIONS  (PRN):  cyclobenzaprine 10 milliGRAM(s) Oral three times a day PRN Muscle Spasm  ondansetron Injectable 4 milliGRAM(s) IV Push once PRN Nausea and/or Vomiting      PAST MEDICAL & SURGICAL HISTORY:  Cerebral palsy      PSC (primary sclerosing cholangitis)  On liver transplant list      Abnormal LFTs      Bile duct stricture      Dental caries      Impacted teeth  wisdom teeth      Phimosis      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      Anemia      History of ERCP  multiple with stent insertions/replacement every 3 months ---last 5/20/2022      [place] teeth extracted  2021          Vital Signs Last 24 Hrs  T(C): 36.6 (16 Nov 2022 09:00), Max: 37.2 (15 Nov 2022 17:01)  T(F): 97.8 (16 Nov 2022 09:00), Max: 98.9 (15 Nov 2022 17:01)  HR: 92 (16 Nov 2022 10:18) (74 - 99)  BP: 133/61 (16 Nov 2022 10:18) (119/62 - 137/61)  BP(mean): --  RR: 18 (16 Nov 2022 09:00) (18 - 18)  SpO2: 100% (16 Nov 2022 10:18) (98% - 100%)    Parameters below as of 16 Nov 2022 10:18  Patient On (Oxygen Delivery Method): room air        I&O's Summary    15 Nov 2022 07:01  -  16 Nov 2022 07:00  --------------------------------------------------------  IN: 850 mL / OUT: 2045 mL / NET: -1195 mL    16 Nov 2022 07:01  -  16 Nov 2022 13:26  --------------------------------------------------------  IN: 0 mL / OUT: 30 mL / NET: -30 mL                              9.3    7.74  )-----------( 132      ( 16 Nov 2022 06:18 )             28.6     11-16    136  |  103  |  14  ----------------------------<  93  4.2   |  23  |  0.64    Ca    7.8<L>      16 Nov 2022 06:18  Phos  3.1     11-16  Mg     1.7     11-16    TPro  4.9<L>  /  Alb  2.3<L>  /  TBili  2.4<H>  /  DBili  x   /  AST  55<H>  /  ALT  87<H>  /  AlkPhos  315<H>  11-16    Tacrolimus (), Serum: 6.5 ng/mL (11-16 @ 06:18)                      Review of systems  Gen: No fevers/chills, weakness  Skin: No rashes  Head/Eyes/Ears/Mouth: No headache; Normal hearing; Normal vision w/o blurriness; No sinus pain/discomfort, sore throat  Respiratory: No dyspnea, cough, wheezing, hemoptysis  CV: No chest pain, PND, orthopnea  GI: C/O mild abdominal pain at surgical site, No diarrhea, constipation, nausea, vomiting, melena, hematochezia  : No increased frequency, dysuria, hematuria, nocturia  MSK: No joint pain/swelling; no back pain; no edema  Neuro: No dizziness/lightheadedness, weakness, seizures, numbness, tingling  Heme: No easy bruising or bleeding  Endo: No heat/cold intolerance  Psych: No significant nervousness, anxiety, stress, depression  All other systems were reviewed and are negative, except as noted.    PHYSICAL EXAM:  Constitutional: well nourished  Eyes: Anicteric  ENMT: nc/at  Neck: supple  Respiratory: CTA b/l   Cardiovascular: RR  Gastrointestinal: soft, non distended, incisional tenderness to palpation, T tube capped,  VIOLETA#1 and #2 with serous drainage. incision packed with iodoform with good granulation tissue  Genitourinary: voiding   Extremities: SCD's in place and working bilaterally, no edema  Vascular: Palpable dp pulses bilaterally  Neurological: Alert, following commands  Skin: Warm,dry, intact throughout   Psychiatric: Responsive

## 2022-11-17 LAB
ALBUMIN SERPL ELPH-MCNC: 2.3 G/DL — LOW (ref 3.3–5)
ALP SERPL-CCNC: 248 U/L — HIGH (ref 40–120)
ALT FLD-CCNC: 66 U/L — HIGH (ref 10–45)
ANION GAP SERPL CALC-SCNC: 9 MMOL/L — SIGNIFICANT CHANGE UP (ref 5–17)
AST SERPL-CCNC: 30 U/L — SIGNIFICANT CHANGE UP (ref 10–40)
BASOPHILS # BLD AUTO: 0 K/UL — SIGNIFICANT CHANGE UP (ref 0–0.2)
BASOPHILS # BLD AUTO: 0.01 K/UL — SIGNIFICANT CHANGE UP (ref 0–0.2)
BASOPHILS NFR BLD AUTO: 0 % — SIGNIFICANT CHANGE UP (ref 0–2)
BASOPHILS NFR BLD AUTO: 0.2 % — SIGNIFICANT CHANGE UP (ref 0–2)
BILIRUB SERPL-MCNC: 2.2 MG/DL — HIGH (ref 0.2–1.2)
BUN SERPL-MCNC: 15 MG/DL — SIGNIFICANT CHANGE UP (ref 7–23)
CALCIUM SERPL-MCNC: 7.8 MG/DL — LOW (ref 8.4–10.5)
CHLORIDE SERPL-SCNC: 103 MMOL/L — SIGNIFICANT CHANGE UP (ref 96–108)
CO2 SERPL-SCNC: 23 MMOL/L — SIGNIFICANT CHANGE UP (ref 22–31)
CREAT SERPL-MCNC: 0.65 MG/DL — SIGNIFICANT CHANGE UP (ref 0.5–1.3)
EGFR: 130 ML/MIN/1.73M2 — SIGNIFICANT CHANGE UP
EOSINOPHIL # BLD AUTO: 0 K/UL — SIGNIFICANT CHANGE UP (ref 0–0.5)
EOSINOPHIL # BLD AUTO: 0.11 K/UL — SIGNIFICANT CHANGE UP (ref 0–0.5)
EOSINOPHIL NFR BLD AUTO: 0 % — SIGNIFICANT CHANGE UP (ref 0–6)
EOSINOPHIL NFR BLD AUTO: 2 % — SIGNIFICANT CHANGE UP (ref 0–6)
GLUCOSE SERPL-MCNC: 88 MG/DL — SIGNIFICANT CHANGE UP (ref 70–99)
HCT VFR BLD CALC: 23.2 % — LOW (ref 39–50)
HCT VFR BLD CALC: 28.1 % — LOW (ref 39–50)
HGB BLD-MCNC: 7.6 G/DL — LOW (ref 13–17)
HGB BLD-MCNC: 9.1 G/DL — LOW (ref 13–17)
IMM GRANULOCYTES NFR BLD AUTO: 0.5 % — SIGNIFICANT CHANGE UP (ref 0–0.9)
IMM GRANULOCYTES NFR BLD AUTO: 0.9 % — SIGNIFICANT CHANGE UP (ref 0–0.9)
INR BLD: 1.16 RATIO — SIGNIFICANT CHANGE UP (ref 0.88–1.16)
LYMPHOCYTES # BLD AUTO: 0.61 K/UL — LOW (ref 1–3.3)
LYMPHOCYTES # BLD AUTO: 2.01 K/UL — SIGNIFICANT CHANGE UP (ref 1–3.3)
LYMPHOCYTES # BLD AUTO: 36.5 % — SIGNIFICANT CHANGE UP (ref 13–44)
LYMPHOCYTES # BLD AUTO: 6.9 % — LOW (ref 13–44)
MAGNESIUM SERPL-MCNC: 1.6 MG/DL — SIGNIFICANT CHANGE UP (ref 1.6–2.6)
MCHC RBC-ENTMCNC: 31 PG — SIGNIFICANT CHANGE UP (ref 27–34)
MCHC RBC-ENTMCNC: 31.4 PG — SIGNIFICANT CHANGE UP (ref 27–34)
MCHC RBC-ENTMCNC: 32.4 GM/DL — SIGNIFICANT CHANGE UP (ref 32–36)
MCHC RBC-ENTMCNC: 32.8 GM/DL — SIGNIFICANT CHANGE UP (ref 32–36)
MCV RBC AUTO: 95.6 FL — SIGNIFICANT CHANGE UP (ref 80–100)
MCV RBC AUTO: 95.9 FL — SIGNIFICANT CHANGE UP (ref 80–100)
MONOCYTES # BLD AUTO: 0.09 K/UL — SIGNIFICANT CHANGE UP (ref 0–0.9)
MONOCYTES # BLD AUTO: 0.2 K/UL — SIGNIFICANT CHANGE UP (ref 0–0.9)
MONOCYTES NFR BLD AUTO: 1 % — LOW (ref 2–14)
MONOCYTES NFR BLD AUTO: 3.6 % — SIGNIFICANT CHANGE UP (ref 2–14)
NEUTROPHILS # BLD AUTO: 3.15 K/UL — SIGNIFICANT CHANGE UP (ref 1.8–7.4)
NEUTROPHILS # BLD AUTO: 8.03 K/UL — HIGH (ref 1.8–7.4)
NEUTROPHILS NFR BLD AUTO: 57.2 % — SIGNIFICANT CHANGE UP (ref 43–77)
NEUTROPHILS NFR BLD AUTO: 91.2 % — HIGH (ref 43–77)
NRBC # BLD: 0 /100 WBCS — SIGNIFICANT CHANGE UP (ref 0–0)
NRBC # BLD: 0 /100 WBCS — SIGNIFICANT CHANGE UP (ref 0–0)
PHOSPHATE SERPL-MCNC: 2.9 MG/DL — SIGNIFICANT CHANGE UP (ref 2.5–4.5)
PLATELET # BLD AUTO: 105 K/UL — LOW (ref 150–400)
PLATELET # BLD AUTO: 164 K/UL — SIGNIFICANT CHANGE UP (ref 150–400)
POTASSIUM SERPL-MCNC: 3.9 MMOL/L — SIGNIFICANT CHANGE UP (ref 3.5–5.3)
POTASSIUM SERPL-SCNC: 3.9 MMOL/L — SIGNIFICANT CHANGE UP (ref 3.5–5.3)
PROT SERPL-MCNC: 4.9 G/DL — LOW (ref 6–8.3)
PROTHROM AB SERPL-ACNC: 13.4 SEC — SIGNIFICANT CHANGE UP (ref 10.5–13.4)
RBC # BLD: 2.42 M/UL — LOW (ref 4.2–5.8)
RBC # BLD: 2.94 M/UL — LOW (ref 4.2–5.8)
RBC # FLD: 15.2 % — HIGH (ref 10.3–14.5)
RBC # FLD: 15.3 % — HIGH (ref 10.3–14.5)
SODIUM SERPL-SCNC: 135 MMOL/L — SIGNIFICANT CHANGE UP (ref 135–145)
TACROLIMUS SERPL-MCNC: 5.3 NG/ML — SIGNIFICANT CHANGE UP
WBC # BLD: 5.51 K/UL — SIGNIFICANT CHANGE UP (ref 3.8–10.5)
WBC # BLD: 8.81 K/UL — SIGNIFICANT CHANGE UP (ref 3.8–10.5)
WBC # FLD AUTO: 5.51 K/UL — SIGNIFICANT CHANGE UP (ref 3.8–10.5)
WBC # FLD AUTO: 8.81 K/UL — SIGNIFICANT CHANGE UP (ref 3.8–10.5)

## 2022-11-17 RX ORDER — TACROLIMUS 5 MG/1
0.5 CAPSULE ORAL ONCE
Refills: 0 | Status: COMPLETED | OUTPATIENT
Start: 2022-11-17 | End: 2022-11-17

## 2022-11-17 RX ORDER — LINEZOLID 600 MG/300ML
600 INJECTION, SOLUTION INTRAVENOUS EVERY 12 HOURS
Refills: 0 | Status: DISCONTINUED | OUTPATIENT
Start: 2022-11-17 | End: 2022-11-22

## 2022-11-17 RX ORDER — CEFPODOXIME PROXETIL 100 MG
200 TABLET ORAL EVERY 12 HOURS
Refills: 0 | Status: DISCONTINUED | OUTPATIENT
Start: 2022-11-17 | End: 2022-11-18

## 2022-11-17 RX ORDER — TACROLIMUS 5 MG/1
2 CAPSULE ORAL
Refills: 0 | Status: DISCONTINUED | OUTPATIENT
Start: 2022-11-17 | End: 2022-11-18

## 2022-11-17 RX ADMIN — Medication 1 TABLET(S): at 17:10

## 2022-11-17 RX ADMIN — CHLORHEXIDINE GLUCONATE 1 APPLICATION(S): 213 SOLUTION TOPICAL at 05:31

## 2022-11-17 RX ADMIN — FLUCONAZOLE 400 MILLIGRAM(S): 150 TABLET ORAL at 11:48

## 2022-11-17 RX ADMIN — PANTOPRAZOLE SODIUM 40 MILLIGRAM(S): 20 TABLET, DELAYED RELEASE ORAL at 05:30

## 2022-11-17 RX ADMIN — Medication 200 MILLIGRAM(S): at 17:10

## 2022-11-17 RX ADMIN — URSODIOL 300 MILLIGRAM(S): 250 TABLET, FILM COATED ORAL at 05:29

## 2022-11-17 RX ADMIN — Medication 1 TABLET(S): at 05:43

## 2022-11-17 RX ADMIN — MYCOPHENOLATE MOFETIL 1000 MILLIGRAM(S): 250 CAPSULE ORAL at 17:10

## 2022-11-17 RX ADMIN — MYCOPHENOLATE MOFETIL 1000 MILLIGRAM(S): 250 CAPSULE ORAL at 05:30

## 2022-11-17 RX ADMIN — Medication 1 TABLET(S): at 11:49

## 2022-11-17 RX ADMIN — TACROLIMUS 1.5 MILLIGRAM(S): 5 CAPSULE ORAL at 08:21

## 2022-11-17 RX ADMIN — Medication 81 MILLIGRAM(S): at 11:49

## 2022-11-17 RX ADMIN — TACROLIMUS 2 MILLIGRAM(S): 5 CAPSULE ORAL at 20:38

## 2022-11-17 RX ADMIN — Medication 20 MILLIGRAM(S): at 05:29

## 2022-11-17 RX ADMIN — LINEZOLID 300 MILLIGRAM(S): 600 INJECTION, SOLUTION INTRAVENOUS at 02:25

## 2022-11-17 RX ADMIN — HEPARIN SODIUM 5000 UNIT(S): 5000 INJECTION INTRAVENOUS; SUBCUTANEOUS at 17:11

## 2022-11-17 RX ADMIN — CEFTRIAXONE 100 MILLIGRAM(S): 500 INJECTION, POWDER, FOR SOLUTION INTRAMUSCULAR; INTRAVENOUS at 11:49

## 2022-11-17 RX ADMIN — HEPARIN SODIUM 5000 UNIT(S): 5000 INJECTION INTRAVENOUS; SUBCUTANEOUS at 05:29

## 2022-11-17 RX ADMIN — TACROLIMUS 0.5 MILLIGRAM(S): 5 CAPSULE ORAL at 17:09

## 2022-11-17 RX ADMIN — URSODIOL 300 MILLIGRAM(S): 250 TABLET, FILM COATED ORAL at 17:10

## 2022-11-17 RX ADMIN — LINEZOLID 300 MILLIGRAM(S): 600 INJECTION, SOLUTION INTRAVENOUS at 14:37

## 2022-11-17 RX ADMIN — VALGANCICLOVIR 900 MILLIGRAM(S): 450 TABLET, FILM COATED ORAL at 11:48

## 2022-11-17 NOTE — CHART NOTE - NSCHARTNOTEFT_GEN_A_CORE
Due to patient's deconditioning and generalized weakness 2/2 liver transplant, pt will require a transport chair.  Patient is unable to self propel in a standard wheelchair.  This is necessary to achieve daily tasks and therapies which cannot be achieved with the use of a cane or rolling walker.  Patient and family are in agreement with transport chair use at home and assistance will be provided as needed. Due to patient's deconditioning and generalized weakness 2/2 liver transplant, pt will require a standard wheelchair.  This is necessary to achieve daily tasks and therapies which cannot be achieved with the use of a cane or rolling walker.  Patient and family are in agreement with standard wheelchair use at home and assistance will be provided as needed.

## 2022-11-17 NOTE — PROGRESS NOTE ADULT - ASSESSMENT
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     [] s/p OLT c/b bile leak and RTOR  - Rising LFTS post op; tx with pulse steroids, LFTs improving  - T Tube capped  - PT/mobilize, please keep pt OOB for all meals  - Ambulate with pt four times a day (VRE/E coli) - ID following: cont linezolid and CTX, likely 14D course per Transplant ID  - BID wound packing with iodoform or as saturated  - please flush T-tube with 3cc NS daily, no aspiration   - ASA/SQH  - Diet: Regular  - Pain management   - Strict I&O's (UO/VIOLETA#2)  - Actigal 300mg bid     [] Immuno:   - FK per level, MMF 1/1, Pred 20  - PPx: valcyte/bactrim/fluc/ppi/oscal

## 2022-11-17 NOTE — PROGRESS NOTE ADULT - SUBJECTIVE AND OBJECTIVE BOX
Transplant Surgery Multidisciplinary Progress Note     Present: Patient seen and examined with Transplant Surgeon Dr. Cartwright, Transplant Hepatologist Dr. Ceja, JESSA Terry, Pharmacist Iris and bedside RN during am rounds.   Disciplines not in attendance will be notified of plan    HPI: 32M PMH Cerebral palsy, Primary Sclerosing Cholangitis Cirrhosis with frequent ERCPs with biliary stent placements for biliary strictures (last in 9/2022), recent COVID s/p MAB in 9/2022).    Underwent OLT with Solumedrol induction on 11/1/22. Post ob course c/b   ·	Fevers   ·	Wound infection (VRE and E Coli)   ·	Bile leak at hepaticojejunostomy requiring re-op on 11/7.     Interval events:   - POD 17 s/p OLT and POD 10 s/p take back  - T tube remains capped, tolerating PO, no acute events  - diarrhea has now improved  - good UO, VIOLETA#2 serous    Immuno:   Maint immuno: FK by level, MMF 1/1, Pred 20  ongoing monitoring for signs of rejection        MEDICATIONS  (STANDING):  aspirin enteric coated 81 milliGRAM(s) Oral daily  calcium carbonate   1250 mG (OsCal) 1 Tablet(s) Oral two times a day  cefTRIAXone   IVPB 2000 milliGRAM(s) IV Intermittent every 24 hours  chlorhexidine 2% Cloths 1 Application(s) Topical <User Schedule>  fluconAZOLE   Tablet 400 milliGRAM(s) Oral <User Schedule>  heparin   Injectable 5000 Unit(s) SubCutaneous every 12 hours  influenza   Vaccine 0.5 milliLiter(s) IntraMuscular once  linezolid  IVPB 600 milliGRAM(s) IV Intermittent every 12 hours  mycophenolate mofetil 1000 milliGRAM(s) Oral two times a day  pantoprazole    Tablet 40 milliGRAM(s) Oral before breakfast  predniSONE   Tablet 20 milliGRAM(s) Oral daily  tacrolimus 1.5 milliGRAM(s) Oral <User Schedule>  trimethoprim   80 mG/sulfamethoxazole 400 mG 1 Tablet(s) Oral daily  ursodiol Capsule 300 milliGRAM(s) Oral two times a day  valGANciclovir 900 milliGRAM(s) Oral daily    MEDICATIONS  (PRN):  cyclobenzaprine 10 milliGRAM(s) Oral three times a day PRN Muscle Spasm  ondansetron Injectable 4 milliGRAM(s) IV Push once PRN Nausea and/or Vomiting      PAST MEDICAL & SURGICAL HISTORY:  Cerebral palsy      PSC (primary sclerosing cholangitis)  On liver transplant list      Abnormal LFTs      Bile duct stricture      Dental caries      Impacted teeth  wisdom teeth      Phimosis      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      Anemia      History of ERCP  multiple with stent insertions/replacement every 3 months ---last 5/20/2022      [place] teeth extracted  2021          Vital Signs Last 24 Hrs  T(C): 36.7 (17 Nov 2022 13:37), Max: 37 (16 Nov 2022 21:19)  T(F): 98.1 (17 Nov 2022 13:37), Max: 98.6 (16 Nov 2022 21:19)  HR: 114 (17 Nov 2022 13:37) (87 - 114)  BP: 116/62 (17 Nov 2022 13:37) (116/62 - 124/52)  BP(mean): --  RR: 20 (17 Nov 2022 13:37) (18 - 20)  SpO2: 100% (17 Nov 2022 13:37) (94% - 100%)    Parameters below as of 17 Nov 2022 13:37  Patient On (Oxygen Delivery Method): room air        I&O's Summary    16 Nov 2022 07:01  -  17 Nov 2022 07:00  --------------------------------------------------------  IN: 1380 mL / OUT: 2150 mL / NET: -770 mL    17 Nov 2022 07:01  -  17 Nov 2022 14:12  --------------------------------------------------------  IN: 340 mL / OUT: 450 mL / NET: -110 mL                              7.6    5.51  )-----------( 105      ( 17 Nov 2022 06:04 )             23.2     11-17    135  |  103  |  15  ----------------------------<  88  3.9   |  23  |  0.65    Ca    7.8<L>      17 Nov 2022 06:04  Phos  2.9     11-17  Mg     1.6     11-17    TPro  4.9<L>  /  Alb  2.3<L>  /  TBili  2.2<H>  /  DBili  x   /  AST  30  /  ALT  66<H>  /  AlkPhos  248<H>  11-17    Tacrolimus (), Serum: 5.3 ng/mL (11-17 @ 06:04)                      Review of systems  Gen: No fevers/chills, weakness  Skin: No rashes  Head/Eyes/Ears/Mouth: No headache; Normal hearing; Normal vision w/o blurriness; No sinus pain/discomfort, sore throat  Respiratory: No dyspnea, cough, wheezing, hemoptysis  CV: No chest pain, PND, orthopnea  GI: C/O mild abdominal pain at surgical site, No diarrhea, constipation, nausea, vomiting, melena, hematochezia  : No increased frequency, dysuria, hematuria, nocturia  MSK: No joint pain/swelling; no back pain; no edema  Neuro: No dizziness/lightheadedness, weakness, seizures, numbness, tingling  Heme: No easy bruising or bleeding  Endo: No heat/cold intolerance  Psych: No significant nervousness, anxiety, stress, depression  All other systems were reviewed and are negative, except as noted.    PHYSICAL EXAM:  Constitutional: well nourished  Eyes: Anicteric  ENMT: nc/at  Neck: supple  Respiratory: CTA b/l   Cardiovascular: RR  Gastrointestinal: soft, non distended, incisional tenderness to palpation, T tube capped,  VIOLETA #2 with serous drainage. incision packed with iodoform daily with good granulation tissue  Genitourinary: voiding   Extremities: SCD's in place and working bilaterally, no edema  Vascular: Palpable dp pulses bilaterally  Neurological: Alert, following commands  Skin: Warm,dry, intact throughout   Psychiatric: Responsive

## 2022-11-17 NOTE — PHARMACY EDUCATION NOTE - EDUCATION SUMMARY
Met with patient and his mother at bedside for transplant medication adherence. Patient states that his mother will be managing most medications at home. Both patient and family were engaged throughout the session. Patient's mother was also taking notes about the regimen. All questions were clarified by the end of the session. Both were able to answer various questions about the regimen. Will continue to follow as needed.    Adrián Li, PharmD

## 2022-11-18 LAB
ALBUMIN SERPL ELPH-MCNC: 2.6 G/DL — LOW (ref 3.3–5)
ALP SERPL-CCNC: 220 U/L — HIGH (ref 40–120)
ALT FLD-CCNC: 53 U/L — HIGH (ref 10–45)
ANION GAP SERPL CALC-SCNC: 8 MMOL/L — SIGNIFICANT CHANGE UP (ref 5–17)
AST SERPL-CCNC: 23 U/L — SIGNIFICANT CHANGE UP (ref 10–40)
BASOPHILS # BLD AUTO: 0.01 K/UL — SIGNIFICANT CHANGE UP (ref 0–0.2)
BASOPHILS NFR BLD AUTO: 0.2 % — SIGNIFICANT CHANGE UP (ref 0–2)
BILIRUB SERPL-MCNC: 1.9 MG/DL — HIGH (ref 0.2–1.2)
BLD GP AB SCN SERPL QL: NEGATIVE — SIGNIFICANT CHANGE UP
BUN SERPL-MCNC: 17 MG/DL — SIGNIFICANT CHANGE UP (ref 7–23)
CALCIUM SERPL-MCNC: 8 MG/DL — LOW (ref 8.4–10.5)
CHLORIDE SERPL-SCNC: 104 MMOL/L — SIGNIFICANT CHANGE UP (ref 96–108)
CO2 SERPL-SCNC: 26 MMOL/L — SIGNIFICANT CHANGE UP (ref 22–31)
CREAT SERPL-MCNC: 0.87 MG/DL — SIGNIFICANT CHANGE UP (ref 0.5–1.3)
EGFR: 119 ML/MIN/1.73M2 — SIGNIFICANT CHANGE UP
EOSINOPHIL # BLD AUTO: 0.08 K/UL — SIGNIFICANT CHANGE UP (ref 0–0.5)
EOSINOPHIL NFR BLD AUTO: 1.5 % — SIGNIFICANT CHANGE UP (ref 0–6)
GLUCOSE SERPL-MCNC: 104 MG/DL — HIGH (ref 70–99)
HCT VFR BLD CALC: 22.4 % — LOW (ref 39–50)
HCT VFR BLD CALC: 26.5 % — LOW (ref 39–50)
HGB BLD-MCNC: 7.3 G/DL — LOW (ref 13–17)
HGB BLD-MCNC: 8.7 G/DL — LOW (ref 13–17)
IMM GRANULOCYTES NFR BLD AUTO: 0.7 % — SIGNIFICANT CHANGE UP (ref 0–0.9)
INR BLD: 1.16 RATIO — SIGNIFICANT CHANGE UP (ref 0.88–1.16)
LYMPHOCYTES # BLD AUTO: 2.14 K/UL — SIGNIFICANT CHANGE UP (ref 1–3.3)
LYMPHOCYTES # BLD AUTO: 39.7 % — SIGNIFICANT CHANGE UP (ref 13–44)
MAGNESIUM SERPL-MCNC: 1.6 MG/DL — SIGNIFICANT CHANGE UP (ref 1.6–2.6)
MCHC RBC-ENTMCNC: 31.5 PG — SIGNIFICANT CHANGE UP (ref 27–34)
MCHC RBC-ENTMCNC: 31.6 PG — SIGNIFICANT CHANGE UP (ref 27–34)
MCHC RBC-ENTMCNC: 32.6 GM/DL — SIGNIFICANT CHANGE UP (ref 32–36)
MCHC RBC-ENTMCNC: 32.8 GM/DL — SIGNIFICANT CHANGE UP (ref 32–36)
MCV RBC AUTO: 96 FL — SIGNIFICANT CHANGE UP (ref 80–100)
MCV RBC AUTO: 97 FL — SIGNIFICANT CHANGE UP (ref 80–100)
MONOCYTES # BLD AUTO: 0.24 K/UL — SIGNIFICANT CHANGE UP (ref 0–0.9)
MONOCYTES NFR BLD AUTO: 4.5 % — SIGNIFICANT CHANGE UP (ref 2–14)
NEUTROPHILS # BLD AUTO: 2.88 K/UL — SIGNIFICANT CHANGE UP (ref 1.8–7.4)
NEUTROPHILS NFR BLD AUTO: 53.4 % — SIGNIFICANT CHANGE UP (ref 43–77)
NRBC # BLD: 0 /100 WBCS — SIGNIFICANT CHANGE UP (ref 0–0)
NRBC # BLD: 0 /100 WBCS — SIGNIFICANT CHANGE UP (ref 0–0)
PHOSPHATE SERPL-MCNC: 2.7 MG/DL — SIGNIFICANT CHANGE UP (ref 2.5–4.5)
PLATELET # BLD AUTO: 101 K/UL — LOW (ref 150–400)
PLATELET # BLD AUTO: 185 K/UL — SIGNIFICANT CHANGE UP (ref 150–400)
POTASSIUM SERPL-MCNC: 4.2 MMOL/L — SIGNIFICANT CHANGE UP (ref 3.5–5.3)
POTASSIUM SERPL-SCNC: 4.2 MMOL/L — SIGNIFICANT CHANGE UP (ref 3.5–5.3)
PROT SERPL-MCNC: 5 G/DL — LOW (ref 6–8.3)
PROTHROM AB SERPL-ACNC: 13.5 SEC — HIGH (ref 10.5–13.4)
RBC # BLD: 2.31 M/UL — LOW (ref 4.2–5.8)
RBC # BLD: 2.76 M/UL — LOW (ref 4.2–5.8)
RBC # FLD: 15.6 % — HIGH (ref 10.3–14.5)
RBC # FLD: 15.8 % — HIGH (ref 10.3–14.5)
RH IG SCN BLD-IMP: POSITIVE — SIGNIFICANT CHANGE UP
SODIUM SERPL-SCNC: 138 MMOL/L — SIGNIFICANT CHANGE UP (ref 135–145)
TACROLIMUS SERPL-MCNC: 5.6 NG/ML — SIGNIFICANT CHANGE UP
WBC # BLD: 5.39 K/UL — SIGNIFICANT CHANGE UP (ref 3.8–10.5)
WBC # BLD: 8.36 K/UL — SIGNIFICANT CHANGE UP (ref 3.8–10.5)
WBC # FLD AUTO: 5.39 K/UL — SIGNIFICANT CHANGE UP (ref 3.8–10.5)
WBC # FLD AUTO: 8.36 K/UL — SIGNIFICANT CHANGE UP (ref 3.8–10.5)

## 2022-11-18 RX ORDER — TACROLIMUS 5 MG/1
2.5 CAPSULE ORAL
Refills: 0 | Status: DISCONTINUED | OUTPATIENT
Start: 2022-11-18 | End: 2022-11-22

## 2022-11-18 RX ORDER — CEFPODOXIME PROXETIL 100 MG
400 TABLET ORAL EVERY 12 HOURS
Refills: 0 | Status: DISCONTINUED | OUTPATIENT
Start: 2022-11-18 | End: 2022-11-22

## 2022-11-18 RX ORDER — MAGNESIUM SULFATE 500 MG/ML
1 VIAL (ML) INJECTION ONCE
Refills: 0 | Status: COMPLETED | OUTPATIENT
Start: 2022-11-18 | End: 2022-11-18

## 2022-11-18 RX ORDER — TACROLIMUS 5 MG/1
0.5 CAPSULE ORAL ONCE
Refills: 0 | Status: COMPLETED | OUTPATIENT
Start: 2022-11-18 | End: 2022-11-18

## 2022-11-18 RX ADMIN — FLUCONAZOLE 400 MILLIGRAM(S): 150 TABLET ORAL at 12:10

## 2022-11-18 RX ADMIN — URSODIOL 300 MILLIGRAM(S): 250 TABLET, FILM COATED ORAL at 17:43

## 2022-11-18 RX ADMIN — Medication 100 GRAM(S): at 10:57

## 2022-11-18 RX ADMIN — Medication 1 TABLET(S): at 17:44

## 2022-11-18 RX ADMIN — HEPARIN SODIUM 5000 UNIT(S): 5000 INJECTION INTRAVENOUS; SUBCUTANEOUS at 17:44

## 2022-11-18 RX ADMIN — PANTOPRAZOLE SODIUM 40 MILLIGRAM(S): 20 TABLET, DELAYED RELEASE ORAL at 05:45

## 2022-11-18 RX ADMIN — Medication 1 TABLET(S): at 12:10

## 2022-11-18 RX ADMIN — Medication 81 MILLIGRAM(S): at 12:10

## 2022-11-18 RX ADMIN — TACROLIMUS 2.5 MILLIGRAM(S): 5 CAPSULE ORAL at 21:08

## 2022-11-18 RX ADMIN — LINEZOLID 600 MILLIGRAM(S): 600 INJECTION, SOLUTION INTRAVENOUS at 17:44

## 2022-11-18 RX ADMIN — VALGANCICLOVIR 900 MILLIGRAM(S): 450 TABLET, FILM COATED ORAL at 12:10

## 2022-11-18 RX ADMIN — MYCOPHENOLATE MOFETIL 1000 MILLIGRAM(S): 250 CAPSULE ORAL at 05:45

## 2022-11-18 RX ADMIN — MYCOPHENOLATE MOFETIL 1000 MILLIGRAM(S): 250 CAPSULE ORAL at 17:44

## 2022-11-18 RX ADMIN — URSODIOL 300 MILLIGRAM(S): 250 TABLET, FILM COATED ORAL at 05:46

## 2022-11-18 RX ADMIN — Medication 400 MILLIGRAM(S): at 17:43

## 2022-11-18 RX ADMIN — Medication 200 MILLIGRAM(S): at 05:45

## 2022-11-18 RX ADMIN — LINEZOLID 600 MILLIGRAM(S): 600 INJECTION, SOLUTION INTRAVENOUS at 05:46

## 2022-11-18 RX ADMIN — TACROLIMUS 2 MILLIGRAM(S): 5 CAPSULE ORAL at 08:11

## 2022-11-18 RX ADMIN — TACROLIMUS 0.5 MILLIGRAM(S): 5 CAPSULE ORAL at 13:45

## 2022-11-18 RX ADMIN — Medication 20 MILLIGRAM(S): at 05:45

## 2022-11-18 RX ADMIN — HEPARIN SODIUM 5000 UNIT(S): 5000 INJECTION INTRAVENOUS; SUBCUTANEOUS at 05:46

## 2022-11-18 RX ADMIN — Medication 1 TABLET(S): at 05:46

## 2022-11-18 RX ADMIN — CYCLOBENZAPRINE HYDROCHLORIDE 10 MILLIGRAM(S): 10 TABLET, FILM COATED ORAL at 21:10

## 2022-11-18 NOTE — PROGRESS NOTE ADULT - ASSESSMENT
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     [] s/p OLT c/b bile leak and RTOR  - Rising LFTS post op; tx with pulse steroids, LFTs improving  - T Tube capped  - PT/mobilize, please keep pt OOB for all meals  - Ambulate with pt four times a day (VRE/E coli) - ID following: cont linezolid and CTX, likely 14D course per Transplant ID  - BID wound packing with iodoform or as saturated  - please flush T-tube with 3cc NS daily, no aspiration   - ASA/SQH  - Diet: Regular  - Pain management   - Strict I&O's (UO/VIOLETA#2)  - Actigal 300mg bid   - Anemia: repeat CBC in the afternoon    [] Immuno:   - FK per level, MMF 1/1, Pred 20  - PPx: valcyte/bactrim/fluc/ppi/oscal

## 2022-11-18 NOTE — PROGRESS NOTE ADULT - SUBJECTIVE AND OBJECTIVE BOX
Transplant Surgery Multidisciplinary Progress Note     Present: Patient seen and examined with Transplant Surgeon Dr. Cartwright, Transplant Hepatologist Dr. Ceja, JESSA Carbajal, Pharmacist Iris and bedside RN during am rounds.   Disciplines not in attendance will be notified of plan    HPI: 32M PMH Cerebral palsy, Primary Sclerosing Cholangitis Cirrhosis with frequent ERCPs with biliary stent placements for biliary strictures (last in 9/2022), recent COVID s/p MAB in 9/2022).    Underwent OLT with Solumedrol induction on 11/1/22. Post ob course c/b   ·	Fevers   ·	Wound infection (VRE and E Coli)   ·	Bile leak at hepaticojejunostomy requiring re-op on 11/7.     Interval events:   - POD 18 s/p OLT and POD 11 s/p take back  - T tube remains capped, tolerating PO, no acute events  - diarrhea has now improved  - good UO, VIOLETA#2 serous    Immuno:   Maint immuno: FK by level, MMF 1/1, Pred 20  ongoing monitoring for signs of rejection      MEDICATIONS  (STANDING):  aspirin enteric coated 81 milliGRAM(s) Oral daily  calcium carbonate   1250 mG (OsCal) 1 Tablet(s) Oral two times a day  cefpodoxime 200 milliGRAM(s) Oral every 12 hours  chlorhexidine 2% Cloths 1 Application(s) Topical <User Schedule>  fluconAZOLE   Tablet 400 milliGRAM(s) Oral <User Schedule>  heparin   Injectable 5000 Unit(s) SubCutaneous every 12 hours  influenza   Vaccine 0.5 milliLiter(s) IntraMuscular once  linezolid    Tablet 600 milliGRAM(s) Oral every 12 hours  mycophenolate mofetil 1000 milliGRAM(s) Oral two times a day  pantoprazole    Tablet 40 milliGRAM(s) Oral before breakfast  predniSONE   Tablet 20 milliGRAM(s) Oral daily  tacrolimus 2.5 milliGRAM(s) Oral <User Schedule>  trimethoprim   80 mG/sulfamethoxazole 400 mG 1 Tablet(s) Oral daily  ursodiol Capsule 300 milliGRAM(s) Oral two times a day  valGANciclovir 900 milliGRAM(s) Oral daily    MEDICATIONS  (PRN):  cyclobenzaprine 10 milliGRAM(s) Oral three times a day PRN Muscle Spasm  ondansetron Injectable 4 milliGRAM(s) IV Push once PRN Nausea and/or Vomiting      Vital Signs Last 24 Hrs  T(C): 36.9 (18 Nov 2022 13:16), Max: 36.9 (17 Nov 2022 17:51)  T(F): 98.4 (18 Nov 2022 13:16), Max: 98.4 (17 Nov 2022 17:51)  HR: 109 (18 Nov 2022 13:16) (89 - 109)  BP: 111/59 (18 Nov 2022 13:16) (111/59 - 128/60)  BP(mean): --  RR: 20 (18 Nov 2022 13:16) (18 - 20)  SpO2: 100% (18 Nov 2022 13:16) (99% - 100%)    Parameters below as of 18 Nov 2022 13:16  Patient On (Oxygen Delivery Method): room air    Review of systems  Gen: No fevers/chills, weakness  Skin: No rashes  Head/Eyes/Ears/Mouth: No headache; Normal hearing; Normal vision w/o blurriness; No sinus pain/discomfort, sore throat  Respiratory: No dyspnea, cough, wheezing, hemoptysis  CV: No chest pain, PND, orthopnea  GI: C/O mild abdominal pain at surgical site, No diarrhea, constipation, nausea, vomiting, melena, hematochezia  : No increased frequency, dysuria, hematuria, nocturia  MSK: No joint pain/swelling; no back pain; no edema  Neuro: No dizziness/lightheadedness, weakness, seizures, numbness, tingling  Heme: No easy bruising or bleeding  Endo: No heat/cold intolerance  Psych: No significant nervousness, anxiety, stress, depression  All other systems were reviewed and are negative, except as noted.    PHYSICAL EXAM:  Constitutional: well nourished  Eyes: Anicteric  ENMT: nc/at  Neck: supple  Respiratory: CTA b/l   Cardiovascular: RR  Gastrointestinal: soft, non distended, incisional tenderness to palpation, T tube capped,  VIOLETA #2 with serous drainage. incision packed with iodoform daily with good granulation tissue  Genitourinary: voiding   Extremities: SCD's in place and working bilaterally, no edema  Vascular: Palpable dp pulses bilaterally  Neurological: Alert, following commands  Skin: Warm,dry, intact throughout   Psychiatric: Responsive

## 2022-11-19 LAB
ALBUMIN SERPL ELPH-MCNC: 2.9 G/DL — LOW (ref 3.3–5)
ALP SERPL-CCNC: 202 U/L — HIGH (ref 40–120)
ALT FLD-CCNC: 44 U/L — SIGNIFICANT CHANGE UP (ref 10–45)
ANION GAP SERPL CALC-SCNC: 8 MMOL/L — SIGNIFICANT CHANGE UP (ref 5–17)
AST SERPL-CCNC: 18 U/L — SIGNIFICANT CHANGE UP (ref 10–40)
BASOPHILS # BLD AUTO: 0.01 K/UL — SIGNIFICANT CHANGE UP (ref 0–0.2)
BASOPHILS NFR BLD AUTO: 0.1 % — SIGNIFICANT CHANGE UP (ref 0–2)
BILIRUB SERPL-MCNC: 1.9 MG/DL — HIGH (ref 0.2–1.2)
BUN SERPL-MCNC: 21 MG/DL — SIGNIFICANT CHANGE UP (ref 7–23)
CALCIUM SERPL-MCNC: 8.4 MG/DL — SIGNIFICANT CHANGE UP (ref 8.4–10.5)
CHLORIDE SERPL-SCNC: 103 MMOL/L — SIGNIFICANT CHANGE UP (ref 96–108)
CO2 SERPL-SCNC: 28 MMOL/L — SIGNIFICANT CHANGE UP (ref 22–31)
CREAT SERPL-MCNC: 0.74 MG/DL — SIGNIFICANT CHANGE UP (ref 0.5–1.3)
EGFR: 125 ML/MIN/1.73M2 — SIGNIFICANT CHANGE UP
EOSINOPHIL # BLD AUTO: 0.11 K/UL — SIGNIFICANT CHANGE UP (ref 0–0.5)
EOSINOPHIL NFR BLD AUTO: 1.4 % — SIGNIFICANT CHANGE UP (ref 0–6)
GLUCOSE SERPL-MCNC: 97 MG/DL — SIGNIFICANT CHANGE UP (ref 70–99)
HCT VFR BLD CALC: 24.5 % — LOW (ref 39–50)
HGB BLD-MCNC: 7.9 G/DL — LOW (ref 13–17)
IMM GRANULOCYTES NFR BLD AUTO: 0.6 % — SIGNIFICANT CHANGE UP (ref 0–0.9)
INR BLD: 1.07 RATIO — SIGNIFICANT CHANGE UP (ref 0.88–1.16)
LYMPHOCYTES # BLD AUTO: 3.18 K/UL — SIGNIFICANT CHANGE UP (ref 1–3.3)
LYMPHOCYTES # BLD AUTO: 39.8 % — SIGNIFICANT CHANGE UP (ref 13–44)
MAGNESIUM SERPL-MCNC: 1.7 MG/DL — SIGNIFICANT CHANGE UP (ref 1.6–2.6)
MCHC RBC-ENTMCNC: 31.2 PG — SIGNIFICANT CHANGE UP (ref 27–34)
MCHC RBC-ENTMCNC: 32.2 GM/DL — SIGNIFICANT CHANGE UP (ref 32–36)
MCV RBC AUTO: 96.8 FL — SIGNIFICANT CHANGE UP (ref 80–100)
MONOCYTES # BLD AUTO: 0.42 K/UL — SIGNIFICANT CHANGE UP (ref 0–0.9)
MONOCYTES NFR BLD AUTO: 5.3 % — SIGNIFICANT CHANGE UP (ref 2–14)
NEUTROPHILS # BLD AUTO: 4.23 K/UL — SIGNIFICANT CHANGE UP (ref 1.8–7.4)
NEUTROPHILS NFR BLD AUTO: 52.8 % — SIGNIFICANT CHANGE UP (ref 43–77)
NRBC # BLD: 0 /100 WBCS — SIGNIFICANT CHANGE UP (ref 0–0)
PHOSPHATE SERPL-MCNC: 3.4 MG/DL — SIGNIFICANT CHANGE UP (ref 2.5–4.5)
PLATELET # BLD AUTO: 159 K/UL — SIGNIFICANT CHANGE UP (ref 150–400)
POTASSIUM SERPL-MCNC: 4.4 MMOL/L — SIGNIFICANT CHANGE UP (ref 3.5–5.3)
POTASSIUM SERPL-SCNC: 4.4 MMOL/L — SIGNIFICANT CHANGE UP (ref 3.5–5.3)
PROT SERPL-MCNC: 5.4 G/DL — LOW (ref 6–8.3)
PROTHROM AB SERPL-ACNC: 12.3 SEC — SIGNIFICANT CHANGE UP (ref 10.5–13.4)
RBC # BLD: 2.53 M/UL — LOW (ref 4.2–5.8)
RBC # FLD: 16.2 % — HIGH (ref 10.3–14.5)
SODIUM SERPL-SCNC: 139 MMOL/L — SIGNIFICANT CHANGE UP (ref 135–145)
TACROLIMUS SERPL-MCNC: 9 NG/ML — SIGNIFICANT CHANGE UP
WBC # BLD: 8 K/UL — SIGNIFICANT CHANGE UP (ref 3.8–10.5)
WBC # FLD AUTO: 8 K/UL — SIGNIFICANT CHANGE UP (ref 3.8–10.5)

## 2022-11-19 RX ADMIN — LINEZOLID 600 MILLIGRAM(S): 600 INJECTION, SOLUTION INTRAVENOUS at 17:04

## 2022-11-19 RX ADMIN — Medication 20 MILLIGRAM(S): at 05:24

## 2022-11-19 RX ADMIN — HEPARIN SODIUM 5000 UNIT(S): 5000 INJECTION INTRAVENOUS; SUBCUTANEOUS at 05:29

## 2022-11-19 RX ADMIN — VALGANCICLOVIR 900 MILLIGRAM(S): 450 TABLET, FILM COATED ORAL at 12:27

## 2022-11-19 RX ADMIN — LINEZOLID 600 MILLIGRAM(S): 600 INJECTION, SOLUTION INTRAVENOUS at 05:24

## 2022-11-19 RX ADMIN — TACROLIMUS 2.5 MILLIGRAM(S): 5 CAPSULE ORAL at 19:53

## 2022-11-19 RX ADMIN — MYCOPHENOLATE MOFETIL 1000 MILLIGRAM(S): 250 CAPSULE ORAL at 05:24

## 2022-11-19 RX ADMIN — URSODIOL 300 MILLIGRAM(S): 250 TABLET, FILM COATED ORAL at 17:05

## 2022-11-19 RX ADMIN — TACROLIMUS 2.5 MILLIGRAM(S): 5 CAPSULE ORAL at 08:41

## 2022-11-19 RX ADMIN — HEPARIN SODIUM 5000 UNIT(S): 5000 INJECTION INTRAVENOUS; SUBCUTANEOUS at 17:03

## 2022-11-19 RX ADMIN — FLUCONAZOLE 400 MILLIGRAM(S): 150 TABLET ORAL at 12:27

## 2022-11-19 RX ADMIN — Medication 1 TABLET(S): at 05:24

## 2022-11-19 RX ADMIN — Medication 1 TABLET(S): at 12:26

## 2022-11-19 RX ADMIN — Medication 1 TABLET(S): at 17:03

## 2022-11-19 RX ADMIN — URSODIOL 300 MILLIGRAM(S): 250 TABLET, FILM COATED ORAL at 05:24

## 2022-11-19 RX ADMIN — Medication 400 MILLIGRAM(S): at 17:04

## 2022-11-19 RX ADMIN — Medication 400 MILLIGRAM(S): at 06:35

## 2022-11-19 RX ADMIN — MYCOPHENOLATE MOFETIL 1000 MILLIGRAM(S): 250 CAPSULE ORAL at 17:03

## 2022-11-19 RX ADMIN — PANTOPRAZOLE SODIUM 40 MILLIGRAM(S): 20 TABLET, DELAYED RELEASE ORAL at 05:25

## 2022-11-19 RX ADMIN — Medication 81 MILLIGRAM(S): at 12:27

## 2022-11-19 NOTE — PROGRESS NOTE ADULT - ASSESSMENT
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     [] s/p OLT c/b bile leak and RTOR  - Rising LFTS post op; tx with pulse steroids, LFTs improving  - Wound infection (VRE/E coli) - continue linezolid/CTX (end date 11/22)  - Dressing changes: packing with iodoform bid, plan for wound vac (ordered)  - T Tube capped,  flush T-tube with 3cc NS daily, no aspiration   - ASA/SQH  - Diet: Regular  - Pain management   - Strict I&O's (UO/VIOLETA#2)  - Actigal 300mg bid     [] Immuno:   - Tac level 9, cont FK 2.5mg bid, MMF 1/1, Pred 20  - PPx: valcyte/bactrim/fluc/ppi/oscal

## 2022-11-19 NOTE — PROGRESS NOTE ADULT - SUBJECTIVE AND OBJECTIVE BOX
Transplant Surgery Multidisciplinary Progress Note     Present: Patient seen and examined with Transplant Surgeon Dr. Cartwright, Transplant Hepatologist Dr. Ceja, JESSA Carbajal, and bedside RN during am rounds.   Disciplines not in attendance will be notified of plan    HPI: 32M PMH Cerebral palsy, Primary Sclerosing Cholangitis Cirrhosis with frequent ERCPs with biliary stent placements for biliary strictures (last in 9/2022), recent COVID s/p MAB in 9/2022).    Underwent OLT with Solumedrol induction on 11/1/22. Post ob course c/b   ·	Fevers   ·	Wound infection (VRE and E Coli)   ·	Bile leak at hepaticojejunostomy requiring re-op on 11/7.     Interval events:   - POD 19 s/p OLT and POD 12 s/p take back  - Feeling well, no overnight events     Immuno:   Maint immuno: FK by level, MMF 1/1, Pred 20  ongoing monitoring for signs of rejection    MEDICATIONS  (STANDING):  aspirin enteric coated 81 milliGRAM(s) Oral daily  calcium carbonate   1250 mG (OsCal) 1 Tablet(s) Oral two times a day  cefpodoxime 400 milliGRAM(s) Oral every 12 hours  chlorhexidine 2% Cloths 1 Application(s) Topical <User Schedule>  fluconAZOLE   Tablet 400 milliGRAM(s) Oral <User Schedule>  heparin   Injectable 5000 Unit(s) SubCutaneous every 12 hours  influenza   Vaccine 0.5 milliLiter(s) IntraMuscular once  linezolid    Tablet 600 milliGRAM(s) Oral every 12 hours  mycophenolate mofetil 1000 milliGRAM(s) Oral two times a day  pantoprazole    Tablet 40 milliGRAM(s) Oral before breakfast  predniSONE   Tablet 20 milliGRAM(s) Oral daily  tacrolimus 2.5 milliGRAM(s) Oral <User Schedule>  trimethoprim   80 mG/sulfamethoxazole 400 mG 1 Tablet(s) Oral daily  ursodiol Capsule 300 milliGRAM(s) Oral two times a day  valGANciclovir 900 milliGRAM(s) Oral daily    MEDICATIONS  (PRN):  cyclobenzaprine 10 milliGRAM(s) Oral three times a day PRN Muscle Spasm  ondansetron Injectable 4 milliGRAM(s) IV Push once PRN Nausea and/or Vomiting    Vital Signs Last 24 Hrs  T(C): 36.8 (19 Nov 2022 13:13), Max: 36.8 (19 Nov 2022 13:13)  T(F): 98.2 (19 Nov 2022 13:13), Max: 98.2 (19 Nov 2022 13:13)  HR: 99 (19 Nov 2022 13:13) (93 - 102)  BP: 108/59 (19 Nov 2022 13:13) (108/59 - 122/59)  BP(mean): --  RR: 18 (19 Nov 2022 13:13) (18 - 20)  SpO2: 100% (19 Nov 2022 13:13) (98% - 100%)    Parameters below as of 19 Nov 2022 13:13  Patient On (Oxygen Delivery Method): room air    I&O's Summary    18 Nov 2022 07:01  -  19 Nov 2022 07:00  --------------------------------------------------------  IN: 480 mL / OUT: 1180 mL / NET: -700 mL    19 Nov 2022 07:01  -  19 Nov 2022 14:49  --------------------------------------------------------  IN: 0 mL / OUT: 570 mL / NET: -570 mL                          7.9    8.00  )-----------( 159      ( 19 Nov 2022 06:14 )             24.5     11-19    139  |  103  |  21  ----------------------------<  97  4.4   |  28  |  0.74    Ca    8.4      19 Nov 2022 06:14  Phos  3.4     11-19  Mg     1.7     11-19    TPro  5.4<L>  /  Alb  2.9<L>  /  TBili  1.9<H>  /  DBili  x   /  AST  18  /  ALT  44  /  AlkPhos  202<H>  11-19    Tacrolimus (), Serum: 9.0 ng/mL (11-19 @ 06:14)    Review of systems  Gen: No fevers/chills, weakness  Skin: No rashes  Head/Eyes/Ears/Mouth: No headache; Normal hearing; Normal vision w/o blurriness; No sinus pain/discomfort, sore throat  Respiratory: No dyspnea, cough, wheezing, hemoptysis  CV: No chest pain, PND, orthopnea  GI: C/O mild abdominal pain at surgical site, No diarrhea, constipation, nausea, vomiting, melena, hematochezia  : No increased frequency, dysuria, hematuria, nocturia  MSK: No joint pain/swelling; no back pain; no edema  Neuro: No dizziness/lightheadedness, weakness, seizures, numbness, tingling  Heme: No easy bruising or bleeding  Endo: No heat/cold intolerance  Psych: No significant nervousness, anxiety, stress, depression  All other systems were reviewed and are negative, except as noted.    PHYSICAL EXAM:  Constitutional: well nourished  Eyes: Anicteric  ENMT: nc/at  Neck: supple  Respiratory: CTA b/l   Cardiovascular: RR  Gastrointestinal: soft, non distended, incisional tenderness to palpation, T tube capped,  VIOLETA #2 with serous drainage. incision packed with iodoform daily with good granulation tissue  Genitourinary: voiding   Extremities: SCD's in place and working bilaterally, no edema  Vascular: Palpable dp pulses bilaterally  Neurological: Alert, following commands  Skin: Warm,dry, intact throughout   Psychiatric: Responsive

## 2022-11-20 LAB
ALBUMIN SERPL ELPH-MCNC: 3 G/DL — LOW (ref 3.3–5)
ALP SERPL-CCNC: 181 U/L — HIGH (ref 40–120)
ALT FLD-CCNC: 39 U/L — SIGNIFICANT CHANGE UP (ref 10–45)
ANION GAP SERPL CALC-SCNC: 9 MMOL/L — SIGNIFICANT CHANGE UP (ref 5–17)
AST SERPL-CCNC: 17 U/L — SIGNIFICANT CHANGE UP (ref 10–40)
BILIRUB SERPL-MCNC: 1.8 MG/DL — HIGH (ref 0.2–1.2)
BUN SERPL-MCNC: 24 MG/DL — HIGH (ref 7–23)
CALCIUM SERPL-MCNC: 8.4 MG/DL — SIGNIFICANT CHANGE UP (ref 8.4–10.5)
CHLORIDE SERPL-SCNC: 102 MMOL/L — SIGNIFICANT CHANGE UP (ref 96–108)
CO2 SERPL-SCNC: 27 MMOL/L — SIGNIFICANT CHANGE UP (ref 22–31)
CREAT SERPL-MCNC: 0.74 MG/DL — SIGNIFICANT CHANGE UP (ref 0.5–1.3)
EGFR: 125 ML/MIN/1.73M2 — SIGNIFICANT CHANGE UP
GLUCOSE SERPL-MCNC: 101 MG/DL — HIGH (ref 70–99)
HCT VFR BLD CALC: 23.1 % — LOW (ref 39–50)
HGB BLD-MCNC: 7.5 G/DL — LOW (ref 13–17)
INR BLD: 1.14 RATIO — SIGNIFICANT CHANGE UP (ref 0.88–1.16)
MAGNESIUM SERPL-MCNC: 1.6 MG/DL — SIGNIFICANT CHANGE UP (ref 1.6–2.6)
MCHC RBC-ENTMCNC: 31.4 PG — SIGNIFICANT CHANGE UP (ref 27–34)
MCHC RBC-ENTMCNC: 32.5 GM/DL — SIGNIFICANT CHANGE UP (ref 32–36)
MCV RBC AUTO: 96.7 FL — SIGNIFICANT CHANGE UP (ref 80–100)
NRBC # BLD: 0 /100 WBCS — SIGNIFICANT CHANGE UP (ref 0–0)
PHOSPHATE SERPL-MCNC: 4.1 MG/DL — SIGNIFICANT CHANGE UP (ref 2.5–4.5)
PLATELET # BLD AUTO: 155 K/UL — SIGNIFICANT CHANGE UP (ref 150–400)
POTASSIUM SERPL-MCNC: 4.4 MMOL/L — SIGNIFICANT CHANGE UP (ref 3.5–5.3)
POTASSIUM SERPL-SCNC: 4.4 MMOL/L — SIGNIFICANT CHANGE UP (ref 3.5–5.3)
PROT SERPL-MCNC: 5.6 G/DL — LOW (ref 6–8.3)
PROTHROM AB SERPL-ACNC: 13.3 SEC — SIGNIFICANT CHANGE UP (ref 10.5–13.4)
RBC # BLD: 2.39 M/UL — LOW (ref 4.2–5.8)
RBC # FLD: 16.7 % — HIGH (ref 10.3–14.5)
SODIUM SERPL-SCNC: 138 MMOL/L — SIGNIFICANT CHANGE UP (ref 135–145)
TACROLIMUS SERPL-MCNC: 9.1 NG/ML — SIGNIFICANT CHANGE UP
WBC # BLD: 9.06 K/UL — SIGNIFICANT CHANGE UP (ref 3.8–10.5)
WBC # FLD AUTO: 9.06 K/UL — SIGNIFICANT CHANGE UP (ref 3.8–10.5)

## 2022-11-20 RX ADMIN — Medication 1 TABLET(S): at 17:38

## 2022-11-20 RX ADMIN — FLUCONAZOLE 400 MILLIGRAM(S): 150 TABLET ORAL at 11:34

## 2022-11-20 RX ADMIN — MYCOPHENOLATE MOFETIL 1000 MILLIGRAM(S): 250 CAPSULE ORAL at 05:57

## 2022-11-20 RX ADMIN — CHLORHEXIDINE GLUCONATE 1 APPLICATION(S): 213 SOLUTION TOPICAL at 05:58

## 2022-11-20 RX ADMIN — HEPARIN SODIUM 5000 UNIT(S): 5000 INJECTION INTRAVENOUS; SUBCUTANEOUS at 05:57

## 2022-11-20 RX ADMIN — Medication 400 MILLIGRAM(S): at 05:57

## 2022-11-20 RX ADMIN — MYCOPHENOLATE MOFETIL 1000 MILLIGRAM(S): 250 CAPSULE ORAL at 17:38

## 2022-11-20 RX ADMIN — TACROLIMUS 2.5 MILLIGRAM(S): 5 CAPSULE ORAL at 20:17

## 2022-11-20 RX ADMIN — Medication 1 TABLET(S): at 10:58

## 2022-11-20 RX ADMIN — TACROLIMUS 2.5 MILLIGRAM(S): 5 CAPSULE ORAL at 07:35

## 2022-11-20 RX ADMIN — VALGANCICLOVIR 900 MILLIGRAM(S): 450 TABLET, FILM COATED ORAL at 10:59

## 2022-11-20 RX ADMIN — Medication 1 TABLET(S): at 05:57

## 2022-11-20 RX ADMIN — LINEZOLID 600 MILLIGRAM(S): 600 INJECTION, SOLUTION INTRAVENOUS at 05:57

## 2022-11-20 RX ADMIN — Medication 81 MILLIGRAM(S): at 10:58

## 2022-11-20 RX ADMIN — LINEZOLID 600 MILLIGRAM(S): 600 INJECTION, SOLUTION INTRAVENOUS at 17:39

## 2022-11-20 RX ADMIN — Medication 20 MILLIGRAM(S): at 05:58

## 2022-11-20 RX ADMIN — Medication 400 MILLIGRAM(S): at 17:39

## 2022-11-20 RX ADMIN — URSODIOL 300 MILLIGRAM(S): 250 TABLET, FILM COATED ORAL at 17:39

## 2022-11-20 RX ADMIN — HEPARIN SODIUM 5000 UNIT(S): 5000 INJECTION INTRAVENOUS; SUBCUTANEOUS at 17:37

## 2022-11-20 RX ADMIN — PANTOPRAZOLE SODIUM 40 MILLIGRAM(S): 20 TABLET, DELAYED RELEASE ORAL at 06:01

## 2022-11-20 RX ADMIN — URSODIOL 300 MILLIGRAM(S): 250 TABLET, FILM COATED ORAL at 05:58

## 2022-11-20 NOTE — PROGRESS NOTE ADULT - ASSESSMENT
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     [] s/p OLT c/b bile leak and RTOR  - Rising LFTS post op; tx with pulse steroids, LFTs improving  - Wound infection (VRE/E coli) - continue linezolid/CTX (end date 11/22)  - Dressing changes: packing with iodoform bid, plan for wound vac (ordered)  - T Tube capped,  flush T-tube with 3cc NS daily, no aspiration   - ASA/SQH  - Diet: Regular  - Pain management   - Strict I&O's (UO/VIOLETA#2)  - Actigal 300mg bid     [] Immuno:   - Tac level 9, cont FK 2.5mg bid, MMF 1/1, Pred 20  - PPx: valcyte/bactrim/fluc/ppi/oscal 32M PMH Cerebral palsy, Primary Sclerosing Cholangitis Cirrhosis with frequent ERCPs with biliary stent placements for biliary strictures (last in 9/2022) on liver transplant list, s/p liver transplant (11/1) and 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     [] s/p OLT c/b bile leak and RTOR  - Rising LFTS post op; tx with pulse steroids, LFTs improving  - Wound infection (VRE/E coli) - continue linezolid/cefpodoxine (end date 11/22)  - Dressing changes: packing with iodoform bid until wound vac placed today  - T Tube capped,  flush T-tube with 3cc NS daily, no aspiration   - ASA/SQH  - Diet: Regular  - Pain management   - Strict I&O's  - Actigal 300mg bid     [] Immuno:   - Tac level 9.1, cont FK 2.5mg bid, MMF 1/1. Decrease Pred  to 15mg qd  - PPx: valcyte/bactrim/fluc/ppi/oscal

## 2022-11-20 NOTE — CHART NOTE - NSCHARTNOTEFT_GEN_A_CORE
Nutrition Follow Up Note  Patient seen for malnutrition and S/P OLT follow up    As per chart: "32M PMH Cerebral palsy, Primary Sclerosing Cholangitis Cirrhosis with frequent ERCPs with biliary stent placements for biliary strictures (last in 2022) on liver transplant list (ABO AB), recent COVID s/p MAB in 2022) admitted for liver transplant, S/P OLT (), c/b bile leak, and Velasquez-en-y HJ s/p RTOR () for washout, resection of Velasquez limb with end to side hepatico jejunostomy and side to side jejunojejunostomy, biliary drain placement and 2 new JPs (3 total); rising LFTS post op; tx with pulse steroids, LFTs improving."    Source: [x] Patient       [x] EMR        [] RN        [x] Family at bedside, mom (sleeping)       [] Other:    -If unable to interview patient: [] Trach/Vent/BiPAP  [] Disoriented/confused/inappropriate to interview as per chart     Pt reports good appetite and PO intake, finishing his meals. Reports drinking Ensure Plus 1-2xday. Denies difficulty chewing/swallowing. Denies nausea or vomiting. Reports diarrhea with last BM today ().     Encouraged to continue adequate kcal and protein intake and provided recommendations to help with diarrhea. Pt and mom received education on post transplant nutrition therapy and food safety guidelines for transplant recipients with nutrition package in previous RD visit - pt denies having questions/concerns about diet and nutrition education provided; mom able to teach back 3-4 points in previous RD visit. Pt and mom aware RD remains available.     Diet Order:   Diet, Regular:   Supplement Feeding Modality:  Oral  Ensure Plus High Protein Cans or Servings Per Day:  2       Frequency:  Daily (-)    Weights:   Daily Weight in k.1 (11-20), Weight in k.1 (11-19), Weight in k.8 (11-18), Weight in k.7 (11-17), Weight in k.4 (11-16), Weight in k.1 (11-15), Weight in k.8 (11-14)  -?accuracy of weights and fluctuations as pt previously with edema and S/P OLT.    Nutritionally Pertinent MEDICATIONS  (STANDING):  calcium carbonate   1250 mG (OsCal)  cefpodoxime  fluconAZOLE   Tablet  linezolid    Tablet  pantoprazole    Tablet  predniSONE   Tablet  trimethoprim   80 mG/sulfamethoxazole 400 mG  ursodiol Capsule  valGANciclovir    Pertinent Labs: ( @ 07:27): Na 138, BUN 24<H>, Cr 0.74, <H>, K+ 4.4, Phos 4.1, Mg 1.6, Alk Phos 181<H>, ALT/SGPT 39, AST/SGOT 17    A1C with Estimated Average Glucose Result: 4.7 % (22 @ 06:49)    Finger Sticks: () 134 - 158     Skin per nursing documentation: no pressure injures.   Edema as per flow sheets: none at this time (previously +2 gideon. foot, ankle, and leg edema).     Estimated Needs:   [x] no change since previous assessment  [] recalculated:     Previous Nutrition Diagnoses:  [x] Inadequate Protein Energy Intake  [x] Food & Nutrition Related Knowledge Deficit  [x] Malnutrition; severe     Nutrition diagnoses are: [x] addressed with PO intake encouragement, nutritional supplements, and nutrition therapy education.     New Nutrition Diagnosis: [x] not applicable    Nutrition Care Plan:  [x] In Progress     Nutrition Interventions:     Education Provided:       [x] Yes  [] No    Recommendations:      1. Continue regular diet upon discharge. Recommend follow up visit with Transplant MD and outpatient RD for dietary modifications as warranted.   2. Continue Ensure Plus High Protein 2xday to optimize kcal and protein intake.   3. Gently encourage PO intake and honor food preferences.   4. Recommend Multivitamin if medically feasible to optimize nutrient intake.   5. Review education on post transplant nutrition therapy and food safety guidelines for transplant recipients as needed/requested before discharge.   6. Encouraged to continue adequate kcal and protein intake and provided recommendations to help with diarrhea.  7. Continue to obtain weights as able to identify changes if any.     Monitoring and Evaluation:   Continue to monitor nutritional intake, tolerance to diet prescription, weights, labs, skin integrity    RD remains available upon request and will follow up per protocol  Bibi Carter MS RDN CDN [facility] CCTD #[phone].

## 2022-11-20 NOTE — PROGRESS NOTE ADULT - NS ATTEND OPT1 GEN_ALL_CORE
I attest my time as attending is greater than 50% of the total combined time spent on qualifying patient care activities by the PA/NP and attending.

## 2022-11-20 NOTE — PROGRESS NOTE ADULT - SUBJECTIVE AND OBJECTIVE BOX
Transplant Surgery Multidisciplinary Progress Note     Present: Patient seen and examined with Transplant Surgeon Dr. Cartwright, Transplant Hepatologist Dr. Ceja, NP Demetris, and bedside RN during am rounds.   Disciplines not in attendance will be notified of plan    HPI: 32M PMH Cerebral palsy, Primary Sclerosing Cholangitis Cirrhosis with frequent ERCPs with biliary stent placements for biliary strictures (last in 9/2022), recent COVID s/p MAB in 9/2022).    Underwent OLT with Solumedrol induction on 11/1/22. Post ob course c/b   ·	Fevers   ·	Wound infection (VRE and E Coli)   ·	Bile leak at hepaticojejunostomy requiring re-op on 11/7.     Interval events:   - POD 20 s/p OLT and POD 13 s/p take back  - Feeling well, no overnight events     Immuno:   Maint immuno: FK by level, MMF 1/1, Pred 20  ongoing monitoring for signs of rejection    MEDICATIONS  (STANDING):  aspirin enteric coated 81 milliGRAM(s) Oral daily  calcium carbonate   1250 mG (OsCal) 1 Tablet(s) Oral two times a day  cefpodoxime 400 milliGRAM(s) Oral every 12 hours  chlorhexidine 2% Cloths 1 Application(s) Topical <User Schedule>  fluconAZOLE   Tablet 400 milliGRAM(s) Oral <User Schedule>  heparin   Injectable 5000 Unit(s) SubCutaneous every 12 hours  influenza   Vaccine 0.5 milliLiter(s) IntraMuscular once  linezolid    Tablet 600 milliGRAM(s) Oral every 12 hours  mycophenolate mofetil 1000 milliGRAM(s) Oral two times a day  pantoprazole    Tablet 40 milliGRAM(s) Oral before breakfast  predniSONE   Tablet 20 milliGRAM(s) Oral daily  tacrolimus 2.5 milliGRAM(s) Oral <User Schedule>  trimethoprim   80 mG/sulfamethoxazole 400 mG 1 Tablet(s) Oral daily  ursodiol Capsule 300 milliGRAM(s) Oral two times a day  valGANciclovir 900 milliGRAM(s) Oral daily    MEDICATIONS  (PRN):  cyclobenzaprine 10 milliGRAM(s) Oral three times a day PRN Muscle Spasm  ondansetron Injectable 4 milliGRAM(s) IV Push once PRN Nausea and/or Vomiting    Vital Signs Last 24 Hrs  T(C): 36.8 (19 Nov 2022 13:13), Max: 36.8 (19 Nov 2022 13:13)  T(F): 98.2 (19 Nov 2022 13:13), Max: 98.2 (19 Nov 2022 13:13)  HR: 99 (19 Nov 2022 13:13) (93 - 102)  BP: 108/59 (19 Nov 2022 13:13) (108/59 - 122/59)  BP(mean): --  RR: 18 (19 Nov 2022 13:13) (18 - 20)  SpO2: 100% (19 Nov 2022 13:13) (98% - 100%)    Parameters below as of 19 Nov 2022 13:13  Patient On (Oxygen Delivery Method): room air    I&O's Summary    18 Nov 2022 07:01  -  19 Nov 2022 07:00  --------------------------------------------------------  IN: 480 mL / OUT: 1180 mL / NET: -700 mL    19 Nov 2022 07:01  -  19 Nov 2022 14:49  --------------------------------------------------------  IN: 0 mL / OUT: 570 mL / NET: -570 mL                          7.9    8.00  )-----------( 159      ( 19 Nov 2022 06:14 )             24.5     11-19    139  |  103  |  21  ----------------------------<  97  4.4   |  28  |  0.74    Ca    8.4      19 Nov 2022 06:14  Phos  3.4     11-19  Mg     1.7     11-19    TPro  5.4<L>  /  Alb  2.9<L>  /  TBili  1.9<H>  /  DBili  x   /  AST  18  /  ALT  44  /  AlkPhos  202<H>  11-19    Tacrolimus (), Serum: 9.0 ng/mL (11-19 @ 06:14)    Review of systems  Gen: No fevers/chills, weakness  Skin: No rashes  Head/Eyes/Ears/Mouth: No headache; Normal hearing; Normal vision w/o blurriness; No sinus pain/discomfort, sore throat  Respiratory: No dyspnea, cough, wheezing, hemoptysis  CV: No chest pain, PND, orthopnea  GI: C/O mild abdominal pain at surgical site, No diarrhea, constipation, nausea, vomiting, melena, hematochezia  : No increased frequency, dysuria, hematuria, nocturia  MSK: No joint pain/swelling; no back pain; no edema  Neuro: No dizziness/lightheadedness, weakness, seizures, numbness, tingling  Heme: No easy bruising or bleeding  Endo: No heat/cold intolerance  Psych: No significant nervousness, anxiety, stress, depression  All other systems were reviewed and are negative, except as noted.    PHYSICAL EXAM:  Constitutional: well nourished  Eyes: Anicteric  ENMT: nc/at  Neck: supple  Respiratory: CTA b/l   Cardiovascular: RR  Gastrointestinal: soft, non distended, incisional tenderness to palpation, T tube capped,  VIOLETA #2 with serous drainage. incision packed with iodoform daily with good granulation tissue  Genitourinary: voiding   Extremities: SCD's in place and working bilaterally, no edema  Vascular: Palpable dp pulses bilaterally  Neurological: Alert, following commands  Skin: Warm,dry, intact throughout   Psychiatric: Responsive                               Transplant Surgery Multidisciplinary Progress Note     Present: Patient seen and examined with Transplant Surgeon Dr. Cartwright, Transplant Hepatologist Dr. Ceja, NP Demetris, and bedside RN during am rounds.   Disciplines not in attendance will be notified of plan    HPI: 32M PMH Cerebral palsy, Primary Sclerosing Cholangitis Cirrhosis with frequent ERCPs with biliary stent placements for biliary strictures (last in 9/2022), recent COVID s/p MAB in 9/2022).    Underwent OLT with Solumedrol induction on 11/1/22. Post ob course c/b   ·	Fevers   ·	Wound infection (VRE and E Coli)   ·	Bile leak at hepaticojejunostomy requiring re-op on 11/7.     Interval events:   - POD 20 s/p OLT and POD 13 s/p take back  - Feeling well, no overnight events     Immuno:   Maint immuno: FK by level, MMF 1/1, Pred 20  ongoing monitoring for signs of rejection      MEDICATIONS  (STANDING):  aspirin enteric coated 81 milliGRAM(s) Oral daily  calcium carbonate   1250 mG (OsCal) 1 Tablet(s) Oral two times a day  cefpodoxime 400 milliGRAM(s) Oral every 12 hours  chlorhexidine 2% Cloths 1 Application(s) Topical <User Schedule>  fluconAZOLE   Tablet 400 milliGRAM(s) Oral <User Schedule>  heparin   Injectable 5000 Unit(s) SubCutaneous every 12 hours  influenza   Vaccine 0.5 milliLiter(s) IntraMuscular once  linezolid    Tablet 600 milliGRAM(s) Oral every 12 hours  mycophenolate mofetil 1000 milliGRAM(s) Oral two times a day  pantoprazole    Tablet 40 milliGRAM(s) Oral before breakfast  predniSONE   Tablet 15 milliGRAM(s) Oral daily  tacrolimus 2.5 milliGRAM(s) Oral <User Schedule>  trimethoprim   80 mG/sulfamethoxazole 400 mG 1 Tablet(s) Oral daily  ursodiol Capsule 300 milliGRAM(s) Oral two times a day  valGANciclovir 900 milliGRAM(s) Oral daily    MEDICATIONS  (PRN):  cyclobenzaprine 10 milliGRAM(s) Oral three times a day PRN Muscle Spasm  ondansetron Injectable 4 milliGRAM(s) IV Push once PRN Nausea and/or Vomiting      PAST MEDICAL & SURGICAL HISTORY:  Cerebral palsy      PSC (primary sclerosing cholangitis)  On liver transplant list      Abnormal LFTs      Bile duct stricture      Dental caries      Impacted teeth  wisdom teeth      Phimosis      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      Anemia      History of ERCP  multiple with stent insertions/replacement every 3 months ---last 5/20/2022      [place] teeth extracted  2021          Vital Signs Last 24 Hrs  T(C): 36.7 (20 Nov 2022 08:30), Max: 37.2 (20 Nov 2022 00:58)  T(F): 98.1 (20 Nov 2022 08:30), Max: 98.9 (20 Nov 2022 00:58)  HR: 76 (20 Nov 2022 08:30) (76 - 106)  BP: 106/59 (20 Nov 2022 08:30) (100/60 - 110/59)  BP(mean): 77 (20 Nov 2022 05:51) (77 - 78)  RR: 18 (20 Nov 2022 08:30) (18 - 20)  SpO2: 100% (20 Nov 2022 08:30) (100% - 100%)    Parameters below as of 20 Nov 2022 08:30  Patient On (Oxygen Delivery Method): room air        I&O's Summary    19 Nov 2022 07:01  -  20 Nov 2022 07:00  --------------------------------------------------------  IN: 560 mL / OUT: 1097.5 mL / NET: -537.5 mL    20 Nov 2022 07:01  -  20 Nov 2022 13:52  --------------------------------------------------------  IN: 0 mL / OUT: 655 mL / NET: -655 mL                              7.5    9.06  )-----------( 155      ( 20 Nov 2022 07:29 )             23.1     11-20    138  |  102  |  24<H>  ----------------------------<  101<H>  4.4   |  27  |  0.74    Ca    8.4      20 Nov 2022 07:27  Phos  4.1     11-20  Mg     1.6     11-20    TPro  5.6<L>  /  Alb  3.0<L>  /  TBili  1.8<H>  /  DBili  x   /  AST  17  /  ALT  39  /  AlkPhos  181<H>  11-20    Tacrolimus (), Serum: 9.1 ng/mL (11-20 @ 07:32)      Review of systems  Gen: No fevers/chills, weakness  Skin: No rashes  Head/Eyes/Ears/Mouth: No headache; Normal hearing; Normal vision w/o blurriness; No sinus pain/discomfort, sore throat  Respiratory: No dyspnea, cough, wheezing, hemoptysis  CV: No chest pain, PND, orthopnea  GI: C/O min abdominal pain at surgical site, No diarrhea, constipation, nausea, vomiting, melena, hematochezia  : No increased frequency, dysuria, hematuria, nocturia  MSK: No joint pain/swelling; no back pain; no edema  Neuro: No dizziness/lightheadedness, weakness, seizures, numbness, tingling  Heme: No easy bruising or bleeding  Endo: No heat/cold intolerance  Psych: No significant nervousness, anxiety, stress, depression  All other systems were reviewed and are negative, except as noted.    PHYSICAL EXAM:  Constitutional: well nourished  Eyes: Anicteric  ENMT: nc/at  Neck: supple  Respiratory: CTA b/l   Cardiovascular: RR  Gastrointestinal: soft, non distended, mild incisional tenderness to palpation, T tube capped,  VIOLETA #2 with serous drainage. incision packed with iodoform/good granulation tissue  Genitourinary: voiding   Extremities: SCD's in place and working bilaterally, no edema  Vascular: Palpable dp pulses bilaterally  Neurological: Alert, following commands  Skin: Warm,dry, intact throughout   Psychiatric: Responsive

## 2022-11-21 LAB
ALBUMIN SERPL ELPH-MCNC: 2.9 G/DL — LOW (ref 3.3–5)
ALP SERPL-CCNC: 164 U/L — HIGH (ref 40–120)
ALT FLD-CCNC: 31 U/L — SIGNIFICANT CHANGE UP (ref 10–45)
ANION GAP SERPL CALC-SCNC: 10 MMOL/L — SIGNIFICANT CHANGE UP (ref 5–17)
AST SERPL-CCNC: 15 U/L — SIGNIFICANT CHANGE UP (ref 10–40)
BASOPHILS # BLD AUTO: 0.03 K/UL — SIGNIFICANT CHANGE UP (ref 0–0.2)
BASOPHILS NFR BLD AUTO: 0.3 % — SIGNIFICANT CHANGE UP (ref 0–2)
BILIRUB SERPL-MCNC: 1.6 MG/DL — HIGH (ref 0.2–1.2)
BLD GP AB SCN SERPL QL: NEGATIVE — SIGNIFICANT CHANGE UP
BUN SERPL-MCNC: 26 MG/DL — HIGH (ref 7–23)
CALCIUM SERPL-MCNC: 8.5 MG/DL — SIGNIFICANT CHANGE UP (ref 8.4–10.5)
CHLORIDE SERPL-SCNC: 104 MMOL/L — SIGNIFICANT CHANGE UP (ref 96–108)
CO2 SERPL-SCNC: 25 MMOL/L — SIGNIFICANT CHANGE UP (ref 22–31)
CREAT SERPL-MCNC: 0.84 MG/DL — SIGNIFICANT CHANGE UP (ref 0.5–1.3)
EGFR: 120 ML/MIN/1.73M2 — SIGNIFICANT CHANGE UP
EOSINOPHIL # BLD AUTO: 0.09 K/UL — SIGNIFICANT CHANGE UP (ref 0–0.5)
EOSINOPHIL NFR BLD AUTO: 1 % — SIGNIFICANT CHANGE UP (ref 0–6)
GLUCOSE SERPL-MCNC: 112 MG/DL — HIGH (ref 70–99)
HCT VFR BLD CALC: 22.7 % — LOW (ref 39–50)
HGB BLD-MCNC: 7.5 G/DL — LOW (ref 13–17)
IMM GRANULOCYTES NFR BLD AUTO: 0.6 % — SIGNIFICANT CHANGE UP (ref 0–0.9)
INR BLD: 1.27 RATIO — HIGH (ref 0.88–1.16)
LYMPHOCYTES # BLD AUTO: 3.33 K/UL — HIGH (ref 1–3.3)
LYMPHOCYTES # BLD AUTO: 38 % — SIGNIFICANT CHANGE UP (ref 13–44)
MAGNESIUM SERPL-MCNC: 1.6 MG/DL — SIGNIFICANT CHANGE UP (ref 1.6–2.6)
MCHC RBC-ENTMCNC: 31.9 PG — SIGNIFICANT CHANGE UP (ref 27–34)
MCHC RBC-ENTMCNC: 33 GM/DL — SIGNIFICANT CHANGE UP (ref 32–36)
MCV RBC AUTO: 96.6 FL — SIGNIFICANT CHANGE UP (ref 80–100)
MONOCYTES # BLD AUTO: 0.5 K/UL — SIGNIFICANT CHANGE UP (ref 0–0.9)
MONOCYTES NFR BLD AUTO: 5.7 % — SIGNIFICANT CHANGE UP (ref 2–14)
NEUTROPHILS # BLD AUTO: 4.77 K/UL — SIGNIFICANT CHANGE UP (ref 1.8–7.4)
NEUTROPHILS NFR BLD AUTO: 54.4 % — SIGNIFICANT CHANGE UP (ref 43–77)
NRBC # BLD: 0 /100 WBCS — SIGNIFICANT CHANGE UP (ref 0–0)
PHOSPHATE SERPL-MCNC: 4.2 MG/DL — SIGNIFICANT CHANGE UP (ref 2.5–4.5)
PLATELET # BLD AUTO: 148 K/UL — LOW (ref 150–400)
POTASSIUM SERPL-MCNC: 4.3 MMOL/L — SIGNIFICANT CHANGE UP (ref 3.5–5.3)
POTASSIUM SERPL-SCNC: 4.3 MMOL/L — SIGNIFICANT CHANGE UP (ref 3.5–5.3)
PROT SERPL-MCNC: 5.6 G/DL — LOW (ref 6–8.3)
PROTHROM AB SERPL-ACNC: 14.6 SEC — HIGH (ref 10.5–13.4)
RBC # BLD: 2.35 M/UL — LOW (ref 4.2–5.8)
RBC # FLD: 17 % — HIGH (ref 10.3–14.5)
RH IG SCN BLD-IMP: POSITIVE — SIGNIFICANT CHANGE UP
SODIUM SERPL-SCNC: 139 MMOL/L — SIGNIFICANT CHANGE UP (ref 135–145)
TACROLIMUS SERPL-MCNC: 9.6 NG/ML — SIGNIFICANT CHANGE UP
WBC # BLD: 8.77 K/UL — SIGNIFICANT CHANGE UP (ref 3.8–10.5)
WBC # FLD AUTO: 8.77 K/UL — SIGNIFICANT CHANGE UP (ref 3.8–10.5)

## 2022-11-21 RX ORDER — MAGNESIUM SULFATE 500 MG/ML
2 VIAL (ML) INJECTION ONCE
Refills: 0 | Status: COMPLETED | OUTPATIENT
Start: 2022-11-21 | End: 2022-11-22

## 2022-11-21 RX ORDER — LIDOCAINE HCL 20 MG/ML
10 VIAL (ML) INJECTION ONCE
Refills: 0 | Status: COMPLETED | OUTPATIENT
Start: 2022-11-21 | End: 2022-11-21

## 2022-11-21 RX ADMIN — VALGANCICLOVIR 900 MILLIGRAM(S): 450 TABLET, FILM COATED ORAL at 11:45

## 2022-11-21 RX ADMIN — TACROLIMUS 2.5 MILLIGRAM(S): 5 CAPSULE ORAL at 20:22

## 2022-11-21 RX ADMIN — Medication 10 MILLILITER(S): at 18:21

## 2022-11-21 RX ADMIN — Medication 81 MILLIGRAM(S): at 11:45

## 2022-11-21 RX ADMIN — HEPARIN SODIUM 5000 UNIT(S): 5000 INJECTION INTRAVENOUS; SUBCUTANEOUS at 06:36

## 2022-11-21 RX ADMIN — Medication 1 TABLET(S): at 06:36

## 2022-11-21 RX ADMIN — HEPARIN SODIUM 5000 UNIT(S): 5000 INJECTION INTRAVENOUS; SUBCUTANEOUS at 17:20

## 2022-11-21 RX ADMIN — URSODIOL 300 MILLIGRAM(S): 250 TABLET, FILM COATED ORAL at 17:20

## 2022-11-21 RX ADMIN — URSODIOL 300 MILLIGRAM(S): 250 TABLET, FILM COATED ORAL at 06:36

## 2022-11-21 RX ADMIN — LINEZOLID 600 MILLIGRAM(S): 600 INJECTION, SOLUTION INTRAVENOUS at 17:20

## 2022-11-21 RX ADMIN — MYCOPHENOLATE MOFETIL 1000 MILLIGRAM(S): 250 CAPSULE ORAL at 06:35

## 2022-11-21 RX ADMIN — MYCOPHENOLATE MOFETIL 1000 MILLIGRAM(S): 250 CAPSULE ORAL at 17:20

## 2022-11-21 RX ADMIN — PANTOPRAZOLE SODIUM 40 MILLIGRAM(S): 20 TABLET, DELAYED RELEASE ORAL at 06:37

## 2022-11-21 RX ADMIN — TACROLIMUS 2.5 MILLIGRAM(S): 5 CAPSULE ORAL at 08:10

## 2022-11-21 RX ADMIN — Medication 15 MILLIGRAM(S): at 06:37

## 2022-11-21 RX ADMIN — FLUCONAZOLE 400 MILLIGRAM(S): 150 TABLET ORAL at 11:45

## 2022-11-21 RX ADMIN — CHLORHEXIDINE GLUCONATE 1 APPLICATION(S): 213 SOLUTION TOPICAL at 06:36

## 2022-11-21 RX ADMIN — Medication 400 MILLIGRAM(S): at 06:37

## 2022-11-21 RX ADMIN — Medication 1 TABLET(S): at 11:45

## 2022-11-21 RX ADMIN — Medication 400 MILLIGRAM(S): at 17:20

## 2022-11-21 RX ADMIN — LINEZOLID 600 MILLIGRAM(S): 600 INJECTION, SOLUTION INTRAVENOUS at 06:35

## 2022-11-21 RX ADMIN — Medication 1 TABLET(S): at 17:20

## 2022-11-21 NOTE — REASON FOR VISIT
[NL] : warm, clear [Initial] : an initial visit for [Liver Transplant Evaluation] : liver transplant evaluation [Parent] : parent [FreeTextEntry2] : Dr. Yasmany Howard

## 2022-11-21 NOTE — PROGRESS NOTE ADULT - ASSESSMENT
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     [] s/p OLT c/b bile leak and RTOR  - Rising LFTS post op; tx with pulse steroids, LFTs improving  - Wound infection (VRE/E coli) - continue linezolid/cefpodoxine (end date 11/22)  - Dressing changes: wound vac   - T Tube capped,  flush T-tube with 3cc NS daily, no aspiration   - ASA/SQH  - Diet: Regular  - Pain management   - Strict I&O's  - Actigal 300mg bid     [] Immuno:   - Tac level 9.6, cont FK 2.5mg bid, MMF 1/1. Pred  to 15mg qd  - PPx: valcyte/bactrim/fluc/ppi/oscal    DISPO: FU CM re: dispo home with wound VAC.  Likely DC home Tuesday after VAC change

## 2022-11-21 NOTE — H&P ADULT - HISTORY OF PRESENT ILLNESS
This patient is a 31yo gentleman with PMH of cerebral palsy, primary sclerosing cholangitis, biliary strictures s/p stent exchanges (most recently performed in Feb 2021), currently awaiting liver transplant, who presents to the ED with complaint of fever and chills. Patient states that his symptoms started the evening that he received his second Pfizer COVID19 vaccine on 4/11/2021. He had fever to 100.3F, chills and sweats. He also had persistent nausea, decreased PO intake, gas, and RUQ soreness. Patient's mother at bedside notes that patient has had dry cough and dyspnea with exertion when walking distance from bedroom to bathroom in the home.   No other individuals at home were ill. He denies any recent travel.  The patient's mother alerted Dr. Tipton, and was advised to hold azathioprine. A prescription for ciprofloxacin was sent to the pharmacy, however it had not yet been obtained or started.   The patient had recently been started on ursodiol for pruritis and reports improvement in itching. 
Unknown

## 2022-11-21 NOTE — PROGRESS NOTE ADULT - SUBJECTIVE AND OBJECTIVE BOX
Transplant Surgery Multidisciplinary Progress Note     Present: Patient seen and examined with Transplant Surgeon Dr. Mederos, Transplant Hepatologist Dr. Carver, NP Demetris, and bedside RN during am rounds.   Disciplines not in attendance will be notified of plan    HPI: 32M PMH Cerebral palsy, Primary Sclerosing Cholangitis Cirrhosis with frequent ERCPs with biliary stent placements for biliary strictures (last in 9/2022), recent COVID s/p MAB in 9/2022).    Underwent OLT with Solumedrol induction on 11/1/22. Post ob course c/b   ·	Fevers   ·	Wound infection (VRE and E Coli)   ·	Bile leak at hepaticojejunostomy requiring re-op on 11/7.     Interval events:   - POD 21 s/p OLT and POD 14 s/p take back  - Feeling well, no overnight events   - Vac placed to abdominal wound  - Pred decreased to 15mg qd    Immuno:   Maint immuno: FK by level, MMF 1/1, Pred 15  ongoing monitoring for signs of rejection        MEDICATIONS  (STANDING):  aspirin enteric coated 81 milliGRAM(s) Oral daily  calcium carbonate   1250 mG (OsCal) 1 Tablet(s) Oral two times a day  cefpodoxime 400 milliGRAM(s) Oral every 12 hours  chlorhexidine 2% Cloths 1 Application(s) Topical <User Schedule>  fluconAZOLE   Tablet 400 milliGRAM(s) Oral <User Schedule>  heparin   Injectable 5000 Unit(s) SubCutaneous every 12 hours  influenza   Vaccine 0.5 milliLiter(s) IntraMuscular once  linezolid    Tablet 600 milliGRAM(s) Oral every 12 hours  mycophenolate mofetil 1000 milliGRAM(s) Oral two times a day  pantoprazole    Tablet 40 milliGRAM(s) Oral before breakfast  predniSONE   Tablet 15 milliGRAM(s) Oral daily  tacrolimus 2.5 milliGRAM(s) Oral <User Schedule>  trimethoprim   80 mG/sulfamethoxazole 400 mG 1 Tablet(s) Oral daily  ursodiol Capsule 300 milliGRAM(s) Oral two times a day  valGANciclovir 900 milliGRAM(s) Oral daily    MEDICATIONS  (PRN):  cyclobenzaprine 10 milliGRAM(s) Oral three times a day PRN Muscle Spasm  ondansetron Injectable 4 milliGRAM(s) IV Push once PRN Nausea and/or Vomiting      PAST MEDICAL & SURGICAL HISTORY:  Cerebral palsy      PSC (primary sclerosing cholangitis)  On liver transplant list      Abnormal LFTs      Bile duct stricture      Dental caries      Impacted teeth  wisdom teeth      Phimosis      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      Anemia      History of ERCP  multiple with stent insertions/replacement every 3 months ---last 5/20/2022      [place] teeth extracted  2021          Vital Signs Last 24 Hrs  T(C): 36.8 (21 Nov 2022 13:13), Max: 37.2 (20 Nov 2022 17:00)  T(F): 98.2 (21 Nov 2022 13:13), Max: 99 (20 Nov 2022 17:00)  HR: 112 (21 Nov 2022 13:13) (98 - 112)  BP: 114/68 (21 Nov 2022 13:13) (105/64 - 116/59)  BP(mean): 83 (21 Nov 2022 00:50) (83 - 83)  RR: 20 (21 Nov 2022 13:13) (18 - 20)  SpO2: 100% (21 Nov 2022 13:13) (100% - 100%)    Parameters below as of 21 Nov 2022 13:13  Patient On (Oxygen Delivery Method): room air        I&O's Summary    20 Nov 2022 07:01  -  21 Nov 2022 07:00  --------------------------------------------------------  IN: 360 mL / OUT: 1635 mL / NET: -1275 mL    21 Nov 2022 07:01  -  21 Nov 2022 16:39  --------------------------------------------------------  IN: 820 mL / OUT: 440 mL / NET: 380 mL                              7.5    8.77  )-----------( 148      ( 21 Nov 2022 06:43 )             22.7     11-21    139  |  104  |  26<H>  ----------------------------<  112<H>  4.3   |  25  |  0.84    Ca    8.5      21 Nov 2022 06:43  Phos  4.2     11-21  Mg     1.6     11-21    TPro  5.6<L>  /  Alb  2.9<L>  /  TBili  1.6<H>  /  DBili  x   /  AST  15  /  ALT  31  /  AlkPhos  164<H>  11-21    Tacrolimus (), Serum: 9.6 ng/mL (11-21 @ 06:43)      Review of systems  Gen: No fevers/chills, weakness  Skin: No rashes  Head/Eyes/Ears/Mouth: No headache; Normal hearing; Normal vision w/o blurriness; No sinus pain/discomfort, sore throat  Respiratory: No dyspnea, cough, wheezing, hemoptysis  CV: No chest pain, PND, orthopnea  GI: C/O min abdominal pain at surgical site, No diarrhea, constipation, nausea, vomiting, melena, hematochezia  : No increased frequency, dysuria, hematuria, nocturia  MSK: No joint pain/swelling; no back pain; no edema  Neuro: No dizziness/lightheadedness, weakness, seizures, numbness, tingling  Heme: No easy bruising or bleeding  Endo: No heat/cold intolerance  Psych: No significant nervousness, anxiety, stress, depression  All other systems were reviewed and are negative, except as noted.    PHYSICAL EXAM:  Constitutional: well nourished  Eyes: Anicteric  ENMT: nc/at  Neck: supple  Respiratory: CTA b/l   Cardiovascular: RR  Gastrointestinal: soft, non distended, mild incisional tenderness to palpation, T tube capped,  VIOLETA #2 with serous drainage. Wond vac patent  Genitourinary: voiding   Extremities: SCD's in place and working bilaterally, no edema  Vascular: Palpable dp pulses bilaterally  Neurological: Alert, following commands  Skin: Warm,dry, intact throughout   Psychiatric: Responsive

## 2022-11-22 ENCOUNTER — TRANSCRIPTION ENCOUNTER (OUTPATIENT)
Age: 32
End: 2022-11-22

## 2022-11-22 VITALS
OXYGEN SATURATION: 99 % | HEART RATE: 106 BPM | SYSTOLIC BLOOD PRESSURE: 113 MMHG | RESPIRATION RATE: 20 BRPM | TEMPERATURE: 98 F | DIASTOLIC BLOOD PRESSURE: 61 MMHG

## 2022-11-22 LAB
ALBUMIN SERPL ELPH-MCNC: 3 G/DL — LOW (ref 3.3–5)
ALP SERPL-CCNC: 144 U/L — HIGH (ref 40–120)
ALT FLD-CCNC: 24 U/L — SIGNIFICANT CHANGE UP (ref 10–45)
ANION GAP SERPL CALC-SCNC: 11 MMOL/L — SIGNIFICANT CHANGE UP (ref 5–17)
AST SERPL-CCNC: 13 U/L — SIGNIFICANT CHANGE UP (ref 10–40)
BASOPHILS # BLD AUTO: 0.04 K/UL — SIGNIFICANT CHANGE UP (ref 0–0.2)
BASOPHILS # BLD AUTO: 0.06 K/UL — SIGNIFICANT CHANGE UP (ref 0–0.2)
BASOPHILS NFR BLD AUTO: 0.4 % — SIGNIFICANT CHANGE UP (ref 0–2)
BASOPHILS NFR BLD AUTO: 0.7 % — SIGNIFICANT CHANGE UP (ref 0–2)
BILIRUB SERPL-MCNC: 1.6 MG/DL — HIGH (ref 0.2–1.2)
BUN SERPL-MCNC: 30 MG/DL — HIGH (ref 7–23)
CALCIUM SERPL-MCNC: 8.5 MG/DL — SIGNIFICANT CHANGE UP (ref 8.4–10.5)
CHLORIDE SERPL-SCNC: 105 MMOL/L — SIGNIFICANT CHANGE UP (ref 96–108)
CO2 SERPL-SCNC: 25 MMOL/L — SIGNIFICANT CHANGE UP (ref 22–31)
CREAT SERPL-MCNC: 0.91 MG/DL — SIGNIFICANT CHANGE UP (ref 0.5–1.3)
EGFR: 116 ML/MIN/1.73M2 — SIGNIFICANT CHANGE UP
EOSINOPHIL # BLD AUTO: 0.06 K/UL — SIGNIFICANT CHANGE UP (ref 0–0.5)
EOSINOPHIL # BLD AUTO: 0.07 K/UL — SIGNIFICANT CHANGE UP (ref 0–0.5)
EOSINOPHIL NFR BLD AUTO: 0.7 % — SIGNIFICANT CHANGE UP (ref 0–6)
EOSINOPHIL NFR BLD AUTO: 0.8 % — SIGNIFICANT CHANGE UP (ref 0–6)
GLUCOSE SERPL-MCNC: 90 MG/DL — SIGNIFICANT CHANGE UP (ref 70–99)
HCT VFR BLD CALC: 21.4 % — LOW (ref 39–50)
HCT VFR BLD CALC: 23.2 % — LOW (ref 39–50)
HGB BLD-MCNC: 7 G/DL — CRITICAL LOW (ref 13–17)
HGB BLD-MCNC: 7.5 G/DL — LOW (ref 13–17)
IMM GRANULOCYTES NFR BLD AUTO: 0.4 % — SIGNIFICANT CHANGE UP (ref 0–0.9)
IMM GRANULOCYTES NFR BLD AUTO: 0.5 % — SIGNIFICANT CHANGE UP (ref 0–0.9)
INR BLD: 1.16 RATIO — SIGNIFICANT CHANGE UP (ref 0.88–1.16)
LYMPHOCYTES # BLD AUTO: 3.37 K/UL — HIGH (ref 1–3.3)
LYMPHOCYTES # BLD AUTO: 3.5 K/UL — HIGH (ref 1–3.3)
LYMPHOCYTES # BLD AUTO: 36.6 % — SIGNIFICANT CHANGE UP (ref 13–44)
LYMPHOCYTES # BLD AUTO: 39.1 % — SIGNIFICANT CHANGE UP (ref 13–44)
MAGNESIUM SERPL-MCNC: 1.9 MG/DL — SIGNIFICANT CHANGE UP (ref 1.6–2.6)
MCHC RBC-ENTMCNC: 31.7 PG — SIGNIFICANT CHANGE UP (ref 27–34)
MCHC RBC-ENTMCNC: 31.8 PG — SIGNIFICANT CHANGE UP (ref 27–34)
MCHC RBC-ENTMCNC: 32.3 GM/DL — SIGNIFICANT CHANGE UP (ref 32–36)
MCHC RBC-ENTMCNC: 32.7 GM/DL — SIGNIFICANT CHANGE UP (ref 32–36)
MCV RBC AUTO: 96.8 FL — SIGNIFICANT CHANGE UP (ref 80–100)
MCV RBC AUTO: 98.3 FL — SIGNIFICANT CHANGE UP (ref 80–100)
MONOCYTES # BLD AUTO: 0.57 K/UL — SIGNIFICANT CHANGE UP (ref 0–0.9)
MONOCYTES # BLD AUTO: 0.63 K/UL — SIGNIFICANT CHANGE UP (ref 0–0.9)
MONOCYTES NFR BLD AUTO: 6.4 % — SIGNIFICANT CHANGE UP (ref 2–14)
MONOCYTES NFR BLD AUTO: 6.8 % — SIGNIFICANT CHANGE UP (ref 2–14)
NEUTROPHILS # BLD AUTO: 4.74 K/UL — SIGNIFICANT CHANGE UP (ref 1.8–7.4)
NEUTROPHILS # BLD AUTO: 5.02 K/UL — SIGNIFICANT CHANGE UP (ref 1.8–7.4)
NEUTROPHILS NFR BLD AUTO: 53 % — SIGNIFICANT CHANGE UP (ref 43–77)
NEUTROPHILS NFR BLD AUTO: 54.6 % — SIGNIFICANT CHANGE UP (ref 43–77)
NRBC # BLD: 0 /100 WBCS — SIGNIFICANT CHANGE UP (ref 0–0)
NRBC # BLD: 0 /100 WBCS — SIGNIFICANT CHANGE UP (ref 0–0)
PHOSPHATE SERPL-MCNC: 4.9 MG/DL — HIGH (ref 2.5–4.5)
PLATELET # BLD AUTO: 150 K/UL — SIGNIFICANT CHANGE UP (ref 150–400)
PLATELET # BLD AUTO: 167 K/UL — SIGNIFICANT CHANGE UP (ref 150–400)
POTASSIUM SERPL-MCNC: 4.1 MMOL/L — SIGNIFICANT CHANGE UP (ref 3.5–5.3)
POTASSIUM SERPL-SCNC: 4.1 MMOL/L — SIGNIFICANT CHANGE UP (ref 3.5–5.3)
PROT SERPL-MCNC: 5.6 G/DL — LOW (ref 6–8.3)
PROTHROM AB SERPL-ACNC: 13.5 SEC — HIGH (ref 10.5–13.4)
RBC # BLD: 2.21 M/UL — LOW (ref 4.2–5.8)
RBC # BLD: 2.36 M/UL — LOW (ref 4.2–5.8)
RBC # FLD: 17.2 % — HIGH (ref 10.3–14.5)
RBC # FLD: 17.2 % — HIGH (ref 10.3–14.5)
SODIUM SERPL-SCNC: 141 MMOL/L — SIGNIFICANT CHANGE UP (ref 135–145)
TACROLIMUS SERPL-MCNC: 9.1 NG/ML — SIGNIFICANT CHANGE UP
WBC # BLD: 8.95 K/UL — SIGNIFICANT CHANGE UP (ref 3.8–10.5)
WBC # BLD: 9.2 K/UL — SIGNIFICANT CHANGE UP (ref 3.8–10.5)
WBC # FLD AUTO: 8.95 K/UL — SIGNIFICANT CHANGE UP (ref 3.8–10.5)
WBC # FLD AUTO: 9.2 K/UL — SIGNIFICANT CHANGE UP (ref 3.8–10.5)

## 2022-11-22 PROCEDURE — 97606 NEG PRS WND THER DME>50 SQCM: CPT

## 2022-11-22 PROCEDURE — U0005: CPT

## 2022-11-22 PROCEDURE — 88307 TISSUE EXAM BY PATHOLOGIST: CPT

## 2022-11-22 PROCEDURE — 84132 ASSAY OF SERUM POTASSIUM: CPT

## 2022-11-22 PROCEDURE — 87181 SC STD AGAR DILUTION PER AGT: CPT

## 2022-11-22 PROCEDURE — 85610 PROTHROMBIN TIME: CPT

## 2022-11-22 PROCEDURE — 80202 ASSAY OF VANCOMYCIN: CPT

## 2022-11-22 PROCEDURE — 88305 TISSUE EXAM BY PATHOLOGIST: CPT

## 2022-11-22 PROCEDURE — 82330 ASSAY OF CALCIUM: CPT

## 2022-11-22 PROCEDURE — 85027 COMPLETE CBC AUTOMATED: CPT

## 2022-11-22 PROCEDURE — 82565 ASSAY OF CREATININE: CPT

## 2022-11-22 PROCEDURE — 86905 BLOOD TYPING RBC ANTIGENS: CPT

## 2022-11-22 PROCEDURE — 97166 OT EVAL MOD COMPLEX 45 MIN: CPT

## 2022-11-22 PROCEDURE — 86900 BLOOD TYPING SEROLOGIC ABO: CPT

## 2022-11-22 PROCEDURE — 36415 COLL VENOUS BLD VENIPUNCTURE: CPT

## 2022-11-22 PROCEDURE — 80053 COMPREHEN METABOLIC PANEL: CPT

## 2022-11-22 PROCEDURE — 97164 PT RE-EVAL EST PLAN CARE: CPT

## 2022-11-22 PROCEDURE — 85730 THROMBOPLASTIN TIME PARTIAL: CPT

## 2022-11-22 PROCEDURE — 82977 ASSAY OF GGT: CPT

## 2022-11-22 PROCEDURE — 94002 VENT MGMT INPAT INIT DAY: CPT

## 2022-11-22 PROCEDURE — 87389 HIV-1 AG W/HIV-1&-2 AB AG IA: CPT

## 2022-11-22 PROCEDURE — 86923 COMPATIBILITY TEST ELECTRIC: CPT

## 2022-11-22 PROCEDURE — 97110 THERAPEUTIC EXERCISES: CPT

## 2022-11-22 PROCEDURE — 83519 RIA NONANTIBODY: CPT

## 2022-11-22 PROCEDURE — 87186 SC STD MICRODIL/AGAR DIL: CPT

## 2022-11-22 PROCEDURE — 88312 SPECIAL STAINS GROUP 1: CPT

## 2022-11-22 PROCEDURE — 86880 COOMBS TEST DIRECT: CPT

## 2022-11-22 PROCEDURE — 83605 ASSAY OF LACTIC ACID: CPT

## 2022-11-22 PROCEDURE — 82435 ASSAY OF BLOOD CHLORIDE: CPT

## 2022-11-22 PROCEDURE — 80076 HEPATIC FUNCTION PANEL: CPT

## 2022-11-22 PROCEDURE — 85018 HEMOGLOBIN: CPT

## 2022-11-22 PROCEDURE — 84100 ASSAY OF PHOSPHORUS: CPT

## 2022-11-22 PROCEDURE — 88304 TISSUE EXAM BY PATHOLOGIST: CPT

## 2022-11-22 PROCEDURE — 87070 CULTURE OTHR SPECIMN AEROBIC: CPT

## 2022-11-22 PROCEDURE — 86706 HEP B SURFACE ANTIBODY: CPT

## 2022-11-22 PROCEDURE — 97161 PT EVAL LOW COMPLEX 20 MIN: CPT

## 2022-11-22 PROCEDURE — 85025 COMPLETE CBC W/AUTO DIFF WBC: CPT

## 2022-11-22 PROCEDURE — 99261: CPT

## 2022-11-22 PROCEDURE — 74177 CT ABD & PELVIS W/CONTRAST: CPT

## 2022-11-22 PROCEDURE — 97168 OT RE-EVAL EST PLAN CARE: CPT

## 2022-11-22 PROCEDURE — 87075 CULTR BACTERIA EXCEPT BLOOD: CPT

## 2022-11-22 PROCEDURE — 80048 BASIC METABOLIC PNL TOTAL CA: CPT

## 2022-11-22 PROCEDURE — 87507 IADNA-DNA/RNA PROBE TQ 12-25: CPT

## 2022-11-22 PROCEDURE — 97535 SELF CARE MNGMENT TRAINING: CPT

## 2022-11-22 PROCEDURE — 80197 ASSAY OF TACROLIMUS: CPT

## 2022-11-22 PROCEDURE — 97530 THERAPEUTIC ACTIVITIES: CPT

## 2022-11-22 PROCEDURE — 82803 BLOOD GASES ANY COMBINATION: CPT

## 2022-11-22 PROCEDURE — 87522 HEPATITIS C REVRS TRNSCRPJ: CPT

## 2022-11-22 PROCEDURE — 87077 CULTURE AEROBIC IDENTIFY: CPT

## 2022-11-22 PROCEDURE — 86985 SPLIT BLOOD OR PRODUCTS: CPT

## 2022-11-22 PROCEDURE — 85384 FIBRINOGEN ACTIVITY: CPT

## 2022-11-22 PROCEDURE — 86664 EPSTEIN-BARR NUCLEAR ANTIGEN: CPT

## 2022-11-22 PROCEDURE — P9016: CPT

## 2022-11-22 PROCEDURE — 84295 ASSAY OF SERUM SODIUM: CPT

## 2022-11-22 PROCEDURE — 86644 CMV ANTIBODY: CPT

## 2022-11-22 PROCEDURE — 36430 TRANSFUSION BLD/BLD COMPNT: CPT

## 2022-11-22 PROCEDURE — P9045: CPT

## 2022-11-22 PROCEDURE — 86850 RBC ANTIBODY SCREEN: CPT

## 2022-11-22 PROCEDURE — P9011: CPT

## 2022-11-22 PROCEDURE — 88341 IMHCHEM/IMCYTCHM EA ADD ANTB: CPT

## 2022-11-22 PROCEDURE — 82248 BILIRUBIN DIRECT: CPT

## 2022-11-22 PROCEDURE — C1889: CPT

## 2022-11-22 PROCEDURE — U0003: CPT

## 2022-11-22 PROCEDURE — 85396 CLOTTING ASSAY WHOLE BLOOD: CPT

## 2022-11-22 PROCEDURE — 86787 VARICELLA-ZOSTER ANTIBODY: CPT

## 2022-11-22 PROCEDURE — 83036 HEMOGLOBIN GLYCOSYLATED A1C: CPT

## 2022-11-22 PROCEDURE — 86665 EPSTEIN-BARR CAPSID VCA: CPT

## 2022-11-22 PROCEDURE — 97605 NEG PRS WND THER DME<=50SQCM: CPT

## 2022-11-22 PROCEDURE — P9041: CPT

## 2022-11-22 PROCEDURE — P9047: CPT

## 2022-11-22 PROCEDURE — C1769: CPT

## 2022-11-22 PROCEDURE — 76705 ECHO EXAM OF ABDOMEN: CPT

## 2022-11-22 PROCEDURE — 88313 SPECIAL STAINS GROUP 2: CPT

## 2022-11-22 PROCEDURE — 86803 HEPATITIS C AB TEST: CPT

## 2022-11-22 PROCEDURE — 83735 ASSAY OF MAGNESIUM: CPT

## 2022-11-22 PROCEDURE — 87040 BLOOD CULTURE FOR BACTERIA: CPT

## 2022-11-22 PROCEDURE — C9399: CPT

## 2022-11-22 PROCEDURE — 93005 ELECTROCARDIOGRAM TRACING: CPT

## 2022-11-22 PROCEDURE — C1751: CPT

## 2022-11-22 PROCEDURE — 82247 BILIRUBIN TOTAL: CPT

## 2022-11-22 PROCEDURE — 86901 BLOOD TYPING SEROLOGIC RH(D): CPT

## 2022-11-22 PROCEDURE — 88309 TISSUE EXAM BY PATHOLOGIST: CPT

## 2022-11-22 PROCEDURE — 93975 VASCULAR STUDY: CPT

## 2022-11-22 PROCEDURE — 87340 HEPATITIS B SURFACE AG IA: CPT

## 2022-11-22 PROCEDURE — 82962 GLUCOSE BLOOD TEST: CPT

## 2022-11-22 PROCEDURE — 0225U NFCT DS DNA&RNA 21 SARSCOV2: CPT

## 2022-11-22 PROCEDURE — 97116 GAIT TRAINING THERAPY: CPT

## 2022-11-22 PROCEDURE — 86704 HEP B CORE ANTIBODY TOTAL: CPT

## 2022-11-22 PROCEDURE — 86663 EPSTEIN-BARR ANTIBODY: CPT

## 2022-11-22 PROCEDURE — 87205 SMEAR GRAM STAIN: CPT

## 2022-11-22 PROCEDURE — 82947 ASSAY GLUCOSE BLOOD QUANT: CPT

## 2022-11-22 PROCEDURE — 81001 URINALYSIS AUTO W/SCOPE: CPT

## 2022-11-22 PROCEDURE — 85014 HEMATOCRIT: CPT

## 2022-11-22 PROCEDURE — 71045 X-RAY EXAM CHEST 1 VIEW: CPT

## 2022-11-22 RX ORDER — CALCIUM CARBONATE 500(1250)
1 TABLET ORAL
Qty: 0 | Refills: 0 | DISCHARGE
Start: 2022-11-22

## 2022-11-22 RX ORDER — ASPIRIN/CALCIUM CARB/MAGNESIUM 324 MG
1 TABLET ORAL
Qty: 0 | Refills: 0 | DISCHARGE
Start: 2022-11-22

## 2022-11-22 RX ORDER — URSODIOL 250 MG/1
1 TABLET, FILM COATED ORAL
Qty: 0 | Refills: 0 | DISCHARGE
Start: 2022-11-22

## 2022-11-22 RX ORDER — APIXABAN 2.5 MG/1
1 TABLET, FILM COATED ORAL
Qty: 0 | Refills: 0 | DISCHARGE

## 2022-11-22 RX ORDER — TACROLIMUS 5 MG/1
1 CAPSULE ORAL
Qty: 0 | Refills: 0 | DISCHARGE
Start: 2022-11-22

## 2022-11-22 RX ORDER — VALGANCICLOVIR 450 MG/1
2 TABLET, FILM COATED ORAL
Qty: 0 | Refills: 0 | DISCHARGE
Start: 2022-11-22

## 2022-11-22 RX ORDER — FLUCONAZOLE 150 MG/1
2 TABLET ORAL
Qty: 0 | Refills: 0 | DISCHARGE
Start: 2022-11-22

## 2022-11-22 RX ORDER — PANTOPRAZOLE SODIUM 20 MG/1
1 TABLET, DELAYED RELEASE ORAL
Qty: 0 | Refills: 0 | DISCHARGE
Start: 2022-11-22

## 2022-11-22 RX ORDER — MYCOPHENOLATE MOFETIL 250 MG/1
2 CAPSULE ORAL
Qty: 0 | Refills: 0 | DISCHARGE
Start: 2022-11-22

## 2022-11-22 RX ADMIN — TACROLIMUS 2.5 MILLIGRAM(S): 5 CAPSULE ORAL at 07:44

## 2022-11-22 RX ADMIN — MYCOPHENOLATE MOFETIL 1000 MILLIGRAM(S): 250 CAPSULE ORAL at 06:19

## 2022-11-22 RX ADMIN — LINEZOLID 600 MILLIGRAM(S): 600 INJECTION, SOLUTION INTRAVENOUS at 06:18

## 2022-11-22 RX ADMIN — PANTOPRAZOLE SODIUM 40 MILLIGRAM(S): 20 TABLET, DELAYED RELEASE ORAL at 06:18

## 2022-11-22 RX ADMIN — Medication 1 TABLET(S): at 06:18

## 2022-11-22 RX ADMIN — Medication 25 GRAM(S): at 01:42

## 2022-11-22 RX ADMIN — Medication 81 MILLIGRAM(S): at 12:05

## 2022-11-22 RX ADMIN — HEPARIN SODIUM 5000 UNIT(S): 5000 INJECTION INTRAVENOUS; SUBCUTANEOUS at 06:19

## 2022-11-22 RX ADMIN — Medication 400 MILLIGRAM(S): at 06:19

## 2022-11-22 RX ADMIN — FLUCONAZOLE 400 MILLIGRAM(S): 150 TABLET ORAL at 12:05

## 2022-11-22 RX ADMIN — VALGANCICLOVIR 900 MILLIGRAM(S): 450 TABLET, FILM COATED ORAL at 12:05

## 2022-11-22 RX ADMIN — URSODIOL 300 MILLIGRAM(S): 250 TABLET, FILM COATED ORAL at 06:19

## 2022-11-22 RX ADMIN — Medication 1 TABLET(S): at 12:05

## 2022-11-22 RX ADMIN — Medication 15 MILLIGRAM(S): at 06:18

## 2022-11-22 NOTE — PROGRESS NOTE ADULT - PROVIDER SPECIALTY LIST ADULT
SICU
SICU
Transplant Surgery
Infectious Disease
SICU
Transplant ID
Transplant Surgery
Pharmacy
SICU
Transplant ID
Transplant Surgery

## 2022-11-22 NOTE — PROGRESS NOTE ADULT - SUBJECTIVE AND OBJECTIVE BOX
Transplant Surgery - Multidisciplinary Rounds  --------------------------------------------------------------  OLTx      POD#    HPI:     Hospital Course:     Present:   Patient seen and examined with multidisciplinary Transplant team including: Surgeon: Dr. Dagher, Dr. Watson, Dr. Godinez, Dr. Sanchez, Dr. Cartwright, Dr. Mederos. Hepatologist: Dr. Tipton, Dr. Ceja, Dr Rodriguez, Dr Carver, Dr Agustin Pharmacist: Dean. CHOCO Pendleton and unit RNs in AM rounds.   Disciplines not in attendance will be notified of the plan.     Interval Events:    Immunosuppression:   Induction:                                                Maintenance Immunosuppression:  Ongoing monitoring for signs of rejection.      Potential Discharge date: Pending clinical course   Education:  Medications  Plan of care:  See Below    MEDICATIONS  (STANDING):  aspirin enteric coated 81 milliGRAM(s) Oral daily  calcium carbonate   1250 mG (OsCal) 1 Tablet(s) Oral two times a day  cefpodoxime 400 milliGRAM(s) Oral every 12 hours  chlorhexidine 2% Cloths 1 Application(s) Topical <User Schedule>  fluconAZOLE   Tablet 400 milliGRAM(s) Oral <User Schedule>  heparin   Injectable 5000 Unit(s) SubCutaneous every 12 hours  influenza   Vaccine 0.5 milliLiter(s) IntraMuscular once  linezolid    Tablet 600 milliGRAM(s) Oral every 12 hours  mycophenolate mofetil 1000 milliGRAM(s) Oral two times a day  pantoprazole    Tablet 40 milliGRAM(s) Oral before breakfast  predniSONE   Tablet 15 milliGRAM(s) Oral daily  tacrolimus 2.5 milliGRAM(s) Oral <User Schedule>  trimethoprim   80 mG/sulfamethoxazole 400 mG 1 Tablet(s) Oral daily  ursodiol Capsule 300 milliGRAM(s) Oral two times a day  valGANciclovir 900 milliGRAM(s) Oral daily    MEDICATIONS  (PRN):  cyclobenzaprine 10 milliGRAM(s) Oral three times a day PRN Muscle Spasm  ondansetron Injectable 4 milliGRAM(s) IV Push once PRN Nausea and/or Vomiting      PAST MEDICAL & SURGICAL HISTORY:  Cerebral palsy      PSC (primary sclerosing cholangitis)  On liver transplant list      Abnormal LFTs      Bile duct stricture      Dental caries      Impacted teeth  wisdom teeth      Phimosis      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      Anemia      History of ERCP  multiple with stent insertions/replacement every 3 months ---last 2022      [place] teeth extracted            Vital Signs Last 24 Hrs  T(C): 36.9 (2022 12:58), Max: 37.4 (2022 21:27)  T(F): 98.4 (2022 12:58), Max: 99.3 (2022 21:27)  HR: 106 (2022 12:58) (100 - 113)  BP: 113/61 (2022 12:58) (103/69 - 121/68)  BP(mean): 76 (2022 06:05) (76 - 89)  RR: 20 (2022 12:58) (18 - 20)  SpO2: 99% (2022 12:58) (99% - 100%)    Parameters below as of 2022 12:58  Patient On (Oxygen Delivery Method): room air        I&O's Summary    2022 07:01  -  2022 07:00  --------------------------------------------------------  IN: 1340 mL / OUT: 575 mL / NET: 765 mL      Daily     Daily Weight in k.3 (2022 06:05)    Labs:                         7.5    9.20  )-----------( 167      ( 2022 07:17 )             23.2     11-    141  |  105  |  30<H>  ----------------------------<  90  4.1   |  25  |  0.91    Ca    8.5      2022 06:35  Phos  4.9       Mg     1.9         TPro  5.6<L>  /  Alb  3.0<L>  /  TBili  1.6<H>  /  DBili  x   /  AST  13  /  ALT  24  /  AlkPhos  144<H>      CAPILLARY BLOOD GLUCOSE        PT/INR - ( 2022 06:35 )   PT: 13.5 sec;   INR: 1.16 ratio           COVID-19 PCR: NotDetec (2022 07:07)  COVID-19 PCR: NotDetec (2022 10:13)  COVID-19 PCR: NotDetec (2022 06:28)  SARS-CoV-2: NotDetec (2022 15:23)  COVID-19 PCR: NotDetec (31 Oct 2022 17:21)  SARS-CoV-2: NotDetec (26 Sep 2022 19:47)  COVID-19 PCR: Detected (24 Sep 2022 15:45)    Tacrolimus (), Serum: 9.1 ng/mL ( @ 06:35)        Review of systems  Gen: No weight changes, fatigue, fevers/chills, weakness  Skin: No rashes  Head/Eyes/Ears/Mouth: No headache; Normal hearing; Normal vision w/o blurriness; No sinus pain/discomfort, sore throat  Respiratory: No dyspnea, cough, wheezing, hemoptysis  CV: No chest pain, PND, orthopnea  GI: C/O mild abdominal pain at surgical site. no diarrhea, constipation, nausea, vomiting, melena, hematochezia  : No increased frequency, dysuria, hematuria, nocturia  MSK: No joint pain/swelling; no back pain; no edema  Neuro: No dizziness/lightheadedness, weakness, seizures, numbness, tingling  Heme: No easy bruising or bleeding  Endo: No heat/cold intolerance  Psych: No significant nervousness, anxiety, stress, depression  All other systems were reviewed and are negative, except as noted.      PHYSICAL EXAM:  Constitutional: Well developed / well nourished  Eyes: *****Anicteric?***, PERRLA  ENMT: nc/at, no thrush  Neck: supple, trachea midline, central line *****************  Respiratory: CTA B/L  Cardiovascular: RRR  Gastrointestinal: Soft abdomen, ND, appropriate incisional TTP. chevron incision c/d/i, staples in place. No signs of infection; VIOLETA with SS output  Genitourinary: Urinary catheter in place*****Voiding spontaneously  Extremities: SCD's in place and working bilaterally  Vascular: Palpable dp pulses bilaterally.   Neurological: A&O x3  Skin: no rashes, ulcerations, lesions  Musculoskeletal: Moving all extremities  Psychiatric: Responsive     Transplant Surgery - Multidisciplinary Rounds  --------------------------------------------------------------  OLTx      POD#22    HPI:  32M PMH Cerebral palsy, Primary Sclerosing Cholangitis Cirrhosis with frequent ERCPs with biliary stent placements for biliary strictures (last in 2022), recent COVID s/p MAB in 2022).    Underwent OLT with Solumedrol induction on 22. Post ob course c/b   ·	Fevers   ·	Wound infection (VRE and E Coli)   ·	Bile leak at hepaticojejunostomy requiring re-op on .     Present: Patient seen and examined with multidisciplinary Transplant team including: Surgeon: Dr. Mederos. Hepatologist: Dr Carver, Pharmacist: Diane Pendleton and unit RNs in AM rounds.   Disciplines not in attendance will be notified of the plan.     Interval Events:  - Final VIOLETA removed  - LFTs continue to remain stable   - T-tube remains capped w/o symptoms     Immunosuppression:   Induction: Medrol                                    Maintenance Immunosuppression: FK 2.5mg BID, MMF 1g BID, Pred 15   Ongoing monitoring for signs of rejection.    Potential Discharge date: Pending clinical course   Education:  Medications  Plan of care:  See Below    MEDICATIONS  (STANDING):  aspirin enteric coated 81 milliGRAM(s) Oral daily  calcium carbonate   1250 mG (OsCal) 1 Tablet(s) Oral two times a day  cefpodoxime 400 milliGRAM(s) Oral every 12 hours  chlorhexidine 2% Cloths 1 Application(s) Topical <User Schedule>  fluconAZOLE   Tablet 400 milliGRAM(s) Oral <User Schedule>  heparin   Injectable 5000 Unit(s) SubCutaneous every 12 hours  influenza   Vaccine 0.5 milliLiter(s) IntraMuscular once  linezolid    Tablet 600 milliGRAM(s) Oral every 12 hours  mycophenolate mofetil 1000 milliGRAM(s) Oral two times a day  pantoprazole    Tablet 40 milliGRAM(s) Oral before breakfast  predniSONE   Tablet 15 milliGRAM(s) Oral daily  tacrolimus 2.5 milliGRAM(s) Oral <User Schedule>  trimethoprim   80 mG/sulfamethoxazole 400 mG 1 Tablet(s) Oral daily  ursodiol Capsule 300 milliGRAM(s) Oral two times a day  valGANciclovir 900 milliGRAM(s) Oral daily    MEDICATIONS  (PRN):  cyclobenzaprine 10 milliGRAM(s) Oral three times a day PRN Muscle Spasm  ondansetron Injectable 4 milliGRAM(s) IV Push once PRN Nausea and/or Vomiting      PAST MEDICAL & SURGICAL HISTORY:  Cerebral palsy  PSC (primary sclerosing cholangitis)  Abnormal LFTs  Bile duct stricture  Dental caries  Impacted teeth-wisdom teeth  Phimosis  History of seizures-at birth  Anemia  History of ERCP-multiple with stent insertions/replacement every 3 months ---last 2022  [place] teeth extracted-      Vital Signs Last 24 Hrs  T(C): 36.9 (2022 12:58), Max: 37.4 (2022 21:27)  T(F): 98.4 (2022 12:58), Max: 99.3 (2022 21:27)  HR: 106 (2022 12:58) (100 - 113)  BP: 113/61 (2022 12:58) (109/61 - 121/68)  BP(mean): 76 (2022 06:05) (76 - 89)  RR: 20 (2022 12:58) (18 - 20)  SpO2: 99% (2022 12:58) (99% - 100%)    Parameters below as of 2022 12:58  Patient On (Oxygen Delivery Method): room air      I&O's Summary    2022 07:  -  2022 07:00  --------------------------------------------------------  IN: 1340 mL / OUT: 575 mL / NET: 765 mL    2022 07:01  -  2022 18:00  --------------------------------------------------------  IN: 560 mL / OUT: 200 mL / NET: 360 mL      Daily     Daily Weight in k.3 (2022 06:05)      Labs:                         7.5    9.20  )-----------( 167      ( 2022 07:17 )             23.2         141  |  105  |  30<H>  ----------------------------<  90  4.1   |  25  |  0.91    Ca    8.5      2022 06:35  Phos  4.9       Mg     1.9         TPro  5.6<L>  /  Alb  3.0<L>  /  TBili  1.6<H>  /  DBili  x   /  AST  13  /  ALT  24  /  AlkPhos  144<H>        PT/INR - ( 2022 06:35 )   PT: 13.5 sec;   INR: 1.16 ratio      COVID-19 PCR: NotDetec (2022 07:07)  COVID-19 PCR: NotDetec (2022 10:13)  COVID-19 PCR: NotDetec (2022 06:28)  SARS-CoV-2: NotDetec (2022 15:23)  COVID-19 PCR: NotDetec (31 Oct 2022 17:21)  SARS-CoV-2: NotDetec (26 Sep 2022 19:47)  COVID-19 PCR: Detected (24 Sep 2022 15:45)    Tacrolimus (), Serum: 9.1 ng/mL ( @ 06:35)      Review of systems  Gen: No weight changes, fatigue, fevers/chills, weakness  Skin: No rashes  Head/Eyes/Ears/Mouth: No headache; Normal hearing; Normal vision w/o blurriness; No sinus pain/discomfort, sore throat  Respiratory: No dyspnea, cough, wheezing, hemoptysis  CV: No chest pain, PND, orthopnea  GI: C/O mild abdominal pain at surgical site. no diarrhea, constipation, nausea, vomiting, melena, hematochezia  : No increased frequency, dysuria, hematuria, nocturia  MSK: No joint pain/swelling; no back pain; no edema  Neuro: No dizziness/lightheadedness, weakness, seizures, numbness, tingling  Heme: No easy bruising or bleeding  Endo: No heat/cold intolerance  Psych: No significant nervousness, anxiety, stress, depression  All other systems were reviewed and are negative, except as noted.      PHYSICAL EXAM:  Constitutional: well nourished  Eyes: Anicteric  ENMT: nc/at  Neck: supple  Respiratory: CTA b/l   Cardiovascular: RR  Gastrointestinal: soft, non distended, mild incisional tenderness to palpation, T tube capped,  VIOLETA #2 with serous drainage. Wond vac patent  Genitourinary: voiding   Extremities: SCD's in place and working bilaterally, no edema  Vascular: Palpable dp pulses bilaterally  Neurological: Alert, following commands  Skin: Warm,dry, intact throughout   Psychiatric: Responsive

## 2022-11-22 NOTE — PROVIDER CONTACT NOTE (CRITICAL VALUE NOTIFICATION) - TEST AND RESULT REPORTED:
Surgical and body fluid collected final result + mod E. Coli and mod enterococcus faceium vanco resistant
body fluid culture from 11/07/22 positive for rare e-coli few enterococcus faecium vancomycin resistant
Hemoglobin -7.0

## 2022-11-22 NOTE — PROVIDER CONTACT NOTE (CRITICAL VALUE NOTIFICATION) - ASSESSMENT
body fluid culture from 11/07/22 positive for rare e-coli few enterococcus faecium vancomycin resistant
Hemoglobin -7.0
Pt A+Ox4, VSS, denies CP; no c/o pain, discomfort, or distress.

## 2022-11-22 NOTE — DISCHARGE NOTE NURSING/CASE MANAGEMENT/SOCIAL WORK - PATIENT PORTAL LINK FT
You can access the FollowMyHealth Patient Portal offered by [facility] by registering at the following website: [email or web address]. By joining [facility]’s FollowMyHealth portal, you will also be able to view your health information using other applications (apps) compatible with our system.

## 2022-11-22 NOTE — PROVIDER CONTACT NOTE (CRITICAL VALUE NOTIFICATION) - SITUATION
Hemoglobin -7.0
Surgical and body fluid collected final result + mod E. Coli and mod enterococcus faceium vanco resistant
body fluid culture from 11/07/22 positive for rare e-coli few enterococcus faecium vancomycin resistant

## 2022-11-22 NOTE — PROGRESS NOTE ADULT - REASON FOR ADMISSION
OLT

## 2022-11-22 NOTE — PROVIDER CONTACT NOTE (CRITICAL VALUE NOTIFICATION) - NAME OF MD/NP/PA/DO NOTIFIED:
[id number] @ [facility] 26w3d presents for routine ob visit. Fetus is active. Denies having any concerns.    Visit Vitals   • /70   • Ht 5' 7.99\" (1.727 m)   • Wt 76.7 kg   • LMP 2016   • BMI 25.7 kg/m2      General Appearance: well developed, well nourished.  Patient is alert and oriented times three.    Abdomen gravid, soft, NT  Fundal Ht -26cm  FHT's-140's      Assessment and Plan:  1. 34 year old  at 26w3d   2. 1hr glucose,CBC at next ob visit  3. Return to clinic in 4 weeks for a routine OB visit.    Kimmie Coulter MD        
Leila GERMAIN
CIERA GOLDEN
JESSA Garcia

## 2022-11-22 NOTE — PROGRESS NOTE ADULT - ASSESSMENT
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     [] s/p OLT c/b bile leak and RTOR  - Rising LFTS post op; tx with pulse steroids, LFTs improving  - Wound infection (VRE/E coli) - continue linezolid/cefpodoxine (end date 11/22)  - Dressing changes: wound vac   - JPs removed (final removed yesterday, 11/21)   - T Tube capped,  flush T-tube with 3cc NS daily, no aspiration   - ASA/SQH  - Diet: Regular  - Pain management   - Strict I&O's  - Actigal 300mg bid     [] Immuno:   - Tac level 9.1, cont FK 2.5mg bid, MMF 1/1. Pred  to 15mg qd  - PPx: valcyte/bactrim/fluc/ppi/oscal    DISPO: DC home today after wound vac change

## 2022-11-22 NOTE — PROVIDER CONTACT NOTE (CRITICAL VALUE NOTIFICATION) - BACKGROUND
ESRD-HD, HTN, HLD, CAD, DM. pt. s/p Kidney Transplant
patient admitted for OLT
Pt s/p OLT POD#12. PMH of cerebral palsy, primary sclerosing cholangitis cirrhosis.

## 2022-11-22 NOTE — PROVIDER CONTACT NOTE (CRITICAL VALUE NOTIFICATION) - RECOMMENDATIONS
repeat H/H
Notify provider.
patient has already been on appropriate interventions, any further interventions?

## 2022-11-22 NOTE — DISCHARGE NOTE NURSING/CASE MANAGEMENT/SOCIAL WORK - NSDCFUADDAPPT_GEN_ALL_CORE_FT
3/29/2017      RE: Chanda Garvey  [address] [place] [address]       I had the pleasure of seeing Chanda Garvey for followup in the Liver Clinic at the [facility] on 03/29/2017.  Ms. Garvey returns for followup of alcoholic cirrhosis.      She continues to abstain from alcohol and is doing really quite well.  She denies any abdominal pain, itching or skin rash and also has improving fatigue.  She is more active and does complain of a number of aches and pains, particularly joint aches.  She also feels that her fingers are relatively swollen as well.        She denies any fevers or chills, cough or shortness of breath.  She denies any nausea, vomiting, diarrhea or constipation.  Her appetite has been good, and her weight has remained relatively stable, although she would like to lose a few more pounds.  She has had a few minor cravings for alcohol but feels as though she does not need any medication to help with that problem.       Current Outpatient Prescriptions   Medication     DiphenhydrAMINE HCl (BENADRYL PO)     doxepin (SINEQUAN) 10 MG capsule     diazepam (VALIUM) 5 MG tablet     No current facility-administered medications for this visit.      B/P: 122/81, T: 98.5, P: 75, R: 16    HEENT exam shows no scleral icterus and no temporal muscle wasting.  Her chest is clear.  Her abdominal exam shows no increase in girth.  No masses or tenderness to palpation are present.  Liver is 10 cm in span without left lobe enlargement.  No spleen tip is palpable, and extremity exam shows no edema.  Skin exam does show some palmar erythema, as well as some spider angioma, and neurologic exam shows no asterixis.       Recent Results (from the past 168 hour(s))   Hepatic panel    Collection Time: 03/29/17  1:17 PM   Result Value Ref Range    Bilirubin Direct 0.5 (H) 0.0 - 0.2 mg/dL    Bilirubin Total 1.0 0.2 - 1.3 mg/dL    Albumin 3.6 3.4 - 5.0 g/dL    Protein Total 7.9 6.8 - 8.8 g/dL     Alkaline Phosphatase 79 40 - 150 U/L    ALT 18 0 - 50 U/L    AST 23 0 - 45 U/L   CBC with platelets    Collection Time: 03/29/17  1:17 PM   Result Value Ref Range    WBC 7.1 4.0 - 11.0 10e9/L    RBC Count 3.69 (L) 3.8 - 5.2 10e12/L    Hemoglobin 12.5 11.7 - 15.7 g/dL    Hematocrit 37.7 35.0 - 47.0 %     (H) 78 - 100 fl    MCH 33.9 (H) 26.5 - 33.0 pg    MCHC 33.2 31.5 - 36.5 g/dL    RDW 12.3 10.0 - 15.0 %    Platelet Count 213 150 - 450 10e9/L   INR    Collection Time: 03/29/17  1:17 PM   Result Value Ref Range    INR 1.28 (H) 0.86 - 1.14   Basic metabolic panel    Collection Time: 03/29/17  1:17 PM   Result Value Ref Range    Sodium 138 133 - 144 mmol/L    Potassium 3.9 3.4 - 5.3 mmol/L    Chloride 102 94 - 109 mmol/L    Carbon Dioxide 26 20 - 32 mmol/L    Anion Gap 10 3 - 14 mmol/L    Glucose 113 (H) 70 - 99 mg/dL    Urea Nitrogen 3 (L) 7 - 30 mg/dL    Creatinine 0.56 0.52 - 1.04 mg/dL    GFR Estimate >90  Non  GFR Calc   >60 mL/min/1.7m2    GFR Estimate If Black >90   GFR Calc   >60 mL/min/1.7m2    Calcium 9.6 8.5 - 10.1 mg/dL      My impression is that Ms. Garvey has alcoholic liver disease and may have alcoholic cirrhosis.  Her liver tests have now completely normalized which is obviously quite encouraging.  I have congratulated her on her abstinence and have offered help if she needs assistance with cravings.  I have encouraged her to be more active, and I think the aches and pains are probably just a result of that.  All complications are currently well addressed, and my plan will be to see her back in the clinic again in 3 months.      Thank you very much for allowing me to participate in the care of this patient.  If you have any questions regarding my recommendations, please do not hesitate to contact me.       Bienvenido Riley MD      Professor of Medicine  [employer]      Executive Medical Director, Solid Organ Transplant  Program  [facility]          Follow up in the Transplant Clinic with lab check on

## 2022-11-22 NOTE — DISCHARGE NOTE NURSING/CASE MANAGEMENT/SOCIAL WORK - NSDCDMENUMBER_GEN_ALL_CORE_FT
[facility] ph#[phone] ext.[facility] ph#[phone]

## 2022-11-22 NOTE — DISCHARGE NOTE NURSING/CASE MANAGEMENT/SOCIAL WORK - NSDCPEFALRISK_GEN_ALL_CORE
For information on Fall & Injury Prevention, visit: [email or web address] OR  [email or web address] OR  https://www.cdc.gov/steadi/patient.html

## 2022-11-28 ENCOUNTER — APPOINTMENT (OUTPATIENT)
Dept: TRANSPLANT | Facility: CLINIC | Age: 32
End: 2022-11-28
Payer: MEDICAID

## 2022-11-28 ENCOUNTER — LABORATORY RESULT (OUTPATIENT)
Age: 32
End: 2022-11-28

## 2022-11-28 VITALS
WEIGHT: 118 LBS | HEART RATE: 106 BPM | TEMPERATURE: 98.2 F | SYSTOLIC BLOOD PRESSURE: 116 MMHG | BODY MASS INDEX: 20.14 KG/M2 | OXYGEN SATURATION: 100 % | HEIGHT: 64 IN | RESPIRATION RATE: 16 BRPM | DIASTOLIC BLOOD PRESSURE: 77 MMHG

## 2022-11-28 LAB
ALBUMIN SERPL ELPH-MCNC: 3.8 G/DL
ALP BLD-CCNC: 118 U/L
ALT SERPL-CCNC: 12 U/L
ANION GAP SERPL CALC-SCNC: 12 MMOL/L
AST SERPL-CCNC: 11 U/L
BASOPHILS # BLD AUTO: 0 K/UL
BASOPHILS NFR BLD AUTO: 0 %
BILIRUB SERPL-MCNC: 1.6 MG/DL
BUN SERPL-MCNC: 25 MG/DL
CALCIUM SERPL-MCNC: 9.3 MG/DL
CHLORIDE SERPL-SCNC: 103 MMOL/L
CO2 SERPL-SCNC: 24 MMOL/L
CREAT SERPL-MCNC: 1.18 MG/DL
EGFR: 85 ML/MIN/1.73M2
EOSINOPHIL # BLD AUTO: 0.21 K/UL
EOSINOPHIL NFR BLD AUTO: 1.7 %
GGT SERPL-CCNC: 224 U/L
GLUCOSE SERPL-MCNC: 90 MG/DL
HCT VFR BLD CALC: 25.1 %
HGB BLD-MCNC: 8 G/DL
INR PPP: 1.11 RATIO
LYMPHOCYTES # BLD AUTO: 3.08 K/UL
LYMPHOCYTES NFR BLD AUTO: 25.2 %
MAGNESIUM SERPL-MCNC: 1.5 MG/DL
MAN DIFF?: NORMAL
MCHC RBC-ENTMCNC: 31.9 GM/DL
MCHC RBC-ENTMCNC: 31.9 PG
MCV RBC AUTO: 100 FL
MONOCYTES # BLD AUTO: 0.43 K/UL
MONOCYTES NFR BLD AUTO: 3.5 %
NEUTROPHILS # BLD AUTO: 8.3 K/UL
NEUTROPHILS NFR BLD AUTO: 67 %
PHOSPHATE SERPL-MCNC: 5.8 MG/DL
PLATELET # BLD AUTO: 229 K/UL
POTASSIUM SERPL-SCNC: 5.2 MMOL/L
PROT SERPL-MCNC: 6 G/DL
PT BLD: 13 SEC
RBC # BLD: 2.51 M/UL
RBC # FLD: 22.5 %
SODIUM SERPL-SCNC: 139 MMOL/L
SURGICAL PATHOLOGY STUDY: SIGNIFICANT CHANGE UP
TACROLIMUS SERPL-MCNC: 18.9 NG/ML
WBC # FLD AUTO: 12.23 K/UL

## 2022-11-28 PROCEDURE — 99024 POSTOP FOLLOW-UP VISIT: CPT

## 2022-11-28 RX ORDER — FLUCONAZOLE 200 MG/1
200 TABLET ORAL
Qty: 60 | Refills: 0 | Status: DISCONTINUED | COMMUNITY
Start: 2022-11-15 | End: 2022-11-28

## 2022-11-28 RX ORDER — APIXABAN 5 MG/1
5 TABLET, FILM COATED ORAL
Qty: 180 | Refills: 3 | Status: DISCONTINUED | COMMUNITY
Start: 2022-07-26 | End: 2022-11-28

## 2022-11-28 RX ORDER — TACROLIMUS 0.5 MG/1
0.5 CAPSULE ORAL
Qty: 60 | Refills: 11 | Status: DISCONTINUED | COMMUNITY
Start: 2022-11-15 | End: 2022-11-28

## 2022-11-28 RX ORDER — TACROLIMUS 5 MG/1
5 CAPSULE ORAL
Qty: 60 | Refills: 11 | Status: DISCONTINUED | COMMUNITY
Start: 2022-11-15 | End: 2022-11-28

## 2022-11-29 LAB
CMV DNA SPEC QL NAA+PROBE: NOT DETECTED IU/ML
CMVPCR LOG: NOT DETECTED LOG10IU/ML

## 2022-12-01 NOTE — PHYSICAL THERAPY INITIAL EVALUATION ADULT - MANUAL MUSCLE TESTING RESULTS, REHAB EVAL
RUE/RLE 5/5, L shoulder/elbow/hip and knee 5/5 Kilograms Preamble Statement (Weight Entered In Details Tab): Reported Weight in kilograms:

## 2022-12-06 ENCOUNTER — LABORATORY RESULT (OUTPATIENT)
Age: 32
End: 2022-12-06

## 2022-12-06 ENCOUNTER — APPOINTMENT (OUTPATIENT)
Dept: TRANSPLANT | Facility: CLINIC | Age: 32
End: 2022-12-06

## 2022-12-07 ENCOUNTER — APPOINTMENT (OUTPATIENT)
Dept: TRANSPLANT | Facility: CLINIC | Age: 32
End: 2022-12-07
Payer: MEDICAID

## 2022-12-07 VITALS
OXYGEN SATURATION: 99 % | DIASTOLIC BLOOD PRESSURE: 83 MMHG | SYSTOLIC BLOOD PRESSURE: 127 MMHG | WEIGHT: 121 LBS | RESPIRATION RATE: 16 BRPM | HEART RATE: 101 BPM | HEIGHT: 64 IN | TEMPERATURE: 98.2 F | BODY MASS INDEX: 20.66 KG/M2

## 2022-12-07 LAB
ALBUMIN SERPL ELPH-MCNC: 4 G/DL
ALP BLD-CCNC: 88 U/L
ALT SERPL-CCNC: 13 U/L
ANION GAP SERPL CALC-SCNC: 10 MMOL/L
AST SERPL-CCNC: 14 U/L
BASOPHILS # BLD AUTO: 0.06 K/UL
BASOPHILS NFR BLD AUTO: 1.2 %
BILIRUB SERPL-MCNC: 1.2 MG/DL
BUN SERPL-MCNC: 26 MG/DL
CALCIUM SERPL-MCNC: 9.4 MG/DL
CHLORIDE SERPL-SCNC: 103 MMOL/L
CMV DNA SPEC QL NAA+PROBE: NOT DETECTED IU/ML
CMVPCR LOG: NOT DETECTED LOG10IU/ML
CO2 SERPL-SCNC: 26 MMOL/L
CREAT SERPL-MCNC: 0.92 MG/DL
EGFR: 115 ML/MIN/1.73M2
EOSINOPHIL # BLD AUTO: 0.14 K/UL
EOSINOPHIL NFR BLD AUTO: 2.8 %
GGT SERPL-CCNC: 110 U/L
GLUCOSE SERPL-MCNC: 96 MG/DL
HBV SURFACE AG SER QL: NONREACTIVE
HCT VFR BLD CALC: 29.9 %
HCV AB SER QL: NONREACTIVE
HCV RNA SERPL NAA+PROBE-LOG IU: NOT DETECTED LOGIU/ML
HCV S/CO RATIO: 0.1 S/CO
HEPB DNA PCR INT: NOT DETECTED
HEPB DNA PCR LOG: NOT DETECTED LOGIU/ML
HEPC RNA INTERP: NOT DETECTED
HGB BLD-MCNC: 9.7 G/DL
HIV1 RNA # SERPL NAA+PROBE: NORMAL
HIV1 RNA # SERPL NAA+PROBE: NORMAL COPIES/ML
HIV1+2 AB SPEC QL IA.RAPID: NONREACTIVE
IMM GRANULOCYTES NFR BLD AUTO: 0.6 %
LYMPHOCYTES # BLD AUTO: 1.64 K/UL
LYMPHOCYTES NFR BLD AUTO: 32.3 %
MAGNESIUM SERPL-MCNC: 1.7 MG/DL
MAN DIFF?: NORMAL
MCHC RBC-ENTMCNC: 32.4 GM/DL
MCHC RBC-ENTMCNC: 32.6 PG
MCV RBC AUTO: 100.3 FL
MONOCYTES # BLD AUTO: 0.38 K/UL
MONOCYTES NFR BLD AUTO: 7.5 %
NEUTROPHILS # BLD AUTO: 2.83 K/UL
NEUTROPHILS NFR BLD AUTO: 55.6 %
PHOSPHATE SERPL-MCNC: 3.8 MG/DL
PLATELET # BLD AUTO: 310 K/UL
POTASSIUM SERPL-SCNC: 4.4 MMOL/L
PROT SERPL-MCNC: 6.3 G/DL
RBC # BLD: 2.98 M/UL
RBC # FLD: 22.2 %
SODIUM SERPL-SCNC: 139 MMOL/L
TACROLIMUS SERPL-MCNC: 3.9 NG/ML
VIRAL LOAD INTERP: NORMAL
VIRAL LOAD LOG: NORMAL LG COP/ML
WBC # FLD AUTO: 5.08 K/UL

## 2022-12-07 PROCEDURE — 99214 OFFICE O/P EST MOD 30 MIN: CPT | Mod: 24

## 2022-12-15 LAB
ALBUMIN SERPL ELPH-MCNC: 3.8 G/DL
ALP BLD-CCNC: 78 U/L
ALT SERPL-CCNC: 14 U/L
ANION GAP SERPL CALC-SCNC: 12 MMOL/L
AST SERPL-CCNC: 15 U/L
BASOPHILS # BLD AUTO: 0.06 K/UL
BASOPHILS NFR BLD AUTO: 1.4 %
BILIRUB SERPL-MCNC: 0.9 MG/DL
BUN SERPL-MCNC: 20 MG/DL
CALCIUM SERPL-MCNC: 9.2 MG/DL
CHLORIDE SERPL-SCNC: 106 MMOL/L
CMV DNA SPEC QL NAA+PROBE: NOT DETECTED IU/ML
CMVPCR LOG: NOT DETECTED LOG10IU/ML
CO2 SERPL-SCNC: 24 MMOL/L
CREAT SERPL-MCNC: 0.83 MG/DL
EGFR: 121 ML/MIN/1.73M2
EOSINOPHIL # BLD AUTO: 0.12 K/UL
EOSINOPHIL NFR BLD AUTO: 2.7 %
GGT SERPL-CCNC: 63 U/L
GLUCOSE SERPL-MCNC: 96 MG/DL
HCT VFR BLD CALC: 34.1 %
HGB BLD-MCNC: 10.6 G/DL
IMM GRANULOCYTES NFR BLD AUTO: 1.4 %
LYMPHOCYTES # BLD AUTO: 1.33 K/UL
LYMPHOCYTES NFR BLD AUTO: 30.4 %
MAGNESIUM SERPL-MCNC: 1.8 MG/DL
MAN DIFF?: NORMAL
MCHC RBC-ENTMCNC: 31.1 GM/DL
MCHC RBC-ENTMCNC: 31.7 PG
MCV RBC AUTO: 102.1 FL
MONOCYTES # BLD AUTO: 0.33 K/UL
MONOCYTES NFR BLD AUTO: 7.6 %
NEUTROPHILS # BLD AUTO: 2.47 K/UL
NEUTROPHILS NFR BLD AUTO: 56.5 %
PLATELET # BLD AUTO: 246 K/UL
POTASSIUM SERPL-SCNC: 3.9 MMOL/L
PROT SERPL-MCNC: 5.9 G/DL
RBC # BLD: 3.34 M/UL
RBC # FLD: 19.1 %
SODIUM SERPL-SCNC: 142 MMOL/L
TACROLIMUS SERPL-MCNC: 4.7 NG/ML
WBC # FLD AUTO: 4.37 K/UL

## 2022-12-21 ENCOUNTER — APPOINTMENT (OUTPATIENT)
Dept: TRANSPLANT | Facility: CLINIC | Age: 32
End: 2022-12-21

## 2022-12-21 VITALS
TEMPERATURE: 97.8 F | RESPIRATION RATE: 16 BRPM | SYSTOLIC BLOOD PRESSURE: 147 MMHG | DIASTOLIC BLOOD PRESSURE: 85 MMHG | HEIGHT: 64 IN | WEIGHT: 128 LBS | OXYGEN SATURATION: 100 % | BODY MASS INDEX: 21.85 KG/M2 | HEART RATE: 109 BPM

## 2022-12-21 PROCEDURE — 99213 OFFICE O/P EST LOW 20 MIN: CPT | Mod: 24

## 2022-12-21 NOTE — ASSESSMENT
[FreeTextEntry1] : 31 y/o  w/ PMH Cerebral palsy with L sided contractures, cirrhosis secondary to PSC, with frequent ERCPs with biliary stent placements for biliary strictures (last in 9/2022), recent COVID s/p MAB in 9/2022). \par \par -Underwent OLT on 11/1/22, RY HJ for bile duct anastomosis. \par Post ob course c/b: \par -Fevers due to Wound infection (VRE and E Coli) - Completed course of Zyvox and Vantin on 11/22/22 \par -Bile leak at hepaticojejunostomy requiring revision of biliary anastomosis/Velasquez-en-y over biliary drain on 11/7. \par -Discharged with biliary drain capped and Wound vac (discharged with home VNS) \par \par *GRAFT - LFts have normalized (TB 1.1, AST 18, ALT 17, ALP 91)\par \par *IMMUNO - FK 4.8 on 2+1, MMF 1g BID and prednisone 5mg daily. will increase tacro to 2/2\par \par *PPx - Valcyte 900mg QD (CMV high risk), Bactrim, ASA 81. \par \par *Wound - 3 open areas, incision healing well. wound vac dressing changed today.  Visiting nurse to resume dressing changes M/W/F. \par \par RTC in 2 weeks with weekly labs. \par

## 2022-12-21 NOTE — PHYSICAL EXAM
[Alert] : alert [Healthy Appearing] : healthy appearing [Scleral Icterus] : no scleral icterus [Clear to Auscultation] : lungs were clear to auscultation bilaterally [Breathing Comfortably on room air] : breathing comfortably on room air [Normal Rate] : normal rate [Regular Rhythm] : regular rhythm [Abdominal Bruit] : no abdominal bruit [Ascites Fluid Wave] : no ascites fluid wave [Splenomegaly] : no splenomegaly [Clean] : clean [Dry] : dry [Healing Well] : healing well [Bleeding] : no active bleeding [Purulent Drainage] : no purulent drainage [Erythema] : not erythematous [Warm] : not warm [Tender] : not tender [No Edema] : no edema [de-identified] : 3 open areas on abdominal incision, more staples removed, few remain, sutures removed, right biliary drain secured. vac dressing changed

## 2022-12-21 NOTE — HISTORY OF PRESENT ILLNESS
[Primary Sclerosing Cholangitis] : Primary Sclerosing Cholangitis [Liver] : Liver [Donor after brain death (DBD] : Donor after brain death (DBD) [None] : None [Steroids] : Steroids [Positive/Negative] : Positive/Negative [Bile Leak] : Bile Leak [FreeTextEntry1] : Javy Biggs is a 33 y/o  w/ PMH Cerebral palsy with L sided contractures, h/o seizures, Cirrhosis secondary to PSC with frequent ERCPs with biliary stent placements for biliary strictures (last in 9/2022), recent COVID s/p MAB in 9/2022). \par \par Underwent OLT on 11/1/22, RY HJ for bile duct anastomosis. \par Post ob course c/b: \par -Fevers due to Wound infection (VRE and E Coli) - Completed course of Zyvox and Vantin on 11/22/22 \par -Bile leak at hepaticojejunostomy requiring revision of biliary anastomosis/Velasquez-en-y over biliary drain on 11/7. \par -Discharged with biliary drain capped and Wound vac (discharged with home VNS). \par \par \par EXPLANT Pathology- Cirrhosis with fibroinflammatory periductal changes, consistent with\par primary sclerosing cholangitis. \par - Reactive lymph nodes\par - Gallbladder with inflammatory changes around cystic duct\par \par Interim Hx (12/21/22) \par Doing well, reports good appetite.\par eating and ambulating\par regular bowel movements \par BP ok \par \par

## 2022-12-22 LAB
ALBUMIN SERPL ELPH-MCNC: 4 G/DL
ALP BLD-CCNC: 91 U/L
ALT SERPL-CCNC: 17 U/L
ANION GAP SERPL CALC-SCNC: 12 MMOL/L
AST SERPL-CCNC: 18 U/L
BASOPHILS # BLD AUTO: 0.05 K/UL
BASOPHILS NFR BLD AUTO: 1.4 %
BILIRUB SERPL-MCNC: 1.1 MG/DL
BUN SERPL-MCNC: 26 MG/DL
CALCIUM SERPL-MCNC: 9.1 MG/DL
CHLORIDE SERPL-SCNC: 105 MMOL/L
CMV DNA SPEC QL NAA+PROBE: NOT DETECTED IU/ML
CMVPCR LOG: NOT DETECTED LOG10IU/ML
CO2 SERPL-SCNC: 24 MMOL/L
CREAT SERPL-MCNC: 0.9 MG/DL
EGFR: 118 ML/MIN/1.73M2
EOSINOPHIL # BLD AUTO: 0.08 K/UL
EOSINOPHIL NFR BLD AUTO: 2.3 %
GGT SERPL-CCNC: 45 U/L
GLUCOSE SERPL-MCNC: 92 MG/DL
HCT VFR BLD CALC: 35.8 %
HGB BLD-MCNC: 11.4 G/DL
IMM GRANULOCYTES NFR BLD AUTO: 0.3 %
LYMPHOCYTES # BLD AUTO: 1.19 K/UL
LYMPHOCYTES NFR BLD AUTO: 34.4 %
MAGNESIUM SERPL-MCNC: 1.6 MG/DL
MAN DIFF?: NORMAL
MCHC RBC-ENTMCNC: 31.8 GM/DL
MCHC RBC-ENTMCNC: 31.8 PG
MCV RBC AUTO: 99.7 FL
MONOCYTES # BLD AUTO: 0.49 K/UL
MONOCYTES NFR BLD AUTO: 14.2 %
NEUTROPHILS # BLD AUTO: 1.64 K/UL
NEUTROPHILS NFR BLD AUTO: 47.4 %
PLATELET # BLD AUTO: 171 K/UL
POTASSIUM SERPL-SCNC: 4.3 MMOL/L
PROT SERPL-MCNC: 6.5 G/DL
RBC # BLD: 3.59 M/UL
RBC # FLD: 16.6 %
SODIUM SERPL-SCNC: 141 MMOL/L
TACROLIMUS SERPL-MCNC: 4.8 NG/ML
WBC # FLD AUTO: 3.46 K/UL

## 2022-12-28 ENCOUNTER — APPOINTMENT (OUTPATIENT)
Dept: TRANSPLANT | Facility: CLINIC | Age: 32
End: 2022-12-28

## 2022-12-28 VITALS
WEIGHT: 130 LBS | DIASTOLIC BLOOD PRESSURE: 77 MMHG | SYSTOLIC BLOOD PRESSURE: 138 MMHG | TEMPERATURE: 97.6 F | BODY MASS INDEX: 22.2 KG/M2 | OXYGEN SATURATION: 97 % | RESPIRATION RATE: 16 BRPM | HEIGHT: 64 IN | HEART RATE: 121 BPM

## 2022-12-28 LAB
ALBUMIN SERPL ELPH-MCNC: 4.6 G/DL
ALP BLD-CCNC: 93 U/L
ALT SERPL-CCNC: 20 U/L
ANION GAP SERPL CALC-SCNC: 11 MMOL/L
AST SERPL-CCNC: 21 U/L
BASOPHILS # BLD AUTO: 0.07 K/UL
BASOPHILS NFR BLD AUTO: 2 %
BILIRUB SERPL-MCNC: 1.2 MG/DL
BUN SERPL-MCNC: 21 MG/DL
CALCIUM SERPL-MCNC: 10.4 MG/DL
CHLORIDE SERPL-SCNC: 101 MMOL/L
CO2 SERPL-SCNC: 26 MMOL/L
CREAT SERPL-MCNC: 0.93 MG/DL
EGFR: 113 ML/MIN/1.73M2
EOSINOPHIL # BLD AUTO: 0.11 K/UL
EOSINOPHIL NFR BLD AUTO: 3.2 %
GGT SERPL-CCNC: 35 U/L
GLUCOSE SERPL-MCNC: 94 MG/DL
HCT VFR BLD CALC: 39.6 %
HGB BLD-MCNC: 12.8 G/DL
IMM GRANULOCYTES NFR BLD AUTO: 0.3 %
LYMPHOCYTES # BLD AUTO: 1.84 K/UL
LYMPHOCYTES NFR BLD AUTO: 52.9 %
MAGNESIUM SERPL-MCNC: 1.9 MG/DL
MAN DIFF?: NORMAL
MCHC RBC-ENTMCNC: 31.1 PG
MCHC RBC-ENTMCNC: 32.3 GM/DL
MCV RBC AUTO: 96.4 FL
MONOCYTES # BLD AUTO: 0.38 K/UL
MONOCYTES NFR BLD AUTO: 10.9 %
NEUTROPHILS # BLD AUTO: 1.07 K/UL
NEUTROPHILS NFR BLD AUTO: 30.7 %
PLATELET # BLD AUTO: 233 K/UL
POTASSIUM SERPL-SCNC: 5.2 MMOL/L
PROT SERPL-MCNC: 6.9 G/DL
RBC # BLD: 4.11 M/UL
RBC # FLD: 14.9 %
SODIUM SERPL-SCNC: 138 MMOL/L
TACROLIMUS SERPL-MCNC: 6.3 NG/ML
WBC # FLD AUTO: 3.48 K/UL

## 2022-12-28 PROCEDURE — 99214 OFFICE O/P EST MOD 30 MIN: CPT | Mod: 24

## 2022-12-29 NOTE — PHYSICAL EXAM
[de-identified] : vacc dressing removed, 2 areas 0.5x3cm of granulating wound - no further need for vacc dressing; simple dressing applied; biliary drain in situ

## 2022-12-29 NOTE — ASSESSMENT
[FreeTextEntry1] : \par \par 33 y/o w/ PMH Cerebral palsy with L sided contractures, cirrhosis secondary to PSC, with frequent ERCPs with biliary stent placements for biliary strictures (last in 9/2022), recent COVID s/p MAB in 9/2022). \par \par -Underwent OLT on 11/1/22, RY HJ for bile duct anastomosis. \par Post ob course c/b: \par -Fevers due to Wound infection (VRE and E Coli) - Completed course of Zyvox and Vantin on 11/22/22 \par -Bile leak at hepaticojejunostomy requiring revision of biliary anastomosis/Velasquez-en-y over biliary drain on 11/7. \par -Discharged with biliary drain capped and Wound vac (discharged with home VNS) \par \par *GRAFT - LFts good\par \par *IMMUNO - FK 6.3 on 2+2, MMF 1g BID and prednisone 5mg daily. \par \par *PPx - Valcyte 900mg QD (CMV high risk), Bactrim, ASA 81. \par \par *Wound - vac dressing removed today. for daily simple dressing change\par \par RTC in 2 weeks with weekly labs. \par

## 2022-12-29 NOTE — HISTORY OF PRESENT ILLNESS
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 \par \par Underwent OLT on 11/1/22, RY HJ for bile duct anastomosis. \par Post ob course c/b: \par -Fevers due to Wound infection (VRE and E Coli) - Completed course of Zyvox and Vantin on 11/22/22 \par -Bile leak at hepaticojejunostomy requiring revision of biliary anastomosis/Velasquez-en-y over biliary drain on 11/7. \par -Discharged with biliary drain capped and Wound vac (discharged with home VNS). \par \par \par EXPLANT Pathology- Cirrhosis with fibroinflammatory periductal changes, consistent with\par primary sclerosing cholangitis. \par - Reactive lymph nodes\par - Gallbladder with inflammatory changes around cystic duct\par \par Interim Hx (12/28/22) \par Doing well, reports good appetite.\par eating and ambulating\par regular bowel movements \par BP controlled\par

## 2022-12-29 NOTE — REASON FOR VISIT
[Follow-Up] : a follow-up visit  [Parent] : parent [FreeTextEntry3] : OLT [FreeTextEntry5] : 11/1/22

## 2023-01-04 NOTE — HISTORY OF PRESENT ILLNESS
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 \par \par Underwent OLT on 11/1/22, RY HJ for bile duct anastomosis. \par Post ob course c/b: \par -Fevers due to Wound infection (VRE and E Coli) - Completed course of Zyvox and Vantin on 11/22/22 \par -Bile leak at hepaticojejunostomy requiring revision of biliary anastomosis/Velasquez-en-y over biliary drain on 11/7. \par -Discharged with biliary drain capped and Wound vac (discharged with home VNS). \par \par \par EXPLANT Pathology- Cirrhosis with fibroinflammatory periductal changes, consistent with\par primary sclerosing cholangitis. \par - Reactive lymph nodes\par - Gallbladder with inflammatory changes around cystic duct\par \par Interim Hx (12/7/22) \par Doing well, reports good appetite. \par has gained 3 lbs since last week. \par BP ok \par \par

## 2023-01-04 NOTE — PHYSICAL EXAM
[Alert] : alert [Healthy Appearing] : healthy appearing [Clear to Auscultation] : lungs were clear to auscultation bilaterally [Breathing Comfortably on room air] : breathing comfortably on room air [Normal Rate] : normal rate [Regular Rhythm] : regular rhythm [Clean] : clean [Dry] : dry [Healing Well] : healing well [No Edema] : no edema [Scleral Icterus] : no scleral icterus [Abdominal Bruit] : no abdominal bruit [Ascites Fluid Wave] : no ascites fluid wave [Splenomegaly] : no splenomegaly [Bleeding] : no active bleeding [Purulent Drainage] : no purulent drainage [Erythema] : not erythematous [Warm] : not warm [Tender] : not tender [de-identified] : 3 open areas on abdominal incision, most staples removed, few remain, sutures removed, right biliary drain secured

## 2023-01-04 NOTE — ASSESSMENT
[FreeTextEntry1] : 33 y/o  w/ PMH Cerebral palsy with L sided contractures, cirrhosis secondary to PSC, with frequent ERCPs with biliary stent placements for biliary strictures (last in 9/2022), recent COVID s/p MAB in 9/2022). \par \par -Underwent OLT on 11/1/22, RY HJ for bile duct anastomosis. \par Post ob course c/b: \par -Fevers due to Wound infection (VRE and E Coli) - Completed course of Zyvox and Vantin on 11/22/22 \par -Bile leak at hepaticojejunostomy requiring revision of biliary anastomosis/Velasquez-en-y over biliary drain on 11/7. \par -Discharged with biliary drain capped and Wound vac (discharged with home VNS) \par \par *GRAFT - LFts have normalized (TB 1.2, AST 14, ALT 13, ALP 88)\par \par *IMMUNO - FK 3.9 on 2+1, MMF 1g BID and prednisone 10mg daily. Decrease prednisone to 5mg daily. \par \par *PPx - Valcyte 900mg QD (CMV high risk), Bactrim, ASA 81. \par \par *Bile leak at hepaticojejunostomy requiring revision of biliary anastomosis/Velasquez-en-y over biliary drain on 11/7. On Ursodiol 300mg BID. \par \par *Wound - 3 open areas, incision healing well. wound vac dressing changed today.  Visiting nurse to resume dressing changes M/W/F. \par \par RTC in 2 weeks with weekly labs. \par

## 2023-01-06 LAB
ALBUMIN SERPL ELPH-MCNC: 4.2 G/DL
ALP BLD-CCNC: 96 U/L
ALT SERPL-CCNC: 15 U/L
ANION GAP SERPL CALC-SCNC: 9 MMOL/L
AST SERPL-CCNC: 17 U/L
BASOPHILS # BLD AUTO: 0.06 K/UL
BASOPHILS NFR BLD AUTO: 1.3 %
BILIRUB SERPL-MCNC: 1 MG/DL
BUN SERPL-MCNC: 21 MG/DL
CALCIUM SERPL-MCNC: 9.4 MG/DL
CHLORIDE SERPL-SCNC: 103 MMOL/L
CMV DNA SPEC QL NAA+PROBE: NOT DETECTED IU/ML
CMVPCR LOG: NOT DETECTED LOG10IU/ML
CO2 SERPL-SCNC: 28 MMOL/L
CREAT SERPL-MCNC: 0.94 MG/DL
EGFR: 112 ML/MIN/1.73M2
EOSINOPHIL # BLD AUTO: 0.13 K/UL
EOSINOPHIL NFR BLD AUTO: 2.8 %
GGT SERPL-CCNC: 26 U/L
GLUCOSE SERPL-MCNC: 94 MG/DL
HCT VFR BLD CALC: 41.1 %
HGB BLD-MCNC: 13.2 G/DL
IMM GRANULOCYTES NFR BLD AUTO: 0.4 %
LYMPHOCYTES # BLD AUTO: 1.5 K/UL
LYMPHOCYTES NFR BLD AUTO: 32.7 %
MAGNESIUM SERPL-MCNC: 1.9 MG/DL
MAN DIFF?: NORMAL
MCHC RBC-ENTMCNC: 31.2 PG
MCHC RBC-ENTMCNC: 32.1 GM/DL
MCV RBC AUTO: 97.2 FL
MONOCYTES # BLD AUTO: 0.39 K/UL
MONOCYTES NFR BLD AUTO: 8.5 %
NEUTROPHILS # BLD AUTO: 2.49 K/UL
NEUTROPHILS NFR BLD AUTO: 54.3 %
PLATELET # BLD AUTO: 238 K/UL
POTASSIUM SERPL-SCNC: 4.4 MMOL/L
PROT SERPL-MCNC: 6.8 G/DL
RBC # BLD: 4.23 M/UL
RBC # FLD: 13.6 %
SODIUM SERPL-SCNC: 140 MMOL/L
TACROLIMUS SERPL-MCNC: 7.5 NG/ML
WBC # FLD AUTO: 4.59 K/UL

## 2023-01-11 ENCOUNTER — APPOINTMENT (OUTPATIENT)
Dept: TRANSPLANT | Facility: CLINIC | Age: 33
End: 2023-01-11
Payer: MEDICAID

## 2023-01-11 VITALS
OXYGEN SATURATION: 99 % | TEMPERATURE: 98 F | DIASTOLIC BLOOD PRESSURE: 77 MMHG | BODY MASS INDEX: 22.53 KG/M2 | SYSTOLIC BLOOD PRESSURE: 135 MMHG | HEIGHT: 64 IN | RESPIRATION RATE: 15 BRPM | HEART RATE: 104 BPM | WEIGHT: 132 LBS

## 2023-01-11 LAB
ALBUMIN SERPL ELPH-MCNC: 4.6 G/DL
ALP BLD-CCNC: 100 U/L
ALT SERPL-CCNC: 17 U/L
ANION GAP SERPL CALC-SCNC: 11 MMOL/L
AST SERPL-CCNC: 16 U/L
BASOPHILS # BLD AUTO: 0.05 K/UL
BASOPHILS NFR BLD AUTO: 1.1 %
BILIRUB SERPL-MCNC: 0.7 MG/DL
BUN SERPL-MCNC: 30 MG/DL
CALCIUM SERPL-MCNC: 9.7 MG/DL
CHLORIDE SERPL-SCNC: 104 MMOL/L
CO2 SERPL-SCNC: 28 MMOL/L
CREAT SERPL-MCNC: 0.93 MG/DL
CYCLOSPORINE SER-MCNC: <30 NG/ML
EGFR: 113 ML/MIN/1.73M2
EOSINOPHIL # BLD AUTO: 0.12 K/UL
EOSINOPHIL NFR BLD AUTO: 2.7 %
GGT SERPL-CCNC: 25 U/L
GLUCOSE SERPL-MCNC: 94 MG/DL
HCT VFR BLD CALC: 39.7 %
HGB BLD-MCNC: 13.4 G/DL
IMM GRANULOCYTES NFR BLD AUTO: 0.7 %
LYMPHOCYTES # BLD AUTO: 2.03 K/UL
LYMPHOCYTES NFR BLD AUTO: 46 %
MAGNESIUM SERPL-MCNC: 1.8 MG/DL
MAN DIFF?: NORMAL
MCHC RBC-ENTMCNC: 31 PG
MCHC RBC-ENTMCNC: 33.8 GM/DL
MCV RBC AUTO: 91.9 FL
MONOCYTES # BLD AUTO: 0.35 K/UL
MONOCYTES NFR BLD AUTO: 7.9 %
NEUTROPHILS # BLD AUTO: 1.83 K/UL
NEUTROPHILS NFR BLD AUTO: 41.6 %
PLATELET # BLD AUTO: 256 K/UL
POTASSIUM SERPL-SCNC: 4.5 MMOL/L
PROT SERPL-MCNC: 7 G/DL
RBC # BLD: 4.32 M/UL
RBC # FLD: 13.6 %
SODIUM SERPL-SCNC: 143 MMOL/L
TACROLIMUS SERPL-MCNC: 9.2 NG/ML
WBC # FLD AUTO: 4.41 K/UL

## 2023-01-11 PROCEDURE — 99212 OFFICE O/P EST SF 10 MIN: CPT | Mod: 24

## 2023-01-11 NOTE — PHYSICAL EXAM
[Alert] : alert [No Acute Distress] : no acute distress [Scleral Icterus] : no scleral icterus [Supple] : the neck was supple [No Thyromegaly] : no thyromegaly [No Abnormal Masses] : no abnormal masses [Clear to Auscultation] : lungs were clear to auscultation bilaterally [Breathing Comfortably on room air] : breathing comfortably on room air [Normal Rate] : normal rate [Regular Rhythm] : regular rhythm [Soft] : soft [Normal Bowel Sounds] : normal bowel sounds [Clean] : clean [Dry] : dry [Healing Well] : healing well [No Edema] : no edema [de-identified] : small area (2cm) still with granulation tissue; clean

## 2023-01-11 NOTE — HISTORY OF PRESENT ILLNESS
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 \par \par Underwent OLT on 11/1/22, RY HJ for bile duct anastomosis. \par Post ob course c/b: \par -Fevers due to Wound infection (VRE and E Coli) - Completed course of Zyvox and Vantin on 11/22/22 \par -Bile leak at hepaticojejunostomy requiring revision of biliary anastomosis/Velasquez-en-y over biliary drain on 11/7. \par -Discharged with biliary drain capped and Wound vac (discharged with home VNS). \par \par \par EXPLANT Pathology- Cirrhosis with fibroinflammatory periductal changes, consistent with\par primary sclerosing cholangitis. \par - Reactive lymph nodes\par - Gallbladder with inflammatory changes around cystic duct\par \par Interim Hx (1-) \par Doing well, reports good appetite.\par eating and ambulating\par regular bowel movements \par BP controlled\par vac dressing is off; wound almost completely healed\par

## 2023-01-11 NOTE — ASSESSMENT
[FreeTextEntry1] : \par \par 31 y/o w/ PMH Cerebral palsy with L sided contractures, cirrhosis secondary to PSC, with frequent ERCPs with biliary stent placements for biliary strictures (last in 9/2022), recent COVID s/p MAB in 9/2022). \par \par -Underwent OLT on 11/1/22, RY HJ for bile duct anastomosis. \par Post ob course c/b: \par -Fevers due to Wound infection (VRE and E Coli) - Completed course of Zyvox and Vantin on 11/22/22 \par -Bile leak at hepaticojejunostomy requiring revision of biliary anastomosis/Velasquez-en-y over biliary drain on 11/7. \par -Discharged with biliary drain capped and Wound vac (discharged with home VNS) \par \par *GRAFT - LFts all normal\par \par *IMMUNO - FK 9 on 2+2, MMF 1g BID and prednisone 5mg daily. will decrease to FK 2+1\par \par *PPx - Valcyte 900mg QD (CMV high risk), Bactrim, ASA 81. \par \par *Wound - healing well. no further wound care needed\par \par - biliary tube still in place; will remove at 3 months\par \par RTC in 2 weeks with weekly labs. \par

## 2023-01-20 LAB
ALBUMIN SERPL ELPH-MCNC: 4.3 G/DL
ALP BLD-CCNC: 95 U/L
ALT SERPL-CCNC: 17 U/L
ANION GAP SERPL CALC-SCNC: 11 MMOL/L
AST SERPL-CCNC: 18 U/L
BASOPHILS # BLD AUTO: 0.05 K/UL
BASOPHILS NFR BLD AUTO: 1.3 %
BILIRUB SERPL-MCNC: 0.8 MG/DL
BUN SERPL-MCNC: 25 MG/DL
CALCIUM SERPL-MCNC: 9.7 MG/DL
CHLORIDE SERPL-SCNC: 103 MMOL/L
CMV DNA SPEC QL NAA+PROBE: NOT DETECTED IU/ML
CMVPCR LOG: NOT DETECTED LOG10IU/ML
CO2 SERPL-SCNC: 25 MMOL/L
CREAT SERPL-MCNC: 0.96 MG/DL
EGFR: 109 ML/MIN/1.73M2
EOSINOPHIL # BLD AUTO: 0.18 K/UL
EOSINOPHIL NFR BLD AUTO: 4.6 %
GGT SERPL-CCNC: 23 U/L
GLUCOSE SERPL-MCNC: 92 MG/DL
HCT VFR BLD CALC: 40.2 %
HGB BLD-MCNC: 13.8 G/DL
IMM GRANULOCYTES NFR BLD AUTO: 1.3 %
LYMPHOCYTES # BLD AUTO: 1.92 K/UL
LYMPHOCYTES NFR BLD AUTO: 49.2 %
MAGNESIUM SERPL-MCNC: 1.7 MG/DL
MAN DIFF?: NORMAL
MCHC RBC-ENTMCNC: 30.7 PG
MCHC RBC-ENTMCNC: 34.3 GM/DL
MCV RBC AUTO: 89.3 FL
MONOCYTES # BLD AUTO: 0.33 K/UL
MONOCYTES NFR BLD AUTO: 8.5 %
NEUTROPHILS # BLD AUTO: 1.37 K/UL
NEUTROPHILS NFR BLD AUTO: 35.1 %
PLATELET # BLD AUTO: 203 K/UL
POTASSIUM SERPL-SCNC: 4.3 MMOL/L
PROT SERPL-MCNC: 6.8 G/DL
RBC # BLD: 4.5 M/UL
RBC # FLD: 13.5 %
SODIUM SERPL-SCNC: 139 MMOL/L
TACROLIMUS SERPL-MCNC: 5.7 NG/ML
WBC # FLD AUTO: 3.9 K/UL

## 2023-01-25 ENCOUNTER — APPOINTMENT (OUTPATIENT)
Dept: TRANSPLANT | Facility: CLINIC | Age: 33
End: 2023-01-25
Payer: MEDICAID

## 2023-01-25 VITALS
TEMPERATURE: 98 F | RESPIRATION RATE: 15 BRPM | OXYGEN SATURATION: 98 % | DIASTOLIC BLOOD PRESSURE: 93 MMHG | WEIGHT: 138 LBS | SYSTOLIC BLOOD PRESSURE: 142 MMHG | BODY MASS INDEX: 23.56 KG/M2 | HEART RATE: 102 BPM | HEIGHT: 64 IN

## 2023-01-25 LAB
ALBUMIN SERPL ELPH-MCNC: 4.4 G/DL
ALP BLD-CCNC: 95 U/L
ALT SERPL-CCNC: 16 U/L
ANION GAP SERPL CALC-SCNC: 13 MMOL/L
AST SERPL-CCNC: 18 U/L
BASOPHILS # BLD AUTO: 0.05 K/UL
BASOPHILS NFR BLD AUTO: 1.4 %
BILIRUB SERPL-MCNC: 0.5 MG/DL
BUN SERPL-MCNC: 25 MG/DL
CALCIUM SERPL-MCNC: 9.7 MG/DL
CHLORIDE SERPL-SCNC: 105 MMOL/L
CMV DNA SPEC QL NAA+PROBE: NOT DETECTED IU/ML
CMVPCR LOG: NOT DETECTED LOG10IU/ML
CO2 SERPL-SCNC: 25 MMOL/L
CREAT SERPL-MCNC: 0.97 MG/DL
EGFR: 108 ML/MIN/1.73M2
EOSINOPHIL # BLD AUTO: 0.14 K/UL
EOSINOPHIL NFR BLD AUTO: 3.9 %
GGT SERPL-CCNC: 20 U/L
GLUCOSE SERPL-MCNC: 90 MG/DL
HCT VFR BLD CALC: 40.9 %
HGB BLD-MCNC: 13.6 G/DL
IMM GRANULOCYTES NFR BLD AUTO: 1.1 %
LYMPHOCYTES # BLD AUTO: 1.71 K/UL
LYMPHOCYTES NFR BLD AUTO: 47.1 %
MAGNESIUM SERPL-MCNC: 1.9 MG/DL
MAN DIFF?: NORMAL
MCHC RBC-ENTMCNC: 30.6 PG
MCHC RBC-ENTMCNC: 33.3 GM/DL
MCV RBC AUTO: 91.9 FL
MONOCYTES # BLD AUTO: 0.34 K/UL
MONOCYTES NFR BLD AUTO: 9.4 %
NEUTROPHILS # BLD AUTO: 1.35 K/UL
NEUTROPHILS NFR BLD AUTO: 37.1 %
PHOSPHATE SERPL-MCNC: 4.4 MG/DL
PLATELET # BLD AUTO: 216 K/UL
POTASSIUM SERPL-SCNC: 4.4 MMOL/L
PROT SERPL-MCNC: 6.7 G/DL
RBC # BLD: 4.45 M/UL
RBC # FLD: 13.2 %
SODIUM SERPL-SCNC: 142 MMOL/L
TACROLIMUS SERPL-MCNC: 7.9 NG/ML
WBC # FLD AUTO: 3.63 K/UL

## 2023-01-25 PROCEDURE — 99214 OFFICE O/P EST MOD 30 MIN: CPT | Mod: 24

## 2023-01-30 NOTE — HISTORY OF PRESENT ILLNESS
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 \par \par Underwent OLT on 11/1/22, RY HJ for bile duct anastomosis. \par Post ob course c/b: \par -Fevers due to Wound infection (VRE and E Coli) - Completed course of Zyvox and Vantin on 11/22/22 \par -Bile leak at hepaticojejunostomy requiring revision of biliary anastomosis/Velasquez-en-y over biliary drain on 11/7. \par -Discharged with biliary drain capped and Wound vac (discharged with home VNS). \par \par \par EXPLANT Pathology- Cirrhosis with fibroinflammatory periductal changes, consistent with\par primary sclerosing cholangitis. \par - Reactive lymph nodes\par - Gallbladder with inflammatory changes around cystic duct\par \par Interim Hx (1/25) - doing well.  drain removed.  tolerating diet. incision healed.

## 2023-01-30 NOTE — PHYSICAL EXAM
[Alert] : alert [No Acute Distress] : no acute distress [Scleral Icterus] : no scleral icterus [Supple] : the neck was supple [No Thyromegaly] : no thyromegaly [No Abnormal Masses] : no abnormal masses [Clear to Auscultation] : lungs were clear to auscultation bilaterally [Breathing Comfortably on room air] : breathing comfortably on room air [Normal Rate] : normal rate [Regular Rhythm] : regular rhythm [Soft] : soft [Normal Bowel Sounds] : normal bowel sounds [Clean] : clean [Dry] : dry [Healing Well] : healing well [No Edema] : no edema [de-identified] : small area (2cm) still with granulation tissue; clean

## 2023-01-30 NOTE — HISTORY OF PRESENT ILLNESS
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 \par \par Underwent OLT on 11/1/22, RY HJ for bile duct anastomosis. \par Post ob course c/b: \par -Fevers due to Wound infection (VRE and E Coli) - Completed course of Zyvox and Vantin on 11/22/22 \par -Bile leak at hepaticojejunostomy requiring revision of biliary anastomosis/Velasquez-en-y over biliary drain on 11/7. \par -Discharged with biliary drain capped and Wound vac (discharged with home VNS). \par \par Presents today 11/28/22 for first post-op follow up in clinic\par -Doing well, has wound vac in place\par -Adequate PO intake\par -Needs to increase physical activity\par -Normal bowel movements

## 2023-01-30 NOTE — ASSESSMENT
[FreeTextEntry1] : \par \par 31 y/o w/ PMH Cerebral palsy with L sided contractures, cirrhosis secondary to PSC, with frequent ERCPs with biliary stent placements for biliary strictures (last in 9/2022), recent COVID s/p MAB in 9/2022). \par \par -Underwent OLT on 11/1/22, RY HJ for bile duct anastomosis. \par Post ob course c/b: \par -Fevers due to Wound infection (VRE and E Coli) - Completed course of Zyvox and Vantin on 11/22/22 \par -Bile leak at hepaticojejunostomy requiring revision of biliary anastomosis/Velasquez-en-y over biliary drain on 11/7. \par -Discharged with biliary drain capped and Wound vac (discharged with home VNS) \par \par *GRAFT - LFts all normal\par \par *IMMUNO - FK goal 8-10, MMF 1g BID and prednisone 5mg daily\par \par *PPx - Valcyte 900mg QD (CMV high risk), Bactrim, ASA 81. \par \par *Wound - healed.\par \par F/u w hepatology.

## 2023-01-30 NOTE — PHYSICAL EXAM
[Alert] : alert [Healthy Appearing] : healthy appearing [Clear to Auscultation] : lungs were clear to auscultation bilaterally [Breathing Comfortably on room air] : breathing comfortably on room air [Normal Rate] : normal rate [Regular Rhythm] : regular rhythm [Clean] : clean [Dry] : dry [Healing Well] : healing well [No Edema] : no edema [Scleral Icterus] : no scleral icterus [Abdominal Bruit] : no abdominal bruit [Ascites Fluid Wave] : no ascites fluid wave [Splenomegaly] : no splenomegaly [de-identified] : 3 [de-identified] : 3 open spots on abdominal incision, staples in place, sutures removed, right biliary drain secured

## 2023-01-30 NOTE — ASSESSMENT
[FreeTextEntry1] : 31 y/o  w/ PMH Cerebral palsy with L sided contractures, cirrhosis secondary to PSC, with frequent ERCPs with biliary stent placements for biliary strictures (last in 9/2022), recent COVID s/p MAB in 9/2022). \par \par -Underwent OLT on 11/1/22, RY HJ for bile duct anastomosis. \par Post ob course c/b: \par -Fevers due to Wound infection (VRE and E Coli) - Completed course of Zyvox and Vantin on 11/22/22 \par -Bile leak at hepaticojejunostomy requiring revision of biliary anastomosis/Velasquez-en-y over biliary drain on 11/7. \par -Discharged with biliary drain capped and Wound vac (discharged with home VNS)\par \par *GRAFT - Labs today. LFTs downtrending prior to discharge (AST 13, ALT 24, AlkP 144, TBil 1.6).\par \par *IMMUNO - FK 2.5mg BID, MMF 1g BID and prednisone 15mg daily. Decrease prednisone to 10mg daily. \par \par *PPx - Valcyte 900mg QD (CMV high risk), Bactrim, ASA 81. Discontinue Diflucan today\par \par *Bile leak at hepaticojejunostomy requiring revision of biliary anastomosis/Velasquez-en-y over biliary drain on 11/7. On Ursodiol 300mg BID\par \par *Wound - 3 spots on incision. VNS to change wound vac dressing tomorrow (11/29). \par \par RTC next week\par

## 2023-02-03 LAB
ALBUMIN SERPL ELPH-MCNC: 4.5 G/DL
ALP BLD-CCNC: 92 U/L
ALT SERPL-CCNC: 15 U/L
ANION GAP SERPL CALC-SCNC: 11 MMOL/L
AST SERPL-CCNC: 19 U/L
BASOPHILS # BLD AUTO: 0.04 K/UL
BASOPHILS NFR BLD AUTO: 1.1 %
BILIRUB SERPL-MCNC: 0.6 MG/DL
BUN SERPL-MCNC: 31 MG/DL
CALCIUM SERPL-MCNC: 10 MG/DL
CHLORIDE SERPL-SCNC: 104 MMOL/L
CMV DNA SPEC QL NAA+PROBE: NOT DETECTED IU/ML
CMVPCR LOG: NOT DETECTED LOG10IU/ML
CO2 SERPL-SCNC: 26 MMOL/L
CREAT SERPL-MCNC: 0.96 MG/DL
EGFR: 109 ML/MIN/1.73M2
EOSINOPHIL # BLD AUTO: 0.15 K/UL
EOSINOPHIL NFR BLD AUTO: 4 %
GGT SERPL-CCNC: 19 U/L
GLUCOSE SERPL-MCNC: 112 MG/DL
HCT VFR BLD CALC: 40.5 %
HGB BLD-MCNC: 13.5 G/DL
IMM GRANULOCYTES NFR BLD AUTO: 1.9 %
LYMPHOCYTES # BLD AUTO: 1.87 K/UL
LYMPHOCYTES NFR BLD AUTO: 50.1 %
MAGNESIUM SERPL-MCNC: 1.8 MG/DL
MAN DIFF?: NORMAL
MCHC RBC-ENTMCNC: 30.1 PG
MCHC RBC-ENTMCNC: 33.3 GM/DL
MCV RBC AUTO: 90.4 FL
MONOCYTES # BLD AUTO: 0.38 K/UL
MONOCYTES NFR BLD AUTO: 10.2 %
NEUTROPHILS # BLD AUTO: 1.22 K/UL
NEUTROPHILS NFR BLD AUTO: 32.7 %
PLATELET # BLD AUTO: 190 K/UL
POTASSIUM SERPL-SCNC: 4.3 MMOL/L
PROT SERPL-MCNC: 6.7 G/DL
RBC # BLD: 4.48 M/UL
RBC # FLD: 13.3 %
SODIUM SERPL-SCNC: 141 MMOL/L
TACROLIMUS SERPL-MCNC: 6.9 NG/ML
WBC # FLD AUTO: 3.73 K/UL

## 2023-02-06 LAB
APPEARANCE: CLEAR
BACTERIA: NEGATIVE
BILIRUBIN URINE: NEGATIVE
BLOOD URINE: ABNORMAL
COLOR: NORMAL
GLUCOSE QUALITATIVE U: NEGATIVE
HYALINE CASTS: 1 /LPF
KETONES URINE: NEGATIVE
LEUKOCYTE ESTERASE URINE: NEGATIVE
MICROSCOPIC-UA: NORMAL
NITRITE URINE: NEGATIVE
PH URINE: 6
PROTEIN URINE: NORMAL
RED BLOOD CELLS URINE: 52 /HPF
SPECIFIC GRAVITY URINE: 1.02
SQUAMOUS EPITHELIAL CELLS: 0 /HPF
UROBILINOGEN URINE: NORMAL
WHITE BLOOD CELLS URINE: 1 /HPF

## 2023-02-09 ENCOUNTER — TRANSCRIPTION ENCOUNTER (OUTPATIENT)
Age: 33
End: 2023-02-09

## 2023-02-09 ENCOUNTER — APPOINTMENT (OUTPATIENT)
Dept: HEPATOLOGY | Facility: CLINIC | Age: 33
End: 2023-02-09
Payer: MEDICAID

## 2023-02-09 VITALS
HEIGHT: 64 IN | BODY MASS INDEX: 24.07 KG/M2 | OXYGEN SATURATION: 98 % | HEART RATE: 114 BPM | SYSTOLIC BLOOD PRESSURE: 148 MMHG | WEIGHT: 141 LBS | RESPIRATION RATE: 16 BRPM | DIASTOLIC BLOOD PRESSURE: 82 MMHG

## 2023-02-09 PROCEDURE — 99215 OFFICE O/P EST HI 40 MIN: CPT

## 2023-02-10 ENCOUNTER — INPATIENT (INPATIENT)
Facility: HOSPITAL | Age: 33
LOS: 0 days | Discharge: HOME | DRG: 696 | End: 2023-02-11
Attending: INTERNAL MEDICINE | Admitting: INTERNAL MEDICINE
Payer: MEDICAID

## 2023-02-10 ENCOUNTER — NON-APPOINTMENT (OUTPATIENT)
Age: 33
End: 2023-02-10

## 2023-02-10 ENCOUNTER — LABORATORY RESULT (OUTPATIENT)
Age: 33
End: 2023-02-10

## 2023-02-10 VITALS
SYSTOLIC BLOOD PRESSURE: 140 MMHG | HEIGHT: 63 IN | WEIGHT: 136.91 LBS | OXYGEN SATURATION: 98 % | TEMPERATURE: 98 F | HEART RATE: 101 BPM | RESPIRATION RATE: 18 BRPM | DIASTOLIC BLOOD PRESSURE: 90 MMHG

## 2023-02-10 DIAGNOSIS — K08.409 PARTIAL LOSS OF TEETH, UNSPECIFIED CAUSE, UNSPECIFIED CLASS: Chronic | ICD-10-CM

## 2023-02-10 DIAGNOSIS — R31.9 HEMATURIA, UNSPECIFIED: ICD-10-CM

## 2023-02-10 DIAGNOSIS — Z98.890 OTHER SPECIFIED POSTPROCEDURAL STATES: Chronic | ICD-10-CM

## 2023-02-10 LAB
ALBUMIN SERPL ELPH-MCNC: 4.5 G/DL
ALP BLD-CCNC: 98 U/L
ALT SERPL-CCNC: 17 U/L
ANION GAP SERPL CALC-SCNC: 12 MMOL/L
AST SERPL-CCNC: 21 U/L
BILIRUB SERPL-MCNC: 1.3 MG/DL
BUN SERPL-MCNC: 21 MG/DL
CALCIUM SERPL-MCNC: 9.7 MG/DL
CHLORIDE SERPL-SCNC: 103 MMOL/L
CO2 SERPL-SCNC: 25 MMOL/L
CREAT SERPL-MCNC: 0.96 MG/DL
EGFR: 109 ML/MIN/1.73M2
GGT SERPL-CCNC: 19 U/L
GLUCOSE SERPL-MCNC: 96 MG/DL
INR PPP: 1.16 RATIO
MAGNESIUM SERPL-MCNC: 1.8 MG/DL
PHOSPHATE SERPL-MCNC: 3.8 MG/DL
POTASSIUM SERPL-SCNC: 4.2 MMOL/L
PROT SERPL-MCNC: 6.8 G/DL
PT BLD: 13.7 SEC
SODIUM SERPL-SCNC: 140 MMOL/L
TACROLIMUS SERPL-MCNC: 5.7 NG/ML

## 2023-02-10 RX ORDER — TACROLIMUS 5 MG/1
2 CAPSULE ORAL
Refills: 0 | Status: DISCONTINUED | OUTPATIENT
Start: 2023-02-10 | End: 2023-02-11

## 2023-02-10 RX ORDER — TACROLIMUS 5 MG/1
1 CAPSULE ORAL ONCE
Refills: 0 | Status: COMPLETED | OUTPATIENT
Start: 2023-02-10 | End: 2023-02-10

## 2023-02-10 RX ORDER — MYCOPHENOLATE MOFETIL 250 MG/1
1000 CAPSULE ORAL
Refills: 0 | Status: DISCONTINUED | OUTPATIENT
Start: 2023-02-10 | End: 2023-02-11

## 2023-02-10 RX ORDER — TACROLIMUS 5 MG/1
2 CAPSULE ORAL
Qty: 0 | Refills: 0 | DISCHARGE

## 2023-02-10 RX ORDER — TACROLIMUS 5 MG/1
2 CAPSULE ORAL DAILY
Refills: 0 | Status: DISCONTINUED | OUTPATIENT
Start: 2023-02-10 | End: 2023-02-10

## 2023-02-10 RX ORDER — INFLUENZA VIRUS VACCINE 15; 15; 15; 15 UG/.5ML; UG/.5ML; UG/.5ML; UG/.5ML
0.5 SUSPENSION INTRAMUSCULAR ONCE
Refills: 0 | Status: DISCONTINUED | OUTPATIENT
Start: 2023-02-10 | End: 2023-02-11

## 2023-02-10 RX ORDER — TACROLIMUS 5 MG/1
1 CAPSULE ORAL
Refills: 0 | Status: DISCONTINUED | OUTPATIENT
Start: 2023-02-10 | End: 2023-02-11

## 2023-02-10 RX ORDER — PANTOPRAZOLE SODIUM 20 MG/1
40 TABLET, DELAYED RELEASE ORAL
Refills: 0 | Status: DISCONTINUED | OUTPATIENT
Start: 2023-02-10 | End: 2023-02-11

## 2023-02-10 RX ORDER — VALGANCICLOVIR 450 MG/1
900 TABLET, FILM COATED ORAL
Refills: 0 | Status: DISCONTINUED | OUTPATIENT
Start: 2023-02-10 | End: 2023-02-11

## 2023-02-10 RX ORDER — CALCIUM CARBONATE 500(1250)
1 TABLET ORAL
Refills: 0 | Status: DISCONTINUED | OUTPATIENT
Start: 2023-02-10 | End: 2023-02-11

## 2023-02-10 RX ORDER — URSODIOL 250 MG/1
300 TABLET, FILM COATED ORAL
Refills: 0 | Status: DISCONTINUED | OUTPATIENT
Start: 2023-02-10 | End: 2023-02-11

## 2023-02-10 RX ORDER — ASPIRIN/CALCIUM CARB/MAGNESIUM 324 MG
81 TABLET ORAL DAILY
Refills: 0 | Status: DISCONTINUED | OUTPATIENT
Start: 2023-02-10 | End: 2023-02-11

## 2023-02-10 RX ORDER — FLUCONAZOLE 150 MG/1
400 TABLET ORAL
Refills: 0 | Status: DISCONTINUED | OUTPATIENT
Start: 2023-02-10 | End: 2023-02-11

## 2023-02-10 RX ORDER — TACROLIMUS 5 MG/1
1 CAPSULE ORAL
Refills: 0 | Status: DISCONTINUED | OUTPATIENT
Start: 2023-02-10 | End: 2023-02-10

## 2023-02-10 RX ADMIN — TACROLIMUS 1 MILLIGRAM(S): 5 CAPSULE ORAL at 23:18

## 2023-02-10 NOTE — H&P ADULT - NSICDXPASTSURGICALHX_GEN_ALL_CORE_FT
PAST SURGICAL HISTORY:  History of ERCP multiple with stent insertions/replacement every 3 months ---last 5/20/2022    [place] teeth extracted 2021

## 2023-02-10 NOTE — H&P ADULT - NSHPPHYSICALEXAM_GEN_ALL_CORE
Constitutional: AOx3, NAD  Eyes: anicteric   Respiratory: nonlabored   Cardiovascular: normotensive, regular rate  Gastrointestinal: soft, nondistended, nontender, well-healed surgical incision   Extremities: no peripheral edema  Vascular: DP intact b/l   Neurological: motor and sensation at patient baseline  Skin: no rash  Musculoskeletal: no deformity   Psychiatric: normal affed

## 2023-02-10 NOTE — H&P ADULT - ASSESSMENT
Javy Biggs is a 32 y.o. man with history of cerebral palsy c/b left-sided contractures and PSC c/b cirrhosis s/p OLT on 11/1/2022. Post-operative course was complicated by wound infection (VRE, E. coli) and hepaticojejunostomy leak requiring reoperation and anastomotic revision. Patient presents to the hospital for intermittent hematuria x2 weeks.     - CT Cystogram  - CMV, EBV titers  - UA  - UC  - Continue home transplant medications

## 2023-02-10 NOTE — H&P ADULT - HISTORY OF PRESENT ILLNESS
Javy Biggs is a  Javy Biggs is a 32 y.o. man with history of cerebral palsy c/b left-sided contractures and PSC c/b cirrhosis s/p OLT on 11/1/2022. Post-operative course was complicated by wound infection (VRE, E. coli) and hepaticojejunostomy leak requiring reoperation and anastomotic revision. Patient presents to the hospital for intermittent hematuria x2 weeks.     The patient states that he does not have hematuria each time and that it only occurs sporadically. The patient denies any associated fever, chills, nausea, vomiting, abdominal pain, dysuria, frequency, urgency, or inability to empty his bladder. The patient denies any history of similar symptoms.

## 2023-02-11 ENCOUNTER — TRANSCRIPTION ENCOUNTER (OUTPATIENT)
Age: 33
End: 2023-02-11

## 2023-02-11 VITALS
TEMPERATURE: 98 F | SYSTOLIC BLOOD PRESSURE: 119 MMHG | RESPIRATION RATE: 18 BRPM | HEART RATE: 82 BPM | DIASTOLIC BLOOD PRESSURE: 67 MMHG | OXYGEN SATURATION: 100 %

## 2023-02-11 LAB
ALBUMIN SERPL ELPH-MCNC: 4.1 G/DL — SIGNIFICANT CHANGE UP (ref 3.3–5)
ALP SERPL-CCNC: 93 U/L — SIGNIFICANT CHANGE UP (ref 40–120)
ALT FLD-CCNC: 17 U/L — SIGNIFICANT CHANGE UP (ref 10–45)
ANION GAP SERPL CALC-SCNC: 8 MMOL/L — SIGNIFICANT CHANGE UP (ref 5–17)
APPEARANCE UR: ABNORMAL
AST SERPL-CCNC: 21 U/L — SIGNIFICANT CHANGE UP (ref 10–40)
BACTERIA # UR AUTO: NEGATIVE — SIGNIFICANT CHANGE UP
BILIRUB SERPL-MCNC: 1 MG/DL — SIGNIFICANT CHANGE UP (ref 0.2–1.2)
BILIRUB UR-MCNC: NEGATIVE — SIGNIFICANT CHANGE UP
BUN SERPL-MCNC: 24 MG/DL — HIGH (ref 7–23)
CALCIUM SERPL-MCNC: 9.2 MG/DL — SIGNIFICANT CHANGE UP (ref 8.4–10.5)
CHLORIDE SERPL-SCNC: 103 MMOL/L — SIGNIFICANT CHANGE UP (ref 96–108)
CO2 SERPL-SCNC: 28 MMOL/L — SIGNIFICANT CHANGE UP (ref 22–31)
COLOR SPEC: ABNORMAL
CREAT SERPL-MCNC: 0.92 MG/DL — SIGNIFICANT CHANGE UP (ref 0.5–1.3)
DIFF PNL FLD: ABNORMAL
EBV EA AB SER IA-ACNC: 23.5 U/ML — HIGH
EBV EA AB TITR SER IF: POSITIVE
EBV EA IGG SER-ACNC: POSITIVE
EBV NA IGG SER IA-ACNC: 548 U/ML — HIGH
EBV PATRN SPEC IB-IMP: SIGNIFICANT CHANGE UP
EBV VCA IGG AVIDITY SER QL IA: POSITIVE
EBV VCA IGM SER IA-ACNC: 556 U/ML — HIGH
EBV VCA IGM SER IA-ACNC: <10 U/ML — SIGNIFICANT CHANGE UP
EBV VCA IGM TITR FLD: NEGATIVE — SIGNIFICANT CHANGE UP
EGFR: 115 ML/MIN/1.73M2 — SIGNIFICANT CHANGE UP
EPI CELLS # UR: 0 /HPF — SIGNIFICANT CHANGE UP
GLUCOSE SERPL-MCNC: 89 MG/DL — SIGNIFICANT CHANGE UP (ref 70–99)
GLUCOSE UR QL: NEGATIVE — SIGNIFICANT CHANGE UP
HCT VFR BLD CALC: 40.3 % — SIGNIFICANT CHANGE UP (ref 39–50)
HGB BLD-MCNC: 13.3 G/DL — SIGNIFICANT CHANGE UP (ref 13–17)
KETONES UR-MCNC: NEGATIVE — SIGNIFICANT CHANGE UP
LEUKOCYTE ESTERASE UR-ACNC: NEGATIVE — SIGNIFICANT CHANGE UP
MAGNESIUM SERPL-MCNC: 1.7 MG/DL — SIGNIFICANT CHANGE UP (ref 1.6–2.6)
MCHC RBC-ENTMCNC: 29.6 PG — SIGNIFICANT CHANGE UP (ref 27–34)
MCHC RBC-ENTMCNC: 33 GM/DL — SIGNIFICANT CHANGE UP (ref 32–36)
MCV RBC AUTO: 89.8 FL — SIGNIFICANT CHANGE UP (ref 80–100)
NITRITE UR-MCNC: NEGATIVE — SIGNIFICANT CHANGE UP
NRBC # BLD: 0 /100 WBCS — SIGNIFICANT CHANGE UP (ref 0–0)
PH UR: 6 — SIGNIFICANT CHANGE UP (ref 5–8)
PHOSPHATE SERPL-MCNC: 3.9 MG/DL — SIGNIFICANT CHANGE UP (ref 2.5–4.5)
PLATELET # BLD AUTO: 167 K/UL — SIGNIFICANT CHANGE UP (ref 150–400)
POTASSIUM SERPL-MCNC: 3.7 MMOL/L — SIGNIFICANT CHANGE UP (ref 3.5–5.3)
POTASSIUM SERPL-SCNC: 3.7 MMOL/L — SIGNIFICANT CHANGE UP (ref 3.5–5.3)
PROT SERPL-MCNC: 6.9 G/DL — SIGNIFICANT CHANGE UP (ref 6–8.3)
PROT UR-MCNC: ABNORMAL
RBC # BLD: 4.49 M/UL — SIGNIFICANT CHANGE UP (ref 4.2–5.8)
RBC # FLD: 13.6 % — SIGNIFICANT CHANGE UP (ref 10.3–14.5)
RBC CASTS # UR COMP ASSIST: 1261 /HPF — HIGH (ref 0–4)
SODIUM SERPL-SCNC: 139 MMOL/L — SIGNIFICANT CHANGE UP (ref 135–145)
SP GR SPEC: 1.03 — HIGH (ref 1.01–1.02)
TACROLIMUS SERPL-MCNC: 6.8 NG/ML — SIGNIFICANT CHANGE UP
UROBILINOGEN FLD QL: NEGATIVE — SIGNIFICANT CHANGE UP
WBC # BLD: 3.16 K/UL — LOW (ref 3.8–10.5)
WBC # FLD AUTO: 3.16 K/UL — LOW (ref 3.8–10.5)
WBC UR QL: 2 /HPF — SIGNIFICANT CHANGE UP (ref 0–5)

## 2023-02-11 PROCEDURE — 86664 EPSTEIN-BARR NUCLEAR ANTIGEN: CPT

## 2023-02-11 PROCEDURE — G0378: CPT

## 2023-02-11 PROCEDURE — 99232 SBSQ HOSP IP/OBS MODERATE 35: CPT

## 2023-02-11 PROCEDURE — 81001 URINALYSIS AUTO W/SCOPE: CPT

## 2023-02-11 PROCEDURE — 80053 COMPREHEN METABOLIC PANEL: CPT

## 2023-02-11 PROCEDURE — 86663 EPSTEIN-BARR ANTIBODY: CPT

## 2023-02-11 PROCEDURE — 36415 COLL VENOUS BLD VENIPUNCTURE: CPT

## 2023-02-11 PROCEDURE — 84100 ASSAY OF PHOSPHORUS: CPT

## 2023-02-11 PROCEDURE — 74178 CT ABD&PLV WO CNTR FLWD CNTR: CPT | Mod: 26

## 2023-02-11 PROCEDURE — 85025 COMPLETE CBC W/AUTO DIFF WBC: CPT

## 2023-02-11 PROCEDURE — 83735 ASSAY OF MAGNESIUM: CPT

## 2023-02-11 PROCEDURE — 74178 CT ABD&PLV WO CNTR FLWD CNTR: CPT

## 2023-02-11 PROCEDURE — 86665 EPSTEIN-BARR CAPSID VCA: CPT

## 2023-02-11 PROCEDURE — 87086 URINE CULTURE/COLONY COUNT: CPT

## 2023-02-11 PROCEDURE — 80197 ASSAY OF TACROLIMUS: CPT

## 2023-02-11 RX ORDER — TACROLIMUS 5 MG/1
1 CAPSULE ORAL
Qty: 0 | Refills: 0 | DISCHARGE
Start: 2023-02-11

## 2023-02-11 RX ORDER — TACROLIMUS 5 MG/1
2 CAPSULE ORAL
Qty: 0 | Refills: 0 | DISCHARGE

## 2023-02-11 RX ORDER — TACROLIMUS 5 MG/1
2 CAPSULE ORAL
Qty: 0 | Refills: 0 | DISCHARGE
Start: 2023-02-11

## 2023-02-11 RX ADMIN — Medication 15 MILLIGRAM(S): at 09:00

## 2023-02-11 RX ADMIN — TACROLIMUS 2 MILLIGRAM(S): 5 CAPSULE ORAL at 09:01

## 2023-02-11 RX ADMIN — PANTOPRAZOLE SODIUM 40 MILLIGRAM(S): 20 TABLET, DELAYED RELEASE ORAL at 06:00

## 2023-02-11 RX ADMIN — URSODIOL 300 MILLIGRAM(S): 250 TABLET, FILM COATED ORAL at 06:01

## 2023-02-11 RX ADMIN — MYCOPHENOLATE MOFETIL 1000 MILLIGRAM(S): 250 CAPSULE ORAL at 09:01

## 2023-02-11 RX ADMIN — Medication 1 TABLET(S): at 09:00

## 2023-02-11 RX ADMIN — VALGANCICLOVIR 900 MILLIGRAM(S): 450 TABLET, FILM COATED ORAL at 09:01

## 2023-02-11 RX ADMIN — Medication 1 TABLET(S): at 06:01

## 2023-02-11 RX ADMIN — Medication 81 MILLIGRAM(S): at 11:18

## 2023-02-11 NOTE — DISCHARGE NOTE NURSING/CASE MANAGEMENT/SOCIAL WORK - NSDCPEFALRISK_GEN_ALL_CORE
For information on Fall & Injury Prevention, visit: [email or web address] OR  [email or web address] OR  https://www.cdc.gov/steadi/patient.html

## 2023-02-11 NOTE — DISCHARGE NOTE PROVIDER - CARE PROVIDER_API CALL
Dagher, Nabil N (MD)  Surgery  [address] [place] [address]  Phone: [phone]  Fax: [phone]  Follow Up Time: 1 week   Dagher, Nabil N (MD)  Surgery  [address] [place] [address]  Phone: [phone]  Fax: [phone]  Follow Up Time: 1 week    Deo Bains  Urology  [address] [place] [address]  Phone: [phone]  Fax: [phone]  Follow Up Time: 2 weeks

## 2023-02-11 NOTE — DISCHARGE NOTE PROVIDER - HOSPITAL COURSE
Javy Biggs is a 32 y.o. man with history of cerebral palsy c/b left-sided contractures and PSC c/b cirrhosis s/p OLT on 11/1/2022. Post-operative course was complicated by wound infection (VRE, E. coli) and hepaticojejunostomy leak requiring reoperation and anastomotic revision. Patient presents to the hospital for intermittent hematuria x2 weeks.   The patient states that he does not have hematuria each time and that it only occurs sporadically. The patient denies any associated fever, chills, nausea, vomiting, abdominal pain, dysuria, frequency, urgency, or inability to empty his bladder. The patient denies any history of similar symptoms.     Patient admitted to transplant hepatology service. Urinalysis completed with significant red blood cell count, light orange in appearance and negative nitrite and leukocyte esterase. Urine culture, CMV and EBV sent. Creatinine remained stable at 0.9 with adequate urine output. LFTs remained within normal range. CT urogram was completed which revealed small stones within the R calyx that are non-obstructing. Per discussion with urology ***    Patient seen by the multidisciplinary liver transplant team and deemed stable for discharge today.     Immunosuppression:  - Prograf 2mg qAM, 1mg qPM  - MMF 1g BID  - Pred 15mg daily     Prophylaxis:   - ASA 81  - Valcyte 900mg daily  - Bactrim SS daily   - PPI  - OsCal    Javy Biggs is a 32 y.o. man with history of cerebral palsy c/b left-sided contractures and PSC c/b cirrhosis s/p OLT on 11/1/2022. Post-operative course was complicated by wound infection (VRE, E. coli) and hepaticojejunostomy leak requiring reoperation and anastomotic revision. Patient presents to the hospital for intermittent hematuria x2 weeks.   The patient states that he does not have hematuria each time and that it only occurs sporadically. The patient denies any associated fever, chills, nausea, vomiting, abdominal pain, dysuria, frequency, urgency, or inability to empty his bladder. The patient denies any history of similar symptoms.     Patient admitted to transplant hepatology service. Urinalysis completed with significant red blood cell count, light orange in appearance and negative nitrite and leukocyte esterase. Urine culture, CMV and EBV sent. Creatinine remained stable at 0.9 with adequate urine output. LFTs remained within normal range. CT urogram was completed which revealed small stones within the R calyx that are non-obstructing. Per discussion with urology patient should follow up as outpatient with Dr. Bains at [facility] for cystoscopy.     Patient seen by the multidisciplinary liver transplant team and deemed stable for discharge today.     Immunosuppression:  - Prograf 2mg qAM, 1mg qPM  - MMF 1g BID  - Pred 15mg daily     Prophylaxis:   - ASA 81  - Valcyte 900mg daily  - Bactrim SS daily   - PPI  - OsCal

## 2023-02-11 NOTE — DISCHARGE NOTE PROVIDER - NSFOLLOWUPCLINICS_GEN_ALL_ED_FT
[facility]  Urology  [address] [place] [address] - [address] [place] [address]  Phone: [phone]  Fax:   Follow Up Time: 2 weeks

## 2023-02-11 NOTE — DISCHARGE NOTE PROVIDER - NSDCCPCAREPLAN_GEN_ALL_CORE_FT
PRINCIPAL DISCHARGE DIAGNOSIS  Diagnosis: Hematuria  Assessment and Plan of Treatment:       SECONDARY DISCHARGE DIAGNOSES  Diagnosis: Liver transplant recipient  Assessment and Plan of Treatment: Contact our Transplant clinic at [phone], return to our Emergency Department or NOTIFY YOUR SURGEON IF:  You have any bleeding that does not stop, any pus draining from your wound, any fever (over 100.4 F) or chills, persistent nausea/vomiting with inability to tolerate food or liquids, persistent diarrhea, and/or if your pain is not controlled on your discharge pain medications, jaundice, confusion, any signs of bleeding, any urinary changes.    Diagnosis: Immunosuppression  Assessment and Plan of Treatment: - Transplant recipients are at risk for developing infection because immune system is lowered due to transplant medications.   - Avoid contact with sick people; practice good hand hygiene;   - Take medications as directed by your translant team. Never stop taking medications unless instructed by your transplant physician.  - Do NOT double up medication dose if you missed your dose; call the transplant office for instructions at [phone]     PRINCIPAL DISCHARGE DIAGNOSIS  Diagnosis: Hematuria  Assessment and Plan of Treatment: No evidence of infection, however CT scan revealed small stones within the R calyx of the kidney. Urology recommendation for follow up with Dr Bains at the [facility] for outpatient cystoscopy      SECONDARY DISCHARGE DIAGNOSES  Diagnosis: Liver transplant recipient  Assessment and Plan of Treatment: Contact our Transplant clinic at [phone], return to our Emergency Department or NOTIFY YOUR SURGEON IF:  You have any bleeding that does not stop, any pus draining from your wound, any fever (over 100.4 F) or chills, persistent nausea/vomiting with inability to tolerate food or liquids, persistent diarrhea, and/or if your pain is not controlled on your discharge pain medications, jaundice, confusion, any signs of bleeding, any urinary changes.    Diagnosis: Immunosuppression  Assessment and Plan of Treatment: - Transplant recipients are at risk for developing infection because immune system is lowered due to transplant medications.   - Avoid contact with sick people; practice good hand hygiene;   - Take medications as directed by your translant team. Never stop taking medications unless instructed by your transplant physician.  - Do NOT double up medication dose if you missed your dose; call the transplant office for instructions at [phone]

## 2023-02-11 NOTE — PROGRESS NOTE ADULT - SUBJECTIVE AND OBJECTIVE BOX
Transplant Surgery - Multidisciplinary Rounds  --------------------------------------------------------------    HPI: Javy Biggs is a 32 y.o. man with history of cerebral palsy c/b left-sided contractures and PSC c/b cirrhosis s/p OLT on 2022. Post-operative course was complicated by wound infection (VRE, E. coli) and hepaticojejunostomy leak requiring reoperation and anastomotic revision. Patient presents to the hospital for intermittent hematuria x2 weeks.   The patient states that he does not have hematuria each time and that it only occurs sporadically. The patient denies any associated fever, chills, nausea, vomiting, abdominal pain, dysuria, frequency, urgency, or inability to empty his bladder. The patient denies any history of similar symptoms    Interval Events:   - Admitted for hematuria evaluation  - Urinalysis without evidence of infection  - Liver function remains at baseline  - Monitoring fever curve off antibiotics  - Afebrile, hemodynamically stable       PAST MEDICAL & SURGICAL HISTORY:  Cerebral palsy  PSC (primary sclerosing cholangitis)  Abnormal LFTs  Bile duct stricture  Dental caries  Impacted teeth-wisdom teeth  Phimosis  History of seizures-at birth  Anemia  History of ERCP-multiple with stent insertions/replacement every 3 months ---last 2022  [place] teeth extracted-      Allergies  No Known Allergies      MEDICATIONS  (STANDING):  aspirin enteric coated 81 milliGRAM(s) Oral daily  calcium carbonate   1250 mG (OsCal) 1 Tablet(s) Oral two times a day  influenza   Vaccine 0.5 milliLiter(s) IntraMuscular once  mycophenolate mofetil 1000 milliGRAM(s) Oral <User Schedule>  pantoprazole    Tablet 40 milliGRAM(s) Oral before breakfast  predniSONE   Tablet 15 milliGRAM(s) Oral <User Schedule>  tacrolimus 1 milliGRAM(s) Oral <User Schedule>  tacrolimus 2 milliGRAM(s) Oral <User Schedule>  trimethoprim   80 mG/sulfamethoxazole 400 mG 1 Tablet(s) Oral <User Schedule>  ursodiol Capsule 300 milliGRAM(s) Oral two times a day  valGANciclovir 900 milliGRAM(s) Oral <User Schedule>    MEDICATIONS  (PRN):        Physical Exam:   General: Well appearing male, resting comfortably in bed in no acute distress   Neuro: Grossly intact bilaterally   HEENT: Normocephalic, atraumatic, trachea midline, no JVD   Heart: Regular S1/S2, no murmurs rubs or gallops   Lungs: Unlabored breathing on RA; Clear to auscultation bilaterally, no adventitious sounds   Abdomen: Soft, non-distended, normoactive bowel sounds throughout, no tenderness to palpation in all 4 quadrants; Well healed chevron incision    Upper Extremities: No edema, freely mobile bilaterally   Lower Extremities: No edema, SCDs in place, feet warm bilaterally   Skin: Warm, non-diaphoretic throughout       Labs:                         13.3   3.16  )-----------( 167      ( 2023 06:18 )             40.3     -    139  |  103  |  24<H>  ----------------------------<  89  3.7   |  28  |  0.92    Ca    9.2      2023 06:17  Phos  3.9     -  Mg     1.7         TPro  6.9  /  Alb  4.1  /  TBili  1.0  /  DBili  x   /  AST  21  /  ALT  17  /  AlkPhos  93  -    COVID-19 PCR: NotDetec (2022 07:07)  COVID-19 PCR: NotDetec (2022 10:13)  COVID-19 PCR: NotDetec (2022 06:28)  SARS-CoV-2: NotDetec (2022 15:23)  COVID-19 PCR: NotDetec (31 Oct 2022 17:21)  SARS-CoV-2: NotDetec (26 Sep 2022 19:47)  COVID-19 PCR: Detected (24 Sep 2022 15:45)    Urinalysis Basic - ( 2023 02:02 )  Color: Light Orange / Appearance: Slightly Turbid / S.026 / pH: x  Gluc: x / Ketone: Negative  / Bili: Negative / Urobili: Negative   Blood: x / Protein: 30 mg/dL / Nitrite: Negative   Leuk Esterase: Negative / RBC: 1261 /hpf / WBC 2 /HPF   Sq Epi: x / Non Sq Epi: 0 /hpf / Bacteria: Negative        Vital Signs:   Vital Signs Last 24 Hrs  T(C): 37.2 (2023 13:00), Max: 37.3 (2023 09:00)  T(F): 98.9 (:), Max: 99.1 (2023 09:00)  HR: 96 (:) (79 - 101)  BP: 119/76 (:) (109/67 - 140/90)  BP(mean): --  RR: 18 (:) (16 - 18)  SpO2: 98% () (97% - 100%)    Parameters below as of :  Patient On (Oxygen Delivery Method): room air        Input/Output:   I&O's Detail    10 Feb 2023 07:01  -  2023 07:00  --------------------------------------------------------  IN:    Oral Fluid: 720 mL  Total IN: 720 mL    OUT:    Voided (mL): 450 mL  Total OUT: 450 mL    Total NET: 270 mL      2023 07:01  -  2023 15:38  --------------------------------------------------------  IN:    Oral Fluid: 240 mL  Total IN: 240 mL    OUT:  Total OUT: 0 mL    Total NET: 240 mL        Daily Height in cm: 160.02 (10 Feb 2023 20:50)    Daily     Height (cm): 160 (02-10-23 @ 20:50)  Weight (kg): 62.1 (02-10-23 @ 20:50)  BMI (kg/m2): 24.3 (02-10-23 @ 20:50)  BSA (m2): 1.65 (02-10-23 @ 20:50)

## 2023-02-11 NOTE — DISCHARGE NOTE PROVIDER - NSDCMRMEDTOKEN_GEN_ALL_CORE_FT
3-in-1 commode: 1 each use as directed, Z94.4  aspirin 81 mg oral delayed release tablet: 1 tab(s) orally once a day  calcium carbonate 1250 mg (500 mg elemental calcium) oral tablet: 1 tab(s) orally 2 times a day  CellCept 500 mg oral tablet: 2 tab(s) orally 2 times a day  fluconazole 200 mg oral tablet: 2 tab(s) orally once a day  pantoprazole 40 mg oral delayed release tablet: 1 tab(s) orally once a day (before a meal)  predniSONE 10 mg oral tablet: 1.5 tab(s) orally once a day  rolling walker: 1 each use as directed, Z94.4  Standard Wheelchair: s/p LIver transplant   sulfamethoxazole-trimethoprim 400 mg-80 mg oral tablet: 1 tab(s) orally once a day  tacrolimus 0.5 mg oral capsule: 1 cap(s) orally once a day  tacrolimus 1 mg oral capsule: 2 cap(s) orally once a day  transport wheelchair: 1 each, use as directed Z94.4  ursodiol 300 mg oral capsule: 1 cap(s) orally 2 times a day  valGANciclovir 450 mg oral tablet: 2 tab(s) orally once a day   3-in-1 commode: 1 each use as directed, Z94.4  aspirin 81 mg oral delayed release tablet: 1 tab(s) orally once a day  calcium carbonate 1250 mg (500 mg elemental calcium) oral tablet: 1 tab(s) orally 2 times a day  CellCept 500 mg oral tablet: 2 tab(s) orally 2 times a day  pantoprazole 40 mg oral delayed release tablet: 1 tab(s) orally once a day (before a meal)  predniSONE 10 mg oral tablet: 1 tab(s) orally once a day  rolling walker: 1 each use as directed, Z94.4  Standard Wheelchair: s/p LIver transplant   sulfamethoxazole-trimethoprim 400 mg-80 mg oral tablet: 1 tab(s) orally once a day  tacrolimus 1 mg oral capsule: 1 cap(s) orally once a day (at bedtime)  tacrolimus 1 mg oral capsule: 2 cap(s) orally once a day  transport wheelchair: 1 each, use as directed Z94.4  ursodiol 300 mg oral capsule: 1 cap(s) orally 2 times a day  valGANciclovir 450 mg oral tablet: 2 tab(s) orally once a day

## 2023-02-11 NOTE — DISCHARGE NOTE PROVIDER - PROVIDER TOKENS
PROVIDER:[TOKEN:[[id number]],FOLLOWUP:[1 week]] PROVIDER:[TOKEN:[[id number]],FOLLOWUP:[1 week]],PROVIDER:[TOKEN:[[id number]],FOLLOWUP:[2 weeks]]

## 2023-02-11 NOTE — DISCHARGE NOTE PROVIDER - CARE PROVIDERS DIRECT ADDRESSES
,DirectAddress_Unknown ,DirectAddress_Unknown,[email or web address]

## 2023-02-11 NOTE — PROGRESS NOTE ADULT - ASSESSMENT
Javy Biggs is a 32 y.o. man with history of cerebral palsy c/b left-sided contractures and PSC c/b cirrhosis s/p OLT on 11/1/2022. Post-operative course was complicated by wound infection (VRE, E. coli) and hepaticojejunostomy leak requiring reoperation and anastomotic revision. Patient presents to the hospital for intermittent hematuria x2 weeks.     #Hematuria  - Urine culture pending; UA negative   - Urine studies pending  - CT Urogram today --> if abnormal findings plan for urology consultation   - Monitor off antibiotics for now     #Liver Transplant Recipient  - Immunosuppression: FK 2/1, MMF 1g BID, Pred 5  - PPx: ASA81, PPI, Valcyte, Bactrim   - Ursodiol BID

## 2023-02-11 NOTE — DISCHARGE NOTE NURSING/CASE MANAGEMENT/SOCIAL WORK - PATIENT PORTAL LINK FT
You can access the FollowMyHealth Patient Portal offered by [facility] by registering at the following website: [email or web address]. By joining [facility]’s FollowMyHealth portal, you will also be able to view your health information using other applications (apps) compatible with our system.

## 2023-02-12 LAB
CMV DNA SPEC QL NAA+PROBE: NOT DETECTED IU/ML
CMVPCR LOG: NOT DETECTED LOG10IU/ML
CULTURE RESULTS: SIGNIFICANT CHANGE UP
SPECIMEN SOURCE: SIGNIFICANT CHANGE UP

## 2023-02-12 NOTE — PHYSICAL EXAM
[Alert] : alert [No Acute Distress] : no acute distress [Supple] : the neck was supple [No Thyromegaly] : no thyromegaly [No Abnormal Masses] : no abnormal masses [Clear to Auscultation] : lungs were clear to auscultation bilaterally [Breathing Comfortably on room air] : breathing comfortably on room air [Normal Rate] : normal rate [Regular Rhythm] : regular rhythm [Soft] : soft [Normal Bowel Sounds] : normal bowel sounds [No Edema] : no edema [Healthy Appearing] : healthy appearing [EOMI] : extra ocular movement intact [PERRLA] : pupils equal, round, reactive to light and accomodation [Full ROM] : full range of motion [No Lymphadenopathy] : no lymphadenopathy [No Skin Discoloration] : no skin discoloration [No Ulcers] : no ulcers [Strength in Tact] : strength in tact [No Rash] : no rash [Scleral Icterus] : no scleral icterus [Hepatojugular Reflux] : no hepatojugular reflux [Abdominal Bruit] : no abdominal bruit [Ascites Fluid Wave] : no ascites fluid wave [Splenomegaly] : no splenomegaly [Spider Angioma] : no spider angioma [Jaundice] : no jaundice [Palmar Erythema] : no palmar erythema [Asterixis] : no asterixis [Hepatic Encephalopathy] : no hepatic encephalopathy

## 2023-02-12 NOTE — HISTORY OF PRESENT ILLNESS
[FreeTextEntry1] : Mr. SAUMYA ZARATE a 32 year male  presents today for  a hepatology appointment. 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  \par \par Patient currently status post orthotopic liver transplantation on November 1, 2022 with a Velasquez-en-Y hepaticojejunostomy for bile duct anastomosis. Postoperative course was complicated with bile leak at hepaticojejunostomy requiring revision of biliary anastomosis/Velasquez-en-Y on November 7.  \par \par He was last seen by transplant surgery team on January 11, 2023 and returns for his 3-month transition visit to hepatology today.  He reports he has been doing well.  He denies any nausea, vomiting, abdominal pain, fever, chills, rigor.  He also reported an episode of hematuria without associated abdominal or flank pain.  This has now resolved.  Patient did undergo an urine analysis along with urine culture who did reveal large blood in the urine.  Culture was negative for active infection.\par \par His CBC revealed a white cell count of 3.73, hemoglobin 13.5 g/dL, platelet count 190,000.  Excellent allograft function with total bilirubin 0.6 mg/dL, AST 19, ALT 15, alkaline phosphatase 92 units/L.  Serum creatinine 0.96 mg/dL.  Electrolytes within normal range.  Glucose was mildly elevated at 112 mg/dL.  Magnesium was 1.8 mg/dL.  GGT 19 units/L.  Tacrolimus level 6.9 ng/mL.  CMV PCR not detected.\par \par Patient currently remains on Prograf 2 mg in the morning and 1 mg in the evening, MMF 1 g twice daily and prednisone 5 mg daily.  In addition he remains on prophylaxis with Valcyte 900 mg daily (CMV high risk), Bactrim, ASA 81.

## 2023-02-12 NOTE — ASSESSMENT
[FreeTextEntry1] : Mr. 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 Postoperative course was complicated with bile leak at hepaticojejunostomy requiring revision of biliary anastomosis/Velasquez-en-Y on November 7.  He returns for a follow-up visit and transition his care from transplant surgery team to hepatology.  He has excellent allograft function.  His only complaint is an episode of hematuria which was investigated recently with an urine analysis and culture which revealed large blood but without any infection.  \par \par PLAN\par # Status post orthotopic liver transplantation\par Patient with excellent allograft function.  He will continue with current immunosuppression regimen that will include Prograf 2 mg in the morning and 1 mg the evening, MMF 1 g twice daily and prednisone 5 mg daily.  His most recent FK level is 6.9 ng/mL.  This is acceptable.\par \par #Post transplant prophylaxis\par Recommended him to continue with Valcyte 900 mg daily (CMV high risk).  In addition he will continue with Bactrim DS 1 tablet daily and aspirin 81 mg daily.\par \par #Hematuria\par Patient had an episode of hematuria which is currently resolved.  Urine analysis is negative for infection.  Patient denies any abdominal pain or flank pain or urethral pain.  I recommended patient to wait and watch at this time.  If he has recurrent episode of hematuria this needs to be further investigated with an abdominal CT scan potentially to rule out renal stone.\par \par Return to hepatology clinic in about 1 month.  He will have follow-up labs in about 2 weeks.

## 2023-02-13 LAB
BASOPHILS # BLD AUTO: 0.03 K/UL
BASOPHILS NFR BLD AUTO: 1.1 %
CMV DNA CSF QL NAA+PROBE: SIGNIFICANT CHANGE UP
CMV DNA SPEC NAA+PROBE-LOG#: SIGNIFICANT CHANGE UP LOG10IU/ML
EOSINOPHIL # BLD AUTO: 0.08 K/UL
EOSINOPHIL NFR BLD AUTO: 2.8 %
HCT VFR BLD CALC: 40.9 %
HGB BLD-MCNC: 14.3 G/DL
IMM GRANULOCYTES NFR BLD AUTO: 0.7 %
LYMPHOCYTES # BLD AUTO: 1.35 K/UL
LYMPHOCYTES NFR BLD AUTO: 47.4 %
MAN DIFF?: NORMAL
MCHC RBC-ENTMCNC: 31 PG
MCHC RBC-ENTMCNC: 35 GM/DL
MCV RBC AUTO: 88.7 FL
MONOCYTES # BLD AUTO: 0.37 K/UL
MONOCYTES NFR BLD AUTO: 13 %
NEUTROPHILS # BLD AUTO: 1 K/UL
NEUTROPHILS NFR BLD AUTO: 35 %
PLATELET # BLD AUTO: 178 K/UL
RBC # BLD: 4.61 M/UL
RBC # FLD: 14 %
WBC # FLD AUTO: 2.85 K/UL

## 2023-02-14 ENCOUNTER — APPOINTMENT (OUTPATIENT)
Dept: HEPATOLOGY | Facility: CLINIC | Age: 33
End: 2023-02-14
Payer: MEDICAID

## 2023-02-14 VITALS
HEART RATE: 102 BPM | RESPIRATION RATE: 16 BRPM | TEMPERATURE: 98.5 F | WEIGHT: 141 LBS | SYSTOLIC BLOOD PRESSURE: 137 MMHG | HEIGHT: 64 IN | BODY MASS INDEX: 24.07 KG/M2 | DIASTOLIC BLOOD PRESSURE: 85 MMHG

## 2023-02-14 LAB
ALBUMIN SERPL ELPH-MCNC: 4.3 G/DL
ALP BLD-CCNC: 93 U/L
ALT SERPL-CCNC: 15 U/L
ANION GAP SERPL CALC-SCNC: 11 MMOL/L
AST SERPL-CCNC: 20 U/L
BASOPHILS # BLD AUTO: 0.04 K/UL
BASOPHILS NFR BLD AUTO: 1.2 %
BILIRUB SERPL-MCNC: 1 MG/DL
BUN SERPL-MCNC: 19 MG/DL
CALCIUM SERPL-MCNC: 9.5 MG/DL
CHLORIDE SERPL-SCNC: 104 MMOL/L
CO2 SERPL-SCNC: 26 MMOL/L
CREAT SERPL-MCNC: 1.01 MG/DL
EGFR: 103 ML/MIN/1.73M2
EOSINOPHIL # BLD AUTO: 0.11 K/UL
EOSINOPHIL NFR BLD AUTO: 3.4 %
GLUCOSE SERPL-MCNC: 98 MG/DL
HCT VFR BLD CALC: 41.7 %
HGB BLD-MCNC: 13.6 G/DL
IMM GRANULOCYTES NFR BLD AUTO: 0.6 %
INR PPP: 1.13 RATIO
LYMPHOCYTES # BLD AUTO: 1.57 K/UL
LYMPHOCYTES NFR BLD AUTO: 48.5 %
MAN DIFF?: NORMAL
MCHC RBC-ENTMCNC: 29.6 PG
MCHC RBC-ENTMCNC: 32.6 GM/DL
MCV RBC AUTO: 90.7 FL
MONOCYTES # BLD AUTO: 0.43 K/UL
MONOCYTES NFR BLD AUTO: 13.3 %
NEUTROPHILS # BLD AUTO: 1.07 K/UL
NEUTROPHILS NFR BLD AUTO: 33 %
PLATELET # BLD AUTO: 190 K/UL
POTASSIUM SERPL-SCNC: 4.7 MMOL/L
PROT SERPL-MCNC: 6.7 G/DL
PT BLD: 13.1 SEC
RBC # BLD: 4.6 M/UL
RBC # FLD: 14.1 %
SODIUM SERPL-SCNC: 141 MMOL/L
TACROLIMUS SERPL-MCNC: 5.7 NG/ML
WBC # FLD AUTO: 3.24 K/UL

## 2023-02-14 PROCEDURE — 99214 OFFICE O/P EST MOD 30 MIN: CPT

## 2023-02-15 NOTE — HISTORY OF PRESENT ILLNESS
[FreeTextEntry1] : Mr. SAUMYA ZARATE a 32 year male  presents today for  a hepatology appointment. 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  \par \par Patient currently status post orthotopic liver transplantation on November 1, 2022 with a Velasquez-en-Y hepaticojejunostomy for bile duct anastomosis. Postoperative course was complicated with bile leak at hepaticojejunostomy requiring revision of biliary anastomosis/Velasquez-en-Y on November 7.  \par \par He was last seen by transplant surgery team on January 11, 2023 and returns for his 3-month transition visit to hepatology today.  He reports he has been doing well.  He denies any nausea, vomiting, abdominal pain, fever, chills, rigor.  He also reported an episode of hematuria without associated abdominal or flank pain.  This has now resolved.  Patient did undergo an urine analysis along with urine culture who did reveal large blood in the urine.  Culture was negative for active infection.\par \par His CBC revealed a white cell count of 3.73, hemoglobin 13.5 g/dL, platelet count 190,000.  Excellent allograft function with total bilirubin 0.6 mg/dL, AST 19, ALT 15, alkaline phosphatase 92 units/L.  Serum creatinine 0.96 mg/dL.  Electrolytes within normal range.  Glucose was mildly elevated at 112 mg/dL.  Magnesium was 1.8 mg/dL.  GGT 19 units/L.  Tacrolimus level 6.9 ng/mL.  CMV PCR not detected.\par \par Patient currently remains on Prograf 2 mg in the morning and 1 mg in the evening, MMF 1 g twice daily and prednisone 5 mg daily.  In addition he remains on prophylaxis with Valcyte 900 mg daily (CMV high risk), Bactrim, ASA 81. \par \par Interval Hx dated Feb 14, 2023\par \par Patient returns for post hospitalization follow up He was admitted in the interval with hematuria. Had a CT scan that showed- renal stone. He is scheduled for cystocopy Feb 24th. MgO has been d/juju.

## 2023-02-15 NOTE — PHYSICAL EXAM
[Alert] : alert [Healthy Appearing] : healthy appearing [No Acute Distress] : no acute distress [EOMI] : extra ocular movement intact [PERRLA] : pupils equal, round, reactive to light and accomodation [Supple] : the neck was supple [Full ROM] : full range of motion [No Lymphadenopathy] : no lymphadenopathy [No Thyromegaly] : no thyromegaly [No Abnormal Masses] : no abnormal masses [Clear to Auscultation] : lungs were clear to auscultation bilaterally [Breathing Comfortably on room air] : breathing comfortably on room air [Normal Rate] : normal rate [Regular Rhythm] : regular rhythm [Soft] : soft [Normal Bowel Sounds] : normal bowel sounds [No Edema] : no edema [No Skin Discoloration] : no skin discoloration [No Ulcers] : no ulcers [Strength in Tact] : strength in tact [No Rash] : no rash [Scleral Icterus] : no scleral icterus [Hepatojugular Reflux] : no hepatojugular reflux [Abdominal Bruit] : no abdominal bruit [Ascites Fluid Wave] : no ascites fluid wave [Splenomegaly] : no splenomegaly [Spider Angioma] : no spider angioma [Jaundice] : no jaundice [Palmar Erythema] : no palmar erythema [Asterixis] : no asterixis [Hepatic Encephalopathy] : no hepatic encephalopathy

## 2023-02-15 NOTE — ASSESSMENT
[FreeTextEntry1] : Mr. 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 Postoperative course was complicated with bile leak at hepaticojejunostomy requiring revision of biliary anastomosis/Velasquez-en-Y on November 7.  He returns for a follow-up visit and transition his care from transplant surgery team to hepatology.  He has excellent allograft function.  His only complaint is an episode of hematuria which was investigated recently with an urine analysis and culture which revealed large blood but without any infection.  He was admitted in hospital due to recurrent hematuria, CT scan shows renal sone, awaiting cystoscopy.\par \par PLAN\par # Status post orthotopic liver transplantation\par Patient with excellent allograft function.  He will continue with current immunosuppression regimen that will include Prograf 2 mg in the morning and 1 mg the evening, MMF 1 g twice daily and prednisone 5 mg daily.  His most recent FK level is 5.7 ng/mL.  This is acceptable.\par \par #Post transplant prophylaxis\par Recommended him to continue with Valcyte 900 mg daily (CMV high risk).  In addition he will continue with Bactrim DS 1 tablet daily and aspirin 81 mg daily.\par \par #Hematuria\par Patient had episodes of hematuria which is currently resolved.  Urine analysis is negative for infection.  Patient denies any abdominal pain or flank pain or urethral pain.  Abdominal CT scan suggest renal stone. Cystocopy Feb 24th fir further evaluation and management.\par \par Return to hepatology clinic in about 1 month.  He will have follow-up labs in about 2 weeks.

## 2023-02-24 ENCOUNTER — APPOINTMENT (OUTPATIENT)
Dept: UROLOGY | Facility: CLINIC | Age: 33
End: 2023-02-24
Payer: MEDICAID

## 2023-02-24 ENCOUNTER — APPOINTMENT (OUTPATIENT)
Dept: UROLOGY | Facility: CLINIC | Age: 33
End: 2023-02-24

## 2023-02-24 ENCOUNTER — OUTPATIENT (OUTPATIENT)
Dept: OUTPATIENT SERVICES | Facility: HOSPITAL | Age: 33
LOS: 1 days | End: 2023-02-24
Payer: MEDICAID

## 2023-02-24 VITALS
TEMPERATURE: 97.2 F | HEART RATE: 68 BPM | RESPIRATION RATE: 16 BRPM | DIASTOLIC BLOOD PRESSURE: 78 MMHG | SYSTOLIC BLOOD PRESSURE: 137 MMHG

## 2023-02-24 DIAGNOSIS — Z98.890 OTHER SPECIFIED POSTPROCEDURAL STATES: Chronic | ICD-10-CM

## 2023-02-24 DIAGNOSIS — K08.409 PARTIAL LOSS OF TEETH, UNSPECIFIED CAUSE, UNSPECIFIED CLASS: Chronic | ICD-10-CM

## 2023-02-24 PROCEDURE — 52000 CYSTOURETHROSCOPY: CPT

## 2023-02-27 DIAGNOSIS — R31.0 GROSS HEMATURIA: ICD-10-CM

## 2023-03-01 ENCOUNTER — NON-APPOINTMENT (OUTPATIENT)
Age: 33
End: 2023-03-01

## 2023-03-01 LAB
ALBUMIN SERPL ELPH-MCNC: 4.2 G/DL
ALP BLD-CCNC: 99 U/L
ALT SERPL-CCNC: 13 U/L
ANION GAP SERPL CALC-SCNC: 13 MMOL/L
AST SERPL-CCNC: 15 U/L
BASOPHILS # BLD AUTO: 0.05 K/UL
BASOPHILS NFR BLD AUTO: 1.3 %
BILIRUB SERPL-MCNC: 0.6 MG/DL
BUN SERPL-MCNC: 14 MG/DL
CALCIUM SERPL-MCNC: 9.4 MG/DL
CHLORIDE SERPL-SCNC: 106 MMOL/L
CO2 SERPL-SCNC: 22 MMOL/L
CREAT SERPL-MCNC: 1.01 MG/DL
EGFR: 103 ML/MIN/1.73M2
EOSINOPHIL # BLD AUTO: 0.06 K/UL
EOSINOPHIL NFR BLD AUTO: 1.6 %
GGT SERPL-CCNC: 17 U/L
GLUCOSE SERPL-MCNC: 96 MG/DL
HCT VFR BLD CALC: 41.5 %
HGB BLD-MCNC: 13.9 G/DL
IMM GRANULOCYTES NFR BLD AUTO: 0.8 %
INR PPP: 1.08 RATIO
LYMPHOCYTES # BLD AUTO: 2 K/UL
LYMPHOCYTES NFR BLD AUTO: 52.2 %
MAGNESIUM SERPL-MCNC: 1.9 MG/DL
MAN DIFF?: NORMAL
MCHC RBC-ENTMCNC: 29.6 PG
MCHC RBC-ENTMCNC: 33.5 GM/DL
MCV RBC AUTO: 88.5 FL
MONOCYTES # BLD AUTO: 0.34 K/UL
MONOCYTES NFR BLD AUTO: 8.9 %
NEUTROPHILS # BLD AUTO: 1.35 K/UL
NEUTROPHILS NFR BLD AUTO: 35.2 %
PHOSPHATE SERPL-MCNC: 4 MG/DL
PLATELET # BLD AUTO: 229 K/UL
POTASSIUM SERPL-SCNC: 4.2 MMOL/L
PROT SERPL-MCNC: 6.6 G/DL
PT BLD: 12.7 SEC
RBC # BLD: 4.69 M/UL
RBC # FLD: 14.6 %
SODIUM SERPL-SCNC: 141 MMOL/L
TACROLIMUS SERPL-MCNC: 6.5 NG/ML
WBC # FLD AUTO: 3.83 K/UL

## 2023-03-02 LAB
CMV DNA SPEC QL NAA+PROBE: NOT DETECTED IU/ML
CMVPCR LOG: NOT DETECTED LOG10IU/ML

## 2023-03-14 LAB
ALBUMIN SERPL ELPH-MCNC: 4.4 G/DL
ALP BLD-CCNC: 91 U/L
ALT SERPL-CCNC: 13 U/L
ANION GAP SERPL CALC-SCNC: 14 MMOL/L
AST SERPL-CCNC: 19 U/L
BASOPHILS # BLD AUTO: 0.02 K/UL
BASOPHILS NFR BLD AUTO: 0.6 %
BILIRUB SERPL-MCNC: 0.8 MG/DL
BUN SERPL-MCNC: 22 MG/DL
CALCIUM SERPL-MCNC: 9.4 MG/DL
CHLORIDE SERPL-SCNC: 103 MMOL/L
CO2 SERPL-SCNC: 25 MMOL/L
CREAT SERPL-MCNC: 1.03 MG/DL
EGFR: 100 ML/MIN/1.73M2
EOSINOPHIL # BLD AUTO: 0.1 K/UL
EOSINOPHIL NFR BLD AUTO: 2.8 %
GGT SERPL-CCNC: 17 U/L
GLUCOSE SERPL-MCNC: 92 MG/DL
HCT VFR BLD CALC: 43.5 %
HGB BLD-MCNC: 14.6 G/DL
IMM GRANULOCYTES NFR BLD AUTO: 1.1 %
INR PPP: 1.14 RATIO
LYMPHOCYTES # BLD AUTO: 1.91 K/UL
LYMPHOCYTES NFR BLD AUTO: 54.3 %
MAGNESIUM SERPL-MCNC: 1.8 MG/DL
MAN DIFF?: NORMAL
MCHC RBC-ENTMCNC: 30.9 PG
MCHC RBC-ENTMCNC: 33.6 GM/DL
MCV RBC AUTO: 92 FL
MONOCYTES # BLD AUTO: 0.35 K/UL
MONOCYTES NFR BLD AUTO: 9.9 %
NEUTROPHILS # BLD AUTO: 1.1 K/UL
NEUTROPHILS NFR BLD AUTO: 31.3 %
PHOSPHATE SERPL-MCNC: 3.5 MG/DL
PLATELET # BLD AUTO: 182 K/UL
POTASSIUM SERPL-SCNC: 4 MMOL/L
PROT SERPL-MCNC: 6.9 G/DL
PT BLD: 13.2 SEC
RBC # BLD: 4.73 M/UL
RBC # FLD: 14.7 %
SODIUM SERPL-SCNC: 141 MMOL/L
WBC # FLD AUTO: 3.52 K/UL

## 2023-03-15 ENCOUNTER — APPOINTMENT (OUTPATIENT)
Dept: HEPATOLOGY | Facility: CLINIC | Age: 33
End: 2023-03-15
Payer: MEDICAID

## 2023-03-15 VITALS
BODY MASS INDEX: 23.22 KG/M2 | HEART RATE: 103 BPM | HEIGHT: 64 IN | RESPIRATION RATE: 16 BRPM | DIASTOLIC BLOOD PRESSURE: 78 MMHG | WEIGHT: 136 LBS | SYSTOLIC BLOOD PRESSURE: 139 MMHG | OXYGEN SATURATION: 99 %

## 2023-03-15 LAB
CMV DNA SPEC QL NAA+PROBE: NOT DETECTED IU/ML
CMVPCR LOG: NOT DETECTED LOG10IU/ML
TACROLIMUS SERPL-MCNC: 6.8 NG/ML

## 2023-03-15 PROCEDURE — 99214 OFFICE O/P EST MOD 30 MIN: CPT

## 2023-03-15 NOTE — ED ADULT NURSE NOTE - PAIN RATING/NUMBER SCALE (0-10): ACTIVITY
Apixaban/Eliquis - Compliance/Apixaban/Eliquis - Dietary Advice/Apixaban/Eliquis - Follow up monitoring/Apixaban/Eliquis - Potential for adverse drug reactions and interactions
8

## 2023-03-15 NOTE — PHYSICAL EXAM
[Alert] : alert [Healthy Appearing] : healthy appearing [No Acute Distress] : no acute distress [EOMI] : extra ocular movement intact [PERRLA] : pupils equal, round, reactive to light and accomodation [Supple] : the neck was supple [Full ROM] : full range of motion [No Lymphadenopathy] : no lymphadenopathy [No Thyromegaly] : no thyromegaly [No Abnormal Masses] : no abnormal masses [Clear to Auscultation] : lungs were clear to auscultation bilaterally [Breathing Comfortably on room air] : breathing comfortably on room air [Normal Rate] : normal rate [Regular Rhythm] : regular rhythm [Soft] : soft [Normal Bowel Sounds] : normal bowel sounds [No Edema] : no edema [No Skin Discoloration] : no skin discoloration [No Ulcers] : no ulcers [Strength in Tact] : strength in tact [No Rash] : no rash [General Appearance - Alert] : alert [General Appearance - In No Acute Distress] : in no acute distress [Auscultation Breath Sounds / Voice Sounds] : lungs were clear to auscultation bilaterally [Heart Rate And Rhythm] : heart rate was normal and rhythm regular [Heart Sounds] : normal S1 and S2 [Heart Sounds Gallop] : no gallops [Murmurs] : no murmurs [Heart Sounds Pericardial Friction Rub] : no pericardial rub [Bowel Sounds] : normal bowel sounds [Abdomen Soft] : soft [Abdomen Tenderness] : non-tender [] : no hepato-splenomegaly [Abdomen Mass (___ Cm)] : no abdominal mass palpated [No CVA Tenderness] : no ~M costovertebral angle tenderness [No Spinal Tenderness] : no spinal tenderness [Abnormal Walk] : normal gait [Nail Clubbing] : no clubbing  or cyanosis of the fingernails [Musculoskeletal - Swelling] : no joint swelling seen [Motor Tone] : muscle strength and tone were normal [Deep Tendon Reflexes (DTR)] : deep tendon reflexes were 2+ and symmetric [Sensation] : the sensory exam was normal to light touch and pinprick [No Focal Deficits] : no focal deficits [Oriented To Time, Place, And Person] : oriented to person, place, and time [Impaired Insight] : insight and judgment were intact [Affect] : the affect was normal [Abdominal  Ascites] : no ascites [Scleral Icterus] : no scleral icterus [Hepatojugular Reflux] : no hepatojugular reflux [Abdominal Bruit] : no abdominal bruit [Ascites Fluid Wave] : no ascites fluid wave [Splenomegaly] : no splenomegaly [Spider Angioma] : no spider angioma [Jaundice] : no jaundice [Palmar Erythema] : no palmar erythema [Asterixis] : no asterixis [Hepatic Encephalopathy] : no hepatic encephalopathy

## 2023-03-15 NOTE — ASSESSMENT
[FreeTextEntry1] : Mr. 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 Postoperative course was complicated with bile leak at hepaticojejunostomy requiring revision of biliary anastomosis/Velasquez-en-Y on November 7.  He returns for a follow-up visit and transition his care from transplant surgery team to hepatology.  He has excellent allograft function.  His only complaint is an episode of hematuria which was investigated recently with an urine analysis and culture which revealed large blood but without any infection.  He was admitted in hospital due to recurrent hematuria, CT scan shows renal sone, awaiting cystoscopy.\par \par PLAN\par # Status post orthotopic liver transplantation\par Patient with excellent allograft function.  He will continue with current immunosuppression regimen that will include Prograf 2 mg in the morning and 1 mg the evening, MMF 1 g twice daily and prednisone 5 mg daily.  His most recent FK level is 6.8 ng/mL.  This is acceptable.\par \par #Post transplant prophylaxis\par Recommended him to continue with Valcyte 900 mg daily (CMV high risk).  In addition he will continue with Bactrim DS 1 tablet daily and aspirin 81 mg daily.\par \par #Hematuria\par Patient had episodes of hematuria - patient is currently following with Urology. Cytoscopy was negative. Patient is pending Renal US. \par \par Return to hepatology clinic in about 1 month.  He will have follow-up labs in about 2 weeks.

## 2023-03-15 NOTE — HISTORY OF PRESENT ILLNESS
[FreeTextEntry1] : Mr. SAUMYA ZARATE a 32 year male  presents today for  a hepatology appointment. 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  \par \par Patient currently status post orthotopic liver transplantation on November 1, 2022 with a Velasquez-en-Y hepaticojejunostomy for bile duct anastomosis. Postoperative course was complicated with bile leak at hepaticojejunostomy requiring revision of biliary anastomosis/Velasquez-en-Y on November 7.  \par \par He was last seen by transplant surgery team on January 11, 2023 and returns for his 3-month transition visit to hepatology today.  He reports he has been doing well.  He denies any nausea, vomiting, abdominal pain, fever, chills, rigor.  He also reported an episode of hematuria without associated abdominal or flank pain.  This has now resolved.  Patient did undergo an urine analysis along with urine culture who did reveal large blood in the urine.  Culture was negative for active infection.\par \par His CBC revealed a white cell count of 3.73, hemoglobin 13.5 g/dL, platelet count 190,000.  Excellent allograft function with total bilirubin 0.6 mg/dL, AST 19, ALT 15, alkaline phosphatase 92 units/L.  Serum creatinine 0.96 mg/dL.  Electrolytes within normal range.  Glucose was mildly elevated at 112 mg/dL.  Magnesium was 1.8 mg/dL.  GGT 19 units/L.  Tacrolimus level 6.9 ng/mL.  CMV PCR not detected.\par \par Patient currently remains on Prograf 2 mg in the morning and 1 mg in the evening, MMF 1 g twice daily and prednisone 5 mg daily.  In addition he remains on prophylaxis with Valcyte 900 mg daily (CMV high risk), Bactrim, ASA 81. \par \par Interval Hx dated Feb 14, 2023\par \par Patient returns for post hospitalization follow up He was admitted in the interval with hematuria. Had a CT scan that showed- renal stone. He is scheduled for cystocopy Feb 24th. MgO has been d/juju. \par \par Interval History dated March 15, 2023:\par \par Patient returns for follow-up to hepatology. He reports feeling well. He followed up with Urology and cystoscopy were benign for findings r/t patient's c/o urinary bleeding. He is pending Renal US. He denies abdominal pain, N/V/C/D. \par Reviewed labs from 3/14/2023: CBC unremarkable. CMP unremarkable. LFTs normal. Tacro: 6.8

## 2023-03-15 NOTE — END OF VISIT
[FreeTextEntry3] : I saw and evaluated the patient with NP Finn.  I discussed the care of this patient with the NP providing the service, and was directly responsible for the patient's management. My discussion with the NP included the patient's history, physical exam, laboratory findings, and medical decision-making. I agree with the documented findings and plan of care of the NP's note. Spent > 30 minutes collectively in reviewing records, performing patient history and physical examination, and formulating recommendations/plan of care.\par

## 2023-03-21 ENCOUNTER — OUTPATIENT (OUTPATIENT)
Dept: OUTPATIENT SERVICES | Facility: HOSPITAL | Age: 33
LOS: 1 days | End: 2023-03-21
Payer: MEDICAID

## 2023-03-21 ENCOUNTER — APPOINTMENT (OUTPATIENT)
Dept: UROLOGY | Facility: CLINIC | Age: 33
End: 2023-03-21
Payer: MEDICAID

## 2023-03-21 DIAGNOSIS — Z98.890 OTHER SPECIFIED POSTPROCEDURAL STATES: Chronic | ICD-10-CM

## 2023-03-21 DIAGNOSIS — K08.409 PARTIAL LOSS OF TEETH, UNSPECIFIED CAUSE, UNSPECIFIED CLASS: Chronic | ICD-10-CM

## 2023-03-21 DIAGNOSIS — R35.0 FREQUENCY OF MICTURITION: ICD-10-CM

## 2023-03-21 PROCEDURE — 99212 OFFICE O/P EST SF 10 MIN: CPT | Mod: 25

## 2023-03-21 PROCEDURE — 76775 US EXAM ABDO BACK WALL LIM: CPT | Mod: 26

## 2023-03-21 PROCEDURE — 76775 US EXAM ABDO BACK WALL LIM: CPT

## 2023-03-21 NOTE — ASSESSMENT
[FreeTextEntry1] : has small asymptomatic stone - unclear if related to the hematuria.\par ad a negative work up as expected given age etc - will observe fir now

## 2023-03-21 NOTE — HISTORY OF PRESENT ILLNESS
[FreeTextEntry1] : had been referred for h/o painless gross hematuria. \par Had a cystoscopy which was negative.\par had a prior non contrast CT scan noting small renal stone.\par reports one episode gross hematuria since the cystoscopy.\par \par ULS today notes normal parenchyma with no cysts or hydro: stone sen as befoe

## 2023-03-22 ENCOUNTER — NON-APPOINTMENT (OUTPATIENT)
Age: 33
End: 2023-03-22

## 2023-03-22 DIAGNOSIS — R31.0 GROSS HEMATURIA: ICD-10-CM

## 2023-04-02 LAB
ALBUMIN SERPL ELPH-MCNC: 4.5 G/DL
ALP BLD-CCNC: 98 U/L
ALT SERPL-CCNC: 13 U/L
ANION GAP SERPL CALC-SCNC: 13 MMOL/L
AST SERPL-CCNC: 19 U/L
BASOPHILS # BLD AUTO: 0.02 K/UL
BASOPHILS NFR BLD AUTO: 0.6 %
BILIRUB SERPL-MCNC: 1.2 MG/DL
BUN SERPL-MCNC: 23 MG/DL
CALCIUM SERPL-MCNC: 9.9 MG/DL
CHLORIDE SERPL-SCNC: 103 MMOL/L
CMV DNA SPEC QL NAA+PROBE: NOT DETECTED IU/ML
CMVPCR LOG: NOT DETECTED LOG10IU/ML
CO2 SERPL-SCNC: 24 MMOL/L
CREAT SERPL-MCNC: 1.36 MG/DL
EGFR: 72 ML/MIN/1.73M2
EOSINOPHIL # BLD AUTO: 0.07 K/UL
EOSINOPHIL NFR BLD AUTO: 2.3 %
GGT SERPL-CCNC: 18 U/L
GLUCOSE SERPL-MCNC: 104 MG/DL
HCT VFR BLD CALC: 40.7 %
HGB BLD-MCNC: 13.8 G/DL
IMM GRANULOCYTES NFR BLD AUTO: 1.9 %
INR PPP: 1.1 RATIO
LYMPHOCYTES # BLD AUTO: 1.45 K/UL
LYMPHOCYTES NFR BLD AUTO: 46.9 %
MAGNESIUM SERPL-MCNC: 1.7 MG/DL
MAN DIFF?: NORMAL
MCHC RBC-ENTMCNC: 30.9 PG
MCHC RBC-ENTMCNC: 33.9 GM/DL
MCV RBC AUTO: 91.1 FL
MONOCYTES # BLD AUTO: 0.38 K/UL
MONOCYTES NFR BLD AUTO: 12.3 %
NEUTROPHILS # BLD AUTO: 1.11 K/UL
NEUTROPHILS NFR BLD AUTO: 36 %
PHOSPHATE SERPL-MCNC: 3.5 MG/DL
PLATELET # BLD AUTO: 203 K/UL
POTASSIUM SERPL-SCNC: 4.3 MMOL/L
PROT SERPL-MCNC: 7 G/DL
PT BLD: 13 SEC
RBC # BLD: 4.47 M/UL
RBC # FLD: 14.7 %
SODIUM SERPL-SCNC: 141 MMOL/L
TACROLIMUS SERPL-MCNC: 6.1 NG/ML
WBC # FLD AUTO: 3.09 K/UL

## 2023-04-09 ENCOUNTER — LABORATORY RESULT (OUTPATIENT)
Age: 33
End: 2023-04-09

## 2023-04-18 ENCOUNTER — APPOINTMENT (OUTPATIENT)
Dept: HEPATOLOGY | Facility: CLINIC | Age: 33
End: 2023-04-18
Payer: MEDICAID

## 2023-04-18 VITALS
BODY MASS INDEX: 23.22 KG/M2 | SYSTOLIC BLOOD PRESSURE: 126 MMHG | HEIGHT: 64 IN | RESPIRATION RATE: 16 BRPM | DIASTOLIC BLOOD PRESSURE: 74 MMHG | OXYGEN SATURATION: 96 % | TEMPERATURE: 98 F | WEIGHT: 136 LBS | HEART RATE: 133 BPM

## 2023-04-18 VITALS — HEART RATE: 94 BPM

## 2023-04-18 LAB
ALBUMIN SERPL ELPH-MCNC: 4.6 G/DL
ALP BLD-CCNC: 93 U/L
ALT SERPL-CCNC: 9 U/L
ANION GAP SERPL CALC-SCNC: 13 MMOL/L
AST SERPL-CCNC: 19 U/L
BASOPHILS # BLD AUTO: 0.06 K/UL
BASOPHILS NFR BLD AUTO: 2.6 %
BILIRUB SERPL-MCNC: 1.1 MG/DL
BUN SERPL-MCNC: 20 MG/DL
CALCIUM SERPL-MCNC: 9.6 MG/DL
CHLORIDE SERPL-SCNC: 105 MMOL/L
CMV DNA SPEC QL NAA+PROBE: NOT DETECTED IU/ML
CMVPCR LOG: NOT DETECTED LOG10IU/ML
CO2 SERPL-SCNC: 25 MMOL/L
CREAT SERPL-MCNC: 1.07 MG/DL
EGFR: 96 ML/MIN/1.73M2
EOSINOPHIL # BLD AUTO: 0.02 K/UL
EOSINOPHIL NFR BLD AUTO: 0.9 %
GGT SERPL-CCNC: 18 U/L
GLUCOSE SERPL-MCNC: 98 MG/DL
HCT VFR BLD CALC: 41.6 %
HGB BLD-MCNC: 13.6 G/DL
INR PPP: 1.07 RATIO
LYMPHOCYTES # BLD AUTO: 1.13 K/UL
LYMPHOCYTES NFR BLD AUTO: 48.7 %
MAGNESIUM SERPL-MCNC: 1.8 MG/DL
MAN DIFF?: NORMAL
MCHC RBC-ENTMCNC: 30.8 PG
MCHC RBC-ENTMCNC: 32.7 GM/DL
MCV RBC AUTO: 94.1 FL
MONOCYTES # BLD AUTO: 0.26 K/UL
MONOCYTES NFR BLD AUTO: 11.3 %
NEUTROPHILS # BLD AUTO: 0.85 K/UL
NEUTROPHILS NFR BLD AUTO: 36.5 %
PHOSPHATE SERPL-MCNC: 3.5 MG/DL
PLATELET # BLD AUTO: 208 K/UL
POTASSIUM SERPL-SCNC: 3.9 MMOL/L
PROT SERPL-MCNC: 7 G/DL
PT BLD: 12.5 SEC
RBC # BLD: 4.42 M/UL
RBC # FLD: 14.8 %
SODIUM SERPL-SCNC: 143 MMOL/L
TACROLIMUS SERPL-MCNC: 5.4 NG/ML
WBC # FLD AUTO: 2.32 K/UL

## 2023-04-18 PROCEDURE — 99214 OFFICE O/P EST MOD 30 MIN: CPT

## 2023-04-18 NOTE — HISTORY OF PRESENT ILLNESS
[FreeTextEntry1] : Mr. SAUMYA ZARATE a 32-year-old male who presented to hepatology for a follow up. 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 He had COVID in September 2022, and he received monoclonal antibodies. He underwent an orthotopic liver transplantation on November 1, 2022, with a Velasquez-en-Y hepaticojejunostomy for bile duct anastomosis. However, he had a bile leak at hepaticojejunostomy, which required revision of biliary anastomosis/Velasquez-en-Y on November 7.\par \par In the interval history dated Feb 14, 2023, the patient was admitted with hematuria and diagnosed with a renal stone. Urine analysis revealed large blood in urine, but the culture was negative for active infection. He was seen by urologist, Deo Bains,  had cystoscopy that was negative. Prior non-contrast CT showed a small renal stone. \par \par He remains on Prograf 2 mg in the morning and 1 mg in the evening, MMF 1 g twice daily, and prednisone 5 mg daily. In addition, he remains on prophylaxis with Valcyte 900 mg daily (CMV high risk), Bactrim, ASA 81.\par \par Today on his follow-up visit he denies abdominal pain, nausea, vomiting, constipation, and diarrhea.  We reviewed recent laboratory test results from April 9, 2023 which revealed excellent allograft function.  Normal renal function.  His tacrolimus level was 5.4 ng/mL.\par \par \par \par  [de-identified] : Mr. SAUMYA ZARATE a 32-year-old male who presented to hepatology for a follow up. 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 He had COVID in September 2022, and he received monoclonal antibodies. He underwent an orthotopic liver transplantation on November 1, 2022, with a Velasquez-en-Y hepaticojejunostomy for bile duct anastomosis. However, he had a bile leak at hepaticojejunostomy, which required revision of biliary anastomosis/Velasquez-en-Y on November 7.\par \par In the interval history dated Feb 14, 2023, the patient was admitted with hematuria and diagnosed with a renal stone. Urine analysis revealed large blood in urine, but the culture was negative for active infection. He was seen by urologist, Deo Bains,  had cystoscopy that was negative. Prior non-contrast CT showed a small renal stone. \par \par He remains on Prograf 1.5 mg in the morning and 1 mg in the evening, MMF 1 g twice daily, and prednisone 5 mg daily. In addition, he remains on prophylaxis with Valcyte 900 mg daily (CMV high risk), Bactrim, ASA 81.\par \par Today on his follow-up visit he denies abdominal pain, nausea, vomiting, constipation, and diarrhea.  We reviewed recent laboratory test results from April 9, 2023 which revealed excellent allograft function.  Normal renal function.  His tacrolimus level was 5.4 ng/mL.\par

## 2023-04-18 NOTE — PHYSICAL EXAM
[General Appearance - Alert] : alert [General Appearance - In No Acute Distress] : in no acute distress [Auscultation Breath Sounds / Voice Sounds] : lungs were clear to auscultation bilaterally [Heart Rate And Rhythm] : heart rate was normal and rhythm regular [Heart Sounds] : normal S1 and S2 [Heart Sounds Gallop] : no gallops [Murmurs] : no murmurs [Heart Sounds Pericardial Friction Rub] : no pericardial rub [Bowel Sounds] : normal bowel sounds [Abdomen Soft] : soft [Abdomen Tenderness] : non-tender [Abdomen Mass (___ Cm)] : no abdominal mass palpated [No CVA Tenderness] : no ~M costovertebral angle tenderness [No Spinal Tenderness] : no spinal tenderness [Abnormal Walk] : normal gait [Nail Clubbing] : no clubbing  or cyanosis of the fingernails [Musculoskeletal - Swelling] : no joint swelling seen [Motor Tone] : muscle strength and tone were normal [Deep Tendon Reflexes (DTR)] : deep tendon reflexes were 2+ and symmetric [Sensation] : the sensory exam was normal to light touch and pinprick [No Focal Deficits] : no focal deficits [Oriented To Time, Place, And Person] : oriented to person, place, and time [Impaired Insight] : insight and judgment were intact [Affect] : the affect was normal [Alert] : alert [Healthy Appearing] : healthy appearing [No Acute Distress] : no acute distress [EOMI] : extra ocular movement intact [PERRLA] : pupils equal, round, reactive to light and accomodation [Supple] : the neck was supple [Full ROM] : full range of motion [No Lymphadenopathy] : no lymphadenopathy [No Thyromegaly] : no thyromegaly [No Abnormal Masses] : no abnormal masses [Clear to Auscultation] : lungs were clear to auscultation bilaterally [Breathing Comfortably on room air] : breathing comfortably on room air [Normal Rate] : normal rate [Regular Rhythm] : regular rhythm [Soft] : soft [Normal Bowel Sounds] : normal bowel sounds [No Edema] : no edema [No Skin Discoloration] : no skin discoloration [No Ulcers] : no ulcers [Strength in Tact] : strength in tact [No Rash] : no rash [Skin Color & Pigmentation] : normal skin color and pigmentation [Skin Turgor] : normal skin turgor [] : no rash [Abdominal  Ascites] : no ascites [FreeTextEntry1] : Healing surgical scar [Scleral Icterus] : no scleral icterus [Hepatojugular Reflux] : no hepatojugular reflux [Abdominal Bruit] : no abdominal bruit [Ascites Fluid Wave] : no ascites fluid wave [Splenomegaly] : no splenomegaly [Spider Angioma] : no spider angioma [Jaundice] : no jaundice [Palmar Erythema] : no palmar erythema [Asterixis] : no asterixis [Hepatic Encephalopathy] : no hepatic encephalopathy

## 2023-04-18 NOTE — ASSESSMENT
[FreeTextEntry1] : Mr. 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 Postoperative course was complicated with bile leak at hepaticojejunostomy requiring revision of biliary anastomosis/Velasquez-en-Y on November 7.   He has excellent allograft function.  Overall doing well. No recurrent hematuria.\par \par PLAN\par # Status post orthotopic liver transplantation\par Patient with excellent allograft function.  He will continue with current immunosuppression regimen that will include Prograf 1.5 mg in the morning and 1 mg the evening, MMF 1 g twice daily and prednisone 5 mg daily.  His most recent FK level is 5.4 ng/mL.  This is acceptable.\par \par #Post transplant prophylaxis\par Recommended him to continue aspirin 81 mg daily.\par Recommended him to decrease Valcyte to 450 mg daily and discontinue Bactrim at 6 month post transplant on May 1, 2023.\par \par #Hematuria\par Patient denies any recent episodes of hematuria - patient is currently following with Urology. Likley related to small renal stone ( seen on prior non-Con CT)\par Cystoscopy was negative. Renal US was negative.\par \par Return to hepatology clinic in about 1 month.  He will have follow-up labs in about 2 weeks.

## 2023-05-05 ENCOUNTER — TRANSCRIPTION ENCOUNTER (OUTPATIENT)
Age: 33
End: 2023-05-05

## 2023-05-05 LAB
ALBUMIN SERPL ELPH-MCNC: 4.3 G/DL
ALP BLD-CCNC: 82 U/L
ALT SERPL-CCNC: 11 U/L
ANION GAP SERPL CALC-SCNC: 12 MMOL/L
AST SERPL-CCNC: 17 U/L
BASOPHILS # BLD AUTO: 0.03 K/UL
BASOPHILS NFR BLD AUTO: 0.7 %
BILIRUB SERPL-MCNC: 1.1 MG/DL
BUN SERPL-MCNC: 25 MG/DL
CALCIUM SERPL-MCNC: 9.3 MG/DL
CHLORIDE SERPL-SCNC: 107 MMOL/L
CO2 SERPL-SCNC: 24 MMOL/L
CREAT SERPL-MCNC: 0.99 MG/DL
EGFR: 105 ML/MIN/1.73M2
EOSINOPHIL # BLD AUTO: 0.08 K/UL
EOSINOPHIL NFR BLD AUTO: 1.8 %
GLUCOSE SERPL-MCNC: 95 MG/DL
HCT VFR BLD CALC: 40.2 %
HGB BLD-MCNC: 13.3 G/DL
IMM GRANULOCYTES NFR BLD AUTO: 0.5 %
INR PPP: 1.12 RATIO
LYMPHOCYTES # BLD AUTO: 1.92 K/UL
LYMPHOCYTES NFR BLD AUTO: 43.4 %
MAGNESIUM SERPL-MCNC: 1.8 MG/DL
MAN DIFF?: NORMAL
MCHC RBC-ENTMCNC: 31.2 PG
MCHC RBC-ENTMCNC: 33.1 GM/DL
MCV RBC AUTO: 94.4 FL
MONOCYTES # BLD AUTO: 0.4 K/UL
MONOCYTES NFR BLD AUTO: 9 %
NEUTROPHILS # BLD AUTO: 1.97 K/UL
NEUTROPHILS NFR BLD AUTO: 44.6 %
PHOSPHATE SERPL-MCNC: 2.8 MG/DL
PLATELET # BLD AUTO: 209 K/UL
POTASSIUM SERPL-SCNC: 4.4 MMOL/L
PROT SERPL-MCNC: 6.6 G/DL
PT BLD: 13 SEC
RBC # BLD: 4.26 M/UL
RBC # FLD: 14.4 %
SODIUM SERPL-SCNC: 143 MMOL/L
TACROLIMUS SERPL-MCNC: 5.4 NG/ML
WBC # FLD AUTO: 4.42 K/UL

## 2023-05-09 RX ORDER — SULFAMETHOXAZOLE AND TRIMETHOPRIM 400; 80 MG/1; MG/1
400-80 TABLET ORAL
Qty: 30 | Refills: 11 | Status: DISCONTINUED | COMMUNITY
Start: 2022-11-15 | End: 2023-05-09

## 2023-05-12 NOTE — ED ADULT NURSE NOTE - COVID-19  TEST TYPE
MOLECULAR PCR Rhofade Counseling: Rhofade is a topical medication which can decrease superficial blood flow where applied. Side effects are uncommon and include stinging, redness and allergic reactions.

## 2023-05-23 ENCOUNTER — LABORATORY RESULT (OUTPATIENT)
Age: 33
End: 2023-05-23

## 2023-05-23 LAB
ALBUMIN SERPL ELPH-MCNC: 4.2 G/DL
ALP BLD-CCNC: 96 U/L
ALT SERPL-CCNC: 13 U/L
ANION GAP SERPL CALC-SCNC: 13 MMOL/L
AST SERPL-CCNC: 17 U/L
BILIRUB SERPL-MCNC: 0.8 MG/DL
BUN SERPL-MCNC: 23 MG/DL
CALCIUM SERPL-MCNC: 9.4 MG/DL
CHLORIDE SERPL-SCNC: 106 MMOL/L
CO2 SERPL-SCNC: 24 MMOL/L
CREAT SERPL-MCNC: 0.98 MG/DL
EGFR: 106 ML/MIN/1.73M2
GLUCOSE SERPL-MCNC: 94 MG/DL
INR PPP: 1.02 RATIO
MAGNESIUM SERPL-MCNC: 1.8 MG/DL
PHOSPHATE SERPL-MCNC: 3.5 MG/DL
POTASSIUM SERPL-SCNC: 4 MMOL/L
PROT SERPL-MCNC: 6.9 G/DL
PT BLD: 11.8 SEC
SODIUM SERPL-SCNC: 143 MMOL/L
TACROLIMUS SERPL-MCNC: 5.7 NG/ML

## 2023-05-24 ENCOUNTER — APPOINTMENT (OUTPATIENT)
Dept: HEPATOLOGY | Facility: CLINIC | Age: 33
End: 2023-05-24
Payer: MEDICAID

## 2023-05-24 VITALS
DIASTOLIC BLOOD PRESSURE: 81 MMHG | HEIGHT: 64 IN | OXYGEN SATURATION: 100 % | HEART RATE: 91 BPM | BODY MASS INDEX: 28.17 KG/M2 | SYSTOLIC BLOOD PRESSURE: 143 MMHG | RESPIRATION RATE: 16 BRPM | WEIGHT: 165 LBS

## 2023-05-24 PROCEDURE — 99214 OFFICE O/P EST MOD 30 MIN: CPT

## 2023-05-29 NOTE — HISTORY OF PRESENT ILLNESS
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  [FreeTextEntry1] : Mr. SAUMYA ZARATE a 32-year-old male who presented to hepatology for a follow up. 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 He had COVID in September 2022, and he received monoclonal antibodies. He underwent an orthotopic liver transplantation on November 1, 2022, with a Velasquez-en-Y hepaticojejunostomy for bile duct anastomosis. However, he had a bile leak at hepaticojejunostomy, which required revision of biliary anastomosis/Velasquez-en-Y on November 7.\par \par His post-op history has remained unremarkable, until Feb 2023 when the patient was admitted with hematuria and diagnosed with a renal stone. Urine analysis revealed large blood in urine, but the culture was negative for active infection. He was seen by urologist, Deo Bains, had cystoscopy that was negative. Prior non-contrast CT showed a small renal stone. No further intervention needed and the problem subsided.\par \par He remains on Prograf 1.5 mg in the morning and 1 mg in the evening, MMF 1 g twice daily, and prednisone 5 mg daily. In addition, he remains on prophylaxis with Valcyte 450 mg daily (CMV high risk), ASA 81.\par \par Today on his follow-up visit he denies abdominal pain, nausea, vomiting, constipation, and diarrhea. We reviewed recent laboratory test results from May 23, 2023 which revealed excellent allograft function. Normal renal function. His tacrolimus level was 5.7 ng/mL. He has plans to go on vacation with his family in 2 weeks - he has enough medication to take with him.

## 2023-05-29 NOTE — ASSESSMENT
[FreeTextEntry1] : Mr. 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 Postoperative course was complicated with bile leak at hepaticojejunostomy requiring revision of biliary anastomosis/Velasquez-en-Y on November 7. He has excellent allograft function. Overall doing well. No recurrent hematuria.\par \par \par PLAN\par I. Status post orthotopic liver transplantation\par -Patient with excellent allograft function. He will continue with current immunosuppression regimen that will include Prograf 1.5 mg in the morning and 1 mg the evening, MMF 1 g twice daily and prednisone 5 mg daily. His most recent FK level is 5.7 ng/mL. This is acceptable.\par \par II. Post transplant prophylaxis\par -Recommended him to continue aspirin 81 mg daily.\par c/w Valcyte to 450 mg daily. He discontinued Bactrim on May 1, 2023 (6 months after transplant)\par \par III. Hematuria\par -Patient denies any recent episodes of hematuria - patient is currently following with Urology. Likely related to small renal stone ( seen on prior non-Con CT)\par -Cystoscopy was negative. Renal US was negative.\par \par \par RTC in 1 month. He will have follow-up labs in about 2 weeks before he leaves for vacation.\par Patient seen and plan discussed with Dr Tipton.\par \par Ivone Briseno, MSN, FNP-BC\par Transplant Hepatology Nurse Practitioner\par [facility]\par [address] [place] [id number]\par T: [phone] | F: [phone]\par

## 2023-06-06 ENCOUNTER — NON-APPOINTMENT (OUTPATIENT)
Age: 33
End: 2023-06-06

## 2023-06-06 LAB
ALBUMIN SERPL ELPH-MCNC: 4.3 G/DL
ALP BLD-CCNC: 94 U/L
ALT SERPL-CCNC: 15 U/L
ANION GAP SERPL CALC-SCNC: 11 MMOL/L
AST SERPL-CCNC: 17 U/L
BILIRUB SERPL-MCNC: 1.1 MG/DL
BUN SERPL-MCNC: 18 MG/DL
CALCIUM SERPL-MCNC: 9.5 MG/DL
CHLORIDE SERPL-SCNC: 106 MMOL/L
CO2 SERPL-SCNC: 26 MMOL/L
CREAT SERPL-MCNC: 1.02 MG/DL
EGFR: 101 ML/MIN/1.73M2
GLUCOSE SERPL-MCNC: 95 MG/DL
INR PPP: 1.08 RATIO
MAGNESIUM SERPL-MCNC: 1.6 MG/DL
PHOSPHATE SERPL-MCNC: 2.3 MG/DL
POTASSIUM SERPL-SCNC: 3.9 MMOL/L
PROT SERPL-MCNC: 6.8 G/DL
PT BLD: 12.8 SEC
SODIUM SERPL-SCNC: 142 MMOL/L
TACROLIMUS SERPL-MCNC: 7.7 NG/ML

## 2023-06-06 NOTE — END OF VISIT
OPERATIVE REPORT  PATIENT NAME: Unruly Jimenez    :  1964  MRN: [id number]  Pt Location: [facility] OR ROOM 02    SURGERY DATE: 2023    Surgeon(s) and Role:     * Abiel Lacy DPM - Primary    Preop Diagnosis:  Type 2 diabetes mellitus with diabetic polyneuropathy (HCC) [E11 42]  Tinea unguium [B35 1]  Hallux rigidus, unspecified foot [M20 20]  Pain in left foot [M79 672]    Post-Op Diagnosis Codes: * Type 2 diabetes mellitus with diabetic polyneuropathy (HCC) [E11 42]     * Tinea unguium [B35 1]     * Hallux rigidus, unspecified foot [M20 20]     * Pain in left foot [M79 672]    Procedure(s):  Left - CHEILECTOMY 1ST MPJ    Specimen(s):  * No specimens in log *    Estimated Blood Loss:   Minimal    Drains:  * No LDAs found *    Anesthesia Type:   IV Sedation with Anesthesia    Operative Indications:  Type 2 diabetes mellitus with diabetic polyneuropathy (HCC) [E11 42]  Tinea unguium [B35 1]  Hallux rigidus, unspecified foot [M20 20]  Pain in left foot [M79 672]  Hallux Rigidus Left foot    Operative Findings:  same    Complications:   None    Procedure and Technique:  The patient entered the operatory this date in good condition with all vital signs stable  The patient was identified prior to admittance to the operating room via personal contact and wristband  The surgical site was identified and marked with a marking pen  The patient then entered the operatory in the appropriate time out was taken prior to surgery  The patient's left was anesthetized with 10 cc of a combination of 0 5% bupivacaine plain and 1% lidocaine plain injected into a total ankle block of the left the patient's foot was then prepped and draped in the usual aseptic manner  Hemostasis was obtained using an ankle pneumatic tourniquet inflated to 250 mmHg after first exsanguinating the foot with an Esmarch bandage  The following was then performed   A curvilinear incision was made over the dorsum of the first metatarsal extending "distally over the proximal phalangeal base this was made medial to the tendon of extensor hallucis longus  The incision was approximately 5 cm in length  The incision was made with a #10 blade and deepened with a #15 blade  Small vessels traversing the site were cut, clamped, and cauterized using an eye cautery unit  The capsule of the first MPJ was then identified  Using a #15 blade an incision was made through the capsule dorsally,medial to the tendon of extensor hallucis longus  A second incision was made on the medial aspect of the joint joining the first in an inverted T  The capsule and periosteum were then underscored and reflected away from the bone  The collateral ligaments were severed using a #15 blade and a crown and collar scissor and the first metatarsal head was delivered  Using an oscillating bone saw the hypertrophic medial eminence was excised  The dorsal exostosis was then resected using sagital saw  First metatarsal head was then remodeled resculptured using a rotary bone bur and was flushed with copious amounts of sterile saline  The capsule was then coaptated using 3-0 Vicryl suture  Prior to closer a small wedge was taken from the capsule on the medial aspect  Skin was coaptated subcutaneously with 4-0 Vicryl suture and closed superficially with subcuticular manner using 4-0 prolene  Tourniquet was released digital circulation returned immediately  Patient tolerated procedures well and left the OR in good condition  The patient is to return to my office in 3 days sooner if they have any problems  Portions of the record may have been created with voice recognition software   Occasional wrong word or \"sound a like\" substitutions may have occurred due to the inherent limitations of voice recognition software   Read the chart carefully and recognize, using context, where substitutions have occurred  I was present for the entire procedure      Patient Disposition:  PACU         SIGNATURE: " Avery Mckeon DPM  DATE: June 6, 2023  TIME: 1:50 PM [Time Spent: ___ minutes] : I have spent [unfilled] minutes of time on the encounter.

## 2023-06-07 LAB
CMV DNA SPEC QL NAA+PROBE: NOT DETECTED IU/ML
CMVPCR LOG: NOT DETECTED LOG10IU/ML

## 2023-06-15 NOTE — SWALLOW BEDSIDE ASSESSMENT ADULT - SWALLOW EVAL: DIAGNOSIS
UNKNOWN LAST TETANUS SHOT      Monica Conde RN  06/15/23 4187 Oral stage judged to be WNL.  Unable to rule out penetration or aspiration.

## 2023-06-27 ENCOUNTER — LABORATORY RESULT (OUTPATIENT)
Age: 33
End: 2023-06-27

## 2023-06-28 ENCOUNTER — APPOINTMENT (OUTPATIENT)
Dept: HEPATOLOGY | Facility: CLINIC | Age: 33
End: 2023-06-28
Payer: MEDICAID

## 2023-06-28 VITALS
SYSTOLIC BLOOD PRESSURE: 142 MMHG | HEIGHT: 64 IN | TEMPERATURE: 97.5 F | DIASTOLIC BLOOD PRESSURE: 77 MMHG | OXYGEN SATURATION: 95 % | HEART RATE: 91 BPM | WEIGHT: 157 LBS | RESPIRATION RATE: 14 BRPM | BODY MASS INDEX: 26.8 KG/M2

## 2023-06-28 PROCEDURE — 99214 OFFICE O/P EST MOD 30 MIN: CPT

## 2023-06-28 NOTE — END OF VISIT
[Time Spent: ___ minutes] : I have spent [unfilled] minutes of time on the encounter. [>50% of the face to face encounter time was spent on counseling and/or coordination of care for ___] : Greater than 50% of the face to face encounter time was spent on counseling and/or coordination of care for [unfilled] [FreeTextEntry3] : I saw and evaluated the patient with NP Finn.  I discussed the care of this patient with the NP providing the service, and was directly responsible for the patient's management. My discussion with the NP included the patient's history, physical exam, laboratory findings, and medical decision-making. I agree with the documented findings and plan of care of the NP's note. Spent > 30 minutes collectively in reviewing records, performing patient history and physical examination, and formulating recommendations/plan of care.\par

## 2023-06-28 NOTE — ASSESSMENT
[FreeTextEntry1] : Mr. 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 Postoperative course was complicated with bile leak at hepaticojejunostomy requiring revision of biliary anastomosis/Velasquez-en-Y on November 7. He has excellent allograft function. Overall doing well. No recurrent hematuria.\par \par \par PLAN\par I. Status post orthotopic liver transplantation\par -Patient with excellent allograft function. He will continue with current immunosuppression regimen that will include Prograf 1 mg in the morning and 1 mg the evening, MMF 1 G twice daily. His most recent FK level is 5.5 ng/mL. Prednisone will be discontinued as 6/28/2023.\par \par II. Post transplant prophylaxis\par -Recommended him to continue aspirin 81 mg daily.\par Discontinue Valcyte as of 6/28/2023. He discontinued Bactrim on May 1, 2023 (6 months after transplant)\par \par III. Hematuria\par -Patient denies any recent episodes of hematuria - patient is currently following with Urology. Likely related to small renal stone ( seen on prior non-Con CT)\par -Cystoscopy was negative. Renal US was negative.\par \par \par RTC in 1 month. He will have follow-up labs in about 2 weeks and before RPA.\par Patient seen and plan discussed with Dr Tipton.\par \par Ivone Briseno, MSN, FNP-BC\par Transplant Hepatology Nurse Practitioner\par [facility]\par [address] [place] [id number]\par T: [phone] | F: [phone]\par

## 2023-06-28 NOTE — HISTORY OF PRESENT ILLNESS
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  [FreeTextEntry1] : Mr. SAUMYA ZARATE a 32-year-old male who presented to hepatology for a follow up. 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 He had COVID in September 2022, and he received monoclonal antibodies. He underwent an orthotopic liver transplantation on November 1, 2022, with a Velasquez-en-Y hepaticojejunostomy for bile duct anastomosis. However, he had a bile leak at hepaticojejunostomy, which required revision of biliary anastomosis/Velasquez-en-Y on November 7.\par \par His post-op history has remained unremarkable, until Feb 2023 when the patient was admitted with hematuria and diagnosed with a renal stone. Urine analysis revealed large blood in urine, but the culture was negative for active infection. He was seen by urologist, Deo Bains, had cystoscopy that was negative. Prior non-contrast CT showed a small renal stone. No further intervention needed and the problem subsided.\par \par He remains on Prograf 1 mg in the morning and 1 mg in the evening, MMF 1 g twice daily, and prednisone 5 mg daily. In addition, he remains on prophylaxis with Valcyte 450 mg daily (CMV high risk) and ASA 81.\par \par Today, on his follow-up visit he denies abdominal pain, nausea, vomiting, constipation, and diarrhea. We reviewed recent laboratory test results from June 27, 2023 which revealed excellent allograft function. Normal renal function. His tacrolimus level was 5.5 ng/mL. He returns from recent vacation with his family. Since last visit, he lost about 8lbs.
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
## 2023-07-12 ENCOUNTER — NON-APPOINTMENT (OUTPATIENT)
Age: 33
End: 2023-07-12

## 2023-07-12 ENCOUNTER — LABORATORY RESULT (OUTPATIENT)
Age: 33
End: 2023-07-12

## 2023-07-12 ENCOUNTER — APPOINTMENT (OUTPATIENT)
Dept: ORTHOPEDIC SURGERY | Facility: CLINIC | Age: 33
End: 2023-07-12
Payer: MEDICAID

## 2023-07-12 DIAGNOSIS — M21.6X2 OTHER ACQUIRED DEFORMITIES OF LEFT FOOT: ICD-10-CM

## 2023-07-12 LAB
ALBUMIN SERPL ELPH-MCNC: 4.3 G/DL
ALP BLD-CCNC: 116 U/L
ALT SERPL-CCNC: 14 U/L
ANION GAP SERPL CALC-SCNC: 14 MMOL/L
AST SERPL-CCNC: 23 U/L
BILIRUB SERPL-MCNC: 1.1 MG/DL
BUN SERPL-MCNC: 16 MG/DL
CALCIUM SERPL-MCNC: 9.2 MG/DL
CHLORIDE SERPL-SCNC: 106 MMOL/L
CO2 SERPL-SCNC: 23 MMOL/L
CREAT SERPL-MCNC: 1.09 MG/DL
EGFR: 93 ML/MIN/1.73M2
GLUCOSE SERPL-MCNC: 101 MG/DL
INR PPP: 1.28 RATIO
MAGNESIUM SERPL-MCNC: 1.7 MG/DL
PHOSPHATE SERPL-MCNC: 3 MG/DL
POTASSIUM SERPL-SCNC: 4.2 MMOL/L
PROT SERPL-MCNC: 6.7 G/DL
PT BLD: 15 SEC
SODIUM SERPL-SCNC: 144 MMOL/L
TACROLIMUS SERPL-MCNC: 6.8 NG/ML

## 2023-07-12 PROCEDURE — 73610 X-RAY EXAM OF ANKLE: CPT | Mod: LT

## 2023-07-12 PROCEDURE — 73590 X-RAY EXAM OF LOWER LEG: CPT | Mod: LT

## 2023-07-12 PROCEDURE — 99204 OFFICE O/P NEW MOD 45 MIN: CPT

## 2023-07-12 NOTE — DISCUSSION/SUMMARY
[de-identified] : Today I had a lengthy discussion with the patient regarding their LLE tightness. I have addressed all the patient's concerns surrounding the pathology of their condition. I have reviewed the patient's XR imaging with them in great detail. \par \par I have recommended the patient to seek out a second opinion at a [facility] to see their orthopedic surgeons and their opinion on a staged release. \par \par The patient understood and verbally agreed to the treatment plan. All of their questions were answered and they were satisfied with the visit. The patient should call the office if they have any questions or experience worsening symptoms.

## 2023-07-12 NOTE — PHYSICAL EXAM
[de-identified] : Left Ankle Exam\par \par General: Alert and oriented x3.  In no acute distress.  Pleasant in nature with a normal affect.  No apparent respiratory distress. \par Erythema, Warmth, Rubor: Negative\par Swelling: Positive\par \par ROM:\par 1. Dorsiflexion: 10 degrees\par 2. Plantarflexion: 40 degrees\par 3. Subtalar: 10 degrees\par 4. Inversion: 30 degrees\par 5. Eversion: 30 degrees\par \par Tenderness to Palpation: \par 1. Lateral Malleolus: Negative\par 2. Medial Malleolus: Negative\par 3. Proximal Fibular Pain: Negative\par 4. Heel Pain: Negative\par 5. Tibiotalar Joint: Negative\par unable to extend knee to neutral, -20 degrees at knee joint. \par \par Tendon Pain:\par 1. Peroneals: Negative\par 2. Posterior Tibialis: Negative\par 3. Achilles: Negative\par 4. Anterior Tibialis: Negative\par \par Ligament Pain:\par 1. ATFL/CFL/PTFL: Negative\par 2. Deltoid Ligaments: Negative\par \par Stability: \par 1. Anterior Drawer: Negative\par 2. Posterior Drawer: Negative\par \par Strength: 5/5 TA/GS/EHL\par \par Pulses: 2+ DP/PT Pulses\par \par Neuro: Intact motor and sensory\par \par Additional Test:\par 1. Pena's Test: Negative\par 2. Syndesmosis Squeeze Test: Negative\par 3. Plantar Fascia: Negative\par 4. Calcaneal Squeeze: Negative\par \par \par \par  [de-identified] : 3V of the left tibia and ankle were ordered obtained and reviewed by me today, 07/12/2023 , revealed: no fractures seen. no abnormalities seen on tibia XR. \par

## 2023-07-12 NOTE — ADDENDUM
[FreeTextEntry1] : I, YESENIA PARK, acted solely as a scribe for Dr. Felton Elkins on this date 07/12/2023  .\par  \par All medical record entries made by the Scribe were at my, Dr. Felton Elkins, direction and personally dictated by me on 07/12/2023 . I have reviewed the chart and agree that the record accurately reflects my personal performance of the history, physical exam, assessment and plan. I have also personally directed, reviewed, and agreed with the chart.

## 2023-07-12 NOTE — HISTORY OF PRESENT ILLNESS
[FreeTextEntry1] : The patient is a 33 year old male presenting with his mother for an initial visit of  left lower leg tightness. He does have Ceberal Palsey. She is currently not in a clinic. He states that his symptoms have been progressing over the last month. He complains of gastroc tightness that increases when he is walking for prolonged periods of time. He has complained of SOB while walking. In the past he did receive Botox injection. He has not done any recent physical therapy. No other complaints. Dr. Bryson is his primary provider.\par \par Liver transplant for PSC about 1 year ago.

## 2023-07-13 LAB
CMV DNA SPEC QL NAA+PROBE: 2440 IU/ML
CMVPCR LOG: 3.39 LOG10IU/ML

## 2023-07-14 ENCOUNTER — LABORATORY RESULT (OUTPATIENT)
Age: 33
End: 2023-07-14

## 2023-07-18 LAB
APPEARANCE: CLEAR
BILIRUBIN URINE: ABNORMAL
BLOOD URINE: NEGATIVE
COLOR: NORMAL
GLUCOSE QUALITATIVE U: NEGATIVE MG/DL
KETONES URINE: NEGATIVE MG/DL
LEUKOCYTE ESTERASE URINE: NEGATIVE
NITRITE URINE: NEGATIVE
PH URINE: 6
PROTEIN URINE: 30 MG/DL
SPECIFIC GRAVITY URINE: >1.03
UROBILINOGEN URINE: 1 MG/DL

## 2023-07-19 ENCOUNTER — LABORATORY RESULT (OUTPATIENT)
Age: 33
End: 2023-07-19

## 2023-07-20 ENCOUNTER — NON-APPOINTMENT (OUTPATIENT)
Age: 33
End: 2023-07-20

## 2023-07-20 LAB
ALBUMIN SERPL ELPH-MCNC: 4 G/DL
ALP BLD-CCNC: 97 U/L
ALT SERPL-CCNC: 15 U/L
ANION GAP SERPL CALC-SCNC: 11 MMOL/L
AST SERPL-CCNC: 23 U/L
BACTERIA BLD CULT: NORMAL
BACTERIA BLD CULT: NORMAL
BILIRUB SERPL-MCNC: 1.4 MG/DL
BUN SERPL-MCNC: 16 MG/DL
CALCIUM SERPL-MCNC: 8.7 MG/DL
CHLORIDE SERPL-SCNC: 107 MMOL/L
CMV DNA SPEC QL NAA+PROBE: 759 IU/ML
CMVPCR LOG: 2.88 LOG10IU/ML
CO2 SERPL-SCNC: 22 MMOL/L
CREAT SERPL-MCNC: 1 MG/DL
EGFR: 103 ML/MIN/1.73M2
GLUCOSE SERPL-MCNC: 104 MG/DL
POTASSIUM SERPL-SCNC: 3.8 MMOL/L
PROT SERPL-MCNC: 6.2 G/DL
SODIUM SERPL-SCNC: 140 MMOL/L
TACROLIMUS SERPL-MCNC: 5.5 NG/ML

## 2023-07-25 ENCOUNTER — OUTPATIENT (OUTPATIENT)
Dept: OUTPATIENT SERVICES | Facility: HOSPITAL | Age: 33
LOS: 1 days | End: 2023-07-25
Payer: MEDICAID

## 2023-07-25 ENCOUNTER — APPOINTMENT (OUTPATIENT)
Dept: INFECTIOUS DISEASE | Facility: CLINIC | Age: 33
End: 2023-07-25
Payer: MEDICAID

## 2023-07-25 ENCOUNTER — APPOINTMENT (OUTPATIENT)
Dept: RADIOLOGY | Facility: CLINIC | Age: 33
End: 2023-07-25
Payer: MEDICAID

## 2023-07-25 VITALS
DIASTOLIC BLOOD PRESSURE: 84 MMHG | TEMPERATURE: 96.9 F | HEIGHT: 64 IN | SYSTOLIC BLOOD PRESSURE: 130 MMHG | HEART RATE: 90 BPM | OXYGEN SATURATION: 100 % | WEIGHT: 151 LBS | BODY MASS INDEX: 25.78 KG/M2

## 2023-07-25 DIAGNOSIS — Z98.890 OTHER SPECIFIED POSTPROCEDURAL STATES: Chronic | ICD-10-CM

## 2023-07-25 DIAGNOSIS — K08.409 PARTIAL LOSS OF TEETH, UNSPECIFIED CAUSE, UNSPECIFIED CLASS: Chronic | ICD-10-CM

## 2023-07-25 DIAGNOSIS — B97.89 OTHER VIRAL AGENTS AS THE CAUSE OF DISEASES CLASSIFIED ELSEWHERE: ICD-10-CM

## 2023-07-25 DIAGNOSIS — R05.3 CHRONIC COUGH: ICD-10-CM

## 2023-07-25 DIAGNOSIS — R06.3 PERIODIC BREATHING: ICD-10-CM

## 2023-07-25 PROCEDURE — 71046 X-RAY EXAM CHEST 2 VIEWS: CPT

## 2023-07-25 PROCEDURE — 71046 X-RAY EXAM CHEST 2 VIEWS: CPT | Mod: 26

## 2023-07-25 PROCEDURE — G0463: CPT

## 2023-07-25 PROCEDURE — 99214 OFFICE O/P EST MOD 30 MIN: CPT

## 2023-07-26 ENCOUNTER — NON-APPOINTMENT (OUTPATIENT)
Age: 33
End: 2023-07-26

## 2023-07-27 LAB
ALBUMIN SERPL ELPH-MCNC: 4.4 G/DL
ALP BLD-CCNC: 109 U/L
ALT SERPL-CCNC: 17 U/L
ANION GAP SERPL CALC-SCNC: 11 MMOL/L
AST SERPL-CCNC: 23 U/L
BILIRUB SERPL-MCNC: 1.1 MG/DL
BUN SERPL-MCNC: 11 MG/DL
CALCIUM SERPL-MCNC: 9.6 MG/DL
CHLORIDE SERPL-SCNC: 104 MMOL/L
CMV DNA SPEC QL NAA+PROBE: 2920 IU/ML
CMVPCR LOG: 3.47 LOG10IU/ML
CO2 SERPL-SCNC: 23 MMOL/L
CREAT SERPL-MCNC: 1.08 MG/DL
EGFR: 94 ML/MIN/1.73M2
GLUCOSE SERPL-MCNC: 100 MG/DL
POTASSIUM SERPL-SCNC: 4.2 MMOL/L
PROT SERPL-MCNC: 6.9 G/DL
SODIUM SERPL-SCNC: 138 MMOL/L

## 2023-07-27 NOTE — ASSESSMENT
[FreeTextEntry1] : 33 year old male PMH Cerebral palsy, Primary Sclerosing Cholangitis Cirrhosis with frequent ERCPs with biliary stent placements for biliary strictures (last in s/p liver transplant 10/31/22 who presents to ID office for treatment of CMV. \par \par Valcyte discontinued as of 6/28/2023. \par CMV PCR (7/12) 2,440, Log 3.39. \par CMV PCR (7/19) 759, Log 2.88. \par Valcyte 900 BID was ordered on 7/13/23 but mother states he has been only taking 450 mg PO BID. \par \par Will check repeat CBC, CMP, CMV PCR today\par If without significant drop in CMV PCR will increase to 900 BID\par Will check CXR\par Will check GI PCR\par

## 2023-07-27 NOTE — PHYSICAL EXAM
[General Appearance - Alert] : alert [General Appearance - In No Acute Distress] : in no acute distress [Sclera] : the sclera and conjunctiva were normal [Outer Ear] : the ears and nose were normal in appearance [Neck Appearance] : the appearance of the neck was normal [Auscultation Breath Sounds / Voice Sounds] : lungs were clear to auscultation bilaterally [Heart Rate And Rhythm] : heart rate was normal and rhythm regular [Heart Sounds] : normal S1 and S2 [Heart Sounds Gallop] : no gallops [Murmurs] : no murmurs [Heart Sounds Pericardial Friction Rub] : no pericardial rub [Edema] : there was no peripheral edema [Bowel Sounds] : normal bowel sounds [Abdomen Soft] : soft [Abdomen Tenderness] : non-tender [] : no hepato-splenomegaly [Abdomen Mass (___ Cm)] : no abdominal mass palpated [Musculoskeletal - Swelling] : no joint swelling [No Palpable Adenopathy] : no palpable adenopathy [Nail Clubbing] : no clubbing  or cyanosis of the fingernails [Motor Tone] : muscle strength and tone were normal [Skin Lesions] : no skin lesions [Affect] : the affect was normal

## 2023-07-27 NOTE — HISTORY OF PRESENT ILLNESS
[FreeTextEntry1] : 33 year old male PMH Cerebral palsy, Primary Sclerosing Cholangitis Cirrhosis with frequent ERCPs with biliary stent placements for biliary strictures (last in s/p liver transplant 10/31/22 who presents to ID office for treatment of CMV. Valcyte discontinued as of 6/28/2023. CMV PCR (7/12) 2,440, Log 3.39. CMV PCR (7/19) 759, Log 2.88. Valcyte 900 BID was ordered on 7/13/23 but mother states he has been only taking 450 mg PO BID. Initially had chills and fevers which are now resolved. Still with intermittent diarrhea. Notes nonproductive cough. \par

## 2023-07-28 DIAGNOSIS — R05.3 CHRONIC COUGH: ICD-10-CM

## 2023-07-28 DIAGNOSIS — R50.9 FEVER, UNSPECIFIED: ICD-10-CM

## 2023-08-05 NOTE — DISCHARGE NOTE NURSING/CASE MANAGEMENT/SOCIAL WORK - NSDPACMPNY_GEN_ALL_CORE
Patient lying quietly in bed at this time, eyes closed, noted with decreased o2 sat when patient is asleep. When awakened O2 sat increases to 98%.       Meagan Mills RN  08/05/23 [id number] Parents

## 2023-08-08 ENCOUNTER — APPOINTMENT (OUTPATIENT)
Dept: HEPATOLOGY | Facility: CLINIC | Age: 33
End: 2023-08-08
Payer: MEDICAID

## 2023-08-08 VITALS
TEMPERATURE: 97.5 F | SYSTOLIC BLOOD PRESSURE: 134 MMHG | HEART RATE: 109 BPM | WEIGHT: 156 LBS | DIASTOLIC BLOOD PRESSURE: 77 MMHG | OXYGEN SATURATION: 99 % | RESPIRATION RATE: 14 BRPM | HEIGHT: 64 IN | BODY MASS INDEX: 26.63 KG/M2

## 2023-08-08 PROCEDURE — 99214 OFFICE O/P EST MOD 30 MIN: CPT

## 2023-08-09 LAB
ALBUMIN SERPL ELPH-MCNC: 4.3 G/DL
ALP BLD-CCNC: 96 U/L
ALT SERPL-CCNC: 11 U/L
ANION GAP SERPL CALC-SCNC: 13 MMOL/L
AST SERPL-CCNC: 17 U/L
BILIRUB SERPL-MCNC: 1.2 MG/DL
BUN SERPL-MCNC: 22 MG/DL
CALCIUM SERPL-MCNC: 9.3 MG/DL
CHLORIDE SERPL-SCNC: 107 MMOL/L
CO2 SERPL-SCNC: 22 MMOL/L
CREAT SERPL-MCNC: 1.06 MG/DL
EGFR: 96 ML/MIN/1.73M2
GGT SERPL-CCNC: 16 U/L
GLUCOSE SERPL-MCNC: 92 MG/DL
INR PPP: 1.05 RATIO
MAGNESIUM SERPL-MCNC: 1.6 MG/DL
PHOSPHATE SERPL-MCNC: 2.8 MG/DL
POTASSIUM SERPL-SCNC: 4.2 MMOL/L
PROT SERPL-MCNC: 6.4 G/DL
PT BLD: 11.9 SEC
SODIUM SERPL-SCNC: 142 MMOL/L
TACROLIMUS SERPL-MCNC: 6.6 NG/ML

## 2023-08-19 NOTE — HISTORY OF PRESENT ILLNESS
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  [FreeTextEntry1] : Mr. SAUMYA ZARATE a 32-year-old male came for a follow-up appointment in the hepatology department.  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 He also had a bout of COVID in September 2022 and received monoclonal antibodies as part of the treatment. On November 1, 2022, he underwent orthotopic liver transplantation with a Velasquez-en-Y hepaticojejunostomy for bile duct anastomosis. However, he experienced a bile leak at the hepaticojejunostomy, necessitating a revision of the biliary anastomosis/Velasquez-en-Y on November 7.  His post-operative course was uneventful until February 2023 when he was admitted with hematuria and diagnosed with a renal stone. A urine analysis revealed significant blood in the urine, but the culture showed no active infection. He was evaluated by urologist Dr. Deo Bains and underwent a negative cystoscopy. A non-contrast CT scan indicated a small renal stone, but no further intervention was required as the issue resolved on its own.  During his follow-up visit on June 28, 2023, he denied any gastrointestinal symptoms such as abdominal pain, nausea, vomiting, constipation, or diarrhea. Recent lab results from June 27, 2023, indicated excellent allograft (transplanted organ) function, along with normal renal function. His tacrolimus level was 5.5 ng/mL. He returned from a recent vacation with his family and reported an 8-pound weight loss since his last visit.  On his subsequent follow-up on August 8, 2023, he continued to be in good health with no new complaints. His graft function remained excellent. 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 His CMV treatment is being managed by infectious disease specialist Dr. Doll. Additionally, he continues to take Prograf (1 mg in the morning and 1 mg in the evening), MMF (1 g twice daily), and off prednisone (5 mg daily, since 6/28) to manage his post-transplant regimen.His Valcyte was discontinued on 6/28/2023, but CMV relapsed, and Valcyte was resumed on July 13, 2023. He discontinued Bactrim on May 1, 2023 (6 months after the transplant). Still on ASA 81 mg daily.

## 2023-08-19 NOTE — ASSESSMENT
[FreeTextEntry1] : Mr. SAUMYA ZARATE a 32-year male with a past medical history of cerebral palsy with left-sided contractures, a history of seizures, cirrhosis secondary to primary sclerosing cholangitis currently status post orthotopic liver transplantation on November 1, 2022, with a Velasquez-en-Y hepaticojejunostomy for bile duct anastomosis. The postoperative course was complicated with bile leak at hepaticojejunostomy requiring revision of biliary anastomosis/Velasquez-en-Y on November 7. He has excellent allograft function. Overall doing well. No recurrent hematuria. He however has CMV relapse, now on Valcyte 900 mg bid.  PLAN I. Status post orthotopic liver transplantation -Patient with excellent allograft function. He will continue with his current immunosuppression regimen that will include Prograf 1 mg in the morning and 1 mg in the evening, MMF 1 G twice daily. His most recent FK level is 6.6 ng/mL. He is off Prednisone (6/28/2023).  II. Post transplant prophylaxis -Recommended him to continue aspirin 81 mg daily. - His Valcyte was discontinued on 6/28/2023, but CMV relapsed, and Valcyte was resumed on July 13, 2023. He discontinued Bactrim on May 1, 2023 (6 months after the transplant)  III. Hematuria -Patient denies any recent episodes of hematuria - the patient is currently following up with Urology. Likely related to a small renal stone ( seen on prior non-Con CT) -Cystoscopy was negative. Renal US was negative.  RTC in 1 month. He will have follow-up labs in about 2 weeks.

## 2023-08-23 LAB
ALBUMIN SERPL ELPH-MCNC: 4.4 G/DL
ALP BLD-CCNC: 101 U/L
ALT SERPL-CCNC: 11 U/L
ANION GAP SERPL CALC-SCNC: 13 MMOL/L
AST SERPL-CCNC: 16 U/L
BILIRUB SERPL-MCNC: 0.9 MG/DL
BUN SERPL-MCNC: 17 MG/DL
CALCIUM SERPL-MCNC: 9.6 MG/DL
CHLORIDE SERPL-SCNC: 104 MMOL/L
CO2 SERPL-SCNC: 23 MMOL/L
CREAT SERPL-MCNC: 1.05 MG/DL
EGFR: 97 ML/MIN/1.73M2
GLUCOSE SERPL-MCNC: 98 MG/DL
INR PPP: 1.01 RATIO
POTASSIUM SERPL-SCNC: 4.1 MMOL/L
PROT SERPL-MCNC: 6.9 G/DL
PT BLD: 11.4 SEC
SODIUM SERPL-SCNC: 140 MMOL/L

## 2023-08-24 ENCOUNTER — NON-APPOINTMENT (OUTPATIENT)
Age: 33
End: 2023-08-24

## 2023-08-24 ENCOUNTER — TRANSCRIPTION ENCOUNTER (OUTPATIENT)
Age: 33
End: 2023-08-24

## 2023-08-24 LAB — TACROLIMUS SERPL-MCNC: 7.7 NG/ML

## 2023-08-25 ENCOUNTER — TRANSCRIPTION ENCOUNTER (OUTPATIENT)
Age: 33
End: 2023-08-25

## 2023-09-05 ENCOUNTER — NON-APPOINTMENT (OUTPATIENT)
Age: 33
End: 2023-09-05

## 2023-09-07 LAB
CMV DNA SPEC QL NAA+PROBE: NOT DETECTED IU/ML
CMVPCR LOG: NOT DETECTED LOG10IU/ML

## 2023-09-12 ENCOUNTER — LABORATORY RESULT (OUTPATIENT)
Age: 33
End: 2023-09-12

## 2023-09-12 LAB
ALBUMIN SERPL ELPH-MCNC: 4.4 G/DL
ALP BLD-CCNC: 104 U/L
ALT SERPL-CCNC: 6 U/L
ANION GAP SERPL CALC-SCNC: 11 MMOL/L
AST SERPL-CCNC: 17 U/L
BILIRUB SERPL-MCNC: 0.7 MG/DL
BUN SERPL-MCNC: 16 MG/DL
CALCIUM SERPL-MCNC: 9.5 MG/DL
CHLORIDE SERPL-SCNC: 104 MMOL/L
CO2 SERPL-SCNC: 23 MMOL/L
CREAT SERPL-MCNC: 1.08 MG/DL
EGFR: 94 ML/MIN/1.73M2
GLUCOSE SERPL-MCNC: 103 MG/DL
INR PPP: 0.98 RATIO
MAGNESIUM SERPL-MCNC: 1.8 MG/DL
PHOSPHATE SERPL-MCNC: 2.6 MG/DL
POTASSIUM SERPL-SCNC: 3.9 MMOL/L
PROT SERPL-MCNC: 6.7 G/DL
PT BLD: 11.1 SEC
SODIUM SERPL-SCNC: 138 MMOL/L
TACROLIMUS SERPL-MCNC: 4.1 NG/ML

## 2023-09-13 ENCOUNTER — APPOINTMENT (OUTPATIENT)
Dept: HEPATOLOGY | Facility: CLINIC | Age: 33
End: 2023-09-13
Payer: MEDICAID

## 2023-09-13 VITALS
WEIGHT: 160 LBS | RESPIRATION RATE: 14 BRPM | DIASTOLIC BLOOD PRESSURE: 81 MMHG | OXYGEN SATURATION: 96 % | HEIGHT: 64 IN | BODY MASS INDEX: 27.31 KG/M2 | TEMPERATURE: 97.4 F | HEART RATE: 111 BPM | SYSTOLIC BLOOD PRESSURE: 151 MMHG

## 2023-09-13 DIAGNOSIS — R79.89 OTHER SPECIFIED ABNORMAL FINDINGS OF BLOOD CHEMISTRY: ICD-10-CM

## 2023-09-13 DIAGNOSIS — Z87.2 PERSONAL HISTORY OF DISEASES OF THE SKIN AND SUBCUTANEOUS TISSUE: ICD-10-CM

## 2023-09-13 DIAGNOSIS — K75.0 ABSCESS OF LIVER: ICD-10-CM

## 2023-09-13 DIAGNOSIS — L70.8 OTHER ACNE: ICD-10-CM

## 2023-09-13 DIAGNOSIS — Z09 ENCOUNTER FOR FOLLOW-UP EXAMINATION AFTER COMPLETED TREATMENT FOR CONDITIONS OTHER THAN MALIGNANT NEOPLASM: ICD-10-CM

## 2023-09-13 DIAGNOSIS — K83.1 OBSTRUCTION OF BILE DUCT: ICD-10-CM

## 2023-09-13 DIAGNOSIS — Z86.2 PERSONAL HISTORY OF DISEASES OF THE BLOOD AND BLOOD-FORMING ORGANS AND CERTAIN DISORDERS INVOLVING THE IMMUNE MECHANISM: ICD-10-CM

## 2023-09-13 DIAGNOSIS — Z76.82 AWAITING ORGAN TRANSPLANT STATUS: ICD-10-CM

## 2023-09-13 DIAGNOSIS — Z87.898 PERSONAL HISTORY OF OTHER SPECIFIED CONDITIONS: ICD-10-CM

## 2023-09-13 DIAGNOSIS — Z01.818 ENCOUNTER FOR OTHER PREPROCEDURAL EXAMINATION: ICD-10-CM

## 2023-09-13 DIAGNOSIS — T38.0X5A OTHER ACNE: ICD-10-CM

## 2023-09-13 DIAGNOSIS — Z98.890 OTHER SPECIFIED POSTPROCEDURAL STATES: ICD-10-CM

## 2023-09-13 DIAGNOSIS — Z87.438 PERSONAL HISTORY OF OTHER DISEASES OF MALE GENITAL ORGANS: ICD-10-CM

## 2023-09-13 DIAGNOSIS — Z23 ENCOUNTER FOR IMMUNIZATION: ICD-10-CM

## 2023-09-13 DIAGNOSIS — K86.89 OTHER SPECIFIED DISEASES OF PANCREAS: ICD-10-CM

## 2023-09-13 DIAGNOSIS — I81 PORTAL VEIN THROMBOSIS: ICD-10-CM

## 2023-09-13 LAB
CMV DNA SPEC QL NAA+PROBE: NOT DETECTED IU/ML
CMVPCR LOG: NOT DETECTED LOG10IU/ML

## 2023-09-13 PROCEDURE — 99214 OFFICE O/P EST MOD 30 MIN: CPT

## 2023-09-13 NOTE — CONSULT NOTE ADULT - REASON FOR ADMISSION
OLT
Elidel Counseling: Patient may experience a mild burning sensation during topical application. Elidel is not approved in children less than 2 years of age. There have been case reports of hematologic and skin malignancies in patients using topical calcineurin inhibitors although causality is questionable.

## 2023-09-16 PROBLEM — Z23 ENCOUNTER FOR IMMUNIZATION: Status: RESOLVED | Noted: 2021-05-19 | Resolved: 2023-09-16

## 2023-09-16 PROBLEM — Z98.890 STATUS POST ROUTINE CIRCUMCISION: Status: RESOLVED | Noted: 2022-08-17 | Resolved: 2023-09-16

## 2023-09-16 PROBLEM — I81 PORTAL VEIN THROMBOSIS: Status: RESOLVED | Noted: 2022-07-26 | Resolved: 2023-09-16

## 2023-09-16 PROBLEM — Z87.898 HISTORY OF GROSS HEMATURIA: Status: RESOLVED | Noted: 2023-02-12 | Resolved: 2023-09-16

## 2023-09-16 PROBLEM — Z87.898 HISTORY OF FEVER: Status: RESOLVED | Noted: 2020-06-08 | Resolved: 2023-09-16

## 2023-09-16 PROBLEM — R79.89 ABNORMAL LFTS: Status: RESOLVED | Noted: 2019-08-26 | Resolved: 2023-09-16

## 2023-09-16 PROBLEM — Z01.818 PRE-OP TESTING: Status: RESOLVED | Noted: 2020-08-14 | Resolved: 2023-09-16

## 2023-09-16 PROBLEM — K75.0 LIVER ABSCESS: Status: RESOLVED | Noted: 2021-05-12 | Resolved: 2023-09-16

## 2023-09-16 PROBLEM — Z76.82 PRE-LIVER TRANSPLANT, LISTED: Status: RESOLVED | Noted: 2022-01-24 | Resolved: 2023-09-16

## 2023-09-16 PROBLEM — Z09 HOSPITAL DISCHARGE FOLLOW-UP: Status: RESOLVED | Noted: 2019-03-07 | Resolved: 2023-09-16

## 2023-09-16 PROBLEM — Z87.438 HISTORY OF PHIMOSIS OF PENIS: Status: RESOLVED | Noted: 2022-06-06 | Resolved: 2023-09-16

## 2023-09-16 PROBLEM — Z01.818 PRE-TRANSPLANT EVALUATION FOR LIVER TRANSPLANT: Status: RESOLVED | Noted: 2021-01-12 | Resolved: 2023-09-16

## 2023-09-16 PROBLEM — K83.1 BILE DUCT STRICTURE: Status: RESOLVED | Noted: 2019-03-07 | Resolved: 2023-09-16

## 2023-09-16 PROBLEM — L70.8 STEROID-INDUCED ACNE: Status: RESOLVED | Noted: 2019-09-05 | Resolved: 2023-09-16

## 2023-09-16 PROBLEM — Z87.2 HISTORY OF SKIN PRURITUS: Status: RESOLVED | Noted: 2020-02-13 | Resolved: 2023-09-16

## 2023-09-16 PROBLEM — Z87.898 HISTORY OF CHRONIC COUGH: Status: RESOLVED | Noted: 2021-06-23 | Resolved: 2023-09-16

## 2023-09-16 PROBLEM — K83.1 OBSTRUCTIVE JAUNDICE: Status: RESOLVED | Noted: 2019-03-07 | Resolved: 2023-09-16

## 2023-09-16 PROBLEM — Z86.2 HISTORY OF ANEMIA: Status: RESOLVED | Noted: 2021-12-19 | Resolved: 2023-09-16

## 2023-09-16 PROBLEM — Z87.898 HISTORY OF WEIGHT LOSS: Status: RESOLVED | Noted: 2021-06-15 | Resolved: 2023-09-16

## 2023-09-27 ENCOUNTER — APPOINTMENT (OUTPATIENT)
Dept: INFECTIOUS DISEASE | Facility: CLINIC | Age: 33
End: 2023-09-27
Payer: MEDICAID

## 2023-09-27 ENCOUNTER — OUTPATIENT (OUTPATIENT)
Dept: OUTPATIENT SERVICES | Facility: HOSPITAL | Age: 33
LOS: 1 days | End: 2023-09-27
Payer: MEDICAID

## 2023-09-27 DIAGNOSIS — K08.409 PARTIAL LOSS OF TEETH, UNSPECIFIED CAUSE, UNSPECIFIED CLASS: Chronic | ICD-10-CM

## 2023-09-27 DIAGNOSIS — B25.9 CYTOMEGALOVIRAL DISEASE, UNSPECIFIED: ICD-10-CM

## 2023-09-27 DIAGNOSIS — Z98.890 OTHER SPECIFIED POSTPROCEDURAL STATES: Chronic | ICD-10-CM

## 2023-09-27 PROCEDURE — G0463: CPT

## 2023-09-27 PROCEDURE — ZZZZZ: CPT

## 2023-10-01 PROBLEM — K86.89 PANCREATIC MASS: Status: RESOLVED | Noted: 2020-11-18 | Resolved: 2023-09-16

## 2023-10-02 ENCOUNTER — TRANSCRIPTION ENCOUNTER (OUTPATIENT)
Age: 33
End: 2023-10-02

## 2023-10-03 ENCOUNTER — TRANSCRIPTION ENCOUNTER (OUTPATIENT)
Age: 33
End: 2023-10-03

## 2023-10-12 ENCOUNTER — NON-APPOINTMENT (OUTPATIENT)
Age: 33
End: 2023-10-12

## 2023-10-12 DIAGNOSIS — B97.89 OTHER VIRAL AGENTS AS THE CAUSE OF DISEASES CLASSIFIED ELSEWHERE: ICD-10-CM

## 2023-10-12 LAB
ALBUMIN SERPL ELPH-MCNC: 4.3 G/DL
ALP BLD-CCNC: 105 U/L
ALT SERPL-CCNC: 17 U/L
ANION GAP SERPL CALC-SCNC: 13 MMOL/L
AST SERPL-CCNC: 23 U/L
BASOPHILS # BLD AUTO: 0.03 K/UL
BASOPHILS NFR BLD AUTO: 1 %
BILIRUB SERPL-MCNC: 1.1 MG/DL
BUN SERPL-MCNC: 17 MG/DL
CALCIUM SERPL-MCNC: 9.3 MG/DL
CHLORIDE SERPL-SCNC: 104 MMOL/L
CMV DNA SPEC QL NAA+PROBE: ABNORMAL IU/ML
CMVPCR LOG: ABNORMAL LOG10IU/ML
CO2 SERPL-SCNC: 24 MMOL/L
CREAT SERPL-MCNC: 1.05 MG/DL
EGFR: 97 ML/MIN/1.73M2
EOSINOPHIL # BLD AUTO: 0.16 K/UL
EOSINOPHIL NFR BLD AUTO: 5.1 %
GGT SERPL-CCNC: 18 U/L
GLUCOSE SERPL-MCNC: 94 MG/DL
HBV CORE IGG+IGM SER QL: NONREACTIVE
HBV SURFACE AB SER QL: REACTIVE
HBV SURFACE AB SERPL IA-ACNC: >1000 MIU/ML
HBV SURFACE AG SER QL: NONREACTIVE
HCT VFR BLD CALC: 39.2 %
HCV AB SER QL: NONREACTIVE
HCV RNA SERPL NAA+PROBE-LOG IU: NOT DETECTED LOGIU/ML
HCV S/CO RATIO: 0.1 S/CO
HEPB DNA PCR INT: NOT DETECTED
HEPB DNA PCR LOG: NOT DETECTED LOGIU/ML
HEPC RNA INTERP: NOT DETECTED
HGB BLD-MCNC: 13.4 G/DL
HIV1 RNA # SERPL NAA+PROBE: NORMAL
HIV1 RNA # SERPL NAA+PROBE: NORMAL COPIES/ML
HIV1+2 AB SPEC QL IA.RAPID: NONREACTIVE
IMM GRANULOCYTES NFR BLD AUTO: 0.3 %
INR PPP: 1.06 RATIO
LYMPHOCYTES # BLD AUTO: 0.93 K/UL
LYMPHOCYTES NFR BLD AUTO: 29.9 %
MAGNESIUM SERPL-MCNC: 1.8 MG/DL
MAN DIFF?: NORMAL
MCHC RBC-ENTMCNC: 30.7 PG
MCHC RBC-ENTMCNC: 34.2 GM/DL
MCV RBC AUTO: 89.9 FL
MONOCYTES # BLD AUTO: 0.42 K/UL
MONOCYTES NFR BLD AUTO: 13.5 %
NEUTROPHILS # BLD AUTO: 1.56 K/UL
NEUTROPHILS NFR BLD AUTO: 50.2 %
PHOSPHATE SERPL-MCNC: 2.7 MG/DL
PLATELET # BLD AUTO: 184 K/UL
POTASSIUM SERPL-SCNC: 4.3 MMOL/L
PROT SERPL-MCNC: 6.5 G/DL
PT BLD: 12 SEC
RBC # BLD: 4.36 M/UL
RBC # FLD: 14 %
SODIUM SERPL-SCNC: 141 MMOL/L
TACROLIMUS SERPL-MCNC: 4.4 NG/ML
VIRAL LOAD INTERP: NORMAL
VIRAL LOAD LOG: NORMAL LG COP/ML
WBC # FLD AUTO: 3.11 K/UL

## 2023-10-13 ENCOUNTER — TRANSCRIPTION ENCOUNTER (OUTPATIENT)
Age: 33
End: 2023-10-13

## 2023-10-19 ENCOUNTER — TRANSCRIPTION ENCOUNTER (OUTPATIENT)
Age: 33
End: 2023-10-19

## 2023-10-19 LAB
CMV DNA SPEC QL NAA+PROBE: 70 IU/ML
CMVPCR LOG: 1.84 LOG10IU/ML

## 2023-10-19 RX ORDER — SENNOSIDES 8.6 MG
8.6 TABLET ORAL
Qty: 30 | Refills: 0 | Status: DISCONTINUED | COMMUNITY
Start: 2022-11-15 | End: 2023-10-19

## 2023-10-19 RX ORDER — PREDNISONE 5 MG/1
5 TABLET ORAL
Qty: 60 | Refills: 5 | Status: DISCONTINUED | COMMUNITY
Start: 2022-11-15 | End: 2023-10-19

## 2023-10-20 ENCOUNTER — TRANSCRIPTION ENCOUNTER (OUTPATIENT)
Age: 33
End: 2023-10-20

## 2023-10-26 ENCOUNTER — TRANSCRIPTION ENCOUNTER (OUTPATIENT)
Age: 33
End: 2023-10-26

## 2023-10-26 LAB
CMV DNA SPEC QL NAA+PROBE: 119 IU/ML
CMVPCR LOG: 2.08 LOG10IU/ML

## 2023-11-03 ENCOUNTER — NON-APPOINTMENT (OUTPATIENT)
Age: 33
End: 2023-11-03

## 2023-11-03 ENCOUNTER — TRANSCRIPTION ENCOUNTER (OUTPATIENT)
Age: 33
End: 2023-11-03

## 2023-11-03 LAB
CMV DNA SPEC QL NAA+PROBE: 231 IU/ML
CMVPCR LOG: 2.36 LOG10IU/ML

## 2023-11-03 RX ORDER — VALGANCICLOVIR HYDROCHLORIDE 450 MG/1
450 TABLET ORAL
Qty: 60 | Refills: 5 | Status: ACTIVE | COMMUNITY
Start: 2022-11-15 | End: 1900-01-01

## 2023-11-08 ENCOUNTER — TRANSCRIPTION ENCOUNTER (OUTPATIENT)
Age: 33
End: 2023-11-08

## 2023-11-08 LAB
ALBUMIN SERPL ELPH-MCNC: 4.2 G/DL
ALP BLD-CCNC: 94 U/L
ALT SERPL-CCNC: 31 U/L
ANION GAP SERPL CALC-SCNC: 10 MMOL/L
AST SERPL-CCNC: 35 U/L
BASOPHILS # BLD AUTO: 0.03 K/UL
BASOPHILS NFR BLD AUTO: 0.9 %
BILIRUB SERPL-MCNC: 0.9 MG/DL
BUN SERPL-MCNC: 23 MG/DL
CALCIUM SERPL-MCNC: 9.4 MG/DL
CHLORIDE SERPL-SCNC: 106 MMOL/L
CMV DNA SPEC QL NAA+PROBE: ABNORMAL IU/ML
CMVPCR LOG: ABNORMAL LOG10IU/ML
CO2 SERPL-SCNC: 26 MMOL/L
CREAT SERPL-MCNC: 1.05 MG/DL
EGFR: 97 ML/MIN/1.73M2
EOSINOPHIL # BLD AUTO: 0.12 K/UL
EOSINOPHIL NFR BLD AUTO: 3.4 %
GLUCOSE SERPL-MCNC: 97 MG/DL
HCT VFR BLD CALC: 38.2 %
HGB BLD-MCNC: 12.6 G/DL
IMM GRANULOCYTES NFR BLD AUTO: 0.3 %
LYMPHOCYTES # BLD AUTO: 2.04 K/UL
LYMPHOCYTES NFR BLD AUTO: 58.3 %
MAN DIFF?: NORMAL
MCHC RBC-ENTMCNC: 29.6 PG
MCHC RBC-ENTMCNC: 33 GM/DL
MCV RBC AUTO: 89.9 FL
MONOCYTES # BLD AUTO: 0.28 K/UL
MONOCYTES NFR BLD AUTO: 8 %
NEUTROPHILS # BLD AUTO: 1.02 K/UL
NEUTROPHILS NFR BLD AUTO: 29.1 %
PLATELET # BLD AUTO: 173 K/UL
POTASSIUM SERPL-SCNC: 4.1 MMOL/L
PROT SERPL-MCNC: 6.6 G/DL
RBC # BLD: 4.25 M/UL
RBC # FLD: 13 %
SODIUM SERPL-SCNC: 141 MMOL/L
TACROLIMUS SERPL-MCNC: 4.6 NG/ML
WBC # FLD AUTO: 3.5 K/UL

## 2023-11-09 ENCOUNTER — TRANSCRIPTION ENCOUNTER (OUTPATIENT)
Age: 33
End: 2023-11-09

## 2023-11-14 LAB
ALBUMIN SERPL ELPH-MCNC: 4.2 G/DL
ALP BLD-CCNC: 101 U/L
ALT SERPL-CCNC: 24 U/L
ANION GAP SERPL CALC-SCNC: 10 MMOL/L
AST SERPL-CCNC: 28 U/L
BASOPHILS # BLD AUTO: 0.04 K/UL
BASOPHILS NFR BLD AUTO: 1.1 %
BILIRUB SERPL-MCNC: 0.7 MG/DL
BUN SERPL-MCNC: 21 MG/DL
CALCIUM SERPL-MCNC: 9.3 MG/DL
CHLORIDE SERPL-SCNC: 105 MMOL/L
CO2 SERPL-SCNC: 26 MMOL/L
CREAT SERPL-MCNC: 0.97 MG/DL
EGFR: 107 ML/MIN/1.73M2
EOSINOPHIL # BLD AUTO: 0.13 K/UL
EOSINOPHIL NFR BLD AUTO: 3.5 %
GLUCOSE SERPL-MCNC: 93 MG/DL
HCT VFR BLD CALC: 37.9 %
HGB BLD-MCNC: 12.7 G/DL
IMM GRANULOCYTES NFR BLD AUTO: 0 %
LYMPHOCYTES # BLD AUTO: 2.26 K/UL
LYMPHOCYTES NFR BLD AUTO: 60.9 %
MAGNESIUM SERPL-MCNC: 1.7 MG/DL
MAN DIFF?: NORMAL
MCHC RBC-ENTMCNC: 29.8 PG
MCHC RBC-ENTMCNC: 33.5 GM/DL
MCV RBC AUTO: 89 FL
MONOCYTES # BLD AUTO: 0.23 K/UL
MONOCYTES NFR BLD AUTO: 6.2 %
NEUTROPHILS # BLD AUTO: 1.05 K/UL
NEUTROPHILS NFR BLD AUTO: 28.3 %
PHOSPHATE SERPL-MCNC: 2.4 MG/DL
PLATELET # BLD AUTO: 194 K/UL
POTASSIUM SERPL-SCNC: 4.3 MMOL/L
PROT SERPL-MCNC: 6.7 G/DL
RBC # BLD: 4.26 M/UL
RBC # FLD: 12.9 %
SODIUM SERPL-SCNC: 141 MMOL/L
TACROLIMUS SERPL-MCNC: 5.6 NG/ML
WBC # FLD AUTO: 3.71 K/UL

## 2023-11-15 ENCOUNTER — APPOINTMENT (OUTPATIENT)
Dept: HEPATOLOGY | Facility: CLINIC | Age: 33
End: 2023-11-15
Payer: MEDICAID

## 2023-11-15 VITALS
HEIGHT: 64 IN | OXYGEN SATURATION: 98 % | SYSTOLIC BLOOD PRESSURE: 122 MMHG | HEART RATE: 102 BPM | TEMPERATURE: 98.3 F | BODY MASS INDEX: 26.12 KG/M2 | WEIGHT: 153 LBS | DIASTOLIC BLOOD PRESSURE: 68 MMHG

## 2023-11-15 DIAGNOSIS — K64.9 UNSPECIFIED HEMORRHOIDS: ICD-10-CM

## 2023-11-15 DIAGNOSIS — G80.9 CEREBRAL PALSY, UNSPECIFIED: ICD-10-CM

## 2023-11-15 LAB
CMV DNA SPEC QL NAA+PROBE: ABNORMAL IU/ML
CMVPCR LOG: ABNORMAL LOG10IU/ML

## 2023-11-15 PROCEDURE — 99214 OFFICE O/P EST MOD 30 MIN: CPT

## 2023-11-15 RX ORDER — HYDROCORTISONE 25 MG/G
2.5 CREAM TOPICAL 3 TIMES DAILY
Qty: 1 | Refills: 0 | Status: ACTIVE | COMMUNITY
Start: 2023-11-15 | End: 1900-01-01

## 2023-11-15 NOTE — PATIENT PROFILE ADULT - NSTOBACCONEVERSMOKERY/N_GEN_A
No

## 2023-11-17 PROBLEM — G80.9 MILD CEREBRAL PALSY: Status: ACTIVE | Noted: 2019-03-07

## 2023-11-30 NOTE — ED ADULT TRIAGE NOTE - HEIGHT IN FEET
Wound Care Provider Visit     Patient seen in Wound Care Clinic by: Wound MD Dr. Knott assisted by: Messi VÁSQUEZ RN and Mary Kate REYNA RN.    Wound care orders and/or updates:  Wound care to Left Lower Leg:  Cleanse wound with VASHE or Prontasan.  Apply Vashe or Prontasan damp gauze to wound bed.  Apply ABD as cover dressing.  Secure with Rolled gauze and tape.  Change twice daily.        Wound Measurements:         Wound Leg Left Circumferential Non-pressure Injury (Active)   Wound Length (cm) 18.5 cm 11/30/23 1301   Wound Width (cm) 31 cm 11/30/23 1301   Wound Depth (cm) 0.3 cm 11/30/23 1301   Wound Surface Area (cm^2) 573.5 cm^2 11/30/23 1301   Wound Volume (cm^3) 172.05 cm^3 11/30/23 1301   Wound Volume Change (Initial) 116.25 cm3 11/30/23 1301   Wound Volume % Change (Initial) 208.33 % 11/30/23 1301   Wound Volume Change (30 days) 116.25 cm3 11/30/23 1301   Wound Volume % Change (30 days) 208.33 % 11/30/23 1301   Number of days: 412       Wound Hip/trochanter Left Posterior Surgical Wound (Active)   Number of days: 204       Wound Thigh Left Lateral Surgical Wound (Active)   Number of days: 204       Wound Thigh Left Surgical Wound (Active)   Number of days: 146         Wound Characteristics:  Wound Edge  Wound Leg Left Circumferential Non-pressure Injury-Wound Edge: Well defined, Attached to wound bed    Wound Exudate  Wound Leg Left Circumferential Non-pressure Injury-Wound Exudate: Moderate, Serosanguineous, No odor    Wound Bed/Tissue Type  Wound Leg Left Circumferential Non-pressure Injury-Wound Bed/Tissue Type: Pink, Red, Tan    Sera-wound Condition  Wound Leg Left Circumferential Non-pressure Injury-Periwound Condition: Edema, Erythema, blanchable, Hyperpigmented, Macerated, Moist      Lab(s)/additional orders placed this visit: None  Wound(s) debrided by provider: No Consent obtained/verified: N/A  Wounds were mechanically debrided by RN with sterile gauze and saline/Vashe.     Wound care being completed by:  Family  Supplies provided/ordered: Ordered by Wound Care RN and Provided to patient    Patient education complete: Yes  Patient verbalized understanding of education/patient questions answered: Yes      MD removed sutures.     Follow up in 10 weeks.     5

## 2023-12-04 NOTE — ASU PREOP CHECKLIST - SKIN PREP
MRI result reviewed. Mild tendinosis of supraspinatus and small posterior, inferior labrum tear correlating with physical exam and symptoms.  n/a

## 2023-12-11 ENCOUNTER — TRANSCRIPTION ENCOUNTER (OUTPATIENT)
Age: 33
End: 2023-12-11

## 2023-12-14 NOTE — ED ADULT NURSE NOTE - CADM POA URETHRAL CATHETER
[de-identified] : Ms. Olga Womack is a 79 year old female here for an evaluation of breast cancer. Her oncologic history is as follows:  She underwent routine breast imaging on 01/25/2022 (BI-RADS 0)  which showed possible irregular shaped mass with associated distortion lower inner left breast middle to posterior third which likely correlates with an irregular shaped hypoechoic nodule with associated acoustic shadowing left breast 9:00 6 cm from nipple on screening breast ultrasound. for which additional imaging was rec. Additional left breast imaging on 02/03/2022 (BI-RADS 5) showed a 7 mm irregular mass in the left breast at the 9:00 position for which ultrasound-guided core biopsy is advised.   She underwent  left breast 9 o'clock 6cmfn ultrasound guided biopsy core on 2/9/2022 which showed invasive moderately differentiated ductal carcinoma, Marc score 6/9, measuring 0.5 cm ER +, 70%, PgR Negative,HER-2 Negative. Microcalcifications and focal ADH and  columnar cell change noted.  She underwent a breast MRI on 02/21/2022 (BI-RADS 6) which showed  a 5 mm enhancing mass in the left inner breast, recently diagnosed left breast malignancy. No other suspicious enhancing masses.   She underwent left breast lumpectomy: on  3/7/2022  3/07/2022 L lumpectomy, L SLNB  - IDC, G2, 7 mm  - DCIS, intermediate grade, at least 1 mm (3/28 slides)  - Margins negative  - SLNB 0/7  - ER 70%, IN 0%, HER2 negative  - [id number], AJCC Stage IA  Initial consult 4/4/22- AI prescribed ODX reported 4/13/22: RS 36 D/w pt over the phone 4/14/22- rec to come for visit  She is here today 4/25/22 to discuss ODX.  She doesn't know why she is here today Pt reports she is forgetful and has no recollection of ODX discussion or visit ith me 3 weeks ago She is having major anxiety and reports " acting crazy" at home. She lives with friends Dianne and Deidre She is here with friend- Shania. Shania reports pt is very forgetful lately. Was here last time with friend Dianne but she doesn't remember  She says that she thought she was going to see someone for "acting crazy"  She is tearful and reports " she cant think straight". She dosent remember the name of her psychiatrist or when she was seen last.  Denies SI or HI  After a lot of redirecting, she wanted to discuss cancer treatment option. We discussed CMF chemo but she is not interested. pt needs to be evaluated by psychiatrist first to control anxiety issues and then have better discussion re risks/benefits of chemo. Given her age, comorbidities and uncontrolled anxiety- may not be able to tolerate anything more than AI but will need a discussion about her options when she is able to comprehend risks/benefits.   addendum 4/25: Pt discharged from ER w/o psyche intervention addendum 4/26: referred to see Dr Marie urgently addendum 4/27: Dr Marie rec to see her primary psychiatrist, Dr Mahajan addendum 4/28- spoke to pt. She says she is alone and doesn't know how to place call to make appt. She says Her mind isnt working.  My office conference her in with Dr Mahajan office. She declined an earlier appt and wanted to keep appt for next month.   We will schedule office visit in 1-2 weeks to f/u  Continue AI in the meantime  5/13/22: She is here today with friend Ranjit. They live together and he is well aware of her medical conditions She is in better mood today. Says she is here today because she has an appt to discuss cancer treatment. Not tearful or anxious TODAY, able to have a conversation Ranjit had RT for prostate cancer and is very supportive  She is f/b psychiatrist for anxiety/depression. f/b Dr Mahajan  We discussed CMF chemo and RT due to high risk ODX  I discussed the expected and unexpected side effects of chemotherapy, including but not limited to hair loss, fatigue, change in taste, mouth sores, nausea, vomiting, diarrhea, infusion reaction, allergic reaction, significant myelosuppression, need for blood transfusion, infection, bleeding, cardiac toxicity, neuropathy, chemical cystitis, kidney injury, nerve pain, muscle pain and detrimental effect on liver, lung and heart function. I also discussed a small but potential risk of development of acute leukemia with the use of cyclophosphamide therapy.  The patient understood all the potential side effects and signed an informed consent after discussing the risks, benefits and alternatives. Chemo consent obtained  Patient is willing to do chemo. Concerned about transportation.  called  Holding AI while on chemo Case was presented in the TB last week. Consensus was to give CMF chemo f/b RT Will refer for RT after chemo  Here to start CMF + onpro #1 today 5/27/22 Counts good  Chemo s/e discussed, She reports being very forgetful . I reminded her to HOLD anastrozole during Chemo   Medication for symptom management reviewed and questions answered EKG done 5/13/22 she is noted to have left bundle branch block and will f/u with cardio oncology ,  Patient is a anxious about stating chemo today Emotional support given, Written instructions given   6/24/22 : she was hospitalized after C1 of chemo. She had neutropenia.  She is been home x 2 weeks and still feeling tired She has appt with psyche for her mental issues. We discussed risks of continuing chemotherapy as well as benefits.  We discussed potential dose reduction.  Patient does not wish to continue chemotherapy given significant toxicity with the first cycle.  She will be sent for radiation oncology consultation.  She will restart anastrozole in the meantime.  Patient and her friend are both in agreement. [de-identified] : In summary, Ms. MARVIN CHISHOLM is a 79 year old postmenopausal female with stage IA (T1b, N0, Mx) ER positive, IL negative, HER-2/susan negative invasive ductal carcinoma of the left breast. She is status post lumpectomy and ALNbx 3/2022. ODX: RS 36 She has h/o severe anxiety and depression. Didnt tolerate chemo 5/2022. AI started 6/2022 12/2023 Takes AI daily, good compliance. She denies aches/pain, hot flashes, vag dryness, excessive fatigue, GI s/e, hair loss. She is active, no change in energy, wt or appetite.  mammogram - not done this year. 4/2022  DEXA- 04/15/2022: Osteoporosis T score - 2.5 osteoporosis femoral neck   Bone density reviewed with the patient multiple times. 4/2023 but she refused prolia. Today 12/2023 we discussed fosamax and she is amenable to try . precautions reviewed No

## 2023-12-19 LAB
ALBUMIN SERPL ELPH-MCNC: 4.1 G/DL
ALP BLD-CCNC: 106 U/L
ALT SERPL-CCNC: 14 U/L
ANION GAP SERPL CALC-SCNC: 11 MMOL/L
AST SERPL-CCNC: 21 U/L
BASOPHILS # BLD AUTO: 0.03 K/UL
BASOPHILS NFR BLD AUTO: 0.8 %
BILIRUB SERPL-MCNC: 0.7 MG/DL
BUN SERPL-MCNC: 18 MG/DL
CALCIUM SERPL-MCNC: 9.2 MG/DL
CHLORIDE SERPL-SCNC: 105 MMOL/L
CO2 SERPL-SCNC: 25 MMOL/L
CREAT SERPL-MCNC: 1.1 MG/DL
EGFR: 92 ML/MIN/1.73M2
EOSINOPHIL # BLD AUTO: 0.1 K/UL
EOSINOPHIL NFR BLD AUTO: 2.8 %
GGT SERPL-CCNC: 16 U/L
GLUCOSE SERPL-MCNC: 88 MG/DL
HCT VFR BLD CALC: 38.3 %
HGB BLD-MCNC: 13 G/DL
IMM GRANULOCYTES NFR BLD AUTO: 0 %
INR PPP: 1 RATIO
LYMPHOCYTES # BLD AUTO: 1.9 K/UL
LYMPHOCYTES NFR BLD AUTO: 53.2 %
MAGNESIUM SERPL-MCNC: 1.9 MG/DL
MAN DIFF?: NORMAL
MCHC RBC-ENTMCNC: 29.1 PG
MCHC RBC-ENTMCNC: 33.9 GM/DL
MCV RBC AUTO: 85.7 FL
MONOCYTES # BLD AUTO: 0.28 K/UL
MONOCYTES NFR BLD AUTO: 7.8 %
NEUTROPHILS # BLD AUTO: 1.26 K/UL
NEUTROPHILS NFR BLD AUTO: 35.4 %
PHOSPHATE SERPL-MCNC: 3.1 MG/DL
PLATELET # BLD AUTO: 230 K/UL
POTASSIUM SERPL-SCNC: 4.2 MMOL/L
PROT SERPL-MCNC: 6.8 G/DL
PT BLD: 11.3 SEC
RBC # BLD: 4.47 M/UL
RBC # FLD: 13.4 %
SODIUM SERPL-SCNC: 140 MMOL/L
TACROLIMUS SERPL-MCNC: 5 NG/ML
WBC # FLD AUTO: 3.57 K/UL

## 2023-12-21 LAB
CMV DNA SPEC QL NAA+PROBE: NOT DETECTED IU/ML
CMVPCR LOG: NOT DETECTED LOG10IU/ML

## 2023-12-27 NOTE — PHYSICAL THERAPY INITIAL EVALUATION ADULT - PHYSICAL ASSIST/NONPHYSICAL ASSIST: SIT/STAND, REHAB EVAL
01/05/24 0001   Pre-Procedure Phone Call   Procedure Time Verified Yes   Arrival Time 0730   Procedure Location Verified Yes   Medical History Reviewed Yes   NPO Status Reinforced No   Ride and Caregiver Arranged N/A   Patient Knows to Bring Current Medications No   Bring Outside Films Requested No        verbal cues/nonverbal cues (demo/gestures)/1 person assist

## 2024-01-14 ENCOUNTER — TRANSCRIPTION ENCOUNTER (OUTPATIENT)
Age: 34
End: 2024-01-14

## 2024-01-21 ENCOUNTER — TRANSCRIPTION ENCOUNTER (OUTPATIENT)
Age: 34
End: 2024-01-21

## 2024-01-21 LAB
ALBUMIN SERPL ELPH-MCNC: 4.3 G/DL
ALP BLD-CCNC: 99 U/L
ALT SERPL-CCNC: 14 U/L
ANION GAP SERPL CALC-SCNC: 11 MMOL/L
AST SERPL-CCNC: 21 U/L
BASOPHILS # BLD AUTO: 0.02 K/UL
BASOPHILS NFR BLD AUTO: 0.5 %
BILIRUB SERPL-MCNC: 1 MG/DL
BUN SERPL-MCNC: 24 MG/DL
CALCIUM SERPL-MCNC: 9.3 MG/DL
CHLORIDE SERPL-SCNC: 106 MMOL/L
CO2 SERPL-SCNC: 24 MMOL/L
CREAT SERPL-MCNC: 1.07 MG/DL
EGFR: 95 ML/MIN/1.73M2
EOSINOPHIL # BLD AUTO: 0.15 K/UL
EOSINOPHIL NFR BLD AUTO: 3.5 %
GLUCOSE SERPL-MCNC: 90 MG/DL
HCT VFR BLD CALC: 39.7 %
HGB BLD-MCNC: 13.2 G/DL
IMM GRANULOCYTES NFR BLD AUTO: 0.2 %
INR PPP: 1.11 RATIO
LYMPHOCYTES # BLD AUTO: 1.92 K/UL
LYMPHOCYTES NFR BLD AUTO: 44.7 %
MAN DIFF?: NORMAL
MCHC RBC-ENTMCNC: 28.9 PG
MCHC RBC-ENTMCNC: 33.2 GM/DL
MCV RBC AUTO: 87.1 FL
MONOCYTES # BLD AUTO: 0.32 K/UL
MONOCYTES NFR BLD AUTO: 7.4 %
NEUTROPHILS # BLD AUTO: 1.88 K/UL
NEUTROPHILS NFR BLD AUTO: 43.7 %
PLATELET # BLD AUTO: 235 K/UL
POTASSIUM SERPL-SCNC: 3.9 MMOL/L
PROT SERPL-MCNC: 6.9 G/DL
PT BLD: 12.5 SEC
RBC # BLD: 4.56 M/UL
RBC # FLD: 14.3 %
SODIUM SERPL-SCNC: 141 MMOL/L
TACROLIMUS SERPL-MCNC: 4.5 NG/ML
WBC # FLD AUTO: 4.3 K/UL

## 2024-01-22 ENCOUNTER — TRANSCRIPTION ENCOUNTER (OUTPATIENT)
Age: 34
End: 2024-01-22

## 2024-02-07 ENCOUNTER — TRANSCRIPTION ENCOUNTER (OUTPATIENT)
Age: 34
End: 2024-02-07

## 2024-02-15 NOTE — PATIENT PROFILE ADULT - LAST ORAL INTAKE
----- Message from Erick Tripathi sent at 2/15/2024 12:41 PM CST -----  Regarding: self  Type: Patient Call Back    Who called:self    What is the request in detail:pt fell hurt left ankle and hip would like to get a x ray order sent over     Can the clinic reply by [facility]?no    Would the patient rather a call back or a response via [facility]? callback    Best call back number:[phone]    Additional Information:    
Spoke with patient. Patient declines scheduling an appointment with an NP. Patient states she needs to be seen today. No availability with anyone today. Patient was advised to go to urgent care. Patient states she does not want to wait 5-6 hours to be seen. Patient was advised to contact the urgent care as we do no have any availability today and am not able to give her an estimated time frame. Patient verbalized understanding and states she will go to urgent care.   
12-Feb-2019 21:44

## 2024-02-20 LAB
ALBUMIN SERPL ELPH-MCNC: 4.3 G/DL
ALP BLD-CCNC: 97 U/L
ALT SERPL-CCNC: 14 U/L
ANION GAP SERPL CALC-SCNC: 12 MMOL/L
AST SERPL-CCNC: 20 U/L
BASOPHILS # BLD AUTO: 0.03 K/UL
BASOPHILS NFR BLD AUTO: 0.8 %
BILIRUB SERPL-MCNC: 1.1 MG/DL
BUN SERPL-MCNC: 22 MG/DL
CALCIUM SERPL-MCNC: 8.9 MG/DL
CHLORIDE SERPL-SCNC: 106 MMOL/L
CO2 SERPL-SCNC: 23 MMOL/L
CREAT SERPL-MCNC: 1.01 MG/DL
EGFR: 102 ML/MIN/1.73M2
EOSINOPHIL # BLD AUTO: 0.12 K/UL
EOSINOPHIL NFR BLD AUTO: 3.2 %
GLUCOSE SERPL-MCNC: 96 MG/DL
HCT VFR BLD CALC: 37.6 %
HGB BLD-MCNC: 13.1 G/DL
IMM GRANULOCYTES NFR BLD AUTO: 0.3 %
INR PPP: 1.05 RATIO
LYMPHOCYTES # BLD AUTO: 1.85 K/UL
LYMPHOCYTES NFR BLD AUTO: 48.8 %
MAN DIFF?: NORMAL
MCHC RBC-ENTMCNC: 30 PG
MCHC RBC-ENTMCNC: 34.8 GM/DL
MCV RBC AUTO: 86.2 FL
MONOCYTES # BLD AUTO: 0.27 K/UL
MONOCYTES NFR BLD AUTO: 7.1 %
NEUTROPHILS # BLD AUTO: 1.51 K/UL
NEUTROPHILS NFR BLD AUTO: 39.8 %
PLATELET # BLD AUTO: 222 K/UL
POTASSIUM SERPL-SCNC: 4.1 MMOL/L
PROT SERPL-MCNC: 6.7 G/DL
PT BLD: 11.9 SEC
RBC # BLD: 4.36 M/UL
RBC # FLD: 14.6 %
SODIUM SERPL-SCNC: 140 MMOL/L
TACROLIMUS SERPL-MCNC: 5.1 NG/ML
WBC # FLD AUTO: 3.79 K/UL

## 2024-02-21 ENCOUNTER — APPOINTMENT (OUTPATIENT)
Dept: HEPATOLOGY | Facility: CLINIC | Age: 34
End: 2024-02-21
Payer: MEDICAID

## 2024-02-21 VITALS
SYSTOLIC BLOOD PRESSURE: 137 MMHG | DIASTOLIC BLOOD PRESSURE: 84 MMHG | TEMPERATURE: 96.7 F | HEART RATE: 123 BPM | BODY MASS INDEX: 28 KG/M2 | RESPIRATION RATE: 14 BRPM | WEIGHT: 164 LBS | HEIGHT: 64 IN | OXYGEN SATURATION: 99 %

## 2024-02-21 DIAGNOSIS — B25.9 CYTOMEGALOVIRAL DISEASE, UNSPECIFIED: ICD-10-CM

## 2024-02-21 DIAGNOSIS — K83.01 PRIMARY SCLEROSING CHOLANGITIS: ICD-10-CM

## 2024-02-21 PROCEDURE — 99214 OFFICE O/P EST MOD 30 MIN: CPT

## 2024-02-25 DIAGNOSIS — Z12.11 ENCOUNTER FOR SCREENING FOR MALIGNANT NEOPLASM OF COLON: ICD-10-CM

## 2024-02-25 RX ORDER — POLYETHYLENE GLYCOL 3350 AND ELECTROLYTES WITH LEMON FLAVOR 236; 22.74; 6.74; 5.86; 2.97 G/4L; G/4L; G/4L; G/4L; G/4L
236 POWDER, FOR SOLUTION ORAL
Qty: 1 | Refills: 0 | Status: ACTIVE | COMMUNITY
Start: 2024-02-25 | End: 1900-01-01

## 2024-02-29 NOTE — ED PROCEDURE NOTE - PROCEDURE SUPERVISED BY
Try fiber tablet twice a day x 1-2 weeks and if not helpful, we will try Lomotil instead.  
Jesus Giraldo
Fall Risk

## 2024-03-09 ENCOUNTER — TRANSCRIPTION ENCOUNTER (OUTPATIENT)
Age: 34
End: 2024-03-09

## 2024-03-09 LAB
ALBUMIN SERPL ELPH-MCNC: 4.3 G/DL
ALP BLD-CCNC: 111 U/L
ALT SERPL-CCNC: 14 U/L
ANION GAP SERPL CALC-SCNC: 10 MMOL/L
AST SERPL-CCNC: 21 U/L
BASOPHILS # BLD AUTO: 0.04 K/UL
BASOPHILS NFR BLD AUTO: 0.9 %
BILIRUB SERPL-MCNC: 0.6 MG/DL
BUN SERPL-MCNC: 22 MG/DL
CALCIUM SERPL-MCNC: 9.5 MG/DL
CHLORIDE SERPL-SCNC: 104 MMOL/L
CO2 SERPL-SCNC: 27 MMOL/L
CREAT SERPL-MCNC: 1.11 MG/DL
EGFR: 91 ML/MIN/1.73M2
EOSINOPHIL # BLD AUTO: 0.09 K/UL
EOSINOPHIL NFR BLD AUTO: 2 %
GLUCOSE SERPL-MCNC: 93 MG/DL
HCT VFR BLD CALC: 36.7 %
HGB BLD-MCNC: 12.9 G/DL
IMM GRANULOCYTES NFR BLD AUTO: 0.2 %
INR PPP: 1.01 RATIO
LYMPHOCYTES # BLD AUTO: 2.23 K/UL
LYMPHOCYTES NFR BLD AUTO: 49.9 %
MAGNESIUM SERPL-MCNC: 1.7 MG/DL
MAN DIFF?: NORMAL
MCHC RBC-ENTMCNC: 31 PG
MCHC RBC-ENTMCNC: 35.1 GM/DL
MCV RBC AUTO: 88.2 FL
MONOCYTES # BLD AUTO: 0.35 K/UL
MONOCYTES NFR BLD AUTO: 7.8 %
NEUTROPHILS # BLD AUTO: 1.75 K/UL
NEUTROPHILS NFR BLD AUTO: 39.2 %
PHOSPHATE SERPL-MCNC: 2.5 MG/DL
PLATELET # BLD AUTO: 218 K/UL
POTASSIUM SERPL-SCNC: 4.1 MMOL/L
PROT SERPL-MCNC: 6.8 G/DL
PT BLD: 11.4 SEC
RBC # BLD: 4.16 M/UL
RBC # FLD: 14.6 %
SODIUM SERPL-SCNC: 141 MMOL/L
TACROLIMUS SERPL-MCNC: 6.8 NG/ML
WBC # FLD AUTO: 4.47 K/UL

## 2024-03-11 ENCOUNTER — TRANSCRIPTION ENCOUNTER (OUTPATIENT)
Age: 34
End: 2024-03-11

## 2024-03-11 RX ORDER — ACETAMINOPHEN 325 MG/1
325 TABLET, FILM COATED ORAL
Qty: 240 | Refills: 0 | Status: ACTIVE | COMMUNITY
Start: 2022-11-15 | End: 1900-01-01

## 2024-03-21 LAB
ALBUMIN SERPL ELPH-MCNC: 4.3 G/DL
ALP BLD-CCNC: 102 U/L
ALT SERPL-CCNC: 14 U/L
ANION GAP SERPL CALC-SCNC: 9 MMOL/L
AST SERPL-CCNC: 18 U/L
BASOPHILS # BLD AUTO: 0.05 K/UL
BASOPHILS NFR BLD AUTO: 0.9 %
BILIRUB SERPL-MCNC: 1 MG/DL
BUN SERPL-MCNC: 19 MG/DL
CALCIUM SERPL-MCNC: 9.6 MG/DL
CHLORIDE SERPL-SCNC: 104 MMOL/L
CMV DNA SPEC QL NAA+PROBE: NOT DETECTED IU/ML
CMVPCR LOG: NOT DETECTED LOG10IU/ML
CO2 SERPL-SCNC: 24 MMOL/L
CREAT SERPL-MCNC: 0.97 MG/DL
EGFR: 107 ML/MIN/1.73M2
EOSINOPHIL # BLD AUTO: 0.17 K/UL
EOSINOPHIL NFR BLD AUTO: 3.1 %
GLUCOSE SERPL-MCNC: 91 MG/DL
HCT VFR BLD CALC: 38.1 %
HGB BLD-MCNC: 13 G/DL
IMM GRANULOCYTES NFR BLD AUTO: 0.2 %
INR PPP: 1.04 RATIO
LYMPHOCYTES # BLD AUTO: 2.64 K/UL
LYMPHOCYTES NFR BLD AUTO: 48.6 %
MAGNESIUM SERPL-MCNC: 1.7 MG/DL
MAN DIFF?: NORMAL
MCHC RBC-ENTMCNC: 30.7 PG
MCHC RBC-ENTMCNC: 34.1 GM/DL
MCV RBC AUTO: 90.1 FL
MONOCYTES # BLD AUTO: 0.46 K/UL
MONOCYTES NFR BLD AUTO: 8.5 %
NEUTROPHILS # BLD AUTO: 2.1 K/UL
NEUTROPHILS NFR BLD AUTO: 38.7 %
PHOSPHATE SERPL-MCNC: 2.3 MG/DL
PLATELET # BLD AUTO: 223 K/UL
POTASSIUM SERPL-SCNC: 4.2 MMOL/L
PROT SERPL-MCNC: 7.1 G/DL
PT BLD: 11.7 SEC
RBC # BLD: 4.23 M/UL
RBC # FLD: 14.4 %
SODIUM SERPL-SCNC: 138 MMOL/L
TACROLIMUS SERPL-MCNC: 7 NG/ML
WBC # FLD AUTO: 5.43 K/UL

## 2024-03-21 RX ORDER — MYCOPHENOLATE MOFETIL 500 MG/1
500 TABLET ORAL
Qty: 360 | Refills: 3 | Status: ACTIVE | COMMUNITY
Start: 2022-11-15 | End: 1900-01-01

## 2024-03-21 RX ORDER — ASPIRIN ENTERIC COATED TABLETS 81 MG 81 MG/1
81 TABLET, DELAYED RELEASE ORAL DAILY
Qty: 90 | Refills: 3 | Status: ACTIVE | COMMUNITY
Start: 2022-11-15 | End: 1900-01-01

## 2024-03-21 RX ORDER — URSODIOL 300 MG/1
300 CAPSULE ORAL
Qty: 180 | Refills: 3 | Status: ACTIVE | COMMUNITY
Start: 2022-11-15 | End: 1900-01-01

## 2024-03-21 RX ORDER — DEXTROMETHORPHAN POLISTIREX 30 MG/5 ML
500-1000-40 SUSPENSION, EXTENDED RELEASE 12 HR ORAL
Qty: 180 | Refills: 3 | Status: ACTIVE | COMMUNITY
Start: 2022-11-15 | End: 1900-01-01

## 2024-03-25 ENCOUNTER — TRANSCRIPTION ENCOUNTER (OUTPATIENT)
Age: 34
End: 2024-03-25

## 2024-03-25 ENCOUNTER — RESULT REVIEW (OUTPATIENT)
Age: 34
End: 2024-03-25

## 2024-03-25 ENCOUNTER — APPOINTMENT (OUTPATIENT)
Dept: HEPATOLOGY | Facility: HOSPITAL | Age: 34
End: 2024-03-25

## 2024-03-25 ENCOUNTER — OUTPATIENT (OUTPATIENT)
Dept: OUTPATIENT SERVICES | Facility: HOSPITAL | Age: 34
LOS: 1 days | End: 2024-03-25
Payer: MEDICAID

## 2024-03-25 VITALS
RESPIRATION RATE: 20 BRPM | TEMPERATURE: 98 F | HEART RATE: 115 BPM | SYSTOLIC BLOOD PRESSURE: 129 MMHG | WEIGHT: 167.99 LBS | DIASTOLIC BLOOD PRESSURE: 62 MMHG | HEIGHT: 64 IN | OXYGEN SATURATION: 99 %

## 2024-03-25 VITALS
DIASTOLIC BLOOD PRESSURE: 66 MMHG | RESPIRATION RATE: 18 BRPM | OXYGEN SATURATION: 100 % | SYSTOLIC BLOOD PRESSURE: 121 MMHG | HEART RATE: 91 BPM

## 2024-03-25 DIAGNOSIS — K83.01 PRIMARY SCLEROSING CHOLANGITIS: ICD-10-CM

## 2024-03-25 DIAGNOSIS — Z94.4 LIVER TRANSPLANT STATUS: Chronic | ICD-10-CM

## 2024-03-25 DIAGNOSIS — Z98.890 OTHER SPECIFIED POSTPROCEDURAL STATES: Chronic | ICD-10-CM

## 2024-03-25 DIAGNOSIS — K08.409 PARTIAL LOSS OF TEETH, UNSPECIFIED CAUSE, UNSPECIFIED CLASS: Chronic | ICD-10-CM

## 2024-03-25 PROCEDURE — 45378 DIAGNOSTIC COLONOSCOPY: CPT

## 2024-03-25 PROCEDURE — 88305 TISSUE EXAM BY PATHOLOGIST: CPT | Mod: 26

## 2024-03-25 PROCEDURE — 88305 TISSUE EXAM BY PATHOLOGIST: CPT

## 2024-03-25 PROCEDURE — 45380 COLONOSCOPY AND BIOPSY: CPT

## 2024-03-25 DEVICE — NET RETRV ROT ROTH 2.5MMX230CM: Type: IMPLANTABLE DEVICE | Status: FUNCTIONAL

## 2024-03-25 RX ORDER — TACROLIMUS 5 MG/1
1 CAPSULE ORAL
Refills: 0 | DISCHARGE

## 2024-03-25 RX ORDER — SODIUM CHLORIDE 9 MG/ML
500 INJECTION, SOLUTION INTRAVENOUS
Refills: 0 | Status: DISCONTINUED | OUTPATIENT
Start: 2024-03-25 | End: 2024-04-09

## 2024-03-25 NOTE — ASU DISCHARGE PLAN (ADULT/PEDIATRIC) - NS MD DC FALL RISK RISK
For information on Fall & Injury Prevention, visit: [email or web address] OR  [email or web address] OR  https://www.cdc.gov/steadi/patient.html

## 2024-03-25 NOTE — ASU PATIENT PROFILE, ADULT - NSICDXPASTSURGICALHX_GEN_ALL_CORE_FT
PAST SURGICAL HISTORY:  History of ERCP multiple with stent insertions/replacement every 3 months ---last 5/20/2022    S/P liver transplant     [place] teeth extracted 2021

## 2024-03-25 NOTE — PRE PROCEDURE NOTE - PRE PROCEDURE EVALUATION
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      Anemia      History of ERCP  multiple with stent insertions/replacement every 3 months ---last 5/20/2022      [place] teeth extracted  2021      S/P liver transplant        ALLERGIES:  No Known Allergies    HOME MEDICATIONS:  aspirin 81 mg oral delayed release tablet: 1 tab(s) orally once a day  calcium carbonate 1250 mg (500 mg elemental calcium) oral tablet: 1 tab(s) orally 2 times a day  CellCept 500 mg oral tablet: 2 tab(s) orally 2 times a day  pantoprazole 40 mg oral delayed release tablet: 1 tab(s) orally once a day (before a meal)  predniSONE 10 mg oral tablet: 1 tab(s) orally once a day  sulfamethoxazole-trimethoprim 400 mg-80 mg oral tablet: 1 tab(s) orally once a day  tacrolimus 0.5 mg oral capsule: 1 cap(s) orally 2 times a day  tacrolimus 1 mg oral capsule: 1 cap(s) orally once a day (at bedtime)  tacrolimus 1 mg oral capsule: 2 cap(s) orally once a day  ursodiol 300 mg oral capsule: 1 cap(s) orally 2 times a day  valGANciclovir 450 mg oral tablet: 2 tab(s) orally once a day    AICD/PPM: [ ] yes   [ ] no    PERTINENT LAB DATA:                      PHYSICAL EXAMINATION:    T(C): --  HR: --  BP: --  RR: --  SpO2: --    Constitutional: NAD  HEENT: PERRLA, EOMI,    Neck:  No JVD  Respiratory: CTAB/L  Cardiovascular: S1 and S2  Gastrointestinal: BS+, soft, NT/ND  Extremities: No peripheral edema  Neurological: A/O x 3, no focal deficits  Psychiatric: Normal mood, normal affect  Skin: No rashes    ASA Class: I [ ]  II [ ]  III [ ]  IV [ ]    COMMENTS:    The patient is a suitable candidate for the planned procedure unless box checked [ ]  No, explain:

## 2024-03-25 NOTE — ASU PATIENT PROFILE, ADULT - FALL HARM RISK - UNIVERSAL INTERVENTIONS
Bed in lowest position, wheels locked, appropriate side rails in place/Call bell, personal items and telephone in reach/Instruct patient to call for assistance before getting out of bed or chair/Non-slip footwear when patient is out of bed/[place] to call system/Physically safe environment - no spills, clutter or unnecessary equipment/Purposeful Proactive Rounding/Room/bathroom lighting operational, light cord in reach

## 2024-03-29 LAB — SURGICAL PATHOLOGY STUDY: SIGNIFICANT CHANGE UP

## 2024-04-08 ENCOUNTER — TRANSCRIPTION ENCOUNTER (OUTPATIENT)
Age: 34
End: 2024-04-08

## 2024-04-08 ENCOUNTER — NON-APPOINTMENT (OUTPATIENT)
Age: 34
End: 2024-04-08

## 2024-04-11 NOTE — ED STATDOCS - NS ED NOTE AC HIGH RISK COUNTRIES
OPERATIVE REPORT  PATIENT NAME: Joel Wyman    :  1941  MRN: [id number]  Pt Location: [facility] OR ROOM 04    SURGERY DATE: 2024    Surgeons and Role:     * Yovani Khan MD - Primary    Preop Diagnosis:  Malignant neoplasm of urinary bladder, unspecified site (HCC) [C67.9]    Post-Op Diagnosis Codes:     * Malignant neoplasm of urinary bladder, unspecified site (HCC) [C67.9]    Procedure(s):  TRANSURETHRAL RESECTION OF BLADDER TUMOR (TURBT)  Bilateral - BILATERAL CYSTOSCOPY WITH RETROGRADE PYELOGRAM. BLADDER NECK CONTRACTURE    Specimen(s):  ID Type Source Tests Collected by Time Destination   1 :  Urine Urine, Cystoscopic CYTOLOGY, URINE Yovani Khan MD 2024 1227    2 : BLADDER NECK BIOPSY Tissue Urinary Bladder TISSUE EXAM Yovani Khan MD 2024 1230        Estimated Blood Loss:   Minimal    Drains:  Urethral Catheter Latex;Three way 22 Fr. (Active)   Number of days: 0       Anesthesia Type:   General/LMA    Operative Indications:  Malignant neoplasm of urinary bladder, unspecified site (HCC) [C67.9]  This 82-year-old male with a long complicated urologic history status post radical prostatectomy by myself over 15 years ago developing periprostatic recurrence undergoing external beam radiation developing radiation cystitis sphincter incompetency and bladder papillomas undergoing hyperbaric oxygen therapy and surveillance flex cystoscopy was in the last few months has had intermittent gross hematuria admission to [facility] for urinary retention patient recently seen in the office for complicated UTI on multiple courses of antibiotics and was scheduled for 6-month surveillance office cystoscopy for history of bladder papillomas now will undergo exam under anesthesia cystoscopy possible TURBT bilateral retrograde pyelograms to evaluate the upper tracts     Operative Findings:  Severely contracted bladder neck necrotic tissue/bladder tumor?  Encompassing the bladder neck and urethra  undergoing transurethral resection bladder neck contracture/bladder tumor 3 cm  No intraluminal lesions  No signs of radiation cystitis status post hyperbolic therapy  Bilateral retrograde pyelograms normal filling and drainage  22 three-way Sumner catheter CBI clear      Complications:   None    Procedure and Technique:  Patient identified in the holding area consent reviewed wife present wish to proceed with procedure placed in the operative suite.  After anesthesia was induced placed in thigh position draped and prepped standard fashion timeout performed.  22 Cypriot cystoscope with 30 lens passed the urethra to the bladder.  Anterior urethra confirmed no abnormalities posterior urethra confirmed a severely contracted bladder neck with white necrotic and papillary lesions extending around the entire bladder neck towards the external sphincter.  Scope inserted into the bladder lumen no intraluminal lesions were noted.  5 Cypriot open-ended catheter placed into the right orifice right retrograde pyelogram confirmed normal filling drainage of recurrent pyelogram confirmed normal filling and drainage the scope was removed with significant tightness noted around the contracted bladder neck and necrotic tissue the 24 Cypriot resectoscope was placed and resection of the bladder neck for both bladder tumor and contracture was performed with aggressive fulguration of the entire area in light of bleeding.  At the end of resection all pathology was removed with the Ellik and sent on view from the external sphincter no obvious bleeding opened scope removed 22 Cypriot three-way Sumner cath inserted and 30 cc placed into the balloon with mild bladder irrigation clear patient procedure awakened taken to recovery room stable condition   I was present for the entire procedure.    Patient Disposition:  PACU         SIGNATURE: Yovani Khan MD  DATE: April 11, 2024  TIME: 1:24 PM                 No

## 2024-04-16 ENCOUNTER — NON-APPOINTMENT (OUTPATIENT)
Age: 34
End: 2024-04-16

## 2024-04-16 LAB
ALBUMIN SERPL ELPH-MCNC: 4 G/DL
ALP BLD-CCNC: 112 U/L
ALT SERPL-CCNC: 16 U/L
ANION GAP SERPL CALC-SCNC: 12 MMOL/L
AST SERPL-CCNC: 22 U/L
BASOPHILS # BLD AUTO: 0.04 K/UL
BASOPHILS NFR BLD AUTO: 1 %
BILIRUB SERPL-MCNC: 0.7 MG/DL
BUN SERPL-MCNC: 16 MG/DL
CALCIUM SERPL-MCNC: 9.4 MG/DL
CHLORIDE SERPL-SCNC: 107 MMOL/L
CMV DNA SPEC QL NAA+PROBE: NOT DETECTED IU/ML
CMVPCR LOG: NOT DETECTED LOG10IU/ML
CO2 SERPL-SCNC: 23 MMOL/L
CREAT SERPL-MCNC: 1.07 MG/DL
EGFR: 95 ML/MIN/1.73M2
EOSINOPHIL # BLD AUTO: 0.15 K/UL
EOSINOPHIL NFR BLD AUTO: 3.7 %
GLUCOSE SERPL-MCNC: 98 MG/DL
HCT VFR BLD CALC: 39.5 %
HGB BLD-MCNC: 12.8 G/DL
IMM GRANULOCYTES NFR BLD AUTO: 0.2 %
INR PPP: 0.95 RATIO
LYMPHOCYTES # BLD AUTO: 1.98 K/UL
LYMPHOCYTES NFR BLD AUTO: 48.5 %
MAGNESIUM SERPL-MCNC: 1.8 MG/DL
MAN DIFF?: NORMAL
MCHC RBC-ENTMCNC: 29.6 PG
MCHC RBC-ENTMCNC: 32.4 GM/DL
MCV RBC AUTO: 91.4 FL
MONOCYTES # BLD AUTO: 0.4 K/UL
MONOCYTES NFR BLD AUTO: 9.8 %
NEUTROPHILS # BLD AUTO: 1.5 K/UL
NEUTROPHILS NFR BLD AUTO: 36.8 %
PHOSPHATE SERPL-MCNC: 2.6 MG/DL
PLATELET # BLD AUTO: 236 K/UL
POTASSIUM SERPL-SCNC: 4.2 MMOL/L
PROT SERPL-MCNC: 6.5 G/DL
PT BLD: 10.8 SEC
RBC # BLD: 4.32 M/UL
RBC # FLD: 13.2 %
SODIUM SERPL-SCNC: 142 MMOL/L
TACROLIMUS SERPL-MCNC: 3.3 NG/ML
WBC # FLD AUTO: 4.08 K/UL

## 2024-04-16 RX ORDER — TACROLIMUS 0.5 MG/1
0.5 CAPSULE ORAL
Qty: 90 | Refills: 3 | Status: DISCONTINUED | COMMUNITY
Start: 2023-04-10 | End: 2024-04-16

## 2024-05-07 ENCOUNTER — TRANSCRIPTION ENCOUNTER (OUTPATIENT)
Age: 34
End: 2024-05-07

## 2024-05-07 LAB
ALBUMIN SERPL ELPH-MCNC: 4.2 G/DL
ALP BLD-CCNC: 109 U/L
ALT SERPL-CCNC: 17 U/L
ANION GAP SERPL CALC-SCNC: 11 MMOL/L
AST SERPL-CCNC: 23 U/L
BASOPHILS # BLD AUTO: 0.04 K/UL
BASOPHILS NFR BLD AUTO: 0.8 %
BILIRUB SERPL-MCNC: 0.9 MG/DL
BUN SERPL-MCNC: 20 MG/DL
CALCIUM SERPL-MCNC: 9.4 MG/DL
CHLORIDE SERPL-SCNC: 104 MMOL/L
CMV DNA SPEC QL NAA+PROBE: NOT DETECTED IU/ML
CMVPCR LOG: NOT DETECTED LOG10IU/ML
CO2 SERPL-SCNC: 24 MMOL/L
CREAT SERPL-MCNC: 1.07 MG/DL
EGFR: 95 ML/MIN/1.73M2
EOSINOPHIL # BLD AUTO: 0.23 K/UL
EOSINOPHIL NFR BLD AUTO: 4.7 %
GLUCOSE SERPL-MCNC: 99 MG/DL
HCT VFR BLD CALC: 37.9 %
HGB BLD-MCNC: 12.7 G/DL
IMM GRANULOCYTES NFR BLD AUTO: 0 %
LYMPHOCYTES # BLD AUTO: 2.39 K/UL
LYMPHOCYTES NFR BLD AUTO: 48.8 %
MAGNESIUM SERPL-MCNC: 1.7 MG/DL
MAN DIFF?: NORMAL
MCHC RBC-ENTMCNC: 29.2 PG
MCHC RBC-ENTMCNC: 33.5 GM/DL
MCV RBC AUTO: 87.1 FL
MONOCYTES # BLD AUTO: 0.4 K/UL
MONOCYTES NFR BLD AUTO: 8.2 %
NEUTROPHILS # BLD AUTO: 1.84 K/UL
NEUTROPHILS NFR BLD AUTO: 37.5 %
PHOSPHATE SERPL-MCNC: 2.8 MG/DL
PLATELET # BLD AUTO: 228 K/UL
POTASSIUM SERPL-SCNC: 4.1 MMOL/L
PROT SERPL-MCNC: 7 G/DL
RBC # BLD: 4.35 M/UL
RBC # FLD: 12.7 %
SODIUM SERPL-SCNC: 140 MMOL/L
TACROLIMUS SERPL-MCNC: 3.3 NG/ML
WBC # FLD AUTO: 4.9 K/UL

## 2024-05-14 ENCOUNTER — TRANSCRIPTION ENCOUNTER (OUTPATIENT)
Age: 34
End: 2024-05-14

## 2024-05-22 ENCOUNTER — TRANSCRIPTION ENCOUNTER (OUTPATIENT)
Age: 34
End: 2024-05-22

## 2024-05-29 ENCOUNTER — APPOINTMENT (OUTPATIENT)
Dept: HEPATOLOGY | Facility: CLINIC | Age: 34
End: 2024-05-29
Payer: MEDICAID

## 2024-05-29 VITALS
HEIGHT: 64 IN | TEMPERATURE: 98.3 F | SYSTOLIC BLOOD PRESSURE: 142 MMHG | DIASTOLIC BLOOD PRESSURE: 78 MMHG | HEART RATE: 114 BPM | OXYGEN SATURATION: 96 % | RESPIRATION RATE: 14 BRPM | WEIGHT: 176 LBS | BODY MASS INDEX: 30.05 KG/M2

## 2024-05-29 DIAGNOSIS — E83.39 OTHER DISORDERS OF PHOSPHORUS METABOLISM: ICD-10-CM

## 2024-05-29 LAB
ALBUMIN SERPL ELPH-MCNC: 4.1 G/DL
ALP BLD-CCNC: 112 U/L
ALT SERPL-CCNC: 25 U/L
ANION GAP SERPL CALC-SCNC: 14 MMOL/L
AST SERPL-CCNC: 29 U/L
BASOPHILS # BLD AUTO: 0.04 K/UL
BASOPHILS NFR BLD AUTO: 0.9 %
BILIRUB SERPL-MCNC: 0.7 MG/DL
BUN SERPL-MCNC: 25 MG/DL
CALCIUM SERPL-MCNC: 9 MG/DL
CHLORIDE SERPL-SCNC: 106 MMOL/L
CMV DNA SPEC QL NAA+PROBE: NOT DETECTED IU/ML
CMVPCR LOG: NOT DETECTED LOG10IU/ML
CO2 SERPL-SCNC: 21 MMOL/L
CREAT SERPL-MCNC: 1.06 MG/DL
EGFR: 96 ML/MIN/1.73M2
EOSINOPHIL # BLD AUTO: 0.27 K/UL
EOSINOPHIL NFR BLD AUTO: 6 %
GLUCOSE SERPL-MCNC: 100 MG/DL
HCT VFR BLD CALC: 39.6 %
HGB BLD-MCNC: 12.8 G/DL
IMM GRANULOCYTES NFR BLD AUTO: 0.2 %
LYMPHOCYTES # BLD AUTO: 2.15 K/UL
LYMPHOCYTES NFR BLD AUTO: 47.8 %
MAGNESIUM SERPL-MCNC: 1.9 MG/DL
MAN DIFF?: NORMAL
MCHC RBC-ENTMCNC: 29.3 PG
MCHC RBC-ENTMCNC: 32.3 GM/DL
MCV RBC AUTO: 90.6 FL
MONOCYTES # BLD AUTO: 0.34 K/UL
MONOCYTES NFR BLD AUTO: 7.6 %
NEUTROPHILS # BLD AUTO: 1.69 K/UL
NEUTROPHILS NFR BLD AUTO: 37.5 %
PHOSPHATE SERPL-MCNC: 2 MG/DL
PLATELET # BLD AUTO: 245 K/UL
POTASSIUM SERPL-SCNC: 4.6 MMOL/L
PROT SERPL-MCNC: 6.8 G/DL
RBC # BLD: 4.37 M/UL
RBC # FLD: 13.1 %
SODIUM SERPL-SCNC: 140 MMOL/L
TACROLIMUS SERPL-MCNC: 4.1 NG/ML
WBC # FLD AUTO: 4.5 K/UL

## 2024-05-29 PROCEDURE — 99214 OFFICE O/P EST MOD 30 MIN: CPT

## 2024-05-29 NOTE — PHYSICAL EXAM
[General Appearance - Alert] : alert [General Appearance - In No Acute Distress] : in no acute distress [Auscultation Breath Sounds / Voice Sounds] : lungs were clear to auscultation bilaterally [Heart Rate And Rhythm] : heart rate was normal and rhythm regular [Heart Sounds] : normal S1 and S2 [Heart Sounds Gallop] : no gallops [Murmurs] : no murmurs [Heart Sounds Pericardial Friction Rub] : no pericardial rub [Bowel Sounds] : normal bowel sounds [Abdomen Soft] : soft [Abdomen Tenderness] : non-tender [Abdomen Mass (___ Cm)] : no abdominal mass palpated [Abnormal Walk] : normal gait [Nail Clubbing] : no clubbing  or cyanosis of the fingernails [Musculoskeletal - Swelling] : no joint swelling seen [Motor Tone] : muscle strength and tone were normal [Skin Color & Pigmentation] : normal skin color and pigmentation [Skin Turgor] : normal skin turgor [] : no rash [Oriented To Time, Place, And Person] : oriented to person, place, and time [Impaired Insight] : insight and judgment were intact [Affect] : the affect was normal [Abdominal  Ascites] : no ascites [Jaundice] : No jaundice None known in lifetime

## 2024-05-29 NOTE — HISTORY OF PRESENT ILLNESS
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  [FreeTextEntry1] : Mr. SAUMYA ZARATE a 33-year-old male came for a follow-up appointment in the hepatology department.  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 He also had a bout of COVID in September 2022 and received monoclonal antibodies as part of the treatment. On November 1, 2022, he underwent orthotopic liver transplantation with a Velasquez-en-Y hepaticojejunostomy for bile duct anastomosis. However, he experienced a bile leak at the hepaticojejunostomy, necessitating a revision of the biliary anastomosis/Velasquez-en-Y on November 7.  His post-operative course was uneventful until February 2023 when he was admitted with hematuria and diagnosed with a renal stone. A urine analysis revealed significant blood in the urine, but the culture showed no active infection. He was evaluated by urologist Dr. Deo Bains and underwent a negative cystoscopy. A non-contrast CT scan indicated a small renal stone, but no further intervention was required as the issue resolved on its own.  During his follow-up visit on June 28, 2023, he denied any gastrointestinal symptoms such as abdominal pain, nausea, vomiting, constipation, or diarrhea. Recent lab results from June 27, 2023, indicated excellent allograft (transplanted organ) function, along with normal renal function. His tacrolimus level was 5.5 ng/mL. He returned from a recent vacation with his family and reported an 8-pound weight loss since his last visit.  On his subsequent follow-up on August 8, 2023, he continued to be in good health with no new complaints. His graft function remained excellent. 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 His CMV treatment is being managed by infectious disease specialist Dr. Lantigua. Additionally, he continues to take Prograf (1 mg in the morning and 0.5 mg in the evening), MMF (1 g twice daily), and off prednisone (5 mg daily, since 6/28) to manage his post-transplant regimen. His Valcyte was discontinued on 6/28/2023, but CMV relapsed, and Valcyte was resumed on July 13, 2023. He discontinued Bactrim on May 1, 2023 (6 months after the transplant). Still on ASA 81 mg daily.  Sep 13, 2023: Today he returns for a follow-up visit along with his mother.  He feels well and has no new complaints.  He has gained about 4 pounds of weight in 1 month.  I have cautioned him to be watchful on his diet and include exercise on his daily regimen.  We did review blood test results from August 30, 2023 which revealed negative CMV PCR.  Excellent allograft function.  Tacrolimus level from Sep 12, 2023 was 4.1 ng/mL.  His immunosuppression regimen has remained same since last visit.  He continues to remain on Prograf 1 mg in the morning and 0.5 mg evening along with MMF 1 g twice daily.  He remains now on Valcyte 900 mg daily since September 4, 2023 being managed by Dr. Lantigua.  Dose was reduced from 900 mg twice a day to 900 mg daily as CMV PCR is now negative.   Nov 15, 2023: Patient returns for a follow-up accompanied by her mother. There are no new concerns except for a dry area beneath both eyes. Despite following the PCP's advice to use Aquaphor, there has been no improvement. The patient is currently on Valcyte 450 mg twice daily with low-level viremia. He reports experiencing constipation and pain during bowel movements, having taken Miralax 17 g daily without relief. I suggested increasing the Miralax to twice daily. Additionally, the patient has passed blood-tinged stool, possibly indicative of an anal fissure. I recommended using Anusol HC 2.5% twice daily for three weeks.  Feb 21, 2024: Doing well. Excellent allograft function. Taking Tacro 1 mg and 0.5, and Cellecpt 1000 mg bid. Off Prednisone. Reporting having some altered bowel movement, and pain before bowel movements, that resolves after bowel movements. He is due for a colonoscopic evaluation given his hx of PSC. We will schedule it.   May 29, 2024: Patient returns for a follow-up visit accompanied by her mother.  He has no new complaints today.  He however has gained 12 pounds of weight since February 2024.  We discussed about the plan for weight loss with more exercise and dietary restrictions.  Blood test results from May 20, 2024 was reviewed.  This revealed excellent allograft function with normal liver enzymes.  Normal kidney function.  CBC revealed hemoglobin of 12.8 g/dL and a platelet count 245,000.  He is phosphorus level is low at 2 mg/dL.  His tacrolimus level is 4.1 ng/mL which is stable.  I have recommended starting Phospha neutral 1 tablet daily.  He will have follow-up labs monthly and will see us in 3 months.  Recent colonoscopy on Mach 25, 2024:  The perianal and digital rectal examinations were normal. There was stool throughout the entire colon (unable to rule out polyp <6 mm). Otherwise, the  exam was otherwise normal throughout the examined colon. Biopsy from the Right/Left side of  the colon and rectum were obtained.  The terminal ileum appeared normal.  Internal hemorrhoids were found during endoscopy. The hemorrhoids were small.

## 2024-05-29 NOTE — ASSESSMENT
[FreeTextEntry1] : Mr. 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 The postoperative course was complicated with bile leak at hepaticojejunostomy requiring revision of biliary anastomosis/Velasquez-en-Y on November 7. He has excellent allograft function. Overall doing well. No recurrent hematuria. He however has CMV relapse, now on Valcyte 900 mg daily, still detectable.  PLAN I. Status post orthotopic liver transplantation -Patient with excellent allograft function. He will continue with his current immunosuppression regimen that will include Prograf 0.5 mg in the morning and 0.5 mg in the evening,   mg twice daily. His most recent FK level is 4.1 ng/mL. Tacro goal 4-6. He is off Prednisone (6/28/2023).  II. Post transplant prophylaxis -Recommended him to continue aspirin 81 mg daily. -His Valcyte was discontinued on 6/28/2023, but CMV relapsed, and Valcyte was resumed on July 13, 2023. He is now off Valcyte since March 2024.  -He discontinued Bactrim on May 1, 2023 (6 months after the transplant)  III. Blood-tinged stool with painful defecation -Resolved. Recent colonoscopy, March 25, 2024- hemorrhoids noted.   IV. Weight Gain The patient having recent weight gain. Discussed diet and exercise options for him with his physical limitations. He is working on it.    RTC in 3 months. He will have follow-up labs monthly.

## 2024-06-16 NOTE — ED ADULT NURSE NOTE - CHPI ED NUR TIMING2
Goal Outcome Evaluation:  Plan of Care Reviewed With: patient        Progress: no change  Outcome Evaluation: Vss. PRN Norco administered one time this shift. Patient walked with therapy this shift and tolerated well. Will continue to follow current plan of care.                                gradual onset

## 2024-07-01 DIAGNOSIS — Z94.4 LIVER TRANSPLANT STATUS: ICD-10-CM

## 2024-07-01 DIAGNOSIS — D84.9 IMMUNODEFICIENCY, UNSPECIFIED: ICD-10-CM

## 2024-07-01 LAB
ALBUMIN SERPL ELPH-MCNC: 4 G/DL
ALP BLD-CCNC: 132 U/L
ALT SERPL-CCNC: 40 U/L
ANION GAP SERPL CALC-SCNC: 10 MMOL/L
AST SERPL-CCNC: 42 U/L
BASOPHILS # BLD AUTO: 0.03 K/UL
BASOPHILS NFR BLD AUTO: 0.6 %
BILIRUB SERPL-MCNC: 0.5 MG/DL
BUN SERPL-MCNC: 15 MG/DL
CALCIUM SERPL-MCNC: 8.8 MG/DL
CHLORIDE SERPL-SCNC: 105 MMOL/L
CMV DNA SPEC QL NAA+PROBE: NOT DETECTED IU/ML
CMVPCR LOG: NOT DETECTED LOG10IU/ML
CO2 SERPL-SCNC: 24 MMOL/L
CREAT SERPL-MCNC: 1.12 MG/DL
EGFR: 90 ML/MIN/1.73M2
EOSINOPHIL # BLD AUTO: 0.35 K/UL
EOSINOPHIL NFR BLD AUTO: 7.2 %
GGT SERPL-CCNC: 51 U/L
GLUCOSE SERPL-MCNC: 99 MG/DL
HCT VFR BLD CALC: 39.3 %
HGB BLD-MCNC: 12.8 G/DL
IMM GRANULOCYTES NFR BLD AUTO: 0.2 %
LYMPHOCYTES # BLD AUTO: 2.46 K/UL
LYMPHOCYTES NFR BLD AUTO: 50.7 %
MAGNESIUM SERPL-MCNC: 1.8 MG/DL
MAN DIFF?: NORMAL
MCHC RBC-ENTMCNC: 28 PG
MCHC RBC-ENTMCNC: 32.6 GM/DL
MCV RBC AUTO: 86 FL
MONOCYTES # BLD AUTO: 0.35 K/UL
MONOCYTES NFR BLD AUTO: 7.2 %
NEUTROPHILS # BLD AUTO: 1.65 K/UL
NEUTROPHILS NFR BLD AUTO: 34.1 %
PHOSPHATE SERPL-MCNC: 2.2 MG/DL
PLATELET # BLD AUTO: 235 K/UL
POTASSIUM SERPL-SCNC: 3.7 MMOL/L
PROT SERPL-MCNC: 7 G/DL
RBC # BLD: 4.57 M/UL
RBC # FLD: 13.9 %
SODIUM SERPL-SCNC: 139 MMOL/L
TACROLIMUS SERPL-MCNC: 3.7 NG/ML
WBC # FLD AUTO: 4.85 K/UL

## 2024-07-01 RX ORDER — TACROLIMUS 1 MG/1
1 CAPSULE ORAL
Qty: 90 | Refills: 3 | Status: ACTIVE | COMMUNITY
Start: 2024-07-01 | End: 1900-01-01

## 2024-07-22 ENCOUNTER — TRANSCRIPTION ENCOUNTER (OUTPATIENT)
Age: 34
End: 2024-07-22

## 2024-07-22 LAB
ALBUMIN SERPL ELPH-MCNC: 3.9 G/DL
ALP BLD-CCNC: 117 U/L
ALT SERPL-CCNC: 40 U/L
ANION GAP SERPL CALC-SCNC: 14 MMOL/L
AST SERPL-CCNC: 40 U/L
BASOPHILS # BLD AUTO: 0.05 K/UL
BASOPHILS NFR BLD AUTO: 0.9 %
BILIRUB SERPL-MCNC: 0.6 MG/DL
BUN SERPL-MCNC: 12 MG/DL
CALCIUM SERPL-MCNC: 8.9 MG/DL
CHLORIDE SERPL-SCNC: 108 MMOL/L
CO2 SERPL-SCNC: 20 MMOL/L
CREAT SERPL-MCNC: 1 MG/DL
EGFR: 103 ML/MIN/1.73M2
EOSINOPHIL # BLD AUTO: 0.39 K/UL
EOSINOPHIL NFR BLD AUTO: 7.4 %
GGT SERPL-CCNC: 45 U/L
GLUCOSE SERPL-MCNC: 97 MG/DL
HCT VFR BLD CALC: 39.2 %
HGB BLD-MCNC: 12.9 G/DL
IMM GRANULOCYTES NFR BLD AUTO: 0.2 %
LYMPHOCYTES # BLD AUTO: 3.25 K/UL
LYMPHOCYTES NFR BLD AUTO: 61.3 %
MAGNESIUM SERPL-MCNC: 1.7 MG/DL
MAN DIFF?: NORMAL
MCHC RBC-ENTMCNC: 27.8 PG
MCHC RBC-ENTMCNC: 32.9 GM/DL
MCV RBC AUTO: 84.5 FL
MONOCYTES # BLD AUTO: 0.45 K/UL
MONOCYTES NFR BLD AUTO: 8.5 %
NEUTROPHILS # BLD AUTO: 1.15 K/UL
NEUTROPHILS NFR BLD AUTO: 21.7 %
PHOSPHATE SERPL-MCNC: 2.5 MG/DL
PLATELET # BLD AUTO: 201 K/UL
POTASSIUM SERPL-SCNC: 4 MMOL/L
PROT SERPL-MCNC: 6.9 G/DL
RBC # BLD: 4.64 M/UL
RBC # FLD: 14.5 %
SODIUM SERPL-SCNC: 142 MMOL/L
TACROLIMUS SERPL-MCNC: 5.4 NG/ML
WBC # FLD AUTO: 5.3 K/UL

## 2024-08-14 ENCOUNTER — APPOINTMENT (OUTPATIENT)
Dept: HEPATOLOGY | Facility: CLINIC | Age: 34
End: 2024-08-14
Payer: MEDICAID

## 2024-08-14 VITALS
WEIGHT: 174 LBS | TEMPERATURE: 98.6 F | HEIGHT: 64 IN | SYSTOLIC BLOOD PRESSURE: 128 MMHG | HEART RATE: 102 BPM | BODY MASS INDEX: 29.71 KG/M2 | DIASTOLIC BLOOD PRESSURE: 73 MMHG | OXYGEN SATURATION: 98 % | RESPIRATION RATE: 14 BRPM

## 2024-08-14 DIAGNOSIS — D84.9 IMMUNODEFICIENCY, UNSPECIFIED: ICD-10-CM

## 2024-08-14 DIAGNOSIS — B25.9 CYTOMEGALOVIRAL DISEASE, UNSPECIFIED: ICD-10-CM

## 2024-08-14 DIAGNOSIS — K83.01 PRIMARY SCLEROSING CHOLANGITIS: ICD-10-CM

## 2024-08-14 PROCEDURE — 99214 OFFICE O/P EST MOD 30 MIN: CPT

## 2024-08-14 NOTE — PHYSICAL EXAM
[Sclera] : the sclera and conjunctiva were normal [Exaggerated Use Of Accessory Muscles For Inspiration] : no accessory muscle use [Auscultation Breath Sounds / Voice Sounds] : lungs were clear to auscultation bilaterally [Chest Palpation] : palpation of the chest revealed no abnormalities [Apical Impulse] : the apical impulse was normal [Heart Sounds] : normal S1 and S2 [Murmurs] : no murmurs [Abdomen Soft] : soft [Bowel Sounds] : normal bowel sounds [Abdomen Tenderness] : non-tender [] : no hepato-splenomegaly [Abdomen Mass (___ Cm)] : no abdominal mass palpated

## 2024-08-14 NOTE — PHYSICAL EXAM
[Sclera] : the sclera and conjunctiva were normal [Exaggerated Use Of Accessory Muscles For Inspiration] : no accessory muscle use [Auscultation Breath Sounds / Voice Sounds] : lungs were clear to auscultation bilaterally [Chest Palpation] : palpation of the chest revealed no abnormalities [Apical Impulse] : the apical impulse was normal [Murmurs] : no murmurs [Heart Sounds] : normal S1 and S2 [Abdomen Soft] : soft [Bowel Sounds] : normal bowel sounds [Abdomen Tenderness] : non-tender [] : no hepato-splenomegaly [Abdomen Mass (___ Cm)] : no abdominal mass palpated

## 2024-08-17 NOTE — ASSESSMENT
[FreeTextEntry1] : Mr. 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 The postoperative course was complicated with bile leak at hepaticojejunostomy requiring revision of biliary anastomosis/Velasquez-en-Y on November 7. He has excellent allograft function. Overall doing well.    PLAN I. Status post orthotopic liver transplantation -Patient with excellent allograft function. He will continue with his current immunosuppression regimen that will include Prograf 0.5 mg in the morning and 0.5 mg in the evening,  mg twice daily. His most recent tacro level is 5.3 ng/mL (Aug 12, 2024). Tacro goal 4-6. He is off Prednisone (6/28/2023).  II. Post transplant prophylaxis -Recommended him to continue aspirin 81 mg daily. -His Valcyte was discontinued on 6/28/2023, but CMV relapsed, and Valcyte was resumed on July 13, 2023. He is now off Valcyte since March 2024. CMV negative on Aug 12, 2024. -He discontinued Bactrim on May 1, 2023 (6 months after the transplant)  III. Blood-tinged stool with painful defecation -Resolved. Recent colonoscopy, March 25, 2024- hemorrhoids noted. biopsy no dysplasia or colitis  IV. Weight Gain The patient having recent weight gain. Discussed diet and exercise options for him with his physical limitations. He is working on it.  Medication changes: Will discontinue ursodiol and phos supplement.   RTC in 3 months. He will have follow-up labs monthly till next visit.

## 2024-08-17 NOTE — REVIEW OF SYSTEMS
[Fever] : no fever [Feeling Poorly] : not feeling poorly [Feeling Tired] : not feeling tired [Chest Pain] : no chest pain [Palpitations] : no palpitations [Lower Ext Edema] : no extremity edema [Abdominal Pain] : no abdominal pain [Vomiting] : no vomiting [Constipation] : no constipation [Diarrhea] : no diarrhea [Heartburn] : no heartburn [Melena] : no melena [Joint Swelling] : no joint swelling [Limb Pain] : no limb pain [Limb Swelling] : no limb swelling [Confused] : no confusion [Dizziness] : no dizziness [Fainting] : no fainting

## 2024-08-17 NOTE — HISTORY OF PRESENT ILLNESS
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   Mr. JAVY ZARATE a 33-year-old male came for a follow-up appointment in the hepatology department.  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 He also had a bout of COVID in September 2022 and received monoclonal antibodies as part of the treatment. On November 1, 2022, he underwent orthotopic liver transplantation with a Velasquez-en-Y hepaticojejunostomy for bile duct anastomosis. However, he experienced a bile leak at the hepaticojejunostomy, necessitating a revision of the biliary anastomosis/Velasquez-en-Y on November 7.  His post-operative course was uneventful until February 2023 when he was admitted with hematuria and diagnosed with a renal stone. A urine analysis revealed significant blood in the urine, but the culture showed no active infection. He was evaluated by urologist Dr. Deo Bains and underwent a negative cystoscopy. A non-contrast CT scan indicated a small renal stone, but no further intervention was required as the issue resolved on its own.  During his follow-up visit on June 28, 2023, he denied any gastrointestinal symptoms such as abdominal pain, nausea, vomiting, constipation, or diarrhea. Recent lab results from June 27, 2023, indicated excellent allograft (transplanted organ) function, along with normal renal function. His tacrolimus level was 5.5 ng/mL. He returned from a recent vacation with his family and reported an 8-pound weight loss since his last visit.  On his subsequent follow-up on August 8, 2023, he continued to be in good health with no new complaints. His graft function remained excellent. 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 His CMV treatment is being managed by infectious disease specialist Dr. Lantigua. Additionally, he continues to take Prograf (1 mg in the morning and 0.5 mg in the evening), MMF (1 g twice daily), and off prednisone (5 mg daily, since 6/28) to manage his post-transplant regimen. His Valcyte was discontinued on 6/28/2023, but CMV relapsed, and Valcyte was resumed on July 13, 2023. He discontinued Bactrim on May 1, 2023 (6 months after the transplant). Still on ASA 81 mg daily.  Sep 13, 2023: Today he returns for a follow-up visit along with his mother. He feels well and has no new complaints. He has gained about 4 pounds of weight in 1 month. I have cautioned him to be watchful on his diet and include exercise on his daily regimen. We did review blood test results from August 30, 2023 which revealed negative CMV PCR. Excellent allograft function. Tacrolimus level from Sep 12, 2023 was 4.1 ng/mL. His immunosuppression regimen has remained same since last visit. He continues to remain on Prograf 1 mg in the morning and 0.5 mg evening along with MMF 1 g twice daily. He remains now on Valcyte 900 mg daily since September 4, 2023 being managed by Dr. Lantigua. Dose was reduced from 900 mg twice a day to 900 mg daily as CMV PCR is now negative.  Nov 15, 2023: Patient returns for a follow-up accompanied by her mother. There are no new concerns except for a dry area beneath both eyes. Despite following the PCP's advice to use Aquaphor, there has been no improvement. The patient is currently on Valcyte 450 mg twice daily with low-level viremia. He reports experiencing constipation and pain during bowel movements, having taken Miralax 17 g daily without relief. I suggested increasing the Miralax to twice daily. Additionally, the patient has passed blood-tinged stool, possibly indicative of an anal fissure. I recommended using Anusol HC 2.5% twice daily for three weeks.  Feb 21, 2024: Doing well. Excellent allograft function. Taking Tacro 1 mg and 0.5, and Cellecpt 1000 mg bid. Off Prednisone. Reporting having some altered bowel movement, and pain before bowel movements, that resolves after bowel movements. He is due for a colonoscopic evaluation given his hx of PSC. We will schedule it.  May 29, 2024: Patient returns for a follow-up visit accompanied by her mother. He has no new complaints today. He however has gained 12 pounds of weight since February 2024. We discussed about the plan for weight loss with more exercise and dietary restrictions. Blood test results from May 20, 2024 was reviewed. This revealed excellent allograft function with normal liver enzymes. Normal kidney function. CBC revealed hemoglobin of 12.8 g/dL and a platelet count 245,000. He is phosphorus level is low at 2 mg/dL. His tacrolimus level is 4.1 ng/mL which is stable. I have recommended starting Phospha neutral 1 tablet daily. He will have follow-up labs monthly and will see us in 3 months. Recent colonoscopy on Mach 25, 2024: The perianal and digital rectal examinations were normal. There was stool throughout the entire colon (unable to rule out polyp <6 mm). Otherwise, the exam was otherwise normal throughout the examined colon. Biopsy from the Right/Left side of the colon and rectum were obtained. The terminal ileum appeared normal. Internal hemorrhoids were found during endoscopy. The hemorrhoids were small.  Aug 14, 2024: The patient presents today for a follow-up visit, accompanied by his mother. He reports feeling well but mentions that he has not been able to lose weight despite stopping snacking. He exercises three times a week for 20 minutes on a treadmill. His weight today is 174 lbs, down 2 lbs since the last visit. He denies any hospitalizations since the last visit. Recent lab work shows Tacrolimus 5.3 ng/mL (August 12, 2024), Creatinine 1.08, Phosphorus 2.9, liver enzymes (AST/ALT/Alk Phos/GGT) within normal limits, Hemoglobin 12.8 (baseline), and CMV not detected. The patient has excellent allograft function. He will continue with his current immunosuppression regimen, which includes Prograf 0.5 mg in the morning and 0.5 mg in the evening, and  mg twice daily. His Tacrolimus goal is 4-6 ng/mL. He has been off Prednisone since June 28, 2023. Medication changes include discontinuing ursodiol and phosphorus supplement.

## 2024-08-23 ENCOUNTER — TRANSCRIPTION ENCOUNTER (OUTPATIENT)
Age: 34
End: 2024-08-23

## 2024-08-23 DIAGNOSIS — R60.9 EDEMA, UNSPECIFIED: ICD-10-CM

## 2024-08-23 DIAGNOSIS — Z94.4 LIVER TRANSPLANT STATUS: ICD-10-CM

## 2024-08-23 RX ORDER — FUROSEMIDE 20 MG/1
20 TABLET ORAL
Qty: 30 | Refills: 5 | Status: ACTIVE | COMMUNITY
Start: 2024-08-23 | End: 1900-01-01

## 2024-09-04 NOTE — ED ADULT NURSE NOTE - NS PRO PASSIVE SMOKE EXP
STABLE  DENIES ANY CP, INDIGESTION, OR COUGH  NOTES NO MELENA OR HEMATOCHEZIA  NO HEMATEMESIS  COMPLIANT WITH MEDICATION  NO CONCERNS    - CONTINUE CURRENT TREATMENT PLAN  - MEDICATION AS PRESCRIBED  - AVOID CAFFEINE, ALCOHOL OR SMOKING     Unknown

## 2024-09-30 NOTE — DISCHARGE NOTE ADULT - MEDICATION SUMMARY - MEDICATIONS TO TAKE
Appointment scheduled for 10/15/24   I will START or STAY ON the medications listed below when I get home from the hospital:    diphenhydrAMINE  -- 25 milligram(s) by mouth every 6 hours, As Needed for pruritis  -- Indication: For itching    cholestyramine 4 g/9 g oral powder for reconstitution  -- 4 gram(s) by mouth once a day (at bedtime)   -- Indication: For itching    calamine topical lotion  -- 1 application on skin 4 times a day, As needed, Itching  -- Indication: For itching

## 2024-10-02 NOTE — PHYSICAL THERAPY INITIAL EVALUATION ADULT - IMPAIRMENTS CONTRIBUTING TO GAIT DEVIATIONS, PT EVAL
Orders and letter in for Pt's 10 year annual completed and will try and schedule in Nov or Dec this year.   
impaired balance/narrow base of support/impaired postural control/decreased strength
impaired balance/impaired coordination/narrow base of support/decreased strength

## 2024-10-25 ENCOUNTER — NON-APPOINTMENT (OUTPATIENT)
Age: 34
End: 2024-10-25

## 2024-10-30 ENCOUNTER — RESULT REVIEW (OUTPATIENT)
Age: 34
End: 2024-10-30

## 2024-10-30 ENCOUNTER — OUTPATIENT (OUTPATIENT)
Dept: OUTPATIENT SERVICES | Facility: HOSPITAL | Age: 34
LOS: 1 days | End: 2024-10-30
Payer: MEDICAID

## 2024-10-30 ENCOUNTER — APPOINTMENT (OUTPATIENT)
Dept: CV DIAGNOSITCS | Facility: HOSPITAL | Age: 34
End: 2024-10-30

## 2024-10-30 DIAGNOSIS — Z94.4 LIVER TRANSPLANT STATUS: Chronic | ICD-10-CM

## 2024-10-30 DIAGNOSIS — Z98.890 OTHER SPECIFIED POSTPROCEDURAL STATES: Chronic | ICD-10-CM

## 2024-10-30 DIAGNOSIS — K08.409 PARTIAL LOSS OF TEETH, UNSPECIFIED CAUSE, UNSPECIFIED CLASS: Chronic | ICD-10-CM

## 2024-10-30 DIAGNOSIS — Z94.4 LIVER TRANSPLANT STATUS: ICD-10-CM

## 2024-10-30 PROCEDURE — 93306 TTE W/DOPPLER COMPLETE: CPT | Mod: 26

## 2024-10-30 PROCEDURE — C8929: CPT

## 2024-11-18 ENCOUNTER — LABORATORY RESULT (OUTPATIENT)
Age: 34
End: 2024-11-18

## 2024-11-20 ENCOUNTER — APPOINTMENT (OUTPATIENT)
Dept: HEPATOLOGY | Facility: CLINIC | Age: 34
End: 2024-11-20
Payer: MEDICAID

## 2024-11-20 VITALS
SYSTOLIC BLOOD PRESSURE: 142 MMHG | TEMPERATURE: 97.9 F | HEIGHT: 64 IN | HEART RATE: 109 BPM | DIASTOLIC BLOOD PRESSURE: 84 MMHG | OXYGEN SATURATION: 98 % | BODY MASS INDEX: 29.02 KG/M2 | WEIGHT: 170 LBS

## 2024-11-20 DIAGNOSIS — D84.9 IMMUNODEFICIENCY, UNSPECIFIED: ICD-10-CM

## 2024-11-20 DIAGNOSIS — R21 RASH AND OTHER NONSPECIFIC SKIN ERUPTION: ICD-10-CM

## 2024-11-20 DIAGNOSIS — Z94.4 LIVER TRANSPLANT STATUS: ICD-10-CM

## 2024-11-20 PROCEDURE — 99214 OFFICE O/P EST MOD 30 MIN: CPT

## 2024-11-20 RX ORDER — HYDROCORTISONE 25 MG/G
2.5 CREAM TOPICAL
Qty: 1 | Refills: 1 | Status: ACTIVE | COMMUNITY
Start: 2024-11-20 | End: 1900-01-01

## 2024-11-20 RX ORDER — HYDROCORTISONE 10 MG/G
1 CREAM TOPICAL
Qty: 1 | Refills: 0 | Status: ACTIVE | COMMUNITY
Start: 2024-11-20 | End: 1900-01-01

## 2024-12-16 ENCOUNTER — TRANSCRIPTION ENCOUNTER (OUTPATIENT)
Age: 34
End: 2024-12-16

## 2024-12-19 ENCOUNTER — TRANSCRIPTION ENCOUNTER (OUTPATIENT)
Age: 34
End: 2024-12-19

## 2025-02-13 ENCOUNTER — TRANSCRIPTION ENCOUNTER (OUTPATIENT)
Age: 35
End: 2025-02-13

## 2025-02-19 ENCOUNTER — APPOINTMENT (OUTPATIENT)
Dept: HEPATOLOGY | Facility: CLINIC | Age: 35
End: 2025-02-19
Payer: MEDICAID

## 2025-02-19 VITALS
DIASTOLIC BLOOD PRESSURE: 76 MMHG | RESPIRATION RATE: 16 BRPM | SYSTOLIC BLOOD PRESSURE: 134 MMHG | WEIGHT: 170 LBS | TEMPERATURE: 98.3 F | BODY MASS INDEX: 29.02 KG/M2 | HEART RATE: 92 BPM | HEIGHT: 64 IN | OXYGEN SATURATION: 98 %

## 2025-02-19 DIAGNOSIS — Z94.4 LIVER TRANSPLANT STATUS: ICD-10-CM

## 2025-02-19 DIAGNOSIS — Z79.60 LONG TERM (CURRENT) USE OF UNSPECIFIED IMMUNOMODULATORS AND IMMUNOSUPPRESSANTS: ICD-10-CM

## 2025-02-19 LAB
ALBUMIN SERPL ELPH-MCNC: 4 G/DL
ALP BLD-CCNC: 108 U/L
ALT SERPL-CCNC: 15 U/L
ANION GAP SERPL CALC-SCNC: 8 MMOL/L
AST SERPL-CCNC: 20 U/L
BASOPHILS # BLD AUTO: 0.05 K/UL
BASOPHILS NFR BLD AUTO: 0.9 %
BILIRUB SERPL-MCNC: 0.6 MG/DL
BUN SERPL-MCNC: 21 MG/DL
CALCIUM SERPL-MCNC: 9.6 MG/DL
CHLORIDE SERPL-SCNC: 105 MMOL/L
CMV DNA SPEC QL NAA+PROBE: NOT DETECTED IU/ML
CMVPCR LOG: NOT DETECTED LOG10IU/ML
CO2 SERPL-SCNC: 28 MMOL/L
CREAT SERPL-MCNC: 1 MG/DL
EGFR: 103 ML/MIN/1.73M2
EOSINOPHIL # BLD AUTO: 0.33 K/UL
EOSINOPHIL NFR BLD AUTO: 6 %
GGT SERPL-CCNC: 87 U/L
GLUCOSE SERPL-MCNC: 101 MG/DL
HCT VFR BLD CALC: 43.3 %
HGB BLD-MCNC: 13.7 G/DL
IMM GRANULOCYTES NFR BLD AUTO: 0.2 %
LYMPHOCYTES # BLD AUTO: 3.14 K/UL
LYMPHOCYTES NFR BLD AUTO: 56.7 %
MAN DIFF?: NORMAL
MCHC RBC-ENTMCNC: 28.7 PG
MCHC RBC-ENTMCNC: 31.6 G/DL
MCV RBC AUTO: 90.6 FL
MONOCYTES # BLD AUTO: 0.48 K/UL
MONOCYTES NFR BLD AUTO: 8.7 %
NEUTROPHILS # BLD AUTO: 1.53 K/UL
NEUTROPHILS NFR BLD AUTO: 27.5 %
PLATELET # BLD AUTO: 294 K/UL
POTASSIUM SERPL-SCNC: 4.3 MMOL/L
PROT SERPL-MCNC: 6.9 G/DL
RBC # BLD: 4.78 M/UL
RBC # FLD: 15 %
SODIUM SERPL-SCNC: 141 MMOL/L
TACROLIMUS SERPL-MCNC: 4.7 NG/ML
WBC # FLD AUTO: 5.54 K/UL

## 2025-02-19 PROCEDURE — 99214 OFFICE O/P EST MOD 30 MIN: CPT

## 2025-02-20 ENCOUNTER — TRANSCRIPTION ENCOUNTER (OUTPATIENT)
Age: 35
End: 2025-02-20

## 2025-02-22 PROBLEM — Z79.60 LONG TERM CURRENT USE OF IMMUNOSUPPRESSIVE DRUG: Status: ACTIVE | Noted: 2025-02-22

## 2025-04-04 NOTE — H&P ADULT - MINUTES
BETHANIE AVENDANO FROM [facility]. CALLED AND REQUESTED ANY RECENT LAB RESULTS AND LST IF POSSIBLE FOR THE PT AND THEY ARE GOING TO DR PALACIOS.    CALL BACK NUMBER FOR [phone]    FAX NUMBER   [phone]   60

## 2025-05-23 ENCOUNTER — TRANSCRIPTION ENCOUNTER (OUTPATIENT)
Age: 35
End: 2025-05-23

## 2025-05-30 NOTE — ED STATDOCS - NSCAREINITIATED _GEN_ER
Discharge instructions reviewed with parent/guardian. Parent/guardian verbalized understanding. Patient is behaving age appropriately upon discharge, no acute distress. All belongings given to parent/guardian prior to discharge.      Bernabe Cassidy(Attending)

## 2025-06-02 NOTE — ED ADULT NURSE NOTE - CAS TRG GENERAL AIRWAY, MLM
Problem: Pain - Adult  Goal: Verbalizes/displays adequate comfort level or baseline comfort level  Outcome: Progressing     Problem: Pain  Goal: Performs ADL's with improved pain control throughout shift  Outcome: Progressing     Problem: Skin  Goal: Participates in plan/prevention/treatment measures  Outcome: Progressing     Problem: Fall/Injury  Goal: Use assistive devices by end of the shift  Outcome: Progressing    The clinical goals for the shift include pt will have adequate pain control through shift.   Patent

## 2025-06-05 ENCOUNTER — APPOINTMENT (OUTPATIENT)
Dept: NEUROLOGY | Facility: CLINIC | Age: 35
End: 2025-06-05
Payer: MEDICAID

## 2025-06-05 VITALS
BODY MASS INDEX: 31.01 KG/M2 | DIASTOLIC BLOOD PRESSURE: 85 MMHG | TEMPERATURE: 98.2 F | SYSTOLIC BLOOD PRESSURE: 130 MMHG | HEART RATE: 90 BPM | HEIGHT: 63 IN | WEIGHT: 175 LBS

## 2025-06-05 DIAGNOSIS — R29.6 REPEATED FALLS: ICD-10-CM

## 2025-06-05 DIAGNOSIS — G81.10 SPASTIC HEMIPLEGIA AFFECTING UNSPECIFIED SIDE: ICD-10-CM

## 2025-06-05 DIAGNOSIS — Z91.81 HISTORY OF FALLING: ICD-10-CM

## 2025-06-05 PROCEDURE — 99204 OFFICE O/P NEW MOD 45 MIN: CPT

## 2025-06-16 ENCOUNTER — OUTPATIENT (OUTPATIENT)
Dept: OUTPATIENT SERVICES | Facility: HOSPITAL | Age: 35
LOS: 1 days | End: 2025-06-16
Payer: MEDICAID

## 2025-06-16 ENCOUNTER — APPOINTMENT (OUTPATIENT)
Dept: MRI IMAGING | Facility: CLINIC | Age: 35
End: 2025-06-16
Payer: MEDICAID

## 2025-06-16 DIAGNOSIS — G81.10 SPASTIC HEMIPLEGIA AFFECTING UNSPECIFIED SIDE: ICD-10-CM

## 2025-06-16 DIAGNOSIS — Z94.4 LIVER TRANSPLANT STATUS: Chronic | ICD-10-CM

## 2025-06-16 DIAGNOSIS — Z98.890 OTHER SPECIFIED POSTPROCEDURAL STATES: Chronic | ICD-10-CM

## 2025-06-16 DIAGNOSIS — K08.409 PARTIAL LOSS OF TEETH, UNSPECIFIED CAUSE, UNSPECIFIED CLASS: Chronic | ICD-10-CM

## 2025-06-16 PROCEDURE — 72141 MRI NECK SPINE W/O DYE: CPT

## 2025-06-16 PROCEDURE — 70551 MRI BRAIN STEM W/O DYE: CPT | Mod: 26

## 2025-06-16 PROCEDURE — 72141 MRI NECK SPINE W/O DYE: CPT | Mod: 26

## 2025-06-16 PROCEDURE — 70551 MRI BRAIN STEM W/O DYE: CPT

## 2025-06-20 ENCOUNTER — NON-APPOINTMENT (OUTPATIENT)
Age: 35
End: 2025-06-20

## 2025-06-25 ENCOUNTER — APPOINTMENT (OUTPATIENT)
Dept: PHYSICAL MEDICINE AND REHAB | Facility: CLINIC | Age: 35
End: 2025-06-25

## 2025-07-01 ENCOUNTER — APPOINTMENT (OUTPATIENT)
Dept: DERMATOLOGY | Facility: CLINIC | Age: 35
End: 2025-07-01
Payer: MEDICAID

## 2025-07-01 ENCOUNTER — NON-APPOINTMENT (OUTPATIENT)
Age: 35
End: 2025-07-01

## 2025-07-01 PROBLEM — D22.9 MULTIPLE BENIGN MELANOCYTIC NEVI: Status: ACTIVE | Noted: 2025-07-01

## 2025-07-01 PROBLEM — L70.0 ACNE VULGARIS: Status: ACTIVE | Noted: 2025-07-01

## 2025-07-01 PROBLEM — Z12.83 SCREENING EXAM FOR SKIN CANCER: Status: ACTIVE | Noted: 2025-07-01

## 2025-07-01 PROCEDURE — 99204 OFFICE O/P NEW MOD 45 MIN: CPT

## 2025-07-01 RX ORDER — BENZOYL PEROXIDE 2.5 G/100G
2.5 GEL TOPICAL
Qty: 1 | Refills: 5 | Status: ACTIVE | COMMUNITY
Start: 2025-07-01 | End: 1900-01-01

## 2025-07-01 RX ORDER — CLINDAMYCIN PHOSPHATE 1 G/10ML
1 GEL TOPICAL
Qty: 1 | Refills: 5 | Status: ACTIVE | COMMUNITY
Start: 2025-07-01 | End: 1900-01-01

## 2025-07-07 RX ORDER — LORAZEPAM 0.5 MG/1
0.5 TABLET ORAL
Qty: 3 | Refills: 0 | Status: ACTIVE | COMMUNITY
Start: 2025-07-07 | End: 1900-01-01

## 2025-07-09 ENCOUNTER — APPOINTMENT (OUTPATIENT)
Dept: PHYSICAL MEDICINE AND REHAB | Facility: CLINIC | Age: 35
End: 2025-07-09
Payer: MEDICAID

## 2025-07-09 PROBLEM — G81.14 SPASTIC HEMIPARESIS OF LEFT NONDOMINANT SIDE: Status: ACTIVE | Noted: 2025-07-09

## 2025-07-09 PROBLEM — G80.2 SPASTIC HEMIPLEGIC CEREBRAL PALSY: Status: ACTIVE | Noted: 2025-07-09

## 2025-07-09 PROBLEM — R26.1 HEMIPLEGIC GAIT: Status: ACTIVE | Noted: 2025-07-09

## 2025-07-09 PROCEDURE — 99204 OFFICE O/P NEW MOD 45 MIN: CPT

## 2025-07-15 ENCOUNTER — NON-APPOINTMENT (OUTPATIENT)
Age: 35
End: 2025-07-15

## 2025-07-21 ENCOUNTER — APPOINTMENT (OUTPATIENT)
Dept: MRI IMAGING | Facility: CLINIC | Age: 35
End: 2025-07-21
Payer: MEDICAID

## 2025-07-21 ENCOUNTER — OUTPATIENT (OUTPATIENT)
Dept: OUTPATIENT SERVICES | Facility: HOSPITAL | Age: 35
LOS: 1 days | End: 2025-07-21
Payer: MEDICAID

## 2025-07-21 DIAGNOSIS — Z98.890 OTHER SPECIFIED POSTPROCEDURAL STATES: Chronic | ICD-10-CM

## 2025-07-21 DIAGNOSIS — G81.10 SPASTIC HEMIPLEGIA AFFECTING UNSPECIFIED SIDE: ICD-10-CM

## 2025-07-21 DIAGNOSIS — R29.6 REPEATED FALLS: ICD-10-CM

## 2025-07-21 DIAGNOSIS — Z94.4 LIVER TRANSPLANT STATUS: Chronic | ICD-10-CM

## 2025-07-21 DIAGNOSIS — K08.409 PARTIAL LOSS OF TEETH, UNSPECIFIED CAUSE, UNSPECIFIED CLASS: Chronic | ICD-10-CM

## 2025-07-21 PROCEDURE — 72141 MRI NECK SPINE W/O DYE: CPT

## 2025-07-21 PROCEDURE — 72141 MRI NECK SPINE W/O DYE: CPT | Mod: 26

## 2025-08-07 ENCOUNTER — APPOINTMENT (OUTPATIENT)
Dept: HEPATOLOGY | Facility: CLINIC | Age: 35
End: 2025-08-07
Payer: MEDICAID

## 2025-08-07 VITALS
SYSTOLIC BLOOD PRESSURE: 130 MMHG | WEIGHT: 173 LBS | TEMPERATURE: 96.9 F | OXYGEN SATURATION: 97 % | HEART RATE: 102 BPM | DIASTOLIC BLOOD PRESSURE: 74 MMHG | BODY MASS INDEX: 30.65 KG/M2

## 2025-08-07 DIAGNOSIS — Z94.4 LIVER TRANSPLANT STATUS: ICD-10-CM

## 2025-08-07 PROCEDURE — 99214 OFFICE O/P EST MOD 30 MIN: CPT

## 2025-08-08 ENCOUNTER — TRANSCRIPTION ENCOUNTER (OUTPATIENT)
Age: 35
End: 2025-08-08

## 2025-08-08 LAB
CHOLEST SERPL-MCNC: 139 MG/DL
ESTIMATED AVERAGE GLUCOSE: 94 MG/DL
HBA1C MFR BLD HPLC: 4.9 %
HDLC SERPL-MCNC: 50 MG/DL
LDLC SERPL-MCNC: 77 MG/DL
NONHDLC SERPL-MCNC: 89 MG/DL
TRIGL SERPL-MCNC: 58 MG/DL

## 2025-08-20 ENCOUNTER — OUTPATIENT (OUTPATIENT)
Dept: OUTPATIENT SERVICES | Facility: HOSPITAL | Age: 35
LOS: 1 days | End: 2025-08-20
Payer: MEDICAID

## 2025-08-20 ENCOUNTER — APPOINTMENT (OUTPATIENT)
Dept: MRI IMAGING | Facility: CLINIC | Age: 35
End: 2025-08-20
Payer: MEDICAID

## 2025-08-20 DIAGNOSIS — Z00.8 ENCOUNTER FOR OTHER GENERAL EXAMINATION: ICD-10-CM

## 2025-08-20 DIAGNOSIS — Z94.4 LIVER TRANSPLANT STATUS: Chronic | ICD-10-CM

## 2025-08-20 DIAGNOSIS — Z98.890 OTHER SPECIFIED POSTPROCEDURAL STATES: Chronic | ICD-10-CM

## 2025-08-20 DIAGNOSIS — R26.1 PARALYTIC GAIT: ICD-10-CM

## 2025-08-20 DIAGNOSIS — K08.409 PARTIAL LOSS OF TEETH, UNSPECIFIED CAUSE, UNSPECIFIED CLASS: Chronic | ICD-10-CM

## 2025-08-20 PROCEDURE — 72156 MRI NECK SPINE W/O & W/DYE: CPT | Mod: 26

## 2025-08-20 PROCEDURE — 70553 MRI BRAIN STEM W/O & W/DYE: CPT | Mod: 26

## 2025-08-20 PROCEDURE — 72157 MRI CHEST SPINE W/O & W/DYE: CPT | Mod: 26

## 2025-08-20 PROCEDURE — 72156 MRI NECK SPINE W/O & W/DYE: CPT

## 2025-08-20 PROCEDURE — A9585: CPT

## 2025-08-20 PROCEDURE — 72157 MRI CHEST SPINE W/O & W/DYE: CPT

## 2025-08-20 PROCEDURE — 70553 MRI BRAIN STEM W/O & W/DYE: CPT

## 2025-08-26 DIAGNOSIS — G95.9 DISEASE OF SPINAL CORD, UNSPECIFIED: ICD-10-CM

## 2025-09-05 ENCOUNTER — NON-APPOINTMENT (OUTPATIENT)
Age: 35
End: 2025-09-05

## 2025-09-09 ENCOUNTER — APPOINTMENT (OUTPATIENT)
Dept: PHYSICAL MEDICINE AND REHAB | Facility: CLINIC | Age: 35
End: 2025-09-09
Payer: MEDICAID

## 2025-09-09 DIAGNOSIS — G81.14 SPASTIC HEMIPLEGIA AFFECTING LEFT NONDOMINANT SIDE: ICD-10-CM

## 2025-09-09 PROCEDURE — 99214 OFFICE O/P EST MOD 30 MIN: CPT

## 2025-09-09 RX ORDER — ONABOTULINUMTOXINA 100 [USP'U]/1
100 INJECTION, POWDER, LYOPHILIZED, FOR SOLUTION INTRADERMAL; INTRAMUSCULAR
Qty: 6 | Refills: 0 | Status: ACTIVE | OUTPATIENT
Start: 2025-09-09

## 2025-09-16 ENCOUNTER — APPOINTMENT (OUTPATIENT)
Dept: NEUROSURGERY | Facility: CLINIC | Age: 35
End: 2025-09-16
Payer: MEDICAID

## 2025-09-16 VITALS
HEART RATE: 98 BPM | RESPIRATION RATE: 16 BRPM | WEIGHT: 170 LBS | SYSTOLIC BLOOD PRESSURE: 128 MMHG | BODY MASS INDEX: 30.12 KG/M2 | OXYGEN SATURATION: 97 % | DIASTOLIC BLOOD PRESSURE: 81 MMHG | HEIGHT: 63 IN

## 2025-09-16 PROCEDURE — 99204 OFFICE O/P NEW MOD 45 MIN: CPT

## (undated) DEVICE — DRSG OPSITE 13.75 X 4"

## (undated) DEVICE — POSITIONER FOAM EGG CRATE ULNAR 2PCS (PINK)

## (undated) DEVICE — BLADE SCALPEL SAFETYLOCK #11

## (undated) DEVICE — CLAMP INS FIBRA FOR SURG 61MM

## (undated) DEVICE — SOL IRR POUR H2O 250ML

## (undated) DEVICE — SUT BIOSYN 4-0 27" P-12

## (undated) DEVICE — NDL COUNTER FOAM AND MAGNET 40-70

## (undated) DEVICE — BAG BILE

## (undated) DEVICE — FOLEY HOLDER STATLOCK 2 WAY ADULT

## (undated) DEVICE — DRAPE LAPAROTOMY W POUCHES

## (undated) DEVICE — WARMING BLANKET UPPER ADULT

## (undated) DEVICE — GUN DIL ALLIANCE

## (undated) DEVICE — CLAMP BX HOT RAD JAW 3

## (undated) DEVICE — TOURNIQUET SET SURE-SNARE 22FR (2 TUBES, 2 UMBILICAL TAPES, 2 PLASTIC SNARES) 5"

## (undated) DEVICE — TUBING SUCTION 20FT

## (undated) DEVICE — WARMING BLANKET LOWER ADULT

## (undated) DEVICE — GLV 7.5 PROTEXIS (WHITE)

## (undated) DEVICE — SUT PROLENE 3-0 36" MH

## (undated) DEVICE — FEEDING TUBE NG ARGYLE PVC 8FR 41CM

## (undated) DEVICE — SYR LUER LOK 50CC

## (undated) DEVICE — DRAIN RESERVOIR FOR JACKSON PRATT 100CC CARDINAL

## (undated) DEVICE — TUBING IV SET GRAVITY 3Y 100" MACRO

## (undated) DEVICE — SSH-ERCP SCOPE TJF Q 180 V 2203371: Type: DURABLE MEDICAL EQUIPMENT

## (undated) DEVICE — FEEDING TUBE NG KANGAROO POLYURETHANE 5FR 51CM

## (undated) DEVICE — SUT PROLENE 4-0 30" SH-1

## (undated) DEVICE — SUT SOFSILK 2-0 18" TIES

## (undated) DEVICE — DRSG TEGADERM 6"X8"

## (undated) DEVICE — VESSEL LOOP MAXI-BLUE 0.120" X 16"

## (undated) DEVICE — STAPLER COVIDIEN ENDO GIA SHORT HANDLE

## (undated) DEVICE — DRSG KLING 4"

## (undated) DEVICE — POLY TRAP ETRAP

## (undated) DEVICE — BIOPSY FORCEP RADIAL JAW 4 STANDARD WITH NEEDLE

## (undated) DEVICE — PREP CHLORAPREP HI-LITE ORANGE 26ML

## (undated) DEVICE — LIGASURE SMALL JAW

## (undated) DEVICE — CATH IV SAFE BC 22G X 1" (BLUE)

## (undated) DEVICE — ADAPTER LUER STUB 15G

## (undated) DEVICE — SUT PDS II 1 54" TP-1

## (undated) DEVICE — SUT SURGIPRO II 2-0 30" GS-24

## (undated) DEVICE — SUT POLYSORB 3-0 30" V-20 UNDYED

## (undated) DEVICE — IRRIGATION TRAY W PISTON SYRINGE 60ML

## (undated) DEVICE — SUT SOFSILK 2-0 18" C-23

## (undated) DEVICE — GOWN TRIMAX LG

## (undated) DEVICE — SYR LUER LOK 20CC

## (undated) DEVICE — FORCEP RADIAL JAW 4 JUMBO 2.8MM 3.2MM 240CM ORANGE DISP

## (undated) DEVICE — SUT PROLENE 3-0 36" SH

## (undated) DEVICE — BLADE SCALPEL SAFETYLOCK #10

## (undated) DEVICE — MARKING PEN W RULER

## (undated) DEVICE — BLADE SCALPEL SAFETYLOCK #15

## (undated) DEVICE — FOLEY TRAY 16FR 5CC LTX UMETER CLOSED

## (undated) DEVICE — SUT PROLENE 5-0 24" C-1

## (undated) DEVICE — PACK IV START WITH CHG

## (undated) DEVICE — SUT SOFSILK 4-0 18" V-20

## (undated) DEVICE — GLV 6.5 PROTEXIS (WHITE)

## (undated) DEVICE — SUCTION YANKAUER OPEN TIP NO VENT CURVE

## (undated) DEVICE — NDL BIOPSY TRU-CUT 14G X 6"

## (undated) DEVICE — TUBING SUCTION CONN 6FT STERILE

## (undated) DEVICE — SUT SOFSILK 4-0 18" TIES

## (undated) DEVICE — Device

## (undated) DEVICE — SUCTION YANKAUER NO CONTROL VENT

## (undated) DEVICE — DRSG MASTISOL

## (undated) DEVICE — VALVE ENDOSCOPE DEFENDO SINGLE USE

## (undated) DEVICE — SUT SOFSILK 2 60" TIES

## (undated) DEVICE — DRSG TAPE UMBILICAL COTTON 2" X 30 X 1/8"

## (undated) DEVICE — SUT SOFSILK 0 30" TIES

## (undated) DEVICE — SOL INJ NS 0.9% 500ML 2 PORT

## (undated) DEVICE — SUT PROLENE 2-0 36" SH

## (undated) DEVICE — LAP PAD 18 X 18"

## (undated) DEVICE — VENODYNE/SCD SLEEVE FOOT

## (undated) DEVICE — DRAPE MAGNETIC INSTRUMENT MEDIUM

## (undated) DEVICE — DRAPE 3/4 SHEET W REINFORCEMENT 56X77"

## (undated) DEVICE — SUT SOFSILK 3-0 18" V-20 (POP-OFF)

## (undated) DEVICE — FORCEP RESCUE COMBO

## (undated) DEVICE — PACK MINOR

## (undated) DEVICE — SPECIMEN CONTAINER 100ML

## (undated) DEVICE — SOL IRR POUR NS 0.9% 500ML

## (undated) DEVICE — INSERT FIBRA FOR SURG CLAMP 33MM STERILE

## (undated) DEVICE — PACK BASIC

## (undated) DEVICE — GLV 8.5 PROTEXIS (WHITE)

## (undated) DEVICE — DRAPE TOWEL BLUE 17" X 24"

## (undated) DEVICE — DRAPE INSTRUMENT POUCH 6.75" X 11"

## (undated) DEVICE — ELCTR GROUNDING PAD ADULT COVIDIEN

## (undated) DEVICE — DRSG STERISTRIPS 0.5 X 4"

## (undated) DEVICE — NDL BIOPSY MONOPTY 18G X 20CM

## (undated) DEVICE — MEDICATION LABELS W MARKER

## (undated) DEVICE — SUT SOFSILK 2-0 30" TIES

## (undated) DEVICE — SET CATH RIC RAPID INFUSION EXCHANGE 7FRX5.08CM

## (undated) DEVICE — SYR ASEPTO

## (undated) DEVICE — GLV 7 PROTEXIS (WHITE)

## (undated) DEVICE — CLAMP SURG INSERT 86MM

## (undated) DEVICE — IRRIGATOR BIO SHIELD

## (undated) DEVICE — HND PC ELCTRSRG HAND CNTRL 10

## (undated) DEVICE — DRAPE LIGHT HANDLE COVER (BLUE)

## (undated) DEVICE — PACK FEM/POP

## (undated) DEVICE — WARMING BLANKET FULL UNDERBODY

## (undated) DEVICE — BRUSH COLONOSCOPY CYTOLOGY

## (undated) DEVICE — DRAPE SLUSH / WARMER 44 X 66"

## (undated) DEVICE — DRAPE ISOLATION BAG 20X20"

## (undated) DEVICE — SUT PDS II PLUS 5-0 30" RB-1

## (undated) DEVICE — LIGASURE IMPACT

## (undated) DEVICE — SNARE MINI-MICRO OVAL

## (undated) DEVICE — DRAPE 1/2 SHEET 40X57"

## (undated) DEVICE — DRSG KLING 6"

## (undated) DEVICE — SUT SOFSILK 4-0 30" TIES

## (undated) DEVICE — ELCTR BOVIE TIP BLADE VALLEYLAB 6.5"

## (undated) DEVICE — DRAIN JACKSON PRATT 10MM FLAT FULL NO TROCAR

## (undated) DEVICE — SUT BOOT STANDARD (ASSORTED) 5 PAIR

## (undated) DEVICE — SENSOR O2 FINGER ADULT

## (undated) DEVICE — VISITEC 4X4

## (undated) DEVICE — STAPLER SKIN VISI-STAT 35 WIDE

## (undated) DEVICE — DEVICE GRASPING RAPTOR

## (undated) DEVICE — VENODYNE/SCD SLEEVE CALF LARGE

## (undated) DEVICE — SUT PROLENE 6-0 24" BV

## (undated) DEVICE — VESSEL LOOP MAXI-YELLOW  0.120" X 16"

## (undated) DEVICE — LIGASURE EXACT DISSECTOR

## (undated) DEVICE — PACK BASIN SPECIAL PROCEDURE

## (undated) DEVICE — GOWN XL EXTRA LONG

## (undated) DEVICE — ELCTR HANDPIECE ARGON BEND A BEAM 6"

## (undated) DEVICE — TUBING TUR 2 PRONG

## (undated) DEVICE — DRAPE IOBAN 33" X 23"

## (undated) DEVICE — DRAPE C ARM UNIVERSAL

## (undated) DEVICE — ELCTR BOVIE TIP BLADE INSULATED 2.75" EDGE

## (undated) DEVICE — ELCTR BOVIE TIP BLADE INSULATED 6.5" EDGE

## (undated) DEVICE — SUT SOFSILK 2-0 18" V-20 (POP-OFF)

## (undated) DEVICE — STAPLER ETHICON ECHELON FLEX 45MM X 340MM

## (undated) DEVICE — GLV 8 PROTEXIS (WHITE)

## (undated) DEVICE — SUT PROLENE 6-0 30" C-1

## (undated) DEVICE — CATH NG SALEM SUMP 16FR

## (undated) DEVICE — DRAPE MAYO STAND 30"

## (undated) DEVICE — VESSEL LOOP MAXI-RED  0.120" X 16"

## (undated) DEVICE — CATH IV SAFE BC 20G X 1.16" (PINK)